# Patient Record
Sex: MALE | Race: OTHER | NOT HISPANIC OR LATINO | Employment: OTHER | URBAN - METROPOLITAN AREA
[De-identification: names, ages, dates, MRNs, and addresses within clinical notes are randomized per-mention and may not be internally consistent; named-entity substitution may affect disease eponyms.]

---

## 2017-06-12 ENCOUNTER — GENERIC CONVERSION - ENCOUNTER (OUTPATIENT)
Dept: OTHER | Facility: OTHER | Age: 62
End: 2017-06-12

## 2017-07-31 ENCOUNTER — ALLSCRIPTS OFFICE VISIT (OUTPATIENT)
Dept: OTHER | Facility: OTHER | Age: 62
End: 2017-07-31

## 2017-07-31 DIAGNOSIS — R53.83 OTHER FATIGUE: ICD-10-CM

## 2017-07-31 DIAGNOSIS — E78.5 HYPERLIPIDEMIA: ICD-10-CM

## 2017-07-31 DIAGNOSIS — Z00.00 ENCOUNTER FOR GENERAL ADULT MEDICAL EXAMINATION WITHOUT ABNORMAL FINDINGS: ICD-10-CM

## 2017-07-31 DIAGNOSIS — Z86.39 PERSONAL HISTORY OF OTHER ENDOCRINE, NUTRITIONAL AND METABOLIC DISEASE: ICD-10-CM

## 2017-07-31 DIAGNOSIS — M79.672 PAIN OF LEFT FOOT: ICD-10-CM

## 2017-08-01 ENCOUNTER — TRANSCRIBE ORDERS (OUTPATIENT)
Dept: ADMINISTRATIVE | Age: 62
End: 2017-08-01

## 2017-08-01 ENCOUNTER — APPOINTMENT (OUTPATIENT)
Dept: LAB | Age: 62
End: 2017-08-01
Payer: COMMERCIAL

## 2017-08-01 ENCOUNTER — APPOINTMENT (OUTPATIENT)
Dept: RADIOLOGY | Age: 62
End: 2017-08-01
Payer: COMMERCIAL

## 2017-08-01 DIAGNOSIS — R53.83 OTHER FATIGUE: ICD-10-CM

## 2017-08-01 DIAGNOSIS — E78.5 HYPERLIPIDEMIA: ICD-10-CM

## 2017-08-01 DIAGNOSIS — Z86.39 PERSONAL HISTORY OF OTHER ENDOCRINE, NUTRITIONAL AND METABOLIC DISEASE: ICD-10-CM

## 2017-08-01 DIAGNOSIS — Z00.00 ENCOUNTER FOR GENERAL ADULT MEDICAL EXAMINATION WITHOUT ABNORMAL FINDINGS: ICD-10-CM

## 2017-08-01 DIAGNOSIS — M79.672 PAIN OF LEFT FOOT: ICD-10-CM

## 2017-08-01 DIAGNOSIS — E11.9 DIABETES MELLITUS WITHOUT COMPLICATION (HCC): Primary | ICD-10-CM

## 2017-08-01 LAB
ALBUMIN SERPL BCP-MCNC: 3.6 G/DL (ref 3.5–5)
ALP SERPL-CCNC: 38 U/L (ref 46–116)
ALT SERPL W P-5'-P-CCNC: 28 U/L (ref 12–78)
ANION GAP SERPL CALCULATED.3IONS-SCNC: 7 MMOL/L (ref 4–13)
AST SERPL W P-5'-P-CCNC: 19 U/L (ref 5–45)
BASOPHILS # BLD AUTO: 0.03 THOUSANDS/ΜL (ref 0–0.1)
BASOPHILS NFR BLD AUTO: 0 % (ref 0–1)
BILIRUB SERPL-MCNC: 0.64 MG/DL (ref 0.2–1)
BUN SERPL-MCNC: 25 MG/DL (ref 5–25)
CALCIUM SERPL-MCNC: 9.4 MG/DL (ref 8.3–10.1)
CHLORIDE SERPL-SCNC: 102 MMOL/L (ref 100–108)
CHOLEST SERPL-MCNC: 180 MG/DL (ref 50–200)
CO2 SERPL-SCNC: 29 MMOL/L (ref 21–32)
CREAT SERPL-MCNC: 1.01 MG/DL (ref 0.6–1.3)
CREAT UR-MCNC: 38.6 MG/DL
EOSINOPHIL # BLD AUTO: 0.01 THOUSAND/ΜL (ref 0–0.61)
EOSINOPHIL NFR BLD AUTO: 0 % (ref 0–6)
ERYTHROCYTE [DISTWIDTH] IN BLOOD BY AUTOMATED COUNT: 14 % (ref 11.6–15.1)
EST. AVERAGE GLUCOSE BLD GHB EST-MCNC: 140 MG/DL
GFR SERPL CREATININE-BSD FRML MDRD: 79 ML/MIN/1.73SQ M
GLUCOSE P FAST SERPL-MCNC: 109 MG/DL (ref 65–99)
HBA1C MFR BLD: 6.5 % (ref 4.2–6.3)
HCT VFR BLD AUTO: 42.1 % (ref 36.5–49.3)
HDLC SERPL-MCNC: 35 MG/DL (ref 40–60)
HGB BLD-MCNC: 14 G/DL (ref 12–17)
LDLC SERPL CALC-MCNC: 116 MG/DL (ref 0–100)
LYMPHOCYTES # BLD AUTO: 1.89 THOUSANDS/ΜL (ref 0.6–4.47)
LYMPHOCYTES NFR BLD AUTO: 26 % (ref 14–44)
MCH RBC QN AUTO: 28.7 PG (ref 26.8–34.3)
MCHC RBC AUTO-ENTMCNC: 33.3 G/DL (ref 31.4–37.4)
MCV RBC AUTO: 86 FL (ref 82–98)
MICROALBUMIN UR-MCNC: <5 MG/L (ref 0–20)
MICROALBUMIN/CREAT 24H UR: <13 MG/G CREATININE (ref 0–30)
MONOCYTES # BLD AUTO: 0.75 THOUSAND/ΜL (ref 0.17–1.22)
MONOCYTES NFR BLD AUTO: 10 % (ref 4–12)
NEUTROPHILS # BLD AUTO: 4.69 THOUSANDS/ΜL (ref 1.85–7.62)
NEUTS SEG NFR BLD AUTO: 64 % (ref 43–75)
NRBC BLD AUTO-RTO: 0 /100 WBCS
PLATELET # BLD AUTO: 186 THOUSANDS/UL (ref 149–390)
PMV BLD AUTO: 11.8 FL (ref 8.9–12.7)
POTASSIUM SERPL-SCNC: 4.3 MMOL/L (ref 3.5–5.3)
PROT SERPL-MCNC: 7.6 G/DL (ref 6.4–8.2)
PSA SERPL-MCNC: 0.6 NG/ML (ref 0–4)
RBC # BLD AUTO: 4.87 MILLION/UL (ref 3.88–5.62)
SODIUM SERPL-SCNC: 138 MMOL/L (ref 136–145)
TRIGL SERPL-MCNC: 146 MG/DL
WBC # BLD AUTO: 7.39 THOUSAND/UL (ref 4.31–10.16)

## 2017-08-01 PROCEDURE — 73630 X-RAY EXAM OF FOOT: CPT

## 2017-08-01 PROCEDURE — 80061 LIPID PANEL: CPT

## 2017-08-01 PROCEDURE — 82570 ASSAY OF URINE CREATININE: CPT | Performed by: INTERNAL MEDICINE

## 2017-08-01 PROCEDURE — 83036 HEMOGLOBIN GLYCOSYLATED A1C: CPT

## 2017-08-01 PROCEDURE — 80053 COMPREHEN METABOLIC PANEL: CPT

## 2017-08-01 PROCEDURE — 82043 UR ALBUMIN QUANTITATIVE: CPT | Performed by: INTERNAL MEDICINE

## 2017-08-01 PROCEDURE — 36415 COLL VENOUS BLD VENIPUNCTURE: CPT

## 2017-08-01 PROCEDURE — 85025 COMPLETE CBC W/AUTO DIFF WBC: CPT

## 2017-08-01 PROCEDURE — G0103 PSA SCREENING: HCPCS

## 2017-08-08 ENCOUNTER — GENERIC CONVERSION - ENCOUNTER (OUTPATIENT)
Dept: OTHER | Facility: OTHER | Age: 62
End: 2017-08-08

## 2017-09-02 ENCOUNTER — APPOINTMENT (EMERGENCY)
Dept: RADIOLOGY | Facility: HOSPITAL | Age: 62
End: 2017-09-02
Payer: COMMERCIAL

## 2017-09-02 ENCOUNTER — HOSPITAL ENCOUNTER (OUTPATIENT)
Facility: HOSPITAL | Age: 62
Setting detail: OBSERVATION
Discharge: HOME | End: 2017-09-03
Attending: EMERGENCY MEDICINE | Admitting: INTERNAL MEDICINE
Payer: COMMERCIAL

## 2017-09-02 DIAGNOSIS — R07.9 CHEST PAIN, UNSPECIFIED TYPE: Primary | ICD-10-CM

## 2017-09-02 LAB
ALBUMIN SERPL BCP-MCNC: 3.6 G/DL (ref 3.5–5)
ALP SERPL-CCNC: 33 U/L (ref 46–116)
ALT SERPL W P-5'-P-CCNC: 36 U/L (ref 12–78)
ANION GAP SERPL CALCULATED.3IONS-SCNC: 8 MMOL/L (ref 4–13)
AST SERPL W P-5'-P-CCNC: 49 U/L (ref 5–45)
BASOPHILS # BLD AUTO: 0.04 THOUSANDS/ΜL (ref 0–0.1)
BASOPHILS NFR BLD AUTO: 1 % (ref 0–1)
BILIRUB SERPL-MCNC: 0.65 MG/DL (ref 0.2–1)
BUN SERPL-MCNC: 25 MG/DL (ref 5–25)
CALCIUM SERPL-MCNC: 9.2 MG/DL (ref 8.3–10.1)
CHLORIDE SERPL-SCNC: 104 MMOL/L (ref 100–108)
CO2 SERPL-SCNC: 25 MMOL/L (ref 21–32)
CREAT SERPL-MCNC: 1.04 MG/DL (ref 0.6–1.3)
EOSINOPHIL # BLD AUTO: 0 THOUSAND/ΜL (ref 0–0.61)
EOSINOPHIL NFR BLD AUTO: 0 % (ref 0–6)
ERYTHROCYTE [DISTWIDTH] IN BLOOD BY AUTOMATED COUNT: 13.8 % (ref 11.6–15.1)
GFR SERPL CREATININE-BSD FRML MDRD: 76 ML/MIN/1.73SQ M
GLUCOSE SERPL-MCNC: 113 MG/DL (ref 65–140)
HCT VFR BLD AUTO: 44.6 % (ref 36.5–49.3)
HGB BLD-MCNC: 15.3 G/DL (ref 12–17)
LYMPHOCYTES # BLD AUTO: 2.08 THOUSANDS/ΜL (ref 0.6–4.47)
LYMPHOCYTES NFR BLD AUTO: 26 % (ref 14–44)
MCH RBC QN AUTO: 29 PG (ref 26.8–34.3)
MCHC RBC AUTO-ENTMCNC: 34.3 G/DL (ref 31.4–37.4)
MCV RBC AUTO: 85 FL (ref 82–98)
MONOCYTES # BLD AUTO: 0.74 THOUSAND/ΜL (ref 0.17–1.22)
MONOCYTES NFR BLD AUTO: 9 % (ref 4–12)
NEUTROPHILS # BLD AUTO: 4.99 THOUSANDS/ΜL (ref 1.85–7.62)
NEUTS SEG NFR BLD AUTO: 64 % (ref 43–75)
NRBC BLD AUTO-RTO: 0 /100 WBCS
PLATELET # BLD AUTO: 193 THOUSANDS/UL (ref 149–390)
PMV BLD AUTO: 10.8 FL (ref 8.9–12.7)
POTASSIUM SERPL-SCNC: 4.2 MMOL/L (ref 3.5–5.3)
PROT SERPL-MCNC: 7.7 G/DL (ref 6.4–8.2)
RBC # BLD AUTO: 5.27 MILLION/UL (ref 3.88–5.62)
SODIUM SERPL-SCNC: 137 MMOL/L (ref 136–145)
SPECIMEN SOURCE: NORMAL
TROPONIN I BLD-MCNC: 0 NG/ML (ref 0–0.08)
WBC # BLD AUTO: 7.87 THOUSAND/UL (ref 4.31–10.16)

## 2017-09-02 PROCEDURE — 93005 ELECTROCARDIOGRAM TRACING: CPT | Performed by: EMERGENCY MEDICINE

## 2017-09-02 PROCEDURE — 80053 COMPREHEN METABOLIC PANEL: CPT | Performed by: EMERGENCY MEDICINE

## 2017-09-02 PROCEDURE — 36415 COLL VENOUS BLD VENIPUNCTURE: CPT

## 2017-09-02 PROCEDURE — 84484 ASSAY OF TROPONIN QUANT: CPT

## 2017-09-02 PROCEDURE — 71020 HB CHEST X-RAY 2VW FRONTAL&LATL: CPT

## 2017-09-02 PROCEDURE — 85025 COMPLETE CBC W/AUTO DIFF WBC: CPT | Performed by: EMERGENCY MEDICINE

## 2017-09-02 RX ORDER — OMEPRAZOLE 40 MG/1
40 CAPSULE, DELAYED RELEASE ORAL DAILY
COMMUNITY
End: 2018-01-28 | Stop reason: SDUPTHER

## 2017-09-02 RX ORDER — METOPROLOL TARTRATE 75 MG/1
TABLET, FILM COATED ORAL
COMMUNITY
End: 2017-09-03 | Stop reason: CLARIF

## 2017-09-02 RX ORDER — AMLODIPINE BESYLATE 5 MG/1
5 TABLET ORAL DAILY
COMMUNITY
End: 2018-02-25 | Stop reason: SDUPTHER

## 2017-09-02 RX ORDER — ASPIRIN 81 MG/1
81 TABLET, CHEWABLE ORAL DAILY
COMMUNITY

## 2017-09-02 RX ORDER — ENALAPRIL MALEATE 10 MG/1
10 TABLET ORAL DAILY
COMMUNITY
End: 2018-07-03 | Stop reason: SDUPTHER

## 2017-09-02 RX ORDER — TRIAMTERENE AND HYDROCHLOROTHIAZIDE 37.5; 25 MG/1; MG/1
1 CAPSULE ORAL EVERY MORNING
COMMUNITY
End: 2018-01-28 | Stop reason: SDUPTHER

## 2017-09-03 ENCOUNTER — APPOINTMENT (OUTPATIENT)
Dept: NON INVASIVE DIAGNOSTICS | Facility: HOSPITAL | Age: 62
End: 2017-09-03
Payer: COMMERCIAL

## 2017-09-03 ENCOUNTER — GENERIC CONVERSION - ENCOUNTER (OUTPATIENT)
Dept: OTHER | Facility: OTHER | Age: 62
End: 2017-09-03

## 2017-09-03 VITALS
SYSTOLIC BLOOD PRESSURE: 142 MMHG | HEART RATE: 64 BPM | TEMPERATURE: 98.7 F | BODY MASS INDEX: 41.75 KG/M2 | HEIGHT: 73 IN | WEIGHT: 315 LBS | DIASTOLIC BLOOD PRESSURE: 68 MMHG | RESPIRATION RATE: 20 BRPM | OXYGEN SATURATION: 93 %

## 2017-09-03 PROBLEM — R07.89 CHEST DISCOMFORT: Status: ACTIVE | Noted: 2017-09-03

## 2017-09-03 PROBLEM — R07.9 CHEST PAIN: Status: RESOLVED | Noted: 2017-09-03 | Resolved: 2017-09-03

## 2017-09-03 PROBLEM — R07.9 CHEST PAIN: Status: ACTIVE | Noted: 2017-09-03

## 2017-09-03 LAB
ANION GAP SERPL CALCULATED.3IONS-SCNC: 9 MMOL/L (ref 4–13)
ATRIAL RATE: 58 BPM
ATRIAL RATE: 77 BPM
BUN SERPL-MCNC: 20 MG/DL (ref 5–25)
CALCIUM SERPL-MCNC: 9 MG/DL (ref 8.3–10.1)
CHLORIDE SERPL-SCNC: 106 MMOL/L (ref 100–108)
CHOLEST SERPL-MCNC: 161 MG/DL (ref 50–200)
CO2 SERPL-SCNC: 26 MMOL/L (ref 21–32)
CREAT SERPL-MCNC: 0.92 MG/DL (ref 0.6–1.3)
ERYTHROCYTE [DISTWIDTH] IN BLOOD BY AUTOMATED COUNT: 13.6 % (ref 11.6–15.1)
EST. AVERAGE GLUCOSE BLD GHB EST-MCNC: 137 MG/DL
GFR SERPL CREATININE-BSD FRML MDRD: 88 ML/MIN/1.73SQ M
GLUCOSE SERPL-MCNC: 105 MG/DL (ref 65–140)
HBA1C MFR BLD: 6.4 % (ref 4.2–6.3)
HCT VFR BLD AUTO: 41.5 % (ref 36.5–49.3)
HDLC SERPL-MCNC: 31 MG/DL (ref 40–60)
HGB BLD-MCNC: 14.1 G/DL (ref 12–17)
LDLC SERPL CALC-MCNC: 109 MG/DL (ref 0–100)
MAGNESIUM SERPL-MCNC: 2.3 MG/DL (ref 1.6–2.6)
MCH RBC QN AUTO: 29 PG (ref 26.8–34.3)
MCHC RBC AUTO-ENTMCNC: 34 G/DL (ref 31.4–37.4)
MCV RBC AUTO: 85 FL (ref 82–98)
P AXIS: 40 DEGREES
P AXIS: 62 DEGREES
PLATELET # BLD AUTO: 168 THOUSANDS/UL (ref 149–390)
PMV BLD AUTO: 10.7 FL (ref 8.9–12.7)
POTASSIUM SERPL-SCNC: 3.3 MMOL/L (ref 3.5–5.3)
PR INTERVAL: 190 MS
PR INTERVAL: 212 MS
QRS AXIS: -45 DEGREES
QRS AXIS: -54 DEGREES
QRSD INTERVAL: 100 MS
QRSD INTERVAL: 104 MS
QT INTERVAL: 396 MS
QT INTERVAL: 438 MS
QTC INTERVAL: 429 MS
QTC INTERVAL: 448 MS
RBC # BLD AUTO: 4.87 MILLION/UL (ref 3.88–5.62)
SODIUM SERPL-SCNC: 141 MMOL/L (ref 136–145)
T WAVE AXIS: 41 DEGREES
T WAVE AXIS: 80 DEGREES
TRIGL SERPL-MCNC: 107 MG/DL
TROPONIN I SERPL-MCNC: <0.02 NG/ML
TROPONIN I SERPL-MCNC: <0.02 NG/ML
VENTRICULAR RATE: 58 BPM
VENTRICULAR RATE: 77 BPM
WBC # BLD AUTO: 5.89 THOUSAND/UL (ref 4.31–10.16)

## 2017-09-03 PROCEDURE — 83036 HEMOGLOBIN GLYCOSYLATED A1C: CPT | Performed by: INTERNAL MEDICINE

## 2017-09-03 PROCEDURE — 93005 ELECTROCARDIOGRAM TRACING: CPT | Performed by: INTERNAL MEDICINE

## 2017-09-03 PROCEDURE — 84484 ASSAY OF TROPONIN QUANT: CPT | Performed by: INTERNAL MEDICINE

## 2017-09-03 PROCEDURE — 85027 COMPLETE CBC AUTOMATED: CPT | Performed by: INTERNAL MEDICINE

## 2017-09-03 PROCEDURE — 93306 TTE W/DOPPLER COMPLETE: CPT

## 2017-09-03 PROCEDURE — 80048 BASIC METABOLIC PNL TOTAL CA: CPT | Performed by: INTERNAL MEDICINE

## 2017-09-03 PROCEDURE — 80061 LIPID PANEL: CPT | Performed by: INTERNAL MEDICINE

## 2017-09-03 PROCEDURE — 99285 EMERGENCY DEPT VISIT HI MDM: CPT

## 2017-09-03 PROCEDURE — 83735 ASSAY OF MAGNESIUM: CPT | Performed by: INTERNAL MEDICINE

## 2017-09-03 RX ORDER — PANTOPRAZOLE SODIUM 40 MG/1
40 TABLET, DELAYED RELEASE ORAL
Status: DISCONTINUED | OUTPATIENT
Start: 2017-09-03 | End: 2017-09-03 | Stop reason: HOSPADM

## 2017-09-03 RX ORDER — AMLODIPINE BESYLATE 5 MG/1
5 TABLET ORAL DAILY
Status: DISCONTINUED | OUTPATIENT
Start: 2017-09-03 | End: 2017-09-03 | Stop reason: HOSPADM

## 2017-09-03 RX ORDER — TRIAMTERENE AND HYDROCHLOROTHIAZIDE 37.5; 25 MG/1; MG/1
1 TABLET ORAL DAILY
Status: DISCONTINUED | OUTPATIENT
Start: 2017-09-03 | End: 2017-09-03 | Stop reason: HOSPADM

## 2017-09-03 RX ORDER — METOPROLOL SUCCINATE 25 MG/1
75 TABLET, EXTENDED RELEASE ORAL DAILY
COMMUNITY
Start: 2017-07-31 | End: 2018-01-28 | Stop reason: SDUPTHER

## 2017-09-03 RX ORDER — POTASSIUM CHLORIDE 20 MEQ/1
40 TABLET, EXTENDED RELEASE ORAL ONCE
Status: COMPLETED | OUTPATIENT
Start: 2017-09-03 | End: 2017-09-03

## 2017-09-03 RX ORDER — ENALAPRIL MALEATE 10 MG/1
10 TABLET ORAL DAILY
Status: DISCONTINUED | OUTPATIENT
Start: 2017-09-03 | End: 2017-09-03 | Stop reason: HOSPADM

## 2017-09-03 RX ORDER — ASPIRIN 81 MG/1
81 TABLET, CHEWABLE ORAL DAILY
Status: DISCONTINUED | OUTPATIENT
Start: 2017-09-03 | End: 2017-09-03 | Stop reason: HOSPADM

## 2017-09-03 RX ADMIN — ASPIRIN 81 MG 81 MG: 81 TABLET ORAL at 08:27

## 2017-09-03 RX ADMIN — ENALAPRIL MALEATE 10 MG: 10 TABLET ORAL at 08:27

## 2017-09-03 RX ADMIN — PANTOPRAZOLE SODIUM 40 MG: 40 TABLET, DELAYED RELEASE ORAL at 06:25

## 2017-09-03 RX ADMIN — POTASSIUM CHLORIDE 40 MEQ: 1500 TABLET, EXTENDED RELEASE ORAL at 13:43

## 2017-09-03 RX ADMIN — AMLODIPINE BESYLATE 5 MG: 5 TABLET ORAL at 08:27

## 2017-09-03 RX ADMIN — METOPROLOL SUCCINATE 75 MG: 50 TABLET, EXTENDED RELEASE ORAL at 08:26

## 2017-09-03 RX ADMIN — TRIAMTERENE AND HYDROCHLOROTHIAZIDE 1 TABLET: 37.5; 25 TABLET ORAL at 08:27

## 2017-09-03 RX ADMIN — PERFLUTREN 1 ML/MIN: 6.52 INJECTION, SUSPENSION INTRAVENOUS at 15:31

## 2017-09-04 LAB
ATRIAL RATE: 57 BPM
P AXIS: 54 DEGREES
PR INTERVAL: 208 MS
QRS AXIS: -41 DEGREES
QRSD INTERVAL: 100 MS
QT INTERVAL: 454 MS
QTC INTERVAL: 441 MS
T WAVE AXIS: 59 DEGREES
VENTRICULAR RATE: 57 BPM

## 2017-09-05 ENCOUNTER — TRANSCRIBE ORDERS (OUTPATIENT)
Dept: ADMINISTRATIVE | Age: 62
End: 2017-09-05

## 2017-09-05 ENCOUNTER — APPOINTMENT (OUTPATIENT)
Dept: RADIOLOGY | Age: 62
End: 2017-09-05
Payer: COMMERCIAL

## 2017-09-05 DIAGNOSIS — R06.02 SHORTNESS OF BREATH: ICD-10-CM

## 2017-09-05 DIAGNOSIS — R06.02 SHORTNESS OF BREATH: Primary | ICD-10-CM

## 2017-09-05 PROCEDURE — 71020 HB CHEST X-RAY 2VW FRONTAL&LATL: CPT

## 2017-09-07 ENCOUNTER — ALLSCRIPTS OFFICE VISIT (OUTPATIENT)
Dept: OTHER | Facility: OTHER | Age: 62
End: 2017-09-07

## 2017-09-07 ENCOUNTER — TRANSCRIBE ORDERS (OUTPATIENT)
Dept: ADMINISTRATIVE | Facility: HOSPITAL | Age: 62
End: 2017-09-07

## 2017-09-07 DIAGNOSIS — R07.9 CHEST PAIN, UNSPECIFIED: Primary | ICD-10-CM

## 2017-09-07 DIAGNOSIS — E87.6 HYPOKALEMIA: ICD-10-CM

## 2017-09-07 DIAGNOSIS — R07.9 CHEST PAIN: ICD-10-CM

## 2017-09-07 DIAGNOSIS — E11.9 TYPE 2 DIABETES MELLITUS WITHOUT COMPLICATIONS (HCC): ICD-10-CM

## 2017-09-22 ENCOUNTER — HOSPITAL ENCOUNTER (OUTPATIENT)
Dept: NON INVASIVE DIAGNOSTICS | Facility: CLINIC | Age: 62
Discharge: HOME | End: 2017-09-22
Payer: COMMERCIAL

## 2017-09-22 DIAGNOSIS — R07.9 CHEST PAIN, UNSPECIFIED: ICD-10-CM

## 2017-09-22 DIAGNOSIS — R07.9 CHEST PAIN: ICD-10-CM

## 2017-09-22 LAB
CHEST PAIN STATEMENT: NORMAL
MAX DIASTOLIC BP: 78 MMHG
MAX HEART RATE: 109 BPM
MAX PREDICTED HEART RATE: 157 BPM
MAX. SYSTOLIC BP: 142 MMHG
PROTOCOL NAME: NORMAL
REASON FOR TERMINATION: NORMAL
TARGET HR FORMULA: NORMAL
TEST INDICATION: NORMAL
TIME IN EXERCISE PHASE: 371 S

## 2017-09-22 PROCEDURE — A9502 TC99M TETROFOSMIN: HCPCS

## 2017-09-22 PROCEDURE — 93017 CV STRESS TEST TRACING ONLY: CPT

## 2017-09-22 PROCEDURE — 78452 HT MUSCLE IMAGE SPECT MULT: CPT

## 2017-09-22 RX ADMIN — REGADENOSON 0.4 MG: 0.08 INJECTION, SOLUTION INTRAVENOUS at 14:12

## 2018-01-11 NOTE — PROGRESS NOTES
Chief Complaint  Pt here for flu shot      Active Problems    1  Acute URI (465 9) (J06 9)   2  Allergy to IVP dye (995 27,E947 4) (T50 995A)   3  Bradycardia (427 89) (R00 1)   4  Dyslipidemia (272 4) (E78 5)   5  Encounter for screening for malignant neoplasm of prostate (V76 44) (Z12 5)   6  Erectile dysfunction of non-organic origin (302 72) (F52 21)   7  Esophageal reflux (530 81) (K21 9)   8  Hyperglycemia (790 29) (R73 9)   9  Hypertension (401 9) (I10)   10  Morbid or severe obesity due to excess calories (278 01) (E66 01)   11  Muscle ache (729 1) (M79 1)   12  Non-toxic uninodular goiter (241 0) (E04 1)   13  Obstructive sleep apnea (327 23) (G47 33)   14  Palpitations (785 1) (R00 2)   15  Seborrhea (706 3) (L21 9)   16  Spermatic cord inflammation (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 4) (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 8)   17  Testicular pain, right (608 9) (N50 8)   18  Vertigo (780 4) (R42)   19  Vitamin D deficiency (268 9) (E55 9)    Current Meds   1  Adult Aspirin EC Low Strength 81 MG Oral Tablet Delayed Release; Therapy: (Recorded:01Pxy0598) to Recorded   2  AmLODIPine Besylate 5 MG Oral Tablet; TAKE 1 TABLET DAILY FOR BLOOD   PRESSURE; Therapy: 56LIN8522 to (Last Rx:91Pzz8454)  Requested for: 54Jlt4978 Ordered   3  Amoxicillin 500 MG Oral Tablet; TAKE 1 TABLET EVERY 8 HOURS DAILY; Therapy: 11EIY0807 to (Evaluate:54Ewq4221); Last Rx:52Ije1177 Ordered   4  Blood Glucose Monitor System w/Device Kit; use as directed; Therapy: 88GNB6613 to (Last Rx:76Pnl7889) Ordered   5  [employer] Vitamin D CAPS; Therapy: (Recorded:28Cez9677) to Recorded   6  Enalapril Maleate 10 MG Oral Tablet; take 1 tablet twice a day; Therapy: 75UKN9029 to (Last Rx:85Olh9693)  Requested for: 12TAP1160 Ordered   7  Meclizine HCl - 12 5 MG Oral Tablet; Take 1 tablet 3 times daily as needed; Therapy: 79KGL7652 to (Last Rx:03Gth7945) Ordered   8  Metoprolol Succinate ER 50 MG Oral Tablet Extended Release 24 Hour; TAKE 1 5 TABS   DAILY;    Therapy: 64Vdr4335 to (Evaluate:43Ryx0474) Requested for: 71PXN0794; Last   Rx:02Yuk9942 Ordered   9  Multivitamins CAPS; Therapy: (Recorded:92Wfm1447) to Recorded   10  Omeprazole 40 MG Oral Capsule Delayed Release; TAKE 1 CAPSULE DAILY; Therapy: 72Qgm6328 to (Evaluate:83Hpm4061)  Requested for: 36Xhp1697 Recorded   11  OneTouch Verio In [employer]; TEST ONCE DAILY; Therapy: 75TEF6763 to (Last Rx:36Bcp6710)  Requested for: 67Fyz4951 Ordered   12  Triamterene-HCTZ 37 5-25 MG Oral Capsule; take 1 capsule daily; Therapy: 67QPH7556 to (Last Rx:06Jun2016)  Requested for: 06Jun2016 Ordered    Allergies    1   No Known Drug Allergies    Vitals  Signs    Temperature: 97 9 F, Tympanic    Plan  Flu vaccine need    · Fluzone Intramuscular Suspension    Signatures   Electronically signed by : ANTONIO Taylor ; Sep 26 2016  9:12PM EST                       (Author)

## 2018-01-12 NOTE — MISCELLANEOUS
Assessment    1  Chest pain (786 50) (R07 9)   2  Benign essential HTN (401 1) (I10)   3  DM type 2, goal HbA1c < 7% (250 00) (E11 9)   4  Dyslipidemia (272 4) (E78 5)   5  Hypokalemia (276 8) (E87 6)    Plan   DM type 2, goal HbA1c < 7%    · (1) COMPREHENSIVE METABOLIC PANEL; Status:Active; Requested DCT:78GQL0997;    Perform:[facility]; BQX:67UVS1762; Ordered; For:DM type 2, goal HbA1c < 7%; Ordered By:Theodora Taveras;   · (1) HEMOGLOBIN A1C; Status:Active; Requested HOW:57HUX7169;    Perform:[facility]; CZS:57OLQ1858; Ordered; For:DM type 2, goal HbA1c < 7%; Ordered By:Theodora Taveras;  Hypokalemia    · (1) BASIC METABOLIC PROFILE; Status:Active; Requested BPL:62DUC0205;    Perform:[facility]; Due:21Ctj9673; Ordered;  For:Hypokalemia; Ordered By:Rachna Taveras;    *VB - Eye Exam; Status:Active - Retrospective By Protocol Authorization; Requested WKI:[id number];   Perform:Other; Due:17Nxv1746; Last Updated By:Carol Coronel; 9/7/2017 12:19:08 PM;Ordered; For:DM type 2, goal HbA1c < 7%; Ordered By:Theodora Taveras;      Discussion/Summary  Counseling Documentation With Imm: The patient was counseled regarding impressions  Medication SE Review and Pt Understands Tx: Possible side effects of new medications were reviewed with the patient/guardian today  The treatment plan was reviewed with the patient/guardian  The patient/guardian understands and agrees with the treatment plan      Chief Complaint  Chief Complaint Free Text Note Form: Patient presents for a DESTINEE for chest pain from [facility] from 9/2/17 to 9/3/17  He still is having on/off dizziness  Has lessened his carbohydrate and sugar intake  History of Present Illness  TCM Communication [facility]: The patient is being contacted for follow-up after hospitalization  Hospital records were reviewed  He was hospitalized at [facility]  The date of admission: 09/02/2017, date of discharge: 09/03/2017   Diagnosis: CHEST DISCOMFORT  He was discharged to home  Medications reviewed and updated today  He scheduled a follow up appointment  Symptoms: dizziness and fatigue  LIGHT HEADED Counseling was provided to the patient  Topics counseled included importance of compliance with treatment  Communication performed and completed by Lauren Costa LPN   HPI: pt went to hosp  he is getting cp with exercise bike  he was negative for mi  his echo was unchanged  he states his nonfasting bld gluc nonfasting       Review of Systems  Complete-Male:   Constitutional: No fever or chills, feels well, no tiredness, no recent weight gain or weight loss  ENT: no complaints of earache, no hearing loss, no nosebleeds, no nasal discharge, no sore throat, no hoarseness  Cardiovascular: No complaints of slow heart rate, no fast heart rate, no chest pain, no palpitations, no leg claudication, no lower extremity  Respiratory: No complaints of shortness of breath, no wheezing, no cough, no SOB on exertion, no orthopnea or PND  Gastrointestinal: No complaints of abdominal pain, no constipation, no nausea or vomiting, no diarrhea or bloody stools  Active Problems     1  DM type 2, goal HbA1c < 7% (250 00) (E11 9)   2  AYLEEN (obstructive sleep apnea) (327 23) (G47 33)    Fatigue (780 79) (R53 83)       Left foot pain (729 5) (M79 672)        Benign essential HTN (401 1) (I10)       Dyslipidemia (272 4) (E78 5)          Past Medical History     1  History of Acute upper respiratory infection (465 9) (J06 9)   2  History of Edema (782 3) (R60 9)   3  History of Flu vaccine need (V04 81) (Z23)   4  Flu vaccine need (V04 81) (Z23)   5  History of Groin pain (789 09) (R10 30)   6  History of arthritis (V13 4) (Z87 39)   7  Denied: History of depression   8  History of epididymitis (V13 89) (Z87 438)   9  History of fatigue (V13 89) (Z87 898)   10  Denied: History of substance abuse   11  History of thyroid disease (V12 29) (Z86 39)   12   History of type 2 diabetes mellitus (V12 29) (Z86 39)   13  History of upper respiratory infection (V12 09) (Z87 09)   14  History of Irregular heart beat (427 9) (I49 9)   15  History of Lymph node enlargement (785 6) (R59 9)   16  History of Prediabetes (790 29) (R73 03)   17  History of Sexual dysfunction (302 70) (R37)   18  History of Tendinitis of elbow (727 09) (M77 8)   19  History of Upper respiratory infection (465 9) (J06 9)   20  History of URTI (acute upper respiratory infection) (465 9) (J06 9)   21  History of Vertigo (780 4) (R42)    History of Palpitations (785 1) (R00 2)          Surgical History    1  History of Colposcopy   2  Denied: History Of Prior Surgery  Surgical History Reviewed: The surgical history was reviewed and updated today  Family History  Mother    1  Family history of Essential Hypertension   2  Family history of cerebrovascular accident (V17 1) (Z82 3)   3  Denied: Family history of depression   4  Family history of hypertension (V17 49) (Z82 49)   5  Denied: Family history of substance abuse   6  Family history of Mother  At Age 61  Father    9  Family history of Essential Hypertension   8  Family history of cardiac disorder (V17 49) (Z82 49)   9  Denied: Family history of depression   10  Family history of hypertension (V17 49) (Z82 49)   11  Denied: Family history of substance abuse   12  Family history of Father  At Age 61  Brother    15  Family history of cerebrovascular accident (V17 1) (Z82 3)   14  Family history of hypertension (V17 49) (Z82 49)  Family History Reviewed: The family history was reviewed and updated today  Social History    · Denied: History of Drug Use   · Former smoker (V15 82) (G34 202)   · Never Drank Alcohol  Social History Reviewed: The social history was reviewed and updated today  The social history was reviewed and is unchanged  Current Meds   1   Adult Aspirin EC Low Strength 81 MG Oral Tablet Delayed Release; Therapy: (Recorded:42Tgr0148) to Recorded   2  AmLODIPine Besylate 5 MG Oral Tablet; TAKE 1 TABLET DAILY FOR BLOOD   PRESSURE; Therapy: 50FYT2105 to (Last Rx:51Vff4895)  Requested for: 93Cni1148 Ordered   3  Blood Glucose Monitor System w/Device Kit; use as directed; Therapy: 87SWN6167 to (Last Rx:06Uis3795) Ordered   4  [employer] Vitamin D CAPS; Therapy: (Recorded:24Dhf2054) to Recorded   5  Enalapril Maleate 10 MG Oral Tablet; take 1 tablet twice a day; Therapy: 71RMY4888 to ([id number])  Requested for: 68LRR1892; Last   Rx:27Rdf6505 Ordered   6  Metoprolol Succinate ER 25 MG Oral Tablet Extended Release 24 Hour; TAKE 1 TABLET   ONCE DAILY (take in addition to 50mg); Therapy: 11JBI8104 to ([id number])  Requested for: 70IEM5558; Last   Rx:46Djd7148 Ordered   7  Metoprolol Succinate ER 25 MG Oral Tablet Extended Release 24 Hour; TAKE 3 TABLET   Daily; Therapy: 90VDV3306 to Recorded   8  Metoprolol Succinate ER 50 MG Oral Tablet Extended Release 24 Hour; TAKE 1 TABLET   DAILY  (take in addition to 25mg); Therapy: 31Txw8937 to (Renew:27Jan2018)  Requested for: 71UJZ2252; Last   Rx:20Psb6935 Ordered   9  Multivitamins CAPS; Therapy: (Recorded:27Ztg0426) to Recorded   10  Omeprazole 40 MG Oral Capsule Delayed Release; TAKE 1 CAPSULE DAILY; Therapy: 22Dzu3217 to (XZFMBKN:92KOO3968)  Requested for: 77RKF3671; Last    Rx:04Jqt9559 Ordered   11  OneTouch Verio In [employer]; TEST ONCE DAILY; Therapy: 34ZZW7562 to (Last [place] Mike)  Requested for: 84MHZ1878 Ordered   12  Triamterene-HCTZ 37 5-25 MG Oral Capsule; take 1 capsule daily; Therapy: 76IEF3119 to Recorded   13  Viagra 100 MG Oral Tablet; take 1 tablet by mouth once daily as directed; Therapy: 98Moj6213 to (Renew:27Jan2018)  Requested for: 94Ssc6067; Last    Rx:06Zle4117 Ordered  Medication List Reviewed: The medication list was reviewed and updated today  Allergies    1   No Known Drug Allergies    Vitals  Signs   Recorded: 66BOY0130 09:39AM   Temperature: 97 7 F, Tympanic  Heart Rate: 72, R Brachial Artery  Pulse Quality: Normal, R Brachial Artery  Respiration Quality: Normal  Respiration: 16  Systolic: 295, LUE, Sitting  Diastolic: 70, LUE, Sitting  BP Cuff Size: Large  Height: 6 ft   Weight: 325 lb 6 oz  BMI Calculated: 44 13  BSA Calculated: 2 62  O2 Saturation: 97    Physical Exam    Constitutional   General appearance: No acute distress, well appearing and well nourished  Eyes   Conjunctiva and lids: No swelling, erythema, or discharge  Pupils and irises: Equal, round and reactive to light  Ears, Nose, Mouth, and Throat   External inspection of ears and nose: Normal     Otoscopic examination: Tympanic membrance translucent with normal light reflex  Canals patent without erythema  Nasal mucosa, septum, and turbinates: Normal without edema or erythema  Oropharynx: Normal with no erythema, edema, exudate or lesions  Pulmonary   Respiratory effort: No increased work of breathing or signs of respiratory distress  Auscultation of lungs: Clear to auscultation, equal breath sounds bilaterally, no wheezes, no rales, no rhonci  Cardiovascular   Auscultation of heart: Normal rate and rhythm, normal S1 and S2, without murmurs  Examination of extremities for edema and/or varicosities: Normal     Abdomen   Abdomen: Non-tender, no masses  Liver and spleen: No hepatomegaly or splenomegaly  Lymphatic   Palpation of lymph nodes in neck: No lymphadenopathy      Musculoskeletal   Gait and station: Normal          Future Appointments    Date/Time Provider Specialty Site   11/20/2017 01:00 PM Edmar Layne DO Internal Medicine [facility]   01/31/2018 02:30 PM Edmar Layne DO Internal Medicine [address] [place] [address]   Electronically signed by : Luciano Garcia DO; Sep  8 2017  4:27AM EST                       (Author)

## 2018-01-13 VITALS
HEART RATE: 60 BPM | BODY MASS INDEX: 42.66 KG/M2 | OXYGEN SATURATION: 97 % | WEIGHT: 315 LBS | HEIGHT: 72 IN | DIASTOLIC BLOOD PRESSURE: 60 MMHG | SYSTOLIC BLOOD PRESSURE: 110 MMHG

## 2018-01-13 VITALS
SYSTOLIC BLOOD PRESSURE: 122 MMHG | DIASTOLIC BLOOD PRESSURE: 74 MMHG | RESPIRATION RATE: 16 BRPM | WEIGHT: 315 LBS | HEART RATE: 66 BPM | BODY MASS INDEX: 42.66 KG/M2 | TEMPERATURE: 98.4 F | HEIGHT: 72 IN | OXYGEN SATURATION: 97 %

## 2018-01-13 VITALS
SYSTOLIC BLOOD PRESSURE: 132 MMHG | TEMPERATURE: 97.7 F | HEIGHT: 72 IN | HEART RATE: 72 BPM | RESPIRATION RATE: 16 BRPM | WEIGHT: 315 LBS | OXYGEN SATURATION: 97 % | DIASTOLIC BLOOD PRESSURE: 70 MMHG | BODY MASS INDEX: 42.66 KG/M2

## 2018-01-15 NOTE — MISCELLANEOUS
Message  left message his a1c went up to 6 5 with his weight gain  rec to reduce and rec restart metformen 500mg daily   please call back to discuss this      Signatures   Electronically signed by : Chelsie Colin DO; Aug  8 2017 12:22PM EST                       (Author)

## 2018-01-28 DIAGNOSIS — I10 HYPERTENSION, UNSPECIFIED TYPE: Primary | ICD-10-CM

## 2018-01-28 DIAGNOSIS — K21.9 GASTROESOPHAGEAL REFLUX DISEASE WITHOUT ESOPHAGITIS: ICD-10-CM

## 2018-01-29 RX ORDER — TRIAMTERENE AND HYDROCHLOROTHIAZIDE 37.5; 25 MG/1; MG/1
CAPSULE ORAL
Qty: 90 CAPSULE | Refills: 1 | Status: SHIPPED | OUTPATIENT
Start: 2018-01-29 | End: 2018-07-03 | Stop reason: SDUPTHER

## 2018-01-29 RX ORDER — OMEPRAZOLE 40 MG/1
CAPSULE, DELAYED RELEASE ORAL
Qty: 90 CAPSULE | Refills: 1 | Status: SHIPPED | OUTPATIENT
Start: 2018-01-29 | End: 2018-07-03 | Stop reason: SDUPTHER

## 2018-01-29 RX ORDER — METOPROLOL SUCCINATE 25 MG/1
TABLET, EXTENDED RELEASE ORAL
Qty: 90 TABLET | Refills: 1 | Status: SHIPPED | OUTPATIENT
Start: 2018-01-29 | End: 2018-07-03 | Stop reason: SDUPTHER

## 2018-02-07 DIAGNOSIS — I10 HYPERTENSION, UNSPECIFIED TYPE: Primary | ICD-10-CM

## 2018-02-07 RX ORDER — METOPROLOL SUCCINATE 50 MG/1
50 TABLET, EXTENDED RELEASE ORAL DAILY
Qty: 90 TABLET | Refills: 1 | Status: SHIPPED | OUTPATIENT
Start: 2018-02-07 | End: 2018-07-03 | Stop reason: SDUPTHER

## 2018-02-25 DIAGNOSIS — I10 HYPERTENSION, UNSPECIFIED TYPE: Primary | ICD-10-CM

## 2018-02-25 RX ORDER — AMLODIPINE BESYLATE 5 MG/1
TABLET ORAL
Qty: 90 TABLET | Refills: 1 | Status: SHIPPED | OUTPATIENT
Start: 2018-02-25 | End: 2018-07-03 | Stop reason: SDUPTHER

## 2018-05-07 ENCOUNTER — HOSPITAL ENCOUNTER (EMERGENCY)
Facility: HOSPITAL | Age: 63
Discharge: HOME | End: 2018-05-07
Attending: EMERGENCY MEDICINE | Admitting: EMERGENCY MEDICINE
Payer: COMMERCIAL

## 2018-05-07 ENCOUNTER — APPOINTMENT (EMERGENCY)
Dept: RADIOLOGY | Facility: HOSPITAL | Age: 63
End: 2018-05-07
Payer: COMMERCIAL

## 2018-05-07 VITALS
OXYGEN SATURATION: 96 % | HEART RATE: 74 BPM | RESPIRATION RATE: 18 BRPM | SYSTOLIC BLOOD PRESSURE: 133 MMHG | DIASTOLIC BLOOD PRESSURE: 64 MMHG | TEMPERATURE: 98.2 F
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
## 2018-05-07 DIAGNOSIS — S29.9XXA CHEST WALL INJURY: ICD-10-CM

## 2018-05-07 DIAGNOSIS — S93.402A LEFT ANKLE SPRAIN: ICD-10-CM

## 2018-05-07 DIAGNOSIS — S00.03XA SCALP CONTUSION: ICD-10-CM

## 2018-05-07 DIAGNOSIS — S06.0X9A CONCUSSION: Primary | ICD-10-CM

## 2018-05-07 LAB
ABO GROUP BLD: NORMAL
ANION GAP SERPL CALCULATED.3IONS-SCNC: 8 MMOL/L (ref 4–13)
ATRIAL RATE: 77 BPM
BACTERIA UR QL AUTO: ABNORMAL /HPF
BASOPHILS # BLD AUTO: 0.05 THOUSANDS/ΜL (ref 0–0.1)
BASOPHILS NFR BLD AUTO: 1 % (ref 0–1)
BILIRUB UR QL STRIP: NEGATIVE
BLD GP AB SCN SERPL QL: NEGATIVE
BUN SERPL-MCNC: 23 MG/DL (ref 5–25)
CALCIUM SERPL-MCNC: 8.9 MG/DL (ref 8.3–10.1)
CHLORIDE SERPL-SCNC: 102 MMOL/L (ref 100–108)
CLARITY UR: CLEAR
CO2 SERPL-SCNC: 29 MMOL/L (ref 21–32)
COLOR UR: YELLOW
COLOR, POC: YELLOW
CREAT SERPL-MCNC: 1.25 MG/DL (ref 0.6–1.3)
EOSINOPHIL # BLD AUTO: 0 THOUSAND/ΜL (ref 0–0.61)
EOSINOPHIL NFR BLD AUTO: 0 % (ref 0–6)
ERYTHROCYTE [DISTWIDTH] IN BLOOD BY AUTOMATED COUNT: 13.5 % (ref 11.6–15.1)
GFR SERPL CREATININE-BSD FRML MDRD: 60 ML/MIN/1.73SQ M
GLUCOSE SERPL-MCNC: 124 MG/DL (ref 65–140)
GLUCOSE UR STRIP-MCNC: NEGATIVE MG/DL
HCT VFR BLD AUTO: 40.2 % (ref 36.5–49.3)
HGB BLD-MCNC: 13.6 G/DL (ref 12–17)
HGB UR QL STRIP.AUTO: ABNORMAL
HYALINE CASTS #/AREA URNS LPF: ABNORMAL /LPF
KETONES UR STRIP-MCNC: NEGATIVE MG/DL
LEUKOCYTE ESTERASE UR QL STRIP: NEGATIVE
LYMPHOCYTES # BLD AUTO: 1.1 THOUSANDS/ΜL (ref 0.6–4.47)
LYMPHOCYTES NFR BLD AUTO: 17 % (ref 14–44)
MCH RBC QN AUTO: 29 PG (ref 26.8–34.3)
MCHC RBC AUTO-ENTMCNC: 33.8 G/DL (ref 31.4–37.4)
MCV RBC AUTO: 86 FL (ref 82–98)
MONOCYTES # BLD AUTO: 0.74 THOUSAND/ΜL (ref 0.17–1.22)
MONOCYTES NFR BLD AUTO: 12 % (ref 4–12)
NEUTROPHILS # BLD AUTO: 4.43 THOUSANDS/ΜL (ref 1.85–7.62)
NEUTS SEG NFR BLD AUTO: 70 % (ref 43–75)
NITRITE UR QL STRIP: NEGATIVE
NON-SQ EPI CELLS URNS QL MICRO: ABNORMAL /HPF
NRBC BLD AUTO-RTO: 0 /100 WBCS
P AXIS: 54 DEGREES
PH UR STRIP.AUTO: 7 [PH] (ref 4.5–8)
PLATELET # BLD AUTO: 176 THOUSANDS/UL (ref 149–390)
PMV BLD AUTO: 10.8 FL (ref 8.9–12.7)
POTASSIUM SERPL-SCNC: 3.4 MMOL/L (ref 3.5–5.3)
PR INTERVAL: 202 MS
PROT UR STRIP-MCNC: NEGATIVE MG/DL
QRS AXIS: -52 DEGREES
QRSD INTERVAL: 102 MS
QT INTERVAL: 392 MS
QTC INTERVAL: 443 MS
RBC # BLD AUTO: 4.69 MILLION/UL (ref 3.88–5.62)
RBC #/AREA URNS AUTO: ABNORMAL /HPF
RH BLD: POSITIVE
SODIUM SERPL-SCNC: 139 MMOL/L (ref 136–145)
SP GR UR STRIP.AUTO: 1.02 (ref 1–1.03)
SPECIMEN EXPIRATION DATE: NORMAL
T WAVE AXIS: 58 DEGREES
TROPONIN I SERPL-MCNC: <0.02 NG/ML
UROBILINOGEN UR QL STRIP.AUTO: 0.2 E.U./DL
VENTRICULAR RATE: 77 BPM
WBC # BLD AUTO: 6.33 THOUSAND/UL (ref 4.31–10.16)
WBC #/AREA URNS AUTO: ABNORMAL /HPF

## 2018-05-07 PROCEDURE — 84484 ASSAY OF TROPONIN QUANT: CPT | Performed by: EMERGENCY MEDICINE

## 2018-05-07 PROCEDURE — 81001 URINALYSIS AUTO W/SCOPE: CPT

## 2018-05-07 PROCEDURE — 74177 CT ABD & PELVIS W/CONTRAST: CPT

## 2018-05-07 PROCEDURE — 70486 CT MAXILLOFACIAL W/O DYE: CPT

## 2018-05-07 PROCEDURE — 99285 EMERGENCY DEPT VISIT HI MDM: CPT

## 2018-05-07 PROCEDURE — 96374 THER/PROPH/DIAG INJ IV PUSH: CPT

## 2018-05-07 PROCEDURE — 36415 COLL VENOUS BLD VENIPUNCTURE: CPT | Performed by: EMERGENCY MEDICINE

## 2018-05-07 PROCEDURE — 73630 X-RAY EXAM OF FOOT: CPT

## 2018-05-07 PROCEDURE — 93005 ELECTROCARDIOGRAM TRACING: CPT

## 2018-05-07 PROCEDURE — 72125 CT NECK SPINE W/O DYE: CPT

## 2018-05-07 PROCEDURE — 71045 X-RAY EXAM CHEST 1 VIEW: CPT

## 2018-05-07 PROCEDURE — 85025 COMPLETE CBC W/AUTO DIFF WBC: CPT | Performed by: EMERGENCY MEDICINE

## 2018-05-07 PROCEDURE — 93010 ELECTROCARDIOGRAM REPORT: CPT | Performed by: INTERNAL MEDICINE

## 2018-05-07 PROCEDURE — 96376 TX/PRO/DX INJ SAME DRUG ADON: CPT

## 2018-05-07 PROCEDURE — 71260 CT THORAX DX C+: CPT

## 2018-05-07 PROCEDURE — 73590 X-RAY EXAM OF LOWER LEG: CPT

## 2018-05-07 PROCEDURE — 80048 BASIC METABOLIC PNL TOTAL CA: CPT | Performed by: EMERGENCY MEDICINE

## 2018-05-07 PROCEDURE — 86901 BLOOD TYPING SEROLOGIC RH(D): CPT | Performed by: EMERGENCY MEDICINE

## 2018-05-07 PROCEDURE — 86850 RBC ANTIBODY SCREEN: CPT | Performed by: EMERGENCY MEDICINE

## 2018-05-07 PROCEDURE — 70450 CT HEAD/BRAIN W/O DYE: CPT

## 2018-05-07 PROCEDURE — 81002 URINALYSIS NONAUTO W/O SCOPE: CPT | Performed by: EMERGENCY MEDICINE

## 2018-05-07 PROCEDURE — 86900 BLOOD TYPING SEROLOGIC ABO: CPT | Performed by: EMERGENCY MEDICINE

## 2018-05-07 RX ORDER — FENTANYL CITRATE 50 UG/ML
50 INJECTION, SOLUTION INTRAMUSCULAR; INTRAVENOUS ONCE
Status: COMPLETED | OUTPATIENT
Start: 2018-05-07 | End: 2018-05-07

## 2018-05-07 RX ORDER — FENTANYL CITRATE 50 UG/ML
75 INJECTION, SOLUTION INTRAMUSCULAR; INTRAVENOUS ONCE
Status: COMPLETED | OUTPATIENT
Start: 2018-05-07 | End: 2018-05-07

## 2018-05-07 RX ORDER — OXYCODONE HYDROCHLORIDE AND ACETAMINOPHEN 5; 325 MG/1; MG/1
1 TABLET ORAL EVERY 4 HOURS PRN
Qty: 30 TABLET | Refills: 0 | Status: SHIPPED | OUTPATIENT
Start: 2018-05-07 | End: 2018-11-19

## 2018-05-07 RX ADMIN — FENTANYL CITRATE 50 MCG: 50 INJECTION, SOLUTION INTRAMUSCULAR; INTRAVENOUS at 21:06

## 2018-05-07 RX ADMIN — IOHEXOL 100 ML: 350 INJECTION, SOLUTION INTRAVENOUS at 21:51

## 2018-05-07 RX ADMIN — FENTANYL CITRATE 50 MCG: 50 INJECTION, SOLUTION INTRAMUSCULAR; INTRAVENOUS at 22:49

## 2018-05-08 NOTE — DISCHARGE INSTRUCTIONS
Ankle Sprain   WHAT YOU NEED TO KNOW:   An ankle sprain happens when 1 or more ligaments in your ankle joint stretch or tear  Ligaments are tough tissues that connect bones  Ligaments support your joints and keep your bones in place  DISCHARGE INSTRUCTIONS:   Return to the emergency department if:   · You have severe pain in your ankle  · Your foot or toes are cold or numb  · Your ankle becomes more weak or unstable (wobbly)  · You are unable to put any weight on your ankle or foot  · Your swelling has increased or returned  Contact your healthcare provider if:   · Your pain does not go away, even after treatment  · You have questions or concerns about your condition or care  Medicines: You may need any of the following:  · NSAIDs , such as ibuprofen, help decrease swelling, pain, and fever  This medicine is available with or without a doctor's order  NSAIDs can cause stomach bleeding or kidney problems in certain people  If you take blood thinner medicine, always ask your healthcare provider if NSAIDs are safe for you  Always read the medicine label and follow directions  · Acetaminophen  decreases pain  It is available without a doctor's order  Ask how much to take and how often to take it  Follow directions  Acetaminophen can cause liver damage if not taken correctly  · Prescription pain medicine  may be given  Ask how to take this medicine safely  · Take your medicine as directed  Contact your healthcare provider if you think your medicine is not helping or if you have side effects  Tell him or her if you are allergic to any medicine  Keep a list of the medicines, vitamins, and herbs you take  Include the amounts, and when and why you take them  Bring the list or the pill bottles to follow-up visits  Carry your medicine list with you in case of an emergency    Self care:   · Use support devices,  such as a brace, cast, or splint, may be needed to limit your movement and protect your joint  You may need to use crutches to decrease your pain as you move around  · Go to physical therapy as directed  A physical therapist teaches you exercises to help improve movement and strength, and to decrease pain  · Rest  your ankle so that it can heal  Return to normal activities as directed  · Apply ice on your ankle for 15 to 20 minutes every hour or as directed  Use an ice pack, or put crushed ice in a plastic bag  Cover it with a towel  Ice helps prevent tissue damage and decreases swelling and pain  · Compress  your ankle  Ask if you should wrap an elastic bandage around your injured ligament  An elastic bandage provides support and helps decrease swelling and movement so your joint can heal  Wear as long as directed  · Elevate  your ankle above the level of your heart as often as you can  This will help decrease swelling and pain  Prop your ankle on pillows or blankets to keep it elevated comfortably  Prevent another ankle sprain:   · Let your ankle heal   Find out how long your ligament needs to heal  Do not do any physical activity until your healthcare provider says it is okay  If you start activity too soon, you may develop a more serious injury  · Always warm up and stretch  before you exercise or play sports  · Use the right equipment  Always wear shoes that fit well and are made for the activity that you are doing  You may also need ankle supports, elbow and knee pads, or braces  Follow up with your healthcare provider as directed:  Write down your questions so you remember to ask them during your visits  © 2017 [address] Information is for End User's use only and may not be sold, redistributed or otherwise used for commercial purposes  All illustrations and images included in CareNotes® are the copyrighted property of [employer]  or [employer]  The above information is an  only   It is not intended as medical advice for individual conditions or treatments  Talk to your doctor, nurse or pharmacist before following any medical regimen to see if it is safe and effective for you  Concussion   WHAT YOU NEED TO KNOW:   A concussion is a mild brain injury  It is usually caused by a bump or blow to the head from a fall, a motor vehicle crash, or a sports injury  Sometimes being shaken forcefully may cause a concussion  DISCHARGE INSTRUCTIONS:   Have someone else call 911 for the following:   · Someone tries to wake you and cannot do so  · You have a seizure, increasing confusion, or a change in personality  · Your speech becomes slurred, or you have new vision problems  Return to the emergency department if:   · You have a severe headache that does not go away  · You have arm or leg weakness, numbness, or new problems with coordination  · You have blood or clear fluid coming out of the ears or nose  Contact your healthcare provider if:   · You have nausea or are vomiting  · You feel more sleepy than usual     · Your symptoms get worse  · Your symptoms last longer than 6 weeks after the injury  · You have questions or concerns about your condition or care  Medicines:   · Acetaminophen  helps to decrease pain  It is available without a doctor's order  Ask how much to take and how often to take it  Follow directions  Acetaminophen can cause liver damage if not taken correctly  · NSAIDs , such as ibuprofen, help decrease swelling and pain  NSAIDs can cause stomach bleeding or kidney problems in certain people  If you take blood thinner medicine, always ask your healthcare provider if NSAIDs are safe for you  Always read the medicine label and follow directions  · Take your medicine as directed  Contact your healthcare provider if you think your medicine is not helping or if you have side effects  Tell him or her if you are allergic to any medicine   Keep a list of the medicines, vitamins, and herbs you take  Include the amounts, and when and why you take them  Bring the list or the pill bottles to follow-up visits  Carry your medicine list with you in case of an emergency  Follow up with your healthcare provider as directed:  Write down your questions so you remember to ask them during your visits  Self-care:   · Rest  from physical and mental activities as directed  Mental activities are those that require thinking, concentration, and attention  You will need to rest until your symptoms are gone  Rest will allow you to recover from your concussion  Ask your healthcare provider when you can return to work and other daily activities  · Have someone stay with you for the first 24 hours after your injury  Your healthcare provider should be contacted if your symptoms get worse, or you develop new symptoms  · Do not participate in sports and physical activities until your healthcare provider says it is okay  They could make your symptoms worse or lead to another concussion  Your healthcare provider will tell you when it is okay for you to return to sports or physical activities  Prevent another concussion:   · Wear protective sports equipment that fit properly  Helmets help decrease your risk of a serious brain injury  Talk to your healthcare provider about ways you can decrease your risk for a concussion if you play sports  · Wear your seat belt  every time you travel  This helps to decrease your risk of a head injury if you are in a car accident  © 2017 [address] [employer] Information is for End User's use only and may not be sold, redistributed or otherwise used for commercial purposes  All illustrations and images included in CareNotes® are the copyrighted property of [employer]  or Jesse Reid  The above information is an  only  It is not intended as medical advice for individual conditions or treatments   Talk to your doctor, nurse or pharmacist before following any medical regimen to see if it is safe and effective for you  Contusion in Adults, Ambulatory Care   GENERAL INFORMATION:   A contusion  is a bruise that appears on your skin after an injury  A bruise happens when small blood vessels tear but skin does not  When blood vessels tear, blood leaks into nearby tissue, such as soft tissue or muscle  Common symptoms include the following:   · Pain that increases when you touch the bruise, walk, or use the area around the bruise    · Swelling or a lump at the site of the bruise or near it    · Red, blue, or black skin that may change to green or yellow after a few days    · Stiffness or problems moving the bruised area of your body  Seek immediate care for the following symptoms:   · New difficulty moving your injured area    · Tingling or numbness in or near the injured area    · Hand or foot below the bruise gets cold or turns pale  Treatment for a contusion  may include any of the following:  · NSAIDs  help decrease swelling and pain or fever  This medicine is available with or without a doctor's order  NSAIDs can cause stomach bleeding or kidney problems in certain people  If you take blood thinner medicine, always ask your healthcare provider if NSAIDs are safe for you  Always read the medicine label and follow directions  · Pain medicine  to decrease or take away pain  Do not wait until the pain is severe before you take your medicine  · Aspiration  to drain pooled blood in your muscle may be done to help prevent increased pressure in the muscle  · Surgery  may be done to repair a tear in the muscle or relieve pressure in the muscle caused by swelling  Care for a contusion:   · Rest the injured area  or use it less than usual  If you bruised your leg or foot, you may need crutches or a cane to help you walk  This will help you keep weight off your injured body part  Use crutches or a cane as directed      · Use ice  to decrease swelling and pain  Ice may also help prevent tissue damage  Use an ice pack, or put crushed ice in a plastic bag  Cover it with a towel and place it on your bruise for 15 to 20 minutes every hour or as directed  · Use Compression  An elastic bandage may be wrapped around a bruised muscle to support the area and decrease swelling  Make sure the bandage is not too tight  You should be able to fit 1 finger between the bandage and your skin  · Elevate (raise) your injured body part  above the level of your heart to help decrease pain and swelling  Use pillows, blankets, or rolled towels to elevate the area as often as you can  · Do not massage or use heat  Heat and massage may slow healing of the area  · Do not drink alcohol  Alcohol may slow healing of your injury  · Do not stretch injured muscles  Ask your healthcare provider when and how you may safely stretch after your injury  Prevent a contusion:   · Stretch and warm up before you play sports or exercise  · Wear protective gear when you play sports  Examples are shin guards and padding  · If you begin a new physical activity, start slowly to give your body a chance to adjust   Follow up with your healthcare provider as directed:  Write down your questions so you remember to ask them during your visits  CARE AGREEMENT:   You have the right to help plan your care  Learn about your health condition and how it may be treated  Discuss treatment options with your caregivers to decide what care you want to receive  You always have the right to refuse treatment  The above information is an  only  It is not intended as medical advice for individual conditions or treatments  Talk to your doctor, nurse or pharmacist before following any medical regimen to see if it is safe and effective for you    © 2014 [address] is for End User's use only and may not be sold, redistributed or otherwise used for commercial purposes  All illustrations and images included in CareNotes® are the copyrighted property of A [employer]  or [address]  Chest Wall Pain   WHAT YOU NEED TO KNOW:   Chest wall pain may be caused by problems with the muscles, cartilage, or bones of the chest wall  Chest wall pain may also be caused by pain that spreads to your chest from another part of your body  The pain may be aching, severe, dull, or sharp  It may come and go, or it may be constant  The pain may be worse when you move in certain ways, breathe deeply, or cough  DISCHARGE INSTRUCTIONS:   Call 911 if:   · You have any of the following signs of a heart attack:      ¨ Squeezing, pressure, or pain in your chest that lasts longer than 5 minutes or returns    ¨ Discomfort or pain in your back, neck, jaw, stomach, or arm     ¨ Trouble breathing    ¨ Nausea or vomiting    ¨ Lightheadedness or a sudden cold sweat, especially with chest pain or trouble breathing    Return to the emergency department if:   · You have severe pain  Contact your healthcare provider if:   · You develop a rash  · You have other new symptoms  · Your pain does not improve, even with treatment  · You have questions or concerns about your condition or care  Medicines: You may need any of the following:  · NSAIDs , such as ibuprofen, help decrease swelling, pain, and fever  This medicine is available with or without a doctor's order  NSAIDs can cause stomach bleeding or kidney problems in certain people  If you take blood thinner medicine, always ask your healthcare provider if NSAIDs are safe for you  Always read the medicine label and follow directions  · Acetaminophen  decreases pain  It is available without a doctor's order  Ask how much to take and how often to take it  Follow directions  Acetaminophen can cause liver damage if not taken correctly  · A cream  may be applied to your chest to decrease pain       · Take your medicine as directed  Contact your healthcare provider if you think your medicine is not helping or if you have side effects  Tell him of her if you are allergic to any medicine  Keep a list of the medicines, vitamins, and herbs you take  Include the amounts, and when and why you take them  Bring the list or the pill bottles to follow-up visits  Carry your medicine list with you in case of an emergency  Follow up with your healthcare provider as directed:  Write down your questions so you remember to ask them during your visits  Self-care:   · Rest  as needed  Avoid activities that make your chest wall pain worse  · Apply heat  on your chest for 20 to 30 minutes every 2 hours for as many days as directed  Heat helps decrease pain and muscle spasms  · Apply ice  on your chest for 15 to 20 minutes every hour or as directed  Use an ice pack, or put crushed ice in a plastic bag  Cover it with a towel  Ice helps prevent tissue damage and decreases swelling and pain  © 2017 [facility] [facility] Information is for End User's use only and may not be sold, redistributed or otherwise used for commercial purposes  All illustrations and images included in CareNotes® are the copyrighted property of [employer]  or Jesse Reid  The above information is an  only  It is not intended as medical advice for individual conditions or treatments  Talk to your doctor, nurse or pharmacist before following any medical regimen to see if it is safe and effective for you  Ankle Sprain   WHAT YOU NEED TO KNOW:   An ankle sprain happens when 1 or more ligaments in your ankle joint stretch or tear  Ligaments are tough tissues that connect bones  Ligaments support your joints and keep your bones in place  DISCHARGE INSTRUCTIONS:   Return to the emergency department if:   · You have severe pain in your ankle  · Your foot or toes are cold or numb      · Your ankle becomes more weak or unstable (wobbly)  · You are unable to put any weight on your ankle or foot  · Your swelling has increased or returned  Contact your healthcare provider if:   · Your pain does not go away, even after treatment  · You have questions or concerns about your condition or care  Medicines: You may need any of the following:  · NSAIDs , such as ibuprofen, help decrease swelling, pain, and fever  This medicine is available with or without a doctor's order  NSAIDs can cause stomach bleeding or kidney problems in certain people  If you take blood thinner medicine, always ask your healthcare provider if NSAIDs are safe for you  Always read the medicine label and follow directions  · Acetaminophen  decreases pain  It is available without a doctor's order  Ask how much to take and how often to take it  Follow directions  Acetaminophen can cause liver damage if not taken correctly  · Prescription pain medicine  may be given  Ask how to take this medicine safely  · Take your medicine as directed  Contact your healthcare provider if you think your medicine is not helping or if you have side effects  Tell him or her if you are allergic to any medicine  Keep a list of the medicines, vitamins, and herbs you take  Include the amounts, and when and why you take them  Bring the list or the pill bottles to follow-up visits  Carry your medicine list with you in case of an emergency  Self care:   · Use support devices,  such as a brace, cast, or splint, may be needed to limit your movement and protect your joint  You may need to use crutches to decrease your pain as you move around  · Go to physical therapy as directed  A physical therapist teaches you exercises to help improve movement and strength, and to decrease pain  · Rest  your ankle so that it can heal  Return to normal activities as directed  · Apply ice on your ankle for 15 to 20 minutes every hour or as directed   Use an ice pack, or put crushed ice in a plastic bag  Cover it with a towel  Ice helps prevent tissue damage and decreases swelling and pain  · Compress  your ankle  Ask if you should wrap an elastic bandage around your injured ligament  An elastic bandage provides support and helps decrease swelling and movement so your joint can heal  Wear as long as directed  · Elevate  your ankle above the level of your heart as often as you can  This will help decrease swelling and pain  Prop your ankle on pillows or blankets to keep it elevated comfortably  Prevent another ankle sprain:   · Let your ankle heal   Find out how long your ligament needs to heal  Do not do any physical activity until your healthcare provider says it is okay  If you start activity too soon, you may develop a more serious injury  · Always warm up and stretch  before you exercise or play sports  · Use the right equipment  Always wear shoes that fit well and are made for the activity that you are doing  You may also need ankle supports, elbow and knee pads, or braces  Follow up with your healthcare provider as directed:  Write down your questions so you remember to ask them during your visits  © 2017 [address] [employer] Information is for End User's use only and may not be sold, redistributed or otherwise used for commercial purposes  All illustrations and images included in CareNotes® are the copyrighted property of [employer]  or Jesse Reid  The above information is an  only  It is not intended as medical advice for individual conditions or treatments  Talk to your doctor, nurse or pharmacist before following any medical regimen to see if it is safe and effective for you
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
## 2018-06-13 RX ORDER — TRIAMTERENE CAPSULES 100 MG/1
100 CAPSULE ORAL
COMMUNITY
End: 2018-12-03 | Stop reason: ALTCHOICE

## 2018-06-13 RX ORDER — IBUPROFEN 600 MG/1
600 TABLET ORAL EVERY 6 HOURS
COMMUNITY
Start: 2018-03-16 | End: 2019-10-04

## 2018-06-13 RX ORDER — SILDENAFIL 100 MG/1
TABLET, FILM COATED ORAL
COMMUNITY
End: 2019-01-02

## 2018-06-13 RX ORDER — METOPROLOL TARTRATE 100 MG/1
100 TABLET ORAL
COMMUNITY
End: 2018-11-19

## 2018-06-13 RX ORDER — MECLIZINE HCL 12.5 MG/1
TABLET ORAL
COMMUNITY
End: 2018-11-19

## 2018-06-14 ENCOUNTER — OFFICE VISIT (OUTPATIENT)
Dept: FAMILY MEDICINE CLINIC | Facility: CLINIC | Age: 63
End: 2018-06-14
Payer: COMMERCIAL

## 2018-06-14 ENCOUNTER — HOSPITAL ENCOUNTER (OUTPATIENT)
Dept: ULTRASOUND IMAGING | Facility: HOSPITAL | Age: 63
Discharge: HOME | End: 2018-06-14
Payer: COMMERCIAL

## 2018-06-14 VITALS
HEART RATE: 68 BPM | BODY MASS INDEX: 41.75 KG/M2 | TEMPERATURE: 98.7 F | RESPIRATION RATE: 16 BRPM | SYSTOLIC BLOOD PRESSURE: 130 MMHG | DIASTOLIC BLOOD PRESSURE: 68 MMHG | OXYGEN SATURATION: 98 % | WEIGHT: 315 LBS | HEIGHT: 73 IN

## 2018-06-14 DIAGNOSIS — E11.9 TYPE 2 DIABETES MELLITUS WITHOUT COMPLICATION, WITHOUT LONG-TERM CURRENT USE OF INSULIN (HCC): ICD-10-CM

## 2018-06-14 DIAGNOSIS — M79.89 LEG SWELLING: ICD-10-CM

## 2018-06-14 DIAGNOSIS — M79.89 LEG SWELLING: Primary | ICD-10-CM

## 2018-06-14 LAB — SL AMB POCT HEMOGLOBIN AIC: 6.5

## 2018-06-14 PROCEDURE — 99214 OFFICE O/P EST MOD 30 MIN: CPT | Performed by: INTERNAL MEDICINE

## 2018-06-14 PROCEDURE — 93971 EXTREMITY STUDY: CPT

## 2018-06-14 PROCEDURE — 83036 HEMOGLOBIN GLYCOSYLATED A1C: CPT | Performed by: INTERNAL MEDICINE

## 2018-06-14 RX ORDER — MELOXICAM 15 MG/1
15 TABLET ORAL DAILY
Qty: 15 TABLET | Refills: 0 | Status: SHIPPED | OUTPATIENT
Start: 2018-06-14 | End: 2019-10-04
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
## 2018-06-14 NOTE — ADDENDUM NOTE
Addended by: Roni [facility] on: 6/14/2018 03:57 PM     Modules accepted: Orders

## 2018-06-15 PROCEDURE — 93971 EXTREMITY STUDY: CPT | Performed by: SURGERY

## 2018-07-03 DIAGNOSIS — I10 HYPERTENSION, UNSPECIFIED TYPE: ICD-10-CM

## 2018-07-03 DIAGNOSIS — K21.9 GASTROESOPHAGEAL REFLUX DISEASE WITHOUT ESOPHAGITIS: ICD-10-CM

## 2018-07-03 RX ORDER — METOPROLOL SUCCINATE 25 MG/1
25 TABLET, EXTENDED RELEASE ORAL DAILY
Qty: 90 TABLET | Refills: 0 | Status: SHIPPED | OUTPATIENT
Start: 2018-07-03 | End: 2018-10-09 | Stop reason: SDUPTHER

## 2018-07-03 RX ORDER — OMEPRAZOLE 40 MG/1
40 CAPSULE, DELAYED RELEASE ORAL DAILY
Qty: 90 CAPSULE | Refills: 0 | Status: SHIPPED | OUTPATIENT
Start: 2018-07-03 | End: 2018-10-09 | Stop reason: SDUPTHER

## 2018-07-03 RX ORDER — METOPROLOL SUCCINATE 50 MG/1
50 TABLET, EXTENDED RELEASE ORAL DAILY
Qty: 90 TABLET | Refills: 0 | Status: SHIPPED | OUTPATIENT
Start: 2018-07-03 | End: 2018-10-09 | Stop reason: SDUPTHER

## 2018-07-03 RX ORDER — ENALAPRIL MALEATE 10 MG/1
10 TABLET ORAL DAILY
Qty: 90 TABLET | Refills: 0 | Status: SHIPPED | OUTPATIENT
Start: 2018-07-03 | End: 2018-10-01 | Stop reason: SDUPTHER

## 2018-07-03 RX ORDER — AMLODIPINE BESYLATE 5 MG/1
5 TABLET ORAL DAILY
Qty: 90 TABLET | Refills: 0 | Status: SHIPPED | OUTPATIENT
Start: 2018-07-03 | End: 2018-10-09 | Stop reason: SDUPTHER

## 2018-07-03 RX ORDER — TRIAMTERENE AND HYDROCHLOROTHIAZIDE 37.5; 25 MG/1; MG/1
1 CAPSULE ORAL DAILY
Qty: 90 CAPSULE | Refills: 0 | Status: SHIPPED | OUTPATIENT
Start: 2018-07-03 | End: 2018-10-09 | Stop reason: SDUPTHER

## 2018-07-23 ENCOUNTER — OFFICE VISIT (OUTPATIENT)
Dept: VASCULAR SURGERY | Facility: CLINIC | Age: 63
End: 2018-07-23
Payer: COMMERCIAL

## 2018-07-23 VITALS
DIASTOLIC BLOOD PRESSURE: 78 MMHG | RESPIRATION RATE: 18 BRPM | WEIGHT: 315 LBS | SYSTOLIC BLOOD PRESSURE: 136 MMHG | HEIGHT: 73 IN | BODY MASS INDEX: 41.75 KG/M2 | HEART RATE: 73 BPM | TEMPERATURE: 97.8 F

## 2018-07-23 DIAGNOSIS — R60.0 BILATERAL LOWER EXTREMITY EDEMA: Primary | ICD-10-CM

## 2018-07-23 DIAGNOSIS — M79.89 LEG SWELLING: ICD-10-CM

## 2018-07-23 PROBLEM — E11.9 DIABETES MELLITUS (HCC): Status: ACTIVE | Noted: 2018-03-16

## 2018-07-23 PROBLEM — E78.5 DYSLIPIDEMIA: Status: ACTIVE | Noted: 2017-07-31

## 2018-07-23 PROBLEM — G47.33 OSA (OBSTRUCTIVE SLEEP APNEA): Status: ACTIVE | Noted: 2017-08-18

## 2018-07-23 PROBLEM — I10 BENIGN ESSENTIAL HTN: Status: ACTIVE | Noted: 2017-07-31

## 2018-07-23 PROBLEM — K21.9 GERD (GASTROESOPHAGEAL REFLUX DISEASE): Status: ACTIVE | Noted: 2018-03-16

## 2018-07-23 PROCEDURE — 99204 OFFICE O/P NEW MOD 45 MIN: CPT | Performed by: NURSE PRACTITIONER
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
## 2018-07-31 ENCOUNTER — TRANSCRIBE ORDERS (OUTPATIENT)
Dept: ADMINISTRATIVE | Age: 63
End: 2018-07-31

## 2018-07-31 ENCOUNTER — APPOINTMENT (OUTPATIENT)
Dept: LAB | Age: 63
End: 2018-07-31
Payer: COMMERCIAL

## 2018-07-31 DIAGNOSIS — K92.1 BLOOD IN STOOL: ICD-10-CM

## 2018-07-31 DIAGNOSIS — R10.9 STOMACH ACHE: ICD-10-CM

## 2018-07-31 DIAGNOSIS — K92.1 BLOOD IN STOOL: Primary | ICD-10-CM

## 2018-07-31 LAB
HCT VFR BLD AUTO: 41.3 % (ref 36.5–49.3)
HGB BLD-MCNC: 13.3 G/DL (ref 12–17)

## 2018-07-31 PROCEDURE — 85018 HEMOGLOBIN: CPT

## 2018-07-31 PROCEDURE — 85014 HEMATOCRIT: CPT

## 2018-07-31 PROCEDURE — 36415 COLL VENOUS BLD VENIPUNCTURE: CPT

## 2018-10-01 DIAGNOSIS — I10 HYPERTENSION, UNSPECIFIED TYPE: ICD-10-CM

## 2018-10-01 RX ORDER — ENALAPRIL MALEATE 10 MG/1
10 TABLET ORAL DAILY
Qty: 90 TABLET | Refills: 1 | Status: SHIPPED | OUTPATIENT
Start: 2018-10-01 | End: 2018-10-09 | Stop reason: SDUPTHER

## 2018-10-04 ENCOUNTER — OFFICE VISIT (OUTPATIENT)
Dept: OBGYN CLINIC | Facility: OTHER | Age: 63
End: 2018-10-04

## 2018-10-04 VITALS — HEIGHT: 73 IN | WEIGHT: 315 LBS | BODY MASS INDEX: 41.75 KG/M2

## 2018-10-04 DIAGNOSIS — M62.830 MUSCLE SPASM OF BACK: ICD-10-CM

## 2018-10-04 DIAGNOSIS — M54.50 ACUTE EXACERBATION OF CHRONIC LOW BACK PAIN: Primary | ICD-10-CM

## 2018-10-04 DIAGNOSIS — G89.29 ACUTE EXACERBATION OF CHRONIC LOW BACK PAIN: Primary | ICD-10-CM

## 2018-10-04 PROCEDURE — 99203 OFFICE O/P NEW LOW 30 MIN: CPT | Performed by: INTERNAL MEDICINE

## 2018-10-04 NOTE — LETTER
October 4, 2018     Patient: Brooks Obrien   YOB: 1955   Date of Visit: 10/4/2018       To Whom it May Concern:    Brooks Obrien is under my professional care  He was seen in my office on 10/4/2018  He is able to resume work on 10/5/2018   Because he has been out of work due to his low back pain  If you have any questions or concerns, please don't hesitate to call           Sincerely,          Linda Tapia MD        CC: No Recipients
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
## 2018-10-09 DIAGNOSIS — I10 HYPERTENSION, UNSPECIFIED TYPE: ICD-10-CM

## 2018-10-09 DIAGNOSIS — K21.9 GASTROESOPHAGEAL REFLUX DISEASE WITHOUT ESOPHAGITIS: ICD-10-CM

## 2018-10-11 RX ORDER — ENALAPRIL MALEATE 10 MG/1
10 TABLET ORAL DAILY
Qty: 90 TABLET | Refills: 1 | Status: SHIPPED | OUTPATIENT
Start: 2018-10-11 | End: 2018-10-22 | Stop reason: SDUPTHER

## 2018-10-11 RX ORDER — METOPROLOL SUCCINATE 25 MG/1
25 TABLET, EXTENDED RELEASE ORAL DAILY
Qty: 90 TABLET | Refills: 1 | Status: SHIPPED | OUTPATIENT
Start: 2018-10-11 | End: 2018-10-22 | Stop reason: SDUPTHER

## 2018-10-11 RX ORDER — OMEPRAZOLE 40 MG/1
40 CAPSULE, DELAYED RELEASE ORAL DAILY
Qty: 90 CAPSULE | Refills: 1 | Status: SHIPPED | OUTPATIENT
Start: 2018-10-11 | End: 2018-10-22 | Stop reason: SDUPTHER

## 2018-10-11 RX ORDER — TRIAMTERENE AND HYDROCHLOROTHIAZIDE 37.5; 25 MG/1; MG/1
1 CAPSULE ORAL DAILY
Qty: 90 CAPSULE | Refills: 1 | Status: SHIPPED | OUTPATIENT
Start: 2018-10-11 | End: 2018-10-22 | Stop reason: SDUPTHER

## 2018-10-11 RX ORDER — METOPROLOL SUCCINATE 50 MG/1
50 TABLET, EXTENDED RELEASE ORAL DAILY
Qty: 90 TABLET | Refills: 1 | Status: SHIPPED | OUTPATIENT
Start: 2018-10-11 | End: 2018-10-13 | Stop reason: SDUPTHER

## 2018-10-11 RX ORDER — AMLODIPINE BESYLATE 5 MG/1
5 TABLET ORAL DAILY
Qty: 90 TABLET | Refills: 1 | Status: SHIPPED | OUTPATIENT
Start: 2018-10-11 | End: 2018-10-22 | Stop reason: SDUPTHER

## 2018-10-13 DIAGNOSIS — I10 HYPERTENSION, UNSPECIFIED TYPE: ICD-10-CM

## 2018-10-13 RX ORDER — METOPROLOL SUCCINATE 50 MG/1
50 TABLET, EXTENDED RELEASE ORAL DAILY
Qty: 90 TABLET | Refills: 1 | Status: SHIPPED | OUTPATIENT
Start: 2018-10-13 | End: 2018-10-22 | Stop reason: SDUPTHER

## 2018-10-22 DIAGNOSIS — I10 HYPERTENSION, UNSPECIFIED TYPE: ICD-10-CM

## 2018-10-22 DIAGNOSIS — K21.9 GASTROESOPHAGEAL REFLUX DISEASE WITHOUT ESOPHAGITIS: ICD-10-CM

## 2018-10-24 RX ORDER — OMEPRAZOLE 40 MG/1
40 CAPSULE, DELAYED RELEASE ORAL DAILY
Qty: 90 CAPSULE | Refills: 0 | Status: SHIPPED | OUTPATIENT
Start: 2018-10-24 | End: 2019-01-02 | Stop reason: SDUPTHER

## 2018-10-24 RX ORDER — TRIAMTERENE AND HYDROCHLOROTHIAZIDE 37.5; 25 MG/1; MG/1
1 CAPSULE ORAL DAILY
Qty: 90 CAPSULE | Refills: 0 | Status: SHIPPED | OUTPATIENT
Start: 2018-10-24 | End: 2019-01-02 | Stop reason: SDUPTHER

## 2018-10-24 RX ORDER — ENALAPRIL MALEATE 10 MG/1
10 TABLET ORAL DAILY
Qty: 90 TABLET | Refills: 0 | Status: SHIPPED | OUTPATIENT
Start: 2018-10-24 | End: 2019-01-02 | Stop reason: SDUPTHER

## 2018-10-24 RX ORDER — AMLODIPINE BESYLATE 5 MG/1
5 TABLET ORAL DAILY
Qty: 90 TABLET | Refills: 0 | Status: SHIPPED | OUTPATIENT
Start: 2018-10-24 | End: 2018-10-30 | Stop reason: SDUPTHER

## 2018-10-24 RX ORDER — METOPROLOL SUCCINATE 50 MG/1
50 TABLET, EXTENDED RELEASE ORAL DAILY
Qty: 90 TABLET | Refills: 0 | Status: SHIPPED | OUTPATIENT
Start: 2018-10-24 | End: 2019-01-02 | Stop reason: SDUPTHER

## 2018-10-24 RX ORDER — METOPROLOL SUCCINATE 25 MG/1
25 TABLET, EXTENDED RELEASE ORAL DAILY
Qty: 90 TABLET | Refills: 0 | Status: SHIPPED | OUTPATIENT
Start: 2018-10-24 | End: 2019-01-02 | Stop reason: SDUPTHER

## 2018-10-30 DIAGNOSIS — I10 HYPERTENSION, UNSPECIFIED TYPE: ICD-10-CM

## 2018-10-30 RX ORDER — AMLODIPINE BESYLATE 5 MG/1
TABLET ORAL
Qty: 90 TABLET | Refills: 0 | Status: SHIPPED | OUTPATIENT
Start: 2018-10-30 | End: 2019-01-02 | Stop reason: SDUPTHER

## 2018-11-19 ENCOUNTER — OFFICE VISIT (OUTPATIENT)
Dept: FAMILY MEDICINE CLINIC | Facility: CLINIC | Age: 63
End: 2018-11-19
Payer: MEDICARE

## 2018-11-19 VITALS
BODY MASS INDEX: 41.75 KG/M2 | DIASTOLIC BLOOD PRESSURE: 64 MMHG | HEIGHT: 73 IN | SYSTOLIC BLOOD PRESSURE: 130 MMHG | WEIGHT: 315 LBS | TEMPERATURE: 98.4 F | HEART RATE: 79 BPM | RESPIRATION RATE: 16 BRPM | OXYGEN SATURATION: 96 %

## 2018-11-19 DIAGNOSIS — E11.9 TYPE 2 DIABETES MELLITUS WITHOUT COMPLICATION, WITHOUT LONG-TERM CURRENT USE OF INSULIN (HCC): ICD-10-CM

## 2018-11-19 DIAGNOSIS — E78.5 DYSLIPIDEMIA: ICD-10-CM

## 2018-11-19 DIAGNOSIS — I10 BENIGN ESSENTIAL HTN: Primary | ICD-10-CM

## 2018-11-19 DIAGNOSIS — R53.83 FATIGUE, UNSPECIFIED TYPE: ICD-10-CM

## 2018-11-19 LAB — SL AMB POCT HEMOGLOBIN AIC: 8.2

## 2018-11-19 PROCEDURE — 99214 OFFICE O/P EST MOD 30 MIN: CPT | Performed by: INTERNAL MEDICINE

## 2018-11-19 PROCEDURE — 83036 HEMOGLOBIN GLYCOSYLATED A1C: CPT | Performed by: INTERNAL MEDICINE

## 2018-11-19 PROCEDURE — 82570 ASSAY OF URINE CREATININE: CPT | Performed by: INTERNAL MEDICINE

## 2018-11-19 PROCEDURE — 82043 UR ALBUMIN QUANTITATIVE: CPT | Performed by: INTERNAL MEDICINE
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
## 2018-11-20 LAB
CREAT UR-MCNC: 118 MG/DL
MICROALBUMIN UR-MCNC: 15.9 MG/L (ref 0–20)
MICROALBUMIN/CREAT 24H UR: 13 MG/G CREATININE (ref 0–30)

## 2018-12-03 ENCOUNTER — OFFICE VISIT (OUTPATIENT)
Dept: CARDIOLOGY CLINIC | Facility: CLINIC | Age: 63
End: 2018-12-03
Payer: MEDICARE

## 2018-12-03 VITALS
SYSTOLIC BLOOD PRESSURE: 128 MMHG | HEART RATE: 64 BPM | OXYGEN SATURATION: 96 % | DIASTOLIC BLOOD PRESSURE: 60 MMHG | WEIGHT: 315 LBS | HEIGHT: 73 IN | BODY MASS INDEX: 41.75 KG/M2

## 2018-12-03 DIAGNOSIS — E78.5 DYSLIPIDEMIA: ICD-10-CM

## 2018-12-03 DIAGNOSIS — R07.89 CHEST DISCOMFORT: ICD-10-CM

## 2018-12-03 DIAGNOSIS — I10 BENIGN ESSENTIAL HTN: Primary | ICD-10-CM

## 2018-12-03 PROCEDURE — 99214 OFFICE O/P EST MOD 30 MIN: CPT | Performed by: INTERNAL MEDICINE

## 2018-12-03 NOTE — PROGRESS NOTES
Cardiology Follow Up    Hardy Mcmanus  1955  [id number]  [address] [facility]  [address] [place] [address]    1  Benign essential HTN     2  Dyslipidemia     3  Chest discomfort       Chief Complaint   Patient presents with    Follow-up     year,     Shortness of Breath     on exertion       Interval History: Patient feels well other than mild increase in shortness of breath since gaining some weight  Patient Active Problem List   Diagnosis    Chest discomfort    Benign essential HTN    Diabetes mellitus ([facility] [address] )    Dyslipidemia    GERD (gastroesophageal reflux disease)    AYLEEN (obstructive sleep apnea)    Bilateral lower extremity edema     Past Medical History:   Diagnosis Date    Arthritis     last assessed 7/1/15    Diabetes mellitus ([facility] [address] )     type 2-last assessed 1/28/15    GERD (gastroesophageal reflux disease)     Hypertension     Irregular heart beat     last assessed 7/1/15    Palpitations     last assessed 9/7/17    Sexual dysfunction     last assessed 7/1/15    Thyroid disease     last assessed 7/1/15     Social History     Social History    Marital status: Single     Spouse name: N/A    Number of children: N/A    Years of education: N/A     Occupational History    Not on file       Social History Main Topics    Smoking status: Former Smoker    Smokeless tobacco: Never Used      Comment: former smoker-quit 17 yrs ago 1pppd x 30 yrs as per [employer]    Alcohol use No    Drug use: No    Sexual activity: Not on file     Other Topics Concern    Not on file     Social History Narrative    No narrative on file      Family History   Problem Relation Age of Onset    Hypertension Mother         essential    Stroke Mother     Hypertension Father         essential    Heart defect Father         cardiac disorder    Stroke Brother     Hypertension Brother      Past Surgical History:   Procedure Laterality Date    COLPOSCOPY      last assessed 7/1/15       Current Outpatient Prescriptions:     amLODIPine (NORVASC) 5 mg tablet, TAKE 1 TABLET DAILY FOR BLOOD PRESSURE, Disp: 90 tablet, Rfl: 0    aspirin 81 mg chewable tablet, Chew 81 mg daily, Disp: , Rfl:     enalapril (VASOTEC) 10 mg tablet, Take 1 tablet (10 mg total) by mouth daily, Disp: 90 tablet, Rfl: 0    glucose blood (ONETOUCH VERIO) test strip, 1 Squirt by In Vitro route 2 (two) times a day, Disp: , Rfl:     ibuprofen (MOTRIN) 600 mg tablet, Take 600 mg by mouth every 6 (six) hours, Disp: , Rfl:     metFORMIN (GLUCOPHAGE) 500 mg tablet, Take 1 tablet (500 mg total) by mouth daily with breakfast, Disp: 90 tablet, Rfl: 1    metoprolol succinate (TOPROL-XL) 25 mg 24 hr tablet, Take 1 tablet (25 mg total) by mouth daily Take inaddition to 50mg, Disp: 90 tablet, Rfl: 0    metoprolol succinate (TOPROL-XL) 50 mg 24 hr tablet, Take 1 tablet (50 mg total) by mouth daily, Disp: 90 tablet, Rfl: 0    omeprazole (PriLOSEC) 40 MG capsule, Take 1 capsule (40 mg total) by mouth daily, Disp: 90 capsule, Rfl: 0    triamterene-hydrochlorothiazide (DYAZIDE) 37 5-25 mg per capsule, Take 1 capsule by mouth daily, Disp: 90 capsule, Rfl: 0    meloxicam (MOBIC) 15 mg tablet, Take 1 tablet (15 mg total) by mouth daily (Patient not taking: Reported on 11/19/2018 ), Disp: 15 tablet, Rfl: 0    sildenafil (VIAGRA) 100 mg tablet, Viagra 100 mg tablet, Disp: , Rfl:   Allergies   Allergen Reactions    Acetaminophen      Other reaction(s): Unknown Reaction  "I had to go to the hospital and get shots after taking it 20yrs ago"       Labs:  Office Visit on 11/19/2018   Component Date Value    Hemoglobin A1C 11/19/2018 8 2     Creatinine, Ur 11/19/2018 118 0     Microalbum  ,U,Random 11/19/2018 15 9     Microalb Creat Ratio 11/19/2018 13    Appointment on 07/31/2018   Component Date Value    Hemoglobin 07/31/2018 13 3     Hematocrit 07/31/2018 41 3    Office Visit on 06/14/2018   Component Date Value    Hemoglobin A1C 06/14/2018 6 5      Lab Results   Component Value Date    CHOL 145 12/16/2015    TRIG 107 09/03/2017    TRIG 57 12/16/2015    HDL 31 (L) 09/03/2017    HDL 35 12/16/2015    LDLDIRECT 119 11/12/2013     Imaging: No results found  Review of Systems:  Review of Systems   Constitutional: Negative for activity change, appetite change, fatigue and fever  HENT: Negative for nosebleeds and sore throat  Eyes: Negative for photophobia and visual disturbance  Respiratory: Positive for shortness of breath  Negative for cough, chest tightness and wheezing  Cardiovascular: Negative for chest pain, palpitations and leg swelling  Gastrointestinal: Negative for abdominal pain, diarrhea, nausea and vomiting  Endocrine: Negative for polyuria  Genitourinary: Negative for dysuria, frequency and hematuria  Musculoskeletal: Negative for arthralgias, back pain and gait problem  Skin: Negative for pallor and rash  Neurological: Negative for dizziness, syncope, speech difficulty and light-headedness  Hematological: Does not bruise/bleed easily  Psychiatric/Behavioral: Negative for agitation, behavioral problems and confusion  Physical Exam:  Physical Exam   Constitutional: He is oriented to person, place, and time  He appears well-developed and well-nourished  HENT:   Head: Normocephalic and atraumatic  Nose: Nose normal    Eyes: Pupils are equal, round, and reactive to light  EOM are normal  No scleral icterus  Neck: Normal range of motion  Neck supple  No JVD present  Cardiovascular: Normal rate, regular rhythm and normal heart sounds  Exam reveals no gallop and no friction rub  No murmur heard  Pulmonary/Chest: Effort normal and breath sounds normal  No respiratory distress  He has no wheezes  He has no rales  Abdominal: Soft  Bowel sounds are normal  He exhibits no distension  There is no tenderness  Musculoskeletal: Normal range of motion  He exhibits no edema or deformity  Neurological: He is alert and oriented to person, place, and time  No cranial nerve deficit  Skin: Skin is warm and dry  No rash noted  He is not diaphoretic  Psychiatric: He has a normal mood and affect  His behavior is normal    Vitals reviewed  Blood pressure 128/60, pulse 64, height 6' 1" (1 854 m), weight (!) 159 kg (349 lb 14 4 oz), SpO2 96 %  Discussion/Summary:  Palpitations: unclear etiology  Has essentially normal stress test and echocardiogram in 2011  He also had a stress test in Aug 2016 revealing no ischemia or infarct, normal LV function, and no TID  Has a normal baseline ECG to suggest any genetic conduction abnormalities  24 hour Holter monitor to look for occult arrhythmias also revealed no significant correlation of symptoms with arrhythmias or ectopy  He seems to have increased symptoms when his metoprolol is reduced  He doesn't seem to have any lightheadedness or syncope with low HR, so I'm not concerned with his HR being lower  Continue metoprolol, but at higher dose of 75mg  Unlikely to be of cardiac etiology  Possibly due to anxiety/stress vs deconditioning physically with recent weight gain and inactivity  HTN: well controlled on current doses of meds  HLD: low HDL and LDL is at goal  Would increase exercise to help with this       Chest pain at last visit: with most recent stress test over a year ago and with hospitalization for chest pain - we screened with another stress test in Sept 2017 that was normal

## 2018-12-03 NOTE — PATIENT INSTRUCTIONS

## 2018-12-10 ENCOUNTER — APPOINTMENT (OUTPATIENT)
Dept: LAB | Age: 63
End: 2018-12-10
Payer: MEDICARE

## 2018-12-10 ENCOUNTER — TRANSCRIBE ORDERS (OUTPATIENT)
Dept: ADMINISTRATIVE | Age: 63
End: 2018-12-10

## 2018-12-10 DIAGNOSIS — E11.9 TYPE 2 DIABETES MELLITUS WITHOUT COMPLICATION, WITHOUT LONG-TERM CURRENT USE OF INSULIN (HCC): ICD-10-CM

## 2018-12-10 DIAGNOSIS — R53.83 FATIGUE, UNSPECIFIED TYPE: ICD-10-CM

## 2018-12-10 DIAGNOSIS — E78.5 DYSLIPIDEMIA: ICD-10-CM

## 2018-12-10 LAB
ALBUMIN SERPL BCP-MCNC: 3.8 G/DL (ref 3.5–5)
ALP SERPL-CCNC: 33 U/L (ref 46–116)
ALT SERPL W P-5'-P-CCNC: 40 U/L (ref 12–78)
ANION GAP SERPL CALCULATED.3IONS-SCNC: 4 MMOL/L (ref 4–13)
AST SERPL W P-5'-P-CCNC: 26 U/L (ref 5–45)
BILIRUB SERPL-MCNC: 0.55 MG/DL (ref 0.2–1)
BUN SERPL-MCNC: 21 MG/DL (ref 5–25)
CALCIUM SERPL-MCNC: 9.8 MG/DL (ref 8.3–10.1)
CHLORIDE SERPL-SCNC: 103 MMOL/L (ref 100–108)
CHOLEST SERPL-MCNC: 177 MG/DL (ref 50–200)
CO2 SERPL-SCNC: 30 MMOL/L (ref 21–32)
CREAT SERPL-MCNC: 1.31 MG/DL (ref 0.6–1.3)
ERYTHROCYTE [DISTWIDTH] IN BLOOD BY AUTOMATED COUNT: 13.5 % (ref 11.6–15.1)
GFR SERPL CREATININE-BSD FRML MDRD: 57 ML/MIN/1.73SQ M
GLUCOSE P FAST SERPL-MCNC: 116 MG/DL (ref 65–99)
HCT VFR BLD AUTO: 42.4 % (ref 36.5–49.3)
HDLC SERPL-MCNC: 30 MG/DL (ref 40–60)
HGB BLD-MCNC: 13.6 G/DL (ref 12–17)
LDLC SERPL CALC-MCNC: 124 MG/DL (ref 0–100)
MCH RBC QN AUTO: 28.3 PG (ref 26.8–34.3)
MCHC RBC AUTO-ENTMCNC: 32.1 G/DL (ref 31.4–37.4)
MCV RBC AUTO: 88 FL (ref 82–98)
NONHDLC SERPL-MCNC: 147 MG/DL
PLATELET # BLD AUTO: 204 THOUSANDS/UL (ref 149–390)
PMV BLD AUTO: 11 FL (ref 8.9–12.7)
POTASSIUM SERPL-SCNC: 4 MMOL/L (ref 3.5–5.3)
PROT SERPL-MCNC: 7.6 G/DL (ref 6.4–8.2)
RBC # BLD AUTO: 4.8 MILLION/UL (ref 3.88–5.62)
SODIUM SERPL-SCNC: 137 MMOL/L (ref 136–145)
TRIGL SERPL-MCNC: 116 MG/DL
WBC # BLD AUTO: 5.66 THOUSAND/UL (ref 4.31–10.16)

## 2018-12-10 PROCEDURE — 36415 COLL VENOUS BLD VENIPUNCTURE: CPT

## 2018-12-10 PROCEDURE — 85027 COMPLETE CBC AUTOMATED: CPT

## 2018-12-10 PROCEDURE — 80053 COMPREHEN METABOLIC PANEL: CPT

## 2018-12-10 PROCEDURE — 80061 LIPID PANEL: CPT

## 2018-12-12 PROBLEM — E66.01 MORBID OBESITY WITH BODY MASS INDEX (BMI) OF 45.0 TO 49.9 IN ADULT (HCC): Status: ACTIVE | Noted: 2018-12-12

## 2018-12-17 ENCOUNTER — OFFICE VISIT (OUTPATIENT)
Dept: FAMILY MEDICINE CLINIC | Facility: CLINIC | Age: 63
End: 2018-12-17
Payer: MEDICARE

## 2018-12-17 VITALS
BODY MASS INDEX: 42.66 KG/M2 | HEIGHT: 72 IN | TEMPERATURE: 97.9 F | SYSTOLIC BLOOD PRESSURE: 140 MMHG | WEIGHT: 315 LBS | DIASTOLIC BLOOD PRESSURE: 78 MMHG | HEART RATE: 68 BPM | RESPIRATION RATE: 16 BRPM | OXYGEN SATURATION: 96 %

## 2018-12-17 DIAGNOSIS — E78.5 DYSLIPIDEMIA: ICD-10-CM

## 2018-12-17 DIAGNOSIS — Z12.5 SCREENING FOR PROSTATE CANCER: ICD-10-CM

## 2018-12-17 DIAGNOSIS — E11.9 TYPE 2 DIABETES MELLITUS WITHOUT COMPLICATION, WITHOUT LONG-TERM CURRENT USE OF INSULIN (HCC): ICD-10-CM

## 2018-12-17 DIAGNOSIS — E66.01 MORBID OBESITY WITH BODY MASS INDEX (BMI) OF 45.0 TO 49.9 IN ADULT (HCC): ICD-10-CM

## 2018-12-17 DIAGNOSIS — Z00.00 WELCOME TO MEDICARE PREVENTIVE VISIT: Primary | ICD-10-CM

## 2018-12-17 PROCEDURE — G0403 EKG FOR INITIAL PREVENT EXAM: HCPCS | Performed by: INTERNAL MEDICINE

## 2018-12-17 PROCEDURE — 99173 VISUAL ACUITY SCREEN: CPT | Performed by: INTERNAL MEDICINE

## 2018-12-17 PROCEDURE — G0402 INITIAL PREVENTIVE EXAM: HCPCS | Performed by: INTERNAL MEDICINE

## 2018-12-17 NOTE — PROGRESS NOTES
Assessment and Plan:  Problem List Items Addressed This Visit     None      Visit Diagnoses     Welcome to Medicare preventive visit    -  Primary    Relevant Orders    POCT ECG        Health Maintenance Due   Topic Date Due    Hepatitis C Screening  1955   [place] Medicare Annual Wellness Visit (AWV)  1955    DM Eye Exam  09/29/1963    DTaP,Tdap,and Td Vaccines (1 - Tdap) 09/29/1974    Pneumococcal PPSV23/PCV13 65+ Years / Low and Medium Risk (1 of 2 - PCV13) 09/29/2018         HPI:  Patient Active Problem List   Diagnosis    Chest discomfort    Benign essential HTN    [facility]    Dyslipidemia    GERD (gastroesophageal reflux disease)    AYLEEN (obstructive sleep apnea)    Bilateral lower extremity edema    Morbid obesity with body mass index (BMI) of 45 0 to 49 9 in adult [facility])     Past Medical History:   Diagnosis Date    Arthritis     last assessed 7/1/15    Diabetes mellitus ([facility] )     type 2-last assessed 1/28/15    GERD (gastroesophageal reflux disease)     Hypertension     Irregular heart beat     last assessed 7/1/15    Palpitations     last assessed 9/7/17    Sexual dysfunction     last assessed 7/1/15    Thyroid disease     last assessed 7/1/15     Past Surgical History:   Procedure Laterality Date    COLPOSCOPY      last assessed 7/1/15     Family History   Problem Relation Age of Onset    Hypertension Mother         essential    Stroke Mother     Hypertension Father         essential    Heart defect Father         cardiac disorder    Stroke Brother     Hypertension Brother      History   Smoking Status    Former Smoker   Smokeless Tobacco    Never Used     Comment: former smoker-quit 17 yrs ago 1pppd x 30 yrs as per [employer]     History   Alcohol Use No      History   Drug Use No         Current Outpatient Prescriptions   Medication Sig Dispense Refill    amLODIPine (NORVASC) 5 mg tablet TAKE 1 TABLET DAILY FOR BLOOD PRESSURE 90 tablet 0    aspirin 81 mg chewable tablet Chew 81 mg daily      enalapril (VASOTEC) 10 mg tablet Take 1 tablet (10 mg total) by mouth daily 90 tablet 0    glucose blood (ONETOUCH VERIO) test strip 1 Squirt by In Vitro route 2 (two) times a day      ibuprofen (MOTRIN) 600 mg tablet Take 600 mg by mouth every 6 (six) hours      meloxicam (MOBIC) 15 mg tablet Take 1 tablet (15 mg total) by mouth daily (Patient not taking: Reported on 11/19/2018 ) 15 tablet 0    metFORMIN (GLUCOPHAGE) 500 mg tablet Take 1 tablet (500 mg total) by mouth daily with breakfast 90 tablet 1    metoprolol succinate (TOPROL-XL) 25 mg 24 hr tablet Take 1 tablet (25 mg total) by mouth daily Take inaddition to 50mg 90 tablet 0    metoprolol succinate (TOPROL-XL) 50 mg 24 hr tablet Take 1 tablet (50 mg total) by mouth daily 90 tablet 0    omeprazole (PriLOSEC) 40 MG capsule Take 1 capsule (40 mg total) by mouth daily 90 capsule 0    sildenafil (VIAGRA) 100 mg tablet Viagra 100 mg tablet      triamterene-hydrochlorothiazide (DYAZIDE) 37 5-25 mg per capsule Take 1 capsule by mouth daily 90 capsule 0     No current facility-administered medications for this visit  Allergies   Allergen Reactions    Acetaminophen      Other reaction(s): Unknown Reaction  "I had to go to the hospital and get shots after taking it 20yrs ago"     Immunization History   Administered Date(s) Administered    Influenza 10/14/2018    Influenza TIV (IM) 10/08/2013, 10/13/2014, 10/15/2015, 09/26/2016, 09/09/2017    Pneumococcal Polysaccharide PPV23 02/18/2015       Patient Care Team:  Taylor LogDO linwood as PCP - General  Taylor Loges, MD Galindo Willis PA-C    Medicare Screening Tests and Risk Assessments:      Health Risk Assessment:  Patient rates overall health as fair  Patient feels that their physical health rating is Slightly worse  Eyesight was rated as Same  Hearing was rated as Same  Patient feels that their emotional and mental health rating is Same   Pain experienced by patient in the last 7 days has been None  Patient states that he has experienced weight loss or gain in last 6 months  (Additional comments: He gained weight)    Emotional/Mental Health:  Patient has been feeling nervous/anxious  PHQ-9 Depression Screening:    Frequency of the following problems over the past two weeks:      1  Little interest or pleasure in doing things: 0 - not at all      2  Feeling down, depressed, or hopeless: 0 - not at all  PHQ-2 Score: 0          Broken Bones/Falls: Fall Risk Assessment:    In the past year, patient has experienced: No history of falling in past year          Bladder/Bowel:  Patient has not leaked urine accidently in the last six months  Patient reports no loss of bowel control  Immunizations:  Patient has had a flu vaccination within the last year  Patient has received a pneumonia shot  Home Safety:  Patient has trouble with stairs inside or outside of their home  Patient currently reports that there are no safety hazards present in home, no working smoke alarms, no working carbon monoxide detectors  Preventative Screenings:   no prostate cancer screen performed, colon cancer screen completed, cholesterol screen completed, glaucoma eye exam completed,     Medications:  Patient is currently taking over-the-counter supplements  Patient is able to manage medications  Lifestyle Choices:  Patient reports no tobacco use  Patient has smoked or used tobacco in the past   Patient has stopped his tobacco use  Tobacco use quit date: 25 yrs ago  Patient reports no alcohol use  Patient drives a vehicle  Patient wears seat belt          Activities of Daily Living:  Can get out of bed by his or her self, able to dress self, able to make own meals, able to do own shopping, able to bathe self, can do own laundry/housekeeping, can manage own money, pay bills and track expenses    Previous Hospitalizations:  Hospitalization or ED visit in past 12 months  Number of hospitalizations within the last year: 1-2        Advanced Directives:  Patient has decided on a power of   Patient has completed advanced directive  Preventative Screening/Counseling:      Cardiovascular:      General: Risks and Benefits Discussed and Screening Current          Diabetes:      General: Risks and Benefits Discussed and Screening Current          Colorectal Cancer:      General: Risks and Benefits Discussed and Screening Current          Prostate Cancer:      General: Risks and Benefits Discussed          Osteoporosis:      Counseling: Calcium and Vitamin D Intake and Regular Weightbearing Exercise          AAA:      General: Risks and Benefits Discussed          Glaucoma:      General: Risks and Benefits Discussed and Screening Current          HIV:      General: Risks and Benefits Discussed and Patient Declines          Hepatitis C:      General: Risks and Benefits Discussed and Patient Declines        Advanced Directives:   Patient has living will for healthcare, has durable POA for healthcare, patient has an advanced directive  Immunizations:      Influenza: Risks & Benefits Discussed and Influenza UTD This Year      Pneumococcal: Risks & Benefits Discussed             Visual Acuity Screening    Right eye Left eye Both eyes   Without correction:      With correction: 20/25 20/25 20/25       Physical Exam:  Review of Systems   Constitutional: Negative  HENT: Negative  Respiratory: Negative  Cardiovascular: Negative  Gastrointestinal: Negative  Negative for bowel incontinence  Genitourinary: Negative  Psychiatric/Behavioral: The patient is not nervous/anxious          Vitals:    12/17/18 0945   BP: 140/78   BP Location: Right arm   Patient Position: Sitting   Cuff Size: Large   Pulse: 68   Resp: 16   Temp: 97 9 °F (36 6 °C)   TempSrc: Tympanic   SpO2: 96%   Weight: (!) 159 kg (351 lb)   Height: 6' (1 829 m)   Body mass index is 47 6 kg/m²  Physical Exam   Constitutional: He appears well-developed and well-nourished  HENT:   Head: Normocephalic and atraumatic  Right Ear: External ear normal    Left Ear: External ear normal    Mouth/Throat: Oropharynx is clear and moist    Neck: Normal range of motion  Neck supple  Cardiovascular: Normal rate, regular rhythm and normal heart sounds      Pulmonary/Chest: Effort normal and breath sounds normal

## 2019-01-02 DIAGNOSIS — I10 HYPERTENSION, UNSPECIFIED TYPE: ICD-10-CM

## 2019-01-02 DIAGNOSIS — K21.9 GASTROESOPHAGEAL REFLUX DISEASE WITHOUT ESOPHAGITIS: ICD-10-CM

## 2019-01-02 RX ORDER — AMLODIPINE BESYLATE 5 MG/1
5 TABLET ORAL DAILY
Qty: 90 TABLET | Refills: 0 | Status: SHIPPED | OUTPATIENT
Start: 2019-01-02 | End: 2019-03-26 | Stop reason: SDUPTHER

## 2019-01-02 RX ORDER — TRIAMTERENE AND HYDROCHLOROTHIAZIDE 37.5; 25 MG/1; MG/1
1 CAPSULE ORAL DAILY
Qty: 90 CAPSULE | Refills: 0 | Status: SHIPPED | OUTPATIENT
Start: 2019-01-02 | End: 2019-01-23 | Stop reason: SDUPTHER

## 2019-01-02 RX ORDER — METOPROLOL SUCCINATE 50 MG/1
50 TABLET, EXTENDED RELEASE ORAL DAILY
Qty: 90 TABLET | Refills: 0 | Status: SHIPPED | OUTPATIENT
Start: 2019-01-02 | End: 2019-01-23 | Stop reason: SDUPTHER

## 2019-01-02 RX ORDER — ENALAPRIL MALEATE 10 MG/1
10 TABLET ORAL DAILY
Qty: 90 TABLET | Refills: 0 | Status: SHIPPED | OUTPATIENT
Start: 2019-01-02 | End: 2019-03-26 | Stop reason: SDUPTHER

## 2019-01-02 RX ORDER — METOPROLOL SUCCINATE 25 MG/1
25 TABLET, EXTENDED RELEASE ORAL DAILY
Qty: 90 TABLET | Refills: 0 | Status: SHIPPED | OUTPATIENT
Start: 2019-01-02 | End: 2019-01-23 | Stop reason: SDUPTHER

## 2019-01-02 RX ORDER — OMEPRAZOLE 40 MG/1
40 CAPSULE, DELAYED RELEASE ORAL DAILY
Qty: 90 CAPSULE | Refills: 0 | Status: SHIPPED | OUTPATIENT
Start: 2019-01-02 | End: 2019-01-23 | Stop reason: SDUPTHER

## 2019-01-23 DIAGNOSIS — K21.9 GASTROESOPHAGEAL REFLUX DISEASE WITHOUT ESOPHAGITIS: ICD-10-CM

## 2019-01-23 DIAGNOSIS — I10 HYPERTENSION, UNSPECIFIED TYPE: ICD-10-CM

## 2019-01-23 RX ORDER — METOPROLOL SUCCINATE 25 MG/1
TABLET, EXTENDED RELEASE ORAL
Qty: 90 TABLET | Refills: 0 | Status: SHIPPED | OUTPATIENT
Start: 2019-01-23 | End: 2019-10-04 | Stop reason: SDUPTHER

## 2019-01-23 RX ORDER — OMEPRAZOLE 40 MG/1
CAPSULE, DELAYED RELEASE ORAL
Qty: 90 CAPSULE | Refills: 0 | Status: SHIPPED | OUTPATIENT
Start: 2019-01-23 | End: 2019-07-19 | Stop reason: SDUPTHER

## 2019-01-23 RX ORDER — METOPROLOL SUCCINATE 50 MG/1
TABLET, EXTENDED RELEASE ORAL
Qty: 90 TABLET | Refills: 0 | Status: SHIPPED | OUTPATIENT
Start: 2019-01-23 | End: 2019-07-19 | Stop reason: SDUPTHER

## 2019-01-23 RX ORDER — TRIAMTERENE AND HYDROCHLOROTHIAZIDE 37.5; 25 MG/1; MG/1
CAPSULE ORAL
Qty: 90 CAPSULE | Refills: 0 | Status: SHIPPED | OUTPATIENT
Start: 2019-01-23 | End: 2019-07-19 | Stop reason: SDUPTHER

## 2019-03-18 ENCOUNTER — OFFICE VISIT (OUTPATIENT)
Dept: FAMILY MEDICINE CLINIC | Facility: CLINIC | Age: 64
End: 2019-03-18
Payer: MEDICARE

## 2019-03-18 VITALS
BODY MASS INDEX: 42.66 KG/M2 | OXYGEN SATURATION: 97 % | WEIGHT: 315 LBS | HEART RATE: 58 BPM | DIASTOLIC BLOOD PRESSURE: 74 MMHG | HEIGHT: 72 IN | RESPIRATION RATE: 16 BRPM | SYSTOLIC BLOOD PRESSURE: 110 MMHG | TEMPERATURE: 98.1 F

## 2019-03-18 DIAGNOSIS — E66.01 MORBID OBESITY WITH BODY MASS INDEX (BMI) OF 45.0 TO 49.9 IN ADULT (HCC): ICD-10-CM

## 2019-03-18 DIAGNOSIS — Z12.5 SCREENING FOR PROSTATE CANCER: ICD-10-CM

## 2019-03-18 DIAGNOSIS — R73.03 PREDIABETES: Primary | ICD-10-CM

## 2019-03-18 DIAGNOSIS — Z11.59 ENCOUNTER FOR HEPATITIS C SCREENING TEST FOR LOW RISK PATIENT: ICD-10-CM

## 2019-03-18 DIAGNOSIS — R73.9 HYPERGLYCEMIA: ICD-10-CM

## 2019-03-18 DIAGNOSIS — E66.01 MORBID OBESITY (HCC): ICD-10-CM

## 2019-03-18 LAB — SL AMB POCT HEMOGLOBIN AIC: 6.3 (ref ?–6.5)

## 2019-03-18 PROCEDURE — 99214 OFFICE O/P EST MOD 30 MIN: CPT | Performed by: INTERNAL MEDICINE

## 2019-03-18 PROCEDURE — 83036 HEMOGLOBIN GLYCOSYLATED A1C: CPT | Performed by: INTERNAL MEDICINE

## 2019-03-18 NOTE — PROGRESS NOTES
Assessment/Plan:         Diagnoses and all orders for this visit:    Prediabetes  -     Comprehensive metabolic panel; Future    Hyperglycemia  -     POCT hemoglobin A1c    Encounter for hepatitis C screening test for low risk patient  -     Hepatitis C antibody; Future    Morbid obesity with body mass index (BMI) of 45 0 to 49 9 in [facility])    Morbid obesity (HCC)          Subjective:      Patient ID: Jessie Estrada is a 70 y o  male  Pt reduced 8 lbs  He felt lightheaded and d/c'd metformen  His a1c is 6 3  He does not eat rice or pasta  Denies retinopathy, neuropathy and denies ckd      The following portions of the patient's history were reviewed and updated as appropriate: He  has a past medical history of Arthritis, Diabetes mellitus ([facility] ), GERD (gastroesophageal reflux disease), Hypertension, Irregular heart beat, Palpitations, Sexual dysfunction, and Thyroid disease  He   Patient Active Problem List    Diagnosis Date Noted    Morbid obesity with body mass index (BMI) of 45 0 to 49 9 in [facility]) 12/12/2018    Bilateral lower extremity edema 07/23/2018    Diabetes mellitus ([facility] [address] ) 03/16/2018    GERD (gastroesophageal reflux disease) 03/16/2018    Chest discomfort 09/03/2017    AYLEEN (obstructive sleep apnea) 08/18/2017    Benign essential HTN 07/31/2017    Dyslipidemia 07/31/2017     He  has a past surgical history that includes Colposcopy  His family history includes Heart defect in his father; Hypertension in his brother, father, and mother; Stroke in his brother and mother  He  reports that he has quit smoking  He has never used smokeless tobacco  He reports that he does not drink alcohol or use drugs    Current Outpatient Medications   Medication Sig Dispense Refill    amLODIPine (NORVASC) 5 mg tablet Take 1 tablet (5 mg total) by mouth daily For blood pressure 90 tablet 0    aspirin 81 mg chewable tablet Chew 81 mg daily      enalapril (VASOTEC) 10 mg tablet Take 1 tablet (10 mg total) by mouth daily 90 tablet 0    glucose blood (ONETOUCH VERIO) test strip 1 Squirt by In Vitro route 2 (two) times a day      metoprolol succinate (TOPROL-XL) 25 mg 24 hr tablet TAKE 1 TABLET DAILY IN     ADDITION TO 50MG TABLET 90 tablet 0    metoprolol succinate (TOPROL-XL) 50 mg 24 hr tablet TAKE 1 TABLET DAILY 90 tablet 0    omeprazole (PriLOSEC) 40 MG capsule TAKE 1 CAPSULE DAILY 90 capsule 0    triamterene-hydrochlorothiazide (DYAZIDE) 37 5-25 mg per capsule TAKE 1 CAPSULE DAILY 90 capsule 0    ibuprofen (MOTRIN) 600 mg tablet Take 600 mg by mouth every 6 (six) hours      meloxicam (MOBIC) 15 mg tablet Take 1 tablet (15 mg total) by mouth daily (Patient not taking: Reported on 11/19/2018 ) 15 tablet 0     No current facility-administered medications for this visit  Current Outpatient Medications on File Prior to Visit   Medication Sig    amLODIPine (NORVASC) 5 mg tablet Take 1 tablet (5 mg total) by mouth daily For blood pressure    aspirin 81 mg chewable tablet Chew 81 mg daily    enalapril (VASOTEC) 10 mg tablet Take 1 tablet (10 mg total) by mouth daily    glucose blood (ONETOUCH VERIO) test strip 1 Squirt by In Vitro route 2 (two) times a day    metoprolol succinate (TOPROL-XL) 25 mg 24 hr tablet TAKE 1 TABLET DAILY IN     ADDITION TO 50MG TABLET    metoprolol succinate (TOPROL-XL) 50 mg 24 hr tablet TAKE 1 TABLET DAILY    omeprazole (PriLOSEC) 40 MG capsule TAKE 1 CAPSULE DAILY    triamterene-hydrochlorothiazide (DYAZIDE) 37 5-25 mg per capsule TAKE 1 CAPSULE DAILY    ibuprofen (MOTRIN) 600 mg tablet Take 600 mg by mouth every 6 (six) hours    meloxicam (MOBIC) 15 mg tablet Take 1 tablet (15 mg total) by mouth daily (Patient not taking: Reported on 11/19/2018 )     No current facility-administered medications on file prior to visit  He is allergic to acetaminophen       Review of Systems   Constitutional: Negative  HENT: Negative  Respiratory: Negative  Cardiovascular: Negative  Endocrine: Negative for polydipsia, polyphagia and polyuria  Objective:      /74 (BP Location: Left arm, Patient Position: Sitting, Cuff Size: Large)   Pulse 58   Temp 98 1 °F (36 7 °C) (Tympanic)   Resp 16   Ht 6' (1 829 m)   Wt (!) 156 kg (343 lb)   SpO2 97%   BMI 46 52 kg/m²          Physical Exam   Constitutional: He appears well-developed and well-nourished  No distress  HENT:   Head: Normocephalic  Right Ear: External ear normal    Left Ear: External ear normal    Nose: Nose normal    Mouth/Throat: Oropharynx is clear and moist  No oropharyngeal exudate  Neck: Normal range of motion  Neck supple  No thyromegaly present  Cardiovascular: Normal rate, regular rhythm, normal heart sounds and intact distal pulses  Exam reveals no gallop  No murmur heard  Pulmonary/Chest: Effort normal and breath sounds normal  No respiratory distress  He has no wheezes  He has no rales  He exhibits no tenderness  Lymphadenopathy:     He has no cervical adenopathy  Skin: He is not diaphoretic  BMI Counseling: Body mass index is 46 52 kg/m²  Discussed the patient's BMI with him  The BMI is above average  BMI counseling and education was provided to the patient  Nutrition recommendations include reducing portion sizes and decreasing overall calorie intake  Exercise recommendations include exercising 3-5 times per week

## 2019-03-22 NOTE — PROGRESS NOTES
Glaucoma exam report stated that the patient did not document a diabetes diagnosis  Due to this, a fundus exam/ diabetic retinopathy screening, which is a requirement of [facility] in order to evaluate and diagnose diabetic retinopathy, was not performed at the visit

## 2019-03-26 ENCOUNTER — APPOINTMENT (OUTPATIENT)
Dept: LAB | Age: 64
End: 2019-03-26
Payer: MEDICARE

## 2019-03-26 DIAGNOSIS — I10 HYPERTENSION, UNSPECIFIED TYPE: ICD-10-CM

## 2019-03-26 DIAGNOSIS — R73.03 PREDIABETES: ICD-10-CM

## 2019-03-26 DIAGNOSIS — Z11.59 ENCOUNTER FOR HEPATITIS C SCREENING TEST FOR LOW RISK PATIENT: ICD-10-CM

## 2019-03-26 DIAGNOSIS — Z12.5 SCREENING FOR PROSTATE CANCER: ICD-10-CM

## 2019-03-26 LAB
ALBUMIN SERPL BCP-MCNC: 3.4 G/DL (ref 3.5–5)
ALP SERPL-CCNC: 32 U/L (ref 46–116)
ALT SERPL W P-5'-P-CCNC: 31 U/L (ref 12–78)
ANION GAP SERPL CALCULATED.3IONS-SCNC: 5 MMOL/L (ref 4–13)
AST SERPL W P-5'-P-CCNC: 25 U/L (ref 5–45)
BILIRUB SERPL-MCNC: 0.5 MG/DL (ref 0.2–1)
BUN SERPL-MCNC: 19 MG/DL (ref 5–25)
CALCIUM SERPL-MCNC: 8.5 MG/DL (ref 8.3–10.1)
CHLORIDE SERPL-SCNC: 106 MMOL/L (ref 100–108)
CO2 SERPL-SCNC: 27 MMOL/L (ref 21–32)
CREAT SERPL-MCNC: 1.15 MG/DL (ref 0.6–1.3)
GFR SERPL CREATININE-BSD FRML MDRD: 66 ML/MIN/1.73SQ M
GLUCOSE P FAST SERPL-MCNC: 116 MG/DL (ref 65–99)
HCV AB SER QL: NORMAL
POTASSIUM SERPL-SCNC: 3.8 MMOL/L (ref 3.5–5.3)
PROT SERPL-MCNC: 7.1 G/DL (ref 6.4–8.2)
SODIUM SERPL-SCNC: 138 MMOL/L (ref 136–145)

## 2019-03-26 PROCEDURE — 36415 COLL VENOUS BLD VENIPUNCTURE: CPT

## 2019-03-26 PROCEDURE — 80053 COMPREHEN METABOLIC PANEL: CPT

## 2019-03-26 PROCEDURE — G0103 PSA SCREENING: HCPCS

## 2019-03-26 PROCEDURE — 86803 HEPATITIS C AB TEST: CPT

## 2019-03-26 RX ORDER — ENALAPRIL MALEATE 10 MG/1
10 TABLET ORAL DAILY
Qty: 90 TABLET | Refills: 0 | Status: SHIPPED | OUTPATIENT
Start: 2019-03-26 | End: 2019-10-04 | Stop reason: SDUPTHER

## 2019-03-26 RX ORDER — AMLODIPINE BESYLATE 5 MG/1
5 TABLET ORAL DAILY
Qty: 90 TABLET | Refills: 0 | Status: SHIPPED | OUTPATIENT
Start: 2019-03-26 | End: 2019-10-04 | Stop reason: SDUPTHER

## 2019-03-27 LAB
PSA FREE MFR SERPL: 30 %
PSA FREE SERPL-MCNC: 0.27 NG/ML
PSA SERPL-MCNC: 0.9 NG/ML (ref 0–4)

## 2019-05-30 DIAGNOSIS — E11.9 TYPE 2 DIABETES MELLITUS WITHOUT COMPLICATION, WITHOUT LONG-TERM CURRENT USE OF INSULIN (HCC): ICD-10-CM

## 2019-07-19 DIAGNOSIS — I10 HYPERTENSION, UNSPECIFIED TYPE: ICD-10-CM

## 2019-07-19 DIAGNOSIS — K21.9 GASTROESOPHAGEAL REFLUX DISEASE WITHOUT ESOPHAGITIS: ICD-10-CM

## 2019-07-19 RX ORDER — OMEPRAZOLE 40 MG/1
CAPSULE, DELAYED RELEASE ORAL
Qty: 90 CAPSULE | Refills: 0 | Status: SHIPPED | OUTPATIENT
Start: 2019-07-19 | End: 2019-10-04 | Stop reason: SDUPTHER

## 2019-07-19 RX ORDER — TRIAMTERENE AND HYDROCHLOROTHIAZIDE 37.5; 25 MG/1; MG/1
CAPSULE ORAL
Qty: 90 CAPSULE | Refills: 0 | Status: SHIPPED | OUTPATIENT
Start: 2019-07-19 | End: 2019-10-04 | Stop reason: SDUPTHER

## 2019-07-19 RX ORDER — METOPROLOL SUCCINATE 50 MG/1
TABLET, EXTENDED RELEASE ORAL
Qty: 90 TABLET | Refills: 0 | Status: SHIPPED | OUTPATIENT
Start: 2019-07-19 | End: 2019-10-04 | Stop reason: SDUPTHER

## 2019-07-24 LAB
LEFT EYE DIABETIC RETINOPATHY: NORMAL
RIGHT EYE DIABETIC RETINOPATHY: NORMAL
SEVERITY (EYE EXAM): NORMAL

## 2019-09-16 NOTE — TELEPHONE ENCOUNTER
Spoke with patient  Explained that he is due for appointment and labs  Patient states he now lives 3 hours away and is unable to come to the office  Stated he will find a provider closer to him for PCP

## 2019-09-16 NOTE — TELEPHONE ENCOUNTER
Pt called inquiring about his diabetic test strips he called in  last week to be refilled  I explained to pt that he was due to be seen before any Rxs can be filled  He stated that he moved and is now almost 2 hours away  I told pt our providers need to see pts every 6-12 months to continue prescribing meds  & that since he is diabetic we like to f/u w/ pts every 3-6 mo  He stated that he cannot get in because he is too far away & busy  I suggested that he may want to seek out a PCP near him and offered to do a search for him  Pt refused stating he has seen Dr Beck Maharaj for 12 yrs & is not switching  Pt refused to schedule an appt saying he is healthy and just here in March  I told him I'd forward the Rx to the dr for refill but couldn't guarantee she would refill them without an appt  Pt stated he is almost out of test strips

## 2019-10-04 ENCOUNTER — OFFICE VISIT (OUTPATIENT)
Dept: FAMILY MEDICINE CLINIC | Facility: CLINIC | Age: 64
End: 2019-10-04
Payer: MEDICARE

## 2019-10-04 ENCOUNTER — APPOINTMENT (OUTPATIENT)
Dept: LAB | Facility: MEDICAL CENTER | Age: 64
End: 2019-10-04
Payer: MEDICARE

## 2019-10-04 VITALS
DIASTOLIC BLOOD PRESSURE: 70 MMHG | TEMPERATURE: 97.8 F | WEIGHT: 315 LBS | BODY MASS INDEX: 42.66 KG/M2 | HEART RATE: 60 BPM | RESPIRATION RATE: 16 BRPM | SYSTOLIC BLOOD PRESSURE: 132 MMHG | OXYGEN SATURATION: 96 % | HEIGHT: 72 IN

## 2019-10-04 DIAGNOSIS — R53.83 FATIGUE, UNSPECIFIED TYPE: ICD-10-CM

## 2019-10-04 DIAGNOSIS — I10 HYPERTENSION, UNSPECIFIED TYPE: ICD-10-CM

## 2019-10-04 DIAGNOSIS — E11.9 TYPE 2 DIABETES MELLITUS WITHOUT COMPLICATION, WITHOUT LONG-TERM CURRENT USE OF INSULIN (HCC): Primary | ICD-10-CM

## 2019-10-04 DIAGNOSIS — E78.5 DYSLIPIDEMIA: ICD-10-CM

## 2019-10-04 DIAGNOSIS — K21.9 GASTROESOPHAGEAL REFLUX DISEASE WITHOUT ESOPHAGITIS: ICD-10-CM

## 2019-10-04 LAB
ALBUMIN SERPL BCP-MCNC: 3.9 G/DL (ref 3.5–5)
ALP SERPL-CCNC: 38 U/L (ref 46–116)
ALT SERPL W P-5'-P-CCNC: 31 U/L (ref 12–78)
ANION GAP SERPL CALCULATED.3IONS-SCNC: 7 MMOL/L (ref 4–13)
AST SERPL W P-5'-P-CCNC: 22 U/L (ref 5–45)
BILIRUB SERPL-MCNC: 1.29 MG/DL (ref 0.2–1)
BUN SERPL-MCNC: 18 MG/DL (ref 5–25)
CALCIUM SERPL-MCNC: 9.7 MG/DL (ref 8.3–10.1)
CHLORIDE SERPL-SCNC: 105 MMOL/L (ref 100–108)
CHOLEST SERPL-MCNC: 175 MG/DL (ref 50–200)
CO2 SERPL-SCNC: 28 MMOL/L (ref 21–32)
CREAT SERPL-MCNC: 1.12 MG/DL (ref 0.6–1.3)
CREAT UR-MCNC: 49.9 MG/DL
ERYTHROCYTE [DISTWIDTH] IN BLOOD BY AUTOMATED COUNT: 13.6 % (ref 11.6–15.1)
GFR SERPL CREATININE-BSD FRML MDRD: 68 ML/MIN/1.73SQ M
GLUCOSE P FAST SERPL-MCNC: 107 MG/DL (ref 65–99)
HCT VFR BLD AUTO: 44 % (ref 36.5–49.3)
HDLC SERPL-MCNC: 34 MG/DL (ref 40–60)
HGB BLD-MCNC: 13.9 G/DL (ref 12–17)
LDLC SERPL CALC-MCNC: 122 MG/DL (ref 0–100)
MCH RBC QN AUTO: 28 PG (ref 26.8–34.3)
MCHC RBC AUTO-ENTMCNC: 31.6 G/DL (ref 31.4–37.4)
MCV RBC AUTO: 89 FL (ref 82–98)
MICROALBUMIN UR-MCNC: 6 MG/L (ref 0–20)
MICROALBUMIN/CREAT 24H UR: 12 MG/G CREATININE (ref 0–30)
NONHDLC SERPL-MCNC: 141 MG/DL
PLATELET # BLD AUTO: 193 THOUSANDS/UL (ref 149–390)
PMV BLD AUTO: 11.4 FL (ref 8.9–12.7)
POTASSIUM SERPL-SCNC: 4.1 MMOL/L (ref 3.5–5.3)
PROT SERPL-MCNC: 8 G/DL (ref 6.4–8.2)
RBC # BLD AUTO: 4.97 MILLION/UL (ref 3.88–5.62)
SL AMB POCT HEMOGLOBIN AIC: 6.1 (ref ?–6.5)
SODIUM SERPL-SCNC: 140 MMOL/L (ref 136–145)
TRIGL SERPL-MCNC: 93 MG/DL
WBC # BLD AUTO: 5.98 THOUSAND/UL (ref 4.31–10.16)

## 2019-10-04 PROCEDURE — 80061 LIPID PANEL: CPT

## 2019-10-04 PROCEDURE — 99214 OFFICE O/P EST MOD 30 MIN: CPT | Performed by: INTERNAL MEDICINE

## 2019-10-04 PROCEDURE — 36415 COLL VENOUS BLD VENIPUNCTURE: CPT

## 2019-10-04 PROCEDURE — 80053 COMPREHEN METABOLIC PANEL: CPT

## 2019-10-04 PROCEDURE — 83036 HEMOGLOBIN GLYCOSYLATED A1C: CPT | Performed by: INTERNAL MEDICINE

## 2019-10-04 PROCEDURE — 85027 COMPLETE CBC AUTOMATED: CPT

## 2019-10-04 PROCEDURE — 82570 ASSAY OF URINE CREATININE: CPT | Performed by: INTERNAL MEDICINE

## 2019-10-04 PROCEDURE — 82043 UR ALBUMIN QUANTITATIVE: CPT | Performed by: INTERNAL MEDICINE

## 2019-10-04 RX ORDER — METOPROLOL SUCCINATE 50 MG/1
50 TABLET, EXTENDED RELEASE ORAL DAILY
Qty: 90 TABLET | Refills: 1 | Status: SHIPPED | OUTPATIENT
Start: 2019-10-04 | End: 2019-10-10 | Stop reason: SDUPTHER

## 2019-10-04 RX ORDER — OMEPRAZOLE 40 MG/1
40 CAPSULE, DELAYED RELEASE ORAL DAILY
Qty: 90 CAPSULE | Refills: 1 | Status: SHIPPED | OUTPATIENT
Start: 2019-10-04 | End: 2019-10-10 | Stop reason: SDUPTHER

## 2019-10-04 RX ORDER — ENALAPRIL MALEATE 10 MG/1
10 TABLET ORAL DAILY
Qty: 90 TABLET | Refills: 1 | Status: SHIPPED | OUTPATIENT
Start: 2019-10-04 | End: 2020-01-28 | Stop reason: SDUPTHER

## 2019-10-04 RX ORDER — AMLODIPINE BESYLATE 5 MG/1
5 TABLET ORAL DAILY
Qty: 90 TABLET | Refills: 1 | Status: SHIPPED | OUTPATIENT
Start: 2019-10-04 | End: 2020-02-19

## 2019-10-04 RX ORDER — TRIAMTERENE AND HYDROCHLOROTHIAZIDE 37.5; 25 MG/1; MG/1
1 CAPSULE ORAL DAILY
Qty: 90 CAPSULE | Refills: 1 | Status: SHIPPED | OUTPATIENT
Start: 2019-10-04 | End: 2019-10-10 | Stop reason: SDUPTHER

## 2019-10-04 RX ORDER — METOPROLOL SUCCINATE 25 MG/1
25 TABLET, EXTENDED RELEASE ORAL DAILY
Qty: 90 TABLET | Refills: 1 | Status: SHIPPED | OUTPATIENT
Start: 2019-10-04 | End: 2020-01-28 | Stop reason: SDUPTHER

## 2019-10-04 NOTE — PROGRESS NOTES
Assessment/Plan:         Diagnoses and all orders for this visit:    Type 2 diabetes mellitus without complication, without long-term current use of insulin ([facility])  Comments:  off metformen and brought a1c down to 6 1  Orders:  -     POCT hemoglobin A1c  -     Microalbumin / creatinine urine ratio  -     glucose blood (ONETOUCH VERIO) test strip; 1 each by Other route 2 (two) times a day    Hypertension, unspecified type  Comments:  at goal  Orders:  -     amLODIPine (NORVASC) 5 mg tablet; Take 1 tablet (5 mg total) by mouth daily For blood pressure  -     enalapril (VASOTEC) 10 mg tablet; Take 1 tablet (10 mg total) by mouth daily  -     metoprolol succinate (TOPROL-XL) 25 mg 24 hr tablet; Take 1 tablet (25 mg total) by mouth daily Take in addition to 50mg  -     metoprolol succinate (TOPROL-XL) 50 mg 24 hr tablet; Take 1 tablet (50 mg total) by mouth daily Take in addition to 25mg  -     triamterene-hydrochlorothiazide (DYAZIDE) 37 5-25 mg per capsule; Take 1 capsule by mouth daily    Gastroesophageal reflux disease without esophagitis  Comments:  need ppi reordered  Orders:  -     omeprazole (PriLOSEC) 40 MG capsule; Take 1 capsule (40 mg total) by mouth daily          Subjective:      Patient ID: Paris Russo is a 75 y o  male  He is off metforem and has lost weight  He uses vasotec and red yeast rice  He has ankle edema and sees podiatry  The following portions of the patient's history were reviewed and updated as appropriate: He  has a past medical history of Arthritis, Diabetes mellitus ([facility] [address] ), GERD (gastroesophageal reflux disease), Hypertension, Irregular heart beat, Palpitations, Sexual dysfunction, and Thyroid disease    He   Patient Active Problem List    Diagnosis Date Noted    Morbid obesity with body mass index (BMI) of 45 0 to 49 9 in adult [facility]) 12/12/2018    Bilateral lower extremity edema 07/23/2018    Diabetes mellitus ([address] ) 03/16/2018    GERD (gastroesophageal reflux disease) 03/16/2018    Chest discomfort 09/03/2017    AYLEEN (obstructive sleep apnea) 08/18/2017    Benign essential HTN 07/31/2017    Dyslipidemia 07/31/2017     He  has a past surgical history that includes Colposcopy  His family history includes Heart defect in his father; Hypertension in his brother, father, and mother; Stroke in his brother and mother  He  reports that he has quit smoking  He has never used smokeless tobacco  He reports that he does not drink alcohol or use drugs  Current Outpatient Medications   Medication Sig Dispense Refill    amLODIPine (NORVASC) 5 mg tablet Take 1 tablet (5 mg total) by mouth daily For blood pressure 90 tablet 1    aspirin 81 mg chewable tablet Chew 81 mg daily      enalapril (VASOTEC) 10 mg tablet Take 1 tablet (10 mg total) by mouth daily 90 tablet 1    glucose blood (ONETOUCH VERIO) test strip 1 each by Other route 2 (two) times a day 100 each 1    metoprolol succinate (TOPROL-XL) 25 mg 24 hr tablet Take 1 tablet (25 mg total) by mouth daily Take in addition to 50mg 90 tablet 1    metoprolol succinate (TOPROL-XL) 50 mg 24 hr tablet Take 1 tablet (50 mg total) by mouth daily Take in addition to 25mg 90 tablet 1    omeprazole (PriLOSEC) 40 MG capsule Take 1 capsule (40 mg total) by mouth daily 90 capsule 1    triamterene-hydrochlorothiazide (DYAZIDE) 37 5-25 mg per capsule Take 1 capsule by mouth daily 90 capsule 1     No current facility-administered medications for this visit        Current Outpatient Medications on File Prior to Visit   Medication Sig    aspirin 81 mg chewable tablet Chew 81 mg daily    [DISCONTINUED] amLODIPine (NORVASC) 5 mg tablet Take 1 tablet (5 mg total) by mouth daily For blood pressure    [DISCONTINUED] enalapril (VASOTEC) 10 mg tablet Take 1 tablet (10 mg total) by mouth daily    [DISCONTINUED] glucose blood (ONETOUCH VERIO) test strip 1 Squirt by In Vitro route 2 (two) times a day    [DISCONTINUED] metoprolol succinate (TOPROL-XL) 25 mg 24 hr tablet TAKE 1 TABLET DAILY IN     ADDITION TO 50MG TABLET    [DISCONTINUED] metoprolol succinate (TOPROL-XL) 50 mg 24 hr tablet TAKE 1 TABLET DAILY    [DISCONTINUED] omeprazole (PriLOSEC) 40 MG capsule TAKE 1 CAPSULE DAILY    [DISCONTINUED] triamterene-hydrochlorothiazide (DYAZIDE) 37 5-25 mg per capsule TAKE 1 CAPSULE DAILY    [DISCONTINUED] ibuprofen (MOTRIN) 600 mg tablet Take 600 mg by mouth every 6 (six) hours    [DISCONTINUED] meloxicam (MOBIC) 15 mg tablet Take 1 tablet (15 mg total) by mouth daily (Patient not taking: Reported on 11/19/2018 )    [DISCONTINUED] metFORMIN (GLUCOPHAGE) 500 mg tablet TAKE 1 TABLET DAILY WITH   BREAKFAST (Patient not taking: Reported on 10/4/2019)     No current facility-administered medications on file prior to visit  He is allergic to acetaminophen       Review of Systems   Constitutional: Negative  HENT: Negative  Respiratory: Negative  Cardiovascular: Negative  Neurological: Negative for numbness  Objective:      /70 (BP Location: Right arm, Patient Position: Sitting, Cuff Size: Large)   Pulse 60   Temp 97 8 °F (36 6 °C) (Tympanic)   Resp 16   Ht 6' (1 829 m)   Wt (!) 153 kg (337 lb)   SpO2 96%   BMI 45 71 kg/m²          Physical Exam   Constitutional: He appears well-developed and well-nourished  No distress  HENT:   Head: Normocephalic and atraumatic  Right Ear: External ear normal    Left Ear: External ear normal    Nose: Nose normal    Mouth/Throat: Oropharynx is clear and moist  No oropharyngeal exudate  Neck: Normal range of motion  Neck supple  No thyromegaly present  Cardiovascular: Normal rate, regular rhythm, normal heart sounds and intact distal pulses  Exam reveals no gallop and no friction rub  No murmur heard  Pulses:       Dorsalis pedis pulses are 2+ on the right side, and 2+ on the left side  Pulmonary/Chest: Effort normal and breath sounds normal  No stridor  No respiratory distress   He has no wheezes  Feet:   Right Foot:   Skin Integrity: Positive for warmth  Negative for erythema  Left Foot:   Skin Integrity: Positive for warmth  Negative for erythema  Lymphadenopathy:     He has no cervical adenopathy  Skin: He is not diaphoretic  Patient's shoes and socks removed  Right Foot/Ankle   Right Foot Inspection  Skin Exam: warmth no erythema                            Sensory   Vibration: diminished    Monofilament testing: intact  Vascular    The right DP pulse is 2+  Left Foot/Ankle  Left Foot Inspection  Skin Exam: warmthno erythema                                         Sensory   Vibration: absent    Monofilament: diminished  Vascular    The left DP pulse is 2+  Assign Risk Category:  No deformity present;  No loss of protective sensation;        Risk: 0

## 2019-10-10 DIAGNOSIS — I10 HYPERTENSION, UNSPECIFIED TYPE: ICD-10-CM

## 2019-10-10 DIAGNOSIS — K21.9 GASTROESOPHAGEAL REFLUX DISEASE WITHOUT ESOPHAGITIS: ICD-10-CM

## 2019-10-11 RX ORDER — OMEPRAZOLE 40 MG/1
40 CAPSULE, DELAYED RELEASE ORAL DAILY
Qty: 90 CAPSULE | Refills: 0 | Status: SHIPPED | OUTPATIENT
Start: 2019-10-11 | End: 2020-01-15

## 2019-10-11 RX ORDER — TRIAMTERENE AND HYDROCHLOROTHIAZIDE 37.5; 25 MG/1; MG/1
1 CAPSULE ORAL DAILY
Qty: 90 CAPSULE | Refills: 0 | Status: SHIPPED | OUTPATIENT
Start: 2019-10-11 | End: 2019-10-17 | Stop reason: SDUPTHER

## 2019-10-11 RX ORDER — METOPROLOL SUCCINATE 50 MG/1
50 TABLET, EXTENDED RELEASE ORAL DAILY
Qty: 90 TABLET | Refills: 0 | Status: SHIPPED | OUTPATIENT
Start: 2019-10-11 | End: 2020-01-15

## 2019-10-17 ENCOUNTER — TELEPHONE (OUTPATIENT)
Dept: FAMILY MEDICINE CLINIC | Facility: CLINIC | Age: 64
End: 2019-10-17

## 2019-10-17 DIAGNOSIS — I10 HYPERTENSION, UNSPECIFIED TYPE: ICD-10-CM

## 2019-10-17 RX ORDER — TRIAMTERENE AND HYDROCHLOROTHIAZIDE 37.5; 25 MG/1; MG/1
1 CAPSULE ORAL DAILY
Qty: 14 CAPSULE | Refills: 0 | Status: SHIPPED | OUTPATIENT
Start: 2019-10-17 | End: 2020-01-28 | Stop reason: SDUPTHER

## 2019-10-17 NOTE — TELEPHONE ENCOUNTER
Patient still waiting for triamterene coming from mail order  They are still 7-10 days out and he will run out by then  Can you send over a small supply to his local [facility] to hold him over

## 2019-10-21 ENCOUNTER — TELEPHONE (OUTPATIENT)
Dept: FAMILY MEDICINE CLINIC | Facility: CLINIC | Age: 64
End: 2019-10-21

## 2019-10-21 NOTE — TELEPHONE ENCOUNTER
I spoke with the patient regarding adding a statin for his intermittent diabetes  He is willing to try a low dose statin    Please forward to:   [employer] in [place], [place]

## 2019-10-22 DIAGNOSIS — E78.5 DYSLIPIDEMIA: Primary | ICD-10-CM

## 2019-10-22 RX ORDER — ROSUVASTATIN CALCIUM 5 MG/1
5 TABLET, COATED ORAL DAILY
Qty: 30 TABLET | Refills: 1 | Status: SHIPPED | OUTPATIENT
Start: 2019-10-22 | End: 2019-12-17 | Stop reason: SDUPTHER

## 2019-11-04 ENCOUNTER — TELEPHONE (OUTPATIENT)
Dept: FAMILY MEDICINE CLINIC | Facility: CLINIC | Age: 64
End: 2019-11-04

## 2019-11-04 NOTE — TELEPHONE ENCOUNTER
Spoke with Dr Angelo Oliver, then called pt  Told him to stay off the Rosuvastatin for 2 weeks, then restart it to see if the med is the cause of the symptoms he just had  He agrees

## 2019-11-04 NOTE — TELEPHONE ENCOUNTER
Had a reaction to to Rosuvastatin  Diarrhea, headache, fever  Reaction was 2 days after taking the med  Patient stopped the rx yesterday  Feels better today

## 2019-11-22 DIAGNOSIS — E11.9 TYPE 2 DIABETES MELLITUS WITHOUT COMPLICATION, WITHOUT LONG-TERM CURRENT USE OF INSULIN (HCC): ICD-10-CM

## 2019-12-12 ENCOUNTER — TELEPHONE (OUTPATIENT)
Dept: FAMILY MEDICINE CLINIC | Facility: CLINIC | Age: 64
End: 2019-12-12

## 2019-12-12 DIAGNOSIS — G47.33 OSA (OBSTRUCTIVE SLEEP APNEA): Primary | ICD-10-CM

## 2019-12-12 NOTE — TELEPHONE ENCOUNTER
Patient requesting new CPAP  Was advised by DME needs updated sleep study to be done for new machine and proper titration  Please place order for sleep study if appropriate

## 2019-12-16 NOTE — TELEPHONE ENCOUNTER
Order mailed to patient  Will find provider/center for testing closer to where he lives that pars with insurance

## 2019-12-17 DIAGNOSIS — E78.5 DYSLIPIDEMIA: ICD-10-CM

## 2019-12-18 RX ORDER — ROSUVASTATIN CALCIUM 5 MG/1
5 TABLET, COATED ORAL DAILY
Qty: 90 TABLET | Refills: 0 | Status: SHIPPED | OUTPATIENT
Start: 2019-12-18 | End: 2020-01-28 | Stop reason: SDUPTHER

## 2019-12-23 DIAGNOSIS — E11.9 TYPE 2 DIABETES MELLITUS WITHOUT COMPLICATION, WITHOUT LONG-TERM CURRENT USE OF INSULIN (HCC): ICD-10-CM

## 2020-01-15 DIAGNOSIS — I10 HYPERTENSION, UNSPECIFIED TYPE: ICD-10-CM

## 2020-01-15 DIAGNOSIS — K21.9 GASTROESOPHAGEAL REFLUX DISEASE WITHOUT ESOPHAGITIS: ICD-10-CM

## 2020-01-15 RX ORDER — OMEPRAZOLE 40 MG/1
CAPSULE, DELAYED RELEASE ORAL
Qty: 90 CAPSULE | Refills: 0 | Status: SHIPPED | OUTPATIENT
Start: 2020-01-15 | End: 2020-01-28 | Stop reason: SDUPTHER

## 2020-01-15 RX ORDER — METOPROLOL SUCCINATE 50 MG/1
TABLET, EXTENDED RELEASE ORAL
Qty: 90 TABLET | Refills: 0 | Status: SHIPPED | OUTPATIENT
Start: 2020-01-15 | End: 2020-01-28

## 2020-01-27 DIAGNOSIS — K21.9 GASTROESOPHAGEAL REFLUX DISEASE WITHOUT ESOPHAGITIS: ICD-10-CM

## 2020-01-27 DIAGNOSIS — E78.5 DYSLIPIDEMIA: ICD-10-CM

## 2020-01-27 DIAGNOSIS — I10 HYPERTENSION, UNSPECIFIED TYPE: ICD-10-CM

## 2020-01-28 DIAGNOSIS — I10 HYPERTENSION, UNSPECIFIED TYPE: ICD-10-CM

## 2020-01-28 DIAGNOSIS — E78.5 DYSLIPIDEMIA: ICD-10-CM

## 2020-01-28 DIAGNOSIS — K21.9 GASTROESOPHAGEAL REFLUX DISEASE WITHOUT ESOPHAGITIS: ICD-10-CM

## 2020-01-28 RX ORDER — ENALAPRIL MALEATE 10 MG/1
10 TABLET ORAL DAILY
Qty: 30 TABLET | Refills: 1 | Status: SHIPPED | OUTPATIENT
Start: 2020-01-28 | End: 2020-02-19

## 2020-01-28 RX ORDER — ENALAPRIL MALEATE 10 MG/1
10 TABLET ORAL DAILY
Qty: 90 TABLET | Refills: 1 | Status: SHIPPED | OUTPATIENT
Start: 2020-01-28 | End: 2020-01-28 | Stop reason: SDUPTHER

## 2020-01-28 RX ORDER — METOPROLOL SUCCINATE 50 MG/1
TABLET, EXTENDED RELEASE ORAL
Qty: 90 TABLET | Refills: 0 | Status: SHIPPED | OUTPATIENT
Start: 2020-01-28 | End: 2020-03-16 | Stop reason: SDUPTHER

## 2020-01-28 RX ORDER — ROSUVASTATIN CALCIUM 5 MG/1
TABLET, COATED ORAL
Qty: 90 TABLET | Refills: 0 | Status: SHIPPED | OUTPATIENT
Start: 2020-01-28 | End: 2020-03-16 | Stop reason: SDUPTHER

## 2020-01-28 RX ORDER — TRIAMTERENE AND HYDROCHLOROTHIAZIDE 37.5; 25 MG/1; MG/1
1 CAPSULE ORAL DAILY
Qty: 30 CAPSULE | Refills: 1 | Status: SHIPPED | OUTPATIENT
Start: 2020-01-28 | End: 2020-05-15

## 2020-01-28 RX ORDER — OMEPRAZOLE 40 MG/1
40 CAPSULE, DELAYED RELEASE ORAL DAILY
Qty: 90 CAPSULE | Refills: 1 | Status: SHIPPED | OUTPATIENT
Start: 2020-01-28 | End: 2020-05-15

## 2020-01-28 RX ORDER — METOPROLOL SUCCINATE 25 MG/1
25 TABLET, EXTENDED RELEASE ORAL DAILY
Qty: 30 TABLET | Refills: 1 | Status: SHIPPED | OUTPATIENT
Start: 2020-01-28 | End: 2020-03-16 | Stop reason: SDUPTHER

## 2020-01-28 RX ORDER — TRIAMTERENE AND HYDROCHLOROTHIAZIDE 37.5; 25 MG/1; MG/1
1 CAPSULE ORAL DAILY
Qty: 90 CAPSULE | Refills: 1 | Status: SHIPPED | OUTPATIENT
Start: 2020-01-28 | End: 2020-01-28 | Stop reason: SDUPTHER

## 2020-01-28 RX ORDER — ROSUVASTATIN CALCIUM 5 MG/1
5 TABLET, COATED ORAL DAILY
Qty: 90 TABLET | Refills: 1 | Status: SHIPPED | OUTPATIENT
Start: 2020-01-28 | End: 2020-03-18

## 2020-01-28 RX ORDER — OMEPRAZOLE 40 MG/1
CAPSULE, DELAYED RELEASE ORAL
Qty: 90 CAPSULE | Refills: 0 | Status: SHIPPED | OUTPATIENT
Start: 2020-01-28 | End: 2020-03-16 | Stop reason: SDUPTHER

## 2020-01-28 RX ORDER — TRIAMTERENE AND HYDROCHLOROTHIAZIDE 37.5; 25 MG/1; MG/1
CAPSULE ORAL
Qty: 90 CAPSULE | Refills: 0 | Status: SHIPPED | OUTPATIENT
Start: 2020-01-28 | End: 2020-03-16 | Stop reason: SDUPTHER

## 2020-01-28 RX ORDER — METOPROLOL SUCCINATE 25 MG/1
25 TABLET, EXTENDED RELEASE ORAL DAILY
Qty: 90 TABLET | Refills: 1 | Status: SHIPPED | OUTPATIENT
Start: 2020-01-28 | End: 2020-01-28 | Stop reason: SDUPTHER

## 2020-01-28 NOTE — TELEPHONE ENCOUNTER
Patient needs these to be sent to [facility] for [facility] short supply until mail order can be received      Metoprolol 25 mg  Enalapril 10 mg  Triamterine-hctz 37 5-25 mg

## 2020-01-29 ENCOUNTER — TELEPHONE (OUTPATIENT)
Dept: VASCULAR SURGERY | Facility: CLINIC | Age: 65
End: 2020-01-29

## 2020-01-29 NOTE — TELEPHONE ENCOUNTER
[facility] called to ask us to fill out paperwork for pump  I said we did not order pump, she said  No podiatry did,  I said sergio has not had recent visit and Kalani Gifford, NP did not order and we could not sign

## 2020-01-30 ENCOUNTER — OFFICE VISIT (OUTPATIENT)
Dept: CARDIOLOGY CLINIC | Facility: CLINIC | Age: 65
End: 2020-01-30
Payer: MEDICARE

## 2020-01-30 VITALS
BODY MASS INDEX: 42.66 KG/M2 | HEART RATE: 72 BPM | HEIGHT: 72 IN | WEIGHT: 315 LBS | DIASTOLIC BLOOD PRESSURE: 82 MMHG | SYSTOLIC BLOOD PRESSURE: 144 MMHG

## 2020-01-30 DIAGNOSIS — E66.01 MORBID OBESITY WITH BODY MASS INDEX (BMI) OF 45.0 TO 49.9 IN ADULT (HCC): ICD-10-CM

## 2020-01-30 DIAGNOSIS — I10 BENIGN ESSENTIAL HTN: Primary | ICD-10-CM

## 2020-01-30 DIAGNOSIS — E78.5 DYSLIPIDEMIA: ICD-10-CM

## 2020-01-30 DIAGNOSIS — R06.02 SOB (SHORTNESS OF BREATH): ICD-10-CM

## 2020-01-30 PROCEDURE — 99214 OFFICE O/P EST MOD 30 MIN: CPT | Performed by: INTERNAL MEDICINE

## 2020-01-30 NOTE — PROGRESS NOTES
Cardiology Follow Up    Frederick Arzate  1955  [id number]  [address] [facility]  [address] Rosemarie Chance Jayson Lee [address]    1  Benign essential HTN     2  Dyslipidemia     3  SOB (shortness of breath)     4  Morbid obesity with body mass index (BMI) of 45 0 to 49 9 in adult [facility])       Chief Complaint   Patient presents with    Follow-up    Shortness of Breath     States "i'm out of shape"    Edema     Feet     Interval History: Patient is here for continued evaluation of hypertension and hyperlipidemia with increased shortness of breath with continued weight gain  No reported chest pain, palpitations, lightheadedness, syncope, LE edema, orthopnea, PND, or significant weight changes  Patient remains active without any increased fatigue out of the ordinary        Patient Active Problem List   Diagnosis    Chest discomfort    Benign essential HTN    Diabetes mellitus ([address] )    Dyslipidemia    GERD (gastroesophageal reflux disease)    AYLEEN (obstructive sleep apnea)    Bilateral lower extremity edema    Morbid obesity with body mass index (BMI) of 45 0 to 49 9 in adult ([facility])    SOB (shortness of breath)     Past Medical History:   Diagnosis Date    Arthritis     last assessed 7/1/15    Diabetes mellitus ([address] )     type 2-last assessed 1/28/15    GERD (gastroesophageal reflux disease)     Hypertension     Irregular heart beat     last assessed 7/1/15    Palpitations     last assessed 9/7/17    Sexual dysfunction     last assessed 7/1/15    Thyroid disease     last assessed 7/1/15     Social History     Socioeconomic History    Marital status: Single     Spouse name: Not on file    Number of children: Not on file    Years of education: Not on file    Highest education level: Not on file   Occupational History    Not on file   Social Needs    Financial resource strain: Not on file   Ashlee-Aldo insecurity:     Worry: Not on file     Inability: Not on file    Transportation needs:     Medical: Not on file     Non-medical: Not on file   Tobacco Use    Smoking status: Former Smoker    Smokeless tobacco: Never Used    Tobacco comment: former smoker-quit 17 yrs ago 1pppd x 30 yrs as per [employer]   Substance and Sexual Activity    Alcohol use: No    Drug use: No    Sexual activity: Not on file   Lifestyle    Physical activity:     Days per week: Not on file     Minutes per session: Not on file    Stress: Not on file   Relationships    Social connections:     Talks on phone: Not on file     Gets together: Not on file     Attends Oriental orthodox service: Not on file     Active member of club or organization: Not on file     Attends meetings of clubs or organizations: Not on file     Relationship status: Not on file    Intimate partner violence:     Fear of current or ex partner: Not on file     Emotionally abused: Not on file     Physically abused: Not on file     Forced sexual activity: Not on file   Other Topics Concern    Not on file   Social History Narrative    Not on file      Family History   Problem Relation Age of Onset    Hypertension Mother         essential    Stroke Mother     Hypertension Father         essential    Heart defect Father         cardiac disorder    Stroke Brother     Hypertension Brother      Past Surgical History:   Procedure Laterality Date    COLPOSCOPY      last assessed 7/1/15       Current Outpatient Medications:     amLODIPine (NORVASC) 5 mg tablet, Take 1 tablet (5 mg total) by mouth daily For blood pressure, Disp: 90 tablet, Rfl: 1    aspirin 81 mg chewable tablet, Chew 81 mg daily, Disp: , Rfl:     enalapril (VASOTEC) 10 mg tablet, Take 1 tablet (10 mg total) by mouth daily, Disp: 30 tablet, Rfl: 1    glucose blood (ONETOUCH VERIO) test strip, 1 each by Other route daily, Disp: 100 each, Rfl: 1    metoprolol succinate (TOPROL-XL) 25 mg 24 hr tablet, Take 1 tablet (25 mg total) by mouth daily Take in addition to 50mg, Disp: 30 tablet, Rfl: 1    metoprolol succinate (TOPROL-XL) 50 mg 24 hr tablet, TAKE 1 TABLET DAILY, TAKE  IN ADDITION TO 25MG, Disp: 90 tablet, Rfl: 0    omeprazole (PriLOSEC) 40 MG capsule, TAKE 1 CAPSULE DAILY, Disp: 90 capsule, Rfl: 0    rosuvastatin (CRESTOR) 5 mg tablet, TAKE 1 TABLET DAILY PLEASE REPEAT LAB AFTER ON        MEDICATION FOR 6 WEEKS, Disp: 90 tablet, Rfl: 0    triamterene-hydrochlorothiazide (DYAZIDE) 37 5-25 mg per capsule, TAKE 1 CAPSULE DAILY, Disp: 90 capsule, Rfl: 0    omeprazole (PriLOSEC) 40 MG capsule, Take 1 capsule (40 mg total) by mouth daily (Patient not taking: Reported on 1/30/2020), Disp: 90 capsule, Rfl: 1    rosuvastatin (CRESTOR) 5 mg tablet, Take 1 tablet (5 mg total) by mouth daily Please repeat lab after on med x 6 weeks (Patient not taking: Reported on 1/30/2020), Disp: 90 tablet, Rfl: 1    triamterene-hydrochlorothiazide (DYAZIDE) 37 5-25 mg per capsule, Take 1 capsule by mouth daily (Patient not taking: Reported on 1/30/2020), Disp: 30 capsule, Rfl: 1  Allergies   Allergen Reactions    Acetaminophen      Other reaction(s): Unknown Reaction  "I had to go to the hospital and get shots after taking it 20yrs ago"       Labs:  Appointment on 10/04/2019   Component Date Value    Cholesterol 10/04/2019 175     Triglycerides 10/04/2019 93     HDL, Direct 10/04/2019 34*    LDL Calculated 10/04/2019 122*    Non-HDL-Chol (CHOL-HDL) 10/04/2019 141     Sodium 10/04/2019 140     Potassium 10/04/2019 4 1     Chloride 10/04/2019 105     CO2 10/04/2019 28     ANION GAP 10/04/2019 7     BUN 10/04/2019 18     Creatinine 10/04/2019 1 12     Glucose, Fasting 10/04/2019 107*    Calcium 10/04/2019 9 7     AST 10/04/2019 22     ALT 10/04/2019 31     Alkaline Phosphatase 10/04/2019 38*    Total Protein 10/04/2019 8 0     Albumin 10/04/2019 3 9     Total Bilirubin 10/04/2019 1 29*    eGFR 10/04/2019 68     WBC 10/04/2019 5 98     RBC 10/04/2019 4 97     Hemoglobin 10/04/2019 13 9     Hematocrit 10/04/2019 44 0     MCV 10/04/2019 89     MCH 10/04/2019 28 0     MCHC 10/04/2019 31 6     RDW 10/04/2019 13 6     Platelets 01/14/6346 193     MPV 10/04/2019 11 4    Office Visit on 10/04/2019   Component Date Value    Hemoglobin A1C 10/04/2019 6 1     Creatinine, Ur 10/04/2019 49 9     Microalbum  ,U,Random 10/04/2019 6 0     Microalb Creat Ratio 10/04/2019 12      Lab Results   Component Value Date    CHOL 145 12/16/2015    TRIG 93 10/04/2019    TRIG 57 12/16/2015    HDL 34 (L) 10/04/2019    HDL 35 12/16/2015    LDLDIRECT 119 11/12/2013     Imaging: No results found  Review of Systems:  Review of Systems   Constitutional: Negative for activity change, appetite change, fatigue and fever  HENT: Negative for nosebleeds and sore throat  Eyes: Negative for photophobia and visual disturbance  Respiratory: Positive for shortness of breath  Negative for cough, chest tightness and wheezing  Cardiovascular: Negative for chest pain, palpitations and leg swelling  Gastrointestinal: Negative for abdominal pain, diarrhea, nausea and vomiting  Endocrine: Negative for polyuria  Genitourinary: Negative for dysuria, frequency and hematuria  Musculoskeletal: Negative for arthralgias, back pain and gait problem  Skin: Negative for pallor and rash  Neurological: Negative for dizziness, syncope, speech difficulty and light-headedness  Hematological: Does not bruise/bleed easily  Psychiatric/Behavioral: Negative for agitation, behavioral problems and confusion  Physical Exam:  Physical Exam   Constitutional: He is oriented to person, place, and time  He appears well-developed and well-nourished  HENT:   Head: Normocephalic and atraumatic  Nose: Nose normal    Eyes: Pupils are equal, round, and reactive to light  EOM are normal  No scleral icterus  Neck: Normal range of motion  Neck supple   No JVD present  Cardiovascular: Normal rate, regular rhythm and normal heart sounds  Exam reveals no gallop and no friction rub  No murmur heard  Pulmonary/Chest: Effort normal and breath sounds normal  No respiratory distress  He has no wheezes  He has no rales  Abdominal: Soft  Bowel sounds are normal  He exhibits no distension  There is no tenderness  Musculoskeletal: Normal range of motion  He exhibits no edema or deformity  Neurological: He is alert and oriented to person, place, and time  No cranial nerve deficit  Skin: Skin is warm and dry  No rash noted  He is not diaphoretic  Psychiatric: He has a normal mood and affect  His behavior is normal    Vitals reviewed  Blood pressure 144/82, pulse 72, height 6' (1 829 m), weight (!) 157 kg (345 lb 11 2 oz)  Discussion/Summary:  Shortness of breath: unclear etiology, most likely related to weight gain and reduced activity as a result  Has essentially normal stress test and echocardiogram in 2011  He also had a stress test in Aug 2016 and Sept 2017  revealing no ischemia or infarct, normal LV function, and no TID  Has a normal baseline ECG to suggest any conduction abnormalities  24 hour Holter monitor to look for occult arrhythmias also revealed no significant correlation of symptoms with arrhythmias or ectopy  He seems to have increased symptoms when his metoprolol is reduced  He doesn't seem to have any lightheadedness or syncope with low HR, so I'm not concerned with his HR being lower  Continue metoprolol, but at higher dose of 75mg  Unlikely to be of cardiac etiology  Possibly due to anxiety/stress vs deconditioning physically with recent weight gain and inactivity  HTN: relatively well controlled on current doses of meds  Recommended weight loss  HLD: low HDL of 34 and LDL is at goal at 122 in Oct 2019  Would increase exercise to help with this

## 2020-01-30 NOTE — PATIENT INSTRUCTIONS

## 2020-02-19 DIAGNOSIS — I10 HYPERTENSION, UNSPECIFIED TYPE: ICD-10-CM

## 2020-02-19 RX ORDER — ENALAPRIL MALEATE 10 MG/1
TABLET ORAL
Qty: 90 TABLET | Refills: 0 | Status: SHIPPED | OUTPATIENT
Start: 2020-02-19 | End: 2020-03-16 | Stop reason: SDUPTHER

## 2020-02-19 RX ORDER — AMLODIPINE BESYLATE 5 MG/1
TABLET ORAL
Qty: 90 TABLET | Refills: 0 | Status: SHIPPED | OUTPATIENT
Start: 2020-02-19 | End: 2020-03-16 | Stop reason: SDUPTHER

## 2020-02-20 DIAGNOSIS — E11.9 TYPE 2 DIABETES MELLITUS WITHOUT COMPLICATION, WITHOUT LONG-TERM CURRENT USE OF INSULIN (HCC): ICD-10-CM

## 2020-02-24 ENCOUNTER — TELEPHONE (OUTPATIENT)
Dept: FAMILY MEDICINE CLINIC | Facility: CLINIC | Age: 65
End: 2020-02-24

## 2020-02-24 NOTE — TELEPHONE ENCOUNTER
Pt called stating he saw his podiatrist, Dr Spencer Nunez today for swelling in his feet  Podiatrist is recommending pt see a vascular surgeon, but told pt PCP needs to make referral   Pt would like referral to vascular surgeon    If you'd like to discuss anything w/ pts podiatrist, Dr Valdo Nash can be reached at [phone]

## 2020-02-25 ENCOUNTER — TELEPHONE (OUTPATIENT)
Dept: FAMILY MEDICINE CLINIC | Facility: CLINIC | Age: 65
End: 2020-02-25

## 2020-02-25 DIAGNOSIS — M79.89 FOOT SWELLING: Primary | ICD-10-CM

## 2020-02-25 DIAGNOSIS — E11.9 TYPE 2 DIABETES MELLITUS WITHOUT COMPLICATION, WITHOUT LONG-TERM CURRENT USE OF INSULIN (HCC): Primary | ICD-10-CM

## 2020-02-25 RX ORDER — LANCETS
EACH MISCELLANEOUS
Qty: 100 EACH | Refills: 1 | Status: SHIPPED | OUTPATIENT
Start: 2020-02-25 | End: 2020-03-16 | Stop reason: SDUPTHER

## 2020-02-25 NOTE — TELEPHONE ENCOUNTER
Pt called stating he looking into a vascular surgeon in his area  There is one in [place]  Pt is requesting you place a referral to   Dr Una Spear at the [place] [address] at [address] [facility], [address] [place] [address]  Once referral is in pts chart I will mail it to him

## 2020-02-26 NOTE — TELEPHONE ENCOUNTER
Referrals are not required  You are speaking of an order which we do internally  We do not know the physicians or health systems in that area

## 2020-03-06 ENCOUNTER — TELEPHONE (OUTPATIENT)
Dept: FAMILY MEDICINE CLINIC | Facility: CLINIC | Age: 65
End: 2020-03-06

## 2020-03-16 DIAGNOSIS — E78.5 DYSLIPIDEMIA: ICD-10-CM

## 2020-03-16 DIAGNOSIS — E11.9 TYPE 2 DIABETES MELLITUS WITHOUT COMPLICATION, WITHOUT LONG-TERM CURRENT USE OF INSULIN (HCC): ICD-10-CM

## 2020-03-16 DIAGNOSIS — K21.9 GASTROESOPHAGEAL REFLUX DISEASE WITHOUT ESOPHAGITIS: ICD-10-CM

## 2020-03-16 DIAGNOSIS — I10 HYPERTENSION, UNSPECIFIED TYPE: ICD-10-CM

## 2020-03-16 RX ORDER — AMLODIPINE BESYLATE 5 MG/1
5 TABLET ORAL DAILY
Qty: 90 TABLET | Refills: 0 | Status: SHIPPED | OUTPATIENT
Start: 2020-03-16 | End: 2020-07-27 | Stop reason: SDUPTHER

## 2020-03-16 RX ORDER — ROSUVASTATIN CALCIUM 5 MG/1
5 TABLET, COATED ORAL DAILY
Qty: 90 TABLET | Refills: 0 | Status: SHIPPED | OUTPATIENT
Start: 2020-03-16 | End: 2020-03-18

## 2020-03-16 RX ORDER — ENALAPRIL MALEATE 10 MG/1
10 TABLET ORAL DAILY
Qty: 90 TABLET | Refills: 0 | Status: SHIPPED | OUTPATIENT
Start: 2020-03-16 | End: 2020-04-20 | Stop reason: SDUPTHER

## 2020-03-16 RX ORDER — LANCETS
EACH MISCELLANEOUS
Qty: 100 EACH | Refills: 1 | Status: SHIPPED | OUTPATIENT
Start: 2020-03-16 | End: 2020-08-04 | Stop reason: SDUPTHER

## 2020-03-16 RX ORDER — TRIAMTERENE AND HYDROCHLOROTHIAZIDE 37.5; 25 MG/1; MG/1
1 CAPSULE ORAL DAILY
Qty: 90 CAPSULE | Refills: 0 | Status: SHIPPED | OUTPATIENT
Start: 2020-03-16 | End: 2020-06-18 | Stop reason: SDUPTHER

## 2020-03-16 RX ORDER — OMEPRAZOLE 40 MG/1
40 CAPSULE, DELAYED RELEASE ORAL DAILY
Qty: 90 CAPSULE | Refills: 0 | Status: SHIPPED | OUTPATIENT
Start: 2020-03-16 | End: 2020-07-02

## 2020-03-16 RX ORDER — METOPROLOL SUCCINATE 50 MG/1
50 TABLET, EXTENDED RELEASE ORAL DAILY
Qty: 90 TABLET | Refills: 0 | Status: SHIPPED | OUTPATIENT
Start: 2020-03-16 | End: 2020-07-02

## 2020-03-16 RX ORDER — METOPROLOL SUCCINATE 25 MG/1
25 TABLET, EXTENDED RELEASE ORAL DAILY
Qty: 90 TABLET | Refills: 1 | Status: SHIPPED | OUTPATIENT
Start: 2020-03-16 | End: 2020-08-04 | Stop reason: SDUPTHER

## 2020-03-18 ENCOUNTER — TELEPHONE (OUTPATIENT)
Dept: FAMILY MEDICINE CLINIC | Facility: CLINIC | Age: 65
End: 2020-03-18

## 2020-03-18 DIAGNOSIS — E78.5 DYSLIPIDEMIA: Primary | ICD-10-CM

## 2020-03-18 RX ORDER — ATORVASTATIN CALCIUM 10 MG/1
10 TABLET, FILM COATED ORAL DAILY
Qty: 90 TABLET | Refills: 0 | Status: SHIPPED | OUTPATIENT
Start: 2020-03-18 | End: 2020-03-20 | Stop reason: SDUPTHER

## 2020-03-18 NOTE — TELEPHONE ENCOUNTER
Rosuvastatin is $125 and patient cant afford it  Can you send something else that may be cheaper    Send to [employer]

## 2020-03-20 DIAGNOSIS — E78.5 DYSLIPIDEMIA: ICD-10-CM

## 2020-03-20 RX ORDER — ATORVASTATIN CALCIUM 10 MG/1
10 TABLET, FILM COATED ORAL DAILY
Qty: 14 TABLET | Refills: 0 | Status: SHIPPED | OUTPATIENT
Start: 2020-03-20 | End: 2020-03-26 | Stop reason: SDUPTHER

## 2020-03-25 ENCOUNTER — TELEPHONE (OUTPATIENT)
Dept: OTHER | Facility: OTHER | Age: 65
End: 2020-03-25

## 2020-03-25 DIAGNOSIS — E78.5 DYSLIPIDEMIA: ICD-10-CM

## 2020-03-25 NOTE — TELEPHONE ENCOUNTER
Patient would like a 90 day prescription for atorvastatin 10 mg sent to the [facility] mail [facility] please

## 2020-03-26 DIAGNOSIS — E78.5 DYSLIPIDEMIA: ICD-10-CM

## 2020-03-26 RX ORDER — ATORVASTATIN CALCIUM 10 MG/1
10 TABLET, FILM COATED ORAL DAILY
Qty: 90 TABLET | Refills: 0 | Status: SHIPPED | OUTPATIENT
Start: 2020-03-26 | End: 2020-04-02 | Stop reason: SDUPTHER

## 2020-04-02 DIAGNOSIS — E78.5 DYSLIPIDEMIA: ICD-10-CM

## 2020-04-02 RX ORDER — ATORVASTATIN CALCIUM 10 MG/1
10 TABLET, FILM COATED ORAL DAILY
Qty: 30 TABLET | Refills: 0 | Status: SHIPPED | OUTPATIENT
Start: 2020-04-02 | End: 2020-04-21 | Stop reason: SDUPTHER

## 2020-04-10 ENCOUNTER — TELEMEDICINE (OUTPATIENT)
Dept: FAMILY MEDICINE CLINIC | Facility: CLINIC | Age: 65
End: 2020-04-10
Payer: MEDICARE

## 2020-04-10 DIAGNOSIS — Z00.00 MEDICARE ANNUAL WELLNESS VISIT, SUBSEQUENT: Primary | ICD-10-CM

## 2020-04-10 DIAGNOSIS — I10 BENIGN ESSENTIAL HTN: ICD-10-CM

## 2020-04-10 DIAGNOSIS — E78.5 DYSLIPIDEMIA: ICD-10-CM

## 2020-04-10 DIAGNOSIS — E66.01 MORBID OBESITY WITH BODY MASS INDEX (BMI) OF 45.0 TO 49.9 IN ADULT (HCC): ICD-10-CM

## 2020-04-10 DIAGNOSIS — E11.9 TYPE 2 DIABETES MELLITUS WITHOUT COMPLICATION, WITHOUT LONG-TERM CURRENT USE OF INSULIN (HCC): ICD-10-CM

## 2020-04-10 PROCEDURE — 2022F DILAT RTA XM EVC RTNOPTHY: CPT | Performed by: INTERNAL MEDICINE

## 2020-04-10 PROCEDURE — G0438 PPPS, INITIAL VISIT: HCPCS | Performed by: INTERNAL MEDICINE

## 2020-04-10 PROCEDURE — 4040F PNEUMOC VAC/ADMIN/RCVD: CPT | Performed by: INTERNAL MEDICINE

## 2020-04-10 PROCEDURE — 1125F AMNT PAIN NOTED PAIN PRSNT: CPT | Performed by: INTERNAL MEDICINE

## 2020-04-10 PROCEDURE — 1036F TOBACCO NON-USER: CPT | Performed by: INTERNAL MEDICINE

## 2020-04-10 PROCEDURE — 3077F SYST BP >= 140 MM HG: CPT | Performed by: INTERNAL MEDICINE

## 2020-04-10 PROCEDURE — 99214 OFFICE O/P EST MOD 30 MIN: CPT | Performed by: INTERNAL MEDICINE

## 2020-04-10 PROCEDURE — 1170F FXNL STATUS ASSESSED: CPT | Performed by: INTERNAL MEDICINE

## 2020-04-10 PROCEDURE — 1160F RVW MEDS BY RX/DR IN RCRD: CPT | Performed by: INTERNAL MEDICINE

## 2020-04-10 PROCEDURE — 3079F DIAST BP 80-89 MM HG: CPT | Performed by: INTERNAL MEDICINE

## 2020-04-20 DIAGNOSIS — I10 HYPERTENSION, UNSPECIFIED TYPE: ICD-10-CM

## 2020-04-20 RX ORDER — ENALAPRIL MALEATE 10 MG/1
10 TABLET ORAL DAILY
Qty: 90 TABLET | Refills: 0 | Status: SHIPPED | OUTPATIENT
Start: 2020-04-20 | End: 2020-07-02

## 2020-04-21 ENCOUNTER — TELEPHONE (OUTPATIENT)
Dept: FAMILY MEDICINE CLINIC | Facility: CLINIC | Age: 65
End: 2020-04-21

## 2020-04-21 ENCOUNTER — TELEMEDICINE (OUTPATIENT)
Dept: FAMILY MEDICINE CLINIC | Facility: CLINIC | Age: 65
End: 2020-04-21
Payer: MEDICARE

## 2020-04-21 DIAGNOSIS — M79.672 LEFT FOOT PAIN: ICD-10-CM

## 2020-04-21 DIAGNOSIS — J06.9 UPPER RESPIRATORY TRACT INFECTION, UNSPECIFIED TYPE: Primary | ICD-10-CM

## 2020-04-21 DIAGNOSIS — E78.5 DYSLIPIDEMIA: ICD-10-CM

## 2020-04-21 PROCEDURE — 99214 OFFICE O/P EST MOD 30 MIN: CPT | Performed by: INTERNAL MEDICINE

## 2020-04-21 RX ORDER — ATORVASTATIN CALCIUM 10 MG/1
10 TABLET, FILM COATED ORAL DAILY
Qty: 90 TABLET | Refills: 1 | Status: SHIPPED | OUTPATIENT
Start: 2020-04-21 | End: 2020-06-22 | Stop reason: SDUPTHER

## 2020-04-21 RX ORDER — MELOXICAM 15 MG/1
15 TABLET ORAL DAILY
Qty: 30 TABLET | Refills: 1 | Status: SHIPPED | OUTPATIENT
Start: 2020-04-21 | End: 2020-06-17

## 2020-04-21 RX ORDER — AMOXICILLIN 500 MG/1
500 CAPSULE ORAL EVERY 8 HOURS SCHEDULED
Qty: 30 CAPSULE | Refills: 0 | Status: SHIPPED | OUTPATIENT
Start: 2020-04-21 | End: 2020-05-01

## 2020-05-07 DIAGNOSIS — E11.9 TYPE 2 DIABETES MELLITUS WITHOUT COMPLICATION, WITHOUT LONG-TERM CURRENT USE OF INSULIN (HCC): ICD-10-CM

## 2020-05-15 ENCOUNTER — TELEMEDICINE (OUTPATIENT)
Dept: FAMILY MEDICINE CLINIC | Facility: CLINIC | Age: 65
End: 2020-05-15
Payer: MEDICARE

## 2020-05-15 VITALS — HEIGHT: 72 IN | WEIGHT: 315 LBS | BODY MASS INDEX: 42.66 KG/M2 | TEMPERATURE: 97.7 F

## 2020-05-15 DIAGNOSIS — T59.811A SMOKE INHALATION: Primary | ICD-10-CM

## 2020-05-15 PROCEDURE — 99442 PR PHYS/QHP TELEPHONE EVALUATION 11-20 MIN: CPT | Performed by: INTERNAL MEDICINE

## 2020-05-15 RX ORDER — ALBUTEROL SULFATE 90 UG/1
2 AEROSOL, METERED RESPIRATORY (INHALATION) EVERY 4 HOURS PRN
Qty: 1 INHALER | Refills: 5 | Status: SHIPPED | OUTPATIENT
Start: 2020-05-15 | End: 2021-05-17

## 2020-06-15 ENCOUNTER — TELEPHONE (OUTPATIENT)
Dept: FAMILY MEDICINE CLINIC | Facility: CLINIC | Age: 65
End: 2020-06-15

## 2020-06-15 NOTE — TELEPHONE ENCOUNTER
Patient would like a note for work to excuse him for self quarantine during corona  He said to keep it open ended   Just from the start of corona he was advised to stay home for self quarantine       e-mail to patient

## 2020-06-17 DIAGNOSIS — M79.672 LEFT FOOT PAIN: ICD-10-CM

## 2020-06-17 RX ORDER — MELOXICAM 15 MG/1
TABLET ORAL
Qty: 30 TABLET | Refills: 1 | Status: SHIPPED | OUTPATIENT
Start: 2020-06-17 | End: 2020-08-04

## 2020-06-18 DIAGNOSIS — I10 HYPERTENSION, UNSPECIFIED TYPE: ICD-10-CM

## 2020-06-22 DIAGNOSIS — E78.5 DYSLIPIDEMIA: ICD-10-CM

## 2020-06-22 RX ORDER — TRIAMTERENE AND HYDROCHLOROTHIAZIDE 37.5; 25 MG/1; MG/1
1 CAPSULE ORAL DAILY
Qty: 90 CAPSULE | Refills: 0 | Status: SHIPPED | OUTPATIENT
Start: 2020-06-22 | End: 2020-07-08

## 2020-06-23 RX ORDER — ATORVASTATIN CALCIUM 10 MG/1
10 TABLET, FILM COATED ORAL DAILY
Qty: 90 TABLET | Refills: 1 | Status: SHIPPED | OUTPATIENT
Start: 2020-06-23 | End: 2020-07-09

## 2020-07-02 DIAGNOSIS — I10 HYPERTENSION, UNSPECIFIED TYPE: ICD-10-CM

## 2020-07-02 DIAGNOSIS — K21.9 GASTROESOPHAGEAL REFLUX DISEASE WITHOUT ESOPHAGITIS: ICD-10-CM

## 2020-07-02 RX ORDER — OMEPRAZOLE 40 MG/1
CAPSULE, DELAYED RELEASE ORAL
Qty: 90 CAPSULE | Refills: 0 | Status: SHIPPED | OUTPATIENT
Start: 2020-07-02 | End: 2020-08-04 | Stop reason: SDUPTHER

## 2020-07-02 RX ORDER — METOPROLOL SUCCINATE 50 MG/1
TABLET, EXTENDED RELEASE ORAL
Qty: 90 TABLET | Refills: 0 | Status: SHIPPED | OUTPATIENT
Start: 2020-07-02 | End: 2020-08-04 | Stop reason: SDUPTHER

## 2020-07-02 RX ORDER — ENALAPRIL MALEATE 10 MG/1
TABLET ORAL
Qty: 90 TABLET | Refills: 0 | Status: SHIPPED | OUTPATIENT
Start: 2020-07-02 | End: 2020-08-04 | Stop reason: SDUPTHER

## 2020-07-08 DIAGNOSIS — I10 HYPERTENSION, UNSPECIFIED TYPE: ICD-10-CM

## 2020-07-08 DIAGNOSIS — E78.5 DYSLIPIDEMIA: ICD-10-CM

## 2020-07-08 RX ORDER — TRIAMTERENE AND HYDROCHLOROTHIAZIDE 37.5; 25 MG/1; MG/1
CAPSULE ORAL
Qty: 90 CAPSULE | Refills: 0 | Status: SHIPPED | OUTPATIENT
Start: 2020-07-08 | End: 2020-08-04 | Stop reason: SDUPTHER

## 2020-07-09 RX ORDER — ATORVASTATIN CALCIUM 10 MG/1
TABLET, FILM COATED ORAL
Qty: 90 TABLET | Refills: 0 | Status: SHIPPED | OUTPATIENT
Start: 2020-07-09 | End: 2020-08-04 | Stop reason: SDUPTHER

## 2020-07-23 ENCOUNTER — TELEPHONE (OUTPATIENT)
Dept: FAMILY MEDICINE CLINIC | Facility: CLINIC | Age: 65
End: 2020-07-23

## 2020-07-23 DIAGNOSIS — E11.9 TYPE 2 DIABETES MELLITUS WITHOUT COMPLICATION, WITHOUT LONG-TERM CURRENT USE OF INSULIN (HCC): Primary | ICD-10-CM

## 2020-07-23 NOTE — TELEPHONE ENCOUNTER
Patient requesting call to discuss blood sugars and meds  He thinks sugars are running too high 140+    He does have follow up appointment for 8/4/20

## 2020-07-27 DIAGNOSIS — I10 HYPERTENSION, UNSPECIFIED TYPE: ICD-10-CM

## 2020-07-27 RX ORDER — AMLODIPINE BESYLATE 5 MG/1
5 TABLET ORAL DAILY
Qty: 90 TABLET | Refills: 0 | Status: SHIPPED | OUTPATIENT
Start: 2020-07-27 | End: 2020-08-04 | Stop reason: SDUPTHER

## 2020-07-28 LAB
ALBUMIN SERPL-MCNC: 4.6 G/DL (ref 3.8–4.8)
ALBUMIN/GLOB SERPL: 1.7 {RATIO} (ref 1.2–2.2)
ALP SERPL-CCNC: 36 IU/L (ref 39–117)
ALT SERPL-CCNC: 26 IU/L (ref 0–44)
AST SERPL-CCNC: 26 IU/L (ref 0–40)
BILIRUB SERPL-MCNC: 0.9 MG/DL (ref 0–1.2)
BUN SERPL-MCNC: 17 MG/DL (ref 8–27)
BUN/CREAT SERPL: 16 (ref 10–24)
CALCIUM SERPL-MCNC: 9.4 MG/DL (ref 8.6–10.2)
CHLORIDE SERPL-SCNC: 99 MMOL/L (ref 96–106)
CHOLEST SERPL-MCNC: 139 MG/DL (ref 100–199)
CO2 SERPL-SCNC: 25 MMOL/L (ref 20–29)
CREAT SERPL-MCNC: 1.08 MG/DL (ref 0.76–1.27)
GLOBULIN SER-MCNC: 2.7 G/DL (ref 1.5–4.5)
GLUCOSE SERPL-MCNC: 148 MG/DL (ref 65–99)
HBA1C MFR BLD: 7.5 % (ref 4.8–5.6)
HDLC SERPL-MCNC: 35 MG/DL
LDLC SERPL CALC-MCNC: 83 MG/DL (ref 0–99)
POTASSIUM SERPL-SCNC: 4.5 MMOL/L (ref 3.5–5.2)
PROT SERPL-MCNC: 7.3 G/DL (ref 6–8.5)
SL AMB EGFR AFRICAN AMERICAN: 82 ML/MIN/1.73
SL AMB EGFR NON AFRICAN AMERICAN: 71 ML/MIN/1.73
SL AMB VLDL CHOLESTEROL CALC: 21 MG/DL (ref 5–40)
SODIUM SERPL-SCNC: 139 MMOL/L (ref 134–144)
TRIGL SERPL-MCNC: 106 MG/DL (ref 0–149)

## 2020-08-04 ENCOUNTER — OFFICE VISIT (OUTPATIENT)
Dept: FAMILY MEDICINE CLINIC | Facility: CLINIC | Age: 65
End: 2020-08-04
Payer: MEDICARE

## 2020-08-04 VITALS
DIASTOLIC BLOOD PRESSURE: 82 MMHG | HEART RATE: 76 BPM | WEIGHT: 315 LBS | SYSTOLIC BLOOD PRESSURE: 134 MMHG | OXYGEN SATURATION: 98 % | HEIGHT: 72 IN | TEMPERATURE: 97.6 F | BODY MASS INDEX: 42.66 KG/M2

## 2020-08-04 DIAGNOSIS — I10 HYPERTENSION, UNSPECIFIED TYPE: ICD-10-CM

## 2020-08-04 DIAGNOSIS — K21.9 GASTROESOPHAGEAL REFLUX DISEASE WITHOUT ESOPHAGITIS: ICD-10-CM

## 2020-08-04 DIAGNOSIS — E11.9 TYPE 2 DIABETES MELLITUS WITHOUT COMPLICATION, WITHOUT LONG-TERM CURRENT USE OF INSULIN (HCC): Primary | ICD-10-CM

## 2020-08-04 DIAGNOSIS — Z12.5 SCREENING FOR PROSTATE CANCER: ICD-10-CM

## 2020-08-04 DIAGNOSIS — E11.9 TYPE 2 DIABETES MELLITUS WITHOUT COMPLICATION, WITHOUT LONG-TERM CURRENT USE OF INSULIN (HCC): ICD-10-CM

## 2020-08-04 DIAGNOSIS — E78.5 DYSLIPIDEMIA: ICD-10-CM

## 2020-08-04 PROCEDURE — 4040F PNEUMOC VAC/ADMIN/RCVD: CPT | Performed by: INTERNAL MEDICINE

## 2020-08-04 PROCEDURE — 3075F SYST BP GE 130 - 139MM HG: CPT | Performed by: INTERNAL MEDICINE

## 2020-08-04 PROCEDURE — 2022F DILAT RTA XM EVC RTNOPTHY: CPT | Performed by: INTERNAL MEDICINE

## 2020-08-04 PROCEDURE — 4010F ACE/ARB THERAPY RXD/TAKEN: CPT | Performed by: INTERNAL MEDICINE

## 2020-08-04 PROCEDURE — 3008F BODY MASS INDEX DOCD: CPT | Performed by: INTERNAL MEDICINE

## 2020-08-04 PROCEDURE — 3079F DIAST BP 80-89 MM HG: CPT | Performed by: INTERNAL MEDICINE

## 2020-08-04 PROCEDURE — 1036F TOBACCO NON-USER: CPT | Performed by: INTERNAL MEDICINE

## 2020-08-04 PROCEDURE — 99214 OFFICE O/P EST MOD 30 MIN: CPT | Performed by: INTERNAL MEDICINE

## 2020-08-04 RX ORDER — METOPROLOL SUCCINATE 25 MG/1
25 TABLET, EXTENDED RELEASE ORAL DAILY
Qty: 90 TABLET | Refills: 1 | Status: SHIPPED | OUTPATIENT
Start: 2020-08-04 | End: 2021-04-16

## 2020-08-04 RX ORDER — ENALAPRIL MALEATE 10 MG/1
10 TABLET ORAL DAILY
Qty: 90 TABLET | Refills: 1 | Status: SHIPPED | OUTPATIENT
Start: 2020-08-04 | End: 2021-01-29 | Stop reason: SDUPTHER

## 2020-08-04 RX ORDER — ATORVASTATIN CALCIUM 10 MG/1
10 TABLET, FILM COATED ORAL DAILY
Qty: 90 TABLET | Refills: 1 | Status: SHIPPED | OUTPATIENT
Start: 2020-08-04 | End: 2021-06-16 | Stop reason: SDUPTHER

## 2020-08-04 RX ORDER — OMEPRAZOLE 40 MG/1
40 CAPSULE, DELAYED RELEASE ORAL DAILY
Qty: 90 CAPSULE | Refills: 1 | Status: SHIPPED | OUTPATIENT
Start: 2020-08-04 | End: 2021-02-19 | Stop reason: SDUPTHER

## 2020-08-04 RX ORDER — TRIAMTERENE AND HYDROCHLOROTHIAZIDE 37.5; 25 MG/1; MG/1
1 CAPSULE ORAL DAILY
Qty: 90 CAPSULE | Refills: 1 | Status: SHIPPED | OUTPATIENT
Start: 2020-08-04 | End: 2020-08-14

## 2020-08-04 RX ORDER — METOPROLOL SUCCINATE 50 MG/1
50 TABLET, EXTENDED RELEASE ORAL DAILY
Qty: 90 TABLET | Refills: 1 | Status: SHIPPED | OUTPATIENT
Start: 2020-08-04 | End: 2021-04-16

## 2020-08-04 RX ORDER — AMLODIPINE BESYLATE 5 MG/1
5 TABLET ORAL DAILY
Qty: 90 TABLET | Refills: 1 | Status: SHIPPED | OUTPATIENT
Start: 2020-08-04 | End: 2020-10-23 | Stop reason: SDUPTHER

## 2020-08-04 RX ORDER — LANCETS
EACH MISCELLANEOUS
Qty: 100 EACH | Refills: 1 | Status: SHIPPED | OUTPATIENT
Start: 2020-08-04 | End: 2021-02-19 | Stop reason: SDUPTHER

## 2020-08-04 RX ORDER — BLOOD SUGAR DIAGNOSTIC
1 STRIP MISCELLANEOUS DAILY
Qty: 100 EACH | Refills: 3 | Status: SHIPPED | OUTPATIENT
Start: 2020-08-04 | End: 2020-09-16 | Stop reason: SDUPTHER

## 2020-08-04 NOTE — PROGRESS NOTES
Assessment/Plan:         Diagnoses and all orders for this visit:    Type 2 diabetes mellitus without complication, without long-term current use of insulin ([facility])  Comments:  increase metformen to bid a1c up to 7 5  Orders:  -     metFORMIN (GLUCOPHAGE) 500 mg tablet; Take 1 tablet (500 mg total) by mouth 2 (two) times a day with meals  -     glucose blood (OneTouch Verio) test strip; 1 each by Other route daily  -     Lancets (onetouch ultrasoft) lancets; Check glucose daily  -     Hemoglobin A1C; Future  -     Comprehensive metabolic panel; Future  -     Microalbumin / creatinine urine ratio    Type 2 diabetes mellitus without complication, without long-term current use of insulin ([facility])  -     metFORMIN (GLUCOPHAGE) 500 mg tablet; Take 1 tablet (500 mg total) by mouth 2 (two) times a day with meals  -     glucose blood (OneTouch Verio) test strip; 1 each by Other route daily  -     Lancets (onetouch ultrasoft) lancets; Check glucose daily  -     Hemoglobin A1C; Future  -     Comprehensive metabolic panel; Future  -     Microalbumin / creatinine urine ratio    Gastroesophageal reflux disease without esophagitis  Comments:  need ppi reordered  Orders:  -     omeprazole (PriLOSEC) 40 MG capsule; Take 1 capsule (40 mg total) by mouth daily    Hypertension, unspecified type  Comments:  at goal  Orders:  -     triamterene-hydrochlorothiazide (DYAZIDE) 37 5-25 mg per capsule; Take 1 capsule by mouth daily  -     amLODIPine (NORVASC) 5 mg tablet; Take 1 tablet (5 mg total) by mouth daily For blood pressure  -     enalapril (VASOTEC) 10 mg tablet; Take 1 tablet (10 mg total) by mouth daily  -     metoprolol succinate (TOPROL-XL) 25 mg 24 hr tablet; Take 1 tablet (25 mg total) by mouth daily Take in addition to 50mg  -     metoprolol succinate (TOPROL-XL) 50 mg 24 hr tablet;  Take 1 tablet (50 mg total) by mouth daily Take in addition to 25mg    Dyslipidemia  Comments:  renew statin  Orders:  -     atorvastatin (LIPITOR) 10 mg tablet; Take 1 tablet (10 mg total) by mouth daily    Screening for prostate cancer  -     PSA, total and free; Future          Subjective:      Patient ID: Nola Dahl is a 75 y o  male  Denies retinopathy, neuropathy or ckd  On statin and ace  Needs rx  Had lab and reviewed  He also states he saw vascular and told circulation was bad       The following portions of the patient's history were reviewed and updated as appropriate: He  has a past medical history of Arthritis, Diabetes mellitus ([facility] ), GERD (gastroesophageal reflux disease), Hypertension, Irregular heart beat, Palpitations, Sexual dysfunction, and Thyroid disease  He   Patient Active Problem List    Diagnosis Date Noted    SOB (shortness of breath) 01/30/2020    Morbid obesity with body mass index (BMI) of 45 0 to 49 9 in adult [facility]) 12/12/2018    Bilateral lower extremity edema 07/23/2018    Diabetes mellitus ([facility] ) 03/16/2018    GERD (gastroesophageal reflux disease) 03/16/2018    Chest discomfort 09/03/2017    AYLEEN (obstructive sleep apnea) 08/18/2017    Benign essential HTN 07/31/2017    Dyslipidemia 07/31/2017     He  has a past surgical history that includes Colonoscopy  His family history includes Heart defect in his father; Hypertension in his brother, father, and mother; Stroke in his brother and mother  He  reports that he has quit smoking  He has never used smokeless tobacco  He reports that he does not drink alcohol or use drugs    Current Outpatient Medications   Medication Sig Dispense Refill    albuterol (PROVENTIL HFA,VENTOLIN HFA) 90 mcg/act inhaler Inhale 2 puffs every 4 (four) hours as needed for wheezing or shortness of breath 1 Inhaler 5    amLODIPine (NORVASC) 5 mg tablet Take 1 tablet (5 mg total) by mouth daily For blood pressure 90 tablet 1    aspirin 81 mg chewable tablet Chew 81 mg daily      atorvastatin (LIPITOR) 10 mg tablet Take 1 tablet (10 mg total) by mouth daily 90 tablet 1    enalapril (VASOTEC) 10 mg tablet Take 1 tablet (10 mg total) by mouth daily 90 tablet 1    glucose blood (OneTouch Verio) test strip 1 each by Other route daily 100 each 3    Lancets (onetouch ultrasoft) lancets Check glucose daily 100 each 1    metFORMIN (GLUCOPHAGE) 500 mg tablet Take 1 tablet (500 mg total) by mouth 2 (two) times a day with meals 180 tablet 1    metoprolol succinate (TOPROL-XL) 25 mg 24 hr tablet Take 1 tablet (25 mg total) by mouth daily Take in addition to 50mg 90 tablet 1    metoprolol succinate (TOPROL-XL) 50 mg 24 hr tablet Take 1 tablet (50 mg total) by mouth daily Take in addition to 25mg 90 tablet 1    omeprazole (PriLOSEC) 40 MG capsule Take 1 capsule (40 mg total) by mouth daily 90 capsule 1    triamterene-hydrochlorothiazide (DYAZIDE) 37 5-25 mg per capsule Take 1 capsule by mouth daily 90 capsule 1     No current facility-administered medications for this visit  Current Outpatient Medications on File Prior to Visit   Medication Sig    albuterol (PROVENTIL HFA,VENTOLIN HFA) 90 mcg/act inhaler Inhale 2 puffs every 4 (four) hours as needed for wheezing or shortness of breath    aspirin 81 mg chewable tablet Chew 81 mg daily     No current facility-administered medications on file prior to visit  He is allergic to acetaminophen       Review of Systems   Constitutional: Negative  HENT: Negative  Eyes: Negative for visual disturbance  Respiratory: Negative  Cardiovascular: Negative  Neurological: Negative for numbness  Objective:      /82 (BP Location: Left arm, Patient Position: Sitting, Cuff Size: Large)   Pulse 76   Temp 97 6 °F (36 4 °C)   Ht 6'   Wt (!) 161 kg (354 lb 3 2 oz)   SpO2 98%   BMI 48 04 kg/m²          Physical Exam   HENT:   Head: Normocephalic and atraumatic     Right Ear: Tympanic membrane, external ear and ear canal normal    Left Ear: Tympanic membrane, external ear and ear canal normal    Nose: Nose normal  No rhinorrhea or congestion  Mouth/Throat: No oropharyngeal exudate or posterior oropharyngeal erythema  Neck: Neck supple  No muscular tenderness present  Carotid bruit is not present  No neck rigidity  Cardiovascular: Normal rate and regular rhythm  Pulses:       Dorsalis pedis pulses are 2+ on the right side and 2+ on the left side  Pulmonary/Chest: Effort normal and breath sounds normal  No stridor  No respiratory distress  He has no wheezes  Abdominal: Normal appearance  Feet:   Right Foot:   Skin Integrity: Positive for warmth  Negative for erythema  Left Foot:   Skin Integrity: Positive for warmth  Negative for erythema  Lymphadenopathy:     He has no cervical adenopathy  Neurological: He is alert  Diabetic Foot Exam    Patient's shoes and socks removed  Right Foot/Ankle   Right Foot Inspection  Skin Exam: warmth no erythema                            Sensory   Vibration: intact    Monofilament testing: intact  Vascular    The right DP pulse is 2+  Left Foot/Ankle  Left Foot Inspection  Skin Exam: warmthno erythema                                         Sensory   Vibration: intact    Monofilament: intact  Vascular    The left DP pulse is 2+  Assign Risk Category:  No deformity present;  No loss of protective sensation;        Risk: 0

## 2020-08-08 LAB
ALBUMIN SERPL-MCNC: 4.3 G/DL (ref 3.8–4.8)
ALBUMIN/CREAT UR: 14 MG/G CREAT (ref 0–29)
ALBUMIN/GLOB SERPL: 1.6 {RATIO} (ref 1.2–2.2)
ALP SERPL-CCNC: 35 IU/L (ref 39–117)
ALT SERPL-CCNC: 28 IU/L (ref 0–44)
AST SERPL-CCNC: 25 IU/L (ref 0–40)
BILIRUB SERPL-MCNC: 0.6 MG/DL (ref 0–1.2)
BUN SERPL-MCNC: 20 MG/DL (ref 8–27)
BUN/CREAT SERPL: 19 (ref 10–24)
CALCIUM SERPL-MCNC: 9.9 MG/DL (ref 8.6–10.2)
CHLORIDE SERPL-SCNC: 100 MMOL/L (ref 96–106)
CO2 SERPL-SCNC: 23 MMOL/L (ref 20–29)
CREAT SERPL-MCNC: 1.06 MG/DL (ref 0.76–1.27)
CREAT UR-MCNC: 73.8 MG/DL
GLOBULIN SER-MCNC: 2.7 G/DL (ref 1.5–4.5)
GLUCOSE SERPL-MCNC: 125 MG/DL (ref 65–99)
HBA1C MFR BLD: 7.2 % (ref 4.8–5.6)
MICROALBUMIN UR-MCNC: 10.3 UG/ML
POTASSIUM SERPL-SCNC: 4.1 MMOL/L (ref 3.5–5.2)
PROT SERPL-MCNC: 7 G/DL (ref 6–8.5)
PSA FREE MFR SERPL: 27.5 %
PSA FREE SERPL-MCNC: 0.22 NG/ML
PSA SERPL-MCNC: 0.8 NG/ML (ref 0–4)
SL AMB EGFR AFRICAN AMERICAN: 84 ML/MIN/1.73
SL AMB EGFR NON AFRICAN AMERICAN: 73 ML/MIN/1.73
SODIUM SERPL-SCNC: 140 MMOL/L (ref 134–144)

## 2020-08-10 ENCOUNTER — TELEPHONE (OUTPATIENT)
Dept: FAMILY MEDICINE CLINIC | Facility: CLINIC | Age: 65
End: 2020-08-10

## 2020-08-10 NOTE — TELEPHONE ENCOUNTER
Pt called requesting to take one metformin in the AM & 1/2 pill at dinner  Pt is afraid blood sugar will drop too low  He also asked for his lab results taken on 8/6/20

## 2020-08-11 DIAGNOSIS — E11.9 TYPE 2 DIABETES MELLITUS WITHOUT COMPLICATION, WITHOUT LONG-TERM CURRENT USE OF INSULIN (HCC): ICD-10-CM

## 2020-08-11 NOTE — TELEPHONE ENCOUNTER
Please tell him he can change the metformen to one tab in am and 1/2 tab in pm   Also his lab is okay  It is improving    His a1c is down to 7 2

## 2020-08-11 NOTE — TELEPHONE ENCOUNTER
Called & spoke to pt to give him instructions on his change to frequency of Metformin  Gave pt lab results of A1C    Pt understood instructions

## 2020-08-14 DIAGNOSIS — I10 HYPERTENSION, UNSPECIFIED TYPE: ICD-10-CM

## 2020-08-14 DIAGNOSIS — E11.9 TYPE 2 DIABETES MELLITUS WITHOUT COMPLICATION, WITHOUT LONG-TERM CURRENT USE OF INSULIN (HCC): ICD-10-CM

## 2020-08-14 RX ORDER — TRIAMTERENE AND HYDROCHLOROTHIAZIDE 37.5; 25 MG/1; MG/1
CAPSULE ORAL
Qty: 90 CAPSULE | Refills: 0 | Status: SHIPPED | OUTPATIENT
Start: 2020-08-14 | End: 2020-10-27

## 2020-08-19 ENCOUNTER — TELEPHONE (OUTPATIENT)
Dept: FAMILY MEDICINE CLINIC | Facility: CLINIC | Age: 65
End: 2020-08-19

## 2020-08-19 DIAGNOSIS — M54.50 LOW BACK PAIN, UNSPECIFIED BACK PAIN LATERALITY, UNSPECIFIED CHRONICITY, UNSPECIFIED WHETHER SCIATICA PRESENT: Primary | ICD-10-CM

## 2020-08-19 NOTE — TELEPHONE ENCOUNTER
Pt request to go to pt near his home in [place]  He will find out location and let us know if we must fax order for his low back pain  He is using tylenol and advil for pain

## 2020-08-19 NOTE — TELEPHONE ENCOUNTER
Patient called requesting order for PT be faxed to [phone]  He claims this was discussed at today's visit but I am not seeing it  Please place order if appropriate and fax

## 2020-09-04 ENCOUNTER — TELEPHONE (OUTPATIENT)
Dept: FAMILY MEDICINE CLINIC | Facility: CLINIC | Age: 65
End: 2020-09-04

## 2020-09-04 DIAGNOSIS — M54.50 LOW BACK PAIN, UNSPECIFIED BACK PAIN LATERALITY, UNSPECIFIED CHRONICITY, UNSPECIFIED WHETHER SCIATICA PRESENT: Primary | ICD-10-CM

## 2020-09-04 NOTE — TELEPHONE ENCOUNTER
Patient called asking for a referral for pain management  He's in physical therapy and seeing a chiropractor every day of the week- it's been 2 weeks with no improvement  Please advise- he would like to go to [facility]  He is taking Advil around the clock

## 2020-09-16 DIAGNOSIS — E11.9 TYPE 2 DIABETES MELLITUS WITHOUT COMPLICATION, WITHOUT LONG-TERM CURRENT USE OF INSULIN (HCC): ICD-10-CM

## 2020-09-17 RX ORDER — BLOOD SUGAR DIAGNOSTIC
1 STRIP MISCELLANEOUS DAILY
Qty: 100 EACH | Refills: 3 | Status: SHIPPED | OUTPATIENT
Start: 2020-09-17 | End: 2020-12-24 | Stop reason: SDUPTHER

## 2020-10-23 DIAGNOSIS — I10 HYPERTENSION, UNSPECIFIED TYPE: ICD-10-CM

## 2020-10-23 DIAGNOSIS — E11.9 TYPE 2 DIABETES MELLITUS WITHOUT COMPLICATION, WITHOUT LONG-TERM CURRENT USE OF INSULIN (HCC): ICD-10-CM

## 2020-10-23 RX ORDER — AMLODIPINE BESYLATE 5 MG/1
5 TABLET ORAL DAILY
Qty: 90 TABLET | Refills: 1 | Status: SHIPPED | OUTPATIENT
Start: 2020-10-23 | End: 2020-12-10 | Stop reason: SDUPTHER

## 2020-10-27 RX ORDER — TRIAMTERENE AND HYDROCHLOROTHIAZIDE 37.5; 25 MG/1; MG/1
CAPSULE ORAL
Qty: 90 CAPSULE | Refills: 0 | Status: SHIPPED | OUTPATIENT
Start: 2020-10-27 | End: 2021-02-19 | Stop reason: SDUPTHER

## 2020-12-09 ENCOUNTER — TELEPHONE (OUTPATIENT)
Dept: FAMILY MEDICINE CLINIC | Facility: CLINIC | Age: 65
End: 2020-12-09

## 2020-12-09 ENCOUNTER — TELEMEDICINE (OUTPATIENT)
Dept: FAMILY MEDICINE CLINIC | Facility: CLINIC | Age: 65
End: 2020-12-09
Payer: MEDICARE

## 2020-12-09 DIAGNOSIS — K21.9 GASTROESOPHAGEAL REFLUX DISEASE WITHOUT ESOPHAGITIS: Primary | ICD-10-CM

## 2020-12-09 DIAGNOSIS — K31.84 GASTROPARESIS: ICD-10-CM

## 2020-12-09 PROCEDURE — 99213 OFFICE O/P EST LOW 20 MIN: CPT | Performed by: INTERNAL MEDICINE

## 2020-12-09 RX ORDER — METOCLOPRAMIDE 10 MG/1
10 TABLET ORAL
Qty: 10 TABLET | Refills: 0 | Status: SHIPPED | OUTPATIENT
Start: 2020-12-09 | End: 2021-06-18

## 2020-12-10 DIAGNOSIS — I10 HYPERTENSION, UNSPECIFIED TYPE: ICD-10-CM

## 2020-12-10 DIAGNOSIS — K21.9 GASTROESOPHAGEAL REFLUX DISEASE WITHOUT ESOPHAGITIS: ICD-10-CM

## 2020-12-10 RX ORDER — AMLODIPINE BESYLATE 5 MG/1
5 TABLET ORAL DAILY
Qty: 90 TABLET | Refills: 1 | Status: SHIPPED | OUTPATIENT
Start: 2020-12-10 | End: 2021-02-19 | Stop reason: SDUPTHER

## 2020-12-24 DIAGNOSIS — E11.9 TYPE 2 DIABETES MELLITUS WITHOUT COMPLICATION, WITHOUT LONG-TERM CURRENT USE OF INSULIN (HCC): ICD-10-CM

## 2020-12-24 RX ORDER — BLOOD SUGAR DIAGNOSTIC
1 STRIP MISCELLANEOUS DAILY
Qty: 100 EACH | Refills: 3 | Status: SHIPPED | OUTPATIENT
Start: 2020-12-24 | End: 2021-01-05 | Stop reason: SDUPTHER

## 2021-01-05 DIAGNOSIS — E11.9 TYPE 2 DIABETES MELLITUS WITHOUT COMPLICATION, WITHOUT LONG-TERM CURRENT USE OF INSULIN (HCC): ICD-10-CM

## 2021-01-05 RX ORDER — BLOOD SUGAR DIAGNOSTIC
1 STRIP MISCELLANEOUS DAILY
Qty: 100 EACH | Refills: 3 | Status: SHIPPED | OUTPATIENT
Start: 2021-01-05 | End: 2021-03-18 | Stop reason: SDUPTHER

## 2021-01-05 NOTE — TELEPHONE ENCOUNTER
Patient did not  DM testing supplies at the [facility] pharmacy   Patient wants to switch the prescription to [facility]  Please send new script for testing strips

## 2021-01-19 ENCOUNTER — TELEPHONE (OUTPATIENT)
Dept: FAMILY MEDICINE CLINIC | Facility: CLINIC | Age: 66
End: 2021-01-19

## 2021-01-19 NOTE — TELEPHONE ENCOUNTER
Patient called in asking for supplies for CPAP machine- asking for 90 day supplies with refills so he doesn't have to call for them "all the time"   Please advise    [facility] in [place]#[phone]   [facility]#[phone]

## 2021-01-29 DIAGNOSIS — I10 HYPERTENSION, UNSPECIFIED TYPE: ICD-10-CM

## 2021-01-29 DIAGNOSIS — E11.9 TYPE 2 DIABETES MELLITUS WITHOUT COMPLICATION, WITHOUT LONG-TERM CURRENT USE OF INSULIN (HCC): ICD-10-CM

## 2021-01-29 RX ORDER — ENALAPRIL MALEATE 10 MG/1
10 TABLET ORAL DAILY
Qty: 90 TABLET | Refills: 1 | Status: SHIPPED | OUTPATIENT
Start: 2021-01-29 | End: 2021-04-16

## 2021-01-29 NOTE — TELEPHONE ENCOUNTER
Patient has one day left of enalapril  Please advise  I did advise him to call sooner next time for med refills

## 2021-02-19 DIAGNOSIS — K21.9 GASTROESOPHAGEAL REFLUX DISEASE WITHOUT ESOPHAGITIS: ICD-10-CM

## 2021-02-19 DIAGNOSIS — E11.9 TYPE 2 DIABETES MELLITUS WITHOUT COMPLICATION, WITHOUT LONG-TERM CURRENT USE OF INSULIN (HCC): ICD-10-CM

## 2021-02-19 DIAGNOSIS — I10 HYPERTENSION, UNSPECIFIED TYPE: ICD-10-CM

## 2021-02-19 RX ORDER — OMEPRAZOLE 40 MG/1
40 CAPSULE, DELAYED RELEASE ORAL DAILY
Qty: 90 CAPSULE | Refills: 1 | Status: SHIPPED | OUTPATIENT
Start: 2021-02-19 | End: 2021-06-18

## 2021-02-19 RX ORDER — AMLODIPINE BESYLATE 5 MG/1
5 TABLET ORAL DAILY
Qty: 90 TABLET | Refills: 1 | Status: SHIPPED | OUTPATIENT
Start: 2021-02-19 | End: 2021-06-18

## 2021-02-19 RX ORDER — TRIAMTERENE AND HYDROCHLOROTHIAZIDE 37.5; 25 MG/1; MG/1
1 CAPSULE ORAL DAILY
Qty: 90 CAPSULE | Refills: 1 | Status: SHIPPED | OUTPATIENT
Start: 2021-02-19 | End: 2021-05-12 | Stop reason: CLARIF

## 2021-02-19 RX ORDER — LANCETS
EACH MISCELLANEOUS
Qty: 100 EACH | Refills: 1 | Status: SHIPPED | OUTPATIENT
Start: 2021-02-19

## 2021-02-19 NOTE — TELEPHONE ENCOUNTER
[facility] [place]  And omeprazole to mail order     Lancets to local

## 2021-03-10 DIAGNOSIS — Z23 ENCOUNTER FOR IMMUNIZATION: ICD-10-CM

## 2021-03-18 DIAGNOSIS — E11.9 TYPE 2 DIABETES MELLITUS WITHOUT COMPLICATION, WITHOUT LONG-TERM CURRENT USE OF INSULIN (HCC): ICD-10-CM

## 2021-03-22 RX ORDER — BLOOD SUGAR DIAGNOSTIC
1 STRIP MISCELLANEOUS DAILY
Qty: 100 EACH | Refills: 0 | Status: SHIPPED | OUTPATIENT
Start: 2021-03-22 | End: 2021-08-16 | Stop reason: SDUPTHER

## 2021-04-16 DIAGNOSIS — I10 HYPERTENSION, UNSPECIFIED TYPE: ICD-10-CM

## 2021-04-16 RX ORDER — METOPROLOL SUCCINATE 25 MG/1
TABLET, EXTENDED RELEASE ORAL
Qty: 90 TABLET | Refills: 1 | Status: SHIPPED | OUTPATIENT
Start: 2021-04-16 | End: 2021-06-18

## 2021-04-16 RX ORDER — METOPROLOL SUCCINATE 50 MG/1
TABLET, EXTENDED RELEASE ORAL
Qty: 90 TABLET | Refills: 1 | Status: SHIPPED | OUTPATIENT
Start: 2021-04-16 | End: 2021-06-18

## 2021-04-16 RX ORDER — ENALAPRIL MALEATE 10 MG/1
TABLET ORAL
Qty: 90 TABLET | Refills: 1 | Status: SHIPPED | OUTPATIENT
Start: 2021-04-16 | End: 2021-06-18

## 2021-05-05 ENCOUNTER — OFFICE VISIT (OUTPATIENT)
Dept: VASCULAR SURGERY | Facility: HOSPITAL | Age: 66
End: 2021-05-05
Payer: MEDICARE

## 2021-05-05 VITALS
SYSTOLIC BLOOD PRESSURE: 147 MMHG | HEIGHT: 72 IN | WEIGHT: 315 LBS | HEART RATE: 59 BPM | RESPIRATION RATE: 20 BRPM | DIASTOLIC BLOOD PRESSURE: 68 MMHG | BODY MASS INDEX: 42.66 KG/M2

## 2021-05-05 DIAGNOSIS — E78.5 DYSLIPIDEMIA: ICD-10-CM

## 2021-05-05 DIAGNOSIS — R60.0 BILATERAL LOWER EXTREMITY EDEMA: Primary | ICD-10-CM

## 2021-05-05 DIAGNOSIS — I10 BENIGN ESSENTIAL HTN: ICD-10-CM

## 2021-05-05 PROCEDURE — 99214 OFFICE O/P EST MOD 30 MIN: CPT | Performed by: PHYSICIAN ASSISTANT

## 2021-05-05 NOTE — PATIENT INSTRUCTIONS
Lymphedema   AMBULATORY CARE:   The lymphatic system  contains fluid, vessels, tissue, and organs  This system removes and carries fluid throughout the body  It also helps the body fight infection  Lymphedema is the buildup of lymph fluid in fat tissue under your skin  The buildup causes the area to swell  Lymphedema can happen any time that lymphatic vessels are blocked or damaged  Damage to lymphatic vessels may be caused by surgery, infection, injury, cancer, radiation, or scar tissue     Common signs and symptoms include the following:  Signs and symptoms may happen where lymph nodes were removed, or in the arm, leg, chest, or underarm  · Swelling or itching    · Pain, burning, or aching    · Tight, hard, or red skin    · Hair loss    · Heaviness or fullness    · Numbness or tingling    · Stiffness    Contact your healthcare provider or lymphedema specialist if:   · You have a fever or chills  · You have an open area of skin that looks red or swollen, or drains pus  · Your symptoms, such as swelling or pain, get worse  · Your arms or legs feel heavy, or you cannot move them  · Your skin becomes hard, thick, or rough  · You have a skin wound that will not heal      · Your shoes, clothes, or jewelry feel tighter  · You have questions or concerns about your condition or care  Treatment for lymphedema  can relieve symptoms and prevent lymphedema from getting worse  You may need therapeutic massage, physical therapy, or compression devices to help decrease swelling and pain  Surgery may be needed if other treatments do not work  Self-care:   · Elevate  your arm or leg above the level of your heart as often as you can  This will help decrease swelling and pain  Prop your arm or leg on pillows or blankets to keep it elevated comfortably  · Wear compression socks, sleeves, or bandages  as directed  Compression devices must be fitted by a healthcare provider   Compression devices may need to be replaced every 3 to 6 months  · Exercise  can help you maintain or regain function of your arm or leg  Ask your healthcare provider what type of exercise to do and how often to do it  Start slow, take breaks, and gradually do more each day  Do not do vigorous, repeated exercises  Watch for changes in your arm or leg during exercise  Stop and rest if you have swelling, increased pain, or heaviness  Elevate your arm or leg above the level of your heart  · Change your position often  to help move lymphatic fluid through your body  Do not sit or  one position for more than 30 minutes  Do not cross your legs when you sit  These actions can cause lymphatic fluid to buildup  · Maintain a healthy weight  Ask your healthcare provider what you should weigh  Weight loss may improve your symptoms  If you need to lose weight, your healthcare provider can help you create a weight loss program     Prevent infection with proper skin care:  A skin infection can make lymphedema worse  Do the following to decrease your risk for a skin infection in your arm or leg with lymphedema:  · Wash your skin gently and dry it well  Use a mild soap to wash your skin  Gently pat your skin dry after you bathe  Apply a mild cream or lotion to moisturize your skin and prevent dryness or cracking  Keep your feet clean and dry  · Protect your skin from injury  Wear gloves when you garden and wash dishes  Cut your nails straight across to prevent injury to your fingers and toes  Use sunscreen and insect repellant to avoid burns and punctures  Wear slippers in the house  Wear shoes when you go outside  · Check your skin every day for signs of infection  Signs of infection include redness, swelling, increased heat, or pus  You may also have a fever or chills  · Care for cuts, scratches or burns  Apply antibiotic ointment to cuts and other small breaks in the skin   Apply a cold pack or cold water to a burn for 15 minutes  Then wash it with soap and water  Cover scratches, cuts, or burns with a clean, dry gauze or bandage as directed  Keep the area clean and dry  · Tell healthcare providers that you have lymphedema  Tell them not to do, IVs, blood draws, and blood pressure readings in the arm or leg with lymphedema  If there is lymphedema in both arms, ask them to take your blood pressure on your leg  Do not allow flu shots or vaccinations in your arm with lymphedema  Follow up with your healthcare provider or lymphedema specialist as directed: You will need regular visits so healthcare providers can examine the affected areas  You may also be referred to a clinic that specializes in lymphedema treatment  Write down your questions so you remember to ask them during your visits  © Copyright [address] Information is for End User's use only and may not be sold, redistributed or otherwise used for commercial purposes  All illustrations and images included in CareNotes® are the copyrighted property of [employer]  or [employer]  The above information is an  only  It is not intended as medical advice for individual conditions or treatments  Talk to your doctor, nurse or pharmacist before following any medical regimen to see if it is safe and effective for you

## 2021-05-05 NOTE — PROGRESS NOTES
Assessment/Plan:    Bilateral lower extremity edema  77year old morbidly obese male former smoker w/ HTN, HLD, DM2, AYLEEN, GERD presenting with bilateral lower extremity edema without wounds      -Patient reports continued lower extremity edema  He has compression stockings at home however feels that they do not help because they do not fit properly    -Symptoms are equal bilaterally  Include heaviness, aching and tired legs  No wounds or lesions  Some mild chronic skin changes consistent with lymphedema/chronic venous insufficiency  -Will prescribe compression stockings  -Discussed importance of continued compression, elevation and exercise/weight loss to help with symptoms  -Regular skin barrier protection w/ cream/mosturizer   -Follow up in 2-3 months to reassess symptoms    Benign essential HTN  -BP stable in the office today  -Continue adequate BP control  -Medical management per PCP    Dyslipidemia  -Stable  -Continue statin therapy   -Encourage low cholesterol, low fat diet  -Medical management per PCP       Diagnoses and all orders for this visit:    Bilateral lower extremity edema  -     Compression Stocking  -     Compression Stocking    Benign essential HTN    Dyslipidemia          Subjective:      Patient ID: Franco Alonzo is a 77 y o  male  Patient is new to our practice and has not had testing  Pt c/o BLE painful bulging veins  Pt states that he has tiredness, heaviness, and swelling in his legs  Lt leg > Rt leg  Pt states that he has had this for years  Pt does not wear compression stockings or elevate his legs  Pt denies DVT or open wounds  Pt does not currently wear compression stockings because it moves the swelling up his legs  77year old male with chronic venous insufficiency presents to the office for reestablishment of care and concern for worsening symptoms  Patient has been experiencing bilateral lower extremity swelling and discomfort for 2-3 years    Patient states that he has compression stockings at home however he has not been wearing them because he feels that they do not help secondary to them fitting him properly  He denies any wounds or skin changes  He does note that his legs get so swollen at night that he is unable to wear shoes  The following portions of the patient's history were reviewed and updated as appropriate: allergies, current medications, past family history, past medical history, past social history, past surgical history and problem list     Review of Systems   Constitutional: Negative  HENT: Negative  Eyes: Negative  Respiratory: Negative  Cardiovascular: Positive for leg swelling  Painful veins   Gastrointestinal: Negative  Endocrine: Negative  Genitourinary: Negative  Musculoskeletal: Positive for arthralgias and gait problem  Skin: Negative  Allergic/Immunologic: Negative  Neurological: Negative for dizziness, weakness and numbness  Hematological: Negative  Psychiatric/Behavioral: Negative  I have reviewed and made appropriate changes to the review of systems input by the medical assistant      Vitals:    05/05/21 1335   BP: 147/68   BP Location: Right arm   Patient Position: Sitting   Pulse: 59   Resp: 20   Weight: (!) 157 kg (347 lb)   Height: 6' (1 829 m)       Patient Active Problem List   Diagnosis    Chest discomfort    Benign essential HTN    Diabetes mellitus ([facility] [address] )    Dyslipidemia    GERD (gastroesophageal reflux disease)    AYLEEN (obstructive sleep apnea)    Bilateral lower extremity edema    Morbid obesity with body mass index (BMI) of 45 0 to 49 9 in adult (HCC)    SOB (shortness of breath)       Past Surgical History:   Procedure Laterality Date    COLONOSCOPY         Family History   Problem Relation Age of Onset    Hypertension Mother         essential    Stroke Mother     Hypertension Father         essential    Heart defect Father         cardiac disorder    Stroke Brother     Hypertension Brother        Social History     Socioeconomic History    Marital status: Single     Spouse name: Not on file    Number of children: Not on file    Years of education: Not on file    Highest education level: Not on file   Occupational History    Not on file   Social Needs    Financial resource strain: Not on file    Food insecurity     Worry: Not on file     Inability: Not on file    Transportation needs     Medical: Not on file     Non-medical: Not on file   Tobacco Use    Smoking status: Former Smoker    Smokeless tobacco: Never Used    Tobacco comment: former smoker-quit 17 yrs ago 1pppd x 30 yrs as per [employer]   Substance and Sexual Activity    Alcohol use: No    Drug use: No    Sexual activity: Not on file   Lifestyle    Physical activity     Days per week: Not on file     Minutes per session: Not on file    Stress: Not on file   Relationships    Social connections     Talks on phone: Not on file     Gets together: Not on file     Attends Anabaptism service: Not on file     Active member of club or organization: Not on file     Attends meetings of clubs or organizations: Not on file     Relationship status: Not on file    Intimate partner violence     Fear of current or ex partner: Not on file     Emotionally abused: Not on file     Physically abused: Not on file     Forced sexual activity: Not on file   Other Topics Concern    Not on file   Social History Narrative    Not on file       Allergies   Allergen Reactions    Acetaminophen      Other reaction(s): Unknown Reaction  "I had to go to the hospital and get shots after taking it 20yrs ago"         Current Outpatient Medications:     albuterol (PROVENTIL HFA,VENTOLIN HFA) 90 mcg/act inhaler, Inhale 2 puffs every 4 (four) hours as needed for wheezing or shortness of breath, Disp: 1 Inhaler, Rfl: 5    amLODIPine (NORVASC) 5 mg tablet, Take 1 tablet (5 mg total) by mouth daily For blood pressure, Disp: 90 tablet, Rfl: 1   aspirin 81 mg chewable tablet, Chew 81 mg daily, Disp: , Rfl:     atorvastatin (LIPITOR) 10 mg tablet, Take 1 tablet (10 mg total) by mouth daily, Disp: 90 tablet, Rfl: 1    enalapril (VASOTEC) 10 mg tablet, TAKE 1 TABLET DAILY, Disp: 90 tablet, Rfl: 1    glucose blood (OneTouch Verio) test strip, Use 1 each daily, Disp: 100 each, Rfl: 0    Lancets (onetouch ultrasoft) lancets, Check glucose daily, Disp: 100 each, Rfl: 1    metFORMIN (GLUCOPHAGE) 500 mg tablet, One tab in am and 1/2 tab in pm, Disp: 180 tablet, Rfl: 1    metoclopramide (REGLAN) 10 mg tablet, Take 1 tablet (10 mg total) by mouth 2 (two) times a day before meals, Disp: 10 tablet, Rfl: 0    metoprolol succinate (TOPROL-XL) 25 mg 24 hr tablet, TAKE 1 TABLET DAILY IN     ADDITION TO 50 MG DOSE, Disp: 90 tablet, Rfl: 1    metoprolol succinate (TOPROL-XL) 50 mg 24 hr tablet, TAKE 1 TABLET DAILY IN     ADDITION TO 25MG, Disp: 90 tablet, Rfl: 1    omeprazole (PriLOSEC) 40 MG capsule, Take 1 capsule (40 mg total) by mouth daily, Disp: 90 capsule, Rfl: 1    triamterene-hydrochlorothiazide (DYAZIDE) 37 5-25 mg per capsule, Take 1 capsule by mouth daily, Disp: 90 capsule, Rfl: 1    Objective:      /68 (BP Location: Right arm, Patient Position: Sitting)   Pulse 59   Resp 20   Ht 6' (1 829 m)   Wt (!) 157 kg (347 lb)   BMI 47 06 kg/m²          Physical Exam  Constitutional:       General: He is not in acute distress  Appearance: Normal appearance  He is not ill-appearing, toxic-appearing or diaphoretic  HENT:      Head: Normocephalic and atraumatic  Right Ear: External ear normal       Left Ear: External ear normal       Nose: Nose normal       Mouth/Throat:      Mouth: Mucous membranes are moist       Pharynx: Oropharynx is clear  Eyes:      General: No scleral icterus  Extraocular Movements: Extraocular movements intact        Conjunctiva/sclera: Conjunctivae normal    Neck:      Musculoskeletal: Normal range of motion and neck supple  Cardiovascular:      Rate and Rhythm: Normal rate and regular rhythm  Pulses:           Radial pulses are 2+ on the right side and 2+ on the left side  Dorsalis pedis pulses are 2+ on the right side and 2+ on the left side  Heart sounds: Normal heart sounds  Pulmonary:      Effort: Pulmonary effort is normal  No respiratory distress  Breath sounds: Normal breath sounds  Abdominal:      General: Abdomen is flat  Palpations: Abdomen is soft  Tenderness: There is no abdominal tenderness  Musculoskeletal: Normal range of motion  Right lower leg: Edema present  Left lower leg: Edema present  Skin:     General: Skin is warm and dry  Neurological:      General: No focal deficit present  Mental Status: He is alert and oriented to person, place, and time  Mental status is at baseline  Cranial Nerves: No cranial nerve deficit  Sensory: No sensory deficit  Motor: No weakness     Psychiatric:         Mood and Affect: Mood normal          Behavior: Behavior normal

## 2021-05-05 NOTE — ASSESSMENT & PLAN NOTE
77year old morbidly obese male former smoker w/ HTN, HLD, DM2, AYLEEN, GERD presenting with bilateral lower extremity edema without wounds      -Patient reports continued lower extremity edema  He has compression stockings at home however feels that they do not help because they do not fit properly    -Symptoms are equal bilaterally  Include heaviness, aching and tired legs  No wounds or lesions   Some mild chronic skin changes consistent with lymphedema/chronic venous insufficiency  -Will prescribe compression stockings  -Discussed importance of continued compression, elevation and exercise/weight loss to help with symptoms  -Regular skin barrier protection w/ cream/mosturizer   -Follow up in 2-3 months to reassess symptoms

## 2021-05-06 ENCOUNTER — TELEPHONE (OUTPATIENT)
Dept: FAMILY MEDICINE CLINIC | Facility: CLINIC | Age: 66
End: 2021-05-06

## 2021-05-06 NOTE — TELEPHONE ENCOUNTER
Pt's insurance is requiring pt to have updated sleep study to cover future CPAP supplies  Pt stated he called [address] [place] [address] to schedule appt & they told pt referral needs to be placed in his chart before they will schedule  I told pt to contact the sleep study doctor to place these orders  He claims he did, but that sleep study doctor would like results of a study prior to his appt

## 2021-05-11 ENCOUNTER — HOSPITAL ENCOUNTER (EMERGENCY)
Facility: HOSPITAL | Age: 66
Discharge: HOME | End: 2021-05-11
Attending: EMERGENCY MEDICINE
Payer: MEDICARE

## 2021-05-11 ENCOUNTER — OFFICE VISIT (OUTPATIENT)
Dept: URGENT CARE | Facility: CLINIC | Age: 66
End: 2021-05-11
Payer: MEDICARE

## 2021-05-11 ENCOUNTER — TELEPHONE (OUTPATIENT)
Dept: OTOLARYNGOLOGY | Facility: CLINIC | Age: 66
End: 2021-05-11

## 2021-05-11 ENCOUNTER — HOSPITAL ENCOUNTER (EMERGENCY)
Facility: HOSPITAL | Age: 66
Discharge: HOME | End: 2021-05-11
Payer: MEDICARE

## 2021-05-11 ENCOUNTER — APPOINTMENT (EMERGENCY)
Dept: RADIOLOGY | Facility: HOSPITAL | Age: 66
End: 2021-05-11
Payer: MEDICARE

## 2021-05-11 VITALS
HEIGHT: 72 IN | RESPIRATION RATE: 18 BRPM | TEMPERATURE: 98.2 F | OXYGEN SATURATION: 96 % | SYSTOLIC BLOOD PRESSURE: 125 MMHG | WEIGHT: 315 LBS | HEART RATE: 65 BPM | BODY MASS INDEX: 42.66 KG/M2 | DIASTOLIC BLOOD PRESSURE: 68 MMHG

## 2021-05-11 VITALS
SYSTOLIC BLOOD PRESSURE: 120 MMHG | TEMPERATURE: 98 F | HEIGHT: 72 IN | BODY MASS INDEX: 42.66 KG/M2 | RESPIRATION RATE: 18 BRPM | DIASTOLIC BLOOD PRESSURE: 64 MMHG | WEIGHT: 315 LBS | HEART RATE: 60 BPM | OXYGEN SATURATION: 98 %

## 2021-05-11 DIAGNOSIS — R06.00 DYSPNEA ON EXERTION: Primary | ICD-10-CM

## 2021-05-11 DIAGNOSIS — R06.00 DYSPNEA: Primary | ICD-10-CM

## 2021-05-11 LAB
ANION GAP SERPL CALCULATED.3IONS-SCNC: 8 MMOL/L (ref 4–13)
BASOPHILS # BLD AUTO: 0.03 THOUSANDS/ΜL (ref 0–0.1)
BASOPHILS NFR BLD AUTO: 1 % (ref 0–1)
BUN SERPL-MCNC: 17 MG/DL (ref 5–25)
CALCIUM SERPL-MCNC: 9.5 MG/DL (ref 8.3–10.1)
CHLORIDE SERPL-SCNC: 102 MMOL/L (ref 100–108)
CO2 SERPL-SCNC: 28 MMOL/L (ref 21–32)
CREAT SERPL-MCNC: 1.13 MG/DL (ref 0.6–1.3)
D DIMER PPP FEU-MCNC: 0.46 UG/ML FEU
EOSINOPHIL # BLD AUTO: 0 THOUSAND/ΜL (ref 0–0.61)
EOSINOPHIL NFR BLD AUTO: 0 % (ref 0–6)
ERYTHROCYTE [DISTWIDTH] IN BLOOD BY AUTOMATED COUNT: 13.5 % (ref 11.6–15.1)
GFR SERPL CREATININE-BSD FRML MDRD: 67 ML/MIN/1.73SQ M
GLUCOSE SERPL-MCNC: 126 MG/DL (ref 65–140)
HCT VFR BLD AUTO: 44.3 % (ref 36.5–49.3)
HGB BLD-MCNC: 14.1 G/DL (ref 12–17)
IMM GRANULOCYTES # BLD AUTO: 0.02 THOUSAND/UL (ref 0–0.2)
IMM GRANULOCYTES NFR BLD AUTO: 0 % (ref 0–2)
LYMPHOCYTES # BLD AUTO: 1.88 THOUSANDS/ΜL (ref 0.6–4.47)
LYMPHOCYTES NFR BLD AUTO: 30 % (ref 14–44)
MCH RBC QN AUTO: 28.3 PG (ref 26.8–34.3)
MCHC RBC AUTO-ENTMCNC: 31.8 G/DL (ref 31.4–37.4)
MCV RBC AUTO: 89 FL (ref 82–98)
MONOCYTES # BLD AUTO: 0.59 THOUSAND/ΜL (ref 0.17–1.22)
MONOCYTES NFR BLD AUTO: 9 % (ref 4–12)
NEUTROPHILS # BLD AUTO: 3.73 THOUSANDS/ΜL (ref 1.85–7.62)
NEUTS SEG NFR BLD AUTO: 60 % (ref 43–75)
NRBC BLD AUTO-RTO: 0 /100 WBCS
NT-PROBNP SERPL-MCNC: 78 PG/ML
PLATELET # BLD AUTO: 193 THOUSANDS/UL (ref 149–390)
PMV BLD AUTO: 10.9 FL (ref 8.9–12.7)
POTASSIUM SERPL-SCNC: 4 MMOL/L (ref 3.5–5.3)
RBC # BLD AUTO: 4.98 MILLION/UL (ref 3.88–5.62)
SARS-COV-2 RNA RESP QL NAA+PROBE: NEGATIVE
SODIUM SERPL-SCNC: 138 MMOL/L (ref 136–145)
TROPONIN I SERPL-MCNC: <0.02 NG/ML
WBC # BLD AUTO: 6.25 THOUSAND/UL (ref 4.31–10.16)

## 2021-05-11 PROCEDURE — 80048 BASIC METABOLIC PNL TOTAL CA: CPT | Performed by: EMERGENCY MEDICINE

## 2021-05-11 PROCEDURE — 99213 OFFICE O/P EST LOW 20 MIN: CPT | Performed by: PHYSICIAN ASSISTANT

## 2021-05-11 PROCEDURE — 36415 COLL VENOUS BLD VENIPUNCTURE: CPT | Performed by: EMERGENCY MEDICINE

## 2021-05-11 PROCEDURE — 71046 X-RAY EXAM CHEST 2 VIEWS: CPT

## 2021-05-11 PROCEDURE — 85379 FIBRIN DEGRADATION QUANT: CPT | Performed by: EMERGENCY MEDICINE

## 2021-05-11 PROCEDURE — U0003 INFECTIOUS AGENT DETECTION BY NUCLEIC ACID (DNA OR RNA); SEVERE ACUTE RESPIRATORY SYNDROME CORONAVIRUS 2 (SARS-COV-2) (CORONAVIRUS DISEASE [COVID-19]), AMPLIFIED PROBE TECHNIQUE, MAKING USE OF HIGH THROUGHPUT TECHNOLOGIES AS DESCRIBED BY CMS-2020-01-R: HCPCS | Performed by: EMERGENCY MEDICINE

## 2021-05-11 PROCEDURE — 99285 EMERGENCY DEPT VISIT HI MDM: CPT

## 2021-05-11 PROCEDURE — 85025 COMPLETE CBC W/AUTO DIFF WBC: CPT | Performed by: EMERGENCY MEDICINE

## 2021-05-11 PROCEDURE — 93005 ELECTROCARDIOGRAM TRACING: CPT

## 2021-05-11 PROCEDURE — 99284 EMERGENCY DEPT VISIT MOD MDM: CPT | Performed by: EMERGENCY MEDICINE

## 2021-05-11 PROCEDURE — G0463 HOSPITAL OUTPT CLINIC VISIT: HCPCS | Performed by: PHYSICIAN ASSISTANT

## 2021-05-11 PROCEDURE — U0005 INFEC AGEN DETEC AMPLI PROBE: HCPCS | Performed by: EMERGENCY MEDICINE

## 2021-05-11 PROCEDURE — 84484 ASSAY OF TROPONIN QUANT: CPT | Performed by: EMERGENCY MEDICINE

## 2021-05-11 PROCEDURE — 83880 ASSAY OF NATRIURETIC PEPTIDE: CPT | Performed by: EMERGENCY MEDICINE

## 2021-05-12 ENCOUNTER — OFFICE VISIT (OUTPATIENT)
Dept: CARDIOLOGY CLINIC | Facility: CLINIC | Age: 66
End: 2021-05-12
Payer: MEDICARE

## 2021-05-12 ENCOUNTER — APPOINTMENT (OUTPATIENT)
Dept: LAB | Facility: HOSPITAL | Age: 66
End: 2021-05-12
Payer: MEDICARE

## 2021-05-12 VITALS
DIASTOLIC BLOOD PRESSURE: 74 MMHG | WEIGHT: 315 LBS | BODY MASS INDEX: 42.66 KG/M2 | HEIGHT: 72 IN | SYSTOLIC BLOOD PRESSURE: 138 MMHG | OXYGEN SATURATION: 98 % | HEART RATE: 64 BPM

## 2021-05-12 DIAGNOSIS — E66.01 CLASS 3 SEVERE OBESITY WITH SERIOUS COMORBIDITY AND BODY MASS INDEX (BMI) OF 45.0 TO 49.9 IN ADULT, UNSPECIFIED OBESITY TYPE (HCC): ICD-10-CM

## 2021-05-12 DIAGNOSIS — E78.5 DYSLIPIDEMIA: ICD-10-CM

## 2021-05-12 DIAGNOSIS — G47.33 OSA TREATED WITH BIPAP: ICD-10-CM

## 2021-05-12 DIAGNOSIS — E11.9 TYPE 2 DIABETES MELLITUS WITHOUT COMPLICATION, WITHOUT LONG-TERM CURRENT USE OF INSULIN (HCC): ICD-10-CM

## 2021-05-12 DIAGNOSIS — I50.31 ACUTE DIASTOLIC HEART FAILURE (HCC): Primary | ICD-10-CM

## 2021-05-12 DIAGNOSIS — I10 HYPERTENSION, UNSPECIFIED TYPE: ICD-10-CM

## 2021-05-12 LAB
ANION GAP SERPL CALCULATED.3IONS-SCNC: 8 MMOL/L (ref 4–13)
ATRIAL RATE: 70 BPM
BUN SERPL-MCNC: 19 MG/DL (ref 5–25)
CALCIUM SERPL-MCNC: 9.4 MG/DL (ref 8.3–10.1)
CHLORIDE SERPL-SCNC: 103 MMOL/L (ref 100–108)
CO2 SERPL-SCNC: 30 MMOL/L (ref 21–32)
CREAT SERPL-MCNC: 1.25 MG/DL (ref 0.6–1.3)
GFR SERPL CREATININE-BSD FRML MDRD: 59 ML/MIN/1.73SQ M
GLUCOSE SERPL-MCNC: 149 MG/DL (ref 65–140)
P AXIS: 59 DEGREES
POTASSIUM SERPL-SCNC: 3.9 MMOL/L (ref 3.5–5.3)
PR INTERVAL: 190 MS
QRS AXIS: -55 DEGREES
QRSD INTERVAL: 110 MS
QT INTERVAL: 416 MS
QTC INTERVAL: 449 MS
SODIUM SERPL-SCNC: 141 MMOL/L (ref 136–145)
T WAVE AXIS: 65 DEGREES
VENTRICULAR RATE: 70 BPM

## 2021-05-12 PROCEDURE — 36415 COLL VENOUS BLD VENIPUNCTURE: CPT | Performed by: NURSE PRACTITIONER

## 2021-05-12 PROCEDURE — 99215 OFFICE O/P EST HI 40 MIN: CPT | Performed by: NURSE PRACTITIONER

## 2021-05-12 PROCEDURE — 93010 ELECTROCARDIOGRAM REPORT: CPT | Performed by: INTERNAL MEDICINE

## 2021-05-12 PROCEDURE — 80048 BASIC METABOLIC PNL TOTAL CA: CPT | Performed by: NURSE PRACTITIONER

## 2021-05-12 RX ORDER — FUROSEMIDE 40 MG/1
40 TABLET ORAL DAILY
Qty: 30 TABLET | Refills: 3 | Status: SHIPPED | OUTPATIENT
Start: 2021-05-12 | End: 2021-05-12 | Stop reason: SDUPTHER

## 2021-05-12 RX ORDER — POTASSIUM CHLORIDE 20 MEQ/1
20 TABLET, EXTENDED RELEASE ORAL DAILY
Qty: 30 TABLET | Refills: 3 | Status: SHIPPED | OUTPATIENT
Start: 2021-05-12 | End: 2021-06-18

## 2021-05-12 RX ORDER — POTASSIUM CHLORIDE 20 MEQ/1
20 TABLET, EXTENDED RELEASE ORAL DAILY
Qty: 30 TABLET | Refills: 3 | Status: SHIPPED | OUTPATIENT
Start: 2021-05-12 | End: 2021-05-12 | Stop reason: SDUPTHER

## 2021-05-12 RX ORDER — FUROSEMIDE 40 MG/1
40 TABLET ORAL DAILY
Qty: 30 TABLET | Refills: 3 | Status: SHIPPED | OUTPATIENT
Start: 2021-05-12 | End: 2021-06-18

## 2021-05-12 NOTE — PATIENT INSTRUCTIONS
Maintain a 2 gram daily sodium diet and 2 liters of fluid daily fluid restriction  Check daily weights  If you gained 3 pounds in one day, 5 pounds in one week, or experience worsening shortness of breath or increasing lower leg swelling  Please call the heart failure office at [phone]  Please bring a  list of your current medications and daily weights to the office visit    2 D echocardiogram   Rx nuclear stress test - Hold metoprolol the morning of stress test   Start lasix 40mg daily   Potassium 20meq daily  BMP in one week     Call our office in one week to evaluate symptoms
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
## 2021-05-12 NOTE — ED NOTES
Ambulatory pulse ox performed, patient did well  Oxygen saturation remained 97-98% on room air  Patient offered no complaints at time of discharge       Juan Manuel Mora  05/11/21 [id number]
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
## 2021-05-14 NOTE — PROGRESS NOTES
[facility] [facility]    NAME: Justyn London is a 77 y o  male  : 1955    MRN: [id number]  DATE: May 14, 2021  TIME: 3:53 PM    Assessment and Plan   Dyspnea on exertion [R06 00]  1  Dyspnea on exertion  Transfer to other facility       Patient Instructions   Patient Instructions     Recommend patient go to the emergency room for further evaluation  Need to rule out cardiac cause  Chief Complaint     Chief Complaint   Patient presents with    Shortness of Breath     chest congestion with walking       History of Present Illness     53-year-old male here with complaint shortness of breath with exertion  States that he feels like he has chest congestion with walking  Denies any cough and does not feel ill  Also has some symptoms when lying down at night  Review of Systems   Review of Systems   Constitutional: Negative for chills, fatigue and fever  HENT: Negative for congestion, ear pain, postnasal drip, sinus pressure and sore throat  Respiratory: Positive for cough, chest tightness and shortness of breath  Negative for wheezing  Neurological: Negative for headaches  All other systems reviewed and are negative        Current Medications     Current Outpatient Medications:     albuterol (PROVENTIL HFA,VENTOLIN HFA) 90 mcg/act inhaler, Inhale 2 puffs every 4 (four) hours as needed for wheezing or shortness of breath (Patient not taking: Reported on 2021), Disp: 1 Inhaler, Rfl: 5    amLODIPine (NORVASC) 5 mg tablet, Take 1 tablet (5 mg total) by mouth daily For blood pressure, Disp: 90 tablet, Rfl: 1    aspirin 81 mg chewable tablet, Chew 81 mg daily, Disp: , Rfl:     atorvastatin (LIPITOR) 10 mg tablet, Take 1 tablet (10 mg total) by mouth daily, Disp: 90 tablet, Rfl: 1    enalapril (VASOTEC) 10 mg tablet, TAKE 1 TABLET DAILY, Disp: 90 tablet, Rfl: 1    glucose blood (OneTouch Verio) test strip, Use 1 each daily, Disp: 100 each, Rfl: 0    Lancets (onetouch ultrasoft) lancets, Check glucose daily, Disp: 100 each, Rfl: 1    metFORMIN (GLUCOPHAGE) 500 mg tablet, One tab in am and 1/2 tab in pm (Patient taking differently: One tab in am), Disp: 180 tablet, Rfl: 1    metoclopramide (REGLAN) 10 mg tablet, Take 1 tablet (10 mg total) by mouth 2 (two) times a day before meals (Patient not taking: Reported on 5/12/2021), Disp: 10 tablet, Rfl: 0    metoprolol succinate (TOPROL-XL) 25 mg 24 hr tablet, TAKE 1 TABLET DAILY IN     ADDITION TO 50 MG DOSE, Disp: 90 tablet, Rfl: 1    metoprolol succinate (TOPROL-XL) 50 mg 24 hr tablet, TAKE 1 TABLET DAILY IN     ADDITION TO 25MG, Disp: 90 tablet, Rfl: 1    omeprazole (PriLOSEC) 40 MG capsule, Take 1 capsule (40 mg total) by mouth daily, Disp: 90 capsule, Rfl: 1    furosemide (LASIX) 40 mg tablet, Take 1 tablet (40 mg total) by mouth daily, Disp: 30 tablet, Rfl: 3    potassium chloride (K-DUR,KLOR-CON) 20 mEq tablet, Take 1 tablet (20 mEq total) by mouth daily, Disp: 30 tablet, Rfl: 3    Current Allergies     Allergies as of 05/11/2021 - Reviewed 05/11/2021   Allergen Reaction Noted    Acetaminophen  03/16/2018          The following portions of the patient's history were reviewed and updated as appropriate: allergies, current medications, past family history, past medical history, past social history, past surgical history and problem list    Past Medical History:   Diagnosis Date    Arthritis     last assessed 7/1/15    Diabetes mellitus ([facility] [address] )     type 2-last assessed 1/28/15    GERD (gastroesophageal reflux disease)     Hypertension     Irregular heart beat     last assessed 7/1/15    Palpitations     last assessed 9/7/17    Sexual dysfunction     last assessed 7/1/15    Thyroid disease     last assessed 7/1/15     Past Surgical History:   Procedure Laterality Date    COLONOSCOPY       Family History   Problem Relation Age of Onset    Hypertension Mother         essential    Stroke Mother     Hypertension Father         essential  Heart defect Father         cardiac disorder    Stroke Brother     Hypertension Brother      Social History     Socioeconomic History    Marital status: Single     Spouse name: Not on file    Number of children: Not on file    Years of education: Not on file    Highest education level: Not on file   Occupational History    Not on file   Social Needs    Financial resource strain: Not on file    Food insecurity     Worry: Not on file     Inability: Not on file    Transportation needs     Medical: Not on file     Non-medical: Not on file   Tobacco Use    Smoking status: Former Smoker    Smokeless tobacco: Never Used    Tobacco comment: former smoker-quit 17 yrs ago 1pppd x 30 yrs as per [employer]   Substance and Sexual Activity    Alcohol use: No    Drug use: No    Sexual activity: Not on file   Lifestyle    Physical activity     Days per week: Not on file     Minutes per session: Not on file    Stress: Not on file   Relationships    Social connections     Talks on phone: Not on file     Gets together: Not on file     Attends Sikh service: Not on file     Active member of club or organization: Not on file     Attends meetings of clubs or organizations: Not on file     Relationship status: Not on file    Intimate partner violence     Fear of current or ex partner: Not on file     Emotionally abused: Not on file     Physically abused: Not on file     Forced sexual activity: Not on file   Other Topics Concern    Not on file   Social History Narrative    Not on file     Medications have been verified  Objective   /64   Pulse 60   Temp 98 °F (36 7 °C) (Temporal)   Resp 18   Ht 6' (1 829 m)   Wt (!) 157 kg (347 lb)   SpO2 98%   BMI 47 06 kg/m²      Physical Exam   Physical Exam  Vitals signs reviewed  Constitutional:       General: He is not in acute distress  Appearance: He is well-developed  HENT:      Head: Normocephalic and atraumatic        Right Ear: Tympanic membrane normal       Left Ear: Tympanic membrane normal       Nose: No mucosal edema  Cardiovascular:      Rate and Rhythm: Normal rate and regular rhythm  Heart sounds: Normal heart sounds  Pulmonary:      Effort: Pulmonary effort is normal  No respiratory distress  Breath sounds: Normal breath sounds

## 2021-05-17 ENCOUNTER — TELEPHONE (OUTPATIENT)
Dept: CARDIOLOGY CLINIC | Facility: CLINIC | Age: 66
End: 2021-05-17

## 2021-05-17 ENCOUNTER — OFFICE VISIT (OUTPATIENT)
Dept: PULMONOLOGY | Facility: CLINIC | Age: 66
End: 2021-05-17
Payer: MEDICARE

## 2021-05-17 VITALS
SYSTOLIC BLOOD PRESSURE: 145 MMHG | OXYGEN SATURATION: 96 % | TEMPERATURE: 98.5 F | WEIGHT: 315 LBS | HEIGHT: 72 IN | DIASTOLIC BLOOD PRESSURE: 84 MMHG | BODY MASS INDEX: 42.66 KG/M2 | HEART RATE: 56 BPM

## 2021-05-17 DIAGNOSIS — G47.33 OSA (OBSTRUCTIVE SLEEP APNEA): ICD-10-CM

## 2021-05-17 DIAGNOSIS — K21.9 GASTROESOPHAGEAL REFLUX DISEASE WITHOUT ESOPHAGITIS: ICD-10-CM

## 2021-05-17 DIAGNOSIS — E66.01 MORBID OBESITY (HCC): ICD-10-CM

## 2021-05-17 DIAGNOSIS — R06.00 DYSPNEA: Primary | ICD-10-CM

## 2021-05-17 PROCEDURE — 99204 OFFICE O/P NEW MOD 45 MIN: CPT | Performed by: INTERNAL MEDICINE

## 2021-05-17 PROCEDURE — 94618 PULMONARY STRESS TESTING: CPT | Performed by: INTERNAL MEDICINE
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
## 2021-05-17 NOTE — TELEPHONE ENCOUNTER
Patient called insisting to speak with you  He will not say what it is in regards to   Please call [phone]

## 2021-05-24 ENCOUNTER — TELEPHONE (OUTPATIENT)
Dept: OTOLARYNGOLOGY | Facility: CLINIC | Age: 66
End: 2021-05-24

## 2021-05-24 ENCOUNTER — HOSPITAL ENCOUNTER (OUTPATIENT)
Dept: CT IMAGING | Facility: HOSPITAL | Age: 66
Discharge: HOME | End: 2021-05-24
Attending: INTERNAL MEDICINE
Payer: MEDICARE

## 2021-05-24 ENCOUNTER — HOSPITAL ENCOUNTER (OUTPATIENT)
Dept: PULMONOLOGY | Facility: HOSPITAL | Age: 66
Discharge: HOME | End: 2021-05-24
Attending: INTERNAL MEDICINE
Payer: MEDICARE

## 2021-05-24 DIAGNOSIS — R06.00 DYSPNEA: ICD-10-CM

## 2021-05-24 PROCEDURE — 94760 N-INVAS EAR/PLS OXIMETRY 1: CPT

## 2021-05-24 PROCEDURE — 94727 GAS DIL/WSHOT DETER LNG VOL: CPT

## 2021-05-24 PROCEDURE — 71250 CT THORAX DX C-: CPT

## 2021-05-24 PROCEDURE — 94727 GAS DIL/WSHOT DETER LNG VOL: CPT | Performed by: INTERNAL MEDICINE

## 2021-05-24 PROCEDURE — G1004 CDSM NDSC: HCPCS

## 2021-05-24 PROCEDURE — 94010 BREATHING CAPACITY TEST: CPT

## 2021-05-24 PROCEDURE — 94729 DIFFUSING CAPACITY: CPT

## 2021-05-24 PROCEDURE — 94010 BREATHING CAPACITY TEST: CPT | Performed by: INTERNAL MEDICINE

## 2021-05-24 PROCEDURE — 94729 DIFFUSING CAPACITY: CPT | Performed by: INTERNAL MEDICINE

## 2021-05-24 RX ORDER — ALBUTEROL SULFATE 2.5 MG/3ML
2.5 SOLUTION RESPIRATORY (INHALATION) ONCE AS NEEDED
Status: DISCONTINUED | OUTPATIENT
Start: 2021-05-24 | End: 2021-05-28 | Stop reason: HOSPADM

## 2021-05-25 ENCOUNTER — TELEPHONE (OUTPATIENT)
Dept: FAMILY MEDICINE CLINIC | Facility: CLINIC | Age: 66
End: 2021-05-25

## 2021-05-25 NOTE — TELEPHONE ENCOUNTER
Pt requesting annual lab work  He has been dizzy for the past 3 days  Isn't sure if it is his water pill causing side effects  Pt is only willing to schedule a VV due to distance he lives from office

## 2021-05-25 NOTE — TELEPHONE ENCOUNTER
Patient has upcoming appointment with [facility]  He can either call that location to request sooner appointment otherwise he would need to be seen in the office  We have not had a face to face since 8/2020

## 2021-05-25 NOTE — TELEPHONE ENCOUNTER
LMOM relaying information that he needs to be seen in our office in person or call [facility] to see if they can move his appt up sooner than the end of Emily

## 2021-05-28 ENCOUNTER — TELEPHONE (OUTPATIENT)
Dept: FAMILY MEDICINE CLINIC | Facility: CLINIC | Age: 66
End: 2021-05-28

## 2021-05-28 NOTE — TELEPHONE ENCOUNTER
Pt called in after receiving my  VM in regards to Dr Lou Wayne ordering routine annual lab work  Pt needs to be seen in person in our office for her to order anything or prescribe any meds  While she understands that he is 2 hours away from our office, w/ the meds pt takes provider needs to see him every 6 months in the office  Pt is scheduled in June to see a PCP closer to his home in June  Pt can also request orders once established w/ this physician

## 2021-06-02 ENCOUNTER — HOSPITAL ENCOUNTER (OUTPATIENT)
Dept: NUCLEAR MEDICINE | Facility: HOSPITAL | Age: 66
Discharge: HOME | End: 2021-06-02
Payer: MEDICARE

## 2021-06-02 ENCOUNTER — TELEPHONE (OUTPATIENT)
Dept: CARDIOLOGY CLINIC | Facility: CLINIC | Age: 66
End: 2021-06-02

## 2021-06-02 ENCOUNTER — HOSPITAL ENCOUNTER (OUTPATIENT)
Dept: NON INVASIVE DIAGNOSTICS | Facility: HOSPITAL | Age: 66
Discharge: HOME | End: 2021-06-02
Payer: MEDICARE

## 2021-06-02 DIAGNOSIS — G47.33 OSA TREATED WITH BIPAP: ICD-10-CM

## 2021-06-02 DIAGNOSIS — I50.31 ACUTE DIASTOLIC HEART FAILURE (HCC): ICD-10-CM

## 2021-06-02 DIAGNOSIS — E11.9 TYPE 2 DIABETES MELLITUS WITHOUT COMPLICATION, WITHOUT LONG-TERM CURRENT USE OF INSULIN (HCC): ICD-10-CM

## 2021-06-02 PROCEDURE — 93306 TTE W/DOPPLER COMPLETE: CPT | Performed by: INTERNAL MEDICINE

## 2021-06-02 PROCEDURE — 93017 CV STRESS TEST TRACING ONLY: CPT

## 2021-06-02 PROCEDURE — 93016 CV STRESS TEST SUPVJ ONLY: CPT | Performed by: INTERNAL MEDICINE

## 2021-06-02 PROCEDURE — 93018 CV STRESS TEST I&R ONLY: CPT | Performed by: INTERNAL MEDICINE

## 2021-06-02 PROCEDURE — A9502 TC99M TETROFOSMIN: HCPCS

## 2021-06-02 PROCEDURE — C8929 TTE W OR WO FOL WCON,DOPPLER: HCPCS

## 2021-06-02 PROCEDURE — G1004 CDSM NDSC: HCPCS

## 2021-06-02 PROCEDURE — 78452 HT MUSCLE IMAGE SPECT MULT: CPT | Performed by: INTERNAL MEDICINE

## 2021-06-02 PROCEDURE — 78452 HT MUSCLE IMAGE SPECT MULT: CPT

## 2021-06-02 RX ADMIN — REGADENOSON 0.4 MG: 0.08 INJECTION, SOLUTION INTRAVENOUS at 09:00

## 2021-06-02 RX ADMIN — PERFLUTREN 2 ML/MIN: 6.52 INJECTION, SUSPENSION INTRAVENOUS at 10:16

## 2021-06-03 ENCOUNTER — TELEPHONE (OUTPATIENT)
Dept: CARDIOLOGY CLINIC | Facility: CLINIC | Age: 66
End: 2021-06-03

## 2021-06-03 NOTE — TELEPHONE ENCOUNTER
----- Message from Tammy Ritchie, [address] sent at 6/2/2021  6:10 PM EDT -----  Please call Mr Rafa Failing and inform him echocardiogram is normal   Thank you [place] [facility]

## 2021-06-04 DIAGNOSIS — E04.1 THYROID NODULE: Primary | ICD-10-CM

## 2021-06-07 ENCOUNTER — OFFICE VISIT (OUTPATIENT)
Dept: CARDIOLOGY CLINIC | Facility: CLINIC | Age: 66
End: 2021-06-07
Payer: MEDICARE

## 2021-06-07 VITALS
SYSTOLIC BLOOD PRESSURE: 130 MMHG | WEIGHT: 315 LBS | HEART RATE: 66 BPM | DIASTOLIC BLOOD PRESSURE: 82 MMHG | HEIGHT: 72 IN | OXYGEN SATURATION: 98 % | BODY MASS INDEX: 42.66 KG/M2

## 2021-06-07 DIAGNOSIS — I10 BENIGN ESSENTIAL HTN: Primary | ICD-10-CM

## 2021-06-07 DIAGNOSIS — I51.89 DIASTOLIC DYSFUNCTION: ICD-10-CM

## 2021-06-07 DIAGNOSIS — E78.5 DYSLIPIDEMIA: ICD-10-CM

## 2021-06-07 PROCEDURE — 1123F ACP DISCUSS/DSCN MKR DOCD: CPT | Performed by: INTERNAL MEDICINE

## 2021-06-07 PROCEDURE — 99214 OFFICE O/P EST MOD 30 MIN: CPT | Performed by: INTERNAL MEDICINE

## 2021-06-07 RX ORDER — KETOCONAZOLE 20 MG/ML
SHAMPOO TOPICAL
COMMUNITY
Start: 2021-05-17 | End: 2021-09-01

## 2021-06-07 NOTE — PROGRESS NOTES
Cardiology Follow Up    Ivett De Oliveira  1955  [id number]  [address] [facility] [place]  [address] [place] [address]    1  Benign essential HTN     2  Dyslipidemia     3  Diastolic dysfunction       Chief Complaint   Patient presents with    Follow-up    Rapid Heart Rate     sometimes     Interval History: Patient is here for continued evaluation of hypertension and hyperlipidemia with increased shortness of breath  No reported chest pain, palpitations, lightheadedness, syncope, LE edema, orthopnea, PND  Patient remains active without any increased fatigue out of the ordinary          Patient Active Problem List   Diagnosis    Chest discomfort    Benign essential HTN    Diabetes mellitus ([facility] )    Dyslipidemia    GERD (gastroesophageal reflux disease)    AYLEEN (obstructive sleep apnea)    Bilateral lower extremity edema    Morbid obesity with body mass index (BMI) of 45 0 to 49 9 in adult ([facility] )    Dyspnea    Morbid obesity ([facility] )    Diastolic dysfunction     Past Medical History:   Diagnosis Date    Arthritis     last assessed 7/1/15    Diabetes mellitus ([facility] )     type 2-last assessed 1/28/15    GERD (gastroesophageal reflux disease)     Hypertension     Irregular heart beat     last assessed 7/1/15    Palpitations     last assessed 9/7/17    Sexual dysfunction     last assessed 7/1/15    Thyroid disease     last assessed 7/1/15     Social History     Socioeconomic History    Marital status: Single     Spouse name: Not on file    Number of children: Not on file    Years of education: Not on file    Highest education level: Not on file   Occupational History    Not on file   Social Needs    Financial resource strain: Not on file    Food insecurity     Worry: Not on file     Inability: Not on file    Transportation needs     Medical: Not on file     Non-medical: Not on file   Tobacco Use  Smoking status: Former Smoker    Smokeless tobacco: Never Used    Tobacco comment: former smoker-quit 17 yrs ago 1pppd x 30 yrs as per [employer]   Substance and Sexual Activity    Alcohol use: No    Drug use: No    Sexual activity: Not on file   Lifestyle    Physical activity     Days per week: Not on file     Minutes per session: Not on file    Stress: Not on file   Relationships    Social connections     Talks on phone: Not on file     Gets together: Not on file     Attends Confucianism service: Not on file     Active member of club or organization: Not on file     Attends meetings of clubs or organizations: Not on file     Relationship status: Not on file    Intimate partner violence     Fear of current or ex partner: Not on file     Emotionally abused: Not on file     Physically abused: Not on file     Forced sexual activity: Not on file   Other Topics Concern    Not on file   Social History Narrative    Not on file      Family History   Problem Relation Age of Onset    Hypertension Mother         essential    Stroke Mother     Hypertension Father         essential    Heart defect Father         cardiac disorder    Stroke Brother     Hypertension Brother      Past Surgical History:   Procedure Laterality Date    COLONOSCOPY         Current Outpatient Medications:     amLODIPine (NORVASC) 5 mg tablet, Take 1 tablet (5 mg total) by mouth daily For blood pressure, Disp: 90 tablet, Rfl: 1    aspirin 81 mg chewable tablet, Chew 81 mg daily, Disp: , Rfl:     atorvastatin (LIPITOR) 10 mg tablet, Take 1 tablet (10 mg total) by mouth daily, Disp: 90 tablet, Rfl: 1    enalapril (VASOTEC) 10 mg tablet, TAKE 1 TABLET DAILY, Disp: 90 tablet, Rfl: 1    furosemide (LASIX) 40 mg tablet, Take 1 tablet (40 mg total) by mouth daily, Disp: 30 tablet, Rfl: 3    glucose blood (OneTouch Verio) test strip, Use 1 each daily, Disp: 100 each, Rfl: 0    ketoconazole (NIZORAL) 2 % shampoo, , Disp: , Rfl:    Lancets (onetouch ultrasoft) lancets, Check glucose daily, Disp: 100 each, Rfl: 1    metFORMIN (GLUCOPHAGE) 500 mg tablet, One tab in am and 1/2 tab in pm (Patient taking differently: One tab in am), Disp: 180 tablet, Rfl: 1    metoprolol succinate (TOPROL-XL) 25 mg 24 hr tablet, TAKE 1 TABLET DAILY IN     ADDITION TO 50 MG DOSE, Disp: 90 tablet, Rfl: 1    metoprolol succinate (TOPROL-XL) 50 mg 24 hr tablet, TAKE 1 TABLET DAILY IN     ADDITION TO 25MG, Disp: 90 tablet, Rfl: 1    omeprazole (PriLOSEC) 40 MG capsule, Take 1 capsule (40 mg total) by mouth daily, Disp: 90 capsule, Rfl: 1    potassium chloride (K-DUR,KLOR-CON) 20 mEq tablet, Take 1 tablet (20 mEq total) by mouth daily, Disp: 30 tablet, Rfl: 3    metoclopramide (REGLAN) 10 mg tablet, Take 1 tablet (10 mg total) by mouth 2 (two) times a day before meals (Patient not taking: Reported on 5/12/2021), Disp: 10 tablet, Rfl: 0  Allergies   Allergen Reactions    Acetaminophen Other (See Comments)     Other reaction(s): Unknown Reaction  "I had to go to the hospital and get shots after taking it 20yrs ago"       Labs:  Office Visit on 05/12/2021   Component Date Value    Sodium 05/12/2021 141     Potassium 05/12/2021 3 9     Chloride 05/12/2021 103     CO2 05/12/2021 30     ANION GAP 05/12/2021 8     BUN 05/12/2021 19     Creatinine 05/12/2021 1 25     Glucose 05/12/2021 149*    Calcium 05/12/2021 9 4     eGFR 05/12/2021 59    Admission on 05/11/2021, Discharged on 05/11/2021   Component Date Value    Sodium 05/11/2021 138     Potassium 05/11/2021 4 0     Chloride 05/11/2021 102     CO2 05/11/2021 28     ANION GAP 05/11/2021 8     BUN 05/11/2021 17     Creatinine 05/11/2021 1 13     Glucose 05/11/2021 126     Calcium 05/11/2021 9 5     eGFR 05/11/2021 67     Troponin I 05/11/2021 <0 02     NT-proBNP 05/11/2021 78     WBC 05/11/2021 6 25     RBC 05/11/2021 4 98     Hemoglobin 05/11/2021 14 1     Hematocrit 05/11/2021 44 3     MCV 05/11/2021 89     MCH 05/11/2021 28 3     MCHC 05/11/2021 31 8     RDW 05/11/2021 13 5     MPV 05/11/2021 10 9     Platelets 97/90/2572 193     nRBC 05/11/2021 0     Neutrophils Relative 05/11/2021 60     Immat GRANS % 05/11/2021 0     Lymphocytes Relative 05/11/2021 30     Monocytes Relative 05/11/2021 9     Eosinophils Relative 05/11/2021 0     Basophils Relative 05/11/2021 1     Neutrophils Absolute 05/11/2021 3 73     Immature Grans Absolute 05/11/2021 0 02     Lymphocytes Absolute 05/11/2021 1 88     Monocytes Absolute 05/11/2021 0 59     Eosinophils Absolute 05/11/2021 0 00     Basophils Absolute 05/11/2021 0 03     SARS-CoV-2 05/11/2021 Negative     D-Dimer, Quant 05/11/2021 0 46      Lab Results   Component Value Date    CHOL 145 12/16/2015    TRIG 106 07/27/2020    HDL 35 (L) 07/27/2020    LDLDIRECT 119 11/12/2013     Imaging: No results found  Review of Systems:  Review of Systems   Constitutional: Negative for activity change, appetite change, fatigue and fever  HENT: Negative for nosebleeds and sore throat  Eyes: Negative for photophobia and visual disturbance  Respiratory: Negative for cough, chest tightness, shortness of breath and wheezing  Cardiovascular: Negative for chest pain, palpitations and leg swelling  Gastrointestinal: Negative for abdominal pain, diarrhea, nausea and vomiting  Endocrine: Negative for polyuria  Genitourinary: Negative for dysuria, frequency and hematuria  Musculoskeletal: Negative for arthralgias, back pain and gait problem  Skin: Negative for pallor and rash  Neurological: Negative for dizziness, syncope, speech difficulty and light-headedness  Hematological: Does not bruise/bleed easily  Psychiatric/Behavioral: Negative for agitation, behavioral problems and confusion  Physical Exam:  Physical Exam   Constitutional: He is oriented to person, place, and time  He appears well-developed and well-nourished  No distress  HENT:   Head: Normocephalic and atraumatic  Nose: Nose normal    Eyes: Pupils are equal, round, and reactive to light  EOM are normal  No scleral icterus  Neck: Normal range of motion  Neck supple  No JVD present  Cardiovascular: Normal rate, regular rhythm, S1 normal and S2 normal  Exam reveals no gallop, no S3, no distant heart sounds and no friction rub  No murmur heard  No systolic murmur is present  Pulmonary/Chest: Effort normal and breath sounds normal  No respiratory distress  He has no wheezes  He has no rales  Abdominal: Soft  Bowel sounds are normal  He exhibits no distension  Musculoskeletal:         General: No deformity or edema  Neurological: He is alert and oriented to person, place, and time  No cranial nerve deficit  Skin: Skin is warm and dry  He is not diaphoretic  No erythema  Psychiatric: He has a normal mood and affect  His behavior is normal    Vitals reviewed  Blood pressure 130/82, pulse 66, height 6' (1 829 m), weight (!) 146 kg (321 lb 6 4 oz), SpO2 98 %  Discussion/Summary:  Shortness of breath: unclear etiology, most likely related to weight gain and reduced activity as a result  Has essentially normal stress test and echocardiogram in 2011  He also had a stress test in Aug 2016 and Sept 2017  revealing no ischemia or infarct, normal LV function, and no TID  Repeat stress test in June 2021 revealed no ischemic changes and normal LV function  Echo in June 2021 revealed normal LVEF, mild LVH, and G1DD along with mild AR and TR  Has a normal baseline ECG to suggest any conduction abnormalities  24 hour Holter monitor to look for occult arrhythmias also revealed no significant correlation of symptoms with arrhythmias or ectopy  He seems to have increased symptoms when his metoprolol is reduced  He doesn't seem to have any lightheadedness or syncope with low HR, so I'm not concerned with his HR being lower  Continue metoprolol, but at higher dose of 75mg  There was a diagnosis of acute diastolic CHF noted to him, and he is concerned regarding this  I talked to him about HTN and how if there are elevated values in the setting of diastolic dysfunction, there is a predisposition to developing fluid retention, which could progress to become overt CHF, but that clinically he is not in heart failure right now  HTN: relatively well controlled on current doses of meds  Recommended weight loss  HLD: low HDL of 34 and LDL is at goal at 122 in Oct 2019  Would increase exercise to help with this

## 2021-06-07 NOTE — PATIENT INSTRUCTIONS

## 2021-06-16 DIAGNOSIS — E78.5 DYSLIPIDEMIA: ICD-10-CM

## 2021-06-16 LAB
CHEST PAIN STATEMENT: NORMAL
MAX DIASTOLIC BP: 74 MMHG
MAX HEART RATE: 92 BPM
MAX PREDICTED HEART RATE: 153 BPM
MAX. SYSTOLIC BP: 128 MMHG
PROTOCOL NAME: NORMAL
REASON FOR TERMINATION: NORMAL
TARGET HR FORMULA: NORMAL
TEST INDICATION: NORMAL
TIME IN EXERCISE PHASE: NORMAL

## 2021-06-16 RX ORDER — ATORVASTATIN CALCIUM 10 MG/1
10 TABLET, FILM COATED ORAL DAILY
Qty: 90 TABLET | Refills: 1 | Status: SHIPPED | OUTPATIENT
Start: 2021-06-16 | End: 2021-06-18

## 2021-06-18 ENCOUNTER — OFFICE VISIT (OUTPATIENT)
Dept: FAMILY MEDICINE CLINIC | Facility: CLINIC | Age: 66
End: 2021-06-18
Payer: MEDICARE

## 2021-06-18 VITALS
HEIGHT: 72 IN | DIASTOLIC BLOOD PRESSURE: 72 MMHG | WEIGHT: 315 LBS | BODY MASS INDEX: 42.66 KG/M2 | SYSTOLIC BLOOD PRESSURE: 134 MMHG | TEMPERATURE: 97.6 F

## 2021-06-18 DIAGNOSIS — Z12.5 PROSTATE CANCER SCREENING: ICD-10-CM

## 2021-06-18 DIAGNOSIS — E11.9 TYPE 2 DIABETES MELLITUS WITHOUT COMPLICATION, WITHOUT LONG-TERM CURRENT USE OF INSULIN (HCC): Primary | ICD-10-CM

## 2021-06-18 DIAGNOSIS — Z12.11 COLON CANCER SCREENING: ICD-10-CM

## 2021-06-18 DIAGNOSIS — I50.31 ACUTE DIASTOLIC HEART FAILURE (HCC): ICD-10-CM

## 2021-06-18 DIAGNOSIS — K21.9 GASTROESOPHAGEAL REFLUX DISEASE WITHOUT ESOPHAGITIS: ICD-10-CM

## 2021-06-18 DIAGNOSIS — E78.5 DYSLIPIDEMIA: ICD-10-CM

## 2021-06-18 DIAGNOSIS — I10 HYPERTENSION, UNSPECIFIED TYPE: ICD-10-CM

## 2021-06-18 LAB — SL AMB POCT HEMOGLOBIN AIC: 6.1 (ref ?–6.5)

## 2021-06-18 PROCEDURE — 83036 HEMOGLOBIN GLYCOSYLATED A1C: CPT | Performed by: FAMILY MEDICINE

## 2021-06-18 PROCEDURE — 99204 OFFICE O/P NEW MOD 45 MIN: CPT | Performed by: FAMILY MEDICINE

## 2021-06-18 RX ORDER — AMLODIPINE BESYLATE 5 MG/1
5 TABLET ORAL DAILY
Qty: 90 TABLET | Refills: 3 | Status: SHIPPED | OUTPATIENT
Start: 2021-06-18 | End: 2022-07-28

## 2021-06-18 RX ORDER — METOPROLOL SUCCINATE 50 MG/1
50 TABLET, EXTENDED RELEASE ORAL DAILY
Qty: 90 TABLET | Refills: 3 | Status: SHIPPED | OUTPATIENT
Start: 2021-06-18 | End: 2021-08-16 | Stop reason: SDUPTHER

## 2021-06-18 RX ORDER — POTASSIUM CHLORIDE 20 MEQ/1
20 TABLET, EXTENDED RELEASE ORAL DAILY
Qty: 90 TABLET | Refills: 3 | Status: SHIPPED | OUTPATIENT
Start: 2021-06-18 | End: 2022-07-05 | Stop reason: SDUPTHER

## 2021-06-18 RX ORDER — METOPROLOL SUCCINATE 25 MG/1
25 TABLET, EXTENDED RELEASE ORAL DAILY
Qty: 90 TABLET | Refills: 3 | Status: SHIPPED | OUTPATIENT
Start: 2021-06-18 | End: 2021-08-16 | Stop reason: SDUPTHER

## 2021-06-18 RX ORDER — FUROSEMIDE 40 MG/1
40 TABLET ORAL DAILY
Qty: 90 TABLET | Refills: 3 | Status: SHIPPED | OUTPATIENT
Start: 2021-06-18 | End: 2022-07-05 | Stop reason: SDUPTHER

## 2021-06-18 RX ORDER — ATORVASTATIN CALCIUM 10 MG/1
10 TABLET, FILM COATED ORAL DAILY
Qty: 90 TABLET | Refills: 3 | Status: SHIPPED | OUTPATIENT
Start: 2021-06-18 | End: 2022-08-09 | Stop reason: SDUPTHER

## 2021-06-18 RX ORDER — OMEPRAZOLE 40 MG/1
40 CAPSULE, DELAYED RELEASE ORAL DAILY
Qty: 90 CAPSULE | Refills: 3 | Status: SHIPPED | OUTPATIENT
Start: 2021-06-18 | End: 2022-08-09 | Stop reason: SDUPTHER

## 2021-06-18 RX ORDER — ENALAPRIL MALEATE 10 MG/1
10 TABLET ORAL DAILY
Qty: 90 TABLET | Refills: 3 | Status: SHIPPED | OUTPATIENT
Start: 2021-06-18 | End: 2022-07-05 | Stop reason: SDUPTHER

## 2021-06-18 NOTE — PROGRESS NOTES
Assessment/Plan:   hemoglobin A1c very good at 6 1  Refilled all his medications  Referred him to GI for colonoscopy  Blood work ordered  Continue same medications and follow-up here in 4 months or p r n     Problem List Items Addressed This Visit        Digestive    GERD (gastroesophageal reflux disease)    Relevant Medications    omeprazole (PriLOSEC) 40 MG capsule       Endocrine    Diabetes mellitus ([address] ) - Primary    Relevant Medications    metFORMIN (GLUCOPHAGE) 500 mg tablet    Other Relevant Orders    POCT hemoglobin A1c (Completed)    Microalbumin / creatinine urine ratio    TSH, 3rd generation with Free T4 reflex       Other    Dyslipidemia    Relevant Medications    atorvastatin (LIPITOR) 10 mg tablet    Other Relevant Orders    Comprehensive metabolic panel    Lipid Panel with Direct LDL reflex      Other Visit Diagnoses     Colon cancer screening        Relevant Orders    Ambulatory referral to Gastroenterology    Acute diastolic heart failure (HCC)        Relevant Medications    furosemide (LASIX) 40 mg tablet    potassium chloride (K-DUR,KLOR-CON) 20 mEq tablet    amLODIPine (NORVASC) 5 mg tablet    metoprolol succinate (TOPROL-XL) 25 mg 24 hr tablet    metoprolol succinate (TOPROL-XL) 50 mg 24 hr tablet    Hypertension, unspecified type        at goal    Relevant Medications    furosemide (LASIX) 40 mg tablet    amLODIPine (NORVASC) 5 mg tablet    enalapril (VASOTEC) 10 mg tablet    metoprolol succinate (TOPROL-XL) 25 mg 24 hr tablet    metoprolol succinate (TOPROL-XL) 50 mg 24 hr tablet    Other Relevant Orders    CBC and differential    TSH, 3rd generation with Free T4 reflex    Prostate cancer screening        Relevant Orders    PSA, Total Screen           Diagnoses and all orders for this visit:    Type 2 diabetes mellitus without complication, without long-term current use of insulin (HCC)  -     POCT hemoglobin A1c  -     metFORMIN (GLUCOPHAGE) 500 mg tablet;  Take 1 tablet (500 mg total) by mouth daily with breakfast One tab in am  -     Microalbumin / creatinine urine ratio  -     TSH, 3rd generation with Free T4 reflex    Colon cancer screening  -     Ambulatory referral to Gastroenterology; Future    Acute diastolic heart failure (HCC)  -     furosemide (LASIX) 40 mg tablet; Take 1 tablet (40 mg total) by mouth daily  -     potassium chloride (K-DUR,KLOR-CON) 20 mEq tablet; Take 1 tablet (20 mEq total) by mouth daily    Dyslipidemia  Comments:  renew statin  Orders:  -     atorvastatin (LIPITOR) 10 mg tablet; Take 1 tablet (10 mg total) by mouth daily  -     Comprehensive metabolic panel  -     Lipid Panel with Direct LDL reflex    Gastroesophageal reflux disease without esophagitis  Comments:  need ppi reordered  Orders:  -     omeprazole (PriLOSEC) 40 MG capsule; Take 1 capsule (40 mg total) by mouth daily    Hypertension, unspecified type  Comments:  at goal  Orders:  -     amLODIPine (NORVASC) 5 mg tablet; Take 1 tablet (5 mg total) by mouth daily For blood pressure  -     enalapril (VASOTEC) 10 mg tablet; Take 1 tablet (10 mg total) by mouth daily  -     metoprolol succinate (TOPROL-XL) 25 mg 24 hr tablet; Take 1 tablet (25 mg total) by mouth daily  -     metoprolol succinate (TOPROL-XL) 50 mg 24 hr tablet; Take 1 tablet (50 mg total) by mouth daily  -     CBC and differential  -     TSH, 3rd generation with Free T4 reflex    Prostate cancer screening  -     PSA, Total Screen        No problem-specific Assessment & Plan notes found for this encounter  Subjective:      Patient ID: Saturnino Stockton is a 77 y o  male  Patient here to establish care  Patient sees Cardiology, pulmonology  Has a history of diabetes  He is only taking 1 metformin the day  It is doing very well for him  He has been trying to watch his diet  He denies any chest pain or shortness of breath  Diabetes  He presents for his follow-up diabetic visit  He has type 2 diabetes mellitus   His disease course has been improving  There are no hypoglycemic associated symptoms  There are no diabetic associated symptoms  Pertinent negatives for diabetes include no chest pain  There are no hypoglycemic complications  Diabetic complications include heart disease  Risk factors for coronary artery disease include diabetes mellitus, dyslipidemia, hypertension and male sex  Current diabetic treatment includes oral agent (monotherapy)  He is compliant with treatment all of the time  His weight is stable  He is following a generally healthy diet  When asked about meal planning, he reported none  He has not had a previous visit with a dietitian  He participates in exercise intermittently  His home blood glucose trend is decreasing steadily  An ACE inhibitor/angiotensin II receptor blocker is being taken  Hypertension  This is a chronic problem  The problem is controlled  Pertinent negatives include no chest pain or palpitations  There are no associated agents to hypertension  Risk factors for coronary artery disease include diabetes mellitus, dyslipidemia and male gender  Past treatments include ACE inhibitors, beta blockers and diuretics  The current treatment provides significant improvement  There are no compliance problems  The following portions of the patient's history were reviewed and updated as appropriate:   He has a past medical history of Arthritis, Diabetes mellitus ([address] ), GERD (gastroesophageal reflux disease), Hypertension, Irregular heart beat, Palpitations, Sexual dysfunction, and Thyroid disease  ,  does not have any pertinent problems on file  ,   has a past surgical history that includes Colonoscopy and TONSILLECTOMY  ,  family history includes Heart defect in his father; Hypertension in his brother, father, and mother; Stroke in his brother and mother  ,   reports that he has quit smoking  He has never used smokeless tobacco  He reports that he does not drink alcohol and does not use drugs  ,  is allergic to acetaminophen     Current Outpatient Medications   Medication Sig Dispense Refill    amLODIPine (NORVASC) 5 mg tablet Take 1 tablet (5 mg total) by mouth daily For blood pressure 90 tablet 3    aspirin 81 mg chewable tablet Chew 81 mg daily      atorvastatin (LIPITOR) 10 mg tablet Take 1 tablet (10 mg total) by mouth daily 90 tablet 3    enalapril (VASOTEC) 10 mg tablet Take 1 tablet (10 mg total) by mouth daily 90 tablet 3    furosemide (LASIX) 40 mg tablet Take 1 tablet (40 mg total) by mouth daily 90 tablet 3    glucose blood (OneTouch Verio) test strip Use 1 each daily 100 each 0    ketoconazole (NIZORAL) 2 % shampoo       Lancets (onetouch ultrasoft) lancets Check glucose daily 100 each 1    metFORMIN (GLUCOPHAGE) 500 mg tablet Take 1 tablet (500 mg total) by mouth daily with breakfast One tab in am 90 tablet 3    metoprolol succinate (TOPROL-XL) 25 mg 24 hr tablet Take 1 tablet (25 mg total) by mouth daily 90 tablet 3    metoprolol succinate (TOPROL-XL) 50 mg 24 hr tablet Take 1 tablet (50 mg total) by mouth daily 90 tablet 3    omeprazole (PriLOSEC) 40 MG capsule Take 1 capsule (40 mg total) by mouth daily 90 capsule 3    potassium chloride (K-DUR,KLOR-CON) 20 mEq tablet Take 1 tablet (20 mEq total) by mouth daily 90 tablet 3     No current facility-administered medications for this visit  Review of Systems   Constitutional: Negative  Respiratory: Negative  Cardiovascular: Positive for leg swelling ( chronic)  Negative for chest pain and palpitations  Gastrointestinal: Negative  Genitourinary: Negative  Objective:  Vitals:    06/18/21 1216   BP: 134/72   Temp: 97 6 °F (36 4 °C)   Weight: (!) 147 kg (324 lb 9 6 oz)   Height: 6' (1 829 m)     Body mass index is 44 02 kg/m²  Physical Exam  Vitals reviewed  Constitutional:       General: He is not in acute distress  Appearance: Normal appearance  He is well-developed  He is not diaphoretic     HENT:      Head: Normocephalic and atraumatic  Eyes:      Conjunctiva/sclera: Conjunctivae normal    Cardiovascular:      Rate and Rhythm: Normal rate and regular rhythm  Heart sounds: Normal heart sounds  No murmur heard  No friction rub  No gallop  Pulmonary:      Effort: Pulmonary effort is normal  No respiratory distress  Breath sounds: Normal breath sounds  No wheezing or rales  Musculoskeletal:      Right lower leg: Edema ( mild) present  Left lower leg: Edema ( mild) present  Neurological:      Mental Status: He is alert and oriented to person, place, and time  Psychiatric:         Mood and Affect: Mood normal          Behavior: Behavior normal          Thought Content:  Thought content normal          Judgment: Judgment normal

## 2021-06-21 ENCOUNTER — APPOINTMENT (OUTPATIENT)
Dept: LAB | Facility: MEDICAL CENTER | Age: 66
End: 2021-06-21
Payer: MEDICARE

## 2021-06-21 DIAGNOSIS — Z12.5 SCREENING FOR PROSTATE CANCER: ICD-10-CM

## 2021-06-21 DIAGNOSIS — E11.9 TYPE 2 DIABETES MELLITUS WITHOUT COMPLICATION, WITHOUT LONG-TERM CURRENT USE OF INSULIN (HCC): ICD-10-CM

## 2021-06-21 LAB
ALBUMIN SERPL BCP-MCNC: 3.8 G/DL (ref 3.5–5)
ALP SERPL-CCNC: 36 U/L (ref 46–116)
ALT SERPL W P-5'-P-CCNC: 33 U/L (ref 12–78)
ANION GAP SERPL CALCULATED.3IONS-SCNC: 8 MMOL/L (ref 4–13)
AST SERPL W P-5'-P-CCNC: 23 U/L (ref 5–45)
BASOPHILS # BLD AUTO: 0.04 THOUSANDS/ΜL (ref 0–0.1)
BASOPHILS NFR BLD AUTO: 1 % (ref 0–1)
BILIRUB SERPL-MCNC: 1.02 MG/DL (ref 0.2–1)
BUN SERPL-MCNC: 20 MG/DL (ref 5–25)
CALCIUM SERPL-MCNC: 9.2 MG/DL (ref 8.3–10.1)
CHLORIDE SERPL-SCNC: 109 MMOL/L (ref 100–108)
CHOLEST SERPL-MCNC: 119 MG/DL (ref 50–200)
CO2 SERPL-SCNC: 24 MMOL/L (ref 21–32)
CREAT SERPL-MCNC: 1.11 MG/DL (ref 0.6–1.3)
CREAT UR-MCNC: 161 MG/DL
EOSINOPHIL # BLD AUTO: 0 THOUSAND/ΜL (ref 0–0.61)
EOSINOPHIL NFR BLD AUTO: 0 % (ref 0–6)
ERYTHROCYTE [DISTWIDTH] IN BLOOD BY AUTOMATED COUNT: 13.4 % (ref 11.6–15.1)
GFR SERPL CREATININE-BSD FRML MDRD: 68 ML/MIN/1.73SQ M
GLUCOSE P FAST SERPL-MCNC: 116 MG/DL (ref 65–99)
HCT VFR BLD AUTO: 43 % (ref 36.5–49.3)
HDLC SERPL-MCNC: 31 MG/DL
HGB BLD-MCNC: 13.9 G/DL (ref 12–17)
IMM GRANULOCYTES # BLD AUTO: 0.02 THOUSAND/UL (ref 0–0.2)
IMM GRANULOCYTES NFR BLD AUTO: 0 % (ref 0–2)
LDLC SERPL CALC-MCNC: 74 MG/DL (ref 0–100)
LYMPHOCYTES # BLD AUTO: 1.36 THOUSANDS/ΜL (ref 0.6–4.47)
LYMPHOCYTES NFR BLD AUTO: 29 % (ref 14–44)
MCH RBC QN AUTO: 29.1 PG (ref 26.8–34.3)
MCHC RBC AUTO-ENTMCNC: 32.3 G/DL (ref 31.4–37.4)
MCV RBC AUTO: 90 FL (ref 82–98)
MICROALBUMIN UR-MCNC: 28.5 MG/L (ref 0–20)
MICROALBUMIN/CREAT 24H UR: 18 MG/G CREATININE (ref 0–30)
MONOCYTES # BLD AUTO: 0.57 THOUSAND/ΜL (ref 0.17–1.22)
MONOCYTES NFR BLD AUTO: 12 % (ref 4–12)
NEUTROPHILS # BLD AUTO: 2.78 THOUSANDS/ΜL (ref 1.85–7.62)
NEUTS SEG NFR BLD AUTO: 58 % (ref 43–75)
NRBC BLD AUTO-RTO: 0 /100 WBCS
PLATELET # BLD AUTO: 193 THOUSANDS/UL (ref 149–390)
PMV BLD AUTO: 11.9 FL (ref 8.9–12.7)
POTASSIUM SERPL-SCNC: 4.3 MMOL/L (ref 3.5–5.3)
PROT SERPL-MCNC: 7.7 G/DL (ref 6.4–8.2)
RBC # BLD AUTO: 4.77 MILLION/UL (ref 3.88–5.62)
SODIUM SERPL-SCNC: 141 MMOL/L (ref 136–145)
TRIGL SERPL-MCNC: 70 MG/DL
TSH SERPL DL<=0.05 MIU/L-ACNC: 0.62 UIU/ML (ref 0.36–3.74)
WBC # BLD AUTO: 4.77 THOUSAND/UL (ref 4.31–10.16)

## 2021-06-21 PROCEDURE — 82570 ASSAY OF URINE CREATININE: CPT | Performed by: INTERNAL MEDICINE

## 2021-06-21 PROCEDURE — 82043 UR ALBUMIN QUANTITATIVE: CPT | Performed by: INTERNAL MEDICINE

## 2021-06-21 PROCEDURE — 83036 HEMOGLOBIN GLYCOSYLATED A1C: CPT

## 2021-06-21 PROCEDURE — 84443 ASSAY THYROID STIM HORMONE: CPT | Performed by: FAMILY MEDICINE

## 2021-06-21 PROCEDURE — 80061 LIPID PANEL: CPT | Performed by: FAMILY MEDICINE

## 2021-06-21 PROCEDURE — 80053 COMPREHEN METABOLIC PANEL: CPT

## 2021-06-21 PROCEDURE — 85025 COMPLETE CBC W/AUTO DIFF WBC: CPT | Performed by: FAMILY MEDICINE

## 2021-06-21 PROCEDURE — G0103 PSA SCREENING: HCPCS

## 2021-06-21 PROCEDURE — 36415 COLL VENOUS BLD VENIPUNCTURE: CPT | Performed by: FAMILY MEDICINE

## 2021-06-22 LAB
EST. AVERAGE GLUCOSE BLD GHB EST-MCNC: 134 MG/DL
HBA1C MFR BLD: 6.3 %

## 2021-06-23 LAB
PSA FREE MFR SERPL: 27.5 %
PSA FREE SERPL-MCNC: 0.22 NG/ML
PSA SERPL-MCNC: 0.8 NG/ML (ref 0–4)

## 2021-07-29 ENCOUNTER — TELEPHONE (OUTPATIENT)
Dept: OTOLARYNGOLOGY | Facility: CLINIC | Age: 66
End: 2021-07-29

## 2021-07-29 NOTE — TELEPHONE ENCOUNTER
Rashid called and does not know why he needs to have a consult with the sleep dr      He states he has sleep apnea and has a machine, so there is not reason to have this consult today  Bola Booker He wants to cancel the consult

## 2021-08-16 DIAGNOSIS — I10 HYPERTENSION, UNSPECIFIED TYPE: ICD-10-CM

## 2021-08-16 DIAGNOSIS — E11.9 TYPE 2 DIABETES MELLITUS WITHOUT COMPLICATION, WITHOUT LONG-TERM CURRENT USE OF INSULIN (HCC): ICD-10-CM

## 2021-08-16 RX ORDER — METOPROLOL SUCCINATE 25 MG/1
25 TABLET, EXTENDED RELEASE ORAL DAILY
Qty: 90 TABLET | Refills: 3 | Status: SHIPPED | OUTPATIENT
Start: 2021-08-16

## 2021-08-16 RX ORDER — BLOOD SUGAR DIAGNOSTIC
1 STRIP MISCELLANEOUS DAILY
Qty: 100 EACH | Refills: 5 | Status: SHIPPED | OUTPATIENT
Start: 2021-08-16 | End: 2021-08-16 | Stop reason: SDUPTHER

## 2021-08-16 RX ORDER — BLOOD SUGAR DIAGNOSTIC
1 STRIP MISCELLANEOUS DAILY
Qty: 100 EACH | Refills: 5 | Status: SHIPPED | OUTPATIENT
Start: 2021-08-16

## 2021-08-16 RX ORDER — METOPROLOL SUCCINATE 50 MG/1
50 TABLET, EXTENDED RELEASE ORAL DAILY
Qty: 90 TABLET | Refills: 3 | Status: SHIPPED | OUTPATIENT
Start: 2021-08-16 | End: 2022-08-09 | Stop reason: SDUPTHER

## 2021-10-06 ENCOUNTER — TELEPHONE (OUTPATIENT)
Dept: FAMILY MEDICINE CLINIC | Facility: CLINIC | Age: 66
End: 2021-10-06

## 2021-10-11 ENCOUNTER — OFFICE VISIT (OUTPATIENT)
Dept: FAMILY MEDICINE CLINIC | Facility: CLINIC | Age: 66
End: 2021-10-11
Payer: MEDICARE

## 2021-10-11 VITALS
WEIGHT: 305.6 LBS | HEART RATE: 58 BPM | DIASTOLIC BLOOD PRESSURE: 70 MMHG | BODY MASS INDEX: 41.39 KG/M2 | SYSTOLIC BLOOD PRESSURE: 118 MMHG | OXYGEN SATURATION: 98 % | HEIGHT: 72 IN | TEMPERATURE: 97.4 F

## 2021-10-11 DIAGNOSIS — E66.01 MORBID OBESITY WITH BMI OF 40.0-44.9, ADULT (HCC): ICD-10-CM

## 2021-10-11 DIAGNOSIS — Z00.00 MEDICARE ANNUAL WELLNESS VISIT, SUBSEQUENT: Primary | ICD-10-CM

## 2021-10-11 DIAGNOSIS — E11.9 TYPE 2 DIABETES MELLITUS WITHOUT COMPLICATION, WITHOUT LONG-TERM CURRENT USE OF INSULIN (HCC): ICD-10-CM

## 2021-10-11 LAB — SL AMB POCT HEMOGLOBIN AIC: 5.5 (ref ?–6.5)

## 2021-10-11 PROCEDURE — 83036 HEMOGLOBIN GLYCOSYLATED A1C: CPT | Performed by: FAMILY MEDICINE

## 2021-10-11 PROCEDURE — G0439 PPPS, SUBSEQ VISIT: HCPCS | Performed by: FAMILY MEDICINE

## 2021-10-11 RX ORDER — TRIAMCINOLONE ACETONIDE 5 MG/G
CREAM TOPICAL
COMMUNITY
Start: 2021-09-15

## 2021-10-11 RX ORDER — KETOCONAZOLE 20 MG/ML
SHAMPOO TOPICAL
COMMUNITY
Start: 2021-09-15

## 2021-10-11 RX ORDER — TRIAMCINOLONE ACETONIDE 1 MG/ML
LOTION TOPICAL
COMMUNITY
Start: 2021-09-15

## 2021-10-19 ENCOUNTER — IMMUNIZATIONS (OUTPATIENT)
Dept: FAMILY MEDICINE CLINIC | Facility: CLINIC | Age: 66
End: 2021-10-19
Payer: MEDICARE

## 2021-10-19 DIAGNOSIS — Z23 NEED FOR INFLUENZA VACCINATION: Primary | ICD-10-CM

## 2021-10-19 PROCEDURE — G0008 ADMIN INFLUENZA VIRUS VAC: HCPCS | Performed by: FAMILY MEDICINE

## 2021-10-19 PROCEDURE — 90662 IIV NO PRSV INCREASED AG IM: CPT | Performed by: FAMILY MEDICINE

## 2021-10-26 ENCOUNTER — TELEPHONE (OUTPATIENT)
Dept: FAMILY MEDICINE CLINIC | Facility: CLINIC | Age: 66
End: 2021-10-26

## 2021-10-27 ENCOUNTER — TELEPHONE (OUTPATIENT)
Dept: FAMILY MEDICINE CLINIC | Facility: CLINIC | Age: 66
End: 2021-10-27

## 2021-10-28 ENCOUNTER — TELEPHONE (OUTPATIENT)
Dept: FAMILY MEDICINE CLINIC | Facility: CLINIC | Age: 66
End: 2021-10-28

## 2021-12-06 ENCOUNTER — OFFICE VISIT (OUTPATIENT)
Dept: CARDIOLOGY CLINIC | Facility: CLINIC | Age: 66
End: 2021-12-06
Payer: MEDICARE

## 2021-12-06 VITALS
OXYGEN SATURATION: 96 % | SYSTOLIC BLOOD PRESSURE: 116 MMHG | WEIGHT: 306 LBS | DIASTOLIC BLOOD PRESSURE: 68 MMHG | HEIGHT: 72 IN | BODY MASS INDEX: 41.45 KG/M2 | HEART RATE: 64 BPM

## 2021-12-06 DIAGNOSIS — I10 BENIGN ESSENTIAL HTN: ICD-10-CM

## 2021-12-06 DIAGNOSIS — I51.89 DIASTOLIC DYSFUNCTION: ICD-10-CM

## 2021-12-06 DIAGNOSIS — R06.00 DYSPNEA ON EXERTION: Primary | ICD-10-CM

## 2021-12-06 DIAGNOSIS — E78.5 DYSLIPIDEMIA: ICD-10-CM

## 2021-12-06 PROCEDURE — 99214 OFFICE O/P EST MOD 30 MIN: CPT | Performed by: INTERNAL MEDICINE

## 2021-12-23 ENCOUNTER — OFFICE VISIT (OUTPATIENT)
Dept: FAMILY MEDICINE CLINIC | Facility: CLINIC | Age: 66
End: 2021-12-23
Payer: MEDICARE

## 2021-12-23 VITALS
HEIGHT: 72 IN | WEIGHT: 307 LBS | SYSTOLIC BLOOD PRESSURE: 126 MMHG | BODY MASS INDEX: 41.58 KG/M2 | RESPIRATION RATE: 18 BRPM | HEART RATE: 78 BPM | DIASTOLIC BLOOD PRESSURE: 70 MMHG | TEMPERATURE: 98.1 F

## 2021-12-23 DIAGNOSIS — E11.9 TYPE 2 DIABETES MELLITUS WITHOUT COMPLICATION, WITHOUT LONG-TERM CURRENT USE OF INSULIN (HCC): Primary | ICD-10-CM

## 2021-12-23 DIAGNOSIS — I51.89 DIASTOLIC DYSFUNCTION: ICD-10-CM

## 2021-12-23 DIAGNOSIS — G47.33 OSA (OBSTRUCTIVE SLEEP APNEA): ICD-10-CM

## 2021-12-23 DIAGNOSIS — E66.01 MORBID OBESITY WITH BODY MASS INDEX (BMI) OF 45.0 TO 49.9 IN ADULT (HCC): ICD-10-CM

## 2021-12-23 PROBLEM — R07.89 CHEST DISCOMFORT: Status: RESOLVED | Noted: 2017-09-03 | Resolved: 2021-12-23

## 2021-12-23 PROCEDURE — 99214 OFFICE O/P EST MOD 30 MIN: CPT | Performed by: INTERNAL MEDICINE

## 2022-01-14 ENCOUNTER — TELEPHONE (OUTPATIENT)
Dept: FAMILY MEDICINE CLINIC | Facility: CLINIC | Age: 67
End: 2022-01-14

## 2022-01-14 NOTE — TELEPHONE ENCOUNTER
Pt asking if an order could be put in for both a colonoscopy, it looks like he has an appt for one in Feb  But I didn't see an order in for one, as well as an xray  He said his orthopedic doctor was requesting an xray of his back to be ordered by his PCP due to lower back pain   Also, is there any xray department open later than 3 that he can get it done at?

## 2022-01-17 DIAGNOSIS — M54.50 CHRONIC LOW BACK PAIN, UNSPECIFIED BACK PAIN LATERALITY, UNSPECIFIED WHETHER SCIATICA PRESENT: Primary | ICD-10-CM

## 2022-01-17 DIAGNOSIS — G89.29 CHRONIC LOW BACK PAIN, UNSPECIFIED BACK PAIN LATERALITY, UNSPECIFIED WHETHER SCIATICA PRESENT: Primary | ICD-10-CM

## 2022-01-17 NOTE — TELEPHONE ENCOUNTER
Xray ordered hospital is open later as well as on weekend AM    He has scope  rescheduled for February

## 2022-01-19 ENCOUNTER — APPOINTMENT (OUTPATIENT)
Dept: RADIOLOGY | Facility: MEDICAL CENTER | Age: 67
End: 2022-01-19
Payer: MEDICARE

## 2022-01-19 DIAGNOSIS — G89.29 CHRONIC LOW BACK PAIN, UNSPECIFIED BACK PAIN LATERALITY, UNSPECIFIED WHETHER SCIATICA PRESENT: ICD-10-CM

## 2022-01-19 DIAGNOSIS — M54.50 CHRONIC LOW BACK PAIN, UNSPECIFIED BACK PAIN LATERALITY, UNSPECIFIED WHETHER SCIATICA PRESENT: ICD-10-CM

## 2022-01-19 PROCEDURE — 72110 X-RAY EXAM L-2 SPINE 4/>VWS: CPT

## 2022-01-21 DIAGNOSIS — G89.29 CHRONIC LOW BACK PAIN, UNSPECIFIED BACK PAIN LATERALITY, UNSPECIFIED WHETHER SCIATICA PRESENT: Primary | ICD-10-CM

## 2022-01-21 DIAGNOSIS — M54.50 CHRONIC LOW BACK PAIN, UNSPECIFIED BACK PAIN LATERALITY, UNSPECIFIED WHETHER SCIATICA PRESENT: Primary | ICD-10-CM

## 2022-02-02 ENCOUNTER — CONSULT (OUTPATIENT)
Dept: GASTROENTEROLOGY | Facility: CLINIC | Age: 67
End: 2022-02-02
Payer: MEDICARE

## 2022-02-02 VITALS
BODY MASS INDEX: 40.63 KG/M2 | SYSTOLIC BLOOD PRESSURE: 140 MMHG | HEART RATE: 52 BPM | HEIGHT: 72 IN | WEIGHT: 300 LBS | TEMPERATURE: 98.2 F | DIASTOLIC BLOOD PRESSURE: 68 MMHG

## 2022-02-02 DIAGNOSIS — R10.13 DYSPEPSIA: ICD-10-CM

## 2022-02-02 DIAGNOSIS — K21.9 GASTROESOPHAGEAL REFLUX DISEASE, UNSPECIFIED WHETHER ESOPHAGITIS PRESENT: ICD-10-CM

## 2022-02-02 DIAGNOSIS — Z12.11 COLON CANCER SCREENING: Primary | ICD-10-CM

## 2022-02-02 PROCEDURE — 99203 OFFICE O/P NEW LOW 30 MIN: CPT | Performed by: NURSE PRACTITIONER

## 2022-02-02 NOTE — PATIENT INSTRUCTIONS
Scheduled patient for a Colonoscopy/EGD on 4/15/2022 with MD Timmy Hernandezyer Kim instructions, patietnt expressed understanding  Follow up appointment if needed   Please cancel his other GI appointment

## 2022-02-02 NOTE — PROGRESS NOTES
[facility] - Outpatient Consultation  Yunior Lizarraga 74 y o  male MRN: [id number]  Encounter: [id number]          ASSESSMENT AND PLAN:      1  Colon cancer screening    Patient reports that he has had EGDs and colonoscopies in the past with the last 1 being in 2018  He was scheduled to have 1 done in February of this year with a hospital that was many hours away and would like to be seen here instead  His last colonoscopy report reveals moderate diverticulosis in the descending and sigmoid colon, internal and external hemorrhoids, 2 polyps and a single angiodysplasia not actively bleeding seen in the transverse colon  He was recommended to repeat in 3 years  Patient is due for screening at this time  Process, risks and benefits discussed with patient, he is agreeable  - Colonoscopy; Future    2  Gastroesophageal reflux disease, unspecified whether esophagitis present  3  Dyspepsia    Patient has a history of GERD in which he takes omeprazole 40 mg daily  He does report occasional feeling of dyspepsia and nausea  He reports if it is very bad he will have some vomiting  Patient reports that he had an EGD scheduled for February that hospital many hours from here would prefer to have it done here  Patient's last EGD was in 2018 that revealed erythema involving the stomach and a 3 cm hiatal hernia  Will set patient up for EGD to evaluate for etiology of his symptoms such as but not limited to reflux esophagitis, peptic ulcer disease or H pylori as well as evaluate for Silva's esophagus due to his long-term history of GERD  Process, risks and benefits discussed with patient, he is agreeable  - EGD; Future    Will see patient back after procedures  ______________________________________________________________________    HPI:  Yunior Lizarraga is a 41-year-old male that presents today for consult for colon cancer screening and EGD    Patient reports that he has had EGDs and colonoscopies in the past with his last 1 being in 2018  He reports that he was scheduled to repeat in 3 years with set up for EGD and colonoscopy this month but states that the hospital scheduled out is many hours away in would like to be seen here  His last colonoscopy was in 2018 that revealed moderate diverticulosis in the descending and sigmoid colon, internal and external hemorrhoids, 2 polyps and a single angiodysplasia not actively bleeding seen in the transverse colon  He was recommended to repeat in 3 years  Patient's EGD revealed erythema involving the stomach in a 3 cm hiatal hernia  Patient reports that he takes omeprazole 40 mg daily  He denies any heartburn or dysphagia but states that he does get occasional nausea and dyspepsia  He also reports occasional vomiting if his nausea is severe  He reports having a daily bowel movement that is not difficult to pass  He denies black or bloody stools  REVIEW OF SYSTEMS:    Review of Systems   Constitutional: Negative for fatigue, fever and unexpected weight change  HENT: Negative for trouble swallowing  Respiratory: Negative for shortness of breath  Cardiovascular: Negative for chest pain  Gastrointestinal: Positive for nausea and vomiting  Negative for abdominal distention, abdominal pain, anal bleeding, blood in stool, constipation, diarrhea and rectal pain  Neurological: Negative for weakness           Historical Information   Past Medical History:   Diagnosis Date    Arthritis     last assessed 7/1/15    Diabetes mellitus ([facility] [address] )     type 2-last assessed 1/28/15    GERD (gastroesophageal reflux disease)     Hypertension     Irregular heart beat     last assessed 7/1/15    Palpitations     last assessed 9/7/17    Sexual dysfunction     last assessed 7/1/15    Thyroid disease     last assessed 7/1/15     Past Surgical History:   Procedure Laterality Date    COLONOSCOPY      TONSILLECTOMY       Social History   Social History Substance and Sexual Activity   Alcohol Use Never     Social History     Substance and Sexual Activity   Drug Use Never     Social History     Tobacco Use   Smoking Status Former Smoker   Smokeless Tobacco Never Used   Tobacco Comment    former smoker-quit 17 yrs ago 1pppd x 30 yrs as per [employer]     Family History   Problem Relation Age of Onset    Hypertension Mother         essential    Stroke Mother     Hypertension Father         essential    Heart defect Father         cardiac disorder    Stroke Brother     Hypertension Brother        Meds/Allergies       Current Outpatient Medications:     amLODIPine (NORVASC) 5 mg tablet    aspirin 81 mg chewable tablet    atorvastatin (LIPITOR) 10 mg tablet    enalapril (VASOTEC) 10 mg tablet    furosemide (LASIX) 40 mg tablet    glucose blood (OneTouch Verio) test strip    ketoconazole (NIZORAL) 2 % shampoo    Lancets (onetouch ultrasoft) lancets    metFORMIN (GLUCOPHAGE) 500 mg tablet    metoprolol succinate (TOPROL-XL) 25 mg 24 hr tablet    metoprolol succinate (TOPROL-XL) 50 mg 24 hr tablet    multivitamin (THERAGRAN) TABS    omeprazole (PriLOSEC) 40 MG capsule    potassium chloride (K-DUR,KLOR-CON) 20 mEq tablet    Red Yeast Rice Extract (RED YEAST RICE PO)    triamcinolone (KENALOG) 0 1 % lotion    triamcinolone (KENALOG) 0 5 % cream    Vitamin D, Cholecalciferol, 50 MCG (2000 UT) CAPS    Allergies   Allergen Reactions    Acetaminophen Other (See Comments)     Other reaction(s): Unknown Reaction  "I had to go to the hospital and get shots after taking it 20yrs ago"           Objective     Blood pressure 140/68, pulse (!) 52, temperature 98 2 °F (36 8 °C), temperature source Tympanic, height 6' (1 829 m), weight 136 kg (300 lb)  Body mass index is 40 69 kg/m²  PHYSICAL EXAM:      General Appearance:   Alert, cooperative, no distress   HEENT:   Normocephalic, atraumatic, anicteric       Neck:  Symmetrical, trachea midline   Lungs: Clear to auscultation bilaterally; no rales, rhonchi or wheezing; respirations unlabored    Heart[de-identified]   Regular rate and rhythm; no murmur, rub, or gallop  Abdomen:   Soft, non-tender, non-distended; normal bowel sounds; no masses, no organomegaly    Genitalia:   Deferred    Rectal:   Deferred    Skin:  No jaundice, rashes, or lesions             Lab Results:   No visits with results within 1 Day(s) from this visit  Latest known visit with results is:   Office Visit on 10/11/2021   Component Date Value    Hemoglobin A1C 10/11/2021 5 5          Radiology Results:   XR spine lumbar minimum 4 views non injury    Result Date: 1/21/2022  Narrative: LUMBAR SPINE INDICATION:   M54 50: Low back pain, unspecified G89 29: Other chronic pain  COMPARISON:  01/20/2011 x-ray and CT 05/24/2021 VIEWS:  XR SPINE LUMBAR MINIMUM 4 VIEWS NON INJURY FINDINGS: There are 5 non rib bearing lumbar vertebral bodies  There is no evidence of acute lumbar fracture or destructive osseous lesion  Appears to be increased height loss at T12 compared to prior  Alignment is unremarkable  Degenerative changes throughout the lumbar spine manifested by endplate changes, disc height loss, marginal osteophyte formation and facet arthrosis  The pedicles appear intact  No pars defects  There are atherosclerotic calcifications  Soft tissues are otherwise unremarkable  Impression: Apparent increased height loss of T12 compared to CT of 08/24/2021  Age indeterminate  Please correlate with point tenderness  The study was marked in EPIC for significant notification   Workstation performed: [id number]

## 2022-02-16 ENCOUNTER — EVALUATION (OUTPATIENT)
Dept: PHYSICAL THERAPY | Facility: CLINIC | Age: 67
End: 2022-02-16
Payer: MEDICARE

## 2022-02-16 DIAGNOSIS — M54.41 CHRONIC BILATERAL LOW BACK PAIN WITH RIGHT-SIDED SCIATICA: Primary | ICD-10-CM

## 2022-02-16 DIAGNOSIS — G89.29 CHRONIC BILATERAL LOW BACK PAIN WITH RIGHT-SIDED SCIATICA: Primary | ICD-10-CM

## 2022-02-16 DIAGNOSIS — M54.6 BACK PAIN OF THORACOLUMBAR REGION: ICD-10-CM

## 2022-02-16 DIAGNOSIS — M54.50 BACK PAIN OF THORACOLUMBAR REGION: ICD-10-CM

## 2022-02-16 DIAGNOSIS — M54.10 BACK PAIN WITH RADICULOPATHY: ICD-10-CM

## 2022-02-16 PROCEDURE — 97535 SELF CARE MNGMENT TRAINING: CPT | Performed by: PHYSICAL THERAPIST

## 2022-02-16 PROCEDURE — 97110 THERAPEUTIC EXERCISES: CPT | Performed by: PHYSICAL THERAPIST

## 2022-02-16 PROCEDURE — 97140 MANUAL THERAPY 1/> REGIONS: CPT | Performed by: PHYSICAL THERAPIST

## 2022-02-16 PROCEDURE — 97161 PT EVAL LOW COMPLEX 20 MIN: CPT | Performed by: PHYSICAL THERAPIST

## 2022-02-16 NOTE — PROGRESS NOTES
PT Evaluation     Today's date: 2022  Patient name: Saturnino Stockton  : 1955  MRN: [id number]  Referring provider: Charley Martinez DO  Dx:   Encounter Diagnosis     ICD-10-CM    1  Chronic bilateral low back pain with right-sided sciatica  M54 41     G89 29    2  Back pain with radiculopathy  M54 10    3  Back pain of thoracolumbar region  M54 50     M54 6                   Assessment  Understanding of Dx/Px/POC: good   Prognosis: good    Goals  STGs: To be complete within 4 weeks  - Decrease/centralize pain to < 2/10 at worst  - Increase lumbar extension ROM to WNL  - Increase bilateral hip flexor flexibility to WNL  - Increase core stability and posterior lateral LE chain strength to > 4+/5  - Improve postural awareness capacity to > 60min before deficit    LTGs: To be complete within 6 weeks  - Able to stand and walk for any extended amount of time without pain or limitation for increased safety and functional capacity with ADLs and home-related duty  - Able to repetitively bend over at trunk without pain or limitation for increased safety and functional capacity with ADLs and and home-related duty  - Able to complete all lifting/carrying activity without pain or limitation for increased safety and functional capacity with ADLs and whome-related duty  - Able to complete transfers repetitively without pain or limitation for increased safety and functional capacity with ADLs and home-related duty    Plan  Planned therapy interventions: manual therapy, neuromuscular re-education, patient education, self care, therapeutic activities and therapeutic exercise  Frequency: 2x week  Duration in weeks: 6       Pt is a 74 y o  male with chronic LBP and intermittent R LE radicular sx who presents with functional deficits including decreased capacity with sitting, lifting/carrying, transfers, and bending over at the trunk   Upon completion of today's initial evaluation, Mhamd's sx remain consistent with LBP associated with chronic Thoracolumbar and Lumbosacral DDD and OA  Pt will benefit from skilled physical therapy to address current deficits  Subjective Evaluation    Pain  Current pain ratin  At best pain ratin  At worst pain ratin  Location: LBP    Patient Goals  Patient goals for therapy: increased strength, decreased pain and increased motion         Pt reports he has been dealing with chronic LBP for many years  Reports several months ago he began dealing with increased pain and intermittent R LE radicular sx  Pt reports sx have continued to negatively effect his overall safety and functional capacity with ADLs and home-related duties          Objective Pain level ranges 2-8/10  AROM: Lumbar flexion and rotation 50% decreased; Bilateral Hip extension 50% decreased  Strength: Core stability and post/lat LE chain strength 3+/5  Postural Awareness: Poor (flexed trunk, rounded shoulders)  Hip flexor flexibility: (+) Mode Test bilaterally  FOTO: 26; GOAL: 49  Unable stand without pain and limitation  Unable to walk without pain and limitation  Unable to bend over at trunk without pain and limitation  Unable to complete lifting/carrying activity without pain and limitation  Unable to complete transfers without pain and limitation             Precautions: None at this time    Daily Treatment Diary    HEP: Handout provided and discussed      Manuals 22            ART x15' Hip flexors            PROM/Stretch             IASTM             STM/Triggerpoint             JM                          Neuro Re-Ed             Supine Hip Flexor Stretch with PT Assist 4x20''            PPT  Next session           Bilateral Supine Piriformis Stretch  Next session                                                               Ther Ex             SKTC 3x5 ea            LTR 2x10 ea            PPT into Brdge with Add Sq  Next session           PB 3 way flexion  Next session Ther Activity             NuStep  Next session                        Gait Training                                       Modalities             MHP  Next session Supine Knees Bent           CP Supine Knees Bent x10'            US/Stim

## 2022-02-21 ENCOUNTER — OFFICE VISIT (OUTPATIENT)
Dept: PHYSICAL THERAPY | Facility: CLINIC | Age: 67
End: 2022-02-21
Payer: MEDICARE

## 2022-02-21 DIAGNOSIS — M54.50 BACK PAIN OF THORACOLUMBAR REGION: ICD-10-CM

## 2022-02-21 DIAGNOSIS — G89.29 CHRONIC BILATERAL LOW BACK PAIN WITH RIGHT-SIDED SCIATICA: Primary | ICD-10-CM

## 2022-02-21 DIAGNOSIS — M54.6 BACK PAIN OF THORACOLUMBAR REGION: ICD-10-CM

## 2022-02-21 DIAGNOSIS — M54.41 CHRONIC BILATERAL LOW BACK PAIN WITH RIGHT-SIDED SCIATICA: Primary | ICD-10-CM

## 2022-02-21 DIAGNOSIS — M54.10 BACK PAIN WITH RADICULOPATHY: ICD-10-CM

## 2022-02-21 PROCEDURE — 97110 THERAPEUTIC EXERCISES: CPT

## 2022-02-21 PROCEDURE — 97140 MANUAL THERAPY 1/> REGIONS: CPT

## 2022-02-21 PROCEDURE — 97530 THERAPEUTIC ACTIVITIES: CPT

## 2022-02-21 NOTE — PROGRESS NOTES
Daily Note     Today's date: 2022  Patient name: Dago Tolentino  : 1955  MRN: [id number]  Referring provider: Lonny Plaza DO  Dx:   Encounter Diagnosis     ICD-10-CM    1  Chronic bilateral low back pain with right-sided sciatica  M54 41     G89 29    2  Back pain with radiculopathy  M54 10    3  Back pain of thoracolumbar region  M54 50     M54 6                   Subjective: Reports some improvement since last visit  Objective: See treatment diary below      Assessment: Tolerated treatment well  Patient exhibited good technique with therapeutic exercises  Pt making gains with slight improvement in sx recently  Pt with notable HS mm restrictions with B hamstring manual stretching  Plan: Continue per plan of care        Precautions: None at this time    Daily Treatment Diary    HEP: Handout provided and discussed      Manuals 22           ART x15' Hip flexors            PROM/Stretch  15' LE           IASTM             STM/Triggerpoint             JM                          Neuro Re-Ed             Supine Hip Flexor Stretch with PT Assist 4x20'' 4/20"           PPT  2/10           Bilateral Supine Piriformis Stretch  4/20" ea                                                               Ther Ex             SKTC 3x5 ea 2/10 ea           LTR 2x10 ea 2/10 ea           PPT into Brdge with Add Sq  Next session           PB 3 way flexion  Next session                                                                            Ther Activity             NuStep  6'                        Gait Training                                       Modalities             MHP  10' knees bent supine           CP Supine Knees Bent x10'            US/Stim

## 2022-02-24 ENCOUNTER — OFFICE VISIT (OUTPATIENT)
Dept: PHYSICAL THERAPY | Facility: CLINIC | Age: 67
End: 2022-02-24
Payer: MEDICARE

## 2022-02-24 DIAGNOSIS — M54.41 CHRONIC BILATERAL LOW BACK PAIN WITH RIGHT-SIDED SCIATICA: Primary | ICD-10-CM

## 2022-02-24 DIAGNOSIS — M54.50 BACK PAIN OF THORACOLUMBAR REGION: ICD-10-CM

## 2022-02-24 DIAGNOSIS — M54.6 BACK PAIN OF THORACOLUMBAR REGION: ICD-10-CM

## 2022-02-24 DIAGNOSIS — M54.10 BACK PAIN WITH RADICULOPATHY: ICD-10-CM

## 2022-02-24 DIAGNOSIS — G89.29 CHRONIC BILATERAL LOW BACK PAIN WITH RIGHT-SIDED SCIATICA: Primary | ICD-10-CM

## 2022-02-24 PROCEDURE — 97530 THERAPEUTIC ACTIVITIES: CPT

## 2022-02-24 PROCEDURE — 97140 MANUAL THERAPY 1/> REGIONS: CPT

## 2022-02-24 PROCEDURE — 97110 THERAPEUTIC EXERCISES: CPT

## 2022-02-24 NOTE — PROGRESS NOTES
Daily Note     Today's date: 2022  Patient name: Annmarie Phelps  : 1955  MRN: [id number]  Referring provider: Felisha House DO  Dx:   Encounter Diagnosis     ICD-10-CM    1  Chronic bilateral low back pain with right-sided sciatica  M54 41     G89 29    2  Back pain with radiculopathy  M54 10    3  Back pain of thoracolumbar region  M54 50     M54 6                   Subjective: Pt reports he is cont to do better  Objective: See treatment diary below      Assessment: Tolerated treatment well  Patient exhibited good technique with therapeutic exercises  Pt cont to rafael program well with overall improved sx  Pt making gains toward goals  Plan: Continue per plan of care  Precautions: None at this time    Daily Treatment Diary    HEP: Handout provided and discussed      Manuals 22          ART x15' Hip flexors            PROM/Stretch  15' LE 13' LE          IASTM             STM/Triggerpoint             JM                          Neuro Re-Ed             Supine Hip Flexor Stretch with PT Assist 4x20'' 4/20" 420"           PPT  2/10 2/10          Bilateral Supine Piriformis Stretch  /20" ea 4/20" ea                                                              Ther Ex             SKTC 3x5 ea 2/10 ea 2/10 ea          LTR 2x10 ea 2/10 ea 2/10 ea          PPT into Brdge with Add Sq  Next session           PB 3 way flexion  Next session 20x ea                                                                           Ther Activity             NuStep  6' 10'                       Gait Training                                       Modalities             MHP  10' knees bent supine 10' knees bent            CP Supine Knees Bent x10'            US/Stim

## 2022-02-25 ENCOUNTER — TELEPHONE (OUTPATIENT)
Dept: FAMILY MEDICINE CLINIC | Facility: CLINIC | Age: 67
End: 2022-02-25

## 2022-02-25 DIAGNOSIS — M25.519 ACUTE SHOULDER PAIN, UNSPECIFIED LATERALITY: Primary | ICD-10-CM

## 2022-02-25 NOTE — TELEPHONE ENCOUNTER
Pt asking if an xray of his right shoulder could be ordered as he said he's been having severe pain in his shoulder for about a week now  Nothing OTC is helping

## 2022-02-28 ENCOUNTER — OFFICE VISIT (OUTPATIENT)
Dept: PHYSICAL THERAPY | Facility: CLINIC | Age: 67
End: 2022-02-28
Payer: MEDICARE

## 2022-02-28 ENCOUNTER — APPOINTMENT (OUTPATIENT)
Dept: RADIOLOGY | Facility: MEDICAL CENTER | Age: 67
End: 2022-02-28
Payer: MEDICARE

## 2022-02-28 DIAGNOSIS — G89.29 CHRONIC BILATERAL LOW BACK PAIN WITH RIGHT-SIDED SCIATICA: Primary | ICD-10-CM

## 2022-02-28 DIAGNOSIS — M25.519 ACUTE SHOULDER PAIN, UNSPECIFIED LATERALITY: ICD-10-CM

## 2022-02-28 DIAGNOSIS — M54.6 BACK PAIN OF THORACOLUMBAR REGION: ICD-10-CM

## 2022-02-28 DIAGNOSIS — M54.10 BACK PAIN WITH RADICULOPATHY: ICD-10-CM

## 2022-02-28 DIAGNOSIS — M54.41 CHRONIC BILATERAL LOW BACK PAIN WITH RIGHT-SIDED SCIATICA: Primary | ICD-10-CM

## 2022-02-28 DIAGNOSIS — M54.50 BACK PAIN OF THORACOLUMBAR REGION: ICD-10-CM

## 2022-02-28 PROCEDURE — 97140 MANUAL THERAPY 1/> REGIONS: CPT

## 2022-02-28 PROCEDURE — 97110 THERAPEUTIC EXERCISES: CPT

## 2022-02-28 PROCEDURE — 73030 X-RAY EXAM OF SHOULDER: CPT

## 2022-02-28 PROCEDURE — 97530 THERAPEUTIC ACTIVITIES: CPT

## 2022-02-28 NOTE — PROGRESS NOTES
Daily Note     Today's date: 2022  Patient name: Ivett De Oliveira  : 1955  MRN: [id number]  Referring provider: Anderson Varela DO  Dx:   Encounter Diagnosis     ICD-10-CM    1  Chronic bilateral low back pain with right-sided sciatica  M54 41     G89 29    2  Back pain with radiculopathy  M54 10    3  Back pain of thoracolumbar region  M54 50     M54 6                   Subjective: Pt reports he is doing well  States less back pain but cont to have some sx into R LE  Objective: See treatment diary below      Assessment: Tolerated treatment well  Patient exhibited good technique with therapeutic exercises  Pt making slow steady gains with program  Cont to c/o radicular sx into R LE  Demonstrates improved rafael to exercises  Plan: Continue per plan of care  Precautions: None at this time    Daily Treatment Diary    HEP: Handout provided and discussed      Manuals 22         ART x15' Hip flexors            PROM/Stretch  15' LE 13' LE 15'         IASTM             STM/Triggerpoint             JM                          Neuro Re-Ed             Supine Hip Flexor Stretch with PT Assist 4x20'' 4/20" 4/20"  4/20"          PPT  2/10 2/10 2/10         Bilateral Supine Piriformis Stretch  4/20" ea /20" ea 20" ea                                                             Ther Ex             SKTC 3x5 ea 2/10 ea 2/10 ea 2/10 ea         LTR 2x10 ea 2/10 ea 2/10 ea 2/10 ea         PPT into Brdge with Add Sq  Next session           PB 3 way flexion  Next session 20x ea 20x ea                                                                          Ther Activity             NuStep  6' 10' 10'                      Gait Training                                       Modalities             MHP  10' knees bent supine 10' knees bent   10' knees bent         CP Supine Knees Bent x10'            US/Stim

## 2022-03-03 ENCOUNTER — OFFICE VISIT (OUTPATIENT)
Dept: PHYSICAL THERAPY | Facility: CLINIC | Age: 67
End: 2022-03-03
Payer: MEDICARE

## 2022-03-03 DIAGNOSIS — M54.6 BACK PAIN OF THORACOLUMBAR REGION: ICD-10-CM

## 2022-03-03 DIAGNOSIS — M54.10 BACK PAIN WITH RADICULOPATHY: ICD-10-CM

## 2022-03-03 DIAGNOSIS — M25.519 ACUTE SHOULDER PAIN, UNSPECIFIED LATERALITY: Primary | ICD-10-CM

## 2022-03-03 DIAGNOSIS — M54.41 CHRONIC BILATERAL LOW BACK PAIN WITH RIGHT-SIDED SCIATICA: Primary | ICD-10-CM

## 2022-03-03 DIAGNOSIS — M54.50 BACK PAIN OF THORACOLUMBAR REGION: ICD-10-CM

## 2022-03-03 DIAGNOSIS — G89.29 CHRONIC BILATERAL LOW BACK PAIN WITH RIGHT-SIDED SCIATICA: Primary | ICD-10-CM

## 2022-03-03 PROCEDURE — 97110 THERAPEUTIC EXERCISES: CPT

## 2022-03-03 PROCEDURE — 97530 THERAPEUTIC ACTIVITIES: CPT

## 2022-03-03 PROCEDURE — 97140 MANUAL THERAPY 1/> REGIONS: CPT

## 2022-03-03 NOTE — PROGRESS NOTES
Daily Note     Today's date: 3/3/2022  Patient name: Tenny Sever  : 1955  MRN: [id number]  Referring provider: Whitney Jimenez DO  Dx:   Encounter Diagnosis     ICD-10-CM    1  Chronic bilateral low back pain with right-sided sciatica  M54 41     G89 29    2  Back pain with radiculopathy  M54 10    3  Back pain of thoracolumbar region  M54 50     M54 6                   Subjective: Pt reports back pain has improved  Reports some cont radicular sx into LE  Objective: See treatment diary below      Assessment: Tolerated treatment well  Patient demonstrated fatigue post treatment  Pt cont to rafael program well with min exacerbation of sx  Pt cont to rafael program with min c/o Lumbar pain  Plan: Continue per plan of care  Precautions: None at this time    Daily Treatment Diary    HEP: Handout provided and discussed      Manuals 2/16/22 2/21 2/24 2/28 3/3        ART x15' Hip flexors            PROM/Stretch  15' LE 13' LE 15' 15'        IASTM             STM/Triggerpoint             JM                          Neuro Re-Ed             Supine Hip Flexor Stretch with PT Assist 4x20'' /20" /20"  20"  20"         PPT  2/10 2/10 2/10 2/10        Bilateral Supine Piriformis Stretch  /20" ea 4/20" ea /20" ea /20" ea                                                            Ther Ex             SKTC 3x5 ea 2/10 ea 2/10 ea 2/10 ea 2/10 ea        LTR 2x10 ea 2/10 ea 2/10 ea 2/10 ea 2/10 ea        PPT into Brdge with Add Sq  Next session           PB 3 way flexion  Next session 20x ea 20x ea 20x ea                                                                         Ther Activity             NuStep  6' 10' 10' 10'                     Gait Training                                       Modalities             MHP  10' knees bent supine 10' knees bent  10' knees bent 10' supine knees bent           CP Supine Knees Bent x10'            US/Stim

## 2022-03-07 ENCOUNTER — APPOINTMENT (OUTPATIENT)
Dept: PHYSICAL THERAPY | Facility: CLINIC | Age: 67
End: 2022-03-07
Payer: MEDICARE

## 2022-03-10 ENCOUNTER — OFFICE VISIT (OUTPATIENT)
Dept: PHYSICAL THERAPY | Facility: CLINIC | Age: 67
End: 2022-03-10
Payer: MEDICARE

## 2022-03-10 DIAGNOSIS — M54.10 BACK PAIN WITH RADICULOPATHY: ICD-10-CM

## 2022-03-10 DIAGNOSIS — M54.6 BACK PAIN OF THORACOLUMBAR REGION: ICD-10-CM

## 2022-03-10 DIAGNOSIS — M54.50 BACK PAIN OF THORACOLUMBAR REGION: ICD-10-CM

## 2022-03-10 DIAGNOSIS — M54.41 CHRONIC BILATERAL LOW BACK PAIN WITH RIGHT-SIDED SCIATICA: Primary | ICD-10-CM

## 2022-03-10 DIAGNOSIS — G89.29 CHRONIC BILATERAL LOW BACK PAIN WITH RIGHT-SIDED SCIATICA: Primary | ICD-10-CM

## 2022-03-10 PROCEDURE — 97140 MANUAL THERAPY 1/> REGIONS: CPT

## 2022-03-10 PROCEDURE — 97110 THERAPEUTIC EXERCISES: CPT

## 2022-03-10 PROCEDURE — 97530 THERAPEUTIC ACTIVITIES: CPT

## 2022-03-10 NOTE — PROGRESS NOTES
Daily Note     Today's date: 3/10/2022  Patient name: Any Milian  : 1955  MRN: [id number]  Referring provider: Annabelle Harrison DO  Dx:   Encounter Diagnosis     ICD-10-CM    1  Chronic bilateral low back pain with right-sided sciatica  M54 41     G89 29    2  Back pain with radiculopathy  M54 10    3  Back pain of thoracolumbar region  M54 50     M54 6                   Subjective: Pt reports he cont to have LE sx however LS is doing well without pain  Objective: See treatment diary below      Assessment: Tolerated treatment well  Patient exhibited good technique with therapeutic exercises  Pt making gains with program with overall improved sx in LS  Pt with cont radicular type sx into R LE  Plan: Continue per plan of care  Precautions: None at this time    Daily Treatment Diary    HEP: Handout provided and discussed      Manuals 2/16/22 2/21 2/24 2/28 3/3 3/10       ART x15' Hip flexors            PROM/Stretch  15' LE 15' LE 15' 15' 15'       IASTM             STM/Triggerpoint             JM                          Neuro Re-Ed             Supine Hip Flexor Stretch with PT Assist 4x20'' " "  "  "  "       PPT  2/10 2/10 2/10 2/10 2/10       Bilateral Supine Piriformis Stretch  " ea " ea " ea " ea " ea                                                           Ther Ex             SKTC 3x5 ea 2/10 ea 2/10 ea 2/10 ea 2/10 ea 2/10 ea       LTR 2x10 ea 2/10 ea 2/10 ea 2/10 ea 2/10 ea 2/10 ea       PPT into Brdge with Add Sq  Next session           PB 3 way flexion  Next session 20x ea 20x ea 20x ea 20x ea                                                                        Ther Activity             NuStep  6' 10' 10' 10' 10'                    Gait Training                                       Modalities             MHP  10' knees bent supine 10' knees bent  10' knees bent 10' supine knees bent  10' supine knees bent         CP Supine Knees Bent x10'            US/Stim

## 2022-03-11 ENCOUNTER — TELEPHONE (OUTPATIENT)
Dept: GASTROENTEROLOGY | Facility: CLINIC | Age: 67
End: 2022-03-11

## 2022-03-14 ENCOUNTER — OFFICE VISIT (OUTPATIENT)
Dept: PHYSICAL THERAPY | Facility: CLINIC | Age: 67
End: 2022-03-14
Payer: MEDICARE

## 2022-03-14 DIAGNOSIS — G89.29 CHRONIC BILATERAL LOW BACK PAIN WITH RIGHT-SIDED SCIATICA: Primary | ICD-10-CM

## 2022-03-14 DIAGNOSIS — M54.41 CHRONIC BILATERAL LOW BACK PAIN WITH RIGHT-SIDED SCIATICA: Primary | ICD-10-CM

## 2022-03-14 DIAGNOSIS — M54.6 BACK PAIN OF THORACOLUMBAR REGION: ICD-10-CM

## 2022-03-14 DIAGNOSIS — M54.50 BACK PAIN OF THORACOLUMBAR REGION: ICD-10-CM

## 2022-03-14 DIAGNOSIS — M54.10 BACK PAIN WITH RADICULOPATHY: ICD-10-CM

## 2022-03-14 PROCEDURE — 97140 MANUAL THERAPY 1/> REGIONS: CPT

## 2022-03-14 PROCEDURE — 97530 THERAPEUTIC ACTIVITIES: CPT

## 2022-03-14 PROCEDURE — 97110 THERAPEUTIC EXERCISES: CPT

## 2022-03-14 NOTE — PROGRESS NOTES
Daily Note     Today's date: 3/14/2022  Patient name: Shar Dodson  : 1955  MRN: [id number]  Referring provider: Artem Witt DO  Dx:   Encounter Diagnosis     ICD-10-CM    1  Chronic bilateral low back pain with right-sided sciatica  M54 41     G89 29    2  Back pain with radiculopathy  M54 10    3  Back pain of thoracolumbar region  M54 50     M54 6                   Subjective: Pt reports cont overall improved LBP  States cont sx into LE at times  Objective: See treatment diary below      Assessment: Tolerated treatment well  Patient exhibited good technique with therapeutic exercises  Pt making slow steady improvement in sx  Pt making gains with some improved radicular sx  Plan: Continue per plan of care  Precautions: None at this time    Daily Treatment Diary    HEP: Handout provided and discussed      Manuals 2/16/22 2/21 2/24 2/28 3/3 3/10 3/14      ART x15' Hip flexors            PROM/Stretch  15' LE 15' LE 15' 15' 15' 15'      IASTM             STM/Triggerpoint             JM                          Neuro Re-Ed             Supine Hip Flexor Stretch with PT Assist 4x20'' " "  "  "  " "       PPT  2/10 2/10 2/10 2/10 2/10 2/10      Bilateral Supine Piriformis Stretch  " ea " ea 20" ea 20" ea " ea " ea                                                          Ther Ex             SKTC 3x5 ea 2/10 ea 2/10 ea 2/10 ea 2/10 ea 2/10 ea 2/10 ea      LTR 2x10 ea 2/10 ea 2/10 ea 2/10 ea 2/10 ea 2/10 ea 2/10 ea      PPT into Brdge with Add Sq  Next session           PB 3 way flexion  Next session 20x ea 20x ea 20x ea 20x ea 20x ea                                                                       Ther Activity             NuStep  6' 10' 10' 10' 10' 10'                   Gait Training                                       Modalities             MHP  10' knees bent supine 10' knees bent  10' knees bent 10' supine knees bent    10' supine knees bent  10' supine      CP Supine Knees Bent x10'            US/Stim

## 2022-03-15 ENCOUNTER — TELEPHONE (OUTPATIENT)
Dept: FAMILY MEDICINE CLINIC | Facility: CLINIC | Age: 67
End: 2022-03-15

## 2022-03-15 ENCOUNTER — OFFICE VISIT (OUTPATIENT)
Dept: OBGYN CLINIC | Facility: CLINIC | Age: 67
End: 2022-03-15
Payer: MEDICARE

## 2022-03-15 VITALS
HEIGHT: 72 IN | BODY MASS INDEX: 40.63 KG/M2 | WEIGHT: 300 LBS | HEART RATE: 57 BPM | SYSTOLIC BLOOD PRESSURE: 139 MMHG | DIASTOLIC BLOOD PRESSURE: 74 MMHG

## 2022-03-15 DIAGNOSIS — E66.01 MORBID OBESITY WITH BODY MASS INDEX (BMI) OF 45.0 TO 49.9 IN ADULT (HCC): ICD-10-CM

## 2022-03-15 DIAGNOSIS — E11.9 TYPE 2 DIABETES MELLITUS WITHOUT COMPLICATION, WITHOUT LONG-TERM CURRENT USE OF INSULIN (HCC): ICD-10-CM

## 2022-03-15 DIAGNOSIS — M75.31 CALCIFIC TENDINITIS OF RIGHT SHOULDER: Primary | ICD-10-CM

## 2022-03-15 DIAGNOSIS — M25.519 ACUTE SHOULDER PAIN, UNSPECIFIED LATERALITY: ICD-10-CM

## 2022-03-15 PROCEDURE — 99203 OFFICE O/P NEW LOW 30 MIN: CPT | Performed by: ORTHOPAEDIC SURGERY

## 2022-03-15 NOTE — TELEPHONE ENCOUNTER
Angelita Rdz moved to tomorrow at 12:30 - Thursday is supposed to be no more than 20 patients and he was #22

## 2022-03-15 NOTE — TELEPHONE ENCOUNTER
I put pt in on Thursday at 1:15 but I can call and change this appt if it is not good for you    please advise

## 2022-03-15 NOTE — PROGRESS NOTES
Chief Complaint  Right shoulder pain    History Of Presenting Illness  [facility] 1955 [facility] with right shoulder pain 2 months  Sudden onset  Pain was so bad he is not able to move the shoulder  Patient had radiographs  Patient sent to the office with radiographs  Pain and swelling has decreased significantly since it started  No history of any trauma  Patient is right handed retired         Current Medications  Current Outpatient Medications   Medication Sig Dispense Refill    amLODIPine (NORVASC) 5 mg tablet Take 1 tablet (5 mg total) by mouth daily For blood pressure 90 tablet 3    aspirin 81 mg chewable tablet Chew 81 mg daily      atorvastatin (LIPITOR) 10 mg tablet Take 1 tablet (10 mg total) by mouth daily 90 tablet 3    enalapril (VASOTEC) 10 mg tablet Take 1 tablet (10 mg total) by mouth daily 90 tablet 3    furosemide (LASIX) 40 mg tablet Take 1 tablet (40 mg total) by mouth daily 90 tablet 3    glucose blood (OneTouch Verio) test strip Use 1 each daily 100 each 5    ketoconazole (NIZORAL) 2 % shampoo       Lancets (onetouch ultrasoft) lancets Check glucose daily 100 each 1    metFORMIN (GLUCOPHAGE) 500 mg tablet Take 1 tablet (500 mg total) by mouth daily with breakfast One tab in am 90 tablet 3    metoprolol succinate (TOPROL-XL) 25 mg 24 hr tablet Take 1 tablet (25 mg total) by mouth daily 90 tablet 3    metoprolol succinate (TOPROL-XL) 50 mg 24 hr tablet Take 1 tablet (50 mg total) by mouth daily 90 tablet 3    multivitamin (THERAGRAN) TABS Take 1 tablet by mouth daily      omeprazole (PriLOSEC) 40 MG capsule Take 1 capsule (40 mg total) by mouth daily 90 capsule 3    potassium chloride (K-DUR,KLOR-CON) 20 mEq tablet Take 1 tablet (20 mEq total) by mouth daily 90 tablet 3    Red Yeast Rice Extract (RED YEAST RICE PO) Take by mouth      triamcinolone (KENALOG) 0 1 % lotion       triamcinolone (KENALOG) 0 5 % cream       Vitamin D, Cholecalciferol, 50 MCG (2000 UT) CAPS Take by mouth       No current facility-administered medications for this visit         Current Problems    Active Problems:   Patient Active Problem List    Diagnosis Date Noted    Diastolic dysfunction 17/80/9133    [facility] 05/17/2021    Dyspnea 01/30/2020    Morbid obesity with body mass index (BMI) of 45 0 to 49 9 in adult [facility]) 12/12/2018    Bilateral lower extremity edema 07/23/2018    Diabetes mellitus ([facility] ) 03/16/2018    GERD (gastroesophageal reflux disease) 03/16/2018    AYLEEN (obstructive sleep apnea) 08/18/2017    Benign essential HTN 07/31/2017    Dyslipidemia 07/31/2017         Review of Systems:    General: negative for - chills, fatigue, fever,  weight gain or weight loss  Psychological: negative for - anxiety, behavioral disorder, concentration difficulties  Ophthalmic: negative for - blurry vision, decreased vision, double vision,      Past Medical History:   Past Medical History:   Diagnosis Date    Arthritis     last assessed 7/1/15    Diabetes mellitus ([facility] )     type 2-last assessed 1/28/15    GERD (gastroesophageal reflux disease)     Hypertension     Irregular heart beat     last assessed 7/1/15    Palpitations     last assessed 9/7/17    Sexual dysfunction     last assessed 7/1/15    Thyroid disease     last assessed 7/1/15       Past Surgical History:   Past Surgical History:   Procedure Laterality Date    COLONOSCOPY      TONSILLECTOMY         Family History:  Family history reviewed and non-contributory  Family History   Problem Relation Age of Onset    Hypertension Mother         essential    Stroke Mother     Hypertension Father         essential    Heart defect Father         cardiac disorder    Stroke Brother     Hypertension Brother        Social History:  Social History     Socioeconomic History    Marital status: Single     Spouse name: None    Number of children: None    Years of education: None    Highest education level: None Occupational History    None   Tobacco Use    Smoking status: Former Smoker    Smokeless tobacco: Never Used    Tobacco comment: former smoker-quit 17 yrs ago 1pppd x 30 yrs as per [employer]   Vaping Use    Vaping Use: Never used   Substance and Sexual Activity    Alcohol use: Never    Drug use: Never    Sexual activity: None   Other Topics Concern    None   Social History Narrative    None     Social Determinants of Health     Financial Resource Strain: Not on file   Food Insecurity: Not on file   Transportation Needs: Not on file   Physical Activity: Not on file   Stress: Not on file   Social Connections: Not on file   Intimate Partner Violence: Not on file   Housing Stability: Not on file       Allergies: Allergies   Allergen Reactions    Acetaminophen Other (See Comments)     Other reaction(s): Unknown Reaction  "I had to go to the hospital and get shots after taking it 20yrs ago"           Physical ExaminationBP 139/74   Pulse 57   Ht 6' (1 829 m)   Wt 136 kg (300 lb)   BMI 40 69 kg/m²   Gen: Alert and oriented to person, place, time  HEENT: EOMI, eyes clear, moist mucus membranes, hearing intact      Orthopedic Exam  Right shoulder no swelling or deformity forward flexion is 160 AP duction is 140 cuff strength is 4+ out of 5  No distal deficits radiographs right shoulder show evidence of calcific tendinitis          Impression  Right shoulder calcific tendinitis resolving        Plan    As symptoms are resolving will just start physical therapy program, icing, over-the-counter pain  Medication home exercise program follow-up in 3 months if symptomatic we could consider steroid injection or MRI    Quirino Moss MD        Portions of the record may have been created with voice recognition software  Occasional wrong word or "sound a like" substitutions may have occurred due to the inherent limitations of voice recognition software    Read the chart carefully and recognize, using context, where substitutions have occurred

## 2022-03-15 NOTE — PATIENT INSTRUCTIONS
Exercises for Shoulder Abduction and Adduction   AMBULATORY CARE:   Shoulder abduction and adduction exercises  work the muscles at the back of your shoulder and your upper back  Before you exercise:  Warm up and stretch before you exercise  Walk or ride a stationary bike for 5 to 10 minutes to help you warm up  Stretching helps increase range of motion  It may also decrease muscle soreness and help prevent another injury  Your healthcare provider will tell you which of the following stretches to do:  · Crossover arm stretch:  Relax your shoulders  Hold your upper arm with the opposite hand  Pull your arm across your chest until you feel a stretch  Hold the stretch for 30 seconds  Return to the starting position  · Shoulder flexion stretch:  Stand facing a wall  Slowly walk your fingers up the wall until you feel a stretch  Hold the stretch for 30 seconds  Return to the starting position  · Sleeper stretch:  Lie on your injured side on a firm, flat surface  Bend the elbow of your injured arm 90° with your hand facing up  Use your arm that is not injured to slowly push your injured arm down  Stop when you feel a stretch at the back of your injured shoulder  Hold the stretch for 30 seconds  Slowly return to the starting position  Contact your healthcare provider if:   · You have sharp or worsening pain during exercise or at rest     · You have questions or concerns about your shoulder exercises  How to exercise with a weight:  Your healthcare provider will tell you how much weight to use  · Shoulder abduction:  Stand and hold a weight in your hand with your palm facing your body  Slowly raise your arm to the side with your thumb pointing up  Then raise your arm over your head as far as you can without pain  Hold this position for as long as directed  Do not raise your arm over your head unless your healthcare provider says it is okay            · Shoulder adduction:  Lie on your back on a firm surface  Extend your arm out to a "T " Bend your elbow so your forearm in the air  Hold a weight in your hand  Slowly raise your arm toward the ceiling and straighten your elbow  Hold this position for as long as directed  Slowly return to the starting position  How to exercise with an exercise band:   · Shoulder abduction:  Wrap the exercise band around a heavy, stable object near your foot  Grab the band with the hand of your injured shoulder  Keep your arm straight  Slowly raise your arm to the side with your thumb pointing up  Then, slowly pull the band over your head as far as you can without pain  Do not raise your arm over your head unless your healthcare provider says it is okay  Do not let your shoulder shrug  Hold this position for as long as directed  Slowly return to the starting position  · Shoulder adduction:  Wrap the exercise band around a heavy, stable object  Stand and face away from where the band is anchored  Hold each end of the band in both hands with your elbows bent  Your elbows should not be behind your body  Keep your arms parallel to the floor and slowly straighten your elbows  Hold this position for as long as directed  Slowly return to the starting position  Follow up with your physical therapist as directed:  Write down your questions so you remember to ask them at your visits  © Copyright [employer] 2022 Information is for End User's use only and may not be sold, redistributed or otherwise used for commercial purposes  All illustrations and images included in CareNotes® are the copyrighted property of [employer]  or Kurt Saleh  The above information is an  only  It is not intended as medical advice for individual conditions or treatments  Talk to your doctor, nurse or pharmacist before following any medical regimen to see if it is safe and effective for you

## 2022-03-16 ENCOUNTER — OFFICE VISIT (OUTPATIENT)
Dept: FAMILY MEDICINE CLINIC | Facility: CLINIC | Age: 67
End: 2022-03-16
Payer: MEDICARE

## 2022-03-16 VITALS — BODY MASS INDEX: 41.93 KG/M2 | TEMPERATURE: 98.2 F | HEIGHT: 72 IN | WEIGHT: 309.6 LBS

## 2022-03-16 DIAGNOSIS — I87.303 STASIS EDEMA OF BOTH LOWER EXTREMITIES: Primary | ICD-10-CM

## 2022-03-16 DIAGNOSIS — M79.604 LEG PAIN, BILATERAL: ICD-10-CM

## 2022-03-16 DIAGNOSIS — M79.605 LEG PAIN, BILATERAL: ICD-10-CM

## 2022-03-16 DIAGNOSIS — I51.89 DIASTOLIC DYSFUNCTION: ICD-10-CM

## 2022-03-16 PROBLEM — R06.00 DYSPNEA: Status: RESOLVED | Noted: 2020-01-30 | Resolved: 2022-03-16

## 2022-03-16 PROCEDURE — 99213 OFFICE O/P EST LOW 20 MIN: CPT | Performed by: INTERNAL MEDICINE

## 2022-03-16 NOTE — PROGRESS NOTES
Assessment/Plan:      Diagnoses and all orders for this visit:    Stasis edema of both lower extremities  -     VAS lower limb arterial duplex, complete bilateral; Future    Diastolic dysfunction  -     VAS lower limb arterial duplex, complete bilateral; Future  Recheck doppler b/l He has seen vascular in the past and will restart using compression socks, limit sodium  Cardiology note from December reviewed on chart   Leg pain, bilateral  -     VAS lower limb arterial duplex, complete bilateral; Future  Pt encouraged to elevate LE at rest    Keep April appt and he will get labs prior for A1c                Patient ID: Shashank Farmer is a 74 y o  male  HPI   Pt has swelling b/l lower ext which he has had but feels increased and also has tenderness back of calf right greater than left He saw cardio in December No worsening sob but concerned about the pain He has compression socks but has not been wearing He drinks about 4 bottles water/day No trauma No open areas He has been sitting more due to the winter weather Edema not as bad in early AM when he wakes up     Review of Systems   Constitutional: Negative for chills and fever  HENT: Negative  Eyes: Negative for visual disturbance  Respiratory: Negative for cough and shortness of breath  Cardiovascular: Positive for leg swelling  Negative for chest pain and palpitations  Gastrointestinal: Negative for abdominal distention and abdominal pain         Past Medical History:   Diagnosis Date    Arthritis     last assessed 7/1/15    [facility] ([facility] )     type 2-last assessed 1/28/15    GERD (gastroesophageal reflux disease)     Hypertension     Irregular heart beat     last assessed 7/1/15    Palpitations     last assessed 9/7/17    Sexual dysfunction     last assessed 7/1/15    Thyroid disease     last assessed 7/1/15     Past Surgical History:   Procedure Laterality Date    COLONOSCOPY      TONSILLECTOMY       Social History     Socioeconomic History    Marital status: Single     Spouse name: Not on file    Number of children: Not on file    Years of education: Not on file    Highest education level: Not on file   Occupational History    Not on file   Tobacco Use    Smoking status: Former Smoker    Smokeless tobacco: Never Used    Tobacco comment: former smoker-quit 17 yrs ago 1pppd x 30 yrs as per [employer]   Vaping Use    Vaping Use: Never used   Substance and Sexual Activity    Alcohol use: Never    Drug use: Never    Sexual activity: Not on file   Other Topics Concern    Not on file   Social History Narrative    Not on file     Social Determinants of Health     Financial Resource Strain: Not on file   Food Insecurity: Not on file   Transportation Needs: Not on file   Physical Activity: Not on file   Stress: Not on file   Social Connections: Not on file   Intimate Partner Violence: Not on file   Housing Stability: Not on file     Allergies   Allergen Reactions    Acetaminophen Other (See Comments)     Other reaction(s): Unknown Reaction  "I had to go to the hospital and get shots after taking it 20yrs ago"     BMI Counseling: Body mass index is 41 99 kg/m²  The BMI is above normal  Nutrition recommendations include consuming healthier snacks, moderation in carbohydrate intake and increasing intake of lean protein  Exercise recommendations include exercising 3-5 times per week  Rationale for BMI follow-up plan is due to patient being overweight or obese            Physical Exam

## 2022-03-17 ENCOUNTER — OFFICE VISIT (OUTPATIENT)
Dept: PHYSICAL THERAPY | Facility: CLINIC | Age: 67
End: 2022-03-17
Payer: MEDICARE

## 2022-03-17 DIAGNOSIS — M54.6 BACK PAIN OF THORACOLUMBAR REGION: ICD-10-CM

## 2022-03-17 DIAGNOSIS — M54.50 BACK PAIN OF THORACOLUMBAR REGION: ICD-10-CM

## 2022-03-17 DIAGNOSIS — M54.10 BACK PAIN WITH RADICULOPATHY: ICD-10-CM

## 2022-03-17 DIAGNOSIS — M54.41 CHRONIC BILATERAL LOW BACK PAIN WITH RIGHT-SIDED SCIATICA: Primary | ICD-10-CM

## 2022-03-17 DIAGNOSIS — G89.29 CHRONIC BILATERAL LOW BACK PAIN WITH RIGHT-SIDED SCIATICA: Primary | ICD-10-CM

## 2022-03-17 PROCEDURE — 97530 THERAPEUTIC ACTIVITIES: CPT

## 2022-03-17 PROCEDURE — 97140 MANUAL THERAPY 1/> REGIONS: CPT

## 2022-03-17 PROCEDURE — 97110 THERAPEUTIC EXERCISES: CPT

## 2022-03-17 NOTE — PROGRESS NOTES
Daily Note     Today's date: 3/17/2022  Patient name: Sarai Alvarez  : 1955  MRN: [id number]  Referring provider: Cal Gardiner DO  Dx:   Encounter Diagnosis     ICD-10-CM    1  Chronic bilateral low back pain with right-sided sciatica  M54 41     G89 29    2  Back pain with radiculopathy  M54 10    3  Back pain of thoracolumbar region  M54 50     M54 6                   Subjective: Pt states LS is doing well  Cont improvement in sx  Objective: See treatment diary below      Assessment: Tolerated treatment well  Patient exhibited good technique with therapeutic exercises  Pt cont to make steady gains with program  Pt rafael well with min c/o pain today  Plan: Continue per plan of care  RC next visit  Precautions: None at this time    Daily Treatment Diary    HEP: Handout provided and discussed      Manuals 2/16/22 2/21 2/24 2/28 3/3 3/10 3/14 3/17     ART x15' Hip flexors            PROM/Stretch  15' LE 15' LE 15' 15' 15' 15' 15'     IASTM             STM/Triggerpoint             JM                          Neuro Re-Ed             Supine Hip Flexor Stretch with PT Assist 4x20'' " "  "  "  " "  "     PPT  2/10 2/10 2/10 2/10 2/10 2/10 2/10     Bilateral Supine Piriformis Stretch  " ea /20" ea 20" ea 20" ea 20" ea 20" ea " ea                                                         Ther Ex             SKTC 3x5 ea 2/10 ea 2/10 ea 2/10 ea 2/10 ea 2/10 ea 2/10 ea 2/10 ea     LTR 2x10 ea 2/10 ea 2/10 ea 2/10 ea 2/10 ea 2/10 ea 2/10 ea 2/10 ea     PPT into Brdge with Add Sq  Next session      2/10     PB 3 way flexion  Next session 20x ea 20x ea 20x ea 20x ea 20x ea 20x ea                                                                      Ther Activity             NuStep  6' 10' 10' 10' 10' 10' 10'                  Gait Training                                       Modalities             MHP  10' knees bent supine 10' knees bent   10' knees bent 10' supine knees bent  10' supine knees bent   10' supine 10' supine knees bent     CP Supine Knees Bent x10'            US/Stim

## 2022-03-21 ENCOUNTER — OFFICE VISIT (OUTPATIENT)
Dept: PHYSICAL THERAPY | Facility: CLINIC | Age: 67
End: 2022-03-21
Payer: MEDICARE

## 2022-03-21 DIAGNOSIS — G89.29 CHRONIC BILATERAL LOW BACK PAIN WITH RIGHT-SIDED SCIATICA: Primary | ICD-10-CM

## 2022-03-21 DIAGNOSIS — M54.41 CHRONIC BILATERAL LOW BACK PAIN WITH RIGHT-SIDED SCIATICA: Primary | ICD-10-CM

## 2022-03-21 DIAGNOSIS — M54.6 BACK PAIN OF THORACOLUMBAR REGION: ICD-10-CM

## 2022-03-21 DIAGNOSIS — M54.10 BACK PAIN WITH RADICULOPATHY: ICD-10-CM

## 2022-03-21 DIAGNOSIS — M54.50 BACK PAIN OF THORACOLUMBAR REGION: ICD-10-CM

## 2022-03-21 PROCEDURE — 97140 MANUAL THERAPY 1/> REGIONS: CPT

## 2022-03-21 PROCEDURE — 97110 THERAPEUTIC EXERCISES: CPT

## 2022-03-21 PROCEDURE — 97530 THERAPEUTIC ACTIVITIES: CPT

## 2022-03-21 NOTE — PROGRESS NOTES
Daily Note     Today's date: 3/21/2022  Patient name: Janine Gray  : 1955  MRN: [id number]  Referring provider: Ronna Velázquez DO  Dx:   Encounter Diagnosis     ICD-10-CM    1  Chronic bilateral low back pain with right-sided sciatica  M54 41     G89 29    2  Back pain with radiculopathy  M54 10    3  Back pain of thoracolumbar region  M54 50     M54 6                   Subjective: Pt reports LS cont to do well  States some sx into R LE  However reports overall improvement  Objective: See treatment diary below      Assessment: Tolerated treatment well  Patient exhibited good technique with therapeutic exercises  Pt cont to have notable tightness and restrictions with HS flexiblity  Pt rafael program with cont improvement in radicular sx  Plan: Continue per plan of care        Precautions: None at this time    Daily Treatment Diary    HEP: Handout provided and discussed      Manuals 3/21 2/21 2/24 2/28 3/3 3/10 3/14 3/17     ART             PROM/Stretch 15' LE 15' LE 15' LE 15' 15' 15' 15' 15'     IASTM             STM/Triggerpoint             JM                          Neuro Re-Ed             Supine Hip Flexor Stretch with PT Assist 4x20'' " "  "  "  " "  "     PPT 2/10 2/10 2/10 2/10 2/10 2/10 2/10 2/10     Bilateral Supine Piriformis Stretch " ea " ea " ea " ea " ea " ea " ea " ea                                                         Ther Ex             SKTC 20x  ea 2/10 ea 2/10 ea 2/10 ea 2/10 ea 2/10 ea 2/10 ea 2/10 ea     LTR 2x10 ea 2/10 ea 2/10 ea 2/10 ea 2/10 ea 2/10 ea 2/10 ea 2/10 ea     PPT into Brdge with Add Sq 2/10 Next session      2/10     PB 3 way flexion 20x ea Next session 20x ea 20x ea 20x ea 20x ea 20x ea 20x ea                                                                      Ther Activity             NuStep 10' 6' 10' 10' 10' 10' 10' 10'                  Gait Training Modalities             MHP 10' supine knees bent 10' knees bent supine 10' knees bent  10' knees bent 10' supine knees bent  10' supine knees bent   10' supine 10' supine knees bent     CP Supine Knees Bent x10'            US/Stim

## 2022-03-22 ENCOUNTER — HOSPITAL ENCOUNTER (OUTPATIENT)
Dept: NON INVASIVE DIAGNOSTICS | Facility: HOSPITAL | Age: 67
Discharge: HOME | End: 2022-03-22
Attending: INTERNAL MEDICINE
Payer: MEDICARE

## 2022-03-22 DIAGNOSIS — M79.604 LEG PAIN, BILATERAL: ICD-10-CM

## 2022-03-22 DIAGNOSIS — I51.89 DIASTOLIC DYSFUNCTION: ICD-10-CM

## 2022-03-22 DIAGNOSIS — M79.605 LEG PAIN, BILATERAL: ICD-10-CM

## 2022-03-22 DIAGNOSIS — I87.303 STASIS EDEMA OF BOTH LOWER EXTREMITIES: ICD-10-CM

## 2022-03-22 PROCEDURE — 93970 EXTREMITY STUDY: CPT

## 2022-03-22 PROCEDURE — 93970 EXTREMITY STUDY: CPT | Performed by: SURGERY

## 2022-03-23 DIAGNOSIS — I87.303 STASIS EDEMA OF BOTH LOWER EXTREMITIES: Primary | ICD-10-CM

## 2022-03-24 ENCOUNTER — OFFICE VISIT (OUTPATIENT)
Dept: PHYSICAL THERAPY | Facility: CLINIC | Age: 67
End: 2022-03-24
Payer: MEDICARE

## 2022-03-24 DIAGNOSIS — M54.41 CHRONIC BILATERAL LOW BACK PAIN WITH RIGHT-SIDED SCIATICA: Primary | ICD-10-CM

## 2022-03-24 DIAGNOSIS — M54.6 BACK PAIN OF THORACOLUMBAR REGION: ICD-10-CM

## 2022-03-24 DIAGNOSIS — G89.29 CHRONIC BILATERAL LOW BACK PAIN WITH RIGHT-SIDED SCIATICA: Primary | ICD-10-CM

## 2022-03-24 DIAGNOSIS — M54.50 BACK PAIN OF THORACOLUMBAR REGION: ICD-10-CM

## 2022-03-24 DIAGNOSIS — M54.10 BACK PAIN WITH RADICULOPATHY: ICD-10-CM

## 2022-03-24 PROCEDURE — 97110 THERAPEUTIC EXERCISES: CPT

## 2022-03-24 PROCEDURE — 97140 MANUAL THERAPY 1/> REGIONS: CPT

## 2022-03-24 PROCEDURE — 97530 THERAPEUTIC ACTIVITIES: CPT

## 2022-03-24 NOTE — PROGRESS NOTES
Daily Note     Today's date: 3/24/2022  Patient name: Hiral Clark  : 1955  MRN: [id number]  Referring provider: Lynnette Corbin DO  Dx:   Encounter Diagnosis     ICD-10-CM    1  Chronic bilateral low back pain with right-sided sciatica  M54 41     G89 29    2  Back pain with radiculopathy  M54 10    3  Back pain of thoracolumbar region  M54 50     M54 6                   Subjective: Pt reports overall improvement in sx  Pt states no radicular sx or pain in LS today  Pt would like to DC next week as he will start PT with shoulder  Objective: See treatment diary below      Assessment: Tolerated treatment well  Patient exhibited good technique with therapeutic exercises  Pt cont to rafael program without c/o pain today  Pt with overall improved FOTO scores and strength overall  Plan: Continue per plan of care  DC next week        Precautions: None at this time    Daily Treatment Diary    HEP: Handout provided and discussed      Manuals 3/21 3/24 2/24 2/28 3/3 3/10 3/14 3/17     ART             PROM/Stretch 15' LE 15' LE 15' LE 15' 15' 15' 15' 15'     IASTM             STM/Triggerpoint             JM                          Neuro Re-Ed             Supine Hip Flexor Stretch with PT Assist 4x20'' " "  "  "  " "  "     PPT 2/10 2/10 2/10 2/10 2/10 2/10 2/10 2/10     Bilateral Supine Piriformis Stretch " ea " ea " ea " ea " ea " ea " ea " ea                                                         Ther Ex             SKTC 20x  ea 2/10 ea 2/10 ea 2/10 ea 2/10 ea 2/10 ea 2/10 ea 2/10 ea     LTR 2x10 ea 2/10 ea 2/10 ea 2/10 ea 2/10 ea 2/10 ea 2/10 ea 2/10 ea     PPT into Brdge with Add Sq 2/10 2/10      2/10     PB 3 way flexion 20x ea 20x ea 20x ea 20x ea 20x ea 20x ea 20x ea 20x ea                                                                      Ther Activity             NuStep 10' 10' 10' 10' 10' 10' 10' 10'                  Gait Training Modalities             MHP 10' supine knees bent 10' knees bent supine 10' knees bent  10' knees bent 10' supine knees bent  10' supine knees bent   10' supine 10' supine knees bent     CP Supine Knees Bent x10'            US/Stim

## 2022-03-28 ENCOUNTER — APPOINTMENT (OUTPATIENT)
Dept: PHYSICAL THERAPY | Facility: CLINIC | Age: 67
End: 2022-03-28
Payer: MEDICARE

## 2022-03-28 NOTE — PROGRESS NOTES
PT Re-Evaluation     Today's date: 3/24/2022  Patient name: Salena Walsh  : 1955  MRN: [id number]  Referring provider: Salena Gonzales DO  Dx:   Encounter Diagnosis     ICD-10-CM    1  Chronic bilateral low back pain with right-sided sciatica  M54 41     G89 29    2  Back pain with radiculopathy  M54 10    3   Back pain of thoracolumbar region  M54 50     M54 6        Start Time: 210  Stop Time: 315  Total time in clinic (min): 65 minutes    Assessment  Understanding of Dx/Px/POC: good   Prognosis: good    Goals  STGs: To be complete within 1-2 weeks (partially met)  - Decrease/centralize pain to < 2/10 at worst  - Increase lumbar extension ROM to WNL  - Increase bilateral hip flexor flexibility to WNL  - Increase core stability and posterior lateral LE chain strength to > 4+/5  - Improve postural awareness capacity to > 60min before deficit    LTGs: To be complete within 2-3 weeks (partially met)  - Able to stand and walk for any extended amount of time without pain or limitation for increased safety and functional capacity with ADLs and home-related duty  - Able to repetitively bend over at trunk without pain or limitation for increased safety and functional capacity with ADLs and and home-related duty  - Able to complete all lifting/carrying activity without pain or limitation for increased safety and functional capacity with ADLs and whome-related duty  - Able to complete transfers repetitively without pain or limitation for increased safety and functional capacity with ADLs and home-related duty    Plan  Planned therapy interventions: neuromuscular re-education, patient education, self care, therapeutic activities, therapeutic exercise and manual therapy  Frequency: 2x week  Duration in weeks: 6       Upon completion of today's re-evaluation, as evidenced by present objective and subjective measures, Mhamd's sx remain consistent with continued, well-paced progress from LBP associated with chronic Thoracolumbar and Lumbosacral DDD and OA  Pt will benefit from continued, skilled physical therapy to address current deficits  Subjective Evaluation    Pain  Current pain ratin  At best pain ratin  At worst pain ratin  Location: LBP    Patient Goals  Patient goals for therapy: increased strength, decreased pain and increased motion         Pt reports he feels > 75% improved since IE  Reports HEP continues to go well  Still feeling benefit from attending physical therapy at this time, as he feels progress remains to be made          Objective Pain level ranges 1-4/10  AROM: Lumbar flexion and rotation 50% decreased; Bilateral Hip extension 50% decreased  Strength: Core stability and post/lat LE chain strength 3+/5  Postural Awareness: Poor (flexed trunk, rounded shoulders)  Hip flexor flexibility: (+) Mode Test bilaterally  FOTO: 82; GOAL: 49  Unable stand without pain and limitation, but improving  Unable to walk without pain and limitation, but improving  Unable to bend over at trunk without pain and limitation, but improving  Unable to complete lifting/carrying activity without pain and limitation, but improving  Unable to complete transfers without pain and limitation, but improving    Flowsheet Rows      Most Recent Value   PT/OT G-Codes    Current Score 82   Projected Score 49

## 2022-03-29 ENCOUNTER — TELEPHONE (OUTPATIENT)
Dept: GASTROENTEROLOGY | Facility: CLINIC | Age: 67
End: 2022-03-29

## 2022-03-29 NOTE — TELEPHONE ENCOUNTER
Patients GI provider:  Concepción Montiel    Number to return call: [phone]    Reason for call: Pt calling to reschedule his procedure       Scheduled procedure/appointment date if applicable: procedure 5/15/06

## 2022-03-30 NOTE — TELEPHONE ENCOUNTER
Colon/egd rescheduled to 5/20/22 with Dr Ada Capellan at [facility]   Patient still has prep instructions

## 2022-03-31 ENCOUNTER — OFFICE VISIT (OUTPATIENT)
Dept: PHYSICAL THERAPY | Facility: CLINIC | Age: 67
End: 2022-03-31
Payer: MEDICARE

## 2022-03-31 DIAGNOSIS — M54.41 CHRONIC BILATERAL LOW BACK PAIN WITH RIGHT-SIDED SCIATICA: Primary | ICD-10-CM

## 2022-03-31 DIAGNOSIS — G89.29 CHRONIC BILATERAL LOW BACK PAIN WITH RIGHT-SIDED SCIATICA: Primary | ICD-10-CM

## 2022-03-31 DIAGNOSIS — M54.10 BACK PAIN WITH RADICULOPATHY: ICD-10-CM

## 2022-03-31 DIAGNOSIS — M54.6 BACK PAIN OF THORACOLUMBAR REGION: ICD-10-CM

## 2022-03-31 DIAGNOSIS — M54.50 BACK PAIN OF THORACOLUMBAR REGION: ICD-10-CM

## 2022-03-31 PROCEDURE — 97140 MANUAL THERAPY 1/> REGIONS: CPT

## 2022-03-31 PROCEDURE — 97530 THERAPEUTIC ACTIVITIES: CPT

## 2022-03-31 PROCEDURE — 97110 THERAPEUTIC EXERCISES: CPT

## 2022-03-31 NOTE — PROGRESS NOTES
Daily Note     Today's date: 3/31/2022  Patient name: Susie Weaver  : 1955  MRN: [id number]  Referring provider: Rosalba Higgins DO  Dx:   Encounter Diagnosis     ICD-10-CM    1  Chronic bilateral low back pain with right-sided sciatica  M54 41     G89 29    2  Back pain with radiculopathy  M54 10    3  Back pain of thoracolumbar region  M54 50     M54 6                   Subjective: Pt reports overall improved sx in LS  States doing well with LS at this time and will DC to HEP  Objective: See treatment diary below      Assessment: Tolerated treatment well  Patient exhibited good technique with therapeutic exercises  Pt made cont gains and progress toward goals  Pt will DC to HEP  Pt rafael program without c/o pain today  Plan: Continue per plan of care        Manuals 3/21 3/24 3/31 2/28 3/3 3/10 3/14 3/17       ART                       PROM/Stretch 13' LE 15' LE 15' LE 15' 15' 15' 15' 15'       IASTM                       STM/Triggerpoint                       JM                                               Neuro Re-Ed                       Supine Hip Flexor Stretch with PT Assist 4x20'' " "  "  "  " "  "       PPT 2/10 2/10 2/10 2/10 2/10 2/10 2/10 2/10       Bilateral Supine Piriformis Stretch " ea 20" ea 20" ea 20" ea 20" ea 20" ea 20" ea " ea                                                                                                       Ther Ex                       SKTC 20x  ea 2/10 ea 2/10 ea 2/10 ea 2/10 ea 2/10 ea 2/10 ea 2/10 ea       LTR 2x10 ea 2/10 ea 2/10 ea 2/10 ea 2/10 ea 2/10 ea 2/10 ea 2/10 ea       PPT into Brdge with Add Sq 2/10 2/10  2/10         2/10       PB 3 way flexion 20x ea 20x ea 20x ea 20x ea 20x ea 20x ea 20x ea 20x ea                                                                                                                               Ther Activity                       NuStep 10' 10' 10' 10' 10' 10' 10' 10'                               Gait Training                                                                       Modalities                       MHP 10' supine knees bent 10' knees bent supine 10' knees bent  10' knees bent 10' supine knees bent  10' supine knees bent   10' supine 10' supine knees bent       CP Supine Knees Bent x10'                     US/Stim

## 2022-03-31 NOTE — PROGRESS NOTES
PT Discharge    Today's date: 3/31/2022  Patient name: Hiral Clark  : 1955  MRN: [id number]  Referring provider: Lynnette Corbin DO  Dx:   Encounter Diagnosis     ICD-10-CM    1  Chronic bilateral low back pain with right-sided sciatica  M54 41     G89 29    2  Back pain with radiculopathy  M54 10    3  Back pain of thoracolumbar region  M54 50     M54 6        Start Time: 210  Stop Time: 320  Total time in clinic (min): 70 minutes      Goals  STGs: To be complete within 1-2 weeks (met)  - Decrease/centralize pain to < 2/10 at worst  - Increase lumbar extension ROM to WNL  - Increase bilateral hip flexor flexibility to WNL  - Increase core stability and posterior lateral LE chain strength to > 4+/5  - Improve postural awareness capacity to > 60min before deficit    LTGs: To be complete within 2-3 weeks (met)  - Able to stand and walk for any extended amount of time without pain or limitation for increased safety and functional capacity with ADLs and home-related duty  - Able to repetitively bend over at trunk without pain or limitation for increased safety and functional capacity with ADLs and and home-related duty  - Able to complete all lifting/carrying activity without pain or limitation for increased safety and functional capacity with ADLs and whome-related duty  - Able to complete transfers repetitively without pain or limitation for increased safety and functional capacity with ADLs and home-related duty       Pt will be D/C from physical therapy after today's session, as he has achieved all personal and functional goals  Pt has a good understanding of HEP and has been instructed to reach out with any questions/concerns/setbacks  Subjective Evaluation    Pain  Current pain ratin  At best pain ratin  At worst pain ratin  Location: LBP         Pt reports he feels ready to safely D/C to HEP at this time as he has achieved all personal and functional goals   Pt reports he will continue with HEP and reach out with any questions/concerns/setbacks          Objective Pain level ranges 1-4/10  AROM: Lumbar flexion and rotation 25% decreased; Bilateral Hip extension 25% decreased  Strength: Core stability and post/lat LE chain strength 4+/5  Postural Awareness: Fair (flexed trunk, rounded shoulders)  Hip flexor flexibility: (+) Mode Test bilaterally  FOTO: 82; GOAL: 52  Able stand without pain and limitation  Able to walk without pain and limitation  Able to bend over at trunk without pain and limitation  Able to complete lifting/carrying activity without pain and limitation  Unable to complete transfers without pain and limitation    Flowsheet Rows      Most Recent Value   PT/OT G-Codes    Current Score 82   Projected Score 49

## 2022-04-04 ENCOUNTER — RA CDI HCC (OUTPATIENT)
Dept: OTHER | Facility: HOSPITAL | Age: 67
End: 2022-04-04

## 2022-04-04 ENCOUNTER — APPOINTMENT (OUTPATIENT)
Dept: PHYSICAL THERAPY | Facility: CLINIC | Age: 67
End: 2022-04-04
Payer: MEDICARE

## 2022-04-04 NOTE — PROGRESS NOTES
E[address]  [facility] 75  coding opportunities          Chart Reviewed number of suggestions sent to Provider: 1     Patients Insurance     Medicare Insurance: [employer] all other ROS negative except as per HPI

## 2022-04-05 ENCOUNTER — EVALUATION (OUTPATIENT)
Dept: PHYSICAL THERAPY | Facility: CLINIC | Age: 67
End: 2022-04-05
Payer: MEDICARE

## 2022-04-05 DIAGNOSIS — M25.512 CHRONIC PAIN OF BOTH SHOULDERS: ICD-10-CM

## 2022-04-05 DIAGNOSIS — M75.32 CALCIFIC TENDINITIS OF BOTH SHOULDERS: Primary | ICD-10-CM

## 2022-04-05 DIAGNOSIS — M25.511 CHRONIC PAIN OF BOTH SHOULDERS: ICD-10-CM

## 2022-04-05 DIAGNOSIS — G89.29 CHRONIC PAIN OF BOTH SHOULDERS: ICD-10-CM

## 2022-04-05 DIAGNOSIS — M75.31 CALCIFIC TENDINITIS OF BOTH SHOULDERS: Primary | ICD-10-CM

## 2022-04-05 PROCEDURE — 97161 PT EVAL LOW COMPLEX 20 MIN: CPT | Performed by: PHYSICAL THERAPIST

## 2022-04-05 PROCEDURE — 97535 SELF CARE MNGMENT TRAINING: CPT | Performed by: PHYSICAL THERAPIST

## 2022-04-05 PROCEDURE — 97110 THERAPEUTIC EXERCISES: CPT | Performed by: PHYSICAL THERAPIST

## 2022-04-05 PROCEDURE — 97112 NEUROMUSCULAR REEDUCATION: CPT | Performed by: PHYSICAL THERAPIST

## 2022-04-05 NOTE — PROGRESS NOTES
PT Evaluation     Today's date: 2022  Patient name: Rebecca Branch  : 1955  MRN: [id number]  Referring provider: Corazon Booker MD  Dx:   Encounter Diagnosis     ICD-10-CM    1  Calcific tendinitis of both shoulders  M75 31 Ambulatory Referral to Physical Therapy    M75 32    2  Chronic pain of both shoulders  M25 511     G89 29     M25 512                   Assessment  Understanding of Dx/Px/POC: good   Prognosis: good    Goals  STGs: To be complete within 4 weeks  - Decrease pain to < 2/10 at worst  - Increase AROM to WNL  - Increase strength to > 4+/5  - Improve postural awareness capacity to > 60min before deficit    LTGs: To be complete within 6 weeks  - Able to repetitively complete all overhead activity without pain or limitation for increased safety and functional capacity with ADLs and home-related duty  - Able to repetitively complete all reaching activity without pain or limitation for increased safety and functional capacity with ADLs and home-related duty  - Able to repetitively complete all pushing/pulling activity without pain or limitation for increased safety and functional capacity with ADLs and home-related duty  - Able to complete all lifting/carrying activity without pain or limitation for increased safety and functional capacity with ADLs and home-related duty    Plan  Planned therapy interventions: manual therapy, neuromuscular re-education, patient education, self care, therapeutic activities and therapeutic exercise  Frequency: 2x week  Duration in weeks: 6       Pt is a 74 y o  male with bilateral shoulder pain who presents with functional deficits including decreased capacity with overhead activity, pushing/pulling, lifting/carrying, and behind the back/cross body reaching activity  Upon completion of today's initial evaluation, Mhamd's sx remain consistent with Bilateral Shoulder subacromial impingement and RTC tendonopathy   Patient will benefit from skilled physical therapy to address current deficits  Subjective Evaluation    Pain  Current pain ratin  At best pain ratin  At worst pain ratin  Location: Bilateral Shoulders    Patient Goals  Patient goals for therapy: increased strength, decreased pain and increased motion         Pt reports bilateral Shoulder pain that has continued to worsen to point where it is negatively impacting his overall safety and functional capacity with ADLs and home-related duty          Objective Pain level ranges 2-8/10  AROM: Bilateral Shoulder Abd and Flex 130 degrees (AAROM 165 degrees), ER 60 degrees (AAROM 70 degree), IR 40 degrees (AAROM 45 degrees)  Strength: Bilateral Shoulder Abd and Flex 3+/5; ER 3+/5; IR 5/5  Postural Awareness: Poor (rounded shoulders, forward head)  Special Tests: (+) Hawkin's, (+) Empty Can  FOTO: 48; GOAL: 66  Unable to complete overhead activity without pain and limitation  Unable to complete pushing/pulling activity without pain and limitation  Unable to complete lifting/carrying activity without pain and limitation  Unable to complete cross body/behind the back reaching activity without pain and limitation             Precautions: None at this time    Daily Treatment Diary    HEP: Handout provided and discussed      Manuals 22            ART             PROM/Stretch             IASTM             STM/Triggerpoint             JM                          Neuro Re-Ed             Scap Retract 3x10            No $ AROM 3x10            Bodyblade  2 way (Abd, ER) next session           Shoulder Isometrics Abd, ER 2x10 ea                                                   Ther Ex             TB No $ 3x10 L1            TB 70 degree Abd 3x10 L1            TB ER  Next session           Standing TB Ys, Ts   This session          TB Rows  Next session           TB LTP  Next session           SL ER, Abd  Next session                                     Ther Activity                                       Gait Training                                       Modalities             MHP             CP x10'            US/Stim

## 2022-04-07 ENCOUNTER — APPOINTMENT (OUTPATIENT)
Dept: PHYSICAL THERAPY | Facility: CLINIC | Age: 67
End: 2022-04-07
Payer: MEDICARE

## 2022-04-11 ENCOUNTER — APPOINTMENT (OUTPATIENT)
Dept: PHYSICAL THERAPY | Facility: CLINIC | Age: 67
End: 2022-04-11
Payer: MEDICARE

## 2022-04-12 ENCOUNTER — CONSULT (OUTPATIENT)
Dept: VASCULAR SURGERY | Facility: CLINIC | Age: 67
End: 2022-04-12
Payer: MEDICARE

## 2022-04-12 VITALS
BODY MASS INDEX: 42.66 KG/M2 | WEIGHT: 315 LBS | HEIGHT: 72 IN | SYSTOLIC BLOOD PRESSURE: 136 MMHG | DIASTOLIC BLOOD PRESSURE: 70 MMHG | HEART RATE: 56 BPM | OXYGEN SATURATION: 98 %

## 2022-04-12 DIAGNOSIS — I87.303 STASIS EDEMA OF BOTH LOWER EXTREMITIES: ICD-10-CM

## 2022-04-12 PROCEDURE — 99214 OFFICE O/P EST MOD 30 MIN: CPT | Performed by: PHYSICIAN ASSISTANT

## 2022-04-12 RX ORDER — CEPHALEXIN 500 MG/1
500 CAPSULE ORAL EVERY 12 HOURS SCHEDULED
Qty: 14 CAPSULE | Refills: 0 | Status: SHIPPED | OUTPATIENT
Start: 2022-04-12 | End: 2022-04-20 | Stop reason: SDUPTHER

## 2022-04-12 NOTE — PATIENT INSTRUCTIONS
Venous Insufficiency   AMBULATORY CARE:   Venous insufficiency  is a condition that prevents blood from flowing out of your legs and back to your heart  Veins contain valves that help blood flow in one direction  Venous insufficiency means the valves do not close correctly or fully  Blood flows back and pools in your leg  This can cause problems such as varicose veins  Venous insufficiency may also be called chronic venous insufficiency or venous stasis  Common signs and symptoms:   · Visible veins on your legs that may be small and red or large, thick, and blue         · Swelling in your ankles or calves         · Changes in skin color, such as dark or purple skin    · An ulcer (open sore) on your leg    · Leg pain that is worse when you are menstruating (women) or when you stand, and better when you elevate your legs    · Burning or itching    · Cramps that happen at night    · Thick, hard skin on your legs and ankles    · Feeling of heaviness in your legs    Seek care immediately if:   · You have a wound that does not heal or is infected  · You have an injury that has broken your skin and caused your varicose veins to bleed  · Your leg is swollen and hard  · You have pain in your leg that does not go away or gets worse  · Your legs or feet are turning blue or black  · Your leg feels warm, tender, and painful  It may look swollen and red  Call your doctor if:   · You have a fever  · You have varicose veins and they are painful  · You have new or worsening leg pain, swelling, or redness  · You have new or worsening ulcers or other sores on your leg  · You have questions or concerns about your condition or care  Treatment  may include any of the following:  · Medicine  may be given to improve blood flow  The medicines may thin your blood or reduce swelling to help blood flow  You may also need medicine to treat a bacterial infection      · Ablation  is a procedure used to close varicose veins  A catheter is guided until it is near the vein  A device will then be guided to the area  The device may produce energy through radiofrequency or a laser  The energy creates heat that will close the blood vessel  · Sclerotherapy  is a procedure used to fade visible veins  Your healthcare provider will inject a liquid into a spider vein or varicose vein  The liquid causes irritation in the vein  The vein swells and sticks together  Your body will then absorb the vein  · Surgery  may be needed if other treatments do not work  Surgery may be used to repair a leg vein valve or to clip or tie off a vein so blood cannot flow through it  You may need to have a veins removed during surgery called stripping  Surgery may be used to bypass (go around) the damaged vein  Blood will flow through a vein transplanted from another part of your body  Manage your symptoms:   · Wear pressure stockings as directed  Pressure stockings help keep blood from pooling in your leg veins  Your healthcare provider can prescribe stockings that are right for you  Do not buy over-the-counter pressure stockings unless your healthcare provider says it is okay  They may not fit correctly or may have elastic that cuts off your circulation  Ask your healthcare provider when to start wearing pressure stockings and how long to wear them each day  · Do not sit or stand for long periods of time  If you have to sit for a long time, flex and extend your legs, feet, and ankles  Do this about 10 times every 30 minutes to help keep blood flowing  If you have to stand for a long time, take breaks and sit with your legs elevated  · Elevate your legs  Elevate your legs above the level of your heart to reduce swelling  Your healthcare provider may recommend that you keep your legs elevated for 30 minutes at a time  You may need to do this 3 to 4 times per day, or more if your healthcare provider recommends           · Do not smoke   Nicotine and other chemicals in cigarettes and cigars can cause blood vessel damage  Ask your healthcare provider for information if you currently smoke and need help to quit  E-cigarettes or smokeless tobacco still contain nicotine  Talk to your healthcare provider before you use these products  · Reach or maintain a healthy weight  Extra weight can make venous insufficiency worse  Ask your healthcare provider what a healthy weight is for you  He or she can help you create a weight loss plan if you need to lose weight  · Exercise as directed  Walking can help increase blood flow in your calves  Ask your healthcare provider how much exercise you need each day and which exercises are best for you  · Care for your skin  Keep your skin clean  Do not use any soaps or lotions that may dry your skin  For example, do not use products that contain fragrance or alcohol  If you have a skin ulcer, your healthcare provider may recommend a wet-to-dry bandage  To do this, apply a wet bandage to your wound and allow it to dry  This will help remove drainage from your wound each time you change the bandage  Your healthcare provider will tell you how often to change your bandage and which kind of bandage to use  Check your wound for signs of infection, such as swelling or pus  · Go to physical therapy (PT) as directed  A physical therapist can help you increase movement and range of motion in your legs  Follow up with your doctor as directed:  Write down your questions so you remember to ask them during your visits  © Copyright [employer] 2022 Information is for End User's use only and may not be sold, redistributed or otherwise used for commercial purposes  All illustrations and images included in CareNotes® are the copyrighted property of [employer]  or Kurt Kidd   The above information is an  only   It is not intended as medical advice for individual conditions or treatments  Talk to your doctor, nurse or pharmacist before following any medical regimen to see if it is safe and effective for you  Lymphedema   AMBULATORY CARE:   The lymphatic system  contains fluid, vessels, tissue, and organs  This system removes and carries fluid throughout the body  It also helps the body fight infection  Lymphedema is the buildup of lymph fluid in fat tissue under your skin  The buildup causes the area to swell  Lymphedema can happen any time that lymphatic vessels are blocked or damaged  Damage to lymphatic vessels may be caused by surgery, infection, injury, cancer, radiation, or scar tissue     Common signs and symptoms include the following:  Signs and symptoms may happen where lymph nodes were removed, or in the arm, leg, chest, or underarm  · Swelling or itching    · Pain, burning, or aching    · Tight, hard, or red skin    · Hair loss    · Heaviness or fullness    · Numbness or tingling    · Stiffness    Contact your healthcare provider or lymphedema specialist if:   · You have a fever or chills  · You have an open area of skin that looks red or swollen, or drains pus  · Your symptoms, such as swelling or pain, get worse  · Your arms or legs feel heavy, or you cannot move them  · Your skin becomes hard, thick, or rough  · You have a skin wound that will not heal      · Your shoes, clothes, or jewelry feel tighter  · You have questions or concerns about your condition or care  Treatment for lymphedema  can relieve symptoms and prevent lymphedema from getting worse  You may need therapeutic massage, physical therapy, or compression devices to help decrease swelling and pain  Surgery may be needed if other treatments do not work  Self-care:   · Elevate  your arm or leg above the level of your heart as often as you can  This will help decrease swelling and pain  Prop your arm or leg on pillows or blankets to keep it elevated comfortably       · Wear compression socks, sleeves, or bandages  as directed  Compression devices must be fitted by a healthcare provider  Compression devices may need to be replaced every 3 to 6 months  · Exercise  can help you maintain or regain function of your arm or leg  Ask your healthcare provider what type of exercise to do and how often to do it  Start slow, take breaks, and gradually do more each day  Do not do vigorous, repeated exercises  Watch for changes in your arm or leg during exercise  Stop and rest if you have swelling, increased pain, or heaviness  Elevate your arm or leg above the level of your heart  · Change your position often  to help move lymphatic fluid through your body  Do not sit or  one position for more than 30 minutes  Do not cross your legs when you sit  These actions can cause lymphatic fluid to buildup  · Maintain a healthy weight  Ask your healthcare provider what you should weigh  Weight loss may improve your symptoms  If you need to lose weight, your healthcare provider can help you create a weight loss program     Prevent infection with proper skin care:  A skin infection can make lymphedema worse  Do the following to decrease your risk for a skin infection in your arm or leg with lymphedema:  · Wash your skin gently and dry it well  Use a mild soap to wash your skin  Gently pat your skin dry after you bathe  Apply a mild cream or lotion to moisturize your skin and prevent dryness or cracking  Keep your feet clean and dry  · Protect your skin from injury  Wear gloves when you garden and wash dishes  Cut your nails straight across to prevent injury to your fingers and toes  Use sunscreen and insect repellant to avoid burns and punctures  Wear slippers in the house  Wear shoes when you go outside  · Check your skin every day for signs of infection  Signs of infection include redness, swelling, increased heat, or pus  You may also have a fever or chills       · Care for cuts, scratches or burns  Apply antibiotic ointment to cuts and other small breaks in the skin  Apply a cold pack or cold water to a burn for 15 minutes  Then wash it with soap and water  Cover scratches, cuts, or burns with a clean, dry gauze or bandage as directed  Keep the area clean and dry  · Tell healthcare providers that you have lymphedema  Tell them not to do, IVs, blood draws, and blood pressure readings in the arm or leg with lymphedema  If there is lymphedema in both arms, ask them to take your blood pressure on your leg  Do not allow flu shots or vaccinations in your arm with lymphedema  Follow up with your healthcare provider or lymphedema specialist as directed: You will need regular visits so healthcare providers can examine the affected areas  You may also be referred to a clinic that specializes in lymphedema treatment  Write down your questions so you remember to ask them during your visits  © Copyright [employer] 2022 Information is for End User's use only and may not be sold, redistributed or otherwise used for commercial purposes  All illustrations and images included in CareNotes® are the copyrighted property of [employer]  or Kurt Kidd   The above information is an  only  It is not intended as medical advice for individual conditions or treatments  Talk to your doctor, nurse or pharmacist before following any medical regimen to see if it is safe and effective for you
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
## 2022-04-13 ENCOUNTER — APPOINTMENT (OUTPATIENT)
Dept: LAB | Facility: MEDICAL CENTER | Age: 67
End: 2022-04-13
Payer: MEDICARE

## 2022-04-13 DIAGNOSIS — E11.9 TYPE 2 DIABETES MELLITUS WITHOUT COMPLICATION, WITHOUT LONG-TERM CURRENT USE OF INSULIN (HCC): ICD-10-CM

## 2022-04-13 LAB
ALBUMIN SERPL BCP-MCNC: 3.8 G/DL (ref 3.5–5)
ALP SERPL-CCNC: 37 U/L (ref 46–116)
ALT SERPL W P-5'-P-CCNC: 29 U/L (ref 12–78)
ANION GAP SERPL CALCULATED.3IONS-SCNC: 2 MMOL/L (ref 4–13)
AST SERPL W P-5'-P-CCNC: 20 U/L (ref 5–45)
BILIRUB SERPL-MCNC: 0.97 MG/DL (ref 0.2–1)
BUN SERPL-MCNC: 19 MG/DL (ref 5–25)
CALCIUM SERPL-MCNC: 9.7 MG/DL (ref 8.3–10.1)
CHLORIDE SERPL-SCNC: 109 MMOL/L (ref 100–108)
CO2 SERPL-SCNC: 28 MMOL/L (ref 21–32)
CREAT SERPL-MCNC: 1.08 MG/DL (ref 0.6–1.3)
EST. AVERAGE GLUCOSE BLD GHB EST-MCNC: 131 MG/DL
GFR SERPL CREATININE-BSD FRML MDRD: 70 ML/MIN/1.73SQ M
GLUCOSE P FAST SERPL-MCNC: 132 MG/DL (ref 65–99)
HBA1C MFR BLD: 6.2 %
POTASSIUM SERPL-SCNC: 4.1 MMOL/L (ref 3.5–5.3)
PROT SERPL-MCNC: 7.4 G/DL (ref 6.4–8.2)
SODIUM SERPL-SCNC: 139 MMOL/L (ref 136–145)

## 2022-04-13 PROCEDURE — 83036 HEMOGLOBIN GLYCOSYLATED A1C: CPT

## 2022-04-13 PROCEDURE — 36415 COLL VENOUS BLD VENIPUNCTURE: CPT

## 2022-04-13 PROCEDURE — 80053 COMPREHEN METABOLIC PANEL: CPT

## 2022-04-14 ENCOUNTER — APPOINTMENT (OUTPATIENT)
Dept: PHYSICAL THERAPY | Facility: CLINIC | Age: 67
End: 2022-04-14
Payer: MEDICARE

## 2022-04-18 ENCOUNTER — APPOINTMENT (OUTPATIENT)
Dept: PHYSICAL THERAPY | Facility: CLINIC | Age: 67
End: 2022-04-18
Payer: MEDICARE

## 2022-04-20 ENCOUNTER — OFFICE VISIT (OUTPATIENT)
Dept: FAMILY MEDICINE CLINIC | Facility: CLINIC | Age: 67
End: 2022-04-20
Payer: MEDICARE

## 2022-04-20 VITALS
DIASTOLIC BLOOD PRESSURE: 76 MMHG | SYSTOLIC BLOOD PRESSURE: 122 MMHG | BODY MASS INDEX: 42.26 KG/M2 | WEIGHT: 312 LBS | RESPIRATION RATE: 18 BRPM | HEART RATE: 78 BPM | HEIGHT: 72 IN | TEMPERATURE: 98 F

## 2022-04-20 DIAGNOSIS — E11.69 DIABETES MELLITUS TYPE 2 IN OBESE (HCC): ICD-10-CM

## 2022-04-20 DIAGNOSIS — I87.303 STASIS EDEMA OF BOTH LOWER EXTREMITIES: ICD-10-CM

## 2022-04-20 DIAGNOSIS — E66.01 MORBID OBESITY (HCC): ICD-10-CM

## 2022-04-20 DIAGNOSIS — E66.9 DIABETES MELLITUS TYPE 2 IN OBESE (HCC): ICD-10-CM

## 2022-04-20 DIAGNOSIS — I89.0 LYMPHEDEMA: Primary | ICD-10-CM

## 2022-04-20 PROCEDURE — 92250 FUNDUS PHOTOGRAPHY W/I&R: CPT | Performed by: INTERNAL MEDICINE

## 2022-04-20 PROCEDURE — 99214 OFFICE O/P EST MOD 30 MIN: CPT | Performed by: INTERNAL MEDICINE

## 2022-04-20 RX ORDER — CEPHALEXIN 500 MG/1
500 CAPSULE ORAL EVERY 12 HOURS SCHEDULED
Qty: 14 CAPSULE | Refills: 0 | Status: SHIPPED | OUTPATIENT
Start: 2022-04-20 | End: 2022-04-27

## 2022-04-20 NOTE — PROGRESS NOTES
Assessment/Plan:         Diagnoses and all orders for this visit:    Lymphedema  -     Ambulatory Referral to PT/OT Lymphedema Therapy; Future  Reviewed vascular note and pt agrees to Lymphedema therapy  Wear compressions socks  Refilled keflex as not fully resolved infection     Stasis edema of both lower extremities  -     cephalexin (KEFLEX) 500 mg capsule; Take 1 capsule (500 mg total) by mouth every 12 (twelve) hours for 7 days    Diabetes mellitus type 2 in obese (HCC)  -     HEMOGLOBIN A1C W/ EAG ESTIMATION; Future  -     IRIS Diabetic eye exam  Pt aware of increased A1c and will work on diet changes and exercise  He wants to lose more weight and is trying intermittent fast for current Yazidism period but may continue    Morbid obesity ([facility] [address] )  Pt is working on weight loss and has done so in past He is committed to improving his diet and cutting out unhealthy portions         Rto 3months with A1c      Patient ID: Caitlyn Oliveira is a 74 y o  male  HPI   Pt doing ok but still has some tenderness left calf It is better than it was as he did see vascular and they gave him an antibiotic He generally wears his compression socks which have been helpful No chest pain and not sob Swelling of lower legs has improved He is aware sugar has trended up and admits his diet not optimal during past couple months but he is working on that         Review of Systems   Constitutional: Negative for activity change, chills and fever  HENT: Negative  Respiratory: Negative for cough and shortness of breath  Cardiovascular: Positive for leg swelling  Negative for chest pain and palpitations  Gastrointestinal: Negative for abdominal distention and abdominal pain  Genitourinary: Negative  Musculoskeletal: Positive for arthralgias and gait problem  Neurological: Negative for dizziness, light-headedness and numbness  Psychiatric/Behavioral: Negative for sleep disturbance  The patient is not nervous/anxious  Past Medical History:   Diagnosis Date    Arthritis     last assessed 7/1/15    Diabetes mellitus ([facility] [address] )     type 2-last assessed 1/28/15    GERD (gastroesophageal reflux disease)     Hypertension     Irregular heart beat     last assessed 7/1/15    Palpitations     last assessed 9/7/17    Sexual dysfunction     last assessed 7/1/15    Thyroid disease     last assessed 7/1/15     Past Surgical History:   Procedure Laterality Date    COLONOSCOPY      TONSILLECTOMY       Social History     Socioeconomic History    Marital status: Single     Spouse name: Not on file    Number of children: Not on file    Years of education: Not on file    Highest education level: Not on file   Occupational History    Not on file   Tobacco Use    Smoking status: Former Smoker    Smokeless tobacco: Never Used    Tobacco comment: former smoker-quit 17 yrs ago 1pppd x 30 yrs as per [employer]   Vaping Use    Vaping Use: Never used   Substance and Sexual Activity    Alcohol use: Never    Drug use: Never    Sexual activity: Not on file   Other Topics Concern    Not on file   Social History Narrative    Not on file     Social Determinants of Health     Financial Resource Strain: Not on file   Food Insecurity: Not on file   Transportation Needs: Not on file   Physical Activity: Not on file   Stress: Not on file   Social Connections: Not on file   Intimate Partner Violence: Not on file   Housing Stability: Not on file     Allergies   Allergen Reactions    Acetaminophen Other (See Comments)     Other reaction(s): Unknown Reaction  "I had to go to the hospital and get shots after taking it 20yrs ago"            /76   Pulse 78   Temp 98 °F (36 7 °C) (Temporal)   Resp 18   Ht 6' (1 829 m)   Wt (!) 142 kg (312 lb)   BMI 42 31 kg/m²          Physical Exam  Vitals reviewed  Constitutional:       General: He is not in acute distress  Appearance: Normal appearance   He is not ill-appearing, toxic-appearing or diaphoretic  HENT:      Head: Normocephalic and atraumatic  Right Ear: External ear normal       Left Ear: External ear normal       Nose: Nose normal       Mouth/Throat:      Mouth: Mucous membranes are dry  Eyes:      General: No scleral icterus  Extraocular Movements: Extraocular movements intact  Conjunctiva/sclera: Conjunctivae normal       Pupils: Pupils are equal, round, and reactive to light  Cardiovascular:      Rate and Rhythm: Normal rate and regular rhythm  Pulses: Normal pulses  Heart sounds: Normal heart sounds  Pulmonary:      Effort: Pulmonary effort is normal  No respiratory distress  Breath sounds: Normal breath sounds  No wheezing  Abdominal:      General: Bowel sounds are normal  There is no distension  Palpations: Abdomen is soft  Tenderness: There is no abdominal tenderness  Musculoskeletal:         General: Tenderness present  Cervical back: Normal range of motion and neck supple  No rigidity  Right lower leg: No edema  Left lower leg: Edema present  Comments: Slight redness and tenderness inner left mid calf    Lymphadenopathy:      Cervical: No cervical adenopathy  Skin:     General: Skin is dry  Coloration: Skin is not jaundiced or pale  Neurological:      General: No focal deficit present  Mental Status: He is alert and oriented to person, place, and time  Mental status is at baseline  Cranial Nerves: No cranial nerve deficit  Motor: No weakness  Gait: Gait normal    Psychiatric:         Mood and Affect: Mood normal          Behavior: Behavior normal          Thought Content:  Thought content normal          Judgment: Judgment normal

## 2022-04-21 ENCOUNTER — APPOINTMENT (OUTPATIENT)
Dept: PHYSICAL THERAPY | Facility: CLINIC | Age: 67
End: 2022-04-21
Payer: MEDICARE

## 2022-04-21 LAB
LEFT EYE DIABETIC RETINOPATHY: NORMAL
LEFT EYE IMAGE QUALITY: NORMAL
LEFT EYE MACULAR EDEMA: NORMAL
LEFT EYE OTHER RETINOPATHY: NORMAL
RIGHT EYE DIABETIC RETINOPATHY: NORMAL
RIGHT EYE IMAGE QUALITY: NORMAL
RIGHT EYE MACULAR EDEMA: NORMAL
RIGHT EYE OTHER RETINOPATHY: NORMAL
SEVERITY (EYE EXAM): NORMAL

## 2022-04-25 ENCOUNTER — APPOINTMENT (OUTPATIENT)
Dept: PHYSICAL THERAPY | Facility: CLINIC | Age: 67
End: 2022-04-25
Payer: MEDICARE

## 2022-04-27 ENCOUNTER — TELEPHONE (OUTPATIENT)
Dept: FAMILY MEDICINE CLINIC | Facility: CLINIC | Age: 67
End: 2022-04-27

## 2022-04-27 NOTE — TELEPHONE ENCOUNTER
He was supposed to start lymphedema therapy (last visit note states agreed to that) Did he scheduled that?

## 2022-04-27 NOTE — TELEPHONE ENCOUNTER
I refilled original rx from vascular - he may have discoloration longer term but not infection If no drainage would start lymphedema therapy 5/3  Use compression stockings, limit sodium

## 2022-04-28 ENCOUNTER — APPOINTMENT (OUTPATIENT)
Dept: PHYSICAL THERAPY | Facility: CLINIC | Age: 67
End: 2022-04-28
Payer: MEDICARE

## 2022-05-05 ENCOUNTER — EVALUATION (OUTPATIENT)
Dept: PHYSICAL THERAPY | Facility: HOME HEALTHCARE | Age: 67
End: 2022-05-05
Payer: MEDICARE

## 2022-05-05 DIAGNOSIS — I89.0 LYMPHEDEMA: ICD-10-CM

## 2022-05-05 PROCEDURE — 97140 MANUAL THERAPY 1/> REGIONS: CPT | Performed by: PHYSICAL THERAPIST

## 2022-05-05 PROCEDURE — 97162 PT EVAL MOD COMPLEX 30 MIN: CPT | Performed by: PHYSICAL THERAPIST

## 2022-05-05 NOTE — PROGRESS NOTES
PT Evaluation     Today's date: 2022  Patient name: Rebecca Branch  : 1955  MRN: [id number]  Referring provider: Heber Vaughn DO  Dx:   Encounter Diagnosis     ICD-10-CM    1  Lymphedema  I89 0 Ambulatory Referral to PT/OT Lymphedema Therapy                  Assessment  Assessment details: Pt presents to OPPT with s/s consistent with B LE secondary to CVI  PT interventions to include CDT (complete decongestive therapy), including MLD (manual lymphatic drainage) and compression therapy using short stretch bandages as able  PT to further provide pt with extensive education on self wrapping, self MLD techniques, and skin inspection  Patient will transition to a long term maintenance with appropriate compression garment once maximal decongestion of limb has been achieved  Thank you!     Impairments: abnormal gait, abnormal or restricted ROM, abnormal movement, activity intolerance, difficulty understanding, impaired balance, impaired physical strength, lacks appropriate home exercise program, safety issue and weight-bearing intolerance    Goals  STG: within 4 weeks   Demonstrate at least 75% compliance with self MLD   Demonstrate at least 75% compliance with compression schedule   Demonstrate at least 75% compliance with self skin and nail inspection   Demonstrate understanding of importance of compliance with POC   Free from all s/s of infection in limb    LTG upon DC:   Demonstrate 100% compliance with self MLD  Demonstrate 100% compliance with compression schedule  Demonstrate 100% compliance with skin and nail inspection  Pt remains free from s/s of infection  Pt able to obtain alternative compression garment to transition to self management      Plan  Patient would benefit from: skilled physical therapy  Planned therapy interventions: manual therapy, abdominal trunk stabilization, balance, neuromuscular re-education, patient education, self care, therapeutic training, therapeutic exercise, functional ROM exercises and compression  Frequency: 3x week  Duration in weeks: 4  Plan of Care beginning date: 5/5/2022  Plan of Care expiration date: 6/6/2022  Treatment plan discussed with: patient        Subjective Evaluation    History of Present Illness  Mechanism of injury: Pt reporting that he has noticed an increased in swelling in his legs over the past 2 months  He also had redness on the L lower leg and was put on antibiotics twice with no changes in coloration  He has been to vascular MD and had doppler completed which was (-) for clots  He followed up with PCP who is now referring to OPPT for lymphedema management  Quality of life: good    Pain  No pain reported    Social Support    Employment status: not working  Treatments  Current treatment: physical therapy  Patient Goals  Patient goal: "decreased the swelling in the legs"         Objective     General Comments:       Ankle/Foot Comments   Lymphedema History  Primary/Secondary: SECONDARY   Contributing Factor: CVI   Diagnostic testing: Doppler (-)   Previous treatments: compression garments   Infection history: NO    MLD Precautions/Contraindications   Age > 60yr old: YES  HTN:  YES  Cardia Edema: NO    Acute DVT/hx of DVT: NO    Acute infection:  NO  Renal dysfunction:  NO  Malignant disease:  NO  Pacemaker: NO    Bandaging Precautions/Contraindications    Diabetes: YES  Neuropathy:  YES  Vascular disease: YES    SKIN INSPECTION   Hemosiderin staining  (+) mild L lower leg   Skin discoloration (+)   Open wounds/Infection  (-)   Firm/Sponge (+)   Weeping  (-)   Pitting edema  (+)   Skin dryness/flaking (+) mild dryness           GIRTH   LE Girth measurements  Right  Left   Mid foot    Malleolus     +8 cm    +16 cm    +24 cm    Knee joint line

## 2022-05-09 ENCOUNTER — APPOINTMENT (EMERGENCY)
Dept: RADIOLOGY | Facility: HOSPITAL | Age: 67
End: 2022-05-09
Payer: MEDICARE

## 2022-05-09 ENCOUNTER — HOSPITAL ENCOUNTER (EMERGENCY)
Facility: HOSPITAL | Age: 67
Discharge: HOME | End: 2022-05-09
Attending: EMERGENCY MEDICINE | Admitting: EMERGENCY MEDICINE
Payer: MEDICARE

## 2022-05-09 VITALS
DIASTOLIC BLOOD PRESSURE: 62 MMHG | HEIGHT: 72 IN | SYSTOLIC BLOOD PRESSURE: 144 MMHG | TEMPERATURE: 98 F | HEART RATE: 60 BPM | RESPIRATION RATE: 13 BRPM | BODY MASS INDEX: 42.52 KG/M2 | WEIGHT: 313.94 LBS | OXYGEN SATURATION: 94 %

## 2022-05-09 DIAGNOSIS — R07.89 ATYPICAL CHEST PAIN: ICD-10-CM

## 2022-05-09 DIAGNOSIS — K21.9 GERD (GASTROESOPHAGEAL REFLUX DISEASE): Primary | ICD-10-CM

## 2022-05-09 LAB
ANION GAP SERPL CALCULATED.3IONS-SCNC: 8 MMOL/L (ref 4–13)
APTT PPP: 29 SECONDS (ref 23–37)
BASOPHILS # BLD AUTO: 0.04 THOUSANDS/ΜL (ref 0–0.1)
BASOPHILS NFR BLD AUTO: 1 % (ref 0–1)
BUN SERPL-MCNC: 21 MG/DL (ref 5–25)
CALCIUM SERPL-MCNC: 8.8 MG/DL (ref 8.3–10.1)
CARDIAC TROPONIN I PNL SERPL HS: 5 NG/L (ref 8–18)
CHLORIDE SERPL-SCNC: 104 MMOL/L (ref 100–108)
CO2 SERPL-SCNC: 27 MMOL/L (ref 21–32)
CREAT SERPL-MCNC: 1.15 MG/DL (ref 0.6–1.3)
EOSINOPHIL # BLD AUTO: 0.01 THOUSAND/ΜL (ref 0–0.61)
EOSINOPHIL NFR BLD AUTO: 0 % (ref 0–6)
ERYTHROCYTE [DISTWIDTH] IN BLOOD BY AUTOMATED COUNT: 12.6 % (ref 11.6–15.1)
GFR SERPL CREATININE-BSD FRML MDRD: 65 ML/MIN/1.73SQ M
GLUCOSE SERPL-MCNC: 140 MG/DL (ref 65–140)
HCT VFR BLD AUTO: 42.5 % (ref 36.5–49.3)
HGB BLD-MCNC: 13.9 G/DL (ref 12–17)
IMM GRANULOCYTES # BLD AUTO: 0.02 THOUSAND/UL (ref 0–0.2)
IMM GRANULOCYTES NFR BLD AUTO: 0 % (ref 0–2)
INR PPP: 1.1 (ref 0.84–1.19)
LYMPHOCYTES # BLD AUTO: 1.58 THOUSANDS/ΜL (ref 0.6–4.47)
LYMPHOCYTES NFR BLD AUTO: 28 % (ref 14–44)
MCH RBC QN AUTO: 29.4 PG (ref 26.8–34.3)
MCHC RBC AUTO-ENTMCNC: 32.7 G/DL (ref 31.4–37.4)
MCV RBC AUTO: 90 FL (ref 82–98)
MONOCYTES # BLD AUTO: 0.54 THOUSAND/ΜL (ref 0.17–1.22)
MONOCYTES NFR BLD AUTO: 10 % (ref 4–12)
NEUTROPHILS # BLD AUTO: 3.43 THOUSANDS/ΜL (ref 1.85–7.62)
NEUTS SEG NFR BLD AUTO: 61 % (ref 43–75)
NRBC BLD AUTO-RTO: 0 /100 WBCS
PLATELET # BLD AUTO: 179 THOUSANDS/UL (ref 149–390)
PMV BLD AUTO: 11.3 FL (ref 8.9–12.7)
POTASSIUM SERPL-SCNC: 3.7 MMOL/L (ref 3.5–5.3)
PROTHROMBIN TIME: 13.7 SECONDS (ref 11.6–14.5)
RBC # BLD AUTO: 4.73 MILLION/UL (ref 3.88–5.62)
SODIUM SERPL-SCNC: 139 MMOL/L (ref 136–145)
WBC # BLD AUTO: 5.62 THOUSAND/UL (ref 4.31–10.16)

## 2022-05-09 PROCEDURE — 36415 COLL VENOUS BLD VENIPUNCTURE: CPT | Performed by: EMERGENCY MEDICINE

## 2022-05-09 PROCEDURE — 85610 PROTHROMBIN TIME: CPT | Performed by: EMERGENCY MEDICINE

## 2022-05-09 PROCEDURE — 84484 ASSAY OF TROPONIN QUANT: CPT | Performed by: EMERGENCY MEDICINE

## 2022-05-09 PROCEDURE — 85730 THROMBOPLASTIN TIME PARTIAL: CPT | Performed by: EMERGENCY MEDICINE

## 2022-05-09 PROCEDURE — 85025 COMPLETE CBC W/AUTO DIFF WBC: CPT | Performed by: EMERGENCY MEDICINE

## 2022-05-09 PROCEDURE — 71045 X-RAY EXAM CHEST 1 VIEW: CPT

## 2022-05-09 PROCEDURE — 99284 EMERGENCY DEPT VISIT MOD MDM: CPT | Performed by: EMERGENCY MEDICINE

## 2022-05-09 PROCEDURE — 99285 EMERGENCY DEPT VISIT HI MDM: CPT

## 2022-05-09 PROCEDURE — 80048 BASIC METABOLIC PNL TOTAL CA: CPT | Performed by: EMERGENCY MEDICINE

## 2022-05-09 RX ORDER — ALUMINUM HYDROXIDE, MAGNESIUM HYDROXIDE, SIMETHICONE 400; 400; 40 MG/10ML; MG/10ML; MG/10ML
20 SUSPENSION ORAL
Qty: 710 ML | Refills: 0 | Status: SHIPPED | OUTPATIENT
Start: 2022-05-09

## 2022-05-09 RX ORDER — FAMOTIDINE 20 MG/1
20 TABLET, FILM COATED ORAL ONCE
Status: COMPLETED | OUTPATIENT
Start: 2022-05-09 | End: 2022-05-09

## 2022-05-09 RX ORDER — MAGNESIUM HYDROXIDE/ALUMINUM HYDROXICE/SIMETHICONE 120; 1200; 1200 MG/30ML; MG/30ML; MG/30ML
30 SUSPENSION ORAL ONCE
Status: COMPLETED | OUTPATIENT
Start: 2022-05-09 | End: 2022-05-09

## 2022-05-09 RX ADMIN — ALUMINA, MAGNESIA, AND SIMETHICONE ORAL SUSPENSION REGULAR STRENGTH 30 ML: 1200; 1200; 120 SUSPENSION ORAL at 22:06

## 2022-05-09 RX ADMIN — FAMOTIDINE 20 MG: 20 TABLET ORAL at 22:06

## 2022-05-10 ENCOUNTER — TELEPHONE (OUTPATIENT)
Dept: GASTROENTEROLOGY | Facility: CLINIC | Age: 67
End: 2022-05-10

## 2022-05-10 NOTE — ED PROVIDER NOTES
History  Chief Complaint   Patient presents with    Chest Pain     mid chest pain that started approx 3 hours ago  states if feels like a sharp pain     This is a 53-year-old male with a history of hypertension, diabetes who presents with chest pain  At around 6:00 p m  This evening, patient started to experience a substernal chest pain described as sharp, nonradiating, without any associated symptoms  Denies any alleviating or exacerbating factors  He has experienced similar symptoms in the past associated with GERD  States that this feels the same, but the pain is lasting longer than usual   Denies any exertional component  Denies any associated nausea/vomiting or diaphoresis  Denies fever/chills, nausea/vomiting, lightheadedness/dizziness, numbness/weakness, headache, change in vision, URI symptoms, neck pain, palpitations, shortness of breath, cough, back pain, flank pain, abdominal pain, diarrhea, hematochezia, melena, dysuria, hematuria  Prior to Admission Medications   Prescriptions Last Dose Informant Patient Reported? Taking?    Lancets (onetouch ultrasoft) lancets  Self No No   Sig: Check glucose daily   Red Yeast Rice Extract (RED YEAST RICE PO)  Self Yes No   Sig: Take by mouth   Vitamin D, Cholecalciferol, 50 MCG (2000 UT) CAPS  Self Yes No   Sig: Take by mouth   amLODIPine (NORVASC) 5 mg tablet  Self No No   Sig: Take 1 tablet (5 mg total) by mouth daily For blood pressure   aspirin 81 mg chewable tablet  Self Yes No   Sig: Chew 81 mg daily   atorvastatin (LIPITOR) 10 mg tablet  Self No No   Sig: Take 1 tablet (10 mg total) by mouth daily   enalapril (VASOTEC) 10 mg tablet  Self No No   Sig: Take 1 tablet (10 mg total) by mouth daily   furosemide (LASIX) 40 mg tablet  Self No No   Sig: Take 1 tablet (40 mg total) by mouth daily   glucose blood (OneTouch Verio) test strip  Self No No   Sig: Use 1 each daily   ketoconazole (NIZORAL) 2 % shampoo   Yes No   metFORMIN (GLUCOPHAGE) 500 mg tablet  Self No No   Sig: Take 1 tablet (500 mg total) by mouth daily with breakfast One tab in am   metoprolol succinate (TOPROL-XL) 25 mg 24 hr tablet  Self No No   Sig: Take 1 tablet (25 mg total) by mouth daily   metoprolol succinate (TOPROL-XL) 50 mg 24 hr tablet  Self No No   Sig: Take 1 tablet (50 mg total) by mouth daily   multivitamin (THERAGRAN) TABS  Self Yes No   Sig: Take 1 tablet by mouth daily   omeprazole (PriLOSEC) 40 MG capsule  Self No No   Sig: Take 1 capsule (40 mg total) by mouth daily   potassium chloride (K-DUR,KLOR-CON) 20 mEq tablet  Self No No   Sig: Take 1 tablet (20 mEq total) by mouth daily   triamcinolone (KENALOG) 0 1 % lotion   Yes No   triamcinolone (KENALOG) 0 5 % cream   Yes No      Facility-Administered Medications: None       Past Medical History:   Diagnosis Date    Arthritis     last assessed 7/1/15    Diabetes mellitus ([facility] [address] )     type 2-last assessed 1/28/15    GERD (gastroesophageal reflux disease)     Hypertension     Irregular heart beat     last assessed 7/1/15    Palpitations     last assessed 9/7/17    Sexual dysfunction     last assessed 7/1/15    Thyroid disease     last assessed 7/1/15       Past Surgical History:   Procedure Laterality Date    COLONOSCOPY      TONSILLECTOMY         Family History   Problem Relation Age of Onset    Hypertension Mother         essential    Stroke Mother     Hypertension Father         essential    Heart defect Father         cardiac disorder    Stroke Brother     Hypertension Brother      I have reviewed and agree with the history as documented      E-Cigarette/Vaping    E-Cigarette Use Never User      E-Cigarette/Vaping Substances    Nicotine No     THC No     CBD No     Flavoring No     Other No     Unknown No      Social History     Tobacco Use    Smoking status: Former Smoker    Smokeless tobacco: Never Used    Tobacco comment: former smoker-quit 17 yrs ago 1pppd x 30 yrs as per [employer]   Vaping Use    Vaping Use: Never used   Substance Use Topics    Alcohol use: Never    Drug use: Never       Review of Systems   Constitutional: Negative for chills, fatigue and fever  HENT: Negative for rhinorrhea, sore throat and trouble swallowing  Eyes: Negative for photophobia and visual disturbance  Respiratory: Negative for cough, chest tightness and shortness of breath  Cardiovascular: Positive for chest pain  Negative for palpitations and leg swelling  Gastrointestinal: Negative for abdominal pain, blood in stool, diarrhea, nausea and vomiting  Endocrine: Negative for polyuria  Genitourinary: Negative for dysuria, flank pain and hematuria  Musculoskeletal: Negative for back pain and neck pain  Skin: Negative for color change and rash  Allergic/Immunologic: Negative for immunocompromised state  Neurological: Negative for dizziness, weakness, light-headedness, numbness and headaches  All other systems reviewed and are negative  Physical Exam  Physical Exam  Constitutional:       General: He is not in acute distress  Appearance: Normal appearance  He is well-developed  HENT:      Mouth/Throat:      Pharynx: Uvula midline  Eyes:      General: Lids are normal       Conjunctiva/sclera: Conjunctivae normal       Pupils: Pupils are equal, round, and reactive to light  Neck:      Thyroid: No thyroid mass or thyromegaly  Trachea: Trachea normal    Cardiovascular:      Rate and Rhythm: Normal rate and regular rhythm  Heart sounds: Normal heart sounds  No murmur heard  Pulmonary:      Effort: Pulmonary effort is normal       Breath sounds: Normal breath sounds  Abdominal:      General: Bowel sounds are normal       Palpations: Abdomen is soft  Tenderness: There is no abdominal tenderness  There is no guarding or rebound  Negative signs include Villalobos's sign  Skin:     General: Skin is warm and dry  Neurological:      Mental Status: He is alert     Psychiatric: Speech: Speech normal          Behavior: Behavior normal  Behavior is cooperative  Thought Content:  Thought content normal          Vital Signs  ED Triage Vitals [05/09/22 2128]   Temperature Pulse Respirations Blood Pressure SpO2   98 °F (36 7 °C) 72 18 (!) 187/83 97 %      Temp Source Heart Rate Source Patient Position - Orthostatic VS BP Location FiO2 (%)   Temporal Monitor Lying Right arm --      Pain Score       4           Vitals:    05/09/22 2128   BP: (!) 187/83   Pulse: 72   Patient Position - Orthostatic VS: Lying         Visual Acuity      ED Medications  Medications   famotidine (PEPCID) tablet 20 mg (20 mg Oral Given 5/9/22 2206)   aluminum-magnesium hydroxide-simethicone (MYLANTA) oral suspension 30 mL (30 mL Oral Given 5/9/22 2206)       Diagnostic Studies  Results Reviewed     Procedure Component Value Units Date/Time    High Sensitivity Troponin I Random [[id number]]  (Abnormal) Collected: 05/09/22 2157    Lab Status: Final result Specimen: Blood from Arm, Right Updated: 05/09/22 2225     HS TnI random 5 ng/L     Protime-INR [[id number]]  (Normal) Collected: 05/09/22 2157    Lab Status: Final result Specimen: Blood from Arm, Right Updated: 05/09/22 2214     Protime 13 7 seconds      INR 1 10    APTT [[id number]]  (Normal) Collected: 05/09/22 2157    Lab Status: Final result Specimen: Blood from Arm, Right Updated: 05/09/22 2214     PTT 29 seconds     Basic metabolic panel [[id number]] Collected: 05/09/22 2157    Lab Status: Final result Specimen: Blood from Arm, Right Updated: 05/09/22 2214     Sodium 139 mmol/L      Potassium 3 7 mmol/L      Chloride 104 mmol/L      CO2 27 mmol/L      ANION GAP 8 mmol/L      BUN 21 mg/dL      Creatinine 1 15 mg/dL      Glucose 140 mg/dL      Calcium 8 8 mg/dL      eGFR 65 ml/min/1 73sq m     Narrative:      [place] guidelines for Chronic Kidney Disease (CKD):     Stage 1 with normal or high GFR (GFR > 90 mL/min/1 73 square meters)   Stage 2 Mild CKD (GFR = 60-89 mL/min/1 73 square meters)    Stage 3A Moderate CKD (GFR = 45-59 mL/min/1 73 square meters)    Stage 3B Moderate CKD (GFR = 30-44 mL/min/1 73 square meters)    Stage 4 Severe CKD (GFR = 15-29 mL/min/1 73 square meters)    Stage 5 End Stage CKD (GFR <15 mL/min/1 73 square meters)  Note: GFR calculation is accurate only with a steady state creatinine    CBC and differential [[id number]] Collected: 05/09/22 2157    Lab Status: Final result Specimen: Blood from Arm, Right Updated: 05/09/22 2203     WBC 5 62 Thousand/uL      RBC 4 73 Million/uL      Hemoglobin 13 9 g/dL      Hematocrit 42 5 %      MCV 90 fL      MCH 29 4 pg      MCHC 32 7 g/dL      RDW 12 6 %      MPV 11 3 fL      Platelets 431 Thousands/uL      nRBC 0 /100 WBCs      Neutrophils Relative 61 %      Immat GRANS % 0 %      Lymphocytes Relative 28 %      Monocytes Relative 10 %      Eosinophils Relative 0 %      Basophils Relative 1 %      Neutrophils Absolute 3 43 Thousands/µL      Immature Grans Absolute 0 02 Thousand/uL      Lymphocytes Absolute 1 58 Thousands/µL      Monocytes Absolute 0 54 Thousand/µL      Eosinophils Absolute 0 01 Thousand/µL      Basophils Absolute 0 04 Thousands/µL                  XR chest 1 view portable   ED Interpretation by Antonella Patterson MD (05/09 2228)   No acute cardiopulmonary disease as interpreted by myself                   Procedures  ECG 12 Lead Documentation Only    Date/Time: 5/9/2022 9:50 PM  Performed by: Antonella Patterson MD  Authorized by: Antonella Patterson MD     ECG reviewed by me, the ED Provider: yes    Patient location:  ED  Previous ECG:     Previous ECG:  Compared to current    Comparison ECG info:  6/2/21    Similarity:  No change    Comparison to cardiac monitor: Yes    Interpretation:     Interpretation: non-specific    Rate:     ECG rate:  73    ECG rate assessment: normal    Rhythm:     Rhythm: sinus rhythm    Ectopy:     Ectopy: none    QRS:     QRS axis:  Left    QRS intervals:  Normal  Conduction:     Conduction: abnormal      Abnormal conduction: LAFB    ST segments:     ST segments:  Normal  T waves:     T waves: normal               ED Course  ED Course as of 05/09/22 2230   Mon May 09, 2022   2229 HS TnI random(!): 5  Pain for greater than 3 hours  No need for a delta troponin  HEART Risk Score      Most Recent Value   Heart Score Risk Calculator    History 0 Filed at: 05/09/2022 2228   ECG 0 Filed at: 05/09/2022 2228   Age 2 Filed at: 05/09/2022 2228   Risk Factors 2 Filed at: 05/09/2022 2228   Troponin 0 Filed at: 05/09/2022 2228   HEART Score 4 Filed at: 05/09/2022 2228                        SBIRT 22yo+      Most Recent Value   SBIRT (23 yo +)    In order to provide better care to our patients, we are screening all of our patients for alcohol and drug use  Would it be okay to ask you these screening questions? Yes Filed at: 05/09/2022 2150   Initial Alcohol Screen: US AUDIT-C     1  How often do you have a drink containing alcohol? 0 Filed at: 05/09/2022 2150   2  How many drinks containing alcohol do you have on a typical day you are drinking? 0 Filed at: 05/09/2022 2150   3a  Male UNDER 65: How often do you have five or more drinks on one occasion? 0 Filed at: 05/09/2022 2150   3b  FEMALE Any Age, or MALE 65+: How often do you have 4 or more drinks on one occassion? 0 Filed at: 05/09/2022 2150   Audit-C Score 0 Filed at: 05/09/2022 2150   LOPEZ: How many times in the past year have you    Used an illegal drug or used a prescription medication for non-medical reasons? Never Filed at: 05/09/2022 2150                    MDM  Number of Diagnoses or Management Options  Diagnosis management comments: Will check labs, EKG, chest x-ray  Will treat symptomatically for GERD        Disposition  Final diagnoses:   GERD (gastroesophageal reflux disease)   Atypical chest pain     Time reflects when diagnosis was documented in both MDM as applicable and the Disposition within this note     Time User Action Codes Description Comment    5/9/2022 10:29 PM Pravin Amador Add [K21 9] GERD (gastroesophageal reflux disease)     5/9/2022 10:29 PM Pravin Amador Add [R07 89] Atypical chest pain       ED Disposition     ED Disposition Condition Date/Time Comment    Discharge Stable Mon May 9, 2022 10:29 PM Ethanjb Estrellita discharge to home/self care  Follow-up Information     Follow up With Specialties Details Why Contact Info Elle Bailon DO Internal Medicine Schedule an appointment as soon as possible for a visit   [address] [place] [address] Emergency Department Emergency Medicine Go to  If symptoms worsen [place] [phone]  [facility] Emergency Department[address], [address]          Patient's Medications   Discharge Prescriptions    ALUMINUM-MAGNESIUM HYDROXIDE-SIMETHICONE (MAALOX) 200-200-20 MG/5ML SUSP    Take 20 mL by mouth 4 (four) times a day (before meals and at bedtime)       Start Date: 5/9/2022  End Date: --       Order Dose: 20 mL       Quantity: 710 mL    Refills: 0       No discharge procedures on file      PDMP Review     None          ED Provider  Electronically Signed by           Rm Williamson MD  05/09/22 4061

## 2022-05-10 NOTE — TELEPHONE ENCOUNTER
----- Message from Atiya Rodriguez MD sent at 5/9/2022  1:01 PM EDT -----  Hi,    Yes  Let's do a 2 day prep and he should take daily Miralax for 7 days prior  Thank you!  ----- Message -----  From: Sherie Ordonez MA  Sent: 5/9/2022  12:55 PM EDT  To: Atiya Rodriguez MD    Patient scheduled 5/20/22 for Colon/EGD  He is questioning if he should have a 2 day prep due to previous experiences, he was not complete cleansed  Also, would you like him to take miralax daily a week prior? Please advise

## 2022-05-11 ENCOUNTER — APPOINTMENT (OUTPATIENT)
Dept: PHYSICAL THERAPY | Facility: HOME HEALTHCARE | Age: 67
End: 2022-05-11
Payer: MEDICARE

## 2022-05-11 NOTE — TELEPHONE ENCOUNTER
Patient came in office all directions were explained for 2 day miralax/dulcolax  Samples were given to patient for 7 day supply for week prior  Also 14 packets for prep  All directions were explained to him  He expressed understanding  He will contact our office with questions  Patient received SUTAB sample prior, he was told to discard of that sample

## 2022-05-13 ENCOUNTER — APPOINTMENT (OUTPATIENT)
Dept: PHYSICAL THERAPY | Facility: HOME HEALTHCARE | Age: 67
End: 2022-05-13
Payer: MEDICARE

## 2022-05-13 ENCOUNTER — TELEPHONE (OUTPATIENT)
Dept: FAMILY MEDICINE CLINIC | Facility: CLINIC | Age: 67
End: 2022-05-13

## 2022-05-13 NOTE — TELEPHONE ENCOUNTER
Pt has colonoscopy next Friday, 5/20/22, and was told to stop all of his vitamins a week prior  Pt asking if he needs to stop his metformin or if he should continue taking that?

## 2022-05-16 ENCOUNTER — APPOINTMENT (OUTPATIENT)
Dept: PHYSICAL THERAPY | Facility: HOME HEALTHCARE | Age: 67
End: 2022-05-16
Payer: MEDICARE

## 2022-05-17 ENCOUNTER — TELEPHONE (OUTPATIENT)
Dept: GASTROENTEROLOGY | Facility: CLINIC | Age: 67
End: 2022-05-17

## 2022-05-17 NOTE — TELEPHONE ENCOUNTER
Patients GI provider:  Dr Octavio Florentino     Number to return call: [phone]    Reason for call: Pt calling to reschedule his colonoscopy and EGD  Pt asking to be scheduled with Dr Marti Morgan      Scheduled procedure/appointment date if applicable: N/A

## 2022-05-18 ENCOUNTER — APPOINTMENT (OUTPATIENT)
Dept: PHYSICAL THERAPY | Facility: HOME HEALTHCARE | Age: 67
End: 2022-05-18
Payer: MEDICARE

## 2022-05-20 ENCOUNTER — APPOINTMENT (OUTPATIENT)
Dept: PHYSICAL THERAPY | Facility: HOME HEALTHCARE | Age: 67
End: 2022-05-20
Payer: MEDICARE

## 2022-05-20 NOTE — TELEPHONE ENCOUNTER
Pt rescheduled procedure on 7/19 at [facility] with [address]  Pt confirmed he has prep instructions

## 2022-05-23 ENCOUNTER — APPOINTMENT (OUTPATIENT)
Dept: PHYSICAL THERAPY | Facility: HOME HEALTHCARE | Age: 67
End: 2022-05-23
Payer: MEDICARE

## 2022-05-25 ENCOUNTER — APPOINTMENT (OUTPATIENT)
Dept: PHYSICAL THERAPY | Facility: HOME HEALTHCARE | Age: 67
End: 2022-05-25
Payer: MEDICARE

## 2022-05-27 ENCOUNTER — APPOINTMENT (OUTPATIENT)
Dept: PHYSICAL THERAPY | Facility: HOME HEALTHCARE | Age: 67
End: 2022-05-27
Payer: MEDICARE

## 2022-06-01 ENCOUNTER — OFFICE VISIT (OUTPATIENT)
Dept: PHYSICAL THERAPY | Facility: HOME HEALTHCARE | Age: 67
End: 2022-06-01
Payer: MEDICARE

## 2022-06-01 DIAGNOSIS — I89.0 LYMPHEDEMA: Primary | ICD-10-CM

## 2022-06-01 PROCEDURE — 97140 MANUAL THERAPY 1/> REGIONS: CPT | Performed by: PHYSICAL THERAPIST

## 2022-06-01 NOTE — PROGRESS NOTES
Daily Note     Today's date: 2022  Patient name: Caitlyn Oliveira  : 1955  MRN: [id number]  Referring provider: Eveline Carlisle DO  Dx:   Encounter Diagnosis     ICD-10-CM    1  Lymphedema  I89 0                   Subjective: Pt reporting that he is going away mid  and was wondering if we would be done therapy by then  Objective: See treatment diary below      Assessment: PT applied compression bandages with soft tubigrip underlay and cotton for skin protectant  No c/o pain or tightness with initial application this date  Advised to wear bandages consistently for most effective treatment but also s/s to watch for including pain when he should remove bandages at home  Questionable understanding of POC  Plan: Continue per plan of care              Precautions: B LE edema        Manuals        Compression bandage application  Tubigrip soft; cotton; 6cm x 1; 8cm x 1; 10cm x 1 bilateral LE

## 2022-06-03 ENCOUNTER — OFFICE VISIT (OUTPATIENT)
Dept: PHYSICAL THERAPY | Facility: HOME HEALTHCARE | Age: 67
End: 2022-06-03
Payer: MEDICARE

## 2022-06-03 DIAGNOSIS — I89.0 LYMPHEDEMA: Primary | ICD-10-CM

## 2022-06-03 PROCEDURE — 97140 MANUAL THERAPY 1/> REGIONS: CPT | Performed by: PHYSICAL THERAPIST

## 2022-06-03 NOTE — PROGRESS NOTES
Daily Note     Today's date: 6/3/2022  Patient name: Kenneth Frost  : 1955  MRN: [id number]  Referring provider: Rufina Roman DO  Dx:   Encounter Diagnosis     ICD-10-CM    1  Lymphedema  I89 0                   Subjective: Pt notes that the wraps felt fine; no issues  Objective: See treatment diary below      Assessment: Pt able to wear compression bandages from last appt  Upon removal of wraps, PT notes no evidence of skin breakdown and good edema reduction  Wraps did loosen some and slide down; PT to increase tension on wraps this date to help keep them in place over the weekend  Pt with difficulty getting into shoes with compression in place; advised to bring sandals with next time if shoes are too tight  Plan: Continue per plan of care           Precautions: B LE edema        Manuals 6/1 6/3      Compression bandage application  Tubigrip soft; cotton; 6cm x 1; 8cm x 1; 10cm x 1 bilateral LE Tubigrip soft; cotton; 6cm x 1; 8cm x 1; 10cm x 1 bilateral LE

## 2022-06-06 ENCOUNTER — OFFICE VISIT (OUTPATIENT)
Dept: PHYSICAL THERAPY | Facility: HOME HEALTHCARE | Age: 67
End: 2022-06-06
Payer: MEDICARE

## 2022-06-06 ENCOUNTER — TELEPHONE (OUTPATIENT)
Dept: FAMILY MEDICINE CLINIC | Facility: CLINIC | Age: 67
End: 2022-06-06

## 2022-06-06 DIAGNOSIS — I89.0 LYMPHEDEMA: Primary | ICD-10-CM

## 2022-06-06 PROCEDURE — 97140 MANUAL THERAPY 1/> REGIONS: CPT | Performed by: PHYSICAL THERAPIST

## 2022-06-06 NOTE — PROGRESS NOTES
Daily Note     Today's date: 2022  Patient name: Cecile Leon  : 1955  MRN: [id number]  Referring provider: Cyndee Tamez DO  Dx:   Encounter Diagnosis     ICD-10-CM    1  Lymphedema  I89 0                   Subjective: Pt reporting that the bandages feel more comfortable than the stockings he has worn before  Objective: See treatment diary below      Assessment: Pt without any evidence of skin breakdown observed  Compression bandages did slide downward and pt with minimal increase in edema proximal to bandages  PT applied compression bandages up to the knee with increased tension to help keep them in place; assess next visit  Plan: Continue per plan of care           Precautions: B LE edema        Manuals 6/1 6/3 6/6     Compression bandage application  Tubigrip soft; cotton; 6cm x 1; 8cm x 1; 10cm x 1 bilateral LE Tubigrip soft; cotton; 6cm x 1; 8cm x 1; 10cm x 1 bilateral LE Tubigrip soft; cotton; 6cm x 1; 8cm x 1; 10cm x 1 bilateral LE

## 2022-06-08 ENCOUNTER — OFFICE VISIT (OUTPATIENT)
Dept: PHYSICAL THERAPY | Facility: HOME HEALTHCARE | Age: 67
End: 2022-06-08
Payer: MEDICARE

## 2022-06-08 ENCOUNTER — OFFICE VISIT (OUTPATIENT)
Dept: FAMILY MEDICINE CLINIC | Facility: CLINIC | Age: 67
End: 2022-06-08
Payer: MEDICARE

## 2022-06-08 VITALS — WEIGHT: 313 LBS | HEIGHT: 72 IN | BODY MASS INDEX: 42.39 KG/M2 | TEMPERATURE: 97.6 F

## 2022-06-08 DIAGNOSIS — K43.9 VENTRAL HERNIA WITHOUT OBSTRUCTION OR GANGRENE: Primary | ICD-10-CM

## 2022-06-08 DIAGNOSIS — I89.0 LYMPHEDEMA: Primary | ICD-10-CM

## 2022-06-08 PROCEDURE — 99213 OFFICE O/P EST LOW 20 MIN: CPT | Performed by: INTERNAL MEDICINE

## 2022-06-08 PROCEDURE — 97140 MANUAL THERAPY 1/> REGIONS: CPT | Performed by: PHYSICAL THERAPIST

## 2022-06-08 NOTE — PROGRESS NOTES
Assessment/Plan:      Diagnoses and all orders for this visit:    Ventral hernia without obstruction or gangrene  -     US abdomen complete; Future  No acute issues and suspect underlying hernia as cause of patient concern   Weight loss,diet modification encouraged              Patient ID: Sepideh Berrios is a 74 y o  male  HPI   Pt has noted difference in size of abdomen right more prominent than left No pain No change in bowels He just notes it on the right side He is doing better with lymphedema tx and is trying to work on diet has scope in July scheduled     Review of Systems   Constitutional: Negative for chills and fever  HENT: Negative  Respiratory: Negative for shortness of breath  Cardiovascular: Positive for leg swelling  Negative for chest pain and palpitations  Gastrointestinal: Negative for abdominal pain, blood in stool, constipation, diarrhea and nausea  Musculoskeletal: Negative for back pain  Psychiatric/Behavioral: Negative for sleep disturbance  The patient is not nervous/anxious          Past Medical History:   Diagnosis Date    Arthritis     last assessed 7/1/15    Diabetes mellitus ([facility] [address] )     type 2-last assessed 1/28/15    GERD (gastroesophageal reflux disease)     Hypertension     Irregular heart beat     last assessed 7/1/15    Palpitations     last assessed 9/7/17    Sexual dysfunction     last assessed 7/1/15    Thyroid disease     last assessed 7/1/15     Past Surgical History:   Procedure Laterality Date    COLONOSCOPY      TONSILLECTOMY       Social History     Socioeconomic History    Marital status: Single     Spouse name: Not on file    Number of children: Not on file    Years of education: Not on file    Highest education level: Not on file   Occupational History    Not on file   Tobacco Use    Smoking status: Former Smoker    Smokeless tobacco: Never Used    Tobacco comment: former smoker-quit 17 yrs ago 1pppd x 30 yrs as per [employer]   Vaping Use  Vaping Use: Never used   Substance and Sexual Activity    Alcohol use: Never    Drug use: Never    Sexual activity: Not on file   Other Topics Concern    Not on file   Social History Narrative    Not on file     Social Determinants of Health     Financial Resource Strain: Not on file   Food Insecurity: Not on file   Transportation Needs: Not on file   Physical Activity: Not on file   Stress: Not on file   Social Connections: Not on file   Intimate Partner Violence: Not on file   Housing Stability: Not on file     Allergies   Allergen Reactions    Acetaminophen Other (See Comments)     Other reaction(s): Unknown Reaction  "I had to go to the hospital and get shots after taking it 20yrs ago"           Physical Exam  Constitutional:       Appearance: Normal appearance  Eyes:      General: No scleral icterus  Cardiovascular:      Rate and Rhythm: Normal rate  Pulmonary:      Effort: Pulmonary effort is normal  No respiratory distress  Breath sounds: Normal breath sounds  No wheezing  Abdominal:      General: Bowel sounds are normal  There is no distension  Palpations: Abdomen is soft  There is mass  Tenderness: There is no abdominal tenderness  Comments: Ventral hernia and lower abdominal wall protrusion non tender easily reducible    Musculoskeletal:      Right lower leg: Edema present  Left lower leg: Edema present  Comments: Has compression wraps on    Skin:     General: Skin is dry  Coloration: Skin is not jaundiced or pale  Neurological:      General: No focal deficit present  Mental Status: He is alert and oriented to person, place, and time  Mental status is at baseline  Psychiatric:         Mood and Affect: Mood normal          Behavior: Behavior normal          Thought Content:  Thought content normal          Judgment: Judgment normal

## 2022-06-08 NOTE — PROGRESS NOTES
Daily Note     Today's date: 2022  Patient name: Mame Cantu  : 1955  MRN: [id number]  Referring provider: Dominic Dang DO  Dx:   Encounter Diagnosis     ICD-10-CM    1  Lymphedema  I89 0                   Subjective: Pt reporting that the tape I use doesn't work; he has to keep using the duct tape to keep the wraps on      Objective: See treatment diary below      Assessment: Pt showing good decongestion of B LE; progressing towards transition to compression stockings next visit  PT to provided pt with proper fit compression wear upcoming  Plan: Continue per plan of care           Precautions: B LE edema        Manuals 6/1 6/3 6/6 6/8    Compression bandage application  Tubigrip soft; cotton; 6cm x 1; 8cm x 1; 10cm x 1 bilateral LE Tubigrip soft; cotton; 6cm x 1; 8cm x 1; 10cm x 1 bilateral LE Tubigrip soft; cotton; 6cm x 1; 8cm x 1; 10cm x 1 bilateral LE Tubigrip soft; cotton; 6cm x 1; 8cm x 1; 10cm x 1 bilateral LE

## 2022-06-10 ENCOUNTER — OFFICE VISIT (OUTPATIENT)
Dept: PHYSICAL THERAPY | Facility: HOME HEALTHCARE | Age: 67
End: 2022-06-10
Payer: MEDICARE

## 2022-06-10 DIAGNOSIS — I89.0 LYMPHEDEMA: Primary | ICD-10-CM

## 2022-06-10 PROCEDURE — 97140 MANUAL THERAPY 1/> REGIONS: CPT | Performed by: PHYSICAL THERAPIST

## 2022-06-10 NOTE — PROGRESS NOTES
PT Re-Evaluation     Today's date: 6/10/2022  Patient name: Jere Wisdom  : 1955  MRN: [id number]  Referring provider: Keanu Cline DO  Dx:   Encounter Diagnosis     ICD-10-CM    1  Lymphedema  I89 0                   Assessment  Assessment details: Pt presents to [facility] with s/s consistent with B LE secondary to CVI  PT interventions to include CDT (complete decongestive therapy), including MLD (manual lymphatic drainage) and compression therapy using short stretch bandages as able  PT to further provide pt with extensive education on self wrapping, self MLD techniques, and skin inspection  Patient will transition to a long term maintenance with appropriate compression garment once maximal decongestion of limb has been achieved  Thank you! UPDATE: Pt has been compliant with wearing compression bandages and therapy sessions  He is showing maximal decongestion in B lower limbs at this time  PT has advised pt to reach out to local vendor to obtain compression stockings for transition to long term maintenance; PT to continue with bandage application POC until pt is able to transition to avoid increase in limb edema  Thank you     Impairments: abnormal gait, abnormal movement, activity intolerance, difficulty understanding, impaired physical strength and lacks appropriate home exercise program    Goals  STG: within 4 weeks  - MET  Demonstrate at least 75% compliance with self MLD   Demonstrate at least 75% compliance with compression schedule   Demonstrate at least 75% compliance with self skin and nail inspection   Demonstrate understanding of importance of compliance with POC   Free from all s/s of infection in limb    LTG upon DC:   Demonstrate 100% compliance with self MLD  Demonstrate 100% compliance with compression schedule  Demonstrate 100% compliance with skin and nail inspection  Pt remains free from s/s of infection  Pt able to obtain alternative compression garment to transition to self management      Plan  Patient would benefit from: skilled physical therapy  Planned therapy interventions: manual therapy, abdominal trunk stabilization, balance, neuromuscular re-education, patient education, self care, therapeutic training, therapeutic exercise, functional ROM exercises and compression  Frequency: 3x week  Duration in weeks: 4  Plan of Care beginning date: 6/10/2022  Plan of Care expiration date: 7/8/2022  Treatment plan discussed with: patient        Subjective Evaluation    History of Present Illness  Mechanism of injury: Pt reporting that he has noticed an increased in swelling in his legs over the past 2 months  He also had redness on the L lower leg and was put on antibiotics twice with no changes in coloration  He has been to vascular MD and had doppler completed which was (-) for clots  He followed up with PCP who is now referring to OPPT for lymphedema management  Quality of life: good    Pain  No pain reported    Social Support    Employment status: not working  Treatments  Current treatment: physical therapy  Patient Goals  Patient goal: "decreased the swelling in the legs"         Objective     General Comments:       Ankle/Foot Comments   Lymphedema History  Primary/Secondary: SECONDARY   Contributing Factor: CVI   Diagnostic testing: Doppler (-)   Previous treatments: compression garments   Infection history: NO    MLD Precautions/Contraindications   Age > 60yr old: YES  HTN:  YES  Cardia Edema: NO    Acute DVT/hx of DVT: NO    Acute infection:  NO  Renal dysfunction:  NO  Malignant disease:  NO  Pacemaker: NO    Bandaging Precautions/Contraindications    Diabetes: YES  Neuropathy:  YES  Vascular disease: YES    SKIN INSPECTION   Hemosiderin staining  (+) mild L lower leg   Skin discoloration (+)   Open wounds/Infection  (-)   Firm/Sponge (+)   Weeping  (-)   Pitting edema  (+)   Skin dryness/flaking (+) mild dryness           GIRTH  (CM)   LE Girth measurements  Right  Left   Mid foot              27        27  Malleolus               25        24  +8 cm                          26        27  +16 cm              36        35  +24 cm              45        43  Knee joint line              42        42  Length mal to knee                 50         50         Precautions: B LE edema        Manuals 6/1 6/3 6/6 6/8 6/10   Compression bandage application  Tubigrip soft; cotton; 6cm x 1; 8cm x 1; 10cm x 1 bilateral LE Tubigrip soft; cotton; 6cm x 1; 8cm x 1; 10cm x 1 bilateral LE Tubigrip soft; cotton; 6cm x 1; 8cm x 1; 10cm x 1 bilateral LE Tubigrip soft; cotton; 6cm x 1; 8cm x 1; 10cm x 1 bilateral LE Tubigrip soft; cotton; 6cm x 1; 8cm x 1; 10cm x 1 bilateral LE        Updated measurements for B LE

## 2022-06-15 ENCOUNTER — OFFICE VISIT (OUTPATIENT)
Dept: PHYSICAL THERAPY | Facility: HOME HEALTHCARE | Age: 67
End: 2022-06-15
Payer: MEDICARE

## 2022-06-15 ENCOUNTER — HOSPITAL ENCOUNTER (OUTPATIENT)
Dept: ULTRASOUND IMAGING | Facility: HOSPITAL | Age: 67
Discharge: HOME | End: 2022-06-15
Attending: INTERNAL MEDICINE
Payer: MEDICARE

## 2022-06-15 DIAGNOSIS — I89.0 LYMPHEDEMA: Primary | ICD-10-CM

## 2022-06-15 DIAGNOSIS — K43.9 VENTRAL HERNIA WITHOUT OBSTRUCTION OR GANGRENE: ICD-10-CM

## 2022-06-15 PROCEDURE — 76705 ECHO EXAM OF ABDOMEN: CPT

## 2022-06-15 PROCEDURE — 97140 MANUAL THERAPY 1/> REGIONS: CPT | Performed by: PHYSICAL THERAPIST

## 2022-06-15 NOTE — PROGRESS NOTES
Daily Note     Today's date: 6/15/2022  Patient name: Petra Nur  : 1955  MRN: [id number]  Referring provider: Bob Lucio DO  Dx:   Encounter Diagnosis     ICD-10-CM    1  Lymphedema  I89 0                   Subjective: Pt reporting that the stockings he got were not long enough so he has to return them for a different size  Objective: See treatment diary below      Assessment: PT to continue with compression bandage application until pt is able to obtain compression stockings and transition to self management  Plan: Continue per plan of care          Precautions: B LE edema        Manuals 6/5 6/3 6/6 6/8 6/10   Compression bandage application  Tubigrip; cotton; 6cm x 1; 8cm x 1; 10cm x 1 bilateral LE Tubigrip soft; cotton; 6cm x 1; 8cm x 1; 10cm x 1 bilateral LE Tubigrip soft; cotton; 6cm x 1; 8cm x 1; 10cm x 1 bilateral LE Tubigrip soft; cotton; 6cm x 1; 8cm x 1; 10cm x 1 bilateral LE Tubigrip soft; cotton; 6cm x 1; 8cm x 1; 10cm x 1 bilateral LE        Updated measurements for B LE

## 2022-06-17 ENCOUNTER — APPOINTMENT (OUTPATIENT)
Dept: PHYSICAL THERAPY | Facility: HOME HEALTHCARE | Age: 67
End: 2022-06-17
Payer: MEDICARE

## 2022-06-23 NOTE — PROGRESS NOTES
PT Discharge     Today's date: 2022  Patient name: Layo Barclay  : 1955  MRN: [id number]  Referring provider: Severo Pompa MD  Dx:   Encounter Diagnosis     ICD-10-CM    1  Calcific tendinitis of both shoulders  M75 31 Ambulatory Referral to Physical Therapy    M75 32    2  Chronic pain of both shoulders  M25 511     G89 29     M25 512                   Assessment    Goals  STGs: To be complete within 4 weeks (not met)  - Decrease pain to < 2/10 at worst  - Increase AROM to WNL  - Increase strength to > 4+/5  - Improve postural awareness capacity to > 60min before deficit    LTGs: To be complete within 6 weeks (not met)  - Able to repetitively complete all overhead activity without pain or limitation for increased safety and functional capacity with ADLs and home-related duty  - Able to repetitively complete all reaching activity without pain or limitation for increased safety and functional capacity with ADLs and home-related duty  - Able to repetitively complete all pushing/pulling activity without pain or limitation for increased safety and functional capacity with ADLs and home-related duty  - Able to complete all lifting/carrying activity without pain or limitation for increased safety and functional capacity with ADLs and home-related duty        Plan    Pt will be D/C from physical therapy, as he has not reached out to schedule since 22  Goals not met          Subjective Evaluation    Pain  At best pain ratin  At worst pain ratin  Location: Bilateral Shoulders          Objective Pain level ranges 2-8/10  AROM: Bilateral Shoulder Abd and Flex 130 degrees (AAROM 165 degrees), ER 60 degrees (AAROM 70 degree), IR 40 degrees (AAROM 45 degrees)  Strength: Bilateral Shoulder Abd and Flex 3+/5; ER 3+/5; IR 5/5  Postural Awareness: Poor (rounded shoulders, forward head)  Special Tests: (+) Hawkin's, (+) Empty Can  FOTO: 48; GOAL: 66  Unable to complete overhead activity without pain and limitation  Unable to complete pushing/pulling activity without pain and limitation  Unable to complete lifting/carrying activity without pain and limitation  Unable to complete cross body/behind the back reaching activity without pain and limitation

## 2022-06-24 ENCOUNTER — RA CDI HCC (OUTPATIENT)
Dept: OTHER | Facility: HOSPITAL | Age: 67
End: 2022-06-24

## 2022-07-05 DIAGNOSIS — I10 HYPERTENSION, UNSPECIFIED TYPE: ICD-10-CM

## 2022-07-05 DIAGNOSIS — I50.31 ACUTE DIASTOLIC HEART FAILURE (HCC): ICD-10-CM

## 2022-07-05 DIAGNOSIS — E11.9 TYPE 2 DIABETES MELLITUS WITHOUT COMPLICATION, WITHOUT LONG-TERM CURRENT USE OF INSULIN (HCC): ICD-10-CM

## 2022-07-05 RX ORDER — ENALAPRIL MALEATE 10 MG/1
10 TABLET ORAL DAILY
Qty: 90 TABLET | Refills: 3 | Status: SHIPPED | OUTPATIENT
Start: 2022-07-05

## 2022-07-05 RX ORDER — FUROSEMIDE 40 MG/1
40 TABLET ORAL DAILY
Qty: 90 TABLET | Refills: 3 | Status: SHIPPED | OUTPATIENT
Start: 2022-07-05

## 2022-07-05 RX ORDER — POTASSIUM CHLORIDE 20 MEQ/1
20 TABLET, EXTENDED RELEASE ORAL DAILY
Qty: 90 TABLET | Refills: 3 | Status: SHIPPED | OUTPATIENT
Start: 2022-07-05 | End: 2022-07-08

## 2022-07-07 ENCOUNTER — APPOINTMENT (EMERGENCY)
Dept: CT IMAGING | Facility: HOSPITAL | Age: 67
End: 2022-07-07
Payer: MEDICARE

## 2022-07-07 ENCOUNTER — HOSPITAL ENCOUNTER (EMERGENCY)
Facility: HOSPITAL | Age: 67
Discharge: HOME | End: 2022-07-07
Attending: EMERGENCY MEDICINE
Payer: MEDICARE

## 2022-07-07 ENCOUNTER — HOSPITAL ENCOUNTER (EMERGENCY)
Dept: RADIOLOGY | Facility: HOSPITAL | Age: 67
Discharge: HOME | End: 2022-07-07
Attending: INTERNAL MEDICINE
Payer: MEDICARE

## 2022-07-07 VITALS
DIASTOLIC BLOOD PRESSURE: 64 MMHG | OXYGEN SATURATION: 95 % | TEMPERATURE: 98 F | SYSTOLIC BLOOD PRESSURE: 137 MMHG | RESPIRATION RATE: 16 BRPM | HEART RATE: 54 BPM

## 2022-07-07 DIAGNOSIS — S09.90XA INJURY OF HEAD, INITIAL ENCOUNTER: ICD-10-CM

## 2022-07-07 DIAGNOSIS — M54.50 LOW BACK PAIN, UNSPECIFIED BACK PAIN LATERALITY, UNSPECIFIED CHRONICITY, UNSPECIFIED WHETHER SCIATICA PRESENT: ICD-10-CM
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
## 2022-07-07 DIAGNOSIS — M54.50 LOW BACK PAIN, UNSPECIFIED BACK PAIN LATERALITY, UNSPECIFIED CHRONICITY, UNSPECIFIED WHETHER SCIATICA PRESENT: Primary | ICD-10-CM

## 2022-07-07 PROCEDURE — 99283 EMERGENCY DEPT VISIT LOW MDM: CPT

## 2022-07-07 PROCEDURE — 99284 EMERGENCY DEPT VISIT MOD MDM: CPT | Performed by: EMERGENCY MEDICINE

## 2022-07-07 PROCEDURE — 70450 CT HEAD/BRAIN W/O DYE: CPT

## 2022-07-07 PROCEDURE — 72100 X-RAY EXAM L-S SPINE 2/3 VWS: CPT

## 2022-07-07 PROCEDURE — G1004 CDSM NDSC: HCPCS

## 2022-07-07 NOTE — ED PROVIDER NOTES
History  Chief Complaint   Patient presents with    Fall     Patient was taking a shower and slipped hit his head against the wall  No thinners no LOC     59-year-old male presents for evaluation after slipping and falling in the shower and striking his head against the wall  No loss of conscious  No obvious injury  No neck pain  No headache  No focal neurological deficits  Patient denies any preceding symptoms, stating that he simply slipped and landed on his buttocks  On exam, patient is very well appearing, no distress  Prior to Admission Medications   Prescriptions Last Dose Informant Patient Reported? Taking?    Lancets (onetouch ultrasoft) lancets 7/6/2022 at Unknown time Self No Yes   Sig: Check glucose daily   Red Yeast Rice Extract (RED YEAST RICE PO) 7/6/2022 at Unknown time Self Yes Yes   Sig: Take by mouth   Vitamin D, Cholecalciferol, 50 MCG (2000 UT) CAPS 7/6/2022 at Unknown time Self Yes Yes   Sig: Take by mouth   aluminum-magnesium hydroxide-simethicone (MAALOX) 399-840-50 MG/5ML SUSP 7/6/2022 at Unknown time  No Yes   Sig: Take 20 mL by mouth 4 (four) times a day (before meals and at bedtime)   amLODIPine (NORVASC) 5 mg tablet 7/6/2022 at Unknown time Self No Yes   Sig: Take 1 tablet (5 mg total) by mouth daily For blood pressure   aspirin 81 mg chewable tablet 7/6/2022 at Unknown time Self Yes Yes   Sig: Chew 81 mg daily   atorvastatin (LIPITOR) 10 mg tablet 7/6/2022 at Unknown time Self No Yes   Sig: Take 1 tablet (10 mg total) by mouth daily   enalapril (VASOTEC) 10 mg tablet 7/6/2022 at Unknown time  No Yes   Sig: Take 1 tablet (10 mg total) by mouth daily   furosemide (LASIX) 40 mg tablet 7/6/2022 at Unknown time  No Yes   Sig: Take 1 tablet (40 mg total) by mouth daily   glucose blood (OneTouch Verio) test strip 7/6/2022 at Unknown time Self No Yes   Sig: Use 1 each daily   ketoconazole (NIZORAL) 2 % shampoo 7/6/2022 at Unknown time  Yes Yes   metFORMIN (GLUCOPHAGE) 500 mg tablet 7/6/2022 at Unknown time  No Yes   Sig: Take 1 tablet (500 mg total) by mouth daily with breakfast One tab in am   metoprolol succinate (TOPROL-XL) 25 mg 24 hr tablet 7/6/2022 at Unknown time Self No Yes   Sig: Take 1 tablet (25 mg total) by mouth daily   metoprolol succinate (TOPROL-XL) 50 mg 24 hr tablet 7/6/2022 at Unknown time Self No Yes   Sig: Take 1 tablet (50 mg total) by mouth daily   multivitamin (THERAGRAN) TABS 7/6/2022 at Unknown time Self Yes Yes   Sig: Take 1 tablet by mouth daily   omeprazole (PriLOSEC) 40 MG capsule 7/6/2022 at Unknown time Self No Yes   Sig: Take 1 capsule (40 mg total) by mouth daily   potassium chloride (K-DUR,KLOR-CON) 20 mEq tablet 7/6/2022 at Unknown time  No Yes   Sig: Take 1 tablet (20 mEq total) by mouth daily   triamcinolone (KENALOG) 0 1 % lotion 7/6/2022 at Unknown time  Yes Yes   triamcinolone (KENALOG) 0 5 % cream 7/6/2022 at Unknown time  Yes Yes      Facility-Administered Medications: None       Past Medical History:   Diagnosis Date    Arthritis     last assessed 7/1/15    [facility]     type 2-last assessed 1/28/15    GERD (gastroesophageal reflux disease)     Hypertension     Irregular heart beat     last assessed 7/1/15    Palpitations     last assessed 9/7/17    Sexual dysfunction     last assessed 7/1/15    Thyroid disease     last assessed 7/1/15       Past Surgical History:   Procedure Laterality Date    COLONOSCOPY      TONSILLECTOMY         Family History   Problem Relation Age of Onset    Hypertension Mother         essential    Stroke Mother     Hypertension Father         essential    Heart defect Father         cardiac disorder    Stroke Brother     Hypertension Brother      I have reviewed and agree with the history as documented      E-Cigarette/Vaping    E-Cigarette Use Never User      E-Cigarette/Vaping Substances    Nicotine No     THC No     CBD No     Flavoring No     Other No     Unknown No      Social History     Tobacco Use    Smoking status: Former Smoker    Smokeless tobacco: Never Used    Tobacco comment: former smoker-quit 17 yrs ago 1pppd x 30 yrs as per [employer]   Vaping Use    Vaping Use: Never used   Substance Use Topics    Alcohol use: Never    Drug use: Never       Review of Systems   Constitutional: Negative for activity change, appetite change, chills, diaphoresis, fatigue and fever  HENT: Negative for dental problem, ear pain, sore throat, trouble swallowing and voice change  Eyes: Negative for pain and visual disturbance  Respiratory: Negative for cough, chest tightness, shortness of breath and wheezing  Cardiovascular: Negative for chest pain, palpitations and leg swelling  Gastrointestinal: Negative for abdominal pain, anal bleeding, blood in stool, diarrhea, nausea, rectal pain and vomiting  Endocrine: Negative for polydipsia, polyphagia and polyuria  Genitourinary: Negative for difficulty urinating, dysuria, flank pain, frequency, hematuria and urgency  Musculoskeletal: Positive for back pain  Negative for joint swelling, myalgias, neck pain and neck stiffness  Skin: Negative for pallor, rash and wound  Neurological: Negative for dizziness, facial asymmetry, speech difficulty, weakness, light-headedness, numbness and headaches  Hematological: Negative for adenopathy  Psychiatric/Behavioral: Negative for agitation  The patient is not nervous/anxious  All other systems reviewed and are negative  Physical Exam  Physical Exam  Vitals and nursing note reviewed  Constitutional:       General: He is not in acute distress  Appearance: He is well-developed  He is obese  He is not ill-appearing, toxic-appearing or diaphoretic  HENT:      Head: Normocephalic and atraumatic  No raccoon eyes, Stockton's sign, abrasion, contusion, masses, right periorbital erythema, left periorbital erythema or laceration  Hair is normal       Jaw:  There is normal jaw occlusion  No trismus, tenderness, pain on movement or malocclusion  Right Ear: Tympanic membrane and external ear normal       Left Ear: Tympanic membrane and external ear normal       Nose: Nose normal  No congestion or rhinorrhea  Mouth/Throat:      Mouth: Mucous membranes are moist       Pharynx: No oropharyngeal exudate  Eyes:      General: No visual field deficit or scleral icterus  Right eye: No discharge  Left eye: No discharge  Extraocular Movements: Extraocular movements intact  Conjunctiva/sclera: Conjunctivae normal       Pupils: Pupils are equal, round, and reactive to light  Neck:      Thyroid: No thyroid mass or thyromegaly  Vascular: No JVD  Trachea: Trachea and phonation normal  No tracheal deviation  Cardiovascular:      Rate and Rhythm: Normal rate and regular rhythm  Pulses: Normal pulses  Heart sounds: Normal heart sounds, S1 normal and S2 normal  No murmur heard  No friction rub  No gallop  Pulmonary:      Effort: Pulmonary effort is normal  No accessory muscle usage, respiratory distress or retractions  Breath sounds: Normal breath sounds  No stridor or decreased air movement  No decreased breath sounds, wheezing, rhonchi or rales  Chest:      Chest wall: No deformity, tenderness or crepitus  Abdominal:      General: There is no distension  Palpations: Abdomen is soft  There is no mass  Tenderness: There is no abdominal tenderness  There is no guarding or rebound  Hernia: No hernia is present  Musculoskeletal:         General: No tenderness or deformity  Normal range of motion  Cervical back: Normal, full passive range of motion without pain, normal range of motion and neck supple  No swelling, edema, deformity, erythema, signs of trauma, rigidity, spasms, torticollis, tenderness, bony tenderness or crepitus  No pain with movement, spinous process tenderness or muscular tenderness   Normal range of motion  Thoracic back: Normal  No swelling, deformity, signs of trauma, spasms, tenderness or bony tenderness  Normal range of motion  Lumbar back: Normal  No swelling, deformity, signs of trauma, spasms, tenderness or bony tenderness  Normal range of motion  Lymphadenopathy:      Cervical: No cervical adenopathy  Skin:     General: Skin is warm and dry  Capillary Refill: Capillary refill takes less than 2 seconds  Coloration: Skin is not pale  Findings: No erythema or rash  Neurological:      General: No focal deficit present  Mental Status: He is alert and oriented to person, place, and time  GCS: GCS eye subscore is 4  GCS verbal subscore is 5  GCS motor subscore is 6  Cranial Nerves: Cranial nerves are intact  No cranial nerve deficit, dysarthria or facial asymmetry  Sensory: Sensation is intact  No sensory deficit  Motor: Motor function is intact  No weakness or tremor  Coordination: Coordination is intact  Gait: Gait is intact  Deep Tendon Reflexes: Reflexes are normal and symmetric   Reflexes normal    Psychiatric:         Behavior: Behavior normal          Vital Signs  ED Triage Vitals   Temperature Pulse Respirations Blood Pressure SpO2   07/07/22 0109 07/07/22 0109 07/07/22 0109 07/07/22 0200 07/07/22 0109   98 °F (36 7 °C) 67 18 137/64 97 %      Temp Source Heart Rate Source Patient Position - Orthostatic VS BP Location FiO2 (%)   07/07/22 0109 07/07/22 0109 07/07/22 0200 -- --   Tympanic Monitor Lying        Pain Score       07/07/22 0109       No Pain           Vitals:    07/07/22 0109 07/07/22 0200   BP:  137/64   Pulse: 67 (!) 54   Patient Position - Orthostatic VS:  Lying         Visual Acuity      ED Medications  Medications - No data to display    Diagnostic Studies  Results Reviewed     None                 CT head without contrast   ED Interpretation by Khadijah Lazcano DO (07/07 0224)   No obvious acute intracranial pathology  COMPARISON:  CT FACIAL BONES WO CONTRAST 5/7/2018 9:43 PM  FINDINGS:  Brain: No acute intracranial hemorrhage or acute territorial infarct  Cerebral ventricles: No ventriculomegaly  Paranasal sinuses: Visualized sinuses are unremarkable  No fluid levels  Mastoid air cells: No mastoid effusion  Bones/joints: No acute fracture  Soft tissues: Unremarkable  IMPRESSION:  No acute intracranial abnormality      Procedures  Procedures         ED Course        0249 hours:  Patient reassessed  Feeling well  No focal neurological deficits or headache  CT scan reviewed and formally read as no acute intracranial abnormality  Patient to be discharged home  MDM  Number of Diagnoses or Management Options  Diagnosis management comments: 53-year-old male presents for evaluation after slip and fall in shower  Positive Quintin Valdez  No LOC  Will perform CT scan of head, symptom management, and disposition as appropriate  Amount and/or Complexity of Data Reviewed  Tests in the radiology section of CPT®: ordered and reviewed        Disposition  Final diagnoses:   Fall, initial encounter   Injury of head, initial encounter     Time reflects when diagnosis was documented in both MDM as applicable and the Disposition within this note     Time User Action Codes Description Comment    7/7/2022  1:39 AM Suzanne Leal Add [X92  TSGM] Fall, initial encounter     7/7/2022  1:39 AM Chaitanya Jacob Add [[id number]] Injury of head, initial encounter       ED Disposition     ED Disposition   Discharge    Condition   Stable    Date/Time   Thu Jul 7, 2022  1:39 AM    Comment   Christy Haro discharge to home/self care                 Follow-up Information     Follow up With Specialties Details Why Contact Maximiliano Fritz DO Internal Medicine, Hospice Services In 1 day Follow up [address] [address] [phone]            Patient's Medications   Discharge Prescriptions    No medications on file       No discharge procedures on file      PDMP Review     None          ED Provider  Electronically Signed by           Aleksandr Son DO  07/07/22 8526

## 2022-07-07 NOTE — DISCHARGE INSTRUCTIONS
Please apply adhesive friction strips to the shower for to prevent falling  Please use your shower chair  Please use all means your disposal to prevent injury, especially when showering

## 2022-07-08 ENCOUNTER — TELEPHONE (OUTPATIENT)
Dept: FAMILY MEDICINE CLINIC | Facility: CLINIC | Age: 67
End: 2022-07-08

## 2022-07-08 DIAGNOSIS — S32.020S COMPRESSION FRACTURE OF L2 VERTEBRA, SEQUELA: Primary | ICD-10-CM

## 2022-07-08 DIAGNOSIS — I50.31 ACUTE DIASTOLIC HEART FAILURE (HCC): ICD-10-CM

## 2022-07-08 RX ORDER — POTASSIUM CHLORIDE 1500 MG/1
TABLET, EXTENDED RELEASE ORAL
Qty: 90 TABLET | Refills: 3 | Status: SHIPPED | OUTPATIENT
Start: 2022-07-08

## 2022-07-08 RX ORDER — BACLOFEN 10 MG/1
10 TABLET ORAL 2 TIMES DAILY
Qty: 30 TABLET | Refills: 0 | Status: SHIPPED | OUTPATIENT
Start: 2022-07-08

## 2022-07-08 NOTE — TELEPHONE ENCOUNTER
Pt made aware of xray results after having a fall resulting in a lot of lower back pain  I gave him number to schedule MRI, but he is asking if there's anything you can call in for him for the pain in the meantime? Pt would like to use [facility] in [place] if something is prescribed as it would be closer for him

## 2022-07-11 ENCOUNTER — TELEPHONE (OUTPATIENT)
Dept: GASTROENTEROLOGY | Facility: CLINIC | Age: 67
End: 2022-07-11

## 2022-07-11 NOTE — TELEPHONE ENCOUNTER
Confirmed upcomming procedure  Reviewed all procedure directions  2 day miralax/dulcolax prep and miralax 1 wk prior to procedure  Patient expressed understanding

## 2022-07-13 ENCOUNTER — TELEPHONE (OUTPATIENT)
Dept: FAMILY MEDICINE CLINIC | Facility: CLINIC | Age: 67
End: 2022-07-13

## 2022-07-13 ENCOUNTER — HOSPITAL ENCOUNTER (OUTPATIENT)
Dept: MRI IMAGING | Facility: HOSPITAL | Age: 67
Discharge: HOME | End: 2022-07-13
Attending: INTERNAL MEDICINE

## 2022-07-13 DIAGNOSIS — M54.2 NECK PAIN: Primary | ICD-10-CM

## 2022-07-13 DIAGNOSIS — S32.020S COMPRESSION FRACTURE OF L2 VERTEBRA, SEQUELA: ICD-10-CM

## 2022-07-13 RX ORDER — METHYLPREDNISOLONE 4 MG/1
TABLET ORAL
Qty: 21 EACH | Refills: 0 | Status: SHIPPED | OUTPATIENT
Start: 2022-07-13 | End: 2022-07-22 | Stop reason: ALTCHOICE

## 2022-07-13 NOTE — TELEPHONE ENCOUNTER
Pt was supposed to have his MRI done today but it was cancelled, looks like the reason they added was that pt did not fit in the machine  Said he has to re schedule at different location  Also, said the muscle relaxer that was sent in doesn't seem to be relieving his back pain  Please advise

## 2022-07-13 NOTE — TELEPHONE ENCOUNTER
Medrol dose pack sent to [facility] Let him know it will increase sugars temporarily

## 2022-07-14 ENCOUNTER — TELEPHONE (OUTPATIENT)
Dept: GASTROENTEROLOGY | Facility: CLINIC | Age: 67
End: 2022-07-14

## 2022-07-14 DIAGNOSIS — M54.9 ACUTE BACK PAIN, UNSPECIFIED BACK LOCATION, UNSPECIFIED BACK PAIN LATERALITY: Primary | ICD-10-CM

## 2022-07-14 RX ORDER — TRAMADOL HYDROCHLORIDE 50 MG/1
50 TABLET ORAL EVERY 12 HOURS PRN
Qty: 14 TABLET | Refills: 0 | Status: SHIPPED | OUTPATIENT
Start: 2022-07-14 | End: 2022-07-14 | Stop reason: CLARIF

## 2022-07-14 RX ORDER — TRAMADOL HYDROCHLORIDE 50 MG/1
50 TABLET ORAL EVERY 6 HOURS PRN
Qty: 14 TABLET | Refills: 0 | Status: SHIPPED | OUTPATIENT
Start: 2022-07-14 | End: 2022-07-22

## 2022-07-14 NOTE — TELEPHONE ENCOUNTER
Pt wants a different rx called in  Said he went to pick it up last night and pharmacist said it could cause hypertension, heart palpitations, said it's too many side effects, he doesn't want to take it  Detail Level: Detailed Consent: The patient's consent was obtained including but not limited to risks of crusting, scabbing, blistering, scarring, darker or lighter pigmentary change, recurrence, incomplete removal and infection. Post-Care Instructions: I reviewed with the patient in detail post-care instructions. Patient is to wear sunprotection, and avoid picking at any of the treated lesions. Pt may apply Vaseline to crusted or scabbing areas. Render Post-Care Instructions In Note?: yes Duration Of Freeze Thaw-Cycle (Seconds): 3

## 2022-07-14 NOTE — TELEPHONE ENCOUNTER
Patients GI provider:  Dr Baron Arroyo    Number to return call: [phone]    Reason for call: Pt calling to reschedule his procedure  Above is his number      Scheduled procedure/appointment date if applicable: procedure 2/30/15

## 2022-07-14 NOTE — TELEPHONE ENCOUNTER
Pt made aware of rx sent  Marcos Burnett he was in too much pain trying to get into the MRI, asking if he needs the imaging as he doesn't want to have to travel to different location with the state he's in, can he be referred anywhere? Also, hasn't had a BM in past 4 days, OTC not helping, asking for rx?

## 2022-07-14 NOTE — TELEPHONE ENCOUNTER
Sent #14 tramadol would try to use one daily and can Take Tylenol for breakthru pain Take with food   When did he r/s MRI for?

## 2022-07-14 NOTE — TELEPHONE ENCOUNTER
Use miralax daily for constipation   He should have MRI to confirm cmpression fracture but can refer to pain mgmt in interim as possibly injection would help

## 2022-07-18 ENCOUNTER — APPOINTMENT (OUTPATIENT)
Dept: LAB | Facility: CLINIC | Age: 67
End: 2022-07-18
Payer: MEDICARE

## 2022-07-18 ENCOUNTER — CONSULT (OUTPATIENT)
Dept: PAIN MEDICINE | Facility: CLINIC | Age: 67
End: 2022-07-18
Payer: MEDICARE

## 2022-07-18 VITALS
WEIGHT: 310 LBS | BODY MASS INDEX: 41.99 KG/M2 | SYSTOLIC BLOOD PRESSURE: 139 MMHG | HEIGHT: 72 IN | DIASTOLIC BLOOD PRESSURE: 75 MMHG | HEART RATE: 61 BPM

## 2022-07-18 DIAGNOSIS — M54.9 ACUTE BACK PAIN, UNSPECIFIED BACK LOCATION, UNSPECIFIED BACK PAIN LATERALITY: ICD-10-CM

## 2022-07-18 DIAGNOSIS — M51.16 LUMBAR DISC DISEASE WITH RADICULOPATHY: Primary | ICD-10-CM

## 2022-07-18 DIAGNOSIS — E66.9 DIABETES MELLITUS TYPE 2 IN OBESE (HCC): ICD-10-CM

## 2022-07-18 DIAGNOSIS — E11.69 DIABETES MELLITUS TYPE 2 IN OBESE (HCC): ICD-10-CM

## 2022-07-18 LAB
EST. AVERAGE GLUCOSE BLD GHB EST-MCNC: 128 MG/DL
HBA1C MFR BLD: 6.1 %

## 2022-07-18 PROCEDURE — 83036 HEMOGLOBIN GLYCOSYLATED A1C: CPT

## 2022-07-18 PROCEDURE — 99204 OFFICE O/P NEW MOD 45 MIN: CPT | Performed by: ANESTHESIOLOGY

## 2022-07-18 PROCEDURE — 36415 COLL VENOUS BLD VENIPUNCTURE: CPT

## 2022-07-18 NOTE — PATIENT INSTRUCTIONS
Core Strengthening Exercises   WHAT YOU NEED TO KNOW:   What do I need to know about core strengthening exercises? Your core includes the muscles of your lower back, hip, pelvis, and abdomen  Core strengthening exercises help heal and strengthen these muscles  This helps prevent another injury, and keeps your pelvis, spine, and hips in the correct position  What do I need to know about exercise safety? Talk to your healthcare provider before you start an exercise program  A physical therapist can teach you how to do core strengthening exercises safely  Do the exercises on a mat or firm surface  A firm surface will support your spine and prevent low back pain  Do not do these exercises on a bed  Move slowly and smoothly  Avoid fast or jerky motions  Stop if you feel pain  Core exercises should not be painful  Stop if you feel pain  Breathe normally during core exercises  Do not hold your breath  This may cause an increase in blood pressure and prevent muscle strengthening  Your healthcare provider will tell you when to inhale and exhale during the exercise  Begin all of your exercises with abdominal bracing  Abdominal bracing helps warm up your core muscles  You can also practice abdominal bracing throughout the day  Lie on your back with your knees bent and feet flat on the floor  Place your arms in a relaxed position beside your body  Tighten your abdominal muscles  Pull your belly button in and up toward your spine  Hold for 5 seconds  Relax your muscles  Repeat 10 times  How do I perform core strengthening exercises? Your healthcare provider will tell you how often to do these exercises  The provider will also tell you how many repetitions of each exercise you should do  Hold each exercise for 5 seconds or as directed  As you get stronger, increase your hold to 10 to 15 seconds  You can do some of these exercises on a stability ball, or with a weight   Ask your healthcare provider how to use a stability ball or weight for these exercises:  Bridging:  Lie on your back with your knees bent and feet flat on the floor  Rest your arms at your side  Tighten your buttocks, and then lift your hips 1 inch off the floor  Hold for 5 seconds  When you can do this exercise without pain for 10 seconds, increase the distance you lift your hips  A good goal is to be able to lift your hips so that your shoulders, hips, and knees are in a straight line  Dead bug:  Lie on your back with your knees bent and feet flat on the floor  Place your arms in a relaxed position beside your body  Begin with abdominal bracing  Next, raise one leg, keeping your knee bent  Hold for 5 seconds  Repeat with the other leg  When you can do this exercise without pain for 10 to 15 seconds, you may raise one straight leg and hold  Repeat with the other leg  Quadruped:  Place your hands and knees on the floor  Keep your wrists directly below your shoulders and your knees directly below your hips  Pull your belly button in toward your spine  Do not flatten or arch your back  Tighten your abdominal muscles below your belly button  Hold for 5 seconds  When you can do this exercise without pain for 10 to 15 seconds, you may extend one arm and hold  Repeat on the other side  Side bridge exercises:      Standing side bridge:  Stand next to a wall and extend one arm toward the wall  Place your palm flat on the wall with your fingers pointing upward  Begin with abdominal bracing  Next, without moving your feet, slowly bend your arm to 90 degrees  Hold for 5 seconds  Repeat on the other side  When you can do this exercise without pain for 10 to 15 seconds, you may do the bent leg side bridge on the floor  Bent leg side bridge:  Lie on one side with your legs, hips, and shoulders in a straight line  Prop yourself up onto your forearm so your elbow is directly below your shoulder   Bend your knees back to 90 degrees  Begin with abdominal bracing  Next, lift your hips and balance yourself on your forearm and knees  Hold for 5 seconds  Repeat on the other side  When you can do this exercise without pain for 10 to 15 seconds, you may do the straight leg side bridge on the floor  Straight leg side bridge:  Lie on one side with your legs, hips, and shoulders in a straight line  Prop yourself up onto your forearm so your elbow is directly below your shoulder  Begin with abdominal bracing  Lift your hips off the floor and balance yourself on your forearm and the outside of your flexed foot  Do not let your ankle bend sideways  Hold for 5 seconds  Repeat on the other side  When you can do this exercise without pain for 10 to 15 seconds, ask your healthcare provider for more advanced exercises  Superman:  Lie on your stomach  Extend your arms forward on the floor  Tighten your abdominal muscles and lift your right hand and left leg off the floor  Hold this position  Slowly return to the starting position  Tighten your abdominal muscles and lift your left hand and right leg off the floor  Hold this position  Slowly return to the starting position  Clam:  Lie on your side with your knees bent  Put your bottom arm under your head to keep your neck in line  Put your top hand on your hip to keep your pelvis from moving  Put your heels together, and keep them together during this exercise  Slowly raise your top knee toward the ceiling  Then lower your leg so your knees are together  Repeat this exercise 10 times  Then switch sides and do the exercise 10 times with the other leg  Curl up:  Lie on your back with your knees bent and feet flat on the floor  Place your hands, palms down, underneath your lower back  Next, with your elbows on the floor, lift your shoulders and chest 2 to 3 inches off the floor  Keep your head in line with your shoulders  Hold this position   Slowly return to the starting position  Straight leg raises:  Lie on your back with one leg straight  Bend the other knee and place your foot flat on the floor  Tighten your abdominal muscles  Keep your leg straight and slowly lift it straight up 6 to 12 inches off the floor  Hold this position  Lower your leg slowly  Do as many repetitions as directed on this side  Repeat with the other leg  Plank:  Lie on your stomach  Bend your elbows and place your forearms flat on the floor  Lift your chest, stomach, and knees off the floor  Make sure your elbows are below your shoulders  Your body should be in a straight line  Do not let your hips or lower back sink to the ground  Squeeze your abdominal muscles together and hold for 15 seconds  To make this exercise harder, hold for 30 seconds or lift 1 leg at a time  Bicycles:  Lie on your back  Bend both knees and bring them toward your chest  Your calves should be parallel to the floor  Place the palms of your hands on the back of your head  Straighten your right leg and keep it lifted 2 inches off the floor  Raise your head and shoulders off the floor and twist towards your left  Keep your head and shoulders lifted  Bend your right knee while you straighten your left leg  Keep your left leg 2 inches off the floor  Twist your head and chest towards the left leg  Continue to straighten 1 leg at a time and twist        When should I contact my healthcare provider? You have sharp or worsening pain during exercise or at rest     You have questions or concerns about your condition, care, or exercise program     CARE AGREEMENT:   You have the right to help plan your care  Learn about your health condition and how it may be treated  Discuss treatment options with your healthcare providers to decide what care you want to receive  You always have the right to refuse treatment  The above information is an  only   It is not intended as medical advice for individual conditions or treatments  Talk to your doctor, nurse or pharmacist before following any medical regimen to see if it is safe and effective for you  © Copyright [employer] includes the [facility] 2022 Information is for End User's use only and may not be sold, redistributed or otherwise used for commercial purposes   All illustrations and images included in CareNotes® are the copyrighted property of A D A M , Inc  or [address] [place] [address]

## 2022-07-18 NOTE — PROGRESS NOTES
Assessment:  1  Lumbar disc disease with radiculopathy    2  Acute back pain, unspecified back location, unspecified back pain laterality        Plan:  Patient is a 75-year-old male with complaints of low back pain status post fall 07/2022 in the tub presents office for initial consultation  Patient's situation is complicated by type 2 diabetes managed with metformin  Patient was prescribed tramadol for acute pain control  Patient was instructed to take the tramadol before having the MRI done to help control the pain while he is laying flat  Patient's main complaint is a discogenic low back pain which he describes as increased pressure and throbbing sensation in addition to cramping exacerbated by laying flat or standing up from sitting position or standing for too long  1  We will provide patient open MRI lumbar spine  2  Follow-up in 1 month to review diagnostic imaging and plan for interventional management which most likely be interlaminar epidural steroid injection       History of Present Illness: The patient is a 74 y o  male who presents for consultation in regards to Back Pain  Symptoms have been present for 1 week  Symptoms began following a non work related injury  Pain is reported to be 9 on the numeric rating scale  Symptoms are felt constantly and worst in the no typical pattern  Symptoms are characterized as cramping, dull/aching, pressure-like and throbbing  Symptoms are associated with bilateral leg weakness  Aggravating factors include kneeling, lying down, standing, bending, leaning forward, leaning bckward, walking, exercise, coughing/sneezing and bowel movements  Relieving factors include sitting and relaxation  No change in symptoms with turning the head  Treatments that have been helpful include heat/ice  Medications to relieve symptoms include tramadol  Review of Systems:    Review of Systems   Constitutional: Negative for chills and fever     HENT: Negative for ear pain and sore throat  Eyes: Negative for pain and visual disturbance  Respiratory: Negative for cough and shortness of breath  Cardiovascular: Positive for leg swelling  Negative for chest pain and palpitations  Gastrointestinal: Negative for abdominal pain and vomiting  Genitourinary: Negative for dysuria and hematuria  Musculoskeletal: Positive for arthralgias and myalgias  Negative for back pain  Skin: Negative for color change and rash  Neurological: Negative for seizures and syncope  All other systems reviewed and are negative          Past Medical History:   Diagnosis Date    Arthritis     last assessed 7/1/15    [facility] )     type 2-last assessed 1/28/15    GERD (gastroesophageal reflux disease)     Hypertension     Irregular heart beat     last assessed 7/1/15    Palpitations     last assessed 9/7/17    Sexual dysfunction     last assessed 7/1/15    Thyroid disease     last assessed 7/1/15       Past Surgical History:   Procedure Laterality Date    COLONOSCOPY      TONSILLECTOMY         Family History   Problem Relation Age of Onset    Hypertension Mother         essential    Stroke Mother     Hypertension Father         essential    Heart defect Father         cardiac disorder    Stroke Brother     Hypertension Brother        Social History     Occupational History    Not on file   Tobacco Use    Smoking status: Former Smoker    Smokeless tobacco: Never Used    Tobacco comment: former smoker-quit 17 yrs ago 1pppd x 30 yrs as per [employer]   Vaping Use    Vaping Use: Never used   Substance and Sexual Activity    Alcohol use: Never    Drug use: Never    Sexual activity: Not on file         Current Outpatient Medications:     baclofen 10 mg tablet, Take 1 tablet (10 mg total) by mouth 2 (two) times a day, Disp: 30 tablet, Rfl: 0    methylPREDNISolone 4 MG tablet therapy pack, Use as directed on package, Disp: 21 each, Rfl: 0    traMADol (Ultram) 50 mg tablet, Take 1 tablet (50 mg total) by mouth every 6 (six) hours as needed for moderate pain for up to 7 days, Disp: 14 tablet, Rfl: 0    aluminum-magnesium hydroxide-simethicone (MAALOX) 200-200-20 MG/5ML SUSP, Take 20 mL by mouth 4 (four) times a day (before meals and at bedtime), Disp: 710 mL, Rfl: 0    amLODIPine (NORVASC) 5 mg tablet, Take 1 tablet (5 mg total) by mouth daily For blood pressure, Disp: 90 tablet, Rfl: 3    aspirin 81 mg chewable tablet, Chew 81 mg daily, Disp: , Rfl:     atorvastatin (LIPITOR) 10 mg tablet, Take 1 tablet (10 mg total) by mouth daily, Disp: 90 tablet, Rfl: 3    enalapril (VASOTEC) 10 mg tablet, Take 1 tablet (10 mg total) by mouth daily, Disp: 90 tablet, Rfl: 3    furosemide (LASIX) 40 mg tablet, Take 1 tablet (40 mg total) by mouth daily, Disp: 90 tablet, Rfl: 3    glucose blood (OneTouch Verio) test strip, Use 1 each daily, Disp: 100 each, Rfl: 5    ketoconazole (NIZORAL) 2 % shampoo, , Disp: , Rfl:     Klor-Con M20 20 MEQ tablet, TAKE 1 TABLET DAILY, Disp: 90 tablet, Rfl: 3    Lancets (onetouch ultrasoft) lancets, Check glucose daily, Disp: 100 each, Rfl: 1    metFORMIN (GLUCOPHAGE) 500 mg tablet, Take 1 tablet (500 mg total) by mouth daily with breakfast One tab in am, Disp: 90 tablet, Rfl: 3    metoprolol succinate (TOPROL-XL) 25 mg 24 hr tablet, Take 1 tablet (25 mg total) by mouth daily, Disp: 90 tablet, Rfl: 3    metoprolol succinate (TOPROL-XL) 50 mg 24 hr tablet, Take 1 tablet (50 mg total) by mouth daily, Disp: 90 tablet, Rfl: 3    multivitamin (THERAGRAN) TABS, Take 1 tablet by mouth daily, Disp: , Rfl:     omeprazole (PriLOSEC) 40 MG capsule, Take 1 capsule (40 mg total) by mouth daily, Disp: 90 capsule, Rfl: 3    Red Yeast Rice Extract (RED YEAST RICE PO), Take by mouth, Disp: , Rfl:     triamcinolone (KENALOG) 0 1 % lotion, , Disp: , Rfl:     triamcinolone (KENALOG) 0 5 % cream, , Disp: , Rfl:     Vitamin D, Cholecalciferol, 50 MCG (2000 UT) CAPS, Take by mouth, Disp: , Rfl:     Allergies   Allergen Reactions    Acetaminophen Other (See Comments)     Other reaction(s): Unknown Reaction  "I had to go to the hospital and get shots after taking it 20yrs ago"       Physical Exam:    /75   Pulse 61   Ht 6' (1 829 m)   Wt (!) 141 kg (310 lb)   BMI 42 04 kg/m²     Constitutional: normal, well developed, well nourished, alert, in no distress and non-toxic and no overt pain behavior  and obese  Eyes: anicteric  HEENT: grossly intact  Neck: supple, symmetric, trachea midline and no masses   Pulmonary:even and unlabored  Cardiovascular:No edema or pitting edema present  Skin:Normal without rashes or lesions and well hydrated  Psychiatric:Mood and affect appropriate  Neurologic:Cranial Nerves II-XII grossly intact  Musculoskeletal:antalgic     Lumbar/Sacral Spine examination demonstrates  Decrease range of motion lumbar spine with pain upon: flexion, lateral rotation to the left/right, and bending to the left/right  Bilateral lumbar paraspinals tender to palpation  Muscle spasms noted in the lumbar area bilaterally  4/5 lower extremity strength in all muscle groups bilaterally  Positive seated straight leg raise for bilateral lower extremities  Sensitivity to light touch intact bilateral lower extremities  2+ reflexes in the patella and Achilles  No ankle clonus     Imaging  LUMBAR SPINE     INDICATION:   M[age]: Low back pain, unspecified      COMPARISON:  1/19/2022     VIEWS:  XR SPINE LUMBAR 2 OR 3 VIEWS INJURY  Images: 4     FINDINGS:  Mild superior endplate compression deformity L2, possibly acute     There are 5 non rib bearing lumbar vertebral bodies       There is no evidence of destructive osseous lesion      Alignment is unremarkable       Persistent mild multilevel degenerative spondylosis     The pedicles appear intact      There are atherosclerotic calcifications   Soft tissues are otherwise unremarkable      IMPRESSION:  Mild superior endplate compression deformity L2, possibly acute     Persistent mild multilevel degenerative spondylosis

## 2022-07-21 ENCOUNTER — HOSPITAL ENCOUNTER (OUTPATIENT)
Dept: MRI IMAGING | Facility: HOSPITAL | Age: 67
Discharge: HOME | End: 2022-07-21
Payer: MEDICARE

## 2022-07-21 DIAGNOSIS — M51.16 LUMBAR DISC DISEASE WITH RADICULOPATHY: ICD-10-CM

## 2022-07-21 DIAGNOSIS — M54.9 ACUTE BACK PAIN, UNSPECIFIED BACK LOCATION, UNSPECIFIED BACK PAIN LATERALITY: ICD-10-CM

## 2022-07-21 PROCEDURE — 72148 MRI LUMBAR SPINE W/O DYE: CPT

## 2022-07-21 PROCEDURE — G1004 CDSM NDSC: HCPCS

## 2022-07-22 ENCOUNTER — TELEPHONE (OUTPATIENT)
Dept: PAIN MEDICINE | Facility: CLINIC | Age: 67
End: 2022-07-22

## 2022-07-22 ENCOUNTER — OFFICE VISIT (OUTPATIENT)
Dept: FAMILY MEDICINE CLINIC | Facility: CLINIC | Age: 67
End: 2022-07-22
Payer: MEDICARE

## 2022-07-22 VITALS
HEIGHT: 72 IN | HEART RATE: 76 BPM | WEIGHT: 304.8 LBS | SYSTOLIC BLOOD PRESSURE: 132 MMHG | DIASTOLIC BLOOD PRESSURE: 70 MMHG | BODY MASS INDEX: 41.28 KG/M2 | TEMPERATURE: 97.6 F | RESPIRATION RATE: 18 BRPM

## 2022-07-22 DIAGNOSIS — M47.27 LUMBOSACRAL RADICULOPATHY DUE TO DEGENERATIVE JOINT DISEASE OF SPINE: Primary | ICD-10-CM

## 2022-07-22 DIAGNOSIS — E11.9 TYPE 2 DIABETES MELLITUS WITHOUT COMPLICATION, WITHOUT LONG-TERM CURRENT USE OF INSULIN (HCC): ICD-10-CM

## 2022-07-22 DIAGNOSIS — I89.0 LYMPHEDEMA: ICD-10-CM

## 2022-07-22 PROCEDURE — 99214 OFFICE O/P EST MOD 30 MIN: CPT | Performed by: INTERNAL MEDICINE

## 2022-07-22 NOTE — TELEPHONE ENCOUNTER
Pt called in to check on the status of the MRI results  Pt was advised as soon and the results are finally he will be called  Please be advised thank you    Pt can be reached @[phone]

## 2022-07-22 NOTE — PROGRESS NOTES
Assessment/Plan:         Diagnoses and all orders for this visit:    Lymphedema  -     Ambulatory Referral to Physical Therapy; Future  Pt is interested in lymphatic pumps which would likely be helpful   Type 2 diabetes mellitus without complication, without long-term current use of insulin (HCC)  -     Comprehensive metabolic panel; Future  -     HEMOGLOBIN A1C W/ EAG ESTIMATION; Future  -     Lipid panel; Future  Locarb diet Repeat labs in 3 months     Lumbosacral radiculopathy due to Degenerative joint disease spine  Saw pain mgmt - MRI report pending and he said he will be scheduled for injection thru pain mgmt     Depression Screening and Follow-up Plan: Patient was screened for depression during today's encounter  They screened negative with a PHQ-2 score of 0  Patient ID: Justyn London is a 74 y o  male  HPI  Pt doing ok His pain is better than it was on meds but he is limited in mobility He saw pain mgmt and had MRI yesterday He is awaiting results but said plan was for injection He is staying home due to hot weather and notes swelling increased He did see vascular and is trying to use wraps/compression socks but limiting       Review of Systems   Constitutional: Positive for activity change  Negative for chills and fever  HENT: Negative  Eyes: Negative for visual disturbance  Respiratory: Negative for cough and shortness of breath  Cardiovascular: Positive for leg swelling  Negative for chest pain and palpitations  Gastrointestinal: Negative  Genitourinary: Positive for frequency  Musculoskeletal: Positive for arthralgias, back pain and gait problem  Neurological: Negative for dizziness, light-headedness and headaches  Psychiatric/Behavioral: Positive for sleep disturbance         Past Medical History:   Diagnosis Date    Arthritis     last assessed 7/1/15    Diabetes mellitus ([facility] [facility] )     type 2-last assessed 1/28/15    GERD (gastroesophageal reflux disease)     Hypertension     Irregular heart beat     last assessed 7/1/15    Palpitations     last assessed 9/7/17    Sexual dysfunction     last assessed 7/1/15    Thyroid disease     last assessed 7/1/15     Past Surgical History:   Procedure Laterality Date    COLONOSCOPY      TONSILLECTOMY       Social History     Socioeconomic History    Marital status: Single     Spouse name: Not on file    Number of children: Not on file    Years of education: Not on file    Highest education level: Not on file   Occupational History    Not on file   Tobacco Use    Smoking status: Former Smoker    Smokeless tobacco: Never Used    Tobacco comment: former smoker-quit 17 yrs ago 1pppd x 30 yrs as per [employer]   Vaping Use    Vaping Use: Never used   Substance and Sexual Activity    Alcohol use: Never    Drug use: Never    Sexual activity: Not on file   Other Topics Concern    Not on file   Social History Narrative    Not on file     Social Determinants of Health     Financial Resource Strain: Not on file   Food Insecurity: Not on file   Transportation Needs: Not on file   Physical Activity: Not on file   Stress: Not on file   Social Connections: Not on file   Intimate Partner Violence: Not on file   Housing Stability: Not on file     Allergies   Allergen Reactions    Acetaminophen Other (See Comments)     Other reaction(s): Unknown Reaction  "I had to go to the hospital and get shots after taking it 20yrs ago"           /70   Pulse 76   Temp 97 6 °F (36 4 °C) (Temporal)   Resp 18   Ht 6' (1 829 m)   Wt (!) 138 kg (304 lb 12 8 oz)   BMI 41 34 kg/m²          Physical Exam  Vitals reviewed  Constitutional:       General: He is not in acute distress  Appearance: Normal appearance  He is obese  He is not ill-appearing, toxic-appearing or diaphoretic  HENT:      Head: Normocephalic and atraumatic        Right Ear: External ear normal       Left Ear: External ear normal       Nose: Nose normal  Mouth/Throat:      Mouth: Mucous membranes are dry  Eyes:      General: No scleral icterus  Extraocular Movements: Extraocular movements intact  Conjunctiva/sclera: Conjunctivae normal       Pupils: Pupils are equal, round, and reactive to light  Cardiovascular:      Rate and Rhythm: Normal rate and regular rhythm  Pulses: Normal pulses  Heart sounds: Normal heart sounds  Pulmonary:      Effort: Pulmonary effort is normal  No respiratory distress  Breath sounds: Normal breath sounds  No wheezing  Abdominal:      General: Bowel sounds are normal  There is no distension  Palpations: Abdomen is soft  Tenderness: There is no abdominal tenderness  Musculoskeletal:      Cervical back: Normal range of motion  No rigidity  Right lower leg: Edema present  Left lower leg: Edema present  Lymphadenopathy:      Cervical: No cervical adenopathy  Skin:     General: Skin is dry  Coloration: Skin is not jaundiced or pale  Neurological:      General: No focal deficit present  Mental Status: He is alert and oriented to person, place, and time  Mental status is at baseline  Psychiatric:         Mood and Affect: Mood normal          Behavior: Behavior normal          Thought Content:  Thought content normal          Judgment: Judgment normal

## 2022-07-25 DIAGNOSIS — S32.020A CLOSED COMPRESSION FRACTURE OF L2 LUMBAR VERTEBRA, INITIAL ENCOUNTER (HCC): Primary | ICD-10-CM

## 2022-07-25 NOTE — TELEPHONE ENCOUNTER
MRI Lumbar spine shows compression fracture at L2   Referral to IR has been placed for kyphoplasty (cementing)

## 2022-07-27 ENCOUNTER — APPOINTMENT (OUTPATIENT)
Dept: LAB | Facility: MEDICAL CENTER | Age: 67
End: 2022-07-27
Payer: MEDICARE

## 2022-07-27 ENCOUNTER — TELEPHONE (OUTPATIENT)
Dept: FAMILY MEDICINE CLINIC | Facility: CLINIC | Age: 67
End: 2022-07-27

## 2022-07-27 DIAGNOSIS — M10.9 ACUTE GOUT, UNSPECIFIED CAUSE, UNSPECIFIED SITE: ICD-10-CM

## 2022-07-27 DIAGNOSIS — M10.9 ACUTE GOUT, UNSPECIFIED CAUSE, UNSPECIFIED SITE: Primary | ICD-10-CM

## 2022-07-27 LAB — URATE SERPL-MCNC: 7.9 MG/DL (ref 3.5–8.5)

## 2022-07-27 PROCEDURE — 36415 COLL VENOUS BLD VENIPUNCTURE: CPT

## 2022-07-27 PROCEDURE — 84550 ASSAY OF BLOOD/URIC ACID: CPT

## 2022-07-28 DIAGNOSIS — M10.9 ACUTE GOUT, UNSPECIFIED CAUSE, UNSPECIFIED SITE: Primary | ICD-10-CM

## 2022-07-28 DIAGNOSIS — I10 HYPERTENSION, UNSPECIFIED TYPE: ICD-10-CM

## 2022-07-28 RX ORDER — AMLODIPINE BESYLATE 5 MG/1
TABLET ORAL
Qty: 90 TABLET | Refills: 3 | Status: SHIPPED | OUTPATIENT
Start: 2022-07-28

## 2022-08-01 ENCOUNTER — PREP FOR PROCEDURE (OUTPATIENT)
Dept: INTERVENTIONAL RADIOLOGY/VASCULAR | Facility: HOSPITAL | Age: 67
End: 2022-08-01

## 2022-08-01 DIAGNOSIS — S32.020G COMPRESSION FRACTURE OF L2 VERTEBRA WITH DELAYED HEALING: Primary | ICD-10-CM

## 2022-08-01 NOTE — TELEPHONE ENCOUNTER
S/W pt  Advised pt of the same and gave him central scheduling's phone #  Pt asked for RM's response to be sent to his [employer]  Sent via [employer]  [address]

## 2022-08-03 ENCOUNTER — APPOINTMENT (OUTPATIENT)
Dept: RADIOLOGY | Facility: MEDICAL CENTER | Age: 67
End: 2022-08-03
Payer: MEDICARE

## 2022-08-03 DIAGNOSIS — M25.572 LEFT ANKLE PAIN, UNSPECIFIED CHRONICITY: ICD-10-CM

## 2022-08-03 DIAGNOSIS — M19.072 ARTHRITIS OF LEFT ANKLE: ICD-10-CM

## 2022-08-03 DIAGNOSIS — M79.672 LEFT FOOT PAIN: ICD-10-CM

## 2022-08-03 PROCEDURE — 73630 X-RAY EXAM OF FOOT: CPT

## 2022-08-03 PROCEDURE — 73610 X-RAY EXAM OF ANKLE: CPT

## 2022-08-07 NOTE — TELEPHONE ENCOUNTER
Script signed and faxed to [employer] #[phone] and confirmed receipt listed at 1:10pm Cal[employer]

## 2022-08-09 DIAGNOSIS — E78.5 DYSLIPIDEMIA: ICD-10-CM

## 2022-08-09 DIAGNOSIS — K21.9 GASTROESOPHAGEAL REFLUX DISEASE WITHOUT ESOPHAGITIS: ICD-10-CM

## 2022-08-09 DIAGNOSIS — I10 HYPERTENSION, UNSPECIFIED TYPE: ICD-10-CM

## 2022-08-09 RX ORDER — ATORVASTATIN CALCIUM 10 MG/1
10 TABLET, FILM COATED ORAL DAILY
Qty: 90 TABLET | Refills: 3 | Status: SHIPPED | OUTPATIENT
Start: 2022-08-09

## 2022-08-09 RX ORDER — METOPROLOL SUCCINATE 50 MG/1
50 TABLET, EXTENDED RELEASE ORAL DAILY
Qty: 90 TABLET | Refills: 3 | Status: SHIPPED | OUTPATIENT
Start: 2022-08-09

## 2022-08-09 RX ORDER — OMEPRAZOLE 40 MG/1
40 CAPSULE, DELAYED RELEASE ORAL DAILY
Qty: 90 CAPSULE | Refills: 3 | Status: SHIPPED | OUTPATIENT
Start: 2022-08-09 | End: 2022-08-18 | Stop reason: SDUPTHER

## 2022-08-18 DIAGNOSIS — K21.9 GASTROESOPHAGEAL REFLUX DISEASE WITHOUT ESOPHAGITIS: ICD-10-CM

## 2022-08-18 RX ORDER — OMEPRAZOLE 40 MG/1
40 CAPSULE, DELAYED RELEASE ORAL DAILY
Qty: 90 CAPSULE | Refills: 3 | Status: SHIPPED | OUTPATIENT
Start: 2022-08-18

## 2022-08-29 ENCOUNTER — TELEPHONE (OUTPATIENT)
Dept: FAMILY MEDICINE CLINIC | Facility: CLINIC | Age: 67
End: 2022-08-29

## 2022-08-29 DIAGNOSIS — I10 HYPERTENSION, UNSPECIFIED TYPE: ICD-10-CM

## 2022-08-29 RX ORDER — METOPROLOL SUCCINATE 25 MG/1
TABLET, EXTENDED RELEASE ORAL
Qty: 90 TABLET | Refills: 3 | Status: SHIPPED | OUTPATIENT
Start: 2022-08-29

## 2022-08-29 NOTE — TELEPHONE ENCOUNTER
We do not have that - he can inquire at the department of health but not sure if it is accessible as in past it was dosed only to those affected when there was an outbreak

## 2022-08-29 NOTE — TELEPHONE ENCOUNTER
Pt inquiring about a vaccine for ebola? I don't believe I heard of a vaccine for that, but wanted to check with you

## 2022-09-08 ENCOUNTER — TRANSCRIBE ORDERS (OUTPATIENT)
Dept: ADMINISTRATIVE | Facility: HOSPITAL | Age: 67
End: 2022-09-08

## 2022-09-23 DIAGNOSIS — E11.9 TYPE 2 DIABETES MELLITUS WITHOUT COMPLICATION, WITHOUT LONG-TERM CURRENT USE OF INSULIN (HCC): ICD-10-CM

## 2022-09-23 RX ORDER — BLOOD SUGAR DIAGNOSTIC
1 STRIP MISCELLANEOUS DAILY
Qty: 100 EACH | Refills: 5 | Status: SHIPPED | OUTPATIENT
Start: 2022-09-23

## 2022-09-26 LAB
LEFT EYE DIABETIC RETINOPATHY: NORMAL
RIGHT EYE DIABETIC RETINOPATHY: NORMAL

## 2022-09-29 ENCOUNTER — TELEPHONE (OUTPATIENT)
Dept: PAIN MEDICINE | Facility: CLINIC | Age: 67
End: 2022-09-29

## 2022-09-29 NOTE — TELEPHONE ENCOUNTER
Pt called in request that his procedure could be scheduled at the [facility] office        Please be advised   Pt can be reached @  [phone]

## 2022-09-30 NOTE — TELEPHONE ENCOUNTER
Pt returned call from office- rep gave patient the message and number to IR and transferred- pt is ok

## 2022-10-05 ENCOUNTER — TELEPHONE (OUTPATIENT)
Dept: INTERVENTIONAL RADIOLOGY/VASCULAR | Facility: HOSPITAL | Age: 67
End: 2022-10-05

## 2022-10-05 NOTE — TELEPHONE ENCOUNTER
Called Yesterday around 8:00 am for virtual visit/consult  No answer  Left message  Tried again today  VM was full      RDr

## 2022-10-21 ENCOUNTER — TELEPHONE (OUTPATIENT)
Dept: FAMILY MEDICINE CLINIC | Facility: CLINIC | Age: 67
End: 2022-10-21

## 2022-10-27 ENCOUNTER — TELEPHONE (OUTPATIENT)
Dept: FAMILY MEDICINE CLINIC | Facility: CLINIC | Age: 67
End: 2022-10-27

## 2022-10-27 ENCOUNTER — RA CDI HCC (OUTPATIENT)
Dept: OTHER | Facility: HOSPITAL | Age: 67
End: 2022-10-27

## 2022-10-27 ENCOUNTER — APPOINTMENT (OUTPATIENT)
Dept: LAB | Facility: MEDICAL CENTER | Age: 67
End: 2022-10-27
Payer: MEDICARE

## 2022-10-27 DIAGNOSIS — E11.9 TYPE 2 DIABETES MELLITUS WITHOUT COMPLICATION, WITHOUT LONG-TERM CURRENT USE OF INSULIN (HCC): ICD-10-CM

## 2022-10-27 LAB
ALBUMIN SERPL BCP-MCNC: 3.7 G/DL (ref 3.5–5)
ALP SERPL-CCNC: 38 U/L (ref 46–116)
ALT SERPL W P-5'-P-CCNC: 27 U/L (ref 12–78)
ANION GAP SERPL CALCULATED.3IONS-SCNC: 7 MMOL/L (ref 4–13)
AST SERPL W P-5'-P-CCNC: 19 U/L (ref 5–45)
BILIRUB SERPL-MCNC: 0.84 MG/DL (ref 0.2–1)
BUN SERPL-MCNC: 21 MG/DL (ref 5–25)
CALCIUM SERPL-MCNC: 10 MG/DL (ref 8.3–10.1)
CHLORIDE SERPL-SCNC: 106 MMOL/L (ref 96–108)
CHOLEST SERPL-MCNC: 133 MG/DL
CO2 SERPL-SCNC: 26 MMOL/L (ref 21–32)
CREAT SERPL-MCNC: 1.15 MG/DL (ref 0.6–1.3)
EST. AVERAGE GLUCOSE BLD GHB EST-MCNC: 128 MG/DL
GFR SERPL CREATININE-BSD FRML MDRD: 64 ML/MIN/1.73SQ M
GLUCOSE P FAST SERPL-MCNC: 136 MG/DL (ref 65–99)
HBA1C MFR BLD: 6.1 %
HDLC SERPL-MCNC: 37 MG/DL
LDLC SERPL CALC-MCNC: 82 MG/DL (ref 0–100)
NONHDLC SERPL-MCNC: 96 MG/DL
POTASSIUM SERPL-SCNC: 4 MMOL/L (ref 3.5–5.3)
PROT SERPL-MCNC: 8.1 G/DL (ref 6.4–8.4)
SODIUM SERPL-SCNC: 139 MMOL/L (ref 135–147)
TRIGL SERPL-MCNC: 71 MG/DL

## 2022-10-27 PROCEDURE — 36415 COLL VENOUS BLD VENIPUNCTURE: CPT

## 2022-10-27 PROCEDURE — 83036 HEMOGLOBIN GLYCOSYLATED A1C: CPT

## 2022-10-27 PROCEDURE — 80061 LIPID PANEL: CPT

## 2022-10-27 PROCEDURE — 80053 COMPREHEN METABOLIC PANEL: CPT

## 2022-10-27 NOTE — TELEPHONE ENCOUNTER
He should check with office doing procedure   He should not take any meds AM of of procedure but I am not sure if they request hold of aspirin prior

## 2022-10-27 NOTE — TELEPHONE ENCOUNTER
Pt going for colonoscopy next Monday, said he's taking baby aspirin and vitamins and was wondering if he needs to stop any of these the morning of the procedure

## 2022-11-03 ENCOUNTER — OFFICE VISIT (OUTPATIENT)
Dept: FAMILY MEDICINE CLINIC | Facility: CLINIC | Age: 67
End: 2022-11-03

## 2022-11-03 VITALS — TEMPERATURE: 97.5 F | WEIGHT: 308 LBS | BODY MASS INDEX: 41.72 KG/M2 | HEIGHT: 72 IN

## 2022-11-03 DIAGNOSIS — E11.9 TYPE 2 DIABETES MELLITUS WITHOUT COMPLICATION, WITHOUT LONG-TERM CURRENT USE OF INSULIN (HCC): ICD-10-CM

## 2022-11-03 DIAGNOSIS — Z00.00 MEDICARE ANNUAL WELLNESS VISIT, SUBSEQUENT: Primary | ICD-10-CM

## 2022-11-03 NOTE — PATIENT INSTRUCTIONS
Medicare Preventive Visit Patient Instructions  Thank you for completing your Welcome to Medicare Visit or Medicare Annual Wellness Visit today  Your next wellness visit will be due in one year (11/4/2023)  The screening/preventive services that you may require over the next 5-10 years are detailed below  Some tests may not apply to you based off risk factors and/or age  Screening tests ordered at today's visit but not completed yet may show as past due  Also, please note that scanned in results may not display below  Preventive Screenings:  Service Recommendations Previous Testing/Comments   Colorectal Cancer Screening  · Colonoscopy    · Fecal Occult Blood Test (FOBT)/Fecal Immunochemical Test (FIT)  · Fecal DNA/Cologuard Test  · Flexible Sigmoidoscopy Age: 39-70 years old   Colonoscopy: every 10 years (May be performed more frequently if at higher risk)  OR  FOBT/FIT: every 1 year  OR  Cologuard: every 3 years  OR  Sigmoidoscopy: every 5 years  Screening may be recommended earlier than age 39 if at higher risk for colorectal cancer  Also, an individualized decision between you and your healthcare provider will decide whether screening between the ages of 74-80 would be appropriate   Colonoscopy: 10/31/2022  FOBT/FIT: Not on file  Cologuard: Not on file  Sigmoidoscopy: Not on file    Screening Current     Prostate Cancer Screening Individualized decision between patient and health care provider in men between ages of 53-78   Medicare will cover every 12 months beginning on the day after your 50th birthday PSA: 0 8 ng/mL           Hepatitis C Screening Once for adults born between 1945 and 1965  More frequently in patients at high risk for Hepatitis C Hep C Antibody: 03/26/2019    Screening Current   Diabetes Screening 1-2 times per year if you're at risk for diabetes or have pre-diabetes Fasting glucose: 136 mg/dL (10/27/2022)  A1C: 6 1 % (10/27/2022)  Screening Not Indicated  History Diabetes   Cholesterol Screening Once every 5 years if you don't have a lipid disorder  May order more often based on risk factors  Lipid panel: 10/27/2022  Screening Current      Other Preventive Screenings Covered by Medicare:  1  Abdominal Aortic Aneurysm (AAA) Screening: covered once if your at risk  You're considered to be at risk if you have a family history of AAA or a male between the age of 73-68 who smoking at least 100 cigarettes in your lifetime  2  Lung Cancer Screening: covers low dose CT scan once per year if you meet all of the following conditions: (1) Age 50-69; (2) No signs or symptoms of lung cancer; (3) Current smoker or have quit smoking within the last 15 years; (4) You have a tobacco smoking history of at least 20 pack years (packs per day x number of years you smoked); (5) You get a written order from a healthcare provider  3  Glaucoma Screening: covered annually if you're considered high risk: (1) You have diabetes OR (2) Family history of glaucoma OR (3)  aged 48 and older OR (3)  American aged 72 and older  3  Osteoporosis Screening: covered every 2 years if you meet one of the following conditions: (1) Have a vertebral abnormality; (2) On glucocorticoid therapy for more than 3 months; (3) Have primary hyperparathyroidism; (4) On osteoporosis medications and need to assess response to drug therapy  5  HIV Screening: covered annually if you're between the age of 12-76  Also covered annually if you are younger than 13 and older than 72 with risk factors for HIV infection  For pregnant patients, it is covered up to 3 times per pregnancy      Immunizations:  Immunization Recommendations   Influenza Vaccine Annual influenza vaccination during flu season is recommended for all persons aged >= 6 months who do not have contraindications   Pneumococcal Vaccine   * Pneumococcal conjugate vaccine = PCV13 (Prevnar 13), PCV15 (Vaxneuvance), PCV20 (Prevnar 20)  * Pneumococcal polysaccharide vaccine = PPSV23 (Pneumovax) Adults 2364 years old: 1-3 doses may be recommended based on certain risk factors  Adults 72 years old: 1-2 doses may be recommended based off what pneumonia vaccine you previously received   Hepatitis B Vaccine 3 dose series if at intermediate or high risk (ex: diabetes, end stage renal disease, liver disease)   Tetanus (Td) Vaccine - COST NOT COVERED BY MEDICARE PART B Following completion of primary series, a booster dose should be given every 10 years to maintain immunity against tetanus  Td may also be given as tetanus wound prophylaxis  Tdap Vaccine - COST NOT COVERED BY MEDICARE PART B Recommended at least once for all adults  For pregnant patients, recommended with each pregnancy  Shingles Vaccine (Shingrix) - COST NOT COVERED BY MEDICARE PART B  2 shot series recommended in those aged 48 and above     Health Maintenance Due:      Topic Date Due   • Colorectal Cancer Screening  10/31/2025   • Hepatitis C Screening  Completed     Immunizations Due:      Topic Date Due   • Pneumococcal Vaccine: 65+ Years (3 - PPSV23 or PCV20) 08/25/2021   • COVID-19 Vaccine (3 - Booster for Pfizer series) 08/25/2021   • Influenza Vaccine (1) 09/01/2022     Advance Directives   What are advance directives? Advance directives are legal documents that state your wishes and plans for medical care  These plans are made ahead of time in case you lose your ability to make decisions for yourself  Advance directives can apply to any medical decision, such as the treatments you want, and if you want to donate organs  What are the types of advance directives? There are many types of advance directives, and each state has rules about how to use them  You may choose a combination of any of the following:  · Living will: This is a written record of the treatment you want  You can also choose which treatments you do not want, which to limit, and which to stop at a certain time   This includes surgery, medicine, IV fluid, and tube feedings  · Durable power of  for healthcare [facility]): This is a written record that states who you want to make healthcare choices for you when you are unable to make them for yourself  This person, called a proxy, is usually a family member or a friend  You may choose more than 1 proxy  · Do not resuscitate (DNR) order:  A DNR order is used in case your heart stops beating or you stop breathing  It is a request not to have certain forms of treatment, such as CPR  A DNR order may be included in other types of advance directives  · Medical directive: This covers the care that you want if you are in a coma, near death, or unable to make decisions for yourself  You can list the treatments you want for each condition  Treatment may include pain medicine, surgery, blood transfusions, dialysis, IV or tube feedings, and a ventilator (breathing machine)  · Values history: This document has questions about your views, beliefs, and how you feel and think about life  This information can help others choose the care that you would choose  Why are advance directives important? An advance directive helps you control your care  Although spoken wishes may be used, it is better to have your wishes written down  Spoken wishes can be misunderstood, or not followed  Treatments may be given even if you do not want them  An advance directive may make it easier for your family to make difficult choices about your care  Fall Prevention    Fall prevention  includes ways to make your home and other areas safer  It also includes ways you can move more carefully to prevent a fall  Health conditions that cause changes in your blood pressure, vision, or muscle strength and coordination may increase your risk for falls  Medicines may also increase your risk for falls if they make you dizzy, weak, or sleepy  Fall prevention tips:   · Stand or sit up slowly  · Use assistive devices as directed      · Wear shoes that fit well and have soles that   · Wear a personal alarm  · Stay active  · Manage your medical conditions  Home Safety Tips:  · Add items to prevent falls in the bathroom  · Keep paths clear  · Install bright lights in your home  · Keep items you use often on shelves within reach  · Paint or place reflective tape on the edges of your stairs  Weight Management   Why it is important to manage your weight:  Being overweight increases your risk of health conditions such as heart disease, high blood pressure, type 2 diabetes, and certain types of cancer  It can also increase your risk for osteoarthritis, sleep apnea, and other respiratory problems  Aim for a slow, steady weight loss  Even a small amount of weight loss can lower your risk of health problems  How to lose weight safely:  A safe and healthy way to lose weight is to eat fewer calories and get regular exercise  You can lose up about 1 pound a week by decreasing the number of calories you eat by 500 calories each day  Healthy meal plan for weight management:  A healthy meal plan includes a variety of foods, contains fewer calories, and helps you stay healthy  A healthy meal plan includes the following:  · Eat whole-grain foods more often  A healthy meal plan should contain fiber  Fiber is the part of grains, fruits, and vegetables that is not broken down by your body  Whole-grain foods are healthy and provide extra fiber in your diet  Some examples of whole-grain foods are whole-wheat breads and pastas, oatmeal, brown rice, and bulgur  · Eat a variety of vegetables every day  Include dark, leafy greens such as spinach, kale, vick greens, and mustard greens  Eat yellow and orange vegetables such as carrots, sweet potatoes, and winter squash  · Eat a variety of fruits every day  Choose fresh or canned fruit (canned in its own juice or light syrup) instead of juice  Fruit juice has very little or no fiber    · Eat low-fat dairy foods  Drink fat-free (skim) milk or 1% milk  Eat fat-free yogurt and low-fat cottage cheese  Try low-fat cheeses such as mozzarella and other reduced-fat cheeses  · Choose meat and other protein foods that are low in fat  Choose beans or other legumes such as split peas or lentils  Choose fish, skinless poultry (chicken or turkey), or lean cuts of red meat (beef or pork)  Before you cook meat or poultry, cut off any visible fat  · Use less fat and oil  Try baking foods instead of frying them  Add less fat, such as margarine, sour cream, regular salad dressing and mayonnaise to foods  Eat fewer high-fat foods  Some examples of high-fat foods include french fries, doughnuts, ice cream, and cakes  · Eat fewer sweets  Limit foods and drinks that are high in sugar  This includes candy, cookies, regular soda, and sweetened drinks  Exercise:  Exercise at least 30 minutes per day on most days of the week  Some examples of exercise include walking, biking, dancing, and swimming  You can also fit in more physical activity by taking the stairs instead of the elevator or parking farther away from stores  Ask your healthcare provider about the best exercise plan for you  © Copyright [employer] 2018 Information is for End User's use only and may not be sold, redistributed or otherwise used for commercial purposes   All illustrations and images included in CareNotes® are the copyrighted property of [employer]  or [address] [place] [address] [employer]

## 2022-11-03 NOTE — PROGRESS NOTES
Assessment and Plan:     Problem List Items Addressed This Visit        Endocrine    Diabetes mellitus ([address] )    Relevant Orders    HEMOGLOBIN A1C W/ EAG ESTIMATION    Comprehensive metabolic panel    LDL cholesterol, direct       Other    Medicare annual wellness visit, subsequent - Primary      Pt had flu shot at pharmacy  Reviewed recent labs and he is monitoring BS/improving   Recheck Aic 3 months  Pt had eye exam this Fall and requested report   Colon utd and repeat 3 years noted     Depression Screening and Follow-up Plan: Patient was screened for depression during today's encounter  They screened negative with a PHQ-2 score of 0  Falls Plan of Care: balance, strength, and gait training instructions were provided  Preventive health issues were discussed with patient, and age appropriate screening tests were ordered as noted in patient's After Visit Summary  Personalized health advice and appropriate referrals for health education or preventive services given if needed, as noted in patient's After Visit Summary  History of Present Illness:     Patient presents for a Medicare Wellness Visit    HPI   Patient Care Team:  Jeff Amezcua DO as PCP - General (Internal Medicine)  DO Caleb Vazquez MD Deidre Ogren, PA-C     Review of Systems:     Review of Systems   Constitutional: Negative for chills and fever  HENT: Negative  Eyes: Negative for visual disturbance  Respiratory: Negative for cough and shortness of breath  Cardiovascular: Positive for leg swelling  Negative for chest pain  Doing better with compression   Gastrointestinal: Negative for abdominal distention and abdominal pain  Genitourinary: Negative  Musculoskeletal: Positive for arthralgias and back pain  Neurological: Negative for dizziness, light-headedness and headaches  Psychiatric/Behavioral: Negative for sleep disturbance  The patient is not nervous/anxious           Problem List: Patient Active Problem List   Diagnosis   • Benign essential HTN   • Diabetes mellitus ([place] [address] )   • Dyslipidemia   • GERD (gastroesophageal reflux disease)   • AYLEEN (obstructive sleep apnea)   • Stasis edema of both lower extremities   • Morbid obesity (HCC)   • Diastolic dysfunction   • Diabetes mellitus type 2 in obese [facility])   • Lymphedema   • Medicare annual wellness visit, subsequent      Past Medical and Surgical History:     Past Medical History:   Diagnosis Date   • Arthritis     last assessed 7/1/15   • Diabetes mellitus ([place] [address] )     type 2-last assessed 1/28/15   • GERD (gastroesophageal reflux disease)    • Hypertension    • Irregular heart beat     last assessed 7/1/15   • Palpitations     last assessed 9/7/17   • Sexual dysfunction     last assessed 7/1/15   • Thyroid disease     last assessed 7/1/15     Past Surgical History:   Procedure Laterality Date   • COLONOSCOPY     • TONSILLECTOMY        Family History:     Family History   Problem Relation Age of Onset   • Hypertension Mother         essential   • Stroke Mother    • Hypertension Father         essential   • Heart defect Father         cardiac disorder   • Stroke Brother    • Hypertension Brother       Social History:     Social History     Socioeconomic History   • Marital status: Single     Spouse name: None   • Number of children: None   • Years of education: None   • Highest education level: None   Occupational History   • None   Tobacco Use   • Smoking status: Former Smoker   • Smokeless tobacco: Never Used   • Tobacco comment: former smoker-quit 17 yrs ago 1pppd x 30 yrs as per [employer]   Vaping Use   • Vaping Use: Never used   Substance and Sexual Activity   • Alcohol use: Never   • Drug use: Never   • Sexual activity: None   Other Topics Concern   • None   Social History Narrative   • None     Social Determinants of Health     Financial Resource Strain: Not on file   Food Insecurity: Not on file   Transportation Needs: Not on file Physical Activity: Not on file   Stress: Not on file   Social Connections: Not on file   Intimate Partner Violence: Not on file   Housing Stability: Not on file      Medications and Allergies:     Current Outpatient Medications   Medication Sig Dispense Refill   • amLODIPine (NORVASC) 5 mg tablet TAKE 1 TABLET DAILY FOR    BLOOD PRESSURE 90 tablet 3   • aspirin 81 mg chewable tablet Chew 81 mg daily     • atorvastatin (LIPITOR) 10 mg tablet Take 1 tablet (10 mg total) by mouth daily 90 tablet 3   • baclofen 10 mg tablet Take 1 tablet (10 mg total) by mouth 2 (two) times a day 30 tablet 0   • enalapril (VASOTEC) 10 mg tablet Take 1 tablet (10 mg total) by mouth daily 90 tablet 3   • furosemide (LASIX) 40 mg tablet Take 1 tablet (40 mg total) by mouth daily 90 tablet 3   • glucose blood (OneTouch Verio) test strip Use 1 each daily 100 each 5   • ketoconazole (NIZORAL) 2 % shampoo      • Klor-Con M20 20 MEQ tablet TAKE 1 TABLET DAILY 90 tablet 3   • Lancets (onetouch ultrasoft) lancets Check glucose daily 100 each 1   • metFORMIN (GLUCOPHAGE) 500 mg tablet Take 1 tablet (500 mg total) by mouth daily with breakfast One tab in am 90 tablet 3   • metoprolol succinate (TOPROL-XL) 25 mg 24 hr tablet TAKE 1 TABLET DAILY 90 tablet 3   • metoprolol succinate (TOPROL-XL) 50 mg 24 hr tablet Take 1 tablet (50 mg total) by mouth daily 90 tablet 3   • multivitamin (THERAGRAN) TABS Take 1 tablet by mouth daily     • omeprazole (PriLOSEC) 40 MG capsule Take 1 capsule (40 mg total) by mouth daily 90 capsule 3   • Red Yeast Rice Extract (RED YEAST RICE PO) Take by mouth     • triamcinolone (KENALOG) 0 1 % lotion      • triamcinolone (KENALOG) 0 5 % cream      • Vitamin D, Cholecalciferol, 50 MCG (2000 UT) CAPS Take by mouth       No current facility-administered medications for this visit       Allergies   Allergen Reactions   • Acetaminophen Other (See Comments)     Other reaction(s): Unknown Reaction  "I had to go to the hospital and get shots after taking it 20yrs ago"      Immunizations:     Immunization History   Administered Date(s) Administered   • COVID-19 PFIZER VACCINE 0 3 ML IM 02/25/2021, 03/25/2021   • INFLUENZA 10/14/2018, 10/01/2019, 08/25/2020   • Influenza, high dose seasonal 0 7 mL 10/19/2021   • Influenza, seasonal, injectable 10/08/2013, 10/13/2014, 10/15/2015, 09/26/2016, 09/09/2017   • Pneumococcal Conjugate 13-Valent 08/25/2020   • Pneumococcal Polysaccharide PPV23 02/18/2015   • Tdap 11/28/2015   • influenza, trivalent, adjuvanted 10/08/2019      Health Maintenance:         Topic Date Due   • Colorectal Cancer Screening  10/31/2025   • Hepatitis C Screening  Completed         Topic Date Due   • Pneumococcal Vaccine: 65+ Years (3 - PPSV23 or PCV20) 08/25/2021   • COVID-19 Vaccine (3 - Booster for Pfizer series) 08/25/2021   • Influenza Vaccine (1) 09/01/2022      Medicare Screening Tests and Risk Assessments:     Christy is here for his Subsequent Wellness visit  Last Medicare Wellness visit information reviewed, patient interviewed, no change since last AWV  Health Risk Assessment:   Patient rates overall health as fair  Patient feels that their physical health rating is same  Patient is very satisfied with their life  Eyesight was rated as same  Hearing was rated as same  Patient feels that their emotional and mental health rating is same  Patients states they are never, rarely angry  Patient states they are sometimes unusually tired/fatigued  Pain experienced in the last 7 days has been none  Patient states that he has experienced no weight loss or gain in last 6 months  Depression Screening:   PHQ-2 Score: 0      Fall Risk Screening:    In the past year, patient has experienced: history of falling in past year    Number of falls: 1  Injured during fall?: Yes    Feels unsteady when standing or walking?: No    Worried about falling?: No      Home Safety:  Patient does not have trouble with stairs inside or outside of their home  Patient has working smoke alarms and has working carbon monoxide detector  Home safety hazards include: none  Nutrition:   Current diet is Diabetic  Medications:   Patient is currently taking over-the-counter supplements  OTC medications include: see medication list  Patient is able to manage medications  Activities of Daily Living (ADLs)/Instrumental Activities of Daily Living (IADLs):   Walk and transfer into and out of bed and chair?: Yes  Dress and groom yourself?: Yes    Bathe or shower yourself?: Yes    Feed yourself? Yes  Do your laundry/housekeeping?: Yes  Manage your money, pay your bills and track your expenses?: Yes  Make your own meals?: Yes    Do your own shopping?: Yes    Previous Hospitalizations:   Any hospitalizations or ED visits within the last 12 months?: No      Advance Care Planning:   Living will: Yes    Durable POA for healthcare:  Yes    Advanced directive: Yes    End of Life Decisions reviewed with patient: Yes    Provider agrees with end of life decisions: Yes      Cognitive Screening:   Provider or family/friend/caregiver concerned regarding cognition?: No    PREVENTIVE SCREENINGS      Cardiovascular Screening:    General: Screening Current      Diabetes Screening:     General: Screening Not Indicated, History Diabetes and Screening Current      Colorectal Cancer Screening:     General: Screening Current      Prostate Cancer Screening:    General: Screening Current      Osteoporosis Screening:    General: Screening Not Indicated      Abdominal Aortic Aneurysm (AAA) Screening:    Risk factors include: age between 73-67 yo and tobacco use        Lung Cancer Screening:     General: Screening Not Indicated      Hepatitis C Screening:    General: Screening Current    Screening, Brief Intervention, and Referral to Treatment (SBIRT)    Screening  Typical number of drinks in a day: 0  Typical number of drinks in a week: 0  Interpretation: Low risk drinking behavior  AUDIT-C Screenin) How often did you have a drink containing alcohol in the past year? never  2) How many drinks did you have on a typical day when you were drinking in the past year? 0  3) How often did you have 6 or more drinks on one occasion in the past year? never    AUDIT-C Score: 0  Interpretation: Score 0-3 (male): Negative screen for alcohol misuse    Single Item Drug Screening:  How often have you used an illegal drug (including marijuana) or a prescription medication for non-medical reasons in the past year? never    Single Item Drug Screen Score: 0  Interpretation: Negative screen for possible drug use disorder    Brief Intervention  Alcohol & drug use screenings were reviewed  No concerns regarding substance use disorder identified  Other Counseling Topics:   Calcium and vitamin D intake and regular weightbearing exercise       No exam data present     Physical Exam:     Temp 97 5 °F (36 4 °C) (Temporal)   Ht 6' (1 829 m)   Wt (!) 140 kg (308 lb)   BMI 41 77 kg/m²     Physical Exam     Mary Payment, DO

## 2022-11-04 ENCOUNTER — TELEPHONE (OUTPATIENT)
Dept: ADMINISTRATIVE | Facility: OTHER | Age: 67
End: 2022-11-04

## 2022-11-04 NOTE — TELEPHONE ENCOUNTER
Upon review of the In Basket request and the patient's chart, initial outreach has been made via fax to facility  , please see Contacts section for details       Thank you  Rahel Painter

## 2022-11-04 NOTE — LETTER
Diabetic Eye Exam Form    Date Requested: 22  Patient: Delvin Kaufman  Patient : 1955   Referring Provider: Brittanie Nice DO      DIABETIC Eye Exam Date _______________________________      Type of Exam MUST be documented for Diabetic Eye Exams  Please CHECK ONE  Retinal Exam       Dilated Retinal Exam       OCT       Optomap-Iris Exam      Fundus Photography       Left Eye - Please check Retinopathy or No Retinopathy        Exam did show retinopathy    Exam did not show retinopathy       Right Eye - Please check Retinopathy or No Retinopathy       Exam did show retinopathy    Exam did not show retinopathy       Comments __________________________________________________________    Practice Providing Exam ______________________________________________    Exam Performed By (print name) _______________________________________      Provider Signature ___________________________________________________      These reports are needed for  compliance  Please fax this completed form and a copy of the Diabetic Eye Exam report to our office located at [place] [address] as soon as possible via [phone] berny [place]: Phone [phone]  We thank you for your assistance in treating our mutual patient

## 2022-11-04 NOTE — TELEPHONE ENCOUNTER
As a follow-up, a second attempt has been made for outreach via telephone call to facility  , please see Contacts section for details      Thank you  Rajat Good

## 2022-11-04 NOTE — TELEPHONE ENCOUNTER
Upon review of the In Basket request we were able to locate, review, and update the patient chart as requested for Diabetic Eye Exam     Any additional questions or concerns should be emailed to the Practice Liaisons via the appropriate education email address, please do not reply via In Basket      Thank you  Leslie Claire

## 2022-11-04 NOTE — TELEPHONE ENCOUNTER
----- Message from Raven Camarena sent at 11/3/2022  2:59 PM EDT -----  Regarding: DM Eye Exam  11/03/22 2:59 PM    Hello, our patient Mariya Johnson has had Diabetic Eye Exam completed/performed  Please assist in updating the patient chart by making an External outreach to [facility] located in [place], [place]  The date of service is the fall of 2022 (September-October sometime)      Thank you,  Paris Ochoa   [facility]

## 2022-11-15 DIAGNOSIS — K21.9 GASTROESOPHAGEAL REFLUX DISEASE WITHOUT ESOPHAGITIS: ICD-10-CM

## 2022-11-15 DIAGNOSIS — S32.020S COMPRESSION FRACTURE OF L2 VERTEBRA, SEQUELA: ICD-10-CM

## 2022-11-15 DIAGNOSIS — I50.31 ACUTE DIASTOLIC HEART FAILURE (HCC): ICD-10-CM

## 2022-11-15 DIAGNOSIS — E78.5 DYSLIPIDEMIA: ICD-10-CM

## 2022-11-15 DIAGNOSIS — E11.9 TYPE 2 DIABETES MELLITUS WITHOUT COMPLICATION, WITHOUT LONG-TERM CURRENT USE OF INSULIN (HCC): ICD-10-CM

## 2022-11-15 DIAGNOSIS — I10 HYPERTENSION, UNSPECIFIED TYPE: ICD-10-CM

## 2022-11-15 DIAGNOSIS — E55.9 VITAMIN D DEFICIENCY: Primary | ICD-10-CM

## 2022-11-15 RX ORDER — BACLOFEN 10 MG/1
10 TABLET ORAL 2 TIMES DAILY
Qty: 30 TABLET | Refills: 5 | Status: SHIPPED | OUTPATIENT
Start: 2022-11-15

## 2022-11-15 RX ORDER — OMEPRAZOLE 40 MG/1
40 CAPSULE, DELAYED RELEASE ORAL DAILY
Qty: 90 CAPSULE | Refills: 3 | Status: SHIPPED | OUTPATIENT
Start: 2022-11-15

## 2022-11-15 RX ORDER — ENALAPRIL MALEATE 10 MG/1
10 TABLET ORAL DAILY
Qty: 90 TABLET | Refills: 3 | Status: SHIPPED | OUTPATIENT
Start: 2022-11-15

## 2022-11-15 RX ORDER — FUROSEMIDE 40 MG/1
40 TABLET ORAL DAILY
Qty: 90 TABLET | Refills: 3 | Status: SHIPPED | OUTPATIENT
Start: 2022-11-15

## 2022-11-15 RX ORDER — AMLODIPINE BESYLATE 5 MG/1
5 TABLET ORAL DAILY
Qty: 90 TABLET | Refills: 3 | Status: SHIPPED | OUTPATIENT
Start: 2022-11-15

## 2022-11-15 RX ORDER — MULTIVIT-MIN/IRON/FOLIC ACID/K 18-600-40
50 CAPSULE ORAL DAILY
Qty: 90 CAPSULE | Refills: 3 | Status: SHIPPED | OUTPATIENT
Start: 2022-11-15

## 2022-11-15 RX ORDER — METOPROLOL SUCCINATE 25 MG/1
25 TABLET, EXTENDED RELEASE ORAL DAILY
Qty: 90 TABLET | Refills: 3 | Status: SHIPPED | OUTPATIENT
Start: 2022-11-15

## 2022-11-15 RX ORDER — ATORVASTATIN CALCIUM 10 MG/1
10 TABLET, FILM COATED ORAL DAILY
Qty: 90 TABLET | Refills: 3 | Status: SHIPPED | OUTPATIENT
Start: 2022-11-15

## 2022-11-15 RX ORDER — POTASSIUM CHLORIDE 20 MEQ/1
20 TABLET, EXTENDED RELEASE ORAL DAILY
Qty: 90 TABLET | Refills: 3 | Status: SHIPPED | OUTPATIENT
Start: 2022-11-15

## 2022-11-25 ENCOUNTER — TELEPHONE (OUTPATIENT)
Dept: FAMILY MEDICINE CLINIC | Facility: CLINIC | Age: 67
End: 2022-11-25

## 2022-11-25 NOTE — TELEPHONE ENCOUNTER
Those readings are in same range as last A1c average of 130 so would not make changes  His average sugar has been 130 range or last few checks so no change

## 2022-11-25 NOTE — TELEPHONE ENCOUNTER
His fasting blood sugar is 135 and then after a meal it is 120,  ?  Medication adjustment (he is unsure of a good blood sugar)

## 2022-12-23 ENCOUNTER — TELEPHONE (OUTPATIENT)
Dept: FAMILY MEDICINE CLINIC | Facility: CLINIC | Age: 67
End: 2022-12-23

## 2022-12-23 NOTE — TELEPHONE ENCOUNTER
Patient was at the dentist and he was prescribed Amoxicillin 500 mg QID and wants to know if he should take it

## 2022-12-30 ENCOUNTER — TELEPHONE (OUTPATIENT)
Dept: CARDIOLOGY CLINIC | Facility: CLINIC | Age: 67
End: 2022-12-30

## 2022-12-30 NOTE — TELEPHONE ENCOUNTER
P/C advised is due for an appt  Pt is insist he has been in since then    Pt is advising that Potassium is costing him too much money, is there alternative    Please advise    Dr Ajay Álvarez on vacation

## 2023-01-02 PROBLEM — Z00.00 MEDICARE ANNUAL WELLNESS VISIT, SUBSEQUENT: Status: RESOLVED | Noted: 2022-11-03 | Resolved: 2023-01-02

## 2023-01-04 ENCOUNTER — TELEPHONE (OUTPATIENT)
Dept: FAMILY MEDICINE CLINIC | Facility: CLINIC | Age: 68
End: 2023-01-04

## 2023-01-04 DIAGNOSIS — E55.9 VITAMIN D DEFICIENCY: ICD-10-CM

## 2023-01-04 DIAGNOSIS — E78.5 DYSLIPIDEMIA: ICD-10-CM

## 2023-01-04 DIAGNOSIS — K21.9 GASTROESOPHAGEAL REFLUX DISEASE WITHOUT ESOPHAGITIS: ICD-10-CM

## 2023-01-04 DIAGNOSIS — I10 HYPERTENSION, UNSPECIFIED TYPE: ICD-10-CM

## 2023-01-04 DIAGNOSIS — S32.020S COMPRESSION FRACTURE OF L2 VERTEBRA, SEQUELA: ICD-10-CM

## 2023-01-04 DIAGNOSIS — I50.31 ACUTE DIASTOLIC HEART FAILURE (HCC): Primary | ICD-10-CM

## 2023-01-04 DIAGNOSIS — I50.31 ACUTE DIASTOLIC HEART FAILURE (HCC): ICD-10-CM

## 2023-01-04 DIAGNOSIS — E11.9 TYPE 2 DIABETES MELLITUS WITHOUT COMPLICATION, WITHOUT LONG-TERM CURRENT USE OF INSULIN (HCC): ICD-10-CM

## 2023-01-04 RX ORDER — FUROSEMIDE 40 MG/1
40 TABLET ORAL DAILY
Qty: 90 TABLET | Refills: 3 | Status: SHIPPED | OUTPATIENT
Start: 2023-01-04

## 2023-01-04 RX ORDER — AMLODIPINE BESYLATE 5 MG/1
5 TABLET ORAL DAILY
Qty: 90 TABLET | Refills: 3 | Status: SHIPPED | OUTPATIENT
Start: 2023-01-04

## 2023-01-04 RX ORDER — ATORVASTATIN CALCIUM 10 MG/1
10 TABLET, FILM COATED ORAL DAILY
Qty: 90 TABLET | Refills: 3 | Status: SHIPPED | OUTPATIENT
Start: 2023-01-04

## 2023-01-04 RX ORDER — OMEPRAZOLE 40 MG/1
40 CAPSULE, DELAYED RELEASE ORAL DAILY
Qty: 90 CAPSULE | Refills: 3 | Status: SHIPPED | OUTPATIENT
Start: 2023-01-04

## 2023-01-04 RX ORDER — MULTIVIT-MIN/IRON/FOLIC ACID/K 18-600-40
50 CAPSULE ORAL DAILY
Qty: 90 CAPSULE | Refills: 3 | Status: SHIPPED | OUTPATIENT
Start: 2023-01-04

## 2023-01-04 RX ORDER — METOPROLOL SUCCINATE 25 MG/1
25 TABLET, EXTENDED RELEASE ORAL DAILY
Qty: 90 TABLET | Refills: 3 | Status: SHIPPED | OUTPATIENT
Start: 2023-01-04

## 2023-01-04 RX ORDER — ENALAPRIL MALEATE 10 MG/1
10 TABLET ORAL DAILY
Qty: 90 TABLET | Refills: 3 | Status: SHIPPED | OUTPATIENT
Start: 2023-01-04

## 2023-01-04 RX ORDER — BACLOFEN 10 MG/1
10 TABLET ORAL 2 TIMES DAILY
Qty: 180 TABLET | Refills: 3 | Status: SHIPPED | OUTPATIENT
Start: 2023-01-04

## 2023-01-04 NOTE — TELEPHONE ENCOUNTER
Pt asking if you think buying OTC potassium supplement would be cheaper than the rx because he said the prescription is expensive for him

## 2023-01-06 DIAGNOSIS — I50.31 ACUTE DIASTOLIC HEART FAILURE (HCC): ICD-10-CM

## 2023-01-06 RX ORDER — POTASSIUM CHLORIDE 20 MEQ/1
20 TABLET, EXTENDED RELEASE ORAL DAILY
Qty: 90 TABLET | Refills: 3 | Status: SHIPPED | OUTPATIENT
Start: 2023-01-06 | End: 2023-01-12 | Stop reason: SDUPTHER

## 2023-01-09 DIAGNOSIS — I10 HYPERTENSION, UNSPECIFIED TYPE: ICD-10-CM

## 2023-01-09 RX ORDER — METOPROLOL SUCCINATE 50 MG/1
50 TABLET, EXTENDED RELEASE ORAL DAILY
Qty: 90 TABLET | Refills: 3 | Status: SHIPPED | OUTPATIENT
Start: 2023-01-09 | End: 2023-01-12 | Stop reason: DRUGHIGH

## 2023-01-12 ENCOUNTER — OFFICE VISIT (OUTPATIENT)
Dept: CARDIOLOGY CLINIC | Facility: CLINIC | Age: 68
End: 2023-01-12

## 2023-01-12 VITALS
DIASTOLIC BLOOD PRESSURE: 70 MMHG | SYSTOLIC BLOOD PRESSURE: 144 MMHG | WEIGHT: 301 LBS | HEIGHT: 72 IN | BODY MASS INDEX: 40.77 KG/M2 | HEART RATE: 56 BPM

## 2023-01-12 DIAGNOSIS — G47.33 OSA ON CPAP: ICD-10-CM

## 2023-01-12 DIAGNOSIS — E66.01 MORBID OBESITY (HCC): ICD-10-CM

## 2023-01-12 DIAGNOSIS — Z99.89 OSA ON CPAP: ICD-10-CM

## 2023-01-12 DIAGNOSIS — I51.89 DIASTOLIC DYSFUNCTION: Primary | ICD-10-CM

## 2023-01-12 DIAGNOSIS — I10 PRIMARY HYPERTENSION: ICD-10-CM

## 2023-01-12 DIAGNOSIS — I50.31 ACUTE DIASTOLIC HEART FAILURE (HCC): ICD-10-CM

## 2023-01-12 DIAGNOSIS — E11.69 DIABETES MELLITUS TYPE 2 IN OBESE (HCC): ICD-10-CM

## 2023-01-12 DIAGNOSIS — E66.9 DIABETES MELLITUS TYPE 2 IN OBESE (HCC): ICD-10-CM

## 2023-01-12 RX ORDER — INDOMETHACIN 50 MG/1
CAPSULE ORAL
COMMUNITY
Start: 2023-01-10

## 2023-01-12 RX ORDER — POTASSIUM CHLORIDE 20 MEQ/1
20 TABLET, EXTENDED RELEASE ORAL DAILY
Qty: 30 TABLET | Refills: 11 | Status: SHIPPED | OUTPATIENT
Start: 2023-01-12

## 2023-01-12 RX ORDER — AMOXICILLIN 500 MG/1
CAPSULE ORAL
COMMUNITY
Start: 2023-01-05 | End: 2023-01-23 | Stop reason: ALTCHOICE

## 2023-01-12 NOTE — PROGRESS NOTES
Cardiology Follow up    [facility]  [id number]  1955  [facility] [address]  [facility] [address] [address]      1  Diastolic dysfunction  Basic metabolic panel    Lipid Panel with Direct LDL reflex    Echo complete w/ contrast if indicated      2  Acute diastolic heart failure (HCC)  potassium chloride (Klor-Con M20) 20 mEq tablet      3  Diabetes mellitus type 2 in obese ([address] )        4  Morbid obesity ([address] )        5  Primary hypertension  Basic metabolic panel    Lipid Panel with Direct LDL reflex    Echo complete w/ contrast if indicated      6  AYLEEN on CPAP            Discussion/Summary:  1  Dyspnea on exertion-improved  2  Hypertension  3  Hyperlipidemia  4  Obesity  5  Diastolic dysfunction  6    Obstructive sleep apnea compliant with CPAP therapy    -Pharmacologic nuclear stress test 6/2/2021 showing normal study after pharmacologic vasodilation with image artifact with no diagnostic evidence of perfusion abnormality  -Transthoracic echocardiogram 6/2/2021 showing left-ventricular systolic function normal estimated LVEF 65% with grade 1 diastolic dysfunction, mild aortic regurgitation, mild tricuspid regurgitation   -I was able to call and speak with clinical pharmacist and she believes that after speaking with her  who is a pharmacist at [facility] 30-day supply cash price for Klor-Con 20 M EQ tablets there is $10 70 therefore we will transition patient's prescription for potassium supplementation to [facility] DR RICHA MARIE to see if this is more affordable for him  -Patient will continue amlodipine 5 mg daily, atorvastatin 10 mg daily, enalapril 10 mg daily, furosemide 40 mg daily, metoprolol succinate 25 mg daily  -Patient will monitor home blood pressure readings and let our office know if significantly elevated greater than 130s/80s mmHg as if present we will uptitrate medical therapy at that time  -Patient counseled to follow proper dietary and lifestyle modifications including following a sodium restriction to less than 1800 mg of sodium daily and fluid restriction to less than 2000 mL of fluid daily along with weight loss as goal BMI is less than 30  -He was counseled on the importance of continued compliance with CPAP therapy for treatment of obstructive sleep apnea  -I will see patient in 6 months or sooner if necessary  -Will check BMP and fasting lipid panel prior to next office visit to monitor  -Patient counseled if he were to have any warning or alarm type symptoms he is to seek emergency medical care immediately   -We will check transthoracic echocardiogram in 6 months prior to next office visit to monitor overall cardiac structure and function  History of Present Illness:  -Patient is a 20-year-old male with hypertension, hyperlipidemia, obesity, known diastolic dysfunction and intermittent dyspnea on exertion who was originally seen by Dr Cholo Ivory and presents to the office today for follow-up and transition of care  -Patient overall denies any chest pain, palpitations, lightheadedness or dizziness, loss of consciousness or shortness of breath  He states that he is doing reasonably well and denies any orthopnea or bend apnea  He states his only major problem is that his current potassium supplementation after switching prescription drug plans is costing him $250 a month        Patient Active Problem List   Diagnosis   • Benign essential HTN   • Diabetes mellitus ([facility] )   • Dyslipidemia   • GERD (gastroesophageal reflux disease)   • AYLEEN (obstructive sleep apnea)   • Stasis edema of both lower extremities   • Morbid obesity ([facility] )   • Diastolic dysfunction   • Diabetes mellitus type 2 in obese [facility])   • Lymphedema     Past Medical History:   Diagnosis Date   • Arthritis     last assessed 7/1/15   • Diabetes mellitus ([facility] )     type 2-last assessed 1/28/15   • GERD (gastroesophageal reflux disease)    • Hypertension    • Irregular heart beat     last assessed 7/1/15   • Palpitations     last assessed 9/7/17   • Sexual dysfunction     last assessed 7/1/15   • Thyroid disease     last assessed 7/1/15     Social History     Socioeconomic History   • Marital status: Single     Spouse name: Not on file   • Number of children: Not on file   • Years of education: Not on file   • Highest education level: Not on file   Occupational History   • Not on file   Tobacco Use   • Smoking status: Former   • Smokeless tobacco: Never   • Tobacco comments:     former smoker-quit 17 yrs ago 1pppd x 30 yrs as per [employer]   Vaping Use   • Vaping Use: Never used   Substance and Sexual Activity   • Alcohol use: Never   • Drug use: Never   • Sexual activity: Not on file   Other Topics Concern   • Not on file   Social History Narrative   • Not on file     Social Determinants of Health     Financial Resource Strain: Low Risk    • Difficulty of Paying Living Expenses: Not very hard   Food Insecurity: Not on file   Transportation Needs: No Transportation Needs   • Lack of Transportation (Medical): No   • Lack of Transportation (Non-Medical):  No   Physical Activity: Not on file   Stress: Not on file   Social Connections: Not on file   Intimate Partner Violence: Not on file   Housing Stability: Not on file      Family History   Problem Relation Age of Onset   • Hypertension Mother         essential   • Stroke Mother    • Hypertension Father         essential   • Heart defect Father         cardiac disorder   • Stroke Brother    • Hypertension Brother      Past Surgical History:   Procedure Laterality Date   • COLONOSCOPY     • TONSILLECTOMY         Current Outpatient Medications:   •  amLODIPine (NORVASC) 5 mg tablet, Take 1 tablet (5 mg total) by mouth daily For blood pressure, Disp: 90 tablet, Rfl: 3  •  amoxicillin (AMOXIL) 500 mg capsule, TAKE 1 CAPSULE BY MOUTH EVERY 8 HOURS UNTIL GONE, Disp: , Rfl:   •  aspirin 81 mg chewable tablet, Chew 81 mg daily, Disp: , Rfl:   •  atorvastatin (LIPITOR) 10 mg tablet, Take 1 tablet (10 mg total) by mouth daily, Disp: 90 tablet, Rfl: 3  •  enalapril (VASOTEC) 10 mg tablet, Take 1 tablet (10 mg total) by mouth daily, Disp: 90 tablet, Rfl: 3  •  furosemide (LASIX) 40 mg tablet, Take 1 tablet (40 mg total) by mouth daily, Disp: 90 tablet, Rfl: 3  •  glucose blood (OneTouch Verio) test strip, Use 1 each daily, Disp: 100 each, Rfl: 5  •  indomethacin (INDOCIN) 50 mg capsule, , Disp: , Rfl:   •  ketoconazole (NIZORAL) 2 % shampoo, , Disp: , Rfl:   •  Lancets (onetouch ultrasoft) lancets, Check glucose daily, Disp: 100 each, Rfl: 1  •  metFORMIN (GLUCOPHAGE) 500 mg tablet, Take 1 tablet (500 mg total) by mouth daily with breakfast One tab in am, Disp: 90 tablet, Rfl: 3  •  metoprolol succinate (TOPROL-XL) 25 mg 24 hr tablet, Take 1 tablet (25 mg total) by mouth daily, Disp: 90 tablet, Rfl: 3  •  metoprolol succinate (TOPROL-XL) 50 mg 24 hr tablet, Take 1 tablet (50 mg total) by mouth daily, Disp: 90 tablet, Rfl: 3  •  multivitamin (THERAGRAN) TABS, Take 1 tablet by mouth daily, Disp: , Rfl:   •  omeprazole (PriLOSEC) 40 MG capsule, Take 1 capsule (40 mg total) by mouth daily, Disp: 90 capsule, Rfl: 3  •  potassium chloride (Klor-Con M20) 20 mEq tablet, Take 1 tablet (20 mEq total) by mouth daily, Disp: 30 tablet, Rfl: 11  •  Red Yeast Rice Extract (RED YEAST RICE PO), Take by mouth, Disp: , Rfl:   •  triamcinolone (KENALOG) 0 1 % lotion, , Disp: , Rfl:   •  triamcinolone (KENALOG) 0 5 % cream, , Disp: , Rfl:   •  Vitamin D, Cholecalciferol, 50 MCG (2000 UT) CAPS, Take 50 mcg by mouth daily, Disp: 90 capsule, Rfl: 3  •  baclofen 10 mg tablet, Take 1 tablet (10 mg total) by mouth 2 (two) times a day (Patient not taking: Reported on 1/12/2023), Disp: 180 tablet, Rfl: 3  Allergies   Allergen Reactions   • Acetaminophen Other (See Comments)     Other reaction(s): Unknown Reaction  "I had to go to the hospital and get shots after taking it 20yrs ago"         Labs:  No visits with results within 2 Month(s) from this visit  Latest known visit with results is:   Telephone on 11/04/2022   Component Date Value   • Severity 09/26/2022     • Right Eye Diabetic Retin* 09/26/2022 None    • Left Eye Diabetic Retino* 09/26/2022 None         Imaging: No results found  Review of Systems:  Review of Systems   Constitutional: Negative for chills, diaphoresis, fatigue and fever  HENT: Negative for trouble swallowing and voice change  Eyes: Negative for pain  Respiratory: Negative for shortness of breath and wheezing  Cardiovascular: Negative for chest pain, palpitations and leg swelling  Gastrointestinal: Negative for abdominal pain, constipation, diarrhea, nausea and vomiting  Genitourinary: Negative for dysuria  Musculoskeletal: Positive for arthralgias  Negative for neck pain and neck stiffness  Neurological: Negative for dizziness, syncope, light-headedness and headaches  Psychiatric/Behavioral: Negative for agitation and confusion  All other systems reviewed and are negative  Vitals:    01/12/23 1543   BP: 144/70   BP Location: Left arm   Patient Position: Sitting   Pulse: 56   Weight: (!) 137 kg (301 lb)   Height: 6' (1 829 m)     Vitals:    01/12/23 1543   Weight: (!) 137 kg (301 lb)     Height: 6' (182 9 cm)     Physical Exam:  Physical Exam  Vitals reviewed  Constitutional:       General: He is not in acute distress  Appearance: He is obese  He is not diaphoretic  HENT:      Head: Normocephalic and atraumatic  Eyes:      General:         Right eye: No discharge  Left eye: No discharge  Neck:      Comments: Trachea midline, neck obese, difficult to assess JVD  Cardiovascular:      Rate and Rhythm: Normal rate and regular rhythm  Heart sounds: No friction rub     Pulmonary:      Effort: Pulmonary effort is normal  No respiratory distress  Breath sounds: No wheezing  Chest:      Chest wall: No tenderness  Abdominal:      General: Bowel sounds are normal       Palpations: Abdomen is soft  Tenderness: There is no abdominal tenderness  There is no rebound  Musculoskeletal:      Right lower leg: Edema (trace) present  Left lower leg: Edema (trace) present  Skin:     General: Skin is warm and dry  Neurological:      Mental Status: He is alert  Comments: Awake, alert, able to answer questions appropriately, able to move extremities bilaterally     Psychiatric:         Mood and Affect: Mood normal          Behavior: Behavior normal

## 2023-01-16 ENCOUNTER — TELEPHONE (OUTPATIENT)
Dept: CARDIOLOGY CLINIC | Facility: CLINIC | Age: 68
End: 2023-01-16

## 2023-01-16 DIAGNOSIS — R00.2 PALPITATIONS: Primary | ICD-10-CM

## 2023-01-16 RX ORDER — METOPROLOL SUCCINATE 50 MG/1
50 TABLET, EXTENDED RELEASE ORAL DAILY
Qty: 90 TABLET | Refills: 3 | Status: SHIPPED | OUTPATIENT
Start: 2023-01-16 | End: 2023-01-18

## 2023-01-16 NOTE — TELEPHONE ENCOUNTER
Received a call that patient had called the office and was upset that his metoprolol succinate 50 mg tablets were discontinued  When I had seen the patient in the office I had thought he had 12 and he was only taking 25 mg daily but when I called and spoke with him he states that he takes a 50 mg tablet daily and a 25 mg tablet daily and metoprolol succinate to equal 75 mg total daily dosing  He states he has been doing this for many years and feels better on this dose he notes that his blood pressures and heart rates have been well controlled with no significant issues  He asked that a new prescription be sent for the 50 mg tablets to his mail order pharmacy as he has some left at home  This prescription was sent with refills

## 2023-01-18 DIAGNOSIS — I10 HYPERTENSION, UNSPECIFIED TYPE: ICD-10-CM

## 2023-01-18 DIAGNOSIS — R00.2 PALPITATIONS: ICD-10-CM

## 2023-01-18 RX ORDER — METOPROLOL SUCCINATE 50 MG/1
50 TABLET, EXTENDED RELEASE ORAL DAILY
Qty: 90 TABLET | Refills: 3 | Status: SHIPPED | OUTPATIENT
Start: 2023-01-18

## 2023-01-18 RX ORDER — METOPROLOL SUCCINATE 25 MG/1
25 TABLET, EXTENDED RELEASE ORAL DAILY
Qty: 90 TABLET | Refills: 3 | Status: SHIPPED | OUTPATIENT
Start: 2023-01-18

## 2023-01-23 ENCOUNTER — APPOINTMENT (EMERGENCY)
Dept: CT IMAGING | Facility: HOSPITAL | Age: 68
End: 2023-01-23

## 2023-01-23 ENCOUNTER — OFFICE VISIT (OUTPATIENT)
Dept: URGENT CARE | Facility: MEDICAL CENTER | Age: 68
End: 2023-01-23

## 2023-01-23 ENCOUNTER — HOSPITAL ENCOUNTER (EMERGENCY)
Facility: HOSPITAL | Age: 68
Discharge: HOME | End: 2023-01-23
Attending: EMERGENCY MEDICINE

## 2023-01-23 ENCOUNTER — TELEPHONE (OUTPATIENT)
Dept: FAMILY MEDICINE CLINIC | Facility: CLINIC | Age: 68
End: 2023-01-23

## 2023-01-23 VITALS
BODY MASS INDEX: 40.63 KG/M2 | OXYGEN SATURATION: 100 % | HEART RATE: 60 BPM | SYSTOLIC BLOOD PRESSURE: 150 MMHG | HEIGHT: 72 IN | RESPIRATION RATE: 20 BRPM | WEIGHT: 300 LBS | TEMPERATURE: 98.1 F | DIASTOLIC BLOOD PRESSURE: 80 MMHG

## 2023-01-23 VITALS
DIASTOLIC BLOOD PRESSURE: 61 MMHG | RESPIRATION RATE: 17 BRPM | TEMPERATURE: 97.1 F | OXYGEN SATURATION: 98 % | SYSTOLIC BLOOD PRESSURE: 128 MMHG | HEART RATE: 51 BPM

## 2023-01-23 DIAGNOSIS — R53.1 WEAKNESS: ICD-10-CM

## 2023-01-23 DIAGNOSIS — R42 VERTIGO: Primary | ICD-10-CM

## 2023-01-23 DIAGNOSIS — R42 DIZZINESS AND GIDDINESS: Primary | ICD-10-CM

## 2023-01-23 LAB
ALBUMIN SERPL BCP-MCNC: 4.3 G/DL (ref 3.5–5)
ALP SERPL-CCNC: 37 U/L (ref 34–104)
ALT SERPL W P-5'-P-CCNC: 20 U/L (ref 7–52)
ANION GAP SERPL CALCULATED.3IONS-SCNC: 9 MMOL/L (ref 4–13)
AST SERPL W P-5'-P-CCNC: 20 U/L (ref 13–39)
BASOPHILS # BLD AUTO: 0.02 THOUSANDS/ÂΜL (ref 0–0.1)
BASOPHILS NFR BLD AUTO: 0 % (ref 0–1)
BILIRUB SERPL-MCNC: 1 MG/DL (ref 0.2–1)
BUN SERPL-MCNC: 26 MG/DL (ref 5–25)
CALCIUM SERPL-MCNC: 10 MG/DL (ref 8.4–10.2)
CARDIAC TROPONIN I PNL SERPL HS: 5 NG/L
CHLORIDE SERPL-SCNC: 103 MMOL/L (ref 96–108)
CO2 SERPL-SCNC: 28 MMOL/L (ref 21–32)
CREAT SERPL-MCNC: 1.19 MG/DL (ref 0.6–1.3)
EOSINOPHIL # BLD AUTO: 0 THOUSAND/ÂΜL (ref 0–0.61)
EOSINOPHIL NFR BLD AUTO: 0 % (ref 0–6)
ERYTHROCYTE [DISTWIDTH] IN BLOOD BY AUTOMATED COUNT: 13 % (ref 11.6–15.1)
FLUAV RNA RESP QL NAA+PROBE: NEGATIVE
FLUBV RNA RESP QL NAA+PROBE: NEGATIVE
GFR SERPL CREATININE-BSD FRML MDRD: 61 ML/MIN/1.73SQ M
GLUCOSE SERPL-MCNC: 117 MG/DL (ref 65–140)
GLUCOSE SERPL-MCNC: 122 MG/DL (ref 65–140)
HCT VFR BLD AUTO: 43.3 % (ref 36.5–49.3)
HGB BLD-MCNC: 14.6 G/DL (ref 12–17)
IMM GRANULOCYTES # BLD AUTO: 0.01 THOUSAND/UL (ref 0–0.2)
IMM GRANULOCYTES NFR BLD AUTO: 0 % (ref 0–2)
LYMPHOCYTES # BLD AUTO: 1.34 THOUSANDS/ÂΜL (ref 0.6–4.47)
LYMPHOCYTES NFR BLD AUTO: 22 % (ref 14–44)
MCH RBC QN AUTO: 29.9 PG (ref 26.8–34.3)
MCHC RBC AUTO-ENTMCNC: 33.7 G/DL (ref 31.4–37.4)
MCV RBC AUTO: 89 FL (ref 82–98)
MONOCYTES # BLD AUTO: 0.51 THOUSAND/ÂΜL (ref 0.17–1.22)
MONOCYTES NFR BLD AUTO: 8 % (ref 4–12)
NEUTROPHILS # BLD AUTO: 4.21 THOUSANDS/ÂΜL (ref 1.85–7.62)
NEUTS SEG NFR BLD AUTO: 70 % (ref 43–75)
NRBC BLD AUTO-RTO: 0 /100 WBCS
PLATELET # BLD AUTO: 183 THOUSANDS/UL (ref 149–390)
PMV BLD AUTO: 11.2 FL (ref 8.9–12.7)
POTASSIUM SERPL-SCNC: 4.2 MMOL/L (ref 3.5–5.3)
PROT SERPL-MCNC: 7.7 G/DL (ref 6.4–8.4)
RBC # BLD AUTO: 4.89 MILLION/UL (ref 3.88–5.62)
RSV RNA RESP QL NAA+PROBE: NEGATIVE
SARS-COV-2 RNA RESP QL NAA+PROBE: NEGATIVE
SL AMB POCT GLUCOSE BLD: 122
SODIUM SERPL-SCNC: 140 MMOL/L (ref 135–147)
WBC # BLD AUTO: 6.09 THOUSAND/UL (ref 4.31–10.16)

## 2023-01-23 RX ORDER — MECLIZINE HYDROCHLORIDE 25 MG/1
25 TABLET ORAL 3 TIMES DAILY PRN
Qty: 30 TABLET | Refills: 0 | Status: SHIPPED | OUTPATIENT
Start: 2023-01-23 | End: 2023-02-02

## 2023-01-23 RX ORDER — MECLIZINE HCL 12.5 MG/1
25 TABLET ORAL ONCE
Status: COMPLETED | OUTPATIENT
Start: 2023-01-23 | End: 2023-01-23

## 2023-01-23 RX ADMIN — IOHEXOL 100 ML: 350 INJECTION, SOLUTION INTRAVENOUS at 18:50

## 2023-01-23 RX ADMIN — MECLIZINE 25 MG: 12.5 TABLET ORAL at 18:28

## 2023-01-23 NOTE — TELEPHONE ENCOUNTER
If vomiting or unable to keep anything down would go to care now or urgent care for eval  If he can rest otherwise and increase fluids may improve Would check sugar and bp as well to make sure that not the cause of symptoms but if unable to stand or having GI sxs would get checked as above

## 2023-01-23 NOTE — PATIENT INSTRUCTIONS
Dizziness can be a sign of stroke in the right setting  While it is not necessarily the direct cause known at this time it is important to rule this out, and evaluation in the ED would be most appropriate given your past history of hypertension, high cholesterol, and diabetes

## 2023-01-23 NOTE — ED PROVIDER NOTES
EMERGENCY DEPARTMENT ENCOUNTER NOTE    This note has been generated using a voice recognition software  There may be typographic, grammatic, or word substitution errors that have escaped editorial review  Emergency Department Note- Yeimi Louis 69 y o  male MRN: [id number]    Unit/Bed#: [facility] Encounter: [id number]  ? CHIEF COMPLAINT  Chief Complaint   Patient presents with   • Dizziness     Patient c/o dizziness starting at 0800  Patient states dizziness worsens when laying flat  Patient also c/o nausea  Patient denies chest pain       HPI  Yeimi Louis is a 69 y o  male with PMH of diabetes mellitus, hypertension, hyperlipidemia, presenting with vertigo  Patient notes that over the course of the past several years, he would have vertigo (room spinning) whenever he is laying flat, and therefore, he completely avoids laying flat, sleeping on a somewhat elevated surface  He notes that usually, upon waking up he would feel vertiginous for about 5 to 10 minutes, however, after sitting down for 5 minutes, the sensation would completely resolve  Today, patient continues to have vertigo despite being upright and the symptoms persisted for several hours  He also felt nausea  There was no headache  There is no chest pain  There is no sensation as though he is about to pass out  He does have nasal congestion and his wife is currently sick  He also notes having a burning sensation in his left ear several days ago  He has never seen an ENT for his vertigo  He has no history of stroke  No headache, no changes in vision, no double vision, no difficulty with speech, no weakness or numbness of the extremities  Patient does ambulate with a cane  When he feels vertigo, he does have trouble ambulating, however, he has not sustained any falls due to this      REVIEW OF SYSTEMS    Constitutional: denies fevers, chills  Visual/Eyes: no changes in vision  HENT: no rhinorrhea, no sore throat  Cardiac: no chest pain  Respiratory: no shortness of breath, no cough  GI: Nausea, no abdominal pain  : no burning with urination  Heme/Onc: no easy bruising  Endocrine: no diabetes  Neuro: As above, no focal weakness or numbness, no headaches    Ten systems reviewed and negative unless otherwise noted in HPI and above    PAST MEDICAL HISTORY  Past Medical History:   Diagnosis Date   • Arthritis     last assessed 7/1/15   • [facility] )     type 2-last assessed 1/28/15   • GERD (gastroesophageal reflux disease)    • Hypertension    • Irregular heart beat     last assessed 7/1/15   • Palpitations     last assessed 9/7/17   • Sexual dysfunction     last assessed 7/1/15   • Thyroid disease     last assessed 7/1/15       SURGICAL HISTORY  Past Surgical History:   Procedure Laterality Date   • COLONOSCOPY     • TONSILLECTOMY         FAMILY HISTORY  Family History   Problem Relation Age of Onset   • Hypertension Mother         essential   • Stroke Mother    • Hypertension Father         essential   • Heart defect Father         cardiac disorder   • Stroke Brother    • Hypertension Brother         CURRENT MEDICATIONS  No current facility-administered medications on file prior to encounter       Current Outpatient Medications on File Prior to Encounter   Medication Sig   • amLODIPine (NORVASC) 5 mg tablet Take 1 tablet (5 mg total) by mouth daily For blood pressure   • aspirin 81 mg chewable tablet Chew 81 mg daily   • atorvastatin (LIPITOR) 10 mg tablet Take 1 tablet (10 mg total) by mouth daily   • enalapril (VASOTEC) 10 mg tablet Take 1 tablet (10 mg total) by mouth daily   • furosemide (LASIX) 40 mg tablet Take 1 tablet (40 mg total) by mouth daily   • glucose blood (OneTouch Verio) test strip Use 1 each daily   • indomethacin (INDOCIN) 50 mg capsule    • ketoconazole (NIZORAL) 2 % shampoo    • Lancets (onetouch ultrasoft) lancets Check glucose daily   • metFORMIN (GLUCOPHAGE) 500 mg tablet Take 1 tablet (500 mg total) by mouth daily with breakfast One tab in am   • metoprolol succinate (TOPROL-XL) 25 mg 24 hr tablet Take 1 tablet (25 mg total) by mouth daily   • metoprolol succinate (TOPROL-XL) 50 mg 24 hr tablet Take 1 tablet (50 mg total) by mouth daily   • multivitamin (THERAGRAN) TABS Take 1 tablet by mouth daily   • omeprazole (PriLOSEC) 40 MG capsule Take 1 capsule (40 mg total) by mouth daily   • potassium chloride (Klor-Con M20) 20 mEq tablet Take 1 tablet (20 mEq total) by mouth daily   • Red Yeast Rice Extract (RED YEAST RICE PO) Take by mouth   • triamcinolone (KENALOG) 0 1 % lotion    • triamcinolone (KENALOG) 0 5 % cream    • Vitamin D, Cholecalciferol, 50 MCG (2000 UT) CAPS Take 50 mcg by mouth daily   • [DISCONTINUED] amoxicillin (AMOXIL) 500 mg capsule TAKE 1 CAPSULE BY MOUTH EVERY 8 HOURS UNTIL GONE   • [DISCONTINUED] baclofen 10 mg tablet Take 1 tablet (10 mg total) by mouth 2 (two) times a day       ALLERGIES  Allergies   Allergen Reactions   • Acetaminophen Other (See Comments)     Other reaction(s): Unknown Reaction  "I had to go to the hospital and get shots after taking it 20yrs ago"       SOCIAL HISTORY  Social History     Socioeconomic History   • Marital status: Single     Spouse name: None   • Number of children: None   • Years of education: None   • Highest education level: None   Occupational History   • None   Tobacco Use   • Smoking status: Former   • Smokeless tobacco: Never   • Tobacco comments:     former smoker-quit 17 yrs ago 1pppd x 30 yrs as per [employer]   Vaping Use   • Vaping Use: Never used   Substance and Sexual Activity   • Alcohol use: Never   • Drug use: Never   • Sexual activity: None   Other Topics Concern   • None   Social History Narrative   • None     Social Determinants of Health     Financial Resource Strain: Low Risk    • Difficulty of Paying Living Expenses: Not very hard   Food Insecurity: Not on file   Transportation Needs: No Transportation Needs   • Lack of Transportation (Medical): No   • Lack of Transportation (Non-Medical): No   Physical Activity: Not on file   Stress: Not on file   Social Connections: Not on file   Intimate Partner Violence: Not on file   Housing Stability: Not on file       PHYSICAL EXAM    /54   Pulse (!) 52   Temp (!) 97 1 °F (36 2 °C) (Tympanic)   Resp 18   SpO2 98%   Vital signs and nursing notes reviewed    CONSTITUTIONAL: male appearing stated age resting in bed, in no acute distress  HEENT: atraumatic, normocephalic  Sclera anicteric, conjunctiva are not injected  Moist oral mucosa  CARDIOVASCULAR/CHEST: RRR, no M/R/G  2+ radial pulses  PULMONARY: Breathing comfortably on RA  Breath sounds are equal and clear to auscultation  ABDOMEN: non-distended  BS present, normoactive  Non-tender  MSK: moves all extremities, no deformities, no peripheral edema, no calf asymmetry  NEURO: Patient ambulating to the room with a somewhat unsteady gait using a cane, there is no obvious truncal ataxia  Awake, alert, and oriented x 3  Pupils 2 mm and reactive, extraocular movements are intact without nystagmus, face symmetric, uvula elevates midline, tongue protrudes midline  5/5 strength in bilateral upper and lower extremities  No pronator drift  Moves all extremities spontaneously  No focal neurologic deficits  SKIN: Warm, appears well-perfused  MENTAL STATUS: Normal affect      ? LABS AND TESTS    Results Reviewed     Procedure Component Value Units Date/Time    COVID19, Influenza A/B, RSV PCR, [facility] [[id number]]  (Normal) Collected: 01/23/23 1726    Lab Status: Final result Specimen: Nares from Nose Updated: 01/23/23 1850     SARS-CoV-2 Negative     INFLUENZA A PCR Negative     INFLUENZA B PCR Negative     RSV PCR Negative    Narrative:      FOR PEDIATRIC PATIENTS - copy/paste COVID Guidelines URL to browser: [email or web address]    SARS-CoV-2 assay is a Nucleic Acid Amplification assay intended for the  qualitative detection of nucleic acid from SARS-CoV-2 in nasopharyngeal  swabs  Results are for the presumptive identification of SARS-CoV-2 RNA  Positive results are indicative of infection with SARS-CoV-2, the virus  causing COVID-19, but do not rule out bacterial infection or co-infection  with other viruses  Laboratories within the [place] and its  territories are required to report all positive results to the appropriate  public health authorities  Negative results do not preclude SARS-CoV-2  infection and should not be used as the sole basis for treatment or other  patient management decisions  Negative results must be combined with  clinical observations, patient history, and epidemiological information  This test has not been FDA cleared or approved  This test has been authorized by FDA under an Emergency Use Authorization  (EUA)  This test is only authorized for the duration of time the  declaration that circumstances exist justifying the authorization of the  emergency use of an in vitro diagnostic tests for detection of SARS-CoV-2  virus and/or diagnosis of COVID-19 infection under section 564(b)(1) of  the Act, 21 U  S C  478RKL-2(B)(6), unless the authorization is terminated  or revoked sooner  The test has been validated but independent review by FDA  and CLIA is pending  Test performed using [employer] GeneXpert: This RT-PCR assay targets N2,  a region unique to SARS-CoV-2  A conserved region in the E-gene was chosen  for pan-Sarbecovirus detection which includes SARS-CoV-2  According to CMS-2020-01-R, this platform meets the definition of high-throughput technology      HS Troponin 0hr (reflex protocol) [[id number]]  (Normal) Collected: 01/23/23 1726    Lab Status: Final result Specimen: Blood from Arm, Right Updated: 01/23/23 1804     hs TnI 0hr 5 ng/L     Comprehensive metabolic panel [[id number]]  (Abnormal) Collected: 01/23/23 1726    Lab Status: Final result Specimen: Blood from Arm, Right Updated: 01/23/23 1747     Sodium 140 mmol/L      Potassium 4 2 mmol/L      Chloride 103 mmol/L      CO2 28 mmol/L      ANION GAP 9 mmol/L      BUN 26 mg/dL      Creatinine 1 19 mg/dL      Glucose 117 mg/dL      Calcium 10 0 mg/dL      AST 20 U/L      ALT 20 U/L      Alkaline Phosphatase 37 U/L      Total Protein 7 7 g/dL      Albumin 4 3 g/dL      Total Bilirubin 1 00 mg/dL      eGFR 61 ml/min/1 73sq m     Narrative:      [place] guidelines for Chronic Kidney Disease (CKD):   •  Stage 1 with normal or high GFR (GFR > 90 mL/min/1 73 square meters)  •  Stage 2 Mild CKD (GFR = 60-89 mL/min/1 73 square meters)  •  Stage 3A Moderate CKD (GFR = 45-59 mL/min/1 73 square meters)  •  Stage 3B Moderate CKD (GFR = 30-44 mL/min/1 73 square meters)  •  Stage 4 Severe CKD (GFR = 15-29 mL/min/1 73 square meters)  •  Stage 5 End Stage CKD (GFR <15 mL/min/1 73 square meters)  Note: GFR calculation is accurate only with a steady state creatinine    CBC and differential [[id number]] Collected: 01/23/23 1726    Lab Status: Final result Specimen: Blood from Arm, Right Updated: 01/23/23 1732     WBC 6 09 Thousand/uL      RBC 4 89 Million/uL      Hemoglobin 14 6 g/dL      Hematocrit 43 3 %      MCV 89 fL      MCH 29 9 pg      MCHC 33 7 g/dL      RDW 13 0 %      MPV 11 2 fL      Platelets 160 Thousands/uL      nRBC 0 /100 WBCs      Neutrophils Relative 70 %      Immat GRANS % 0 %      Lymphocytes Relative 22 %      Monocytes Relative 8 %      Eosinophils Relative 0 %      Basophils Relative 0 %      Neutrophils Absolute 4 21 Thousands/µL      Immature Grans Absolute 0 01 Thousand/uL      Lymphocytes Absolute 1 34 Thousands/µL      Monocytes Absolute 0 51 Thousand/µL      Eosinophils Absolute 0 00 Thousand/µL      Basophils Absolute 0 02 Thousands/µL           CTA head and neck with and without contrast   Final Result by Uday Smith MD (01/23 2037)         1    No evidence of acute infarct, intracranial hemorrhage or mass  2   No hemodynamically significant stenosis in the carotid or vertebral arteries or major vessels of the [place]  Hypoplastic vertebrobasilar system  Workstation performed: [id number]             ED [address] [facility]  ECG 12 Lead Documentation Only    Date/Time: 1/23/2023 4:18 PM  Performed by: Eduardo Mcmillan MD  Authorized by: Eduardo Mcmillan MD     Comments:      Normal sinus rhythm, ventricular rate 61, SD interval 198, , QTc 428, left axis deviation, ST segment flattening in lead aVL, and lead V3 no STEMI, overall, no significant change in prior EKG dated 5/11/2021  Medications   meclizine (ANTIVERT) tablet 25 mg (25 mg Oral Given 1/23/23 258)     49-year-old male presenting with persistent vertigo and nausea  Vital signs reviewed, afebrile, mildly bradycardic, within normal notes otherwise  Differential diagnosis includes peripheral causes of vertigo including BPPV, vestibular neuritis, inner ear dysfunction due to current respiratory illness (patient does have nasal congestion on exam), Ménière's disease, with other etiologies such as vertebrobasilar insufficiency, posterior circulation stroke considered  Deferred administered for symptoms  EKG obtained, as reviewed by me, as above  ED Course as of 01/25/23 1541   Mon Jan 23, 2023 1925 On reassessment, patient is resting in bed  He does report feeling better  He started feeling better even prior to receiving meclizine  2000 Calling radiology for update regarding CT read  Work-up reveals unremarkable CMP, normal high-sensitivity troponin, and normal CBC  COVID-19, influenza, and RSV PCR is negative  CT angiography of head and neck vessels does reveal atherosclerotic plaques but no evidence of hemodynamically significant stenosis  At this time, patient symptoms are completely resolved    I do suspect some degree of peripheral vertigo and believe the patient would benefit from a follow-up with ENT given that he always has vertigo whenever he is laying flat  His symptoms at present could be exacerbated by a viral respiratory illness with inner ear dysfunction  I will prescribe patient Antivert  If symptoms recur or new symptoms develop, including but not limited to double vision, difficulty with speech, numbness or weakness with upper or lower extremity, inability to ambulate, patient needs to return for reevaluation right away  Patient discharged to home with recommendations for symptom control, return precautions, and plan for follow up  MDM  Number of Diagnoses or Management Options  Vertigo: new and requires workup     Amount and/or Complexity of Data Reviewed  Clinical lab tests: ordered and reviewed  Tests in the radiology section of CPT®: ordered and reviewed  Tests in the medicine section of CPT®: ordered and reviewed  Review and summarize past medical records: yes  Independent visualization of images, tracings, or specimens: yes    Patient Progress  Patient progress: resolved      CLINICAL IMPRESSION  Final diagnoses:   Vertigo       DISPOSITION  Time reflects when diagnosis was documented in both MDM as applicable and the Disposition within this note     Time User Action Codes Description Comment    1/23/2023  8:35 PM Saurav Calixto [R42] Vertigo       ED Disposition     ED Disposition   Discharge    Condition   Stable    Date/Time   Mon Jan 23, 2023  8:39 PM    Comment   Mhrajeshd Estrellita discharge to home/self care                 Follow-up Information     Follow up With Specialties Details Why Contact Info Additional Information    [facility] [address] [facility] Otolaryngology Call in 1 day Emergency Room Follow-up [address] [facility],[address] [id number] [phone] [facility], [address] [place] [address], [address] [place] [address] Emergency Department Emergency Medicine Go to  As needed, If symptoms worsen Karolyn Kin [id number]  [address] [facility] Emergency Department, [address], [address]   Discharge Medication List as of 1/23/2023  8:41 PM      START taking these medications    Details   meclizine (ANTIVERT) 25 mg tablet Take 1 tablet (25 mg total) by mouth 3 (three) times a day as needed for dizziness (Vertigo) for up to 10 days, Starting Mon 1/23/2023, Until Thu 2/2/2023 at 2359, Normal         CONTINUE these medications which have NOT CHANGED    Details   amLODIPine (NORVASC) 5 mg tablet Take 1 tablet (5 mg total) by mouth daily For blood pressure, Starting Wed 1/4/2023, Normal      aspirin 81 mg chewable tablet Chew 81 mg daily, Historical Med      atorvastatin (LIPITOR) 10 mg tablet Take 1 tablet (10 mg total) by mouth daily, Starting Wed 1/4/2023, Normal      enalapril (VASOTEC) 10 mg tablet Take 1 tablet (10 mg total) by mouth daily, Starting Wed 1/4/2023, Normal      furosemide (LASIX) 40 mg tablet Take 1 tablet (40 mg total) by mouth daily, Starting Wed 1/4/2023, Normal      glucose blood (OneTouch Verio) test strip Use 1 each daily, Starting Fri 9/23/2022, Normal      indomethacin (INDOCIN) 50 mg capsule Starting Tue 1/10/2023, Historical Med      ketoconazole (NIZORAL) 2 % shampoo Starting Wed 9/15/2021, Historical Med      Lancets (onetouch ultrasoft) lancets Check glucose daily, Normal      metFORMIN (GLUCOPHAGE) 500 mg tablet Take 1 tablet (500 mg total) by mouth daily with breakfast One tab in am, Starting Wed 1/4/2023, Normal      !! metoprolol succinate (TOPROL-XL) 25 mg 24 hr tablet Take 1 tablet (25 mg total) by mouth daily, Starting Wed 1/18/2023, Normal      !! metoprolol succinate (TOPROL-XL) 50 mg 24 hr tablet Take 1 tablet (50 mg total) by mouth daily, Starting Wed 1/18/2023, Normal      multivitamin (THERAGRAN) TABS Take 1 tablet by mouth daily, Historical Med omeprazole (PriLOSEC) 40 MG capsule Take 1 capsule (40 mg total) by mouth daily, Starting Wed 1/4/2023, Normal      potassium chloride (Klor-Con M20) 20 mEq tablet Take 1 tablet (20 mEq total) by mouth daily, Starting Thu 1/12/2023, Normal      Red Yeast Rice Extract (RED YEAST RICE PO) Take by mouth, Historical Med      triamcinolone (KENALOG) 0 1 % lotion Starting Wed 9/15/2021, Historical Med      triamcinolone (KENALOG) 0 5 % cream Starting Wed 9/15/2021, Historical Med      Vitamin D, Cholecalciferol, 50 MCG (2000 UT) CAPS Take 50 mcg by mouth daily, Starting Wed 1/4/2023, Normal       !! - Potential duplicate medications found  Please discuss with provider              Devan Barone MD  01/25/23 [id number]

## 2023-01-23 NOTE — TELEPHONE ENCOUNTER
Pt said he will go to UC, said his BS was 140 fasting, said he hasn't been eating today due to being nauseous

## 2023-01-23 NOTE — PROGRESS NOTES
[address] [facility]        NAME: Ashley Roth is a 69 y o  male  : 1955    MRN: [id number]  DATE: 2023  TIME: 3:10 PM    Assessment and Plan   Dizziness and giddiness [R42]  1  Dizziness and giddiness  Transfer to other facility      2  Weakness  POCT blood glucose    Transfer to other facility            Patient Instructions       Proceed to  ER     Chief Complaint     Chief Complaint   Patient presents with   • Dizziness     Woke up this morning and was very dizzy          History of Present Illness       14:33- Patient offered wheelchair from waiting room- patient declined  Ambulated to  3   14:35 POC Glucose obtained  14:40 Patient providing HPI in patient room  Orthostatic BP were evaluated minus the lying reading due to patient's concerns with lying down and his symptoms worsening as reported below  Patient reports waking up this AM with feeling of dizziness  He also feels nauseated  No recent illnesses other than a dental issue which he was on an antibiotic around 23  Patient has pain in right foot- history of gout and ate food which exacerbated his gout last night- there are no outward signs of swelling/gout in RLE  He took his normally prescribed medications for his gout  Denies any shortness of breath  Denies any recent falls/injuries  Denies any CVA hx  There is a family history CVA documented but patient denies any knowledge of this history only knows of HTN and Cardiac problems which run in the family  He has been feeling off balance while walking- he uses a cane when necessary and is using it today more so with the dizziness and as well as the foot pain  Patient states he has a history of dizziness when lying flat  He has to sleep with his head slightly elevated  Symptoms slightly improved from earlier this am but continues to have some dizziness  Symptoms were present upon awaking, he felt ok last night prior to bed   He reports when he is sitting he doesn't feel 'as bad' and reports the dizziness gets worse when he gets up from a seated position  Patient has positive ataxia, but reports this is from his foot pain  Patient reports he checks his BP at home and it has been running in the 130s SBP  He has his machine with him today and we compared to our reading which was very similar  I recommended that he be fully evaluated in the ED to rule out CVA/TIA- patient willing to be evaluated in the ED  Patient wishes for his wife to take him over to the ER  Discussed with the wife present the benefits of being evaluated in the ED  Review of Systems   Review of Systems   Constitutional: Negative for chills, fatigue and fever  HENT: Negative for congestion, ear pain, postnasal drip, rhinorrhea, sinus pressure, sinus pain, sore throat and trouble swallowing  Eyes: Negative for photophobia, pain, discharge and visual disturbance  Respiratory: Negative for cough and shortness of breath  Cardiovascular: Negative for chest pain and palpitations  Gastrointestinal: Positive for nausea  Negative for abdominal pain, constipation, diarrhea and vomiting  Last BM- today  Genitourinary: Negative for dysuria and hematuria  Musculoskeletal: Negative for arthralgias and back pain  Skin: Negative for color change, rash and wound  Neurological: Positive for dizziness and light-headedness  Negative for seizures, syncope, facial asymmetry, speech difficulty, weakness, numbness and headaches  Generalized weakness   Hematological: Negative for adenopathy  Psychiatric/Behavioral: Negative for agitation and confusion  All other systems reviewed and are negative          Current Medications       Current Outpatient Medications:   •  amLODIPine (NORVASC) 5 mg tablet, Take 1 tablet (5 mg total) by mouth daily For blood pressure, Disp: 90 tablet, Rfl: 3  •  aspirin 81 mg chewable tablet, Chew 81 mg daily, Disp: , Rfl:   •  atorvastatin (LIPITOR) 10 mg tablet, Take 1 tablet (10 mg total) by mouth daily, Disp: 90 tablet, Rfl: 3  •  enalapril (VASOTEC) 10 mg tablet, Take 1 tablet (10 mg total) by mouth daily, Disp: 90 tablet, Rfl: 3  •  furosemide (LASIX) 40 mg tablet, Take 1 tablet (40 mg total) by mouth daily, Disp: 90 tablet, Rfl: 3  •  glucose blood (OneTouch Verio) test strip, Use 1 each daily, Disp: 100 each, Rfl: 5  •  indomethacin (INDOCIN) 50 mg capsule, , Disp: , Rfl:   •  ketoconazole (NIZORAL) 2 % shampoo, , Disp: , Rfl:   •  Lancets (onetouch ultrasoft) lancets, Check glucose daily, Disp: 100 each, Rfl: 1  •  metFORMIN (GLUCOPHAGE) 500 mg tablet, Take 1 tablet (500 mg total) by mouth daily with breakfast One tab in am, Disp: 90 tablet, Rfl: 3  •  metoprolol succinate (TOPROL-XL) 25 mg 24 hr tablet, Take 1 tablet (25 mg total) by mouth daily, Disp: 90 tablet, Rfl: 3  •  metoprolol succinate (TOPROL-XL) 50 mg 24 hr tablet, Take 1 tablet (50 mg total) by mouth daily, Disp: 90 tablet, Rfl: 3  •  multivitamin (THERAGRAN) TABS, Take 1 tablet by mouth daily, Disp: , Rfl:   •  omeprazole (PriLOSEC) 40 MG capsule, Take 1 capsule (40 mg total) by mouth daily, Disp: 90 capsule, Rfl: 3  •  potassium chloride (Klor-Con M20) 20 mEq tablet, Take 1 tablet (20 mEq total) by mouth daily, Disp: 30 tablet, Rfl: 11  •  Red Yeast Rice Extract (RED YEAST RICE PO), Take by mouth, Disp: , Rfl:   •  triamcinolone (KENALOG) 0 1 % lotion, , Disp: , Rfl:   •  triamcinolone (KENALOG) 0 5 % cream, , Disp: , Rfl:   •  Vitamin D, Cholecalciferol, 50 MCG (2000 UT) CAPS, Take 50 mcg by mouth daily, Disp: 90 capsule, Rfl: 3    Current Allergies     Allergies as of 01/23/2023 - Reviewed 01/23/2023   Allergen Reaction Noted   • Acetaminophen Other (See Comments) 03/16/2018            The following portions of the patient's history were reviewed and updated as appropriate: allergies, current medications, past family history, past medical history, past social history, past surgical history and problem list      Past Medical History:   Diagnosis Date   • Arthritis     last assessed 7/1/15   • Diabetes mellitus ([facility] [address] )     type 2-last assessed 1/28/15   • GERD (gastroesophageal reflux disease)    • Hypertension    • Irregular heart beat     last assessed 7/1/15   • Palpitations     last assessed 9/7/17   • Sexual dysfunction     last assessed 7/1/15   • Thyroid disease     last assessed 7/1/15       Past Surgical History:   Procedure Laterality Date   • COLONOSCOPY     • TONSILLECTOMY         Family History   Problem Relation Age of Onset   • Hypertension Mother         essential   • Stroke Mother    • Hypertension Father         essential   • Heart defect Father         cardiac disorder   • Stroke Brother    • Hypertension Brother          Medications have been verified  Objective   /72   Pulse 60   Temp 98 1 °F (36 7 °C)   Resp 20   Ht 6' (1 829 m)   Wt 136 kg (300 lb)   SpO2 100%   BMI 40 69 kg/m²        Physical Exam     Physical Exam  Vitals and nursing note reviewed  Constitutional:       General: He is awake  He is not in acute distress  Appearance: Normal appearance  He is well-developed and well-groomed  He is obese  He is not ill-appearing  HENT:      Head: Normocephalic and atraumatic  Right Ear: Tympanic membrane, ear canal and external ear normal  No middle ear effusion  Tympanic membrane is not bulging  Left Ear: Tympanic membrane, ear canal and external ear normal   No middle ear effusion  Tympanic membrane is not bulging  Nose: Nose normal       Mouth/Throat:      Mouth: Mucous membranes are moist       Pharynx: Oropharynx is clear  No oropharyngeal exudate or posterior oropharyngeal erythema  Eyes:      General: Lids are normal  No visual field deficit  Extraocular Movements: Extraocular movements intact  Conjunctiva/sclera: Conjunctivae normal       Pupils: Pupils are equal, round, and reactive to light     Cardiovascular:      Rate and Rhythm: Normal rate and regular rhythm  Pulses: Normal pulses  Radial pulses are 2+ on the right side and 2+ on the left side  Heart sounds: Normal heart sounds, S1 normal and S2 normal  No murmur heard  Pulmonary:      Effort: Pulmonary effort is normal  No tachypnea or respiratory distress  Breath sounds: Normal breath sounds  Abdominal:      General: Abdomen is protuberant  Bowel sounds are normal       Palpations: Abdomen is soft  Tenderness: There is no abdominal tenderness  Musculoskeletal:         General: Normal range of motion  Cervical back: Full passive range of motion without pain, normal range of motion and neck supple  Skin:     General: Skin is warm and dry  Capillary Refill: Capillary refill takes less than 2 seconds  Neurological:      General: No focal deficit present  Mental Status: He is alert and oriented to person, place, and time  Mental status is at baseline  GCS: GCS eye subscore is 4  GCS verbal subscore is 5  GCS motor subscore is 6  Cranial Nerves: Cranial nerves 2-12 are intact  No cranial nerve deficit, dysarthria or facial asymmetry  Sensory: Sensation is intact  No sensory deficit  Motor: Motor function is intact  No weakness, tremor, atrophy, abnormal muscle tone, seizure activity or pronator drift  Coordination: Romberg sign negative  Coordination normal  Finger-Nose-Finger Test abnormal       Gait: Gait abnormal       Comments: Unsteady Ambulating with use of cane - reports pain in right foot which is causing his unsteady gait  There are no obvious signs of gout on his LLE  Very slight ataxia with finger-to-nose  Psychiatric:         Attention and Perception: Attention and perception normal          Mood and Affect: Mood normal          Speech: Speech normal          Behavior: Behavior is cooperative

## 2023-01-24 LAB
ATRIAL RATE: 61 BPM
P AXIS: 47 DEGREES
PR INTERVAL: 198 MS
QRS AXIS: -34 DEGREES
QRSD INTERVAL: 110 MS
QT INTERVAL: 426 MS
QTC INTERVAL: 428 MS
T WAVE AXIS: 49 DEGREES
VENTRICULAR RATE: 61 BPM

## 2023-01-24 NOTE — DISCHARGE INSTRUCTIONS
Your evaluation today reveals no electrolyte abnormalities, baseline renal function, and no evidence of a stroke or blood vessel blockage in your neck or brain  If you develop persistent vertigo, try taking a dose of Antivert (meclizine) for your symptoms  If you are having difficulty with your balance, or have trouble with your vision, speech, or  have weakness in arm or leg, please seek medical attention right away  Please follow-up with the ear nose throat specialist for further evaluation of the vertigo you experience when lying flat

## 2023-01-26 ENCOUNTER — RA CDI HCC (OUTPATIENT)
Dept: OTHER | Facility: HOSPITAL | Age: 68
End: 2023-01-26

## 2023-01-26 ENCOUNTER — TELEPHONE (OUTPATIENT)
Dept: FAMILY MEDICINE CLINIC | Facility: CLINIC | Age: 68
End: 2023-01-26

## 2023-01-26 NOTE — TELEPHONE ENCOUNTER
There are active lab orders he should have drawn in his chart - I think the A1c is not due until after maybe 2/1 or 2 - check orders are from November and that would have to be at least 3 months so may need to push his appt to next week to have labs done

## 2023-01-27 ENCOUNTER — APPOINTMENT (OUTPATIENT)
Dept: LAB | Facility: MEDICAL CENTER | Age: 68
End: 2023-01-27

## 2023-01-27 DIAGNOSIS — E11.9 TYPE 2 DIABETES MELLITUS WITHOUT COMPLICATION, WITHOUT LONG-TERM CURRENT USE OF INSULIN (HCC): ICD-10-CM

## 2023-01-27 DIAGNOSIS — I50.31 ACUTE DIASTOLIC HEART FAILURE (HCC): ICD-10-CM

## 2023-01-27 LAB
ALBUMIN SERPL BCP-MCNC: 3.6 G/DL (ref 3.5–5)
ALP SERPL-CCNC: 41 U/L (ref 46–116)
ALT SERPL W P-5'-P-CCNC: 28 U/L (ref 12–78)
ANION GAP SERPL CALCULATED.3IONS-SCNC: 4 MMOL/L (ref 4–13)
AST SERPL W P-5'-P-CCNC: 21 U/L (ref 5–45)
BILIRUB SERPL-MCNC: 1.21 MG/DL (ref 0.2–1)
BUN SERPL-MCNC: 23 MG/DL (ref 5–25)
CALCIUM SERPL-MCNC: 9.5 MG/DL (ref 8.3–10.1)
CHLORIDE SERPL-SCNC: 106 MMOL/L (ref 96–108)
CO2 SERPL-SCNC: 28 MMOL/L (ref 21–32)
CREAT SERPL-MCNC: 1.14 MG/DL (ref 0.6–1.3)
GFR SERPL CREATININE-BSD FRML MDRD: 65 ML/MIN/1.73SQ M
GLUCOSE P FAST SERPL-MCNC: 127 MG/DL (ref 65–99)
LDLC SERPL DIRECT ASSAY-MCNC: 80 MG/DL (ref 0–100)
POTASSIUM SERPL-SCNC: 3.8 MMOL/L (ref 3.5–5.3)
PROT SERPL-MCNC: 7.6 G/DL (ref 6.4–8.4)
SODIUM SERPL-SCNC: 138 MMOL/L (ref 135–147)

## 2023-01-28 LAB
EST. AVERAGE GLUCOSE BLD GHB EST-MCNC: 128 MG/DL
HBA1C MFR BLD: 6.1 %

## 2023-02-02 ENCOUNTER — OFFICE VISIT (OUTPATIENT)
Dept: FAMILY MEDICINE CLINIC | Facility: CLINIC | Age: 68
End: 2023-02-02

## 2023-02-02 VITALS
HEART RATE: 76 BPM | RESPIRATION RATE: 18 BRPM | DIASTOLIC BLOOD PRESSURE: 74 MMHG | BODY MASS INDEX: 40.77 KG/M2 | SYSTOLIC BLOOD PRESSURE: 130 MMHG | TEMPERATURE: 98 F | WEIGHT: 301 LBS | HEIGHT: 72 IN

## 2023-02-02 DIAGNOSIS — I89.0 LYMPHEDEMA: ICD-10-CM

## 2023-02-02 DIAGNOSIS — E11.69 DIABETES MELLITUS TYPE 2 IN OBESE (HCC): ICD-10-CM

## 2023-02-02 DIAGNOSIS — J40 BRONCHITIS: Primary | ICD-10-CM

## 2023-02-02 DIAGNOSIS — I10 BENIGN ESSENTIAL HTN: ICD-10-CM

## 2023-02-02 DIAGNOSIS — E66.9 DIABETES MELLITUS TYPE 2 IN OBESE (HCC): ICD-10-CM

## 2023-02-02 PROBLEM — E11.9 DIABETES MELLITUS (HCC): Status: RESOLVED | Noted: 2018-03-16 | Resolved: 2023-02-02

## 2023-02-02 RX ORDER — AMOXICILLIN AND CLAVULANATE POTASSIUM 875; 125 MG/1; MG/1
1 TABLET, FILM COATED ORAL 2 TIMES DAILY
Qty: 14 TABLET | Refills: 0 | Status: SHIPPED | OUTPATIENT
Start: 2023-02-02 | End: 2023-02-09

## 2023-02-02 NOTE — PROGRESS NOTES
Name: Tanvi Schultz      : 1955      MRN: [id number]  Encounter Provider: Courtney Ryan DO  Encounter Date: 2023   Encounter department: [address]     1  Bronchitis  -     amoxicillin-clavulanate (AUGMENTIN) 875-125 mg per tablet; Take 1 tablet by mouth 2 (two) times a day for 7 days  Start antibiotic and can take cough rx otc  Stay hydrated Monitor BS    2  Diabetes mellitus type 2 in obese (HCC)  A1c 6 1 Monitor BS     3  Benign essential HTN  No added salt diet and he is hoping to lose more weight     4  Lymphedema  Elevate lower leg Increase water intake       BMI Counseling: Body mass index is 40 82 kg/m²  The BMI is above normal  Nutrition recommendations include consuming healthier snacks and increasing intake of lean protein  Exercise recommendations include exercising 3-5 times per week  Rationale for BMI follow-up plan is due to patient being overweight or obese  Depression Screening and Follow-up Plan: Patient was screened for depression during today's encounter  They screened negative with a PHQ-2 score of 0  Falls Plan of Care: balance, strength, and gait training instructions were provided  Rto 3 months   Subjective      HPI   Pt has had cough for 10 days Had sore throat and has resolved No sob or chest pain His edema has been stable he has starting intermittent fasting last week as he wants to lose weight No falls Back sxs ok currently He has not been drinking much water No bowel issues   Review of Systems   Constitutional: Negative for chills and fever  HENT: Positive for congestion and postnasal drip  Eyes: Negative for visual disturbance  Respiratory: Positive for cough  Negative for shortness of breath  Cardiovascular: Positive for leg swelling  Negative for chest pain and palpitations  Gastrointestinal: Negative for abdominal distention and abdominal pain  Genitourinary: Negative      Musculoskeletal: Positive for arthralgias  Neurological: Negative for dizziness, light-headedness and headaches  Psychiatric/Behavioral: Negative for sleep disturbance  The patient is not nervous/anxious          Current Outpatient Medications on File Prior to Visit   Medication Sig   • amLODIPine (NORVASC) 5 mg tablet Take 1 tablet (5 mg total) by mouth daily For blood pressure   • aspirin 81 mg chewable tablet Chew 81 mg daily   • atorvastatin (LIPITOR) 10 mg tablet Take 1 tablet (10 mg total) by mouth daily   • enalapril (VASOTEC) 10 mg tablet Take 1 tablet (10 mg total) by mouth daily   • furosemide (LASIX) 40 mg tablet Take 1 tablet (40 mg total) by mouth daily   • glucose blood (OneTouch Verio) test strip Use 1 each daily   • indomethacin (INDOCIN) 50 mg capsule    • ketoconazole (NIZORAL) 2 % shampoo    • Lancets (onetouch ultrasoft) lancets Check glucose daily   • meclizine (ANTIVERT) 25 mg tablet Take 1 tablet (25 mg total) by mouth 3 (three) times a day as needed for dizziness (Vertigo) for up to 10 days   • metFORMIN (GLUCOPHAGE) 500 mg tablet Take 1 tablet (500 mg total) by mouth daily with breakfast One tab in am   • metoprolol succinate (TOPROL-XL) 25 mg 24 hr tablet Take 1 tablet (25 mg total) by mouth daily   • metoprolol succinate (TOPROL-XL) 50 mg 24 hr tablet Take 1 tablet (50 mg total) by mouth daily   • multivitamin (THERAGRAN) TABS Take 1 tablet by mouth daily   • omeprazole (PriLOSEC) 40 MG capsule Take 1 capsule (40 mg total) by mouth daily   • potassium chloride (Klor-Con M20) 20 mEq tablet Take 1 tablet (20 mEq total) by mouth daily   • Red Yeast Rice Extract (RED YEAST RICE PO) Take by mouth   • triamcinolone (KENALOG) 0 1 % lotion    • triamcinolone (KENALOG) 0 5 % cream    • Vitamin D, Cholecalciferol, 50 MCG (2000 UT) CAPS Take 50 mcg by mouth daily       Objective     /74   Pulse 76   Temp 98 °F (36 7 °C) (Temporal)   Resp 18   Ht 6' (1 829 m)   Wt (!) 137 kg (301 lb)   BMI 40 82 kg/m²   Diabetic Foot Exam    Patient's shoes and socks removed  Right Foot/Ankle   Right Foot Inspection  Skin Exam: skin normal, skin intact, dry skin, callus and callus  No warmth, no erythema, no maceration, no abnormal color, no pre-ulcer and no ulcer  Toe Exam: tenderness  Sensory   Vibration: intact  Monofilament testing: diminished    Vascular  The right DP pulse is 2+  The right PT pulse is 1+  Left Foot/Ankle  Left Foot Inspection  Skin Exam: skin normal, skin intact, dry skin and callus  No warmth, no erythema, no maceration, normal color, no pre-ulcer and no ulcer  Toe Exam: tenderness  Sensory   Vibration: intact  Monofilament testing: diminished    Vascular  The left DP pulse is 2+  The left PT pulse is 1+  Assign Risk Category  Deformity present  Loss of protective sensation  No weak pulses  Risk: 2    Physical Exam  Vitals reviewed  Constitutional:       General: He is not in acute distress  Appearance: Normal appearance  He is not ill-appearing, toxic-appearing or diaphoretic  HENT:      Head: Normocephalic and atraumatic  Right Ear: External ear normal       Left Ear: External ear normal       Nose: Nose normal       Mouth/Throat:      Mouth: Mucous membranes are moist    Eyes:      General: No scleral icterus  Extraocular Movements: Extraocular movements intact  Conjunctiva/sclera: Conjunctivae normal       Pupils: Pupils are equal, round, and reactive to light  Cardiovascular:      Rate and Rhythm: Normal rate and regular rhythm  Pulses: no weak pulses          Dorsalis pedis pulses are 2+ on the right side and 2+ on the left side  Posterior tibial pulses are 1+ on the right side and 1+ on the left side  Heart sounds: Normal heart sounds  Pulmonary:      Effort: Pulmonary effort is normal  No respiratory distress  Breath sounds: Normal breath sounds  No wheezing  Abdominal:      General: Bowel sounds are normal  There is no distension  Palpations: Abdomen is soft  Tenderness: There is no abdominal tenderness  Musculoskeletal:      Cervical back: Normal range of motion and neck supple  No rigidity  Right lower leg: Edema present  Left lower leg: Edema present  Feet:      Right foot:      Skin integrity: Callus and dry skin present  No ulcer, skin breakdown, erythema or warmth  Left foot:      Skin integrity: Callus and dry skin present  No ulcer, skin breakdown, erythema or warmth  Lymphadenopathy:      Cervical: No cervical adenopathy  Skin:     General: Skin is dry  Coloration: Skin is not jaundiced or pale  Neurological:      General: No focal deficit present  Mental Status: He is alert and oriented to person, place, and time  Mental status is at baseline  Cranial Nerves: No cranial nerve deficit  Psychiatric:         Mood and Affect: Mood normal          Behavior: Behavior normal          Thought Content:  Thought content normal          Judgment: Judgment normal        Apolinar Lira DO

## 2023-02-06 ENCOUNTER — TELEPHONE (OUTPATIENT)
Dept: FAMILY MEDICINE CLINIC | Facility: CLINIC | Age: 68
End: 2023-02-06

## 2023-02-06 DIAGNOSIS — I10 HYPERTENSION, UNSPECIFIED TYPE: Primary | ICD-10-CM

## 2023-02-06 RX ORDER — ENALAPRIL MALEATE 20 MG/1
20 TABLET ORAL DAILY
Qty: 90 TABLET | Refills: 3 | Status: SHIPPED | OUTPATIENT
Start: 2023-02-06

## 2023-02-06 RX ORDER — ENALAPRIL MALEATE 20 MG/1
20 TABLET ORAL DAILY
COMMUNITY
Start: 2023-02-06 | End: 2023-02-06 | Stop reason: SDUPTHER

## 2023-02-06 NOTE — TELEPHONE ENCOUNTER
Would increase enalapril to 20mg daily and monitor BP - may take couple weeks on higher dose to stabilize readings

## 2023-02-06 NOTE — TELEPHONE ENCOUNTER
In office 2/2 told to keep an eye on B/P    B/P:  - Today - 145/70   - Yesterday - 167/78     In office at appt 130/74  Sameday @ home 399/37    Diastolic remains over 925   Systolic 78-43    Wants to get the B/P under control  The pressure from yesterday concerns him

## 2023-02-20 ENCOUNTER — TELEPHONE (OUTPATIENT)
Dept: FAMILY MEDICINE CLINIC | Facility: CLINIC | Age: 68
End: 2023-02-20

## 2023-02-20 NOTE — TELEPHONE ENCOUNTER
It can take few weeks to improve but that reading is much closer to acceptable range so would continue on higher dose and monitor

## 2023-02-20 NOTE — TELEPHONE ENCOUNTER
Patient said he doubled his dose of medication for 10 days and BP still high, BP was 146/72, has no other readings

## 2023-02-21 ENCOUNTER — HOSPITAL ENCOUNTER (EMERGENCY)
Facility: HOSPITAL | Age: 68
Discharge: HOME | End: 2023-02-22
Attending: EMERGENCY MEDICINE

## 2023-02-21 VITALS
OXYGEN SATURATION: 99 % | DIASTOLIC BLOOD PRESSURE: 60 MMHG | RESPIRATION RATE: 18 BRPM | HEART RATE: 70 BPM | TEMPERATURE: 98 F | SYSTOLIC BLOOD PRESSURE: 133 MMHG

## 2023-02-21 DIAGNOSIS — I10 HTN (HYPERTENSION): ICD-10-CM

## 2023-02-21 DIAGNOSIS — R42 EPISODIC LIGHTHEADEDNESS: Primary | ICD-10-CM

## 2023-02-21 LAB
ANION GAP SERPL CALCULATED.3IONS-SCNC: 8 MMOL/L (ref 4–13)
BUN SERPL-MCNC: 25 MG/DL (ref 5–25)
CALCIUM SERPL-MCNC: 9.8 MG/DL (ref 8.4–10.2)
CHLORIDE SERPL-SCNC: 104 MMOL/L (ref 96–108)
CO2 SERPL-SCNC: 26 MMOL/L (ref 21–32)
CREAT SERPL-MCNC: 1.12 MG/DL (ref 0.6–1.3)
ERYTHROCYTE [DISTWIDTH] IN BLOOD BY AUTOMATED COUNT: 13.2 % (ref 11.6–15.1)
GFR SERPL CREATININE-BSD FRML MDRD: 66 ML/MIN/1.73SQ M
GLUCOSE SERPL-MCNC: 119 MG/DL (ref 65–140)
HCT VFR BLD AUTO: 41.3 % (ref 36.5–49.3)
HGB BLD-MCNC: 13.7 G/DL (ref 12–17)
MCH RBC QN AUTO: 29.5 PG (ref 26.8–34.3)
MCHC RBC AUTO-ENTMCNC: 33.2 G/DL (ref 31.4–37.4)
MCV RBC AUTO: 89 FL (ref 82–98)
PLATELET # BLD AUTO: 160 THOUSANDS/UL (ref 149–390)
PMV BLD AUTO: 11.3 FL (ref 8.9–12.7)
POTASSIUM SERPL-SCNC: 4.3 MMOL/L (ref 3.5–5.3)
RBC # BLD AUTO: 4.64 MILLION/UL (ref 3.88–5.62)
SODIUM SERPL-SCNC: 138 MMOL/L (ref 135–147)
WBC # BLD AUTO: 5.61 THOUSAND/UL (ref 4.31–10.16)

## 2023-02-22 ENCOUNTER — TELEPHONE (OUTPATIENT)
Dept: FAMILY MEDICINE CLINIC | Facility: CLINIC | Age: 68
End: 2023-02-22

## 2023-02-22 NOTE — TELEPHONE ENCOUNTER
Blood pressure meds are not working, went to ER last night  Some readings 186/130, 161/78  Wanted to speak to you, do you want him to make appt?

## 2023-02-22 NOTE — DISCHARGE INSTRUCTIONS
You have been seen for lightheadedness and hypertension  Please continue your home medication regimen  Return to the emergency department if you develop worsening lightheadedness, dizziness, weakness/numbness, chest pain, trouble breathing or any other symptoms of concern  Please follow up with your PCP by calling the number provided

## 2023-02-22 NOTE — ED PROVIDER NOTES
History  Chief Complaint   Patient presents with   • Hypertension     Took blood pressure at home was 223 systolic  Takes amlodipine, lasix, enalapril, metoprolol for his blood pressure   states he had a headache and felt dizzy earlier  Justyn London is a 69y o  year old male with PMH of HTN, DM, thyroid disease presenting to the [facility] ED for lightheadedness and hypertension  Patient reports gradually increasing blood pressure over the last several weeks  Noticed blood pressure 570-169 systolic  Three hours prior to arrival, patient was watching TV when he felt lightheaded  He denies room spinning vertigo and did not feel as if he was about to pass out  He had no associated chest pain, dyspnea  No reported visual disturbance, neck pain or weakness/numbness/tingling in extremities  This lasted for approximately 15 minutes and self resolved  one hour ago the patient checked his blood pressure and noticed it was 414 systolic  At the time patient had a mild posterior headache and felt a redness/swelling in his face and ears which he attributes to his blood pressure  At the time of evaluation in the emergency department, the patient denies any complaints  Specifically denies headaches, dizziness, visual changes or weakness/numbness/tingling extremities  Denies chest pain or dyspnea  Reports mild chronic leg swelling which has been attributed to lymphedema though no leg pain  Patient has been compliant with previously prescribed enalapril and lasix daily  History provided by:  Medical records and patient   used: No    Hypertension  Associated symptoms: headaches    Associated symptoms: no abdominal pain, no chest pain, no confusion, no dizziness, no fever, no nausea, no shortness of breath, not vomiting and no weakness        Prior to Admission Medications   Prescriptions Last Dose Informant Patient Reported? Taking?    Lancets (onetouch ultrasoft) lancets   No No   Sig: Check glucose daily   Red Yeast Rice Extract (RED YEAST RICE PO)   Yes No   Sig: Take by mouth   Vitamin D, Cholecalciferol, 50 MCG (2000 UT) CAPS   No No   Sig: Take 50 mcg by mouth daily   amLODIPine (NORVASC) 5 mg tablet   No No   Sig: Take 1 tablet (5 mg total) by mouth daily For blood pressure   aspirin 81 mg chewable tablet   Yes No   Sig: Chew 81 mg daily   atorvastatin (LIPITOR) 10 mg tablet   No No   Sig: Take 1 tablet (10 mg total) by mouth daily   enalapril (VASOTEC) 20 mg tablet   No No   Sig: Take 1 tablet (20 mg total) by mouth daily   furosemide (LASIX) 40 mg tablet   No No   Sig: Take 1 tablet (40 mg total) by mouth daily   glucose blood (OneTouch Verio) test strip   No No   Sig: Use 1 each daily   indomethacin (INDOCIN) 50 mg capsule   Yes No   ketoconazole (NIZORAL) 2 % shampoo   Yes No   meclizine (ANTIVERT) 25 mg tablet   No No   Sig: Take 1 tablet (25 mg total) by mouth 3 (three) times a day as needed for dizziness (Vertigo) for up to 10 days   metFORMIN (GLUCOPHAGE) 500 mg tablet   No No   Sig: Take 1 tablet (500 mg total) by mouth daily with breakfast One tab in am   metoprolol succinate (TOPROL-XL) 25 mg 24 hr tablet   No No   Sig: Take 1 tablet (25 mg total) by mouth daily   metoprolol succinate (TOPROL-XL) 50 mg 24 hr tablet   No No   Sig: Take 1 tablet (50 mg total) by mouth daily   multivitamin (THERAGRAN) TABS   Yes No   Sig: Take 1 tablet by mouth daily   omeprazole (PriLOSEC) 40 MG capsule   No No   Sig: Take 1 capsule (40 mg total) by mouth daily   potassium chloride (Klor-Con M20) 20 mEq tablet   No No   Sig: Take 1 tablet (20 mEq total) by mouth daily   triamcinolone (KENALOG) 0 1 % lotion   Yes No   triamcinolone (KENALOG) 0 5 % cream   Yes No      Facility-Administered Medications: None       Past Medical History:   Diagnosis Date   • Arthritis     last assessed 7/1/15   • Diabetes mellitus ([facility] )     type 2-last assessed 1/28/15   • Diabetes mellitus ([facility] ) 3/16/2018   • GERD (gastroesophageal reflux disease)    • Hypertension    • Irregular heart beat     last assessed 7/1/15   • Palpitations     last assessed 9/7/17   • Sexual dysfunction     last assessed 7/1/15   • Thyroid disease     last assessed 7/1/15       Past Surgical History:   Procedure Laterality Date   • COLONOSCOPY     • TONSILLECTOMY         Family History   Problem Relation Age of Onset   • Hypertension Mother         essential   • Stroke Mother    • Hypertension Father         essential   • Heart defect Father         cardiac disorder   • Stroke Brother    • Hypertension Brother      I have reviewed and agree with the history as documented  E-Cigarette/Vaping   • E-Cigarette Use Never User      E-Cigarette/Vaping Substances   • Nicotine No    • THC No    • CBD No    • Flavoring No    • Other No    • Unknown No      Social History     Tobacco Use   • Smoking status: Former   • Smokeless tobacco: Never   • Tobacco comments:     former smoker-quit 17 yrs ago 1pppd x 30 yrs as per [employer]   Vaping Use   • Vaping Use: Never used   Substance Use Topics   • Alcohol use: Never   • Drug use: Never       Review of Systems   Constitutional: Negative for chills and fever  HENT: Negative for congestion  Eyes: Negative for visual disturbance  Respiratory: Negative for cough and shortness of breath  Cardiovascular: Positive for leg swelling  Negative for chest pain  Gastrointestinal: Negative for abdominal distention, abdominal pain, diarrhea, nausea and vomiting  Endocrine: Negative for polyuria  Genitourinary: Negative for dysuria and flank pain  Musculoskeletal: Negative for arthralgias  Neurological: Positive for light-headedness and headaches  Negative for dizziness, syncope, facial asymmetry, speech difficulty, weakness and numbness  Psychiatric/Behavioral: Negative for behavioral problems and confusion  All other systems reviewed and are negative        Physical Exam  Physical Exam  Vitals and nursing note reviewed  Constitutional:       General: He is not in acute distress  Appearance: Normal appearance  He is well-developed  He is obese  He is not ill-appearing, toxic-appearing or diaphoretic  HENT:      Head: Normocephalic and atraumatic  Nose: No congestion or rhinorrhea  Eyes:      General:         Right eye: No discharge  Left eye: No discharge  Cardiovascular:      Rate and Rhythm: Normal rate and regular rhythm  Pulmonary:      Effort: Pulmonary effort is normal  No respiratory distress  Breath sounds: Normal breath sounds  No wheezing or rales  Abdominal:      General: There is no distension  Palpations: Abdomen is soft  Tenderness: There is no abdominal tenderness  There is no right CVA tenderness, left CVA tenderness, guarding or rebound  Musculoskeletal:      Cervical back: Normal range of motion  No rigidity  Right lower leg: No edema  Left lower leg: No edema  Skin:     General: Skin is warm  Capillary Refill: Capillary refill takes less than 2 seconds  Neurological:      Mental Status: He is alert and oriented to person, place, and time  GCS: GCS eye subscore is 4  GCS verbal subscore is 5  GCS motor subscore is 6  Cranial Nerves: No dysarthria or facial asymmetry  Sensory: Sensation is intact  Motor: Motor function is intact  Comments: Strength +5/5 in bilateral UE/LE  No vertical nystagmus noted  CN III, IV and VI intact  Cerebellar testing: Normal FNF without ataxia  No pronator drift noted       Psychiatric:         Mood and Affect: Mood normal          Behavior: Behavior normal          Vital Signs  ED Triage Vitals   Temperature Pulse Respirations Blood Pressure SpO2   02/21/23 2245 02/21/23 2245 02/21/23 2245 02/21/23 2245 02/21/23 2245   97 7 °F (36 5 °C) 71 18 (!) 186/75 98 %      Temp Source Heart Rate Source Patient Position - Orthostatic VS BP Location FiO2 (%)   02/21/23 2357 02/21/23 2245 02/21/23 2245 02/21/23 2245 --   Tympanic Monitor Lying Left arm       Pain Score       02/21/23 2245       No Pain           Vitals:    02/21/23 2245 02/21/23 2304 02/21/23 2357   BP: (!) 186/75 160/70 133/60   Pulse: 71  70   Patient Position - Orthostatic VS: Lying Lying Lying         Visual Acuity      ED Medications  Medications - No data to display    Diagnostic Studies  Results Reviewed     Procedure Component Value Units Date/Time    Basic metabolic panel [[id number]] Collected: 02/21/23 2320    Lab Status: Final result Specimen: Blood from Arm, Right Updated: 02/21/23 2341     Sodium 138 mmol/L      Potassium 4 3 mmol/L      Chloride 104 mmol/L      CO2 26 mmol/L      ANION GAP 8 mmol/L      BUN 25 mg/dL      Creatinine 1 12 mg/dL      Glucose 119 mg/dL      Calcium 9 8 mg/dL      eGFR 66 ml/min/1 73sq m     Narrative:      [place] guidelines for Chronic Kidney Disease (CKD):   •  Stage 1 with normal or high GFR (GFR > 90 mL/min/1 73 square meters)  •  Stage 2 Mild CKD (GFR = 60-89 mL/min/1 73 square meters)  •  Stage 3A Moderate CKD (GFR = 45-59 mL/min/1 73 square meters)  •  Stage 3B Moderate CKD (GFR = 30-44 mL/min/1 73 square meters)  •  Stage 4 Severe CKD (GFR = 15-29 mL/min/1 73 square meters)  •  Stage 5 End Stage CKD (GFR <15 mL/min/1 73 square meters)  Note: GFR calculation is accurate only with a steady state creatinine    CBC and Platelet [[id number]]  (Normal) Collected: 02/21/23 2320    Lab Status: Final result Specimen: Blood from Arm, Right Updated: 02/21/23 2325     WBC 5 61 Thousand/uL      RBC 4 64 Million/uL      Hemoglobin 13 7 g/dL      Hematocrit 41 3 %      MCV 89 fL      MCH 29 5 pg      MCHC 33 2 g/dL      RDW 13 2 %      Platelets 686 Thousands/uL      MPV 11 3 fL                  No orders to display              Procedures  Procedures         ED Course  ED Course as of 02/22/23 0005 Tue Feb 21, 2023 2330 Procedure Note: EKG  Date/Time: 02/21/23 11:30 PM   Interpreted by: Luisa Stewart DO  Indications / Diagnosis: episodic lightheadedness  ECG reviewed by me, the ED Provider: yes   The EKG demonstrates:  Rhythm: normal sinus rhythm 67 BPM  Intervals: First degree AV block  Normal QT intervals  Axis: Left axis deviation  QRS/Blocks: Normal QRS  ST Changes: No acute ST/T waves changes  No ARDEN/STD  No TWI  Compared to prior EKG performed on 1/23/23   1044 Patient reassessed, he is asymptomatic at this time  Blood pressure normalized  Reviewed labs and EKG results with the patient  We will plan for discharge with outpatient follow-up regarding blood pressure management  Medical Decision Making    69 y o  male presenting for hypertension and episodic lightheadedness  BP elevated, otherwise VSS and denying complaints at time of evaluation  Chart review, underwent CTA head/neck 1 month ago which did not demonstrate cerebrovascular stenosis  The patient denies subjective strokelike symptoms and there is no neurologic deficits on exam   I considered and do not suspect TIA/acute CVA  I considered and do not suspect ACS as patient denying chest pain or dyspnea  Will check basic labs to screen for anemia, kidney disease or electrolyte abnormality and EKG to screen for arrhythmia  Reassessment: Blood pressure improved spontaneously during ED course  Reviewed labs and EKG results with the patient  Patient asymptomatic on repeat assessment  We will plan for outpatient follow-up with PCP regarding chronic blood pressure management  Disposition: I have discussed with the patient our plan to discharge them from the ED and the patient is in agreement with this plan  Discharge Plan: continue home medication regimen and BP monitoring  RTED precautions emphasized  The patient was provided a written after visit summary with strict RTED precautions       Followup: I have discussed with the patient plan to follow up with their PCP  Contact information provided in AVS     Episodic lightheadedness: acute illness or injury  HTN (hypertension): acute illness or injury  Amount and/or Complexity of Data Reviewed  Labs: ordered  Disposition  Final diagnoses:   Episodic lightheadedness   HTN (hypertension)     Time reflects when diagnosis was documented in both MDM as applicable and the Disposition within this note     Time User Action Codes Description Comment    2/21/2023 11:54 PM Akanksha Rubio Add [R42] Episodic lightheadedness     2/21/2023 11:54 PM Akanksha Rubio Add [I10] HTN (hypertension)       ED Disposition     ED Disposition   Discharge    Condition   Stable    Date/Time   Tue Feb 21, 2023 11:54 PM    Comment   Mhamd Estrellita discharge to home/self care  Follow-up Information     Follow up With Specialties Details Why [address] [facility],  Internal Medicine, Hospice Services Schedule an appointment as soon as possible for a visit  To make appointment for reevaluation in 3-5 days  [facility] [address] [phone]            Patient's Medications   Discharge Prescriptions    No medications on file       No discharge procedures on file      PDMP Review     None          ED Provider  Electronically Signed by           Sarah Suazo DO  02/22/23 0005

## 2023-02-23 ENCOUNTER — OFFICE VISIT (OUTPATIENT)
Dept: FAMILY MEDICINE CLINIC | Facility: CLINIC | Age: 68
End: 2023-02-23

## 2023-02-23 VITALS — WEIGHT: 302.2 LBS | TEMPERATURE: 97.9 F | BODY MASS INDEX: 40.93 KG/M2 | HEIGHT: 72 IN

## 2023-02-23 DIAGNOSIS — I10 PRIMARY HYPERTENSION: Primary | ICD-10-CM

## 2023-02-23 DIAGNOSIS — G47.33 OSA (OBSTRUCTIVE SLEEP APNEA): ICD-10-CM

## 2023-02-23 DIAGNOSIS — E11.69 DIABETES MELLITUS TYPE 2 IN OBESE (HCC): ICD-10-CM

## 2023-02-23 DIAGNOSIS — E66.9 DIABETES MELLITUS TYPE 2 IN OBESE (HCC): ICD-10-CM

## 2023-02-23 PROBLEM — J40 BRONCHITIS: Status: RESOLVED | Noted: 2023-02-02 | Resolved: 2023-02-23

## 2023-02-23 RX ORDER — AMLODIPINE BESYLATE 10 MG/1
10 TABLET ORAL DAILY
Qty: 90 TABLET | Refills: 0 | Status: SHIPPED | OUTPATIENT
Start: 2023-02-23

## 2023-02-23 NOTE — PROGRESS NOTES
Name: Shar Dodson      : 1955      MRN: [id number]  Encounter Provider: Vikram Almendarez DO  Encounter Date: 2023   Encounter department: [address] [place] [address]     1  Primary hypertension  -     amLODIPine (NORVASC) 10 mg tablet; Take 1 tablet (10 mg total) by mouth daily  Increase amlodipine to 10mg and take in the AM  Lo sodium diet/increase water intake  2  Diabetes mellitus type 2 in obese (HCC)  BS are doing better on intermittent fasting schedule  3  AYLEEN (obstructive sleep apnea)  Compliant with cpap and effective benefit      Rto 3 weeks        Subjective      HPI   Pt concerned because Bp remains elevated Was in ER for elevated Bp He does feel the medication is helpful but does not last thru the day He takes all 3 meds in the evening so BP will be good overnight No chest pain His BS have been better and he is tolerating fasting schedule well No headache No chest pain No sob   Review of Systems   Constitutional: Negative for chills and fever  HENT: Negative  Eyes: Negative for visual disturbance  Respiratory: Negative for cough and shortness of breath  Cardiovascular: Negative for chest pain, palpitations and leg swelling  Gastrointestinal: Negative for abdominal distention and abdominal pain  Genitourinary: Negative  Musculoskeletal: Positive for arthralgias  Neurological: Negative for dizziness, light-headedness and headaches  Psychiatric/Behavioral: Negative for sleep disturbance  The patient is not nervous/anxious          Current Outpatient Medications on File Prior to Visit   Medication Sig   • amLODIPine (NORVASC) 5 mg tablet Take 1 tablet (5 mg total) by mouth daily For blood pressure   • aspirin 81 mg chewable tablet Chew 81 mg daily   • atorvastatin (LIPITOR) 10 mg tablet Take 1 tablet (10 mg total) by mouth daily   • enalapril (VASOTEC) 20 mg tablet Take 1 tablet (20 mg total) by mouth daily   • furosemide (LASIX) 40 mg tablet Take 1 tablet (40 mg total) by mouth daily   • glucose blood (OneTouch Verio) test strip Use 1 each daily   • indomethacin (INDOCIN) 50 mg capsule    • ketoconazole (NIZORAL) 2 % shampoo    • Lancets (onetouch ultrasoft) lancets Check glucose daily   • meclizine (ANTIVERT) 25 mg tablet Take 1 tablet (25 mg total) by mouth 3 (three) times a day as needed for dizziness (Vertigo) for up to 10 days   • metFORMIN (GLUCOPHAGE) 500 mg tablet Take 1 tablet (500 mg total) by mouth daily with breakfast One tab in am   • metoprolol succinate (TOPROL-XL) 25 mg 24 hr tablet Take 1 tablet (25 mg total) by mouth daily   • metoprolol succinate (TOPROL-XL) 50 mg 24 hr tablet Take 1 tablet (50 mg total) by mouth daily   • multivitamin (THERAGRAN) TABS Take 1 tablet by mouth daily   • omeprazole (PriLOSEC) 40 MG capsule Take 1 capsule (40 mg total) by mouth daily   • potassium chloride (Klor-Con M20) 20 mEq tablet Take 1 tablet (20 mEq total) by mouth daily   • Red Yeast Rice Extract (RED YEAST RICE PO) Take by mouth   • triamcinolone (KENALOG) 0 1 % lotion    • triamcinolone (KENALOG) 0 5 % cream    • Vitamin D, Cholecalciferol, 50 MCG (2000 UT) CAPS Take 50 mcg by mouth daily       Objective     Temp 97 9 °F (36 6 °C) (Temporal)   Ht 6' (1 829 m)   Wt (!) 137 kg (302 lb 3 2 oz)   BMI 40 99 kg/m²     Physical Exam  Aisha Campo DO

## 2023-02-26 LAB
ATRIAL RATE: 67 BPM
P AXIS: 46 DEGREES
PR INTERVAL: 204 MS
QRS AXIS: -63 DEGREES
QRSD INTERVAL: 116 MS
QT INTERVAL: 412 MS
QTC INTERVAL: 435 MS
T WAVE AXIS: 60 DEGREES
VENTRICULAR RATE: 67 BPM

## 2023-03-03 ENCOUNTER — TELEPHONE (OUTPATIENT)
Dept: FAMILY MEDICINE CLINIC | Facility: CLINIC | Age: 68
End: 2023-03-03

## 2023-03-03 NOTE — TELEPHONE ENCOUNTER
8 m is the highest dose he should take of amlodipine He can an additional 10mg preferably but if too hard to cut take 20mg extra Lisinopril

## 2023-03-03 NOTE — TELEPHONE ENCOUNTER
The amlodipine often takes 2-4 weeks to have affect so may further lower readings  He can add 10mg additional of lisinopril if readings not decreasing over next few days   He has appt 3/16 for recheck with me

## 2023-03-03 NOTE — TELEPHONE ENCOUNTER
Pt called stating BP still elevated at night, last night was 170's/90's range  Taking both his enalapril 20mg and amlodipine 10mg daily  Jim Childers he takes one of them in the morning and one at hs as directed by you  Asking if you wanted to tweak any of these dosages? Please advise

## 2023-03-03 NOTE — TELEPHONE ENCOUNTER
Pt asking if BP does get too high, can he just take half an amlodipine or a whole extra tab PRN until he sees you?

## 2023-03-06 ENCOUNTER — OFFICE VISIT (OUTPATIENT)
Dept: URGENT CARE | Facility: MEDICAL CENTER | Age: 68
End: 2023-03-06

## 2023-03-06 ENCOUNTER — APPOINTMENT (EMERGENCY)
Dept: RADIOLOGY | Facility: HOSPITAL | Age: 68
End: 2023-03-06

## 2023-03-06 ENCOUNTER — HOSPITAL ENCOUNTER (EMERGENCY)
Facility: HOSPITAL | Age: 68
Discharge: HOME | End: 2023-03-06
Attending: EMERGENCY MEDICINE

## 2023-03-06 ENCOUNTER — APPOINTMENT (EMERGENCY)
Dept: CT IMAGING | Facility: HOSPITAL | Age: 68
End: 2023-03-06

## 2023-03-06 VITALS
HEIGHT: 72 IN | BODY MASS INDEX: 40.63 KG/M2 | HEART RATE: 68 BPM | RESPIRATION RATE: 18 BRPM | OXYGEN SATURATION: 100 % | TEMPERATURE: 98.1 F | DIASTOLIC BLOOD PRESSURE: 75 MMHG | WEIGHT: 300 LBS | SYSTOLIC BLOOD PRESSURE: 148 MMHG

## 2023-03-06 VITALS
RESPIRATION RATE: 20 BRPM | WEIGHT: 299.83 LBS | HEART RATE: 57 BPM | OXYGEN SATURATION: 97 % | BODY MASS INDEX: 40.66 KG/M2 | DIASTOLIC BLOOD PRESSURE: 86 MMHG | SYSTOLIC BLOOD PRESSURE: 182 MMHG | TEMPERATURE: 98.1 F

## 2023-03-06 DIAGNOSIS — I10 CHRONIC HYPERTENSION: ICD-10-CM

## 2023-03-06 DIAGNOSIS — K44.9 HIATAL HERNIA: ICD-10-CM

## 2023-03-06 DIAGNOSIS — R07.89 CHEST TIGHTNESS: Primary | ICD-10-CM

## 2023-03-06 DIAGNOSIS — R79.89 POSITIVE D DIMER: ICD-10-CM

## 2023-03-06 LAB
2HR DELTA HS TROPONIN: -1 NG/L
ALBUMIN SERPL BCP-MCNC: 4.5 G/DL (ref 3.5–5)
ALP SERPL-CCNC: 32 U/L (ref 34–104)
ALT SERPL W P-5'-P-CCNC: 18 U/L (ref 7–52)
ANION GAP SERPL CALCULATED.3IONS-SCNC: 10 MMOL/L (ref 4–13)
AST SERPL W P-5'-P-CCNC: 18 U/L (ref 13–39)
BASOPHILS # BLD AUTO: 0.05 THOUSANDS/ÂΜL (ref 0–0.1)
BASOPHILS NFR BLD AUTO: 1 % (ref 0–1)
BILIRUB SERPL-MCNC: 1.05 MG/DL (ref 0.2–1)
BNP SERPL-MCNC: 22 PG/ML (ref 0–100)
BUN SERPL-MCNC: 25 MG/DL (ref 5–25)
CALCIUM SERPL-MCNC: 9.9 MG/DL (ref 8.4–10.2)
CARDIAC TROPONIN I PNL SERPL HS: 4 NG/L
CARDIAC TROPONIN I PNL SERPL HS: 5 NG/L
CHLORIDE SERPL-SCNC: 99 MMOL/L (ref 96–108)
CO2 SERPL-SCNC: 28 MMOL/L (ref 21–32)
CREAT SERPL-MCNC: 1.12 MG/DL (ref 0.6–1.3)
D DIMER PPP FEU-MCNC: 0.6 UG/ML FEU
EOSINOPHIL # BLD AUTO: 0 THOUSAND/ÂΜL (ref 0–0.61)
EOSINOPHIL NFR BLD AUTO: 0 % (ref 0–6)
ERYTHROCYTE [DISTWIDTH] IN BLOOD BY AUTOMATED COUNT: 13.2 % (ref 11.6–15.1)
FLUAV RNA RESP QL NAA+PROBE: NEGATIVE
FLUBV RNA RESP QL NAA+PROBE: NEGATIVE
GFR SERPL CREATININE-BSD FRML MDRD: 66 ML/MIN/1.73SQ M
GLUCOSE SERPL-MCNC: 115 MG/DL (ref 65–140)
HCT VFR BLD AUTO: 44.6 % (ref 36.5–49.3)
HGB BLD-MCNC: 14.5 G/DL (ref 12–17)
IMM GRANULOCYTES # BLD AUTO: 0.01 THOUSAND/UL (ref 0–0.2)
IMM GRANULOCYTES NFR BLD AUTO: 0 % (ref 0–2)
LYMPHOCYTES # BLD AUTO: 1.63 THOUSANDS/ÂΜL (ref 0.6–4.47)
LYMPHOCYTES NFR BLD AUTO: 24 % (ref 14–44)
MAGNESIUM SERPL-MCNC: 2.1 MG/DL (ref 1.9–2.7)
MCH RBC QN AUTO: 28.9 PG (ref 26.8–34.3)
MCHC RBC AUTO-ENTMCNC: 32.5 G/DL (ref 31.4–37.4)
MCV RBC AUTO: 89 FL (ref 82–98)
MONOCYTES # BLD AUTO: 0.53 THOUSAND/ÂΜL (ref 0.17–1.22)
MONOCYTES NFR BLD AUTO: 8 % (ref 4–12)
NEUTROPHILS # BLD AUTO: 4.69 THOUSANDS/ÂΜL (ref 1.85–7.62)
NEUTS SEG NFR BLD AUTO: 67 % (ref 43–75)
NRBC BLD AUTO-RTO: 0 /100 WBCS
PLATELET # BLD AUTO: 204 THOUSANDS/UL (ref 149–390)
PMV BLD AUTO: 11.3 FL (ref 8.9–12.7)
POTASSIUM SERPL-SCNC: 3.7 MMOL/L (ref 3.5–5.3)
PROT SERPL-MCNC: 7.8 G/DL (ref 6.4–8.4)
RBC # BLD AUTO: 5.02 MILLION/UL (ref 3.88–5.62)
RSV RNA RESP QL NAA+PROBE: NEGATIVE
SARS-COV-2 RNA RESP QL NAA+PROBE: NEGATIVE
SODIUM SERPL-SCNC: 137 MMOL/L (ref 135–147)
WBC # BLD AUTO: 6.91 THOUSAND/UL (ref 4.31–10.16)

## 2023-03-06 RX ORDER — SODIUM CHLORIDE 9 MG/ML
3 INJECTION INTRAVENOUS
Status: DISCONTINUED | OUTPATIENT
Start: 2023-03-06 | End: 2023-03-06 | Stop reason: HOSPADM

## 2023-03-06 RX ADMIN — IOHEXOL 100 ML: 350 INJECTION, SOLUTION INTRAVENOUS at 19:07

## 2023-03-06 NOTE — ED PROVIDER NOTES
History  Chief Complaint   Patient presents with   • Chest Pain     haD CHEST TIGHTNESS YESTERDAY WHICH WENT AWAY AND RETURNED TODAY   WENT TO URGENT CARE AND ADVISED TO COME TO ER FOR FURTHER EVAL     69year old male with PMH HTN, GERD, DM presents for evaluation of chest tightness  He reports symptoms started last night  He states it lasted a few hours then went away  He notes symptoms returned again this morning and has been constant since  He reports occasional cough but states he always has this  He reports feeling short of breath  No reported aggravating or alleviating factors  Symptoms have been present at rest   Denies N/V/D, abdominal pain  Denies dizziness, lightheadedness  Denies fever, chills, congestion or recent illness  Notes chronic leg swelling related to lymphedema  No reported weight changes  Denies h/o COPD or asthma  Pt is a former smoker  No specific treatments tried  Pt has otherwise been in usual state of health  Pt presented to urgent care earlier today and subsequently referred for further evaluation  History provided by:  Patient and medical records   used: No    Chest Pain  Pain quality: tightness    Pain radiates to:  Does not radiate  Pain radiates to the back: no    Associated symptoms: cough and shortness of breath    Associated symptoms: no abdominal pain, no back pain, no dizziness, no fatigue, no fever, no headache, no nausea, no numbness, no palpitations and not vomiting        Prior to Admission Medications   Prescriptions Last Dose Informant Patient Reported? Taking?    Lancets (onetouch ultrasoft) lancets   No No   Sig: Check glucose daily   Red Yeast Rice Extract (RED YEAST RICE PO)   Yes No   Sig: Take by mouth   Vitamin D, Cholecalciferol, 50 MCG (2000 UT) CAPS   No No   Sig: Take 50 mcg by mouth daily   amLODIPine (NORVASC) 10 mg tablet   No No   Sig: Take 1 tablet (10 mg total) by mouth daily   amLODIPine (NORVASC) 5 mg tablet   No No   Sig: Take 1 tablet (5 mg total) by mouth daily For blood pressure   aspirin 81 mg chewable tablet   Yes No   Sig: Chew 81 mg daily   atorvastatin (LIPITOR) 10 mg tablet   No No   Sig: Take 1 tablet (10 mg total) by mouth daily   enalapril (VASOTEC) 20 mg tablet   No No   Sig: Take 1 tablet (20 mg total) by mouth daily   furosemide (LASIX) 40 mg tablet   No No   Sig: Take 1 tablet (40 mg total) by mouth daily   glucose blood (OneTouch Verio) test strip   No No   Sig: Use 1 each daily   indomethacin (INDOCIN) 50 mg capsule   Yes No   ketoconazole (NIZORAL) 2 % shampoo   Yes No   meclizine (ANTIVERT) 25 mg tablet   No No   Sig: Take 1 tablet (25 mg total) by mouth 3 (three) times a day as needed for dizziness (Vertigo) for up to 10 days   metFORMIN (GLUCOPHAGE) 500 mg tablet   No No   Sig: Take 1 tablet (500 mg total) by mouth daily with breakfast One tab in am   metoprolol succinate (TOPROL-XL) 25 mg 24 hr tablet   No No   Sig: Take 1 tablet (25 mg total) by mouth daily   metoprolol succinate (TOPROL-XL) 50 mg 24 hr tablet   No No   Sig: Take 1 tablet (50 mg total) by mouth daily   multivitamin (THERAGRAN) TABS   Yes No   Sig: Take 1 tablet by mouth daily   omeprazole (PriLOSEC) 40 MG capsule   No No   Sig: Take 1 capsule (40 mg total) by mouth daily   potassium chloride (Klor-Con M20) 20 mEq tablet   No No   Sig: Take 1 tablet (20 mEq total) by mouth daily   triamcinolone (KENALOG) 0 1 % lotion   Yes No   triamcinolone (KENALOG) 0 5 % cream   Yes No      Facility-Administered Medications: None       Past Medical History:   Diagnosis Date   • Arthritis     last assessed 7/1/15   • Bronchitis 2/2/2023   • Diabetes mellitus ([facility] [address] )     type 2-last assessed 1/28/15   • Diabetes mellitus ([facility] [address] ) 3/16/2018   • GERD (gastroesophageal reflux disease)    • Hypertension    • Irregular heart beat     last assessed 7/1/15   • Palpitations     last assessed 9/7/17   • Sexual dysfunction     last assessed 7/1/15   • Thyroid disease     last assessed 7/1/15       Past Surgical History:   Procedure Laterality Date   • COLONOSCOPY     • TONSILLECTOMY         Family History   Problem Relation Age of Onset   • Hypertension Mother         essential   • Stroke Mother    • Hypertension Father         essential   • Heart defect Father         cardiac disorder   • Stroke Brother    • Hypertension Brother      I have reviewed and agree with the history as documented  E-Cigarette/Vaping   • E-Cigarette Use Never User      E-Cigarette/Vaping Substances   • Nicotine No    • THC No    • CBD No    • Flavoring No    • Other No    • Unknown No      Social History     Tobacco Use   • Smoking status: Former   • Smokeless tobacco: Never   • Tobacco comments:     former smoker-quit 17 yrs ago 1pppd x 30 yrs as per [employer]   Vaping Use   • Vaping Use: Never used   Substance Use Topics   • Alcohol use: Never   • Drug use: Never       Review of Systems   Constitutional: Negative  Negative for chills, fatigue and fever  HENT: Negative  Negative for congestion, rhinorrhea and sore throat  Eyes: Negative  Negative for visual disturbance  Respiratory: Positive for cough, chest tightness and shortness of breath  Negative for wheezing  Cardiovascular: Positive for chest pain  Negative for palpitations and leg swelling  Gastrointestinal: Negative  Negative for abdominal pain, constipation, diarrhea, nausea and vomiting  Genitourinary: Negative  Negative for dysuria, flank pain, frequency and hematuria  Musculoskeletal: Negative  Negative for back pain, myalgias and neck pain  Skin: Negative  Negative for rash  Neurological: Negative  Negative for dizziness, syncope, light-headedness, numbness and headaches  Psychiatric/Behavioral: Negative  All other systems reviewed and are negative  Physical Exam  Physical Exam  Vitals and nursing note reviewed  Constitutional:       General: He is awake  He is not in acute distress  Appearance: Normal appearance  He is well-developed and overweight  He is not toxic-appearing or diaphoretic  HENT:      Head: Normocephalic and atraumatic  Right Ear: Hearing and external ear normal       Left Ear: Hearing and external ear normal       Nose: Nose normal       Mouth/Throat:      Mouth: Mucous membranes are moist       Tongue: Tongue does not deviate from midline  Pharynx: Oropharynx is clear  Uvula midline  Eyes:      General: Lids are normal  No scleral icterus  Conjunctiva/sclera: Conjunctivae normal    Neck:      Trachea: Trachea and phonation normal    Cardiovascular:      Rate and Rhythm: Normal rate and regular rhythm  Pulses: Normal pulses  Radial pulses are 2+ on the right side and 2+ on the left side  Dorsalis pedis pulses are 2+ on the right side and 2+ on the left side  Posterior tibial pulses are 2+ on the right side and 2+ on the left side  Heart sounds: Normal heart sounds, S1 normal and S2 normal  No murmur heard  Pulmonary:      Effort: Pulmonary effort is normal  No tachypnea or respiratory distress  Breath sounds: Normal breath sounds  No wheezing, rhonchi or rales  Abdominal:      General: Bowel sounds are normal  There is no distension  Palpations: Abdomen is soft  Tenderness: There is no abdominal tenderness  There is no guarding or rebound  Hernia: A hernia is present  Hernia is present in the ventral area  Musculoskeletal:         General: No tenderness  Normal range of motion  Cervical back: Normal range of motion and neck supple  Right lower leg: Edema present  Left lower leg: Edema present  Skin:     General: Skin is warm and dry  Capillary Refill: Capillary refill takes less than 2 seconds  Findings: No rash  Neurological:      General: No focal deficit present  Mental Status: He is alert and oriented to person, place, and time  GCS: GCS eye subscore is 4  GCS verbal subscore is 5  GCS motor subscore is 6  Gait: Gait normal       Comments: Pt ambulated to exam room  Psychiatric:         Mood and Affect: Mood normal          Speech: Speech normal          Behavior: Behavior normal  Behavior is cooperative  Vital Signs  ED Triage Vitals [03/06/23 1740]   Temperature Pulse Respirations Blood Pressure SpO2   98 1 °F (36 7 °C) 72 20 157/71 97 %      Temp Source Heart Rate Source Patient Position - Orthostatic VS BP Location FiO2 (%)   Temporal Monitor Lying Right arm --      Pain Score       5           Vitals:    03/06/23 1740 03/06/23 1945 03/06/23 2113   BP: 157/71 (!) 182/86 (!) 182/86   Pulse: 72 57 57   Patient Position - Orthostatic VS: Lying Lying Lying         Visual Acuity      ED Medications  Medications   sodium chloride (PF) 0 9 % injection 3 mL (has no administration in time range)   iohexol (OMNIPAQUE) 350 MG/ML injection (SINGLE-DOSE) 100 mL (100 mL Intravenous Given 3/6/23 1907)       Diagnostic Studies  Results Reviewed     Procedure Component Value Units Date/Time    HS Troponin I 2hr [[id number]]  (Normal) Collected: 03/06/23 1956    Lab Status: Final result Specimen: Blood from Arm, Right Updated: 03/06/23 2027     hs TnI 2hr 4 ng/L      Delta 2hr hsTnI -1 ng/L     HS Troponin I 4hr [[id number]]     Lab Status: No result Specimen: Blood     FLU/RSV/COVID - if FLU/RSV clinically relevant [[id number]]  (Normal) Collected: 03/06/23 1805    Lab Status: Final result Specimen: Nasopharyngeal Swab Updated: 03/06/23 1854     SARS-CoV-2 Negative     INFLUENZA A PCR Negative     INFLUENZA B PCR Negative     RSV PCR Negative    Narrative:      FOR PEDIATRIC PATIENTS - copy/paste COVID Guidelines URL to browser: [email or web address]    SARS-CoV-2 assay is a Nucleic Acid Amplification assay intended for the  qualitative detection of nucleic acid from SARS-CoV-2 in nasopharyngeal  swabs   Results are for the presumptive identification of SARS-CoV-2 RNA  Positive results are indicative of infection with SARS-CoV-2, the virus  causing COVID-19, but do not rule out bacterial infection or co-infection  with other viruses  Laboratories within the [place] and its  territories are required to report all positive results to the appropriate  public health authorities  Negative results do not preclude SARS-CoV-2  infection and should not be used as the sole basis for treatment or other  patient management decisions  Negative results must be combined with  clinical observations, patient history, and epidemiological information  This test has not been FDA cleared or approved  This test has been authorized by FDA under an Emergency Use Authorization  (EUA)  This test is only authorized for the duration of time the  declaration that circumstances exist justifying the authorization of the  emergency use of an in vitro diagnostic tests for detection of SARS-CoV-2  virus and/or diagnosis of COVID-19 infection under section 564(b)(1) of  the Act, 21 U  S C  528LDH-6(W)(4), unless the authorization is terminated  or revoked sooner  The test has been validated but independent review by FDA  and CLIA is pending  Test performed using [employer] GeneXpert: This RT-PCR assay targets N2,  a region unique to SARS-CoV-2  A conserved region in the E-gene was chosen  for pan-Sarbecovirus detection which includes SARS-CoV-2  According to CMS-2020-01-R, this platform meets the definition of high-throughput technology  D-dimer, quantitative [[id number]]  (Abnormal) Collected: 03/06/23 1805    Lab Status: Final result Specimen: Blood from Arm, Right Updated: 03/06/23 1836     D-Dimer, Quant 0 60 ug/ml FEU     Narrative:       In the evaluation for possible pulmonary embolism, in the appropriate (Well's Score of 4 or less) patient, the age adjusted d-dimer cutoff for this patient can be calculated as:    Age x 0 01 (in ug/mL) for Age-adjusted D-dimer exclusion threshold for a patient over 50 years      HS Troponin 0hr (reflex protocol) [[id number]]  (Normal) Collected: 03/06/23 1805    Lab Status: Final result Specimen: Blood from Arm, Right Updated: 03/06/23 1834     hs TnI 0hr 5 ng/L     B-Type Natriuretic Peptide(BNP) [[id number]]  (Normal) Collected: 03/06/23 1805    Lab Status: Final result Specimen: Blood from Arm, Right Updated: 03/06/23 1833     BNP 22 pg/mL     Comprehensive metabolic panel [[id number]]  (Abnormal) Collected: 03/06/23 1805    Lab Status: Final result Specimen: Blood from Arm, Right Updated: 03/06/23 1829     Sodium 137 mmol/L      Potassium 3 7 mmol/L      Chloride 99 mmol/L      CO2 28 mmol/L      ANION GAP 10 mmol/L      BUN 25 mg/dL      Creatinine 1 12 mg/dL      Glucose 115 mg/dL      Calcium 9 9 mg/dL      AST 18 U/L      ALT 18 U/L      Alkaline Phosphatase 32 U/L      Total Protein 7 8 g/dL      Albumin 4 5 g/dL      Total Bilirubin 1 05 mg/dL      eGFR 66 ml/min/1 73sq m     Narrative:      [place] guidelines for Chronic Kidney Disease (CKD):   •  Stage 1 with normal or high GFR (GFR > 90 mL/min/1 73 square meters)  •  Stage 2 Mild CKD (GFR = 60-89 mL/min/1 73 square meters)  •  Stage 3A Moderate CKD (GFR = 45-59 mL/min/1 73 square meters)  •  Stage 3B Moderate CKD (GFR = 30-44 mL/min/1 73 square meters)  •  Stage 4 Severe CKD (GFR = 15-29 mL/min/1 73 square meters)  •  Stage 5 End Stage CKD (GFR <15 mL/min/1 73 square meters)  Note: GFR calculation is accurate only with a steady state creatinine    Magnesium [[id number]]  (Normal) Collected: 03/06/23 1805    Lab Status: Final result Specimen: Blood from Arm, Right Updated: 03/06/23 1829     Magnesium 2 1 mg/dL     CBC and differential [[id number]] Collected: 03/06/23 1805    Lab Status: Final result Specimen: Blood from Arm, Right Updated: 03/06/23 1811     WBC 6 91 Thousand/uL      RBC 5 02 Million/uL      Hemoglobin 14 5 g/dL Hematocrit 44 6 %      MCV 89 fL      MCH 28 9 pg      MCHC 32 5 g/dL      RDW 13 2 %      MPV 11 3 fL      Platelets 043 Thousands/uL      nRBC 0 /100 WBCs      Neutrophils Relative 67 %      Immat GRANS % 0 %      Lymphocytes Relative 24 %      Monocytes Relative 8 %      Eosinophils Relative 0 %      Basophils Relative 1 %      Neutrophils Absolute 4 69 Thousands/µL      Immature Grans Absolute 0 01 Thousand/uL      Lymphocytes Absolute 1 63 Thousands/µL      Monocytes Absolute 0 53 Thousand/µL      Eosinophils Absolute 0 00 Thousand/µL      Basophils Absolute 0 05 Thousands/µL                  CTA ED chest PE Study   Final Result by Shad Parker MD (03/06 1947)      No pulmonary embolus  No acute pulmonary disease  Moderate hiatal hernia  Workstation performed: [id number]         X-ray chest 1 view portable    (Results Pending)              Procedures  ECG 12 Lead Documentation Only    Date/Time: 3/6/2023 5:50 PM  Performed by: Анна Gonzalez PA-C  Authorized by: Анна Gonzalez PA-C     Indications / Diagnosis:  Chest tightness  ECG reviewed by me, the ED Provider: yes    Patient location:  ED  Previous ECG:     Previous ECG:  Compared to current    Comparison ECG info:  3/6/23    Similarity:  No change    Comparison to cardiac monitor: Yes    Interpretation:     Interpretation: non-specific    Rate:     ECG rate:  65    ECG rate assessment: normal    Rhythm:     Rhythm: sinus rhythm    Ectopy:     Ectopy: none    Conduction:     Conduction: abnormal      Abnormal conduction: LAFB    ST segments:     ST segments:  Normal  T waves:     T waves: normal    Comments:      , , QT/QTc 428/445; no significant interval change from prior  ED Course  ED Course as of 03/06/23 2237   Mon Mar 06, 2023   1807 X-ray chest 1 view portable  Independently viewed and interpreted by me - no acute cardiopulmonary process; pending official read     1815 WBC: 6 91   1815 Hemoglobin: 14 5   1815 Platelet Count: 159   1831 Glucose, Random: 115   1831 Creatinine: 1 12  Appears baseline   1831 BUN: 25   1831 Sodium: 137   1831 Potassium: 3 7   1831 Chloride: 99   1831 CO2: 28   1831 Anion Gap: 10   1831 Calcium: 9 9   1831 AST: 18   1831 ALT: 18   1831 Alkaline Phosphatase(!): 32   1831 Total Protein: 7 8   1831 Albumin: 4 5   1831 TOTAL BILIRUBIN(!): 1 05   1831 eGFR: 66   1831 Magnesium: 2 1   1836 BNP: 22   1836 hs TnI 0hr: 5   1837 D-Dimer, Quant(!): 0 60  Although this value would correct for age, no clear explanation for his dyspnea; will pursue CTA chest to further evaluate  1854 SARS-COV-2: Negative   1854 INFLU A PCR: Negative   1854 INFLU B PCR: Negative   1854 RSV PCR: Negative   1923 CTA performed and pending interpretation  1948 CTA ED chest PE Study  IMPRESSION:     No pulmonary embolus      No acute pulmonary disease      Moderate hiatal hernia  1950 Pt and spouse updated on results  Pt resting comfortably  He reports feeling improved at this time  Reviewed CTA results  He states hiatal hernia is known and has regular acid reflux from this  Will obtain 2 hr troponin  If negative, anticipate pt could be discharged with outpatient follow up and strict return precautions  2029 Delta 2hr hsTnI: -1  Not c/w ACS   2035 Pt updated on results of repeat troponin  He continues to feel improved  Pt is concerned about his blood pressures running high  He states PCP has been adjusting his meds  He reports taking them as prescribed  He also reports compliance with his CPAP for his AYLEEN  Reviewed diet, exercise and weight loss  Pt states he does fast, watches his salt intake and only eating one meal per day  He states difficultly in losing weight  He states he does follow with Dr Sourav Harvey of cardiology  Recommended outpatient follow up  2054 /73 on recheck  Pt advised to continue home medications as prescribed  Continue to monitor blood pressure  Advised to follow up with PCP as well as cardiologist   Strict return precautions outlined  Advised outpatient follow up with PCP or return to ER for change in condition as outlined  Pt verbalized understanding and had no further questions  Pt left in stable, improved condition  HEART Risk Score    Flowsheet Row Most Recent Value   Heart Score Risk Calculator    History 0 Filed at: 03/06/2023 1801   ECG 0 Filed at: 03/06/2023 1801   Age 2 Filed at: 03/06/2023 1801   Risk Factors 2 Filed at: 03/06/2023 1801   Troponin 0 Filed at: 03/06/2023 1801   HEART Score 4 Filed at: 03/06/2023 1801                            Wells' Criteria for PE    Flowsheet Row Most Recent Value   Wells' Criteria for PE    Clinical signs and symptoms of DVT 0 Filed at: 03/06/2023 5048   PE is primary diagnosis or equally likely 0 Filed at: 03/06/2023 1802   HR >100 0 Filed at: 03/06/2023 1802   Immobilization at least 3 days or Surgery in the previous 4 weeks 0 Filed at: 03/06/2023 1802   Previous, objectively diagnosed PE or DVT 0 Filed at: 03/06/2023 1802   Hemoptysis 0 Filed at: 03/06/2023 1802   Malignancy with treatment within 6 months or palliative 0 Filed at: 03/06/2023 1802   Wells' Criteria Total 0 Filed at: 03/06/2023 3226                Medical Decision Making  Chest tightness: acute illness or injury     Details: EKG and serial troponins not c/w ACS  CTA negative for PE  No pneumonia  No heart failure  No pleural effusion  Covid/flu/rsv negative  Offered admission which pt declined  He states he has had cardiac stress testing in the past 1 5 years which was negative  He did see his cardiologist and he is scheduled for an echo  Chronic hypertension: chronic illness or injury     Details: Mildly elevated, no signs of end organ damage  On several anti-hypertensives    Advised to continue medications as prescribed, continue to monitor and f/u with PCP as well as cardiologist   Hiatal hernia: chronic illness or injury     Details: on PPI  Positive D dimer: acute illness or injury     Details: No evidence of PE  Amount and/or Complexity of Data Reviewed  External Data Reviewed: labs, radiology, ECG and notes  Labs: ordered  Decision-making details documented in ED Course  Radiology: ordered and independent interpretation performed  Decision-making details documented in ED Course  ECG/medicine tests: ordered and independent interpretation performed  Decision-making details documented in ED Course  Risk  OTC drugs  Prescription drug management  Disposition  Final diagnoses:   Chest tightness   Positive D dimer   Hiatal hernia   Chronic hypertension     Time reflects when diagnosis was documented in both MDM as applicable and the Disposition within this note     Time User Action Codes Description Comment    3/6/2023  7:57 PM Lesly Grate Add [R07 89] Chest tightness     3/6/2023  7:57 PM Lesly Grate Add [R79 89] Positive D dimer     3/6/2023  7:57 PM Lesly Grate Add [K44 9] Hiatal hernia     3/6/2023  7:57 PM Lesly Grate Add [I10] Chronic hypertension       ED Disposition     ED Disposition   Discharge    Condition   Stable    Date/Time   Mon Mar 6, 2023  8:34 PM    Comment   Christy Haro discharge to home/self care                 Follow-up Information     Follow up With Specialties Details Why Contact Info Additional Information    Oseas Burch,  Internal Medicine, Hospice Services Schedule an appointment as soon as possible for a visit   [address] [place] [address] Emergency Department Emergency Medicine  As needed [phone]  [facility] Emergency Department[address], [address]          Discharge Medication List as of 3/6/2023  8:35 PM      CONTINUE these medications which have NOT CHANGED    Details   !! amLODIPine (NORVASC) 10 mg tablet Take 1 tablet (10 mg total) by mouth daily, Starting Thu 2/23/2023, Normal      !! amLODIPine (NORVASC) 5 mg tablet Take 1 tablet (5 mg total) by mouth daily For blood pressure, Starting Wed 1/4/2023, Normal      aspirin 81 mg chewable tablet Chew 81 mg daily, Historical Med      atorvastatin (LIPITOR) 10 mg tablet Take 1 tablet (10 mg total) by mouth daily, Starting Wed 1/4/2023, Normal      enalapril (VASOTEC) 20 mg tablet Take 1 tablet (20 mg total) by mouth daily, Starting Mon 2/6/2023, Normal      furosemide (LASIX) 40 mg tablet Take 1 tablet (40 mg total) by mouth daily, Starting Wed 1/4/2023, Normal      glucose blood (OneTouch Verio) test strip Use 1 each daily, Starting Fri 9/23/2022, Normal      indomethacin (INDOCIN) 50 mg capsule Starting Tue 1/10/2023, Historical Med      ketoconazole (NIZORAL) 2 % shampoo Starting Wed 9/15/2021, Historical Med      Lancets (onetouch ultrasoft) lancets Check glucose daily, Normal      meclizine (ANTIVERT) 25 mg tablet Take 1 tablet (25 mg total) by mouth 3 (three) times a day as needed for dizziness (Vertigo) for up to 10 days, Starting Mon 1/23/2023, Until Thu 2/23/2023 at 2359, Normal      metFORMIN (GLUCOPHAGE) 500 mg tablet Take 1 tablet (500 mg total) by mouth daily with breakfast One tab in am, Starting Wed 1/4/2023, Normal      !! metoprolol succinate (TOPROL-XL) 25 mg 24 hr tablet Take 1 tablet (25 mg total) by mouth daily, Starting Wed 1/18/2023, Normal      !! metoprolol succinate (TOPROL-XL) 50 mg 24 hr tablet Take 1 tablet (50 mg total) by mouth daily, Starting Wed 1/18/2023, Normal      multivitamin (THERAGRAN) TABS Take 1 tablet by mouth daily, Historical Med      omeprazole (PriLOSEC) 40 MG capsule Take 1 capsule (40 mg total) by mouth daily, Starting Wed 1/4/2023, Normal      potassium chloride (Klor-Con M20) 20 mEq tablet Take 1 tablet (20 mEq total) by mouth daily, Starting Thu 1/12/2023, Normal      Red Yeast Rice Extract (RED YEAST RICE PO) Take by mouth, Historical Med      triamcinolone (KENALOG) 0 1 % lotion Starting Wed 9/15/2021, Historical Med      triamcinolone (KENALOG) 0 5 % cream Starting Wed 9/15/2021, Historical Med      Vitamin D, Cholecalciferol, 50 MCG (2000 UT) CAPS Take 50 mcg by mouth daily, Starting Wed 1/4/2023, Normal       !! - Potential duplicate medications found  Please discuss with provider  No discharge procedures on file      PDMP Review     None          ED Provider  Electronically Signed by           Derick Pereira PA-C  03/06/23 7403

## 2023-03-06 NOTE — PROGRESS NOTES
[facility]        NAME: Jose Juan Caban is a 69 y o  male  : 1955    MRN: [id number]  DATE: 2023  TIME: 5:30 PM    Assessment and Plan   Chest tightness [R07 89]  1  Chest tightness  Transfer to other facility        EKG 70 bpm and normal sinus rhythm  States left anterior fascicular block which was also noted on EKG 2 weeks ago  No ST elevations  Due to patient's age and comorbidities, sent patient to ER to rule out cardiac cause of chest tightness  Joshua Texted charge nurse of [address] [place] [address] to give patient info  Patient Instructions     Proceed to  ER     Chief Complaint     Chief Complaint   Patient presents with   • Chest Pain         History of Present Illness       Patient is a 68 y/o M with a pertinent PMHx of uncontrolled HTN and DM presenting today with midline chest tightness that started this morning when he woke up and has been having SOB  He regularly takes baby aspirin  No pain in arms, jaw, or back  This morning BP was 155/72  High BP for about 20 years but struggling with BP about 2 weeks and medication was increased a couple of days ago  Denies heart beating fast or palpitations, vision changes, headaches, dizziness  Review of Systems   Review of Systems   Constitutional: Negative  HENT: Negative  Respiratory: Positive for chest tightness and shortness of breath  Negative for cough and wheezing  Cardiovascular: Negative  Negative for palpitations and leg swelling  Gastrointestinal: Negative  Musculoskeletal: Negative  Skin: Negative  Neurological: Negative            Current Medications       Current Outpatient Medications:   •  amLODIPine (NORVASC) 10 mg tablet, Take 1 tablet (10 mg total) by mouth daily, Disp: 90 tablet, Rfl: 0  •  amLODIPine (NORVASC) 5 mg tablet, Take 1 tablet (5 mg total) by mouth daily For blood pressure, Disp: 90 tablet, Rfl: 3  •  aspirin 81 mg chewable tablet, Chew 81 mg daily, Disp: , Rfl:   •  atorvastatin Kike Tierney,  The results of your iron level and blood count were normal. Dr. Arevalo (LIPITOR) 10 mg tablet, Take 1 tablet (10 mg total) by mouth daily, Disp: 90 tablet, Rfl: 3  •  enalapril (VASOTEC) 20 mg tablet, Take 1 tablet (20 mg total) by mouth daily, Disp: 90 tablet, Rfl: 3  •  furosemide (LASIX) 40 mg tablet, Take 1 tablet (40 mg total) by mouth daily, Disp: 90 tablet, Rfl: 3  •  glucose blood (OneTouch Verio) test strip, Use 1 each daily, Disp: 100 each, Rfl: 5  •  indomethacin (INDOCIN) 50 mg capsule, , Disp: , Rfl:   •  ketoconazole (NIZORAL) 2 % shampoo, , Disp: , Rfl:   •  Lancets (onetouch ultrasoft) lancets, Check glucose daily, Disp: 100 each, Rfl: 1  •  meclizine (ANTIVERT) 25 mg tablet, Take 1 tablet (25 mg total) by mouth 3 (three) times a day as needed for dizziness (Vertigo) for up to 10 days, Disp: 30 tablet, Rfl: 0  •  metFORMIN (GLUCOPHAGE) 500 mg tablet, Take 1 tablet (500 mg total) by mouth daily with breakfast One tab in am, Disp: 90 tablet, Rfl: 3  •  metoprolol succinate (TOPROL-XL) 25 mg 24 hr tablet, Take 1 tablet (25 mg total) by mouth daily, Disp: 90 tablet, Rfl: 3  •  metoprolol succinate (TOPROL-XL) 50 mg 24 hr tablet, Take 1 tablet (50 mg total) by mouth daily, Disp: 90 tablet, Rfl: 3  •  multivitamin (THERAGRAN) TABS, Take 1 tablet by mouth daily, Disp: , Rfl:   •  omeprazole (PriLOSEC) 40 MG capsule, Take 1 capsule (40 mg total) by mouth daily, Disp: 90 capsule, Rfl: 3  •  potassium chloride (Klor-Con M20) 20 mEq tablet, Take 1 tablet (20 mEq total) by mouth daily, Disp: 30 tablet, Rfl: 11  •  Red Yeast Rice Extract (RED YEAST RICE PO), Take by mouth, Disp: , Rfl:   •  triamcinolone (KENALOG) 0 1 % lotion, , Disp: , Rfl:   •  triamcinolone (KENALOG) 0 5 % cream, , Disp: , Rfl:   •  Vitamin D, Cholecalciferol, 50 MCG (2000 UT) CAPS, Take 50 mcg by mouth daily, Disp: 90 capsule, Rfl: 3    Current Allergies     Allergies as of 03/06/2023 - Reviewed 02/23/2023   Allergen Reaction Noted   • Acetaminophen Other (See Comments) 03/16/2018            The following portions of the patient's history were reviewed and updated as appropriate: allergies, current medications, past family history, past medical history, past social history, past surgical history and problem list      Past Medical History:   Diagnosis Date   • Arthritis     last assessed 7/1/15   • Bronchitis 2/2/2023   • Diabetes mellitus ([facility] )     type 2-last assessed 1/28/15   • Diabetes mellitus ([facility] ) 3/16/2018   • GERD (gastroesophageal reflux disease)    • Hypertension    • Irregular heart beat     last assessed 7/1/15   • Palpitations     last assessed 9/7/17   • Sexual dysfunction     last assessed 7/1/15   • Thyroid disease     last assessed 7/1/15       Past Surgical History:   Procedure Laterality Date   • COLONOSCOPY     • TONSILLECTOMY         Family History   Problem Relation Age of Onset   • Hypertension Mother         essential   • Stroke Mother    • Hypertension Father         essential   • Heart defect Father         cardiac disorder   • Stroke Brother    • Hypertension Brother          Medications have been verified  Objective   /75   Pulse 68   Temp 98 1 °F (36 7 °C)   Resp 18   Ht 6' (1 829 m)   Wt 136 kg (300 lb)   SpO2 100%   BMI 40 69 kg/m²        Physical Exam     Physical Exam  HENT:      Head: Normocephalic  Eyes:      Extraocular Movements: Extraocular movements intact  Pupils: Pupils are equal, round, and reactive to light  Cardiovascular:      Rate and Rhythm: Normal rate and regular rhythm  Heart sounds: Normal heart sounds  Pulmonary:      Effort: Pulmonary effort is normal       Breath sounds: Examination of the left-upper field reveals wheezing  Wheezing present  Chest:      Chest wall: No deformity, tenderness or crepitus  Musculoskeletal:         General: Normal range of motion  Skin:     General: Skin is warm and dry  Capillary Refill: Capillary refill takes less than 2 seconds  Neurological:      General: No focal deficit present  Mental Status: He is alert     Psychiatric:         Mood and Affect: Mood normal          Behavior: Behavior normal

## 2023-03-07 ENCOUNTER — TELEPHONE (OUTPATIENT)
Dept: FAMILY MEDICINE CLINIC | Facility: CLINIC | Age: 68
End: 2023-03-07

## 2023-03-07 NOTE — TELEPHONE ENCOUNTER
I would recommend if still not completely at normal range (although it is better than it was ) for him to call cardiology and have them assess to see if any further changes

## 2023-03-07 NOTE — TELEPHONE ENCOUNTER
Pt's blood pressures have been running high  150's/170's over 80-90  Asking if you would like to change his medication or what you advise?

## 2023-03-07 NOTE — DISCHARGE INSTRUCTIONS
Continue your home medications as prescribed  Continue to monitor your blood pressure  Follow up with PCP and cardiology or return to ER as needed    Follow up with your cardiologist

## 2023-03-08 LAB
ATRIAL RATE: 63 BPM
ATRIAL RATE: 65 BPM
P AXIS: 57 DEGREES
P AXIS: 7 DEGREES
PR INTERVAL: 182 MS
PR INTERVAL: 208 MS
QRS AXIS: -50 DEGREES
QRS AXIS: -57 DEGREES
QRSD INTERVAL: 108 MS
QRSD INTERVAL: 114 MS
QT INTERVAL: 428 MS
QT INTERVAL: 458 MS
QTC INTERVAL: 445 MS
QTC INTERVAL: 468 MS
T WAVE AXIS: 48 DEGREES
T WAVE AXIS: 65 DEGREES
VENTRICULAR RATE: 63 BPM
VENTRICULAR RATE: 65 BPM

## 2023-03-09 ENCOUNTER — RA CDI HCC (OUTPATIENT)
Dept: OTHER | Facility: HOSPITAL | Age: 68
End: 2023-03-09

## 2023-03-10 ENCOUNTER — OFFICE VISIT (OUTPATIENT)
Dept: CARDIOLOGY CLINIC | Facility: CLINIC | Age: 68
End: 2023-03-10

## 2023-03-10 VITALS
HEART RATE: 63 BPM | WEIGHT: 300 LBS | DIASTOLIC BLOOD PRESSURE: 72 MMHG | BODY MASS INDEX: 40.63 KG/M2 | HEIGHT: 72 IN | SYSTOLIC BLOOD PRESSURE: 152 MMHG

## 2023-03-10 DIAGNOSIS — G47.33 OSA (OBSTRUCTIVE SLEEP APNEA): ICD-10-CM

## 2023-03-10 DIAGNOSIS — I10 PRIMARY HYPERTENSION: Primary | ICD-10-CM

## 2023-03-10 DIAGNOSIS — E78.5 DYSLIPIDEMIA: ICD-10-CM

## 2023-03-10 DIAGNOSIS — R07.9 CHEST PAIN, UNSPECIFIED TYPE: ICD-10-CM

## 2023-03-10 RX ORDER — HYDRALAZINE HYDROCHLORIDE 10 MG/1
10 TABLET, FILM COATED ORAL 3 TIMES DAILY
Qty: 90 TABLET | Refills: 5 | Status: SHIPPED | OUTPATIENT
Start: 2023-03-10 | End: 2023-03-16

## 2023-03-10 RX ORDER — ENALAPRIL MALEATE 20 MG/1
20 TABLET ORAL 2 TIMES DAILY
Qty: 180 TABLET | Refills: 3 | Status: SHIPPED | OUTPATIENT
Start: 2023-03-10

## 2023-03-10 NOTE — PROGRESS NOTES
Cardiology Follow up    [facility]  [id number]  1955  [facility] Mckenna RODRIGES [facility] [address] [address]      1  Primary hypertension  hydrALAZINE (APRESOLINE) 10 mg tablet      2  Dyslipidemia        3  AYLEEN (obstructive sleep apnea)        4  Chest pain, unspecified type            Discussion/Summary:  1  Chest pain  2  Dyspnea  3  Hypertension  4  Hyperlipidemia  5  Obstructive sleep apnea compliant with CPAP therapy  6  Diastolic dysfunction  7  Obesity    -After discussion with patient he does not wish to undergo repeat stress testing at this time but is agreeable to up titration of medical therapy to hopefully allow for better blood pressure control   -Patient can continue his current antihypertensive regimen with amlodipine 10 mg daily, enalapril 20 mg twice daily, furosemide 40 mg daily, metoprolol succinate 75 mg daily and potassium 20 mEq daily  -We will add hydralazine 10 mg 3 times daily patient's current regimen and uptitrate as patient tolerates  -Patient will let us know how his blood pressures are doing on this regimen and if consistently elevated greater than 130/80 will let us know so that up titration of medical therapy can be performed  -Patient counseled on dietary and lifestyle modifications including following sodium restriction to less than 1800 mg of sodium daily and fluid restriction less than 2000 mL of fluid daily along with weight loss with goal BMI less than 30  -We will give referral to sleep medicine for reevaluation and possible need for titration of CPAP therapy  -We will see patient in 1 month or sooner if necessary once testing is completed  -We will get transthoracic echocardiogram performed        History of Present Illness:  -Patient is a 15-year-old male with hypertension, hyperlipidemia, obesity, known diastolic dysfunction and intermittent dyspnea on exertion who originally presented to the office after transition of care previously seen by cardiology Dr Clare Apple   -Unfortunately patient's blood pressure over the last month or so has been uncontrolled even despite up titration of medical therapy to the point where his blood pressure was elevated and he had chest tightness and went to the emergency department in March 2023   -Patient notes home blood pressure readings still mildly elevated in the 289-887 mmHg systolic range despite significant increase in medical therapy  He is compliant with his BiPAP therapy at night but has not had it titrated in many years and while he has been trying to lose weight has not been very successful in this at this time   -Currently in the office today he denies any active chest pain, palpitations, lightness or dizziness, loss of consciousness, shortness of breath, lower extremity edema or other issue      Patient Active Problem List   Diagnosis   • Benign essential HTN   • Dyslipidemia   • GERD (gastroesophageal reflux disease)   • AYLEEN (obstructive sleep apnea)   • Stasis edema of both lower extremities   • Morbid obesity ([facility] )   • Diastolic dysfunction   • Diabetes mellitus type 2 in obese [facility])   • Lymphedema     Past Medical History:   Diagnosis Date   • Arthritis     last assessed 7/1/15   • Bronchitis 2/2/2023   • Diabetes mellitus ([facility] )     type 2-last assessed 1/28/15   • Diabetes mellitus ([facility] ) 3/16/2018   • GERD (gastroesophageal reflux disease)    • Hypertension    • Irregular heart beat     last assessed 7/1/15   • Palpitations     last assessed 9/7/17   • Sexual dysfunction     last assessed 7/1/15   • Thyroid disease     last assessed 7/1/15     Social History     Socioeconomic History   • Marital status: Single     Spouse name: Not on file   • Number of children: Not on file   • Years of education: Not on file   • Highest education level: Not on file   Occupational History   • Not on file Tobacco Use   • Smoking status: Former   • Smokeless tobacco: Never   • Tobacco comments:     former smoker-quit 17 yrs ago 1pppd x 30 yrs as per [employer]   Vaping Use   • Vaping Use: Never used   Substance and Sexual Activity   • Alcohol use: Never   • Drug use: Never   • Sexual activity: Not on file   Other Topics Concern   • Not on file   Social History Narrative   • Not on file     Social Determinants of Health     Financial Resource Strain: Low Risk    • Difficulty of Paying Living Expenses: Not very hard   Food Insecurity: Not on file   Transportation Needs: No Transportation Needs   • Lack of Transportation (Medical): No   • Lack of Transportation (Non-Medical):  No   Physical Activity: Not on file   Stress: Not on file   Social Connections: Not on file   Intimate Partner Violence: Not on file   Housing Stability: Not on file      Family History   Problem Relation Age of Onset   • Hypertension Mother         essential   • Stroke Mother    • Hypertension Father         essential   • Heart defect Father         cardiac disorder   • Stroke Brother    • Hypertension Brother      Past Surgical History:   Procedure Laterality Date   • COLONOSCOPY     • TONSILLECTOMY         Current Outpatient Medications:   •  amLODIPine (NORVASC) 10 mg tablet, Take 1 tablet (10 mg total) by mouth daily, Disp: 90 tablet, Rfl: 0  •  enalapril (VASOTEC) 20 mg tablet, Take 1 tablet (20 mg total) by mouth daily, Disp: 90 tablet, Rfl: 3  •  furosemide (LASIX) 40 mg tablet, Take 1 tablet (40 mg total) by mouth daily, Disp: 90 tablet, Rfl: 3  •  hydrALAZINE (APRESOLINE) 10 mg tablet, Take 1 tablet (10 mg total) by mouth 3 (three) times a day, Disp: 90 tablet, Rfl: 5  •  metFORMIN (GLUCOPHAGE) 500 mg tablet, Take 1 tablet (500 mg total) by mouth daily with breakfast One tab in am, Disp: 90 tablet, Rfl: 3  •  metoprolol succinate (TOPROL-XL) 25 mg 24 hr tablet, Take 1 tablet (25 mg total) by mouth daily, Disp: 90 tablet, Rfl: 3  • metoprolol succinate (TOPROL-XL) 50 mg 24 hr tablet, Take 1 tablet (50 mg total) by mouth daily, Disp: 90 tablet, Rfl: 3  •  multivitamin (THERAGRAN) TABS, Take 1 tablet by mouth daily, Disp: , Rfl:   •  omeprazole (PriLOSEC) 40 MG capsule, Take 1 capsule (40 mg total) by mouth daily, Disp: 90 capsule, Rfl: 3  •  potassium chloride (Klor-Con M20) 20 mEq tablet, Take 1 tablet (20 mEq total) by mouth daily, Disp: 30 tablet, Rfl: 11  •  Red Yeast Rice Extract (RED YEAST RICE PO), Take by mouth, Disp: , Rfl:   •  Vitamin D, Cholecalciferol, 50 MCG (2000 UT) CAPS, Take 50 mcg by mouth daily, Disp: 90 capsule, Rfl: 3  •  aspirin 81 mg chewable tablet, Chew 81 mg daily, Disp: , Rfl:   •  atorvastatin (LIPITOR) 10 mg tablet, Take 1 tablet (10 mg total) by mouth daily, Disp: 90 tablet, Rfl: 3  •  glucose blood (OneTouch Verio) test strip, Use 1 each daily, Disp: 100 each, Rfl: 5  •  indomethacin (INDOCIN) 50 mg capsule, , Disp: , Rfl:   •  ketoconazole (NIZORAL) 2 % shampoo, , Disp: , Rfl:   •  Lancets (onetouch ultrasoft) lancets, Check glucose daily, Disp: 100 each, Rfl: 1  •  meclizine (ANTIVERT) 25 mg tablet, Take 1 tablet (25 mg total) by mouth 3 (three) times a day as needed for dizziness (Vertigo) for up to 10 days (Patient not taking: Reported on 3/10/2023), Disp: 30 tablet, Rfl: 0  •  triamcinolone (KENALOG) 0 1 % lotion, , Disp: , Rfl:   •  triamcinolone (KENALOG) 0 5 % cream, , Disp: , Rfl:   Allergies   Allergen Reactions   • Acetaminophen Other (See Comments)     Other reaction(s): Unknown Reaction  "I had to go to the hospital and get shots after taking it 20yrs ago"         Labs:  Admission on 03/06/2023, Discharged on 03/06/2023   Component Date Value   • WBC 03/06/2023 6 91    • RBC 03/06/2023 5 02    • Hemoglobin 03/06/2023 14 5    • Hematocrit 03/06/2023 44 6    • MCV 03/06/2023 89    • MCH 03/06/2023 28 9    • MCHC 03/06/2023 32 5    • RDW 03/06/2023 13 2    • MPV 03/06/2023 11 3    • Platelets 54/28/9651 204    • nRBC 03/06/2023 0    • Neutrophils Relative 03/06/2023 67    • Immat GRANS % 03/06/2023 0    • Lymphocytes Relative 03/06/2023 24    • Monocytes Relative 03/06/2023 8    • Eosinophils Relative 03/06/2023 0    • Basophils Relative 03/06/2023 1    • Neutrophils Absolute 03/06/2023 4 69    • Immature Grans Absolute 03/06/2023 0 01    • Lymphocytes Absolute 03/06/2023 1 63    • Monocytes Absolute 03/06/2023 0 53    • Eosinophils Absolute 03/06/2023 0 00    • Basophils Absolute 03/06/2023 0 05    • hs TnI 0hr 03/06/2023 5    • BNP 03/06/2023 22    • Sodium 03/06/2023 137    • Potassium 03/06/2023 3 7    • Chloride 03/06/2023 99    • CO2 03/06/2023 28    • ANION GAP 03/06/2023 10    • BUN 03/06/2023 25    • Creatinine 03/06/2023 1 12    • Glucose 03/06/2023 115    • Calcium 03/06/2023 9 9    • AST 03/06/2023 18    • ALT 03/06/2023 18    • Alkaline Phosphatase 03/06/2023 32 (L)    • Total Protein 03/06/2023 7 8    • Albumin 03/06/2023 4 5    • Total Bilirubin 03/06/2023 1 05 (H)    • eGFR 03/06/2023 66    • D-Dimer, Quant 03/06/2023 0 60 (H)    • Magnesium 03/06/2023 2 1    • SARS-CoV-2 03/06/2023 Negative    • INFLUENZA A PCR 03/06/2023 Negative    • INFLUENZA B PCR 03/06/2023 Negative    • RSV PCR 03/06/2023 Negative    • hs TnI 2hr 03/06/2023 4    • Delta 2hr hsTnI 03/06/2023 -1    Office Visit on 03/06/2023   Component Date Value   • Ventricular Rate 03/06/2023 63    • Atrial Rate 03/06/2023 63    • MS Interval 03/06/2023 182    • QRSD Interval 03/06/2023 108    • QT Interval 03/06/2023 458    • QTC Interval 03/06/2023 468    • P Axis 03/06/2023 7    • QRS Axis 03/06/2023 -50    • T Wave Axis 03/06/2023 48    • Ventricular Rate 03/06/2023 65    • Atrial Rate 03/06/2023 65    • MS Interval 03/06/2023 208    • QRSD Interval 03/06/2023 114    • QT Interval 03/06/2023 428    • QTC Interval 03/06/2023 445    • P Axis 03/06/2023 57    • QRS Axis 03/06/2023 -57    • T Wave [place] 03/06/2023 65    Admission on 02/21/2023, Discharged on 02/22/2023   Component Date Value   • WBC 02/21/2023 5 61    • RBC 02/21/2023 4 64    • Hemoglobin 02/21/2023 13 7    • Hematocrit 02/21/2023 41 3    • MCV 02/21/2023 89    • MCH 02/21/2023 29 5    • MCHC 02/21/2023 33 2    • RDW 02/21/2023 13 2    • Platelets 66/77/4725 160    • MPV 02/21/2023 11 3    • Sodium 02/21/2023 138    • Potassium 02/21/2023 4 3    • Chloride 02/21/2023 104    • CO2 02/21/2023 26    • ANION GAP 02/21/2023 8    • BUN 02/21/2023 25    • Creatinine 02/21/2023 1 12    • Glucose 02/21/2023 119    • Calcium 02/21/2023 9 8    • eGFR 02/21/2023 66    • Ventricular Rate 02/21/2023 67    • Atrial Rate 02/21/2023 67    • [place] Interval 02/21/2023 204    • QRSD Interval 02/21/2023 116    • QT Interval 02/21/2023 412    • QTC Interval 02/21/2023 435    • P Axis 02/21/2023 46    • QRS Axis 02/21/2023 -63    • T Wave [place] 02/21/2023 60    Appointment on 01/27/2023   Component Date Value   • Hemoglobin A1C 01/27/2023 6 1 (H)    • EAG 01/27/2023 128    • Sodium 01/27/2023 138    • Potassium 01/27/2023 3 8    • Chloride 01/27/2023 106    • CO2 01/27/2023 28    • ANION GAP 01/27/2023 4    • BUN 01/27/2023 23    • Creatinine 01/27/2023 1 14    • Glucose, Fasting 01/27/2023 127 (H)    • Calcium 01/27/2023 9 5    • AST 01/27/2023 21    • ALT 01/27/2023 28    • Alkaline Phosphatase 01/27/2023 41 (L)    • Total Protein 01/27/2023 7 6    • Albumin 01/27/2023 3 6    • Total Bilirubin 01/27/2023 1 21 (H)    • eGFR 01/27/2023 65    • LDL Direct 01/27/2023 80    Admission on 01/23/2023, Discharged on 01/23/2023   Component Date Value   • Sodium 01/23/2023 140    • Potassium 01/23/2023 4 2    • Chloride 01/23/2023 103    • CO2 01/23/2023 28    • ANION GAP 01/23/2023 9    • BUN 01/23/2023 26 (H)    • Creatinine 01/23/2023 1 19    • Glucose 01/23/2023 117    • Calcium 01/23/2023 10 0    • AST 01/23/2023 20    • ALT 01/23/2023 20    • Alkaline Phosphatase 01/23/2023 37    • Total Protein 01/23/2023 7  7    • Albumin 01/23/2023 4 3    • Total Bilirubin 01/23/2023 1 00    • eGFR 01/23/2023 61    • WBC 01/23/2023 6 09    • RBC 01/23/2023 4 89    • Hemoglobin 01/23/2023 14 6    • Hematocrit 01/23/2023 43 3    • MCV 01/23/2023 89    • MCH 01/23/2023 29 9    • MCHC 01/23/2023 33 7    • RDW 01/23/2023 13 0    • MPV 01/23/2023 11 2    • Platelets 67/17/4209 183    • nRBC 01/23/2023 0    • Neutrophils Relative 01/23/2023 70    • Immat GRANS % 01/23/2023 0    • Lymphocytes Relative 01/23/2023 22    • Monocytes Relative 01/23/2023 8    • Eosinophils Relative 01/23/2023 0    • Basophils Relative 01/23/2023 0    • Neutrophils Absolute 01/23/2023 4 21    • Immature Grans Absolute 01/23/2023 0 01    • Lymphocytes Absolute 01/23/2023 1 34    • Monocytes Absolute 01/23/2023 0 51    • Eosinophils Absolute 01/23/2023 0 00    • Basophils Absolute 01/23/2023 0 02    • hs TnI 0hr 01/23/2023 5    • SARS-CoV-2 01/23/2023 Negative    • INFLUENZA A PCR 01/23/2023 Negative    • INFLUENZA B PCR 01/23/2023 Negative    • RSV PCR 01/23/2023 Negative    • Ventricular Rate 01/23/2023 61    • Atrial Rate 01/23/2023 61    • [place] Interval 01/23/2023 198    • QRSD Interval 01/23/2023 110    • QT Interval 01/23/2023 426    • QTC Interval 01/23/2023 428    • P Axis 01/23/2023 47    • QRS Axis 01/23/2023 -34    • T Wave Axis 01/23/2023 49    Office Visit on 01/23/2023   Component Date Value   • GLUCOSE BLD 01/23/2023 122    • POC Glucose 01/23/2023 122         Imaging: X-ray chest 1 view portable    Result Date: 3/7/2023  Narrative: CHEST INDICATION:   chest pain  COMPARISON:  May 9, 2022 EXAM PERFORMED/VIEWS:  XR CHEST PORTABLE FINDINGS:  Monitoring leads and clips project over the chest  Cardiomediastinal silhouette appears unremarkable  Linear atelectasis/scarring in the retrocardiac left lung base, unchanged from May 9, 2022  The lungs are clear  No pneumothorax or pleural effusion  Osseous structures appear within normal limits for patient age  Impression: No acute cardiopulmonary disease  Workstation performed: [id number]     CTA ED chest PE Study    Result Date: 3/6/2023  Narrative: CTA - CHEST WITH IV CONTRAST - PULMONARY ANGIOGRAM INDICATION:   dyspnea, chest tightness, positive d dimer  COMPARISON: CXR 11/6/2023, CTA neck 1/23/2023, chest CT 5/24/2021 and 5/7/2018  TECHNIQUE: CT angiogram timed for optimal opacification of the pulmonary arteries  Axial, sagittal, and coronal 2D reformats created from source data  Coronal 3D MIP postprocessing on the acquisition scanner  Radiation dose length product (DLP):  634 77 mGy-cm   Radiation dose exposure minimized using iterative reconstruction and automated exposure control  IV Contrast:  100 mL of iohexol (OMNIPAQUE)  FINDINGS: PULMONARY ARTERIES:  No pulmonary embolus  LUNGS:  No acute disease  Stable left lower lobe scar  AIRWAYS: No significant filling defects  PLEURA:  Redemonstration of calcification of the left posterior costal and diaphragmatic pleura  HEART/GREAT VESSELS:  Normal heart size  Mild coronary artery calcification indicating atherosclerotic heart disease  MEDIASTINUM AND SANDY:  Moderate hiatal hernia  CHEST WALL AND LOWER NECK: Stable calcified 3 8 cm left thyroid nodule since May 2018  Robyn Ruiz UPPER ABDOMEN:  Renal cysts  OSSEOUS STRUCTURES:  Mild degenerative disease in the spine  Stable benign bone island in T4  Impression: No pulmonary embolus  No acute pulmonary disease  Moderate hiatal hernia  Workstation performed: [id number]     Review of Systems:  Review of Systems   Constitutional: Negative for chills, diaphoresis, fatigue and fever  HENT: Negative for trouble swallowing and voice change  Eyes: Negative for pain and redness  Respiratory: Negative for shortness of breath and wheezing  Cardiovascular: Negative for chest pain, palpitations and leg swelling  Gastrointestinal: Negative for abdominal pain, constipation, diarrhea, nausea and vomiting  Genitourinary: Negative for dysuria  Musculoskeletal: Positive for arthralgias  Negative for neck pain and neck stiffness  Skin: Negative for rash  Neurological: Negative for dizziness, syncope, light-headedness and headaches  Psychiatric/Behavioral: Negative for agitation and confusion  All other systems reviewed and are negative  Vitals:    03/10/23 1501   BP: 152/72   Pulse: 63   Weight: 136 kg (300 lb)   Height: 6' (1 829 m)     Vitals:    03/10/23 1501   Weight: 136 kg (300 lb)     Height: 6' (182 9 cm)     Physical Exam:  Physical Exam  Vitals reviewed  Constitutional:       General: He is not in acute distress  Appearance: He is obese  HENT:      Head: Normocephalic and atraumatic  Eyes:      General:         Right eye: No discharge  Left eye: No discharge  Neck:      Comments: Trachea midline, neck obese, difficult to assess JVD  Cardiovascular:      Rate and Rhythm: Normal rate and regular rhythm  Heart sounds: No friction rub  Pulmonary:      Effort: Pulmonary effort is normal  No respiratory distress  Breath sounds: No wheezing  Chest:      Chest wall: No tenderness  Abdominal:      General: Bowel sounds are normal       Palpations: Abdomen is soft  Tenderness: There is no abdominal tenderness  There is no rebound  Musculoskeletal:      Right lower leg: Edema (trace-1+) present  Left lower leg: Edema (trace-1+) present  Skin:     General: Skin is warm and dry  Neurological:      Mental Status: He is alert  Comments: Awake, alert, able to answer questions appropriately, able to move extremities bilaterally     Psychiatric:         Mood and Affect: Mood normal          Behavior: Behavior normal

## 2023-03-13 ENCOUNTER — CLINICAL SUPPORT (OUTPATIENT)
Dept: CARDIOLOGY CLINIC | Facility: CLINIC | Age: 68
End: 2023-03-13

## 2023-03-13 ENCOUNTER — TELEPHONE (OUTPATIENT)
Dept: CARDIOLOGY CLINIC | Facility: CLINIC | Age: 68
End: 2023-03-13

## 2023-03-13 DIAGNOSIS — I10 HTN (HYPERTENSION), MALIGNANT: Primary | ICD-10-CM

## 2023-03-13 NOTE — TELEPHONE ENCOUNTER
Called and spoke with patient about blood pressure check today which showed blood pressure 140/80 mmHg  Unfortunately patient has only been taking hydralazine 5 mg 3 times daily instead of 10 mg 3 times daily as instructed  Patient instructed to take 10 mg 3 times daily along with all of his other antihypertensive regimen as discussed at last office visit  Patient will monitor home blood pressure readings and let us know if still elevated for up titration of medical therapy

## 2023-03-14 ENCOUNTER — TELEPHONE (OUTPATIENT)
Dept: CARDIOLOGY CLINIC | Facility: CLINIC | Age: 68
End: 2023-03-14

## 2023-03-14 NOTE — TELEPHONE ENCOUNTER
Called and spoke with patient  He had noted this morning prior to taking his medications blood pressure was in the 398F mmHg systolic range however he has not taken 2 doses of the hydralazine and recently checked his blood pressure with blood pressure improved to 120/70s mmHg  We will continue current medical therapy however patient will monitor blood pressure readings and if persistently elevated greater than 130s/80s mmHg he will let our office know so that up titration of medical therapy can be performed

## 2023-03-14 NOTE — TELEPHONE ENCOUNTER
He wants to know if he can take an extra Hydralazine if needed  He takes it 3 times a day now and doesn't feel it is helping him

## 2023-03-16 ENCOUNTER — TELEPHONE (OUTPATIENT)
Dept: CARDIOLOGY CLINIC | Facility: CLINIC | Age: 68
End: 2023-03-16

## 2023-03-16 ENCOUNTER — OFFICE VISIT (OUTPATIENT)
Dept: FAMILY MEDICINE CLINIC | Facility: CLINIC | Age: 68
End: 2023-03-16

## 2023-03-16 VITALS
DIASTOLIC BLOOD PRESSURE: 82 MMHG | SYSTOLIC BLOOD PRESSURE: 142 MMHG | HEIGHT: 72 IN | BODY MASS INDEX: 41.31 KG/M2 | TEMPERATURE: 98.5 F | HEART RATE: 76 BPM | RESPIRATION RATE: 18 BRPM | WEIGHT: 305 LBS

## 2023-03-16 DIAGNOSIS — E66.01 MORBID OBESITY (HCC): ICD-10-CM

## 2023-03-16 DIAGNOSIS — I10 PRIMARY HYPERTENSION: ICD-10-CM

## 2023-03-16 DIAGNOSIS — I51.89 DIASTOLIC DYSFUNCTION: Primary | ICD-10-CM

## 2023-03-16 DIAGNOSIS — E11.69 DIABETES MELLITUS TYPE 2 IN OBESE (HCC): ICD-10-CM

## 2023-03-16 DIAGNOSIS — I16.0 HYPERTENSIVE URGENCY: ICD-10-CM

## 2023-03-16 DIAGNOSIS — E66.9 DIABETES MELLITUS TYPE 2 IN OBESE (HCC): ICD-10-CM

## 2023-03-16 RX ORDER — HYDRALAZINE HYDROCHLORIDE 25 MG/1
25 TABLET, FILM COATED ORAL 3 TIMES DAILY
Qty: 90 TABLET | Refills: 3 | Status: SHIPPED | OUTPATIENT
Start: 2023-03-16

## 2023-03-16 NOTE — PROGRESS NOTES
Name: Thais Nye      : 1955      MRN: [id number]  Encounter Provider: Selam Calderon DO  Encounter Date: 3/16/2023   Encounter department: [facility] [address]     1  Diastolic dysfunction  Pt is following with cardiology  He is now on Hydralazine and told to continue to monitor - it seems that he has decreased his results but not consistently as of yet  Lo sodium diet  Pt BP monitor is fairly consistent with my reading today so he can use that as a guide       2  Diabetes mellitus type 2 in obese (HCC)  BS has been stable overall and he is following lo carb      3  Morbid obesity ([address] )  Low fat diet and encouraged when stable walking or low level exercise     4  Hypertensive urgency  Hydralazine added three times a day after Cardiology   Lo sodium diet and mon itor BP  Has echo and cardiology appt early May       Has appt May     Subjective      HPI   Pt was in ER recently due to chest tightness and ongoing Bp issues He did see Cardiology since then and was placed on hydralazine TID which he is taking It has lowered BP but pt states it does not last No further chest tightness or sob He is frustrated not losing weight but he is not active since BP elevated Lower leg edema has been fairly stable   Review of Systems   Constitutional: Negative for chills and fever  HENT: Negative  Eyes: Negative for visual disturbance  Respiratory: Negative for cough and shortness of breath  Cardiovascular: Negative for chest pain, palpitations and leg swelling  Gastrointestinal: Negative for abdominal distention and abdominal pain  Genitourinary: Negative  Musculoskeletal: Positive for arthralgias  Neurological: Negative for dizziness, light-headedness and headaches  Psychiatric/Behavioral: Negative for sleep disturbance  The patient is not nervous/anxious          Current Outpatient Medications on File Prior to Visit   Medication Sig   • amLODIPine (NORVASC) 10 mg tablet Take 1 tablet (10 mg total) by mouth daily   • aspirin 81 mg chewable tablet Chew 81 mg daily   • atorvastatin (LIPITOR) 10 mg tablet Take 1 tablet (10 mg total) by mouth daily   • enalapril (VASOTEC) 20 mg tablet Take 1 tablet (20 mg total) by mouth daily   • enalapril (VASOTEC) 20 mg tablet Take 1 tablet (20 mg total) by mouth 2 (two) times a day   • furosemide (LASIX) 40 mg tablet Take 1 tablet (40 mg total) by mouth daily   • glucose blood (OneTouch Verio) test strip Use 1 each daily   • hydrALAZINE (APRESOLINE) 10 mg tablet Take 1 tablet (10 mg total) by mouth 3 (three) times a day   • indomethacin (INDOCIN) 50 mg capsule    • Lancets (onetouch ultrasoft) lancets Check glucose daily   • metFORMIN (GLUCOPHAGE) 500 mg tablet Take 1 tablet (500 mg total) by mouth daily with breakfast One tab in am   • metoprolol succinate (TOPROL-XL) 25 mg 24 hr tablet Take 1 tablet (25 mg total) by mouth daily   • metoprolol succinate (TOPROL-XL) 50 mg 24 hr tablet Take 1 tablet (50 mg total) by mouth daily   • multivitamin (THERAGRAN) TABS Take 1 tablet by mouth daily   • omeprazole (PriLOSEC) 40 MG capsule Take 1 capsule (40 mg total) by mouth daily   • potassium chloride (Klor-Con M20) 20 mEq tablet Take 1 tablet (20 mEq total) by mouth daily   • Red Yeast Rice Extract (RED YEAST RICE PO) Take by mouth   • Vitamin D, Cholecalciferol, 50 MCG (2000 UT) CAPS Take 50 mcg by mouth daily   • ketoconazole (NIZORAL) 2 % shampoo  (Patient not taking: Reported on 3/10/2023)   • meclizine (ANTIVERT) 25 mg tablet Take 1 tablet (25 mg total) by mouth 3 (three) times a day as needed for dizziness (Vertigo) for up to 10 days (Patient not taking: Reported on 3/10/2023)   • triamcinolone (KENALOG) 0 1 % lotion  (Patient not taking: Reported on 3/16/2023)   • triamcinolone (KENALOG) 0 5 % cream  (Patient not taking: Reported on 3/16/2023)       Objective     /82   Pulse 76   Temp 98 5 °F (36 9 °C) (Temporal)   Resp 18   Ht 6' (1 829 m) Wt (!) 138 kg (305 lb)   BMI 41 37 kg/m²     Physical Exam  Vitals reviewed  Constitutional:       General: He is not in acute distress  Appearance: Normal appearance  He is not ill-appearing, toxic-appearing or diaphoretic  HENT:      Head: Normocephalic and atraumatic  Right Ear: External ear normal       Left Ear: External ear normal       Nose: Nose normal       Mouth/Throat:      Mouth: Mucous membranes are moist    Eyes:      General: No scleral icterus  Extraocular Movements: Extraocular movements intact  Conjunctiva/sclera: Conjunctivae normal       Pupils: Pupils are equal, round, and reactive to light  Cardiovascular:      Rate and Rhythm: Normal rate and regular rhythm  Pulses: Normal pulses  Pulmonary:      Effort: Pulmonary effort is normal  No respiratory distress  Breath sounds: Normal breath sounds  No wheezing  Abdominal:      General: Bowel sounds are normal  There is no distension  Palpations: Abdomen is soft  Tenderness: There is no abdominal tenderness  Musculoskeletal:      Cervical back: Normal range of motion and neck supple  No rigidity  Right lower leg: No edema  Left lower leg: Edema present  Lymphadenopathy:      Cervical: No cervical adenopathy  Skin:     General: Skin is warm and dry  Coloration: Skin is not jaundiced or pale  Neurological:      General: No focal deficit present  Mental Status: He is alert and oriented to person, place, and time  Mental status is at baseline  Cranial Nerves: No cranial nerve deficit  Psychiatric:         Mood and Affect: Mood normal          Behavior: Behavior normal          Thought Content:  Thought content normal          Judgment: Judgment normal        Saqib Kelly DO

## 2023-03-16 NOTE — TELEPHONE ENCOUNTER
Received message that patient was still having elevated blood pressure readings in the 404 mmHg systolic range despite up titration of medical therapy  In that setting we will increase hydralazine from 10 mg 3 times daily to 25 mg 3 times daily  Patient counseled to monitor and let us know if blood pressures again remain significantly elevated greater than 130/80 mmHg so that up titration of medical therapy can be performed  Patient was agreeable to plan and new prescription was sent

## 2023-03-23 DIAGNOSIS — I10 BENIGN ESSENTIAL HTN: ICD-10-CM

## 2023-03-23 DIAGNOSIS — I10 HTN (HYPERTENSION), MALIGNANT: Primary | ICD-10-CM

## 2023-03-23 RX ORDER — ENALAPRIL MALEATE 10 MG/1
10 TABLET ORAL 4 TIMES DAILY
Qty: 360 TABLET | Refills: 3 | Status: SHIPPED | OUTPATIENT
Start: 2023-03-23

## 2023-03-23 NOTE — TELEPHONE ENCOUNTER
Patient called  He wants a new prescription for 10 mg of Enalapril four times a day  He states that he has been cutting the 20 mg in half and taking a half tablet four times a day and feels his BP is much better controlled  I did tell him that we can do the 10 mg tablet, however, his insurance may not pay for it  Will submit new prescription and see if the insurance will okay the new dosing

## 2023-04-04 ENCOUNTER — OFFICE VISIT (OUTPATIENT)
Dept: OTOLARYNGOLOGY | Facility: CLINIC | Age: 68
End: 2023-04-04

## 2023-04-04 ENCOUNTER — OFFICE VISIT (OUTPATIENT)
Dept: AUDIOLOGY | Facility: CLINIC | Age: 68
End: 2023-04-04

## 2023-04-04 VITALS
TEMPERATURE: 98.1 F | OXYGEN SATURATION: 96 % | BODY MASS INDEX: 41.55 KG/M2 | DIASTOLIC BLOOD PRESSURE: 72 MMHG | SYSTOLIC BLOOD PRESSURE: 128 MMHG | WEIGHT: 306.8 LBS | HEART RATE: 61 BPM | HEIGHT: 72 IN

## 2023-04-04 DIAGNOSIS — H90.3 SENSORINEURAL HEARING LOSS (SNHL) OF BOTH EARS: ICD-10-CM

## 2023-04-04 DIAGNOSIS — R42 DIZZINESS: ICD-10-CM

## 2023-04-04 DIAGNOSIS — H90.3 ASYMMETRIC SNHL (SENSORINEURAL HEARING LOSS): ICD-10-CM

## 2023-04-04 DIAGNOSIS — R42 POSTURAL DIZZINESS WITH PRESYNCOPE: Primary | ICD-10-CM

## 2023-04-04 DIAGNOSIS — H90.3 SENSORY HEARING LOSS, BILATERAL: Primary | ICD-10-CM

## 2023-04-04 DIAGNOSIS — R55 POSTURAL DIZZINESS WITH PRESYNCOPE: Primary | ICD-10-CM

## 2023-04-04 NOTE — PROGRESS NOTES
Review of Systems   Constitutional: Negative  HENT: Negative  Eyes: Negative  Respiratory: Negative  Cardiovascular: Negative  Gastrointestinal: Negative  Endocrine: Negative  Genitourinary: Negative  Musculoskeletal: Negative  Skin: Negative  Allergic/Immunologic: Negative  Neurological: Positive for dizziness  Hematological: Negative  Psychiatric/Behavioral: Negative

## 2023-04-04 NOTE — PROGRESS NOTES
ENT HEARING EVALUATION    Name:  Michael Matos  :  1955  Age:  69 y o  Date of Evaluation: 23     History: ENT Audiogram  Reason for visit: Michael Matos is being seen today at the request of Dr Grady Berrios for an evaluation of hearing as part of their initial ENT visit  EVALUATION:    Tympanometry:   Right: Type A - normal middle ear pressure and compliance   Left: Type A - normal middle ear pressure and compliance    Audiogram Results:  Pure tone testing revealed a mild sensorineural hearing loss in the right ear and a mild sloping to severe sensorineural hearing loss in the  left  ear  SRT and PTA are in agreement indicating good test reliability  Word recognition scores were excellent bilaterally         *see attached audiogram      RECOMMENDATIONS:  Consult ENT      Melissa Bethea , CCC-A  Clinical Audiologist

## 2023-04-04 NOTE — PROGRESS NOTES
Otolaryngology Clinic Visit  Name:  Irma Nogueira  MRN:  [id number]  Date:  4/4/2023 3:58 PM  ________________________________________________________________________       CHIEF COMPLAINT:   dizziness     HPI:  Irma Nogueira is a 69 y o  male with PMH as below who presents today for evaluation of dizziness  It has been happening for several years  There is no vertigo  No room spending  He has a sensation when he gets up from laying down of feeling light headed and imbalance  He has seen cardiology  He denies ear issues  No issues with his nose or throat today  No other ENT questions or concerns today      PMHx:  Past Medical History:   Diagnosis Date   • Arthritis     last assessed 7/1/15   • Bronchitis 2/2/2023   • Diabetes mellitus ([facility] )     type 2-last assessed 1/28/15   • Diabetes mellitus ([facility] ) 3/16/2018   • GERD (gastroesophageal reflux disease)    • Hypertension    • Irregular heart beat     last assessed 7/1/15   • Palpitations     last assessed 9/7/17   • Sexual dysfunction     last assessed 7/1/15   • Thyroid disease     last assessed 7/1/15       PSHx:  Past Surgical History:   Procedure Laterality Date   • COLONOSCOPY     • TONSILLECTOMY         FAMHx:  Family History   Problem Relation Age of Onset   • Hypertension Mother         essential   • Stroke Mother    • Hypertension Father         essential   • Heart defect Father         cardiac disorder   • Stroke Brother    • Hypertension Brother        SOCHx:  Social History     Socioeconomic History   • Marital status: Single     Spouse name: None   • Number of children: None   • Years of education: None   • Highest education level: None   Occupational History   • None   Tobacco Use   • Smoking status: Former   • Smokeless tobacco: Never   • Tobacco comments:     former smoker-quit 17 yrs ago 1pppd x 30 yrs as per [employer]   Vaping Use   • Vaping Use: Never used   Substance and Sexual Activity   • Alcohol use: Never   • Drug use: Never   • Sexual "activity: None   Other Topics Concern   • None   Social History Narrative   • None     Social Determinants of Health     Financial Resource Strain: Low Risk    • Difficulty of Paying Living Expenses: Not very hard   Food Insecurity: Not on file   Transportation Needs: No Transportation Needs   • Lack of Transportation (Medical): No   • Lack of Transportation (Non-Medical): No   Physical Activity: Not on file   Stress: Not on file   Social Connections: Not on file   Intimate Partner Violence: Not on file   Housing Stability: Not on file       Allergies: Allergies   Allergen Reactions   • Acetaminophen Other (See Comments)     Other reaction(s): Unknown Reaction  \"I had to go to the hospital and get shots after taking it 20yrs ago\"        MEDS:  Reviewed    ROS:  As above       PHYSICAL EXAM:  /72 (BP Location: Right arm, Cuff Size: Large)   Pulse 61   Temp 98 1 °F (36 7 °C) (Temporal)   Ht 6' (1 829 m)   Wt (!) 139 kg (306 lb 12 8 oz)   SpO2 96%   BMI 41 61 kg/m²   General: NAD, AOx4  Eyes:  EOMI  Ears:  Right: ear canal normal, TM normal apperance, no fluid  Left: ear canal normal, TM normal apperance, no fluid  Nasal: No external deformity   Oral cavity:  Unremarkable  Neck: Unremarkable  Lymph:  Unremarkable  Skin:  No obvious facial lesions  Neuro: Face symmetrical, no obvious cranial nerve palsy   No focal deficits   Lungs:  Normal work of breathing, symmetrical chest expansion  Vascular: Well perfused      Procedures:  None     Medical Data Reviewed:  Records reviewed and summarized as in EPIC  Audiogram from Today Mild to moderate SNHL, there is a gap on the Left with a severe loss at the high frequencies  Tympanogram from Today Type A  Reviewed personally with patient and audiology  Radiology:  None    Labs:  None     Patient Active Problem List   Diagnosis   • Benign essential HTN   • Dyslipidemia   • GERD (gastroesophageal reflux disease)   • AYLEEN (obstructive sleep apnea)   • Stasis edema of " both lower extremities   • Morbid obesity (HCC)   • Diastolic dysfunction   • Diabetes mellitus type 2 in obese [facility])   • Lymphedema   • Hypertensive urgency       ASSESSMENT/PLAN:  Meggan Clements is a 69 y o  male with acute and chronic problems as above who presents with:    1  Postural dizziness with presyncope    2  Dizziness    3  Asymmetric SNHL (sensorineural hearing loss)        69 y o  here today for further evaluation of imbalance and dizziness  No true signs of peripheral vertigo today however will send him to PT for evaluation and treatment  He does have a high fq asymmetry with preserved WRS  Will obtain MRI IAC to rule out any retrocochlear pathology  Reviewed possible cause of asymmetry with him today including vestibular schwannoma  Pending his MRI and response to PT will need continued cardiology evaluation or neurology    -MRI  -Vestibular PT    RTC after the above        Christophe Swan MD MPH  Otolaryngology--Head and Neck Surgery  [facility]  4/4/2023 3:58 PM

## 2023-04-06 DIAGNOSIS — L30.9 DERMATITIS: Primary | ICD-10-CM

## 2023-04-06 DIAGNOSIS — I50.31 ACUTE DIASTOLIC HEART FAILURE (HCC): ICD-10-CM

## 2023-04-06 DIAGNOSIS — I10 PRIMARY HYPERTENSION: ICD-10-CM

## 2023-04-06 RX ORDER — HYDRALAZINE HYDROCHLORIDE 25 MG/1
25 TABLET, FILM COATED ORAL 3 TIMES DAILY
Qty: 90 TABLET | Refills: 3 | Status: SHIPPED | OUTPATIENT
Start: 2023-04-06

## 2023-04-06 RX ORDER — POTASSIUM CHLORIDE 20 MEQ/1
20 TABLET, EXTENDED RELEASE ORAL DAILY
Qty: 90 TABLET | Refills: 3 | Status: SHIPPED | OUTPATIENT
Start: 2023-04-06

## 2023-04-06 RX ORDER — TRIAMCINOLONE ACETONIDE 1 MG/ML
LOTION TOPICAL 2 TIMES DAILY
Qty: 60 ML | Refills: 3 | Status: SHIPPED | OUTPATIENT
Start: 2023-04-06

## 2023-04-06 RX ORDER — KETOCONAZOLE 20 MG/ML
1 SHAMPOO TOPICAL 2 TIMES WEEKLY
Qty: 120 ML | Refills: 3 | Status: SHIPPED | OUTPATIENT
Start: 2023-04-06

## 2023-04-25 ENCOUNTER — RA CDI HCC (OUTPATIENT)
Dept: OTHER | Facility: HOSPITAL | Age: 68
End: 2023-04-25

## 2023-04-25 ENCOUNTER — APPOINTMENT (EMERGENCY)
Dept: RADIOLOGY | Facility: HOSPITAL | Age: 68
End: 2023-04-25

## 2023-04-25 ENCOUNTER — HOSPITAL ENCOUNTER (EMERGENCY)
Facility: HOSPITAL | Age: 68
Discharge: HOME | End: 2023-04-26
Attending: EMERGENCY MEDICINE

## 2023-04-25 DIAGNOSIS — R07.9 CHEST PAIN, UNSPECIFIED TYPE: Primary | ICD-10-CM

## 2023-04-25 DIAGNOSIS — E11.9 T2DM (TYPE 2 DIABETES MELLITUS) (HCC): ICD-10-CM

## 2023-04-25 DIAGNOSIS — I10 HTN (HYPERTENSION): ICD-10-CM

## 2023-04-25 LAB
ALBUMIN SERPL BCP-MCNC: 4.3 G/DL (ref 3.5–5)
ALP SERPL-CCNC: 34 U/L (ref 34–104)
ALT SERPL W P-5'-P-CCNC: 26 U/L (ref 7–52)
ANION GAP SERPL CALCULATED.3IONS-SCNC: 11 MMOL/L (ref 4–13)
AST SERPL W P-5'-P-CCNC: 22 U/L (ref 13–39)
BASOPHILS # BLD AUTO: 0.04 THOUSANDS/ΜL (ref 0–0.1)
BASOPHILS NFR BLD AUTO: 1 % (ref 0–1)
BILIRUB SERPL-MCNC: 0.82 MG/DL (ref 0.2–1)
BUN SERPL-MCNC: 23 MG/DL (ref 5–25)
CALCIUM SERPL-MCNC: 9.6 MG/DL (ref 8.4–10.2)
CARDIAC TROPONIN I PNL SERPL HS: 5 NG/L
CHLORIDE SERPL-SCNC: 104 MMOL/L (ref 96–108)
CO2 SERPL-SCNC: 24 MMOL/L (ref 21–32)
CREAT SERPL-MCNC: 1.15 MG/DL (ref 0.6–1.3)
EOSINOPHIL # BLD AUTO: 0.02 THOUSAND/ΜL (ref 0–0.61)
EOSINOPHIL NFR BLD AUTO: 0 % (ref 0–6)
ERYTHROCYTE [DISTWIDTH] IN BLOOD BY AUTOMATED COUNT: 13.1 % (ref 11.6–15.1)
GFR SERPL CREATININE-BSD FRML MDRD: 64 ML/MIN/1.73SQ M
GLUCOSE SERPL-MCNC: 138 MG/DL (ref 65–140)
HCT VFR BLD AUTO: 42.9 % (ref 36.5–49.3)
HGB BLD-MCNC: 14.3 G/DL (ref 12–17)
IMM GRANULOCYTES # BLD AUTO: 0.04 THOUSAND/UL (ref 0–0.2)
IMM GRANULOCYTES NFR BLD AUTO: 1 % (ref 0–2)
LYMPHOCYTES # BLD AUTO: 1.98 THOUSANDS/ΜL (ref 0.6–4.47)
LYMPHOCYTES NFR BLD AUTO: 25 % (ref 14–44)
MCH RBC QN AUTO: 29.8 PG (ref 26.8–34.3)
MCHC RBC AUTO-ENTMCNC: 33.3 G/DL (ref 31.4–37.4)
MCV RBC AUTO: 89 FL (ref 82–98)
MONOCYTES # BLD AUTO: 0.68 THOUSAND/ΜL (ref 0.17–1.22)
MONOCYTES NFR BLD AUTO: 8 % (ref 4–12)
NEUTROPHILS # BLD AUTO: 5.31 THOUSANDS/ΜL (ref 1.85–7.62)
NEUTS SEG NFR BLD AUTO: 65 % (ref 43–75)
NRBC BLD AUTO-RTO: 0 /100 WBCS
PLATELET # BLD AUTO: 248 THOUSANDS/UL (ref 149–390)
PMV BLD AUTO: 10.2 FL (ref 8.9–12.7)
POTASSIUM SERPL-SCNC: 4 MMOL/L (ref 3.5–5.3)
PROT SERPL-MCNC: 7.4 G/DL (ref 6.4–8.4)
RBC # BLD AUTO: 4.8 MILLION/UL (ref 3.88–5.62)
SODIUM SERPL-SCNC: 139 MMOL/L (ref 135–147)
WBC # BLD AUTO: 8.07 THOUSAND/UL (ref 4.31–10.16)

## 2023-04-26 ENCOUNTER — APPOINTMENT (OUTPATIENT)
Dept: LAB | Facility: HOSPITAL | Age: 68
End: 2023-04-26
Attending: INTERNAL MEDICINE

## 2023-04-26 VITALS
BODY MASS INDEX: 40.63 KG/M2 | DIASTOLIC BLOOD PRESSURE: 55 MMHG | RESPIRATION RATE: 13 BRPM | TEMPERATURE: 97.8 F | SYSTOLIC BLOOD PRESSURE: 116 MMHG | HEIGHT: 72 IN | OXYGEN SATURATION: 94 % | HEART RATE: 56 BPM | WEIGHT: 300 LBS

## 2023-04-26 DIAGNOSIS — R55 POSTURAL DIZZINESS WITH PRESYNCOPE: ICD-10-CM

## 2023-04-26 DIAGNOSIS — R42 POSTURAL DIZZINESS WITH PRESYNCOPE: ICD-10-CM

## 2023-04-26 DIAGNOSIS — E66.9 DIABETES MELLITUS TYPE 2 IN OBESE (HCC): ICD-10-CM

## 2023-04-26 DIAGNOSIS — R42 DIZZINESS: ICD-10-CM

## 2023-04-26 DIAGNOSIS — E11.69 DIABETES MELLITUS TYPE 2 IN OBESE (HCC): ICD-10-CM

## 2023-04-26 LAB
2HR DELTA HS TROPONIN: -1 NG/L
ALBUMIN SERPL BCP-MCNC: 4.2 G/DL (ref 3.5–5)
ALP SERPL-CCNC: 34 U/L (ref 34–104)
ALT SERPL W P-5'-P-CCNC: 23 U/L (ref 7–52)
ANION GAP SERPL CALCULATED.3IONS-SCNC: 9 MMOL/L (ref 4–13)
AST SERPL W P-5'-P-CCNC: 18 U/L (ref 13–39)
BILIRUB SERPL-MCNC: 0.73 MG/DL (ref 0.2–1)
BUN SERPL-MCNC: 24 MG/DL (ref 5–25)
CALCIUM SERPL-MCNC: 9.5 MG/DL (ref 8.4–10.2)
CARDIAC TROPONIN I PNL SERPL HS: 4 NG/L
CHLORIDE SERPL-SCNC: 105 MMOL/L (ref 96–108)
CO2 SERPL-SCNC: 26 MMOL/L (ref 21–32)
CREAT SERPL-MCNC: 1.11 MG/DL (ref 0.6–1.3)
GFR SERPL CREATININE-BSD FRML MDRD: 67 ML/MIN/1.73SQ M
GLUCOSE P FAST SERPL-MCNC: 117 MG/DL (ref 65–99)
POTASSIUM SERPL-SCNC: 4.2 MMOL/L (ref 3.5–5.3)
PROT SERPL-MCNC: 7.4 G/DL (ref 6.4–8.4)
SODIUM SERPL-SCNC: 140 MMOL/L (ref 135–147)

## 2023-04-26 NOTE — ED PROVIDER NOTES
History  Chief Complaint   Patient presents with    Chest Pain     Patient came in from cutting the grass on a riding  around 8pm when he started with left sided chest pain  Mellisa Palma is a 69y o  year old male with PMH of HTN, DM, thyroid disease, GERD presenting to the [facility] ED for chest pain  Patient reported onset of left-sided chest discomfort at 2000 this evening  Started after cutting grass on ride-on-top lawn   Pain did not radiate to the neck, arm or back  Patient believed pain may be related to GERD though did not immediately resolve prompting ED presentation  No associated lightheadedness, diaphoresis, nausea/vomiting or dyspnea  No associated abdominal pain  The pain reportedly resolved prior to arrival in the emergency department  The patient denies any complaints at time of evaluation in the ED  Patient following with cardiology for hypertension and reports compliance with antihypertensive regimen  History provided by:  Patient and medical records   used: No        Prior to Admission Medications   Prescriptions Last Dose Informant Patient Reported? Taking?    Lancets (onetouch ultrasoft) lancets   No No   Sig: Check glucose daily   Red Yeast Rice Extract (RED YEAST RICE PO)   Yes No   Sig: Take by mouth   Vitamin D, Cholecalciferol, 50 MCG (2000 UT) CAPS   No No   Sig: Take 50 mcg by mouth daily   amLODIPine (NORVASC) 10 mg tablet   No No   Sig: Take 1 tablet (10 mg total) by mouth daily   Patient taking differently: Take 20 mg by mouth daily   aspirin 81 mg chewable tablet   Yes No   Sig: Chew 81 mg daily   atorvastatin (LIPITOR) 10 mg tablet   No No   Sig: Take 1 tablet (10 mg total) by mouth daily   enalapril (VASOTEC) 10 mg tablet   No No   Sig: Take 1 tablet (10 mg total) by mouth 4 (four) times a day   Patient taking differently: Take 10 mg by mouth 4 (four) times a day 2 tabs in the am, 1 tab in the afternoon, 1 tab @ hs    furosemide (LASIX) 40 mg tablet   No No   Sig: Take 1 tablet (40 mg total) by mouth daily   glucose blood (OneTouch Verio) test strip   No No   Sig: Use 1 each daily   hydrALAZINE (APRESOLINE) 25 mg tablet   No No   Sig: Take 1 tablet (25 mg total) by mouth 3 (three) times a day   indomethacin (INDOCIN) 50 mg capsule   Yes No   Sig: PRN with the gout   ketoconazole (NIZORAL) 2 % shampoo   No No   Sig: Apply 1 application   topically 2 (two) times a week   meclizine (ANTIVERT) 25 mg tablet   No No   Sig: Take 1 tablet (25 mg total) by mouth 3 (three) times a day as needed for dizziness (Vertigo) for up to 10 days   Patient not taking: Reported on 3/10/2023   metFORMIN (GLUCOPHAGE) 500 mg tablet   No No   Sig: Take 1 tablet (500 mg total) by mouth daily with breakfast One tab in am   metoprolol succinate (TOPROL-XL) 25 mg 24 hr tablet   No No   Sig: Take 1 tablet (25 mg total) by mouth daily   metoprolol succinate (TOPROL-XL) 50 mg 24 hr tablet   No No   Sig: Take 1 tablet (50 mg total) by mouth daily   multivitamin (THERAGRAN) TABS   Yes No   Sig: Take 1 tablet by mouth daily   omeprazole (PriLOSEC) 40 MG capsule   No No   Sig: Take 1 capsule (40 mg total) by mouth daily   potassium chloride (Klor-Con M20) 20 mEq tablet   No No   Sig: Take 1 tablet (20 mEq total) by mouth daily   triamcinolone (KENALOG) 0 1 % lotion   No No   Sig: Apply topically 2 (two) times a day   triamcinolone (KENALOG) 0 5 % cream   Yes No   Patient not taking: Reported on 3/16/2023      Facility-Administered Medications: None       Past Medical History:   Diagnosis Date    Arthritis     last assessed 7/1/15    Bronchitis 2/2/2023    Diabetes mellitus ([facility] )     type 2-last assessed 1/28/15    Diabetes mellitus ([facility] ) 3/16/2018    GERD (gastroesophageal reflux disease)     Hypertension     Irregular heart beat     last assessed 7/1/15    Palpitations     last assessed 9/7/17    Sexual dysfunction     last assessed 7/1/15    Thyroid disease     last assessed 7/1/15       Past Surgical History:   Procedure Laterality Date    COLONOSCOPY      TONSILLECTOMY         Family History   Problem Relation Age of Onset    Hypertension Mother         essential    Stroke Mother     Hypertension Father         essential    Heart defect Father         cardiac disorder    Stroke Brother     Hypertension Brother      I have reviewed and agree with the history as documented  E-Cigarette/Vaping    E-Cigarette Use Never User      E-Cigarette/Vaping Substances    Nicotine No     THC No     CBD No     Flavoring No     Other No     Unknown No      Social History     Tobacco Use    Smoking status: Former     Passive exposure: Never    Smokeless tobacco: Never    Tobacco comments:     former smoker-quit 17 yrs ago 1pppd x 30 yrs as per [employer]   Vaping Use    Vaping Use: Never used   Substance Use Topics    Alcohol use: Never    Drug use: Never       Review of Systems   Constitutional: Negative for chills, diaphoresis and fever  Respiratory: Negative for shortness of breath  Cardiovascular: Positive for chest pain and leg swelling (Chronic)  Gastrointestinal: Negative for abdominal pain, nausea and vomiting  Genitourinary: Negative for flank pain  Musculoskeletal: Negative for arthralgias and back pain  Skin: Negative for rash  Neurological: Negative for syncope, weakness and light-headedness  Psychiatric/Behavioral: Negative for confusion  All other systems reviewed and are negative  Physical Exam  Physical Exam  Vitals and nursing note reviewed  Constitutional:       General: He is not in acute distress  Appearance: He is well-developed  He is obese  He is not ill-appearing, toxic-appearing or diaphoretic  HENT:      Head: Normocephalic and atraumatic  Nose: No congestion or rhinorrhea  Eyes:      General:         Right eye: No discharge  Left eye: No discharge     Cardiovascular:      Rate and Rhythm: Normal rate and regular rhythm  Heart sounds: No murmur heard  Comments: Trace bilateral lower extremity edema  Pulmonary:      Effort: Pulmonary effort is normal  No respiratory distress  Breath sounds: Normal breath sounds  No wheezing or rales  Abdominal:      Palpations: Abdomen is soft  Tenderness: There is no abdominal tenderness  There is no right CVA tenderness, left CVA tenderness, guarding or rebound  Musculoskeletal:      Cervical back: Normal range of motion  No rigidity  Right lower leg: Edema present  Left lower leg: Edema present  Skin:     General: Skin is warm  Capillary Refill: Capillary refill takes less than 2 seconds  Neurological:      Mental Status: He is alert and oriented to person, place, and time     Psychiatric:         Mood and Affect: Mood normal          Behavior: Behavior normal          Vital Signs  ED Triage Vitals [04/25/23 2309]   Temperature Pulse Respirations Blood Pressure SpO2   97 8 °F (36 6 °C) 81 20 (!) 174/78 93 %      Temp Source Heart Rate Source Patient Position - Orthostatic VS BP Location FiO2 (%)   Temporal Monitor Lying Left arm --      Pain Score       --           Vitals:    04/26/23 0004 04/26/23 0034 04/26/23 0104 04/26/23 0200   BP: 128/58 118/56 119/56 116/55   Pulse: 64 62 56 56   Patient Position - Orthostatic VS:             Visual Acuity      ED Medications  Medications - No data to display    Diagnostic Studies  Results Reviewed     Procedure Component Value Units Date/Time    HS Troponin I 2hr [[id number]]  (Normal) Collected: 04/26/23 0117    Lab Status: Final result Specimen: Blood from Arm, Right Updated: 04/26/23 0148     hs TnI 2hr 4 ng/L      Delta 2hr hsTnI -1 ng/L     HS Troponin I 4hr [[id number]]     Lab Status: No result Specimen: Blood     HS Troponin 0hr (reflex protocol) [[id number]]  (Normal) Collected: 04/25/23 2311    Lab Status: Final result Specimen: Blood from Arm, Right Updated: 04/25/23 2339     hs TnI 0hr 5 ng/L     Comprehensive metabolic panel [[id number]] Collected: 04/25/23 2311    Lab Status: Final result Specimen: Blood from Arm, Right Updated: 04/25/23 2333     Sodium 139 mmol/L      Potassium 4 0 mmol/L      Chloride 104 mmol/L      CO2 24 mmol/L      ANION GAP 11 mmol/L      BUN 23 mg/dL      Creatinine 1 15 mg/dL      Glucose 138 mg/dL      Calcium 9 6 mg/dL      AST 22 U/L      ALT 26 U/L      Alkaline Phosphatase 34 U/L      Total Protein 7 4 g/dL      Albumin 4 3 g/dL      Total Bilirubin 0 82 mg/dL      eGFR 64 ml/min/1 73sq m     Narrative:      [place] guidelines for Chronic Kidney Disease (CKD):     Stage 1 with normal or high GFR (GFR > 90 mL/min/1 73 square meters)    Stage 2 Mild CKD (GFR = 60-89 mL/min/1 73 square meters)    Stage 3A Moderate CKD (GFR = 45-59 mL/min/1 73 square meters)    Stage 3B Moderate CKD (GFR = 30-44 mL/min/1 73 square meters)    Stage 4 Severe CKD (GFR = 15-29 mL/min/1 73 square meters)    Stage 5 End Stage CKD (GFR <15 mL/min/1 73 square meters)  Note: GFR calculation is accurate only with a steady state creatinine    CBC and differential [[id number]] Collected: 04/25/23 2311    Lab Status: Final result Specimen: Blood from Arm, Right Updated: 04/25/23 2318     WBC 8 07 Thousand/uL      RBC 4 80 Million/uL      Hemoglobin 14 3 g/dL      Hematocrit 42 9 %      MCV 89 fL      MCH 29 8 pg      MCHC 33 3 g/dL      RDW 13 1 %      MPV 10 2 fL      Platelets 863 Thousands/uL      nRBC 0 /100 WBCs      Neutrophils Relative 65 %      Immat GRANS % 1 %      Lymphocytes Relative 25 %      Monocytes Relative 8 %      Eosinophils Relative 0 %      Basophils Relative 1 %      Neutrophils Absolute 5 31 Thousands/µL      Immature Grans Absolute 0 04 Thousand/uL      Lymphocytes Absolute 1 98 Thousands/µL      Monocytes Absolute 0 68 Thousand/µL      Eosinophils Absolute 0 02 Thousand/µL      Basophils Absolute 0 04 Thousands/µL                  XR chest portable   ED Interpretation by Celsa Krishnamurthy DO (04/25 6103)   No acute cardiopulmonary disease  Procedures  Procedures         ED Course  ED Course as of 04/26/23 0238   Tue Apr 25, 2023   2313 Procedure Note: EKG  Date/Time: 04/25/23 11:13 PM   Interpreted by: Celsa Krishnamurthy DO  Indications / Diagnosis: Chest pain  ECG reviewed by me, the ED Provider: yes   The EKG demonstrates:  Rhythm: normal sinus rhythm 78 BPM  Intervals: Normal HI and QT intervals  Axis: Left axis  QRS/Blocks: LAFB  ST Changes: No acute ST/T waves changes  No ARDEN  TWI isolated to aVL  Wed Apr 26, 2023   0117 Procedure Note: EKG  Date/Time: 04/26/23 1:17 AM   Interpreted by: Celsa Krishnamurthy DO  Indications / Diagnosis: Chest pain  ECG reviewed by me, the ED Provider: yes   The EKG demonstrates:  Rhythm: normal sinus rhythm 62 BPM  Intervals: First degree AV block  Normal QT intervals  Axis: Normal axis  QRS/Blocks: LAFB  ST Changes: No acute ST/T waves changes  No ARDEN  TWI isolated to aVL  0151 Patient reassessed  Vitals reassuring  Patient continues to denies chest pain  Denies any complaints at this time  Reviewed labs, serial EKG results and preliminary chest x-ray results  We will plan for discharge at this time with PCP/cardiology follow-up  HEART Risk Score    Flowsheet Row Most Recent Value   Heart Score Risk Calculator    History 1 Filed at: 04/26/2023 0149   ECG 0 Filed at: 04/26/2023 0149   Age 2 Filed at: 04/26/2023 0149   Risk Factors 2 Filed at: 04/26/2023 0149   Troponin 0 Filed at: 04/26/2023 0149   HEART Score 5 Filed at: 04/26/2023 0149                        SBIRT 20yo+    Flowsheet Row Most Recent Value   Initial Alcohol Screen: US AUDIT-C     1  How often do you have a drink containing alcohol? 0 Filed at: 04/25/2023 2309   Audit-C Score 0 Filed at: 04/25/2023 2309   LOPEZ: How many times in the past year have you        Used an illegal drug or used a prescription medication for non-medical reasons? Never Filed at: 04/25/2023 4575                    Medical Decision Making    69 y o  male presenting for chest pain  Resolved PTA, denying any complaints at time of evaluation in ED  Will order CBC, CMP, troponin, EKG, CXR to evaluate for ACS, PTX, pulmonary disease, widened mediastinum  Chart reviewed, including CTA chest dated 3/6/2023 demonstrating no PE and atherosclerotic heart disease  Do not suspect PE in the absence of tachycardia or dyspnea  Given resolution of pain and BP normalized during ED course, do not suspect aortic dissection as etiology of pain  DDx includes GERD/gastritis  Reassessment: continues to deny chest pain or any symptoms during ED course  Serial EKG and troponin WNL  Reviewed lab results with patient  BP improved and VSS in ED  After diagnostic lab testing and imaging, no definitive abnormality was noted that would explain the patient's symptoms  I explained to the patient the relevant test results  I explained to the patient that although no definitive diagnosis could be made today, the possibility exists that it may be too early in the disease process to diagnose serious illness  Disposition: I have discussed with the patient our plan to discharge them from the ED and the patient is in agreement with this plan  Discharge Plan: Continue home medication regimen  RTED precautions emphasized  The patient was provided a written after visit summary with strict RTED precautions  Followup: I have discussed with the patient plan to follow up with their PCP and cardiology  Contact information provided in AVS     Amount and/or Complexity of Data Reviewed  Labs: ordered  Radiology: ordered and independent interpretation performed  ECG/medicine tests: ordered and independent interpretation performed            Disposition  Final diagnoses:   Chest pain, unspecified type   HTN (hypertension)   [facility]DM (type 2 diabetes mellitus) [facility] )     Time reflects when diagnosis was documented in both MDM as applicable and the Disposition within this note     Time User Action Codes Description Comment    4/26/2023  1:49 AM Agustin Tania Add [R07 9] Chest pain, unspecified type     4/26/2023  1:56 AM Agustin Tania Add [I10] HTN (hypertension)     4/26/2023  1:56 AM Agustin Tania Add [E11 9] T2DM (type 2 diabetes mellitus) [facility])       ED Disposition     ED Disposition   Discharge    Condition   Stable    Date/Time   Wed Apr 26, 2023  1:56 AM    Comment   Christy Haro discharge to home/self care  Follow-up Information     Follow up With Specialties Details Why [address] [place], DO Cardiology Schedule an appointment as soon as possible for a visit  To make appointment for reevaluation in 3-5 days  9 [facility] [address]      [facility],  Internal Medicine, Hospice Services Schedule an appointment as soon as possible for a visit  To make appointment for reevaluation in 3-5 days   [place] [address]  [phone]            Discharge Medication List as of 4/26/2023  1:58 AM      CONTINUE these medications which have NOT CHANGED    Details   amLODIPine (NORVASC) 10 mg tablet Take 1 tablet (10 mg total) by mouth daily, Starting Thu 2/23/2023, Normal      aspirin 81 mg chewable tablet Chew 81 mg daily, Historical Med      atorvastatin (LIPITOR) 10 mg tablet Take 1 tablet (10 mg total) by mouth daily, Starting Wed 1/4/2023, Normal      enalapril (VASOTEC) 10 mg tablet Take 1 tablet (10 mg total) by mouth 4 (four) times a day, Starting Thu 3/23/2023, Normal      furosemide (LASIX) 40 mg tablet Take 1 tablet (40 mg total) by mouth daily, Starting Wed 1/4/2023, Normal      glucose blood (OneTouch Verio) test strip Use 1 each daily, Starting Fri 9/23/2022, Normal      hydrALAZINE (APRESOLINE) 25 mg tablet Take 1 tablet (25 mg total) by mouth 3 (three) times a day, Starting Thu 4/6/2023, Normal indomethacin (INDOCIN) 50 mg capsule PRN with the gout, Starting Tue 1/10/2023, Historical Med      ketoconazole (NIZORAL) 2 % shampoo Apply 1 application  topically 2 (two) times a week, Starting Thu 4/6/2023, Normal      Lancets (onetouch ultrasoft) lancets Check glucose daily, Normal      meclizine (ANTIVERT) 25 mg tablet Take 1 tablet (25 mg total) by mouth 3 (three) times a day as needed for dizziness (Vertigo) for up to 10 days, Starting Mon 1/23/2023, Until Thu 2/23/2023 at 2359, Normal      metFORMIN (GLUCOPHAGE) 500 mg tablet Take 1 tablet (500 mg total) by mouth daily with breakfast One tab in am, Starting Wed 1/4/2023, Normal      !! metoprolol succinate (TOPROL-XL) 25 mg 24 hr tablet Take 1 tablet (25 mg total) by mouth daily, Starting Wed 1/18/2023, Normal      !! metoprolol succinate (TOPROL-XL) 50 mg 24 hr tablet Take 1 tablet (50 mg total) by mouth daily, Starting Wed 1/18/2023, Normal      multivitamin (THERAGRAN) TABS Take 1 tablet by mouth daily, Historical Med      omeprazole (PriLOSEC) 40 MG capsule Take 1 capsule (40 mg total) by mouth daily, Starting Wed 1/4/2023, Normal      potassium chloride (Klor-Con M20) 20 mEq tablet Take 1 tablet (20 mEq total) by mouth daily, Starting Thu 4/6/2023, Normal      Red Yeast Rice Extract (RED YEAST RICE PO) Take by mouth, Historical Med      triamcinolone (KENALOG) 0 1 % lotion Apply topically 2 (two) times a day, Starting Thu 4/6/2023, Normal      triamcinolone (KENALOG) 0 5 % cream Starting Wed 9/15/2021, Historical Med      Vitamin D, Cholecalciferol, 50 MCG (2000 UT) CAPS Take 50 mcg by mouth daily, Starting Wed 1/4/2023, Normal       !! - Potential duplicate medications found  Please discuss with provider  No discharge procedures on file      PDMP Review     None          ED Provider  Electronically Signed by           Macarena Galindo DO  04/26/23 6997

## 2023-04-26 NOTE — DISCHARGE INSTRUCTIONS
You have been seen for chest pain  Please continue your home medication regimen  Return to the emergency department if you develop worsening chest pain, trouble breathing, lightheadedness, vomiting or any other symptoms of concern  Please follow up with your PCP and cardiology by calling the number provided

## 2023-04-27 ENCOUNTER — HOSPITAL ENCOUNTER (OUTPATIENT)
Dept: NON INVASIVE DIAGNOSTICS | Facility: CLINIC | Age: 68
Discharge: HOME | End: 2023-04-27

## 2023-04-27 VITALS
BODY MASS INDEX: 40.63 KG/M2 | DIASTOLIC BLOOD PRESSURE: 82 MMHG | HEIGHT: 72 IN | HEART RATE: 78 BPM | SYSTOLIC BLOOD PRESSURE: 140 MMHG | WEIGHT: 300 LBS

## 2023-04-27 DIAGNOSIS — I51.89 DIASTOLIC DYSFUNCTION: ICD-10-CM

## 2023-04-27 DIAGNOSIS — I10 PRIMARY HYPERTENSION: ICD-10-CM

## 2023-04-27 LAB
AORTIC ROOT: 0 CM
APICAL FOUR CHAMBER EJECTION FRACTION: 71 %
ATRIAL RATE: 62 BPM
ATRIAL RATE: 78 BPM
FRACTIONAL SHORTENING: 24 % (ref 28–44)
INTERVENTRICULAR SEPTUM IN DIASTOLE (PARASTERNAL SHORT AXIS VIEW): 1 CM
INTERVENTRICULAR SEPTUM: 1 CM (ref 0.6–1.1)
LAAS-AP2: 29.4 CM2
LAAS-AP4: 16.6 CM2
LEFT ATRIUM SIZE: 4.9 CM
LEFT INTERNAL DIMENSION IN SYSTOLE: 4.4 CM (ref 2.1–4)
LEFT VENTRICULAR INTERNAL DIMENSION IN DIASTOLE: 5.8 CM (ref 3.5–6)
LEFT VENTRICULAR POSTERIOR WALL IN END DIASTOLE: 1 CM
LEFT VENTRICULAR STROKE VOLUME: 82 ML
LVSV (TEICH): 82 ML
P AXIS: 57 DEGREES
P AXIS: 64 DEGREES
PR INTERVAL: 188 MS
PR INTERVAL: 202 MS
QRS AXIS: -61 DEGREES
QRS AXIS: -64 DEGREES
QRSD INTERVAL: 116 MS
QRSD INTERVAL: 120 MS
QT INTERVAL: 400 MS
QT INTERVAL: 436 MS
QTC INTERVAL: 442 MS
QTC INTERVAL: 456 MS
RIGHT ATRIUM AREA SYSTOLE A4C: 23.2 CM2
RIGHT VENTRICLE ID DIMENSION: 4.1 CM
SL CV LEFT ATRIUM LENGTH A2C: 6.6 CM
SL CV LV EF: 60
SL CV PED ECHO LEFT VENTRICLE DIASTOLIC VOLUME (MOD BIPLANE) 2D: 169 ML
SL CV PED ECHO LEFT VENTRICLE SYSTOLIC VOLUME (MOD BIPLANE) 2D: 87 ML
T WAVE AXIS: 67 DEGREES
T WAVE AXIS: 76 DEGREES
TRICUSPID ANNULAR PLANE SYSTOLIC EXCURSION: 1.8 CM
VENTRICULAR RATE: 62 BPM
VENTRICULAR RATE: 78 BPM

## 2023-04-30 LAB
EST. AVERAGE GLUCOSE BLD GHB EST-MCNC: 126 MG/DL
HBA1C MFR BLD: 6 %

## 2023-05-02 ENCOUNTER — OFFICE VISIT (OUTPATIENT)
Dept: FAMILY MEDICINE CLINIC | Facility: CLINIC | Age: 68
End: 2023-05-02

## 2023-05-02 VITALS
TEMPERATURE: 98.7 F | SYSTOLIC BLOOD PRESSURE: 136 MMHG | WEIGHT: 306 LBS | HEART RATE: 76 BPM | BODY MASS INDEX: 41.45 KG/M2 | HEIGHT: 72 IN | DIASTOLIC BLOOD PRESSURE: 74 MMHG | RESPIRATION RATE: 18 BRPM

## 2023-05-02 DIAGNOSIS — E11.69 DIABETES MELLITUS TYPE 2 IN OBESE (HCC): Primary | ICD-10-CM

## 2023-05-02 DIAGNOSIS — E66.9 DIABETES MELLITUS TYPE 2 IN OBESE (HCC): Primary | ICD-10-CM

## 2023-05-02 DIAGNOSIS — I87.303 STASIS EDEMA OF BOTH LOWER EXTREMITIES: ICD-10-CM

## 2023-05-02 DIAGNOSIS — I10 BENIGN ESSENTIAL HTN: ICD-10-CM

## 2023-05-02 PROBLEM — I16.0 HYPERTENSIVE URGENCY: Status: RESOLVED | Noted: 2023-03-16 | Resolved: 2023-05-02

## 2023-05-02 NOTE — PROGRESS NOTES
Name: Tierra Schmidt      : 1955      MRN: [id number]  Encounter Provider: Jasvir Flores DO  Encounter Date: 2023   Encounter department: [address]     1  Diabetes mellitus type 2 in obese (HCC)  -     Hemoglobin A1C; Future; Expected date: 2023  -     Comprehensive metabolic panel; Future; Expected date: 2023  Recheck A1c in 3 months     2  Benign essential HTN  No added salt BP has improved and has Cardiology appt upcoming     3  Stasis edema of both lower extremities  Stable He plans to be more active in the nicer weather outside         Depression Screening and Follow-up Plan: Patient was screened for depression during today's encounter  They screened negative with a PHQ-2 score of 0  Rto 3 months   Subjective      HPI   Pt feeling better in general BP stable on current He does have some lightheadedness when he first gets up but overall he feels better and BP trend much better Overall He did not yet go to vestibular therapy but plans to call and has MRI upcoming for ENt He had echo and holter for cardio and has appt this week for followup No chest pain He plans to be outside more as the weather improves and will work on continued weight loss   Review of Systems   Constitutional: Negative for chills and fever  HENT: Negative  Eyes: Negative for visual disturbance  Respiratory: Negative for cough and shortness of breath  Cardiovascular: Positive for leg swelling  Negative for chest pain and palpitations  Gastrointestinal: Negative for abdominal distention and abdominal pain  Genitourinary: Negative  Musculoskeletal: Positive for arthralgias  Neurological: Positive for dizziness  Negative for light-headedness and headaches  Psychiatric/Behavioral: Negative for sleep disturbance  The patient is not nervous/anxious          Current Outpatient Medications on File Prior to Visit   Medication Sig    amLODIPine (NORVASC) 10 mg tablet Take 1 tablet (10 mg total) by mouth daily (Patient taking differently: Take 20 mg by mouth daily)    aspirin 81 mg chewable tablet Chew 81 mg daily    atorvastatin (LIPITOR) 10 mg tablet Take 1 tablet (10 mg total) by mouth daily    enalapril (VASOTEC) 10 mg tablet Take 1 tablet (10 mg total) by mouth 4 (four) times a day (Patient taking differently: Take 10 mg by mouth 4 (four) times a day 2 tabs in the am, 1 tab in the afternoon, 1 tab @ hs )    furosemide (LASIX) 40 mg tablet Take 1 tablet (40 mg total) by mouth daily    glucose blood (OneTouch Verio) test strip Use 1 each daily    hydrALAZINE (APRESOLINE) 25 mg tablet Take 1 tablet (25 mg total) by mouth 3 (three) times a day    indomethacin (INDOCIN) 50 mg capsule PRN with the gout    ketoconazole (NIZORAL) 2 % shampoo Apply 1 application   topically 2 (two) times a week    Lancets (onetouch ultrasoft) lancets Check glucose daily    meclizine (ANTIVERT) 25 mg tablet Take 1 tablet (25 mg total) by mouth 3 (three) times a day as needed for dizziness (Vertigo) for up to 10 days    metFORMIN (GLUCOPHAGE) 500 mg tablet Take 1 tablet (500 mg total) by mouth daily with breakfast One tab in am    metoprolol succinate (TOPROL-XL) 25 mg 24 hr tablet Take 1 tablet (25 mg total) by mouth daily    metoprolol succinate (TOPROL-XL) 50 mg 24 hr tablet Take 1 tablet (50 mg total) by mouth daily    multivitamin (THERAGRAN) TABS Take 1 tablet by mouth daily    omeprazole (PriLOSEC) 40 MG capsule Take 1 capsule (40 mg total) by mouth daily    potassium chloride (Klor-Con M20) 20 mEq tablet Take 1 tablet (20 mEq total) by mouth daily    Red Yeast Rice Extract (RED YEAST RICE PO) Take by mouth    triamcinolone (KENALOG) 0 1 % lotion Apply topically 2 (two) times a day    triamcinolone (KENALOG) 0 5 % cream     Vitamin D, Cholecalciferol, 50 MCG (2000 UT) CAPS Take 50 mcg by mouth daily       Objective     /74   Pulse 76   Temp 98 7 °F (37 1 °C) (Temporal) Resp 18   Ht 6' (1 829 m)   Wt (!) 139 kg (306 lb)   BMI 41 50 kg/m²     Physical Exam  Vitals reviewed  Constitutional:       General: He is not in acute distress  Appearance: Normal appearance  He is not ill-appearing, toxic-appearing or diaphoretic  HENT:      Head: Normocephalic and atraumatic  Right Ear: External ear normal       Left Ear: External ear normal       Nose: Nose normal       Mouth/Throat:      Mouth: Mucous membranes are moist    Eyes:      General: No scleral icterus  Extraocular Movements: Extraocular movements intact  Conjunctiva/sclera: Conjunctivae normal       Pupils: Pupils are equal, round, and reactive to light  Cardiovascular:      Rate and Rhythm: Normal rate and regular rhythm  Pulses: Normal pulses  Pulmonary:      Effort: Pulmonary effort is normal  No respiratory distress  Breath sounds: Normal breath sounds  No wheezing  Abdominal:      General: Bowel sounds are normal  There is no distension  Palpations: Abdomen is soft  Tenderness: There is no abdominal tenderness  Musculoskeletal:      Cervical back: Normal range of motion and neck supple  Right lower leg: Edema present  Left lower leg: Edema present  Lymphadenopathy:      Cervical: No cervical adenopathy  Skin:     General: Skin is warm and dry  Neurological:      General: No focal deficit present  Mental Status: He is alert and oriented to person, place, and time  Mental status is at baseline  Psychiatric:         Mood and Affect: Mood normal          Behavior: Behavior normal          Thought Content:  Thought content normal          Judgment: Judgment normal        Todd Torres DO

## 2023-05-03 ENCOUNTER — CLINICAL SUPPORT (OUTPATIENT)
Dept: CARDIOLOGY CLINIC | Facility: CLINIC | Age: 68
End: 2023-05-03

## 2023-05-03 DIAGNOSIS — E11.69 DIABETES MELLITUS TYPE 2 IN OBESE (HCC): ICD-10-CM

## 2023-05-03 DIAGNOSIS — I10 HYPERTENSION, UNSPECIFIED TYPE: ICD-10-CM

## 2023-05-03 DIAGNOSIS — E66.9 DIABETES MELLITUS TYPE 2 IN OBESE (HCC): ICD-10-CM

## 2023-05-04 ENCOUNTER — EVALUATION (OUTPATIENT)
Dept: PHYSICAL THERAPY | Facility: CLINIC | Age: 68
End: 2023-05-04

## 2023-05-04 ENCOUNTER — OFFICE VISIT (OUTPATIENT)
Dept: CARDIOLOGY CLINIC | Facility: CLINIC | Age: 68
End: 2023-05-04

## 2023-05-04 VITALS
WEIGHT: 304 LBS | BODY MASS INDEX: 41.17 KG/M2 | SYSTOLIC BLOOD PRESSURE: 122 MMHG | DIASTOLIC BLOOD PRESSURE: 60 MMHG | HEIGHT: 72 IN | HEART RATE: 68 BPM

## 2023-05-04 DIAGNOSIS — I50.32 CHRONIC DIASTOLIC HEART FAILURE (HCC): Primary | ICD-10-CM

## 2023-05-04 DIAGNOSIS — E78.5 DYSLIPIDEMIA: ICD-10-CM

## 2023-05-04 DIAGNOSIS — I10 BENIGN ESSENTIAL HTN: ICD-10-CM

## 2023-05-04 DIAGNOSIS — R00.2 PALPITATIONS: ICD-10-CM

## 2023-05-04 DIAGNOSIS — R42 POSTURAL DIZZINESS WITH PRESYNCOPE: ICD-10-CM

## 2023-05-04 DIAGNOSIS — R42 DIZZINESS: ICD-10-CM

## 2023-05-04 DIAGNOSIS — R55 POSTURAL DIZZINESS WITH PRESYNCOPE: ICD-10-CM

## 2023-05-04 RX ORDER — METOPROLOL SUCCINATE 50 MG/1
50 TABLET, EXTENDED RELEASE ORAL DAILY
Qty: 90 TABLET | Refills: 3 | Status: SHIPPED | OUTPATIENT
Start: 2023-05-04

## 2023-05-04 RX ORDER — FUROSEMIDE 40 MG/1
40 TABLET ORAL DAILY
Qty: 90 TABLET | Refills: 3 | Status: SHIPPED | OUTPATIENT
Start: 2023-05-04

## 2023-05-04 RX ORDER — ATORVASTATIN CALCIUM 10 MG/1
10 TABLET, FILM COATED ORAL DAILY
Qty: 90 TABLET | Refills: 3 | Status: SHIPPED | OUTPATIENT
Start: 2023-05-04

## 2023-05-04 RX ORDER — METOPROLOL SUCCINATE 25 MG/1
25 TABLET, EXTENDED RELEASE ORAL DAILY
Qty: 90 TABLET | Refills: 3 | Status: SHIPPED | OUTPATIENT
Start: 2023-05-04

## 2023-05-04 RX ORDER — AMLODIPINE BESYLATE 10 MG/1
20 TABLET ORAL DAILY
Qty: 90 TABLET | Refills: 3 | Status: SHIPPED | OUTPATIENT
Start: 2023-05-04

## 2023-05-04 RX ORDER — HYDRALAZINE HYDROCHLORIDE 25 MG/1
25 TABLET, FILM COATED ORAL 3 TIMES DAILY
Qty: 270 TABLET | Refills: 3 | Status: SHIPPED | OUTPATIENT
Start: 2023-05-04

## 2023-05-04 RX ORDER — ENALAPRIL MALEATE 10 MG/1
10 TABLET ORAL 4 TIMES DAILY
Qty: 360 TABLET | Refills: 3 | Status: SHIPPED | OUTPATIENT
Start: 2023-05-04

## 2023-05-04 NOTE — ASSESSMENT & PLAN NOTE
Wt Readings from Last 3 Encounters:   05/04/23 (!) 138 kg (304 lb)   05/02/23 (!) 139 kg (306 lb)   04/27/23 136 kg (300 lb)   Volume stable  Continue current Lasix dose   Lasix 40 mg daily  Continue low-sodium fluid restricted diet  Patient wearing compression stockings for lower extremity edema  GDMT with metoprolol and enalapril

## 2023-05-04 NOTE — PROGRESS NOTES
PT Evaluation     Today's date: 2023  Patient name: Roberto Carlos Milton  : 1955  MRN: [id number]  Referring provider: Lanny Pallas*  Dx:   Encounter Diagnosis     ICD-10-CM    1  Postural dizziness with presyncope  R42 Ambulatory Referral to Physical Therapy    R55       2  Dizziness  R42 Ambulatory Referral to Physical Therapy          Start Time: 1500  Stop Time: 1600  Total time in clinic (min): 60 minutes    Assessment  Assessment details: The patient is a 68 yo male who presents to physical therapy with signs and symptoms consistent with positional vertigo  Saccade testing was also positive for slow speed and poor tracking  A horizontal roll maneuver was performed although it is slightly limited by patient's difficulty with rolling in bed  Patient has limitations in cervical ROM in all directions  He is instructed to perform horizontal and vertical saccades at home 2-3x/day  He will be reassessed next week and treatment will be provided accordingly  He would benefit from a course of skilled physical therapy to address deficits in ROM, visual acuity and balance  Other impairment: dizziness  Understanding of Dx/Px/POC: good   Prognosis: good    Goals  STG (1 week)   1  Patient will reports no episodes of vertigo for 1 week   2  Patient will resume all daily activities with no vertigo    LTG (3 weeks)   1  Patient will remain vertigo free   2  Return to PLOF   3  Patient will be independent with HEP     Plan  Patient would benefit from: skilled physical therapy  Planned therapy interventions: balance, neuromuscular re-education, canalith repositioning and home exercise program  Frequency: 1-2x/week    Duration in weeks: 6  Plan of Care beginning date: 2023  Plan of Care expiration date: 6/15/2023  Treatment plan discussed with: patient        Subjective Evaluation    History of Present Illness  Mechanism of injury: The patient reports a several year history of dizziness that occurs when he lays flat and when he first gets up  He describes it as light headed and sometimes spinning that lasts for 5 minutes  He is scheduled for a brain MRI tomorrow  Recurrent probem    Pain  No pain reported    Social Support  Stairs in house: no   Lives in: [place]  Lives with: spouse    Employment status: not working    Diagnostic Tests  No diagnostic tests performed  Treatments  No previous or current treatments  Patient Goals  Patient goal: stop the dizziness        Objective     Concurrent Complaints  Negative for headaches, nausea/motion sickness, tinnitus, visual change, hearing loss, memory loss, aural fullness, poor concentration and peripheral neuropathy  Neuro Exam:     Dizziness  Positive for disequilibrium, vertigo and light-headedness  Negative for oscillopsia, motion sickness, rocking or swaying, diplopia and floating or swimming  Exacerbating factors  Positive for bending over and supine to/from sitting  Negative for rolling in bed, looking up, walking, turning head, optokinetic movement and walking in busy environment       Headaches   Patient reports headaches: No      Oculomotor exam   Oculomotor ROM: WNL  Resting nystagmus: not present   Gaze holding nystagmus: not present left  and not present right  Smooth pursuits: within normal limits  Vertical saccades: hypometric  Horizontal saccades: hypometric   Convergence: normal    Positional testing   Poyntelle-Hallpike   Left posterior canal: WNL  Right posterior canal: WNL  Roll test   Right horizontal canal: symptomatic  Positional testing comment: C/S AROM Flex 75% of normal                      Ext 10%                     R SB 10%   L SB 10%                     R rot 25%   L rot 25%        Balance assessments   MCTSIB   Eyes open level surface: 30 sec  Eyes closed level surface: 30 sec             Precautions: DM, vertigo  Diagnosis, prognosis and PT POC discussed with patient       5/4            Manuals "                       Neuro Re-Ed             Canalith Repositioning R horiz roll            Saccades Horizontal and vertical 30\" ea                                                                             Ther Ex                                                                                                                     Ther Activity                                       Gait Training                                       Modalities                                          "

## 2023-05-04 NOTE — ASSESSMENT & PLAN NOTE
Controlled on current medical regimen  Continue amlodipine 10 mg daily enalapril 10 mg four times daily furosemide 40 mg daily hydralazine 25 mg TID and metoprolol 75 mg

## 2023-05-04 NOTE — PROGRESS NOTES
[address] [place] [address] [facility]   Outpatient Note  Nelson Bass  1955  [id number]  [facility] Alyse Hoffman  [address] [place] [id number]  [facility]    Nelson Bass is a 69 y o  male    Assessment and Plan:   Benign essential HTN  Controlled on current medical regimen  Continue amlodipine 10 mg daily enalapril 10 mg four times daily furosemide 40 mg daily hydralazine 25 mg TID and metoprolol 75 mg    Dyslipidemia  Tolerating very low-dose statin therapy  Continue atorvastatin 10 mg daily    Chronic diastolic heart failure (HCC)  Wt Readings from Last 3 Encounters:   05/04/23 (!) 138 kg (304 lb)   05/02/23 (!) 139 kg (306 lb)   04/27/23 136 kg (300 lb)   Volume stable  Continue current Lasix dose   Lasix 40 mg daily  Continue low-sodium fluid restricted diet  Patient wearing compression stockings for lower extremity edema  GDMT with metoprolol and enalapril          Palpitations  Symptoms improved  Continue current metoprolol dose  Holter monitor reviewed with patient        Additional Plan:   No Medication changes made or testing ordered today  Available lab and test results are reviewed with the patient as noted  Return visit will be in six months or earlier if there are problems  The patient is encouraged to call in the meantime if there are questions or concerns  Subjective:   Patient is seen in the office today for follow-up regarding HFpEF, chronic edema, HTN and HLD  He states that since his last visit his symptoms of lightheadedness and palpitations have dramatically improved  He was advised to take his time when getting up from a sitting position and he has noticed that this dramatically improved his symptomatology  His palpitations have resolved and he no longer feels lightheaded when getting up too quickly  He states that he feels he needed \"time to get used to his medication  \"  Blood pressure is well " controlled on current regimen  He is tolerating current dose of atorvastatin  He has no complaints of exertional chest discomfort or dyspnea  He has no increasing edema  He is wearing compression stockings for chronic lymphedema  He has no orthopnea or PND  He denies any TIA or CVA symptoms        Social History  Social History     Tobacco Use   Smoking Status Former    Passive exposure: Never   Smokeless Tobacco Never   Tobacco Comments    former smoker-quit 17 yrs ago 1pppd x 30 yrs as per [employer]   ,   Social History     Substance and Sexual Activity   Alcohol Use Never   ,   Social History     Substance and Sexual Activity   Drug Use Never     Family History   Problem Relation Age of Onset    Hypertension Mother         essential    Stroke Mother     Hypertension Father         essential    Heart defect Father         cardiac disorder    Stroke Brother     Hypertension Brother        Medical and Surgical History  Past Medical History:   Diagnosis Date    Arthritis     last assessed 7/1/15    Bronchitis 2/2/2023    Diabetes mellitus ([facility] [address] )     type 2-last assessed 1/28/15    Diabetes mellitus ([facility] [address] ) 3/16/2018    GERD (gastroesophageal reflux disease)     Hypertension     Hypertensive urgency 3/16/2023    Irregular heart beat     last assessed 7/1/15    Palpitations     last assessed 9/7/17    Sexual dysfunction     last assessed 7/1/15    Thyroid disease     last assessed 7/1/15     Past Surgical History:   Procedure Laterality Date    COLONOSCOPY      TONSILLECTOMY           Current Outpatient Medications:     amLODIPine (NORVASC) 10 mg tablet, Take 2 tablets (20 mg total) by mouth daily, Disp: 90 tablet, Rfl: 3    aspirin 81 mg chewable tablet, Chew 81 mg daily, Disp: , Rfl:     atorvastatin (LIPITOR) 10 mg tablet, Take 1 tablet (10 mg total) by mouth daily, Disp: 90 tablet, Rfl: 3    enalapril (VASOTEC) 10 mg tablet, Take 1 tablet (10 mg total) by mouth 4 (four) times a day 2 tabs in the am, 1 tab in the afternoon, 1 tab @ hs , Disp: 360 tablet, Rfl: 3    furosemide (LASIX) 40 mg tablet, Take 1 tablet (40 mg total) by mouth daily, Disp: 90 tablet, Rfl: 3    glucose blood (OneTouch Verio) test strip, Use 1 each daily, Disp: 100 each, Rfl: 5    hydrALAZINE (APRESOLINE) 25 mg tablet, Take 1 tablet (25 mg total) by mouth 3 (three) times a day, Disp: 270 tablet, Rfl: 3    indomethacin (INDOCIN) 50 mg capsule, PRN with the gout, Disp: , Rfl:     ketoconazole (NIZORAL) 2 % shampoo, Apply 1 application   topically 2 (two) times a week, Disp: 120 mL, Rfl: 3    Lancets (onetouch ultrasoft) lancets, Check glucose daily, Disp: 100 each, Rfl: 1    metFORMIN (GLUCOPHAGE) 500 mg tablet, Take 1 tablet (500 mg total) by mouth daily with breakfast One tab in am, Disp: 90 tablet, Rfl: 3    metoprolol succinate (TOPROL-XL) 25 mg 24 hr tablet, Take 1 tablet (25 mg total) by mouth daily, Disp: 90 tablet, Rfl: 3    metoprolol succinate (TOPROL-XL) 50 mg 24 hr tablet, Take 1 tablet (50 mg total) by mouth daily, Disp: 90 tablet, Rfl: 3    multivitamin (THERAGRAN) TABS, Take 1 tablet by mouth daily, Disp: , Rfl:     omeprazole (PriLOSEC) 40 MG capsule, Take 1 capsule (40 mg total) by mouth daily, Disp: 90 capsule, Rfl: 3    potassium chloride (Klor-Con M20) 20 mEq tablet, Take 1 tablet (20 mEq total) by mouth daily, Disp: 90 tablet, Rfl: 3    Red Yeast Rice Extract (RED YEAST RICE PO), Take by mouth, Disp: , Rfl:     triamcinolone (KENALOG) 0 1 % lotion, Apply topically 2 (two) times a day, Disp: 60 mL, Rfl: 3    triamcinolone (KENALOG) 0 5 % cream, , Disp: , Rfl:     Vitamin D, Cholecalciferol, 50 MCG (2000 UT) CAPS, Take 50 mcg by mouth daily, Disp: 90 capsule, Rfl: 3    meclizine (ANTIVERT) 25 mg tablet, Take 1 tablet (25 mg total) by mouth 3 (three) times a day as needed for dizziness (Vertigo) for up to 10 days, Disp: 30 tablet, Rfl: 0  Allergies   Allergen Reactions    Acetaminophen Other (See "Comments)     Other reaction(s): Unknown Reaction  \"I had to go to the hospital and get shots after taking it 20yrs ago\"       Review of Systems   Constitutional: Negative  HENT: Negative  Eyes: Negative  Cardiovascular: Positive for leg swelling  Negative for chest pain, claudication, cyanosis, dyspnea on exertion, irregular heartbeat, near-syncope, orthopnea, palpitations, paroxysmal nocturnal dyspnea and syncope  Respiratory: Negative  Negative for cough, hemoptysis, shortness of breath, sleep disturbances due to breathing, snoring, sputum production and wheezing  Endocrine: Negative  Hematologic/Lymphatic: Negative  Skin: Negative  Musculoskeletal: Negative  Gastrointestinal: Negative  Genitourinary: Negative  Neurological: Positive for light-headedness  Psychiatric/Behavioral: Negative  Allergic/Immunologic: Negative  Objective:   /60 Comment: right arm  Pulse 68   Ht 6' (1 829 m)   Wt (!) 138 kg (304 lb)   BMI 41 23 kg/m²   Physical Exam  Vitals and nursing note reviewed  Constitutional:       Appearance: He is well-developed  He is obese  HENT:      Head: Normocephalic and atraumatic  Mouth/Throat:      Mouth: Mucous membranes are moist    Eyes:      General: No scleral icterus  Conjunctiva/sclera: Conjunctivae normal    Neck:      Thyroid: No thyromegaly  Vascular: No JVD  Cardiovascular:      Rate and Rhythm: Normal rate and regular rhythm  Heart sounds: Normal heart sounds, S1 normal and S2 normal  No murmur heard  No friction rub  No gallop  Pulmonary:      Effort: No respiratory distress  Breath sounds: No wheezing or rales  Abdominal:      General: Bowel sounds are normal  There is distension  Palpations: Abdomen is soft  Tenderness: There is no abdominal tenderness  Comments: Aorta not palpable   Musculoskeletal:         General: No tenderness or deformity  Normal range of motion        Cervical " back: Normal range of motion and neck supple  Right lower leg: Edema present  Left lower leg: Edema present  Skin:     General: Skin is warm and dry  Neurological:      General: No focal deficit present  Mental Status: He is alert and oriented to person, place, and time     Psychiatric:         Mood and Affect: Mood normal          Behavior: Behavior normal          Judgment: Judgment normal          Lab Review:   Lab Results   Component Value Date    CHOL 145 12/16/2015     Lab Results   Component Value Date    HDL 37 (L) 10/27/2022     Lab Results   Component Value Date    LDLCALC 82 10/27/2022     Lab Results   Component Value Date    TRIG 71 10/27/2022     Results Reviewed     None        Results Reviewed     None        Results Reviewed     None          Recent Cardiovascular Testing:   Echo 4/27/2023: Normal LVEF 60%, mild LVH normal diastolic function TAPSE greater than 1 7 cm mild LAE mild MAC mild MR    ZIO monitor 5/4/2023 sinus rhythm with first-degree heart block  average 64 SVT runs fastest 10 beats at 126, longest 15 beats at 94, some junctional rhythm present, SVE rare, no A-fib VE rare one slow run of 4 beats at 103    ECG Review:   4/26/2023:Normal sinus rhythm  Left anterior fascicular block  Poor anterior R wave progression is noted

## 2023-05-05 ENCOUNTER — HOSPITAL ENCOUNTER (OUTPATIENT)
Dept: MRI IMAGING | Facility: HOSPITAL | Age: 68
End: 2023-05-05

## 2023-05-05 DIAGNOSIS — H90.3 ASYMMETRIC SNHL (SENSORINEURAL HEARING LOSS): ICD-10-CM

## 2023-05-10 ENCOUNTER — OFFICE VISIT (OUTPATIENT)
Dept: PHYSICAL THERAPY | Facility: CLINIC | Age: 68
End: 2023-05-10

## 2023-05-10 DIAGNOSIS — R55 POSTURAL DIZZINESS WITH PRESYNCOPE: Primary | ICD-10-CM

## 2023-05-10 DIAGNOSIS — R42 DIZZINESS: ICD-10-CM

## 2023-05-10 DIAGNOSIS — R42 POSTURAL DIZZINESS WITH PRESYNCOPE: Primary | ICD-10-CM

## 2023-05-10 NOTE — PROGRESS NOTES
"Daily Note     Today's date: 5/10/2023  Patient name: Zuleima Garcia  : 1955  MRN: [id number]  Referring provider: Maryam Jiang*  Dx:   Encounter Diagnosis     ICD-10-CM    1  Postural dizziness with presyncope  R42     R55       2  Dizziness  R42           Start Time: 1600  Stop Time: 1620  Total time in clinic (min): 20 minutes    Subjective: The patient states that he had an MRI the day following his last PT session and he was able to lay down without any vertigo  He hasn't had an episode in 2 weeks  He has been performing his home exercise program 3-4x/day as directed  Objective: See treatment diary below      Assessment: Slightly altered tracking present with vertical saccades  Patient declined positional testing  Discussed calling therapist if vertigo returns  He is instructed to continue with saccades at home  He will follow up with PT clinic within the next 2 weeks if he needs to be seen in the clinic  Plan: PT on hold for 2 weeks  If symptoms return, the patient is instructed to contact clinic to schedule treatment        Precautions: DM, vertigo  Diagnosis, prognosis and PT POC discussed with patient         5/4 5/10           Manuals                                                    Neuro Re-Ed             Canalith Repositioning R horiz roll            Saccades Horizontal and vertical 30\" ea Horizontal and vert 30\" ea                                                                            Ther Ex                                                                                                                     Ther Activity                                       Gait Training                                       Modalities                                            "

## 2023-05-16 ENCOUNTER — OFFICE VISIT (OUTPATIENT)
Dept: OTOLARYNGOLOGY | Facility: CLINIC | Age: 68
End: 2023-05-16

## 2023-05-16 VITALS
BODY MASS INDEX: 40.96 KG/M2 | HEIGHT: 72 IN | OXYGEN SATURATION: 96 % | SYSTOLIC BLOOD PRESSURE: 110 MMHG | DIASTOLIC BLOOD PRESSURE: 80 MMHG | HEART RATE: 74 BPM | WEIGHT: 302.4 LBS | TEMPERATURE: 97.7 F

## 2023-05-16 DIAGNOSIS — R42 DIZZINESS: Primary | ICD-10-CM

## 2023-05-16 DIAGNOSIS — H90.3 ASYMMETRIC SNHL (SENSORINEURAL HEARING LOSS): ICD-10-CM

## 2023-05-16 DIAGNOSIS — H90.3 SENSORINEURAL HEARING LOSS (SNHL) OF BOTH EARS: ICD-10-CM

## 2023-05-16 NOTE — PROGRESS NOTES
Otolaryngology Clinic Visit  Name:  Joesph Leslie  MRN:  [id number]  Date:  5/16/2023 2:11 PM  ________________________________________________________________________       CHIEF COMPLAINT:   dizziness     HPI:  Joesph Leslie is a 69 y o  male with PMH as below who presents today for evaluation of dizziness  It has been happening for several years  There is no vertigo  No room spending  He has a sensation when he gets up from laying down of feeling light headed and imbalance  He has seen cardiology  He denies ear issues  No issues with his nose or throat today  No other ENT questions or concerns today      Interval history  MRI as below  Dizziness a bit better      PMHx:  Past Medical History:   Diagnosis Date   • Arthritis     last assessed 7/1/15   • Bronchitis 2/2/2023   • Diabetes mellitus ([facility] [address] )     type 2-last assessed 1/28/15   • Diabetes mellitus ([facility] [address] ) 3/16/2018   • GERD (gastroesophageal reflux disease)    • Hypertension    • Hypertensive urgency 3/16/2023   • Irregular heart beat     last assessed 7/1/15   • Palpitations     last assessed 9/7/17   • Sexual dysfunction     last assessed 7/1/15   • Thyroid disease     last assessed 7/1/15       PSHx:  Past Surgical History:   Procedure Laterality Date   • COLONOSCOPY     • TONSILLECTOMY         FAMHx:  Family History   Problem Relation Age of Onset   • Hypertension Mother         essential   • Stroke Mother    • Hypertension Father         essential   • Heart defect Father         cardiac disorder   • Stroke Brother    • Hypertension Brother        SOCHx:  Social History     Socioeconomic History   • Marital status: Single     Spouse name: None   • Number of children: None   • Years of education: None   • Highest education level: None   Occupational History   • None   Tobacco Use   • Smoking status: Former     Passive exposure: Never   • Smokeless tobacco: Never   • Tobacco comments:     former smoker-quit 17 yrs ago 1pppd x 30 yrs as per [employer]   Vaping "Use   • Vaping Use: Never used   Substance and Sexual Activity   • Alcohol use: Never   • Drug use: Never   • Sexual activity: None   Other Topics Concern   • None   Social History Narrative   • None     Social Determinants of Health     Financial Resource Strain: Low Risk    • Difficulty of Paying Living Expenses: Not very hard   Food Insecurity: Not on file   Transportation Needs: No Transportation Needs   • Lack of Transportation (Medical): No   • Lack of Transportation (Non-Medical): No   Physical Activity: Not on file   Stress: Not on file   Social Connections: Not on file   Intimate Partner Violence: Not on file   Housing Stability: Not on file       Allergies: Allergies   Allergen Reactions   • Acetaminophen Other (See Comments)     Other reaction(s): Unknown Reaction  \"I had to go to the hospital and get shots after taking it 20yrs ago\"        MEDS:  Reviewed    ROS:  As above       PHYSICAL EXAM:  /80 (BP Location: Right arm, Cuff Size: Large)   Pulse 74   Temp 97 7 °F (36 5 °C) (Temporal)   Ht 6' (1 829 m)   Wt (!) 137 kg (302 lb 6 4 oz)   SpO2 96%   BMI 41 01 kg/m²   General: NAD, AOx4  Eyes:  EOMI  Ears:  Right: ear canal normal, TM normal apperance, no fluid  Left: ear canal normal, TM normal apperance, no fluid  Nasal: No external deformity   Oral cavity:  Unremarkable  Neck: Unremarkable  Lymph:  Unremarkable  Skin:  No obvious facial lesions  Neuro: Face symmetrical, no obvious cranial nerve palsy   No focal deficits   Lungs:  Normal work of breathing, symmetrical chest expansion  Vascular: Well perfused      Procedures:  None     Medical Data Reviewed:  Records reviewed and summarized as in EPIC    Radiology:  MRI reviewed normal    Labs:  None     Patient Active Problem List   Diagnosis   • Benign essential HTN   • Dyslipidemia   • GERD (gastroesophageal reflux disease)   • AYLEEN (obstructive sleep apnea)   • Stasis edema of both lower extremities   • Morbid obesity ([address] )   • Diastolic " dysfunction   • Diabetes mellitus type 2 in obese [facility])   • Lymphedema   • Palpitations   • Chronic diastolic heart failure [facility])       ASSESSMENT/PLAN:  Laretta Klinefelter is a 69 y o  male with acute and chronic problems as above who presents with:    1  Dizziness    2  Sensorineural hearing loss (SNHL) of both ears    3  Asymmetric SNHL (sensorineural hearing loss)        69 y o  here today for further evaluation of imbalance and dizziness  No true signs of peripheral vertigo today however will send him to PT for evaluation and treatment  He has done well with them  MRI reassuring for his asymetry   Will follow with yearly audiogram     RTC 1 erika Suarez MD MPH  Otolaryngology--Head and Neck Surgery  [facility]  5/16/2023 2:11 PM

## 2023-05-22 ENCOUNTER — TELEPHONE (OUTPATIENT)
Dept: NON INVASIVE DIAGNOSTICS | Facility: HOSPITAL | Age: 68
End: 2023-05-22

## 2023-05-22 NOTE — TELEPHONE ENCOUNTER
Attempted to call patient to go over results of Zio patch monitor with him  Unfortunately I was not able to reach the patient and was unable to leave a voicemail as his voicemail box was listed as full could not accept new messages  Will have office staff attempt to reach patient to discuss better time to call so that I can speak with him about results

## 2023-05-24 ENCOUNTER — TELEPHONE (OUTPATIENT)
Dept: NON INVASIVE DIAGNOSTICS | Facility: HOSPITAL | Age: 68
End: 2023-05-24

## 2023-05-24 DIAGNOSIS — I49.8 JUNCTIONAL RHYTHM: Primary | ICD-10-CM

## 2023-05-24 NOTE — TELEPHONE ENCOUNTER
I was able to call and speak with patient about Zio patch monitor results  In discussing medication recommendation changes for some reduction in metoprolol therapy due to intermittent episodes of junctional rhythm patient notes that he has tried to reduce beta-blocker therapy in the past and had such significant palpitations that these were not well-tolerated  In that setting will give patient referral to electrophysiology for second opinion and assistance with medical therapy  Patient does note that overall he feels well and blood pressures are much better controlled on current regimen  Patient will be seen by sleep medicine as well  We will have office staff assist in setting patient up with electrophysiology  Patient counseled if he were to have any warning or alarm type symptoms he is to seek emergency medical care immediately

## 2023-05-25 ENCOUNTER — CONSULT (OUTPATIENT)
Dept: CARDIOLOGY CLINIC | Facility: CLINIC | Age: 68
End: 2023-05-25

## 2023-05-25 VITALS
HEIGHT: 72 IN | HEART RATE: 51 BPM | WEIGHT: 304.9 LBS | SYSTOLIC BLOOD PRESSURE: 116 MMHG | DIASTOLIC BLOOD PRESSURE: 64 MMHG | BODY MASS INDEX: 41.3 KG/M2

## 2023-05-25 DIAGNOSIS — I10 BENIGN ESSENTIAL HTN: ICD-10-CM

## 2023-05-25 DIAGNOSIS — E66.01 MORBID OBESITY (HCC): ICD-10-CM

## 2023-05-25 DIAGNOSIS — I87.303 STASIS EDEMA OF BOTH LOWER EXTREMITIES: ICD-10-CM

## 2023-05-25 DIAGNOSIS — E78.5 DYSLIPIDEMIA: ICD-10-CM

## 2023-05-25 DIAGNOSIS — R00.2 PALPITATIONS: Primary | ICD-10-CM

## 2023-05-25 DIAGNOSIS — E66.9 DIABETES MELLITUS TYPE 2 IN OBESE (HCC): ICD-10-CM

## 2023-05-25 DIAGNOSIS — I49.8 JUNCTIONAL RHYTHM: ICD-10-CM

## 2023-05-25 DIAGNOSIS — G47.33 OSA (OBSTRUCTIVE SLEEP APNEA): ICD-10-CM

## 2023-05-25 DIAGNOSIS — E11.69 DIABETES MELLITUS TYPE 2 IN OBESE (HCC): ICD-10-CM

## 2023-05-25 DIAGNOSIS — I50.32 CHRONIC DIASTOLIC HEART FAILURE (HCC): ICD-10-CM

## 2023-05-25 RX ORDER — FLECAINIDE ACETATE 150 MG/1
150 TABLET ORAL SEE ADMIN INSTRUCTIONS
Qty: 30 TABLET | Refills: 0 | Status: SHIPPED | OUTPATIENT
Start: 2023-05-25

## 2023-05-25 NOTE — TELEPHONE ENCOUNTER
Pt is scheduled to see Dr Juanita Head today at 1:00 PM in the [facility] office     Thank you for the referral

## 2023-05-25 NOTE — PATIENT INSTRUCTIONS
FOR FLECAINIDE:    Sig: Take 1 tablet (150 mg total) by mouth see administration instructions For palpitations lasting longer than 5 minutes, take one 150 mg tablet  If palpitations last longer than 30 minutes, take another 150 mg tablet  If palpitations persist, please go to the nearest emergency department  Do not exceed 300 mg in one day

## 2023-05-25 NOTE — PROGRESS NOTES
Consultation - Electrophysiology-Cardiology (EP)   Fannie Gong 69 y o  male MRN: [id number]  Unit/Bed#:  Encounter: [id number]      1  Palpitations  POCT ECG      2  Junctional rhythm  Ambulatory Referral to Cardiac Electrophysiology      3  Diabetes mellitus type 2 in obese ([facility] )        4  AYLEEN (obstructive sleep apnea)        5  Benign essential HTN        6  Chronic diastolic heart failure ([facility] )        7  Dyslipidemia        8  Stasis edema of both lower extremities        9  Morbid obesity [facility])              Consults  Physician Requesting Consult: Lucho Infante DO   Reason for Consult / Principal Problem: intermittent episodes of junctional rhythm;referral to electrophysiology for second opinion and assistance with medical therapy;  tried to reduce beta-blocker therapy in the past and had such significant palpitations that these were not well-tolerated          Summary of my recommendation  Palpitations  Long history of same, used to see Dr Klever Garza  Well-controlled on metoprolol, worsening when metoprolol dose reduced  Nuclear stress study negative for ischemia  Use flecainide as a pill in the pocket-150 mg if palpitations lasting more than 5 minutes-if palpitation lasting more than 30 minutes after first dose of flecainide can use another 150 mg      Junctional rhythm  Noted in ambulatory monitor  Heart rate around 60/min  Not having symptom rhythm correlation  No indication for device at this time      Episodic lightheadedness  Always with change in position, standing up  More pronounced when was started on hydralazine which is a vasodilator  Currently much improved      At the current time no symptom rhythm correlation to account for symptoms  If patient continues to have symptoms, recommend long-term monitoring with a Linq            Clinical conditions  Frequent palpitations improved with metoprolol  Intermittent junctional rhythm on metoprolol  Orthostatic lightheadedness  Primary hypertension  Mixed hyperlipidemia  Chronic diastolic heart failure  Morbid obesity, BMI 41  Obstructive sleep apnea  Prediabetes        Assessment/Plan     Frequent palpitations improved with metoprolol  Intermittent junctional rhythm on metoprolol    Palpitations  Long history of same, used to see Dr Julieta Bahena  Well-controlled on metoprolol, worsening when metoprolol dose reduced  Nuclear stress study negative for ischemia  Use flecainide as a pill in the pocket-150 mg if palpitations lasting more than 5 minutes-if palpitation lasting more than 30 minutes after first dose of flecainide can use another 150 mg    Junctional rhythm  Noted in ambulatory monitor  Heart rate around 60/min  Not having symptom rhythm correlation  No indication for device at this time    If patient continues to have symptoms, will benefit from long-term Linq monitoring to establish symptom rhythm correlation          Primary hypertension  Current blood pressure is 116/64  Patient is on metoprolol 75 mg a day, hydralazine, furosemide, enalapril, amlodipine  Large number of vasodilators which can provoke orthostatic hypotension  Adjustment as per primary      Mixed hyperlipidemia  On atorvastatin  No myositis or myalgia      Chronic diastolic heart failure  On beta-blocker and diuretics  Currently well-tolerated      Morbid obesity, BMI 41  Advised dietary changes  Previously had lost 50 pounds  Recommend to continue with aggressive dietary regimen      Obstructive sleep apnea  Evaluate and treat as per primary      Prediabetic  Hemoglobin A1c over 6  Continue with metformin and aggressive dietary regimen            History of Present Illness   HPI: Tammy Meyer is a 69y o  year old male has been referred to me by Dr Redia Favre, DO    For the management of palpitation, intermittent junctional rhythm and orthostatic lightheadedness      The patient has significant medical illnesses which include  Frequent palpitations improved with metoprolol  Intermittent junctional rhythm on metoprolol  Orthostatic lightheadedness  Primary hypertension  Mixed hyperlipidemia  Chronic diastolic heart failure  Morbid obesity, BMI 41  Obstructive sleep apnea  Prediabetes      Patient has a longstanding history of palpitations   used to see Dr Babita Cervantes -Well-controlled on metoprolol, worsening when metoprolol dose reduced    Patient also has very difficult to treat hypertension  He has been started on multiple vasodilator agents  He did get orthostatic hypotension  That is better with adjustment of his medication    He was noted to have junctional rhythm on his ambulatory monitor  However the junctional rhythm was at around 60 beats a minute  There was no symptom rhythm correlation    Patient has gained weight over time and his current BMI is 40    He had smoked a pack per day for for 30 years, quit about 17 years ago  Currently not abusing drugs or alcohol        Historical Information   Past Medical History:   Diagnosis Date   • Arthritis     last assessed 7/1/15   • Bronchitis 2/2/2023   • Diabetes mellitus ([facility] )     type 2-last assessed 1/28/15   • Diabetes mellitus ([facility] ) 3/16/2018   • GERD (gastroesophageal reflux disease)    • Hypertension    • Hypertensive urgency 3/16/2023   • Irregular heart beat     last assessed 7/1/15   • Palpitations     last assessed 9/7/17   • Sexual dysfunction     last assessed 7/1/15   • Thyroid disease     last assessed 7/1/15     Past Surgical History:   Procedure Laterality Date   • COLONOSCOPY     • TONSILLECTOMY       Social History     Substance and Sexual Activity   Alcohol Use Never     Social History     Substance and Sexual Activity   Drug Use Never     Social History     Tobacco Use   Smoking Status Former   • Passive exposure: Never   Smokeless Tobacco Never   Tobacco Comments    former smoker-quit 17 yrs ago 1pppd x 30 yrs as per [employer]     Social History     Socioeconomic History   • Marital status: Single     Spouse name: Not on file "  • Number of children: Not on file   • Years of education: Not on file   • Highest education level: Not on file   Occupational History   • Not on file   Tobacco Use   • Smoking status: Former     Passive exposure: Never   • Smokeless tobacco: Never   • Tobacco comments:     former smoker-quit 17 yrs ago 1pppd x 30 yrs as per [employer]   Vaping Use   • Vaping Use: Never used   Substance and Sexual Activity   • Alcohol use: Never   • Drug use: Never   • Sexual activity: Not on file   Other Topics Concern   • Not on file   Social History Narrative   • Not on file     Social Determinants of Health     Financial Resource Strain: Low Risk  (11/3/2022)    Overall Financial Resource Strain (CARDIA)    • Difficulty of Paying Living Expenses: Not very hard   Food Insecurity: Not on file   Transportation Needs: No Transportation Needs (11/3/2022)    PRAPARE - Transportation    • Lack of Transportation (Medical): No    • Lack of Transportation (Non-Medical): No   Physical Activity: Not on file   Stress: Not on file   Social Connections: Not on file   Intimate Partner Violence: Not on file   Housing Stability: Not on file       Family History:  Family History   Problem Relation Age of Onset   • Hypertension Mother         essential   • Stroke Mother    • Hypertension Father         essential   • Heart defect Father         cardiac disorder   • Stroke Brother    • Hypertension Brother          Meds/Allergies      No current facility-administered medications for this visit          (Not in a hospital admission)      Allergies   Allergen Reactions   • Acetaminophen Other (See Comments)     Other reaction(s): Unknown Reaction  \"I had to go to the hospital and get shots after taking it 20yrs ago\"           Objective   Vitals:   Visit Vitals  /64 (BP Location: Left arm, Patient Position: Sitting, Cuff Size: Large)   Pulse (!) 51   Ht 6' (1 829 m)   Wt (!) 138 kg (304 lb 14 4 oz)   BMI 41 35 kg/m²   Smoking Status Former   BSA " 2 55 m²      Vitals:    05/25/23 1312   Weight: (!) 138 kg (304 lb 14 4 oz)   [unfilled]    Invasive Devices     None                   ROS  Review of Systems   All other systems reviewed and are negative  As described in my history of present illness      PHYSICAL EXAM  Physical Exam  Vitals reviewed  Constitutional:       General: He is not in acute distress  Appearance: Normal appearance  He is obese  He is not ill-appearing  HENT:      Head: Normocephalic and atraumatic  Right Ear: External ear normal       Left Ear: External ear normal       Nose: Nose normal       Mouth/Throat:      Comments: Posterior pharynx is crowded  Eyes:      General: No scleral icterus  Extraocular Movements: Extraocular movements intact  Conjunctiva/sclera: Conjunctivae normal       Pupils: Pupils are equal, round, and reactive to light  Neck:      Comments: Large thick neck  Cardiovascular:      Rate and Rhythm: Normal rate and regular rhythm  Heart sounds: Normal heart sounds  No murmur heard  Pulmonary:      Effort: No respiratory distress  Breath sounds: Normal breath sounds  Abdominal:      Palpations: Abdomen is soft  Comments: Central obesity present   Musculoskeletal:         General: Swelling present  No deformity  Skin:     Coloration: Skin is not jaundiced  Findings: No bruising  Neurological:      Mental Status: He is alert and oriented to person, place, and time  Mental status is at baseline  Cranial Nerves: No cranial nerve deficit  Psychiatric:         Mood and Affect: Mood normal          Behavior: Behavior normal          Thought Content:  Thought content normal          Judgment: Judgment normal                LAB RESULTS:      CBC:  Results from Last 12 Months   Lab Units 04/25/23  2311 03/06/23  1805 02/21/23  2320 01/23/23  1726   HEMATOCRIT % 42 9 44 6 41 3 43 3   HEMOGLOBIN g/dL 14 3 14 5 13 7 14 6   MCH pg 29 8 28 9 29 5 29 9   MCHC g/dL 33 3 32 5 33 2 "33 7   MCV fL 89 89 89 89   MPV fL 10 2 11 3 11 3 11 2   NRBC AUTO /100 WBCs 0 0  --  0   PLATELETS Thousands/uL 248 204 160 183   RBC Million/uL 4 80 5 02 4 64 4 89   RDW % 13 1 13 2 13 2 13 0   WBC Thousand/uL 8 07 6 91 5 61 6 09        CMP:  Results from Last 12 Months   Lab Units 04/26/23  1444 04/25/23  2311 03/06/23  1805 02/21/23  2320 01/27/23  1440 01/23/23  1726   ALK PHOS U/L 34 34 32*  --  41* 37   ALT U/L 23 26 18  --  28 20   AST U/L 18 22 18  --  21 20   BUN mg/dL 24 23 25 25 23 26*   CALCIUM mg/dL 9 5 9 6 9 9 9 8 9 5 10 0   CHLORIDE mmol/L 105 104 99 104 106 103   CO2 mmol/L 26 24 28 26 28 28   CREATININE mg/dL 1 11 1 15 1 12 1 12 1 14 1 19   EGFR ml/min/1 73sq m 67 64 66 66 65 61   POTASSIUM mmol/L 4 2 4 0 3 7 4 3 3 8 4 2        Magnesium:   Results from Last 12 Months   Lab Units 03/06/23  1805   MAGNESIUM mg/dL 2 1       A1C:  Results from Last 12 Months   Lab Units 04/26/23  1444 01/27/23  1440 10/27/22  1357 07/18/22  0906   HEMOGLOBIN A1C % 6 0* 6 1* 6 1* 6 1*        TSH:  No results for input(s): \"TSH\" in the last 8784 hours  TSH 3rd Gen:  6/21/2021  Component Ref Range & Units 6/21/21  1:35 PM 12/16/15  1:30 PM 5/5/14  1:43 PM 11/12/13  9:37 AM   TSH 3RD GENERATON 0 358 - 3 740 uIU/mL 0 625  0 554 R, CM  0 717 R, CM  0 812 R, CM          PT/INR:  No results for input(s): \"INR\", \"PROTIME\" in the last 8784 hours      Lipid Panel:  Results from Last 12 Months   Lab Units 10/27/22  1357   CHOLESTEROL mg/dL 133   HDL mg/dL 37*   NON-HDL-CHOL (CHOL-HDL) mg/dl 96   TRIGLYCERIDES mg/dL 71        Troponin:   4/25/2023  Component Ref Range & Units 4/25/23 11:11 PM 3/6/23  6:05 PM 1/23/23  5:26 PM   hs TnI 0hr \"Refer to ACS Flowchart\"- see link ng/L 5  5 CM  5 CM      Component Ref Range & Units 4/26/23  1:17 AM 3/6/23  7:56 PM   hs TnI 2hr \"Refer to ACS Flowchart\"- see link ng/L 4  4 CM      D-DIMER  3/6/2023    Component Ref Range & Units 3/6/23  6:05 PM 5/11/21  9:08 PM   D-Dimer, [place] <0 50 ug/ml " FEU 0 60 High   0 46 CM            Imaging:     CTA ED CHEST PE STUDY  3/6/2023    CTA - CHEST WITH IV CONTRAST - PULMONARY ANGIOGRAM     INDICATION:   dyspnea, chest tightness, positive d dimer      COMPARISON: CXR 11/6/2023, CTA neck 1/23/2023, chest CT 5/24/2021 and 5/7/2018      TECHNIQUE: CT angiogram timed for optimal opacification of the pulmonary arteries  Axial, sagittal, and coronal 2D reformats created from source data  Coronal 3D MIP postprocessing on the acquisition scanner        Radiation dose length product (DLP):  634 77 mGy-cm   Radiation dose exposure minimized using iterative reconstruction and automated exposure control      IV Contrast:  100 mL of iohexol (OMNIPAQUE)     FINDINGS:     PULMONARY ARTERIES:  No pulmonary embolus       LUNGS:  No acute disease  Stable left lower lobe scar      AIRWAYS: No significant filling defects      PLEURA:  Redemonstration of calcification of the left posterior costal and diaphragmatic pleura      HEART/GREAT VESSELS:  Normal heart size  Mild coronary artery calcification indicating atherosclerotic heart disease      MEDIASTINUM AND SANDY:  Moderate hiatal hernia      CHEST WALL AND LOWER NECK: Stable calcified 3 8 cm left thyroid nodule since May 2018        UPPER ABDOMEN:  Renal cysts      OSSEOUS STRUCTURES:  Mild degenerative disease in the spine  Stable benign bone island in T4      IMPRESSION:     No pulmonary embolus      No acute pulmonary disease      Moderate hiatal hernia            Imaging:    TISHA  No results found for this or any previous visit  Echocardiogram  Results for orders placed during the hospital encounter of 04/27/23    Echo complete w/ contrast if indicated    Interpretation Summary  •  Left Ventricle: Left ventricular cavity size is normal  Wall thickness is mildly increased  There is mild concentric hypertrophy  The left ventricular ejection fraction is 60%   Systolic function is normal  Wall motion is normal  Diastolic function is normal   •  Right Ventricle: Systolic function is normal  Normal tricuspid annular plane systolic excursion (TAPSE) > 1 7 cm  •  Left Atrium: The atrium is mildly dilated  •  Mitral Valve: There is mild annular calcification  There is mild regurgitation  •  Pericardium: There is no pericardial effusion  No results found for this or any previous visit  Stress Test:   Results for orders placed during the hospital encounter of 21    NM myocardial perfusion spect (rx stress and/or rest)    Narrative  [address] [facility] [address] [facility] [address] [place] [address]  [phone]    Regadenoson    Patient: Velma Veloz  MR number: [id number]  Account number: [de-identified]  : 1955  Age: 77 years  Gender: Male  Status: Outpatient  Location: Stress lab  Height: 72 in  Weight: 335 lb  BP: 118/ 72 mmHg    Allergies: ACETAMINOPHEN    Diagnosis: 401 9 - HYPERTENSION NOS    RN:  Shiva Anne RN  Primary Physician:  Catia Hsu DO  Technician:  Evelin Ramos  Referring Physician:  Clotilde Najjar, CRNP  Interpreting Physician:  Melissa Fraire DO  Group:  [facility]    INDICATIONS: Coronary artery disease  HISTORY: The patient is a 77year old  male  Chest pain status: no chest pain  Coronary artery disease risk factors: dyslipidemia, hypertension, family history of premature coronary artery disease, and diabetes mellitus  Cardiovascular history: congestive heart failure  REST ECG: Sinus bradycardia  PROCEDURE: The study was performed in the the Stress lab  A regadenoson infusion pharmacologic stress test was performed  Systolic blood pressure was 118 mmHg, at the start of the study  Diastolic blood pressure was 72 mmHg, at the start  of the study  The heart rate was 53 bpm, at the start of the study    Regadenoson protocol:  HR bpm SBP mmHg DBP mmHg  Baseline 53 118 72  Immediate 85 122 70  2 min 73 128 74  4 min 67 124 64    STRESS SUMMARY: Duration of pharmacologic stress was 3 min  The heart rate response to stress was normal  There was normal resting blood pressure with an appropriate response to stress  There was no chest pain during stress  The stress  test was terminated due to protocol completion  The stress ECG was negative for ischemia and normal  There were no stress arrhythmias or conduction abnormalities  ISOTOPE ADMINISTRATION:  Resting isotope administration Stress isotope administration  Agent Tetrofosmin Tetrofosmin  Dose 16 2 mCi 480 mCi  Date 06/02/2021 06/02/2021  Injection time 08:05 09:00  Imaging time 08:25 --  Injection-image interval 20 min 45 min    The radiopharmaceutical was injected at the peak effect of pharmacologic stress  MYOCARDIAL PERFUSION IMAGING:  The image quality was good  Rotating projection images reveal mild diaphragmatic attenuation and moderate patient motion  Left ventricular size was normal  The TID ratio was 0 98  PERFUSION DEFECTS:  -  There was a small, moderately severe, fixed myocardial perfusion defect of the inferior wall likely due to diaphragm attenuation  GATED SPECT:  The calculated left ventricular ejection fraction was 65 %  There was no left ventricular regional abnormality  SUMMARY:  -  Stress results: There was no chest pain during stress  -  ECG conclusions: The stress ECG was negative for ischemia and normal   -  Perfusion imaging: There was a small, moderately severe, fixed myocardial perfusion defect of the inferior wall likely due to diaphragm attenuation   -  Gated SPECT: The calculated left ventricular ejection fraction was 65 %  There was no left ventricular regional abnormality  IMPRESSIONS: Normal study after pharmacologic vasodilation  There was image artifact, without diagnostic evidence for perfusion abnormality      Prepared and signed by    John Jay DO  Signed 06/02/2021 10:56:07    No results found for this or any previous visit  Cardiac catheterization :  No results found for this or any previous visit  HOLTER MONITOR: 24 HOUR/48 HOUR MONITORS  No results found for this or any previous visit  AMB extended holter monitor  4/19/2023-4/25/2023                    DEVICE CHECK:       No results found for this or any previous visit          Code Status: [unfilled]  Advance Directive and Living Will:      Power of :    POLST:      Counseling / Coordination of Care  Very detailed discussion done with regards to management of palpitations, junctional rhythm as well as orthostatic hypotension      Hanny Duff MD

## 2023-06-05 DIAGNOSIS — S32.020S COMPRESSION FRACTURE OF L2 VERTEBRA, SEQUELA: ICD-10-CM

## 2023-06-05 DIAGNOSIS — M47.27 LUMBOSACRAL RADICULOPATHY DUE TO DEGENERATIVE JOINT DISEASE OF SPINE: Primary | ICD-10-CM

## 2023-06-13 DIAGNOSIS — I10 BENIGN ESSENTIAL HTN: ICD-10-CM

## 2023-06-13 RX ORDER — HYDRALAZINE HYDROCHLORIDE 25 MG/1
25 TABLET, FILM COATED ORAL 3 TIMES DAILY
Qty: 270 TABLET | Refills: 3 | Status: SHIPPED | OUTPATIENT
Start: 2023-06-13

## 2023-06-14 DIAGNOSIS — R00.2 PALPITATIONS: ICD-10-CM

## 2023-06-14 DIAGNOSIS — L30.9 DERMATITIS: ICD-10-CM

## 2023-06-14 DIAGNOSIS — I50.32 CHRONIC DIASTOLIC HEART FAILURE (HCC): ICD-10-CM

## 2023-06-14 DIAGNOSIS — I50.31 ACUTE DIASTOLIC HEART FAILURE (HCC): ICD-10-CM

## 2023-06-14 DIAGNOSIS — I10 BENIGN ESSENTIAL HTN: ICD-10-CM

## 2023-06-14 RX ORDER — KETOCONAZOLE 20 MG/ML
1 SHAMPOO TOPICAL 2 TIMES WEEKLY
Qty: 120 ML | Refills: 3 | Status: SHIPPED | OUTPATIENT
Start: 2023-06-15

## 2023-06-14 RX ORDER — METOPROLOL SUCCINATE 50 MG/1
50 TABLET, EXTENDED RELEASE ORAL DAILY
Qty: 90 TABLET | Refills: 3 | Status: SHIPPED | OUTPATIENT
Start: 2023-06-14

## 2023-06-14 RX ORDER — METOPROLOL SUCCINATE 25 MG/1
25 TABLET, EXTENDED RELEASE ORAL DAILY
Qty: 90 TABLET | Refills: 3 | Status: SHIPPED | OUTPATIENT
Start: 2023-06-14

## 2023-06-14 RX ORDER — ENALAPRIL MALEATE 10 MG/1
10 TABLET ORAL 4 TIMES DAILY
Qty: 360 TABLET | Refills: 3 | Status: SHIPPED | OUTPATIENT
Start: 2023-06-14

## 2023-06-14 RX ORDER — POTASSIUM CHLORIDE 20 MEQ/1
20 TABLET, EXTENDED RELEASE ORAL DAILY
Qty: 90 TABLET | Refills: 3 | Status: SHIPPED | OUTPATIENT
Start: 2023-06-14

## 2023-06-14 RX ORDER — AMLODIPINE BESYLATE 10 MG/1
20 TABLET ORAL DAILY
Qty: 180 TABLET | Refills: 3 | Status: SHIPPED | OUTPATIENT
Start: 2023-06-14

## 2023-06-26 DIAGNOSIS — E11.9 TYPE 2 DIABETES MELLITUS WITHOUT COMPLICATION, WITHOUT LONG-TERM CURRENT USE OF INSULIN (HCC): ICD-10-CM

## 2023-06-26 RX ORDER — BLOOD SUGAR DIAGNOSTIC
1 STRIP MISCELLANEOUS DAILY
Qty: 100 EACH | Refills: 5 | Status: SHIPPED | OUTPATIENT
Start: 2023-06-26

## 2023-07-24 ENCOUNTER — OFFICE VISIT (OUTPATIENT)
Dept: CARDIOLOGY CLINIC | Facility: CLINIC | Age: 68
End: 2023-07-24
Payer: MEDICARE

## 2023-07-24 VITALS
DIASTOLIC BLOOD PRESSURE: 62 MMHG | HEIGHT: 72 IN | HEART RATE: 56 BPM | RESPIRATION RATE: 14 BRPM | WEIGHT: 303 LBS | SYSTOLIC BLOOD PRESSURE: 128 MMHG | OXYGEN SATURATION: 98 % | BODY MASS INDEX: 41.04 KG/M2

## 2023-07-24 DIAGNOSIS — R00.2 PALPITATION: ICD-10-CM

## 2023-07-24 DIAGNOSIS — R06.00 DYSPNEA, UNSPECIFIED TYPE: ICD-10-CM

## 2023-07-24 DIAGNOSIS — I10 PRIMARY HYPERTENSION: Primary | ICD-10-CM

## 2023-07-24 DIAGNOSIS — E66.01 CLASS 3 SEVERE OBESITY DUE TO EXCESS CALORIES WITH BODY MASS INDEX (BMI) OF 40.0 TO 44.9 IN ADULT, UNSPECIFIED WHETHER SERIOUS COMORBIDITY PRESENT (HCC): ICD-10-CM

## 2023-07-24 PROCEDURE — 99214 OFFICE O/P EST MOD 30 MIN: CPT | Performed by: INTERNAL MEDICINE

## 2023-07-24 RX ORDER — METHOCARBAMOL 750 MG/1
TABLET, FILM COATED ORAL
COMMUNITY
Start: 2023-06-12

## 2023-07-24 NOTE — PROGRESS NOTES
Cardiology Follow up  [facility]  [id number]  1955  [facility], [facility]  [facility]  [place] [address]      1. Primary hypertension        2. Palpitation        3. Dyspnea, unspecified type        4. Class 3 severe obesity due to excess calories with body mass index (BMI) of 40.0 to 44.9 in adult, unspecified whether serious comorbidity present ([address])            Discussion/Summary:  1. Hypertension  2. Hyperlipidemia  3. Obstructive sleep apnea compliant with CPAP therapy  4. Dyspnea  5. Diastolic dysfunction  6. Obesity  7.   Palpitations      -As blood pressure appears controlled today we will continue amlodipine 10 mg daily, atorvastatin 10 mg daily, enalapril 10 mg 4 times daily, furosemide 40 mg daily, hydralazine 25 mg 3 times daily, flecainide 150 mg pill in pocket method however patient has not needed and metoprolol succinate 75 mg daily along with potassium 20 mEq daily.  -Patient was seen and evaluated by electrophysiology with recommendations for implantable loop recorder and will have office staff assist in setting this up  -Transthoracic echocardiogram 4/27/2023 showing left ventricular systolic function normal estimated LVEF 60% with mild mitral regurgitation.  -Patient counseled on dietary and lifestyle modifications including following a low-salt, low-fat, heart healthy diet with sodium restriction to less than 1800 mg of sodium daily fluid restriction less than 2000 L of fluid daily along with weight loss with goal BMI less than 30  -We will see patient in 3 months or sooner if necessary and patient will follow-up with sleep medicine for continued monitoring and treatment of his obstructive sleep apnea  -Patient will continue to monitor home blood pressure readings and let our office know if there are any significant issues.  -Counseled if he were to have any warning or alarm type symptoms he is to seek emergency medical care immediately. History of Present Illness:  -Patient is a 67-year-old male with hypertension, hyperlipidemia, obesity, known diastolic dysfunction with intermittent dyspnea on exertion who originally presented to the office after transition of care from his prior cardiologist Dr. Wilfredo Frost. He had also been seen and evaluated by Dr. Dm Farias and Dr. Laura Goes for palpitations and ambulatory event monitor showing intermittent episodes of junctional rhythm.  -He presents to the office today and denies any chest pain, palpitations, lightheadedness or dizziness, loss of consciousness, shortness of breath. He notes blood pressures at home are well controlled on current medical regimen and overall he is doing reasonably well. He does still intermittently get some short episodes of palpitations but states these only last a couple minutes and do not cause him to have any issues. He does note still some intermittent episodes with orthostatic dizziness but states if he goes slow and gets up slowly he does not have these issues.     Patient Active Problem List   Diagnosis   • Benign essential HTN   • Dyslipidemia   • GERD (gastroesophageal reflux disease)   • AYLEEN (obstructive sleep apnea)   • Stasis edema of both lower extremities   • Morbid obesity ([address])   • Diastolic dysfunction   • Diabetes mellitus type 2 in obese [facility])   • Lymphedema   • Palpitations   • Chronic diastolic heart failure ([address])     Past Medical History:   Diagnosis Date   • Arthritis     last assessed 7/1/15   • Bronchitis 2/2/2023   • Diabetes mellitus ([address])     type 2-last assessed 1/28/15   • Diabetes mellitus ([address]) 3/16/2018   • GERD (gastroesophageal reflux disease)    • Hypertension    • Hypertensive urgency 3/16/2023   • Irregular heart beat     last assessed 7/1/15   • Palpitations     last assessed 9/7/17   • Sexual dysfunction     last assessed 7/1/15   • Thyroid disease last assessed 7/1/15     Social History     Socioeconomic History   • Marital status: Single     Spouse name: Not on file   • Number of children: Not on file   • Years of education: Not on file   • Highest education level: Not on file   Occupational History   • Not on file   Tobacco Use   • Smoking status: Former     Passive exposure: Never   • Smokeless tobacco: Never   • Tobacco comments:     former smoker-quit 17 yrs ago 1pppd x 30 yrs as per [employer]   Vaping Use   • Vaping Use: Never used   Substance and Sexual Activity   • Alcohol use: Never   • Drug use: Never   • Sexual activity: Not on file   Other Topics Concern   • Not on file   Social History Narrative   • Not on file     Social Determinants of Health     Financial Resource Strain: Low Risk  (11/3/2022)    Overall Financial Resource Strain (CARDIA)    • Difficulty of Paying Living Expenses: Not very hard   Food Insecurity: Not on file   Transportation Needs: No Transportation Needs (11/3/2022)    PRAPARE - Transportation    • Lack of Transportation (Medical): No    • Lack of Transportation (Non-Medical):  No   Physical Activity: Not on file   Stress: Not on file   Social Connections: Not on file   Intimate Partner Violence: Not on file   Housing Stability: Not on file      Family History   Problem Relation Age of Onset   • Hypertension Mother         essential   • Stroke Mother    • Hypertension Father         essential   • Heart defect Father         cardiac disorder   • Stroke Brother    • Hypertension Brother      Past Surgical History:   Procedure Laterality Date   • COLONOSCOPY     • TONSILLECTOMY         Current Outpatient Medications:   •  amLODIPine (NORVASC) 10 mg tablet, Take 2 tablets (20 mg total) by mouth daily, Disp: 180 tablet, Rfl: 3  •  aspirin 81 mg chewable tablet, Chew 81 mg daily, Disp: , Rfl:   •  atorvastatin (LIPITOR) 10 mg tablet, Take 1 tablet (10 mg total) by mouth daily, Disp: 90 tablet, Rfl: 3  •  enalapril (VASOTEC) 10 mg tablet, Take 1 tablet (10 mg total) by mouth 4 (four) times a day 2 tabs in the am, 1 tab in the afternoon, 1 tab @ hs., Disp: 360 tablet, Rfl: 3  •  flecainide (TAMBOCOR) 150 MG tablet, Take 1 tablet (150 mg total) by mouth see administration instructions For palpitations lasting longer than 5 minutes, take one 150 mg tablet. If palpitations last longer than 30 minutes, take another 150 mg tablet. If palpitations persist, please go to the nearest emergency department. Do not exceed 300 mg in one day., Disp: 30 tablet, Rfl: 0  •  furosemide (LASIX) 40 mg tablet, Take 1 tablet (40 mg total) by mouth daily, Disp: 90 tablet, Rfl: 3  •  glucose blood (OneTouch Verio) test strip, Use 1 each daily, Disp: 100 each, Rfl: 5  •  hydrALAZINE (APRESOLINE) 25 mg tablet, Take 1 tablet (25 mg total) by mouth 3 (three) times a day, Disp: 270 tablet, Rfl: 3  •  indomethacin (INDOCIN) 50 mg capsule, PRN with the gout, Disp: , Rfl:   •  ketoconazole (NIZORAL) 2 % shampoo, Apply 1 application.  topically 2 (two) times a week, Disp: 120 mL, Rfl: 3  •  Lancets (onetouch ultrasoft) lancets, Check glucose daily, Disp: 100 each, Rfl: 1  •  metFORMIN (GLUCOPHAGE) 500 mg tablet, Take 1 tablet (500 mg total) by mouth daily with breakfast One tab in am, Disp: 90 tablet, Rfl: 3  •  metoprolol succinate (TOPROL-XL) 25 mg 24 hr tablet, Take 1 tablet (25 mg total) by mouth daily, Disp: 90 tablet, Rfl: 3  •  metoprolol succinate (TOPROL-XL) 50 mg 24 hr tablet, Take 1 tablet (50 mg total) by mouth daily, Disp: 90 tablet, Rfl: 3  •  multivitamin (THERAGRAN) TABS, Take 1 tablet by mouth daily, Disp: , Rfl:   •  omeprazole (PriLOSEC) 40 MG capsule, Take 1 capsule (40 mg total) by mouth daily, Disp: 90 capsule, Rfl: 3  •  potassium chloride (Klor-Con M20) 20 mEq tablet, Take 1 tablet (20 mEq total) by mouth daily, Disp: 90 tablet, Rfl: 3  •  Red Yeast Rice Extract (RED YEAST RICE PO), Take by mouth, Disp: , Rfl:   •  triamcinolone (KENALOG) 0.1 % lotion, Apply topically 2 (two) times a day, Disp: 60 mL, Rfl: 3  •  triamcinolone (KENALOG) 0.5 % cream, , Disp: , Rfl:   •  Vitamin D, Cholecalciferol, 50 MCG (2000 UT) CAPS, Take 50 mcg by mouth daily, Disp: 90 capsule, Rfl: 3  •  meclizine (ANTIVERT) 25 mg tablet, Take 1 tablet (25 mg total) by mouth 3 (three) times a day as needed for dizziness (Vertigo) for up to 10 days (Patient not taking: Reported on 5/16/2023), Disp: 30 tablet, Rfl: 0  •  methocarbamol (ROBAXIN) 750 mg tablet, TAKE 1 TABLET BY MOUTH 4 TIMES DAILY AS NEEDED FOR MUSCLE SPASM, Disp: , Rfl:   Allergies   Allergen Reactions   • Acetaminophen Other (See Comments)     Other reaction(s): Unknown Reaction  "I had to go to the hospital and get shots after taking it 20yrs ago"         Labs:  No visits with results within 2 Month(s) from this visit. Latest known visit with results is:   Hospital Outpatient Visit on 04/27/2023   Component Date Value   • LA size 04/27/2023 4.9    • LVPWd 04/27/2023 1.00    • Left Atrium Area-systoli* 04/27/2023 29.4    • Left Atrium Area-systoli* 04/27/2023 16.6    • Tricuspid annular plane * 04/27/2023 1.80    • IVSd 04/27/2023 5.26    • LV DIASTOLIC VOLUME (MOD* 38/82/3874 169    • LEFT VENTRICLE SYSTOLIC * 83/36/1796 87    • Left ventricular stroke * 04/27/2023 82.00    • A4C EF 04/27/2023 71    • LA length (A2C) 04/27/2023 6.60    • LVIDd 04/27/2023 5.80    • IVS 04/27/2023 1    • LVIDS 04/27/2023 4.40    • FS 04/27/2023 24    • Ao root 04/27/2023 0.00    • RVID d 04/27/2023 4.1    • RAA A4C 04/27/2023 23.2    • LVSV, 2D 04/27/2023 82    • LV EF 04/27/2023 60         Imaging: MRI lumbar spine wo contrast    Result Date: 6/30/2023  Narrative: History: Low back pain Procedure: MRI of the lumbar spine was obtained with the following sequences: Sagittal T1, sagittal T2, sagittal STIR and axial T2 weighted images. Comparison: None Findings:  For the purposes of this dictation, the lumbar vertebrae are labeled from a caudal to cranial direction, the first vertebra with lumbar morphology is labeled as L5. Conus and lower thoracic cord: Normal Marrow and Alignment: No marrow abnormality. Anatomic alignment. Chronic compression fracture at L2. Mild kyphosis centered at L1-L2. L1-L2: Mild degenerative changes in endplates. Moderate facet hypertrophy. L2-L3: Mild degenerative changes in endplates. Moderate facet hypertrophy. Minimal disc bulging. L3-L4: Mild degenerative changes in endplates. Moderate facet hypertrophy. Minimal disc bulging. L4-L5: Mild degenerative changes in endplates. Moderate facet hypertrophy. L5-S1: Mild degenerative changes in endplates. Moderate facet hypertrophy. Minimal central disc protrusion. No significant central stenosis. Right foraminal stenosis at L3-L4, L5-S1. Left foraminal stenosis at L3-L4, L4-L5, L5-S1. Impression: IMPRESSION: 1. Moderate spondylosis with facet arthropathy. 2. No central stenosis. 3. Foraminal stenosis bilaterally at multiple levels. 4. Chronic compression fracture at L2. L1-L2: L2-L3 : L3-L4: L4-L5: L5-S1: Impression: Workstation:[id number]      Review of Systems:  Review of Systems   Constitutional: Negative for chills, diaphoresis, fatigue and fever. HENT: Negative for trouble swallowing and voice change. Eyes: Negative for pain and redness. Respiratory: Negative for shortness of breath and wheezing. Cardiovascular: Negative for chest pain, palpitations and leg swelling. Gastrointestinal: Negative for abdominal pain, constipation, diarrhea, nausea and vomiting. Genitourinary: Negative for dysuria. Musculoskeletal: Positive for arthralgias. Negative for neck pain and neck stiffness. Skin: Negative for rash. Neurological: Negative for dizziness, syncope, light-headedness and headaches. Psychiatric/Behavioral: Negative for agitation and confusion. All other systems reviewed and are negative.         Vitals:    07/24/23 1529   BP: 128/62   BP Location: Left arm   Patient Position: Sitting   Cuff Size: Large   Pulse: 56   Resp: 14   SpO2: 98%   Weight: (!) 137 kg (303 lb)   Height: 6' (1.829 m)     Vitals:    07/24/23 1529   Weight: (!) 137 kg (303 lb)     Height: 6' (182.9 cm)     Physical Exam:  Physical Exam  Vitals reviewed. Constitutional:       General: He is not in acute distress. Appearance: He is obese. He is not diaphoretic. HENT:      Head: Normocephalic and atraumatic. Eyes:      General:         Right eye: No discharge. Left eye: No discharge. Neck:      Comments: Neck obese, difficult to assess JVD, trachea midline  Cardiovascular:      Rate and Rhythm: Normal rate and regular rhythm. Heart sounds: No friction rub. Pulmonary:      Effort: Pulmonary effort is normal. No respiratory distress. Breath sounds: No wheezing. Chest:      Chest wall: No tenderness. Abdominal:      General: Bowel sounds are normal.      Palpations: Abdomen is soft. Tenderness: There is no abdominal tenderness. There is no rebound. Musculoskeletal:      Right lower leg: Edema (1+) present. Left lower leg: Edema (1+) present. Skin:     General: Skin is warm and dry. Neurological:      Mental Status: He is alert. Comments: Awake, alert, able to answer questions appropriately, able to move extremities bilaterally.    Psychiatric:         Mood and Affect: Mood normal.         Behavior: Behavior normal.

## 2023-07-26 ENCOUNTER — RA CDI HCC (OUTPATIENT)
Dept: OTHER | Facility: HOSPITAL | Age: 68
End: 2023-07-26

## 2023-07-26 NOTE — PROGRESS NOTES
[address] [facility] coding opportunities       Chart reviewed, no opportunity found: CHART REVIEWED, NO OPPORTUNITY FOUND        Patients Insurance     Medicare Insurance: Medicare

## 2023-07-31 ENCOUNTER — APPOINTMENT (OUTPATIENT)
Dept: LAB | Facility: MEDICAL CENTER | Age: 68
End: 2023-07-31
Payer: MEDICARE

## 2023-07-31 DIAGNOSIS — I51.89 DIASTOLIC DYSFUNCTION: ICD-10-CM

## 2023-07-31 DIAGNOSIS — I10 PRIMARY HYPERTENSION: ICD-10-CM

## 2023-07-31 LAB
ALBUMIN SERPL BCP-MCNC: 3.6 G/DL (ref 3.5–5)
ALP SERPL-CCNC: 35 U/L (ref 46–116)
ALT SERPL W P-5'-P-CCNC: 29 U/L (ref 12–78)
ANION GAP SERPL CALCULATED.3IONS-SCNC: 2 MMOL/L
AST SERPL W P-5'-P-CCNC: 18 U/L (ref 5–45)
BILIRUB SERPL-MCNC: 0.78 MG/DL (ref 0.2–1)
BUN SERPL-MCNC: 29 MG/DL (ref 5–25)
CALCIUM SERPL-MCNC: 9.1 MG/DL (ref 8.3–10.1)
CHLORIDE SERPL-SCNC: 111 MMOL/L (ref 96–108)
CHOLEST SERPL-MCNC: 129 MG/DL
CO2 SERPL-SCNC: 25 MMOL/L (ref 21–32)
CREAT SERPL-MCNC: 1.15 MG/DL (ref 0.6–1.3)
EST. AVERAGE GLUCOSE BLD GHB EST-MCNC: 131 MG/DL
GFR SERPL CREATININE-BSD FRML MDRD: 64 ML/MIN/1.73SQ M
GLUCOSE P FAST SERPL-MCNC: 128 MG/DL (ref 65–99)
HBA1C MFR BLD: 6.2 %
HDLC SERPL-MCNC: 32 MG/DL
LDLC SERPL CALC-MCNC: 71 MG/DL (ref 0–100)
POTASSIUM SERPL-SCNC: 4.3 MMOL/L (ref 3.5–5.3)
PROT SERPL-MCNC: 7.2 G/DL (ref 6.4–8.4)
SODIUM SERPL-SCNC: 138 MMOL/L (ref 135–147)
TRIGL SERPL-MCNC: 131 MG/DL

## 2023-07-31 PROCEDURE — 80061 LIPID PANEL: CPT

## 2023-08-07 ENCOUNTER — OFFICE VISIT (OUTPATIENT)
Dept: FAMILY MEDICINE CLINIC | Facility: CLINIC | Age: 68
End: 2023-08-07
Payer: MEDICARE

## 2023-08-07 VITALS
BODY MASS INDEX: 41.53 KG/M2 | SYSTOLIC BLOOD PRESSURE: 128 MMHG | DIASTOLIC BLOOD PRESSURE: 76 MMHG | HEART RATE: 78 BPM | HEIGHT: 72 IN | RESPIRATION RATE: 18 BRPM | WEIGHT: 306.6 LBS | TEMPERATURE: 97.2 F

## 2023-08-07 DIAGNOSIS — E66.9 DIABETES MELLITUS TYPE 2 IN OBESE (HCC): Primary | ICD-10-CM

## 2023-08-07 DIAGNOSIS — I10 BENIGN ESSENTIAL HTN: ICD-10-CM

## 2023-08-07 DIAGNOSIS — E66.01 MORBID OBESITY (HCC): ICD-10-CM

## 2023-08-07 DIAGNOSIS — E78.5 DYSLIPIDEMIA: ICD-10-CM

## 2023-08-07 DIAGNOSIS — E11.69 DIABETES MELLITUS TYPE 2 IN OBESE (HCC): Primary | ICD-10-CM

## 2023-08-07 PROCEDURE — 99214 OFFICE O/P EST MOD 30 MIN: CPT | Performed by: INTERNAL MEDICINE

## 2023-08-07 NOTE — PROGRESS NOTES
Name: Colton Cho      : 1955      MRN: [id number]  Encounter Provider: Anthony Barreto DO  Encounter Date: 2023   Encounter department: [address]     1. Diabetes mellitus type 2 in obese (HCC)  -     Hemoglobin A1C; Future; Expected date: 2023  -     Comprehensive metabolic panel; Future; Expected date: 2023  Pt is getting back on lo carb diet after vacation and trying to stay hydrated   Recheck A1c for next visit    2. Benign essential HTN  Stable and pt monitors     3. Dyslipidemia  Lo fat diet and he is trying to stay active with home exercises     4. Morbid obesity ([address])  He was on vacation and diet changed for the week but he is back on his lo carb diet         Depression Screening and Follow-up Plan: Patient was screened for depression during today's encounter. They screened negative with a PHQ-2 score of 0. Rto 3months   Subjective      HPI   Pt doing ok He did go on vacation last wee so his diet was not as consistent His sugars did go up slightly but he is getting back on his regular lo carb diet He did have some increased le edema but no sob or chest pain BP stable No falls No acute issues other than BS increase slightly due to vacation  Review of Systems   Constitutional: Negative for chills and fever. HENT: Negative. Eyes: Negative for visual disturbance. Respiratory: Negative for cough and shortness of breath. Cardiovascular: Negative for chest pain, palpitations and leg swelling. Gastrointestinal: Negative for abdominal distention and abdominal pain. Genitourinary: Negative. Musculoskeletal: Positive for arthralgias. Neurological: Negative for dizziness, light-headedness and headaches. Psychiatric/Behavioral: Negative for sleep disturbance. The patient is not nervous/anxious.         Current Outpatient Medications on File Prior to Visit   Medication Sig   • amLODIPine (NORVASC) 10 mg tablet Take 2 tablets (20 mg total) by mouth daily   • aspirin 81 mg chewable tablet Chew 81 mg daily   • atorvastatin (LIPITOR) 10 mg tablet Take 1 tablet (10 mg total) by mouth daily   • enalapril (VASOTEC) 10 mg tablet Take 1 tablet (10 mg total) by mouth 4 (four) times a day 2 tabs in the am, 1 tab in the afternoon, 1 tab @ hs. • flecainide (TAMBOCOR) 150 MG tablet Take 1 tablet (150 mg total) by mouth see administration instructions For palpitations lasting longer than 5 minutes, take one 150 mg tablet. If palpitations last longer than 30 minutes, take another 150 mg tablet. If palpitations persist, please go to the nearest emergency department. Do not exceed 300 mg in one day. • furosemide (LASIX) 40 mg tablet Take 1 tablet (40 mg total) by mouth daily   • glucose blood (OneTouch Verio) test strip Use 1 each daily   • hydrALAZINE (APRESOLINE) 25 mg tablet Take 1 tablet (25 mg total) by mouth 3 (three) times a day   • indomethacin (INDOCIN) 50 mg capsule PRN with the gout   • ketoconazole (NIZORAL) 2 % shampoo Apply 1 application.  topically 2 (two) times a week   • Lancets (onetouch ultrasoft) lancets Check glucose daily   • meclizine (ANTIVERT) 25 mg tablet Take 1 tablet (25 mg total) by mouth 3 (three) times a day as needed for dizziness (Vertigo) for up to 10 days   • metFORMIN (GLUCOPHAGE) 500 mg tablet Take 1 tablet (500 mg total) by mouth daily with breakfast One tab in am   • methocarbamol (ROBAXIN) 750 mg tablet TAKE 1 TABLET BY MOUTH 4 TIMES DAILY AS NEEDED FOR MUSCLE SPASM   • metoprolol succinate (TOPROL-XL) 25 mg 24 hr tablet Take 1 tablet (25 mg total) by mouth daily   • metoprolol succinate (TOPROL-XL) 50 mg 24 hr tablet Take 1 tablet (50 mg total) by mouth daily   • multivitamin (THERAGRAN) TABS Take 1 tablet by mouth daily   • omeprazole (PriLOSEC) 40 MG capsule Take 1 capsule (40 mg total) by mouth daily   • potassium chloride (Klor-Con M20) 20 mEq tablet Take 1 tablet (20 mEq total) by mouth daily   • Red Yeast Rice Extract (RED YEAST RICE PO) Take by mouth   • triamcinolone (KENALOG) 0.1 % lotion Apply topically 2 (two) times a day   • triamcinolone (KENALOG) 0.5 % cream    • Vitamin D, Cholecalciferol, 50 MCG (2000 UT) CAPS Take 50 mcg by mouth daily       Objective     /76   Pulse 78   Temp (!) 97.2 °F (36.2 °C) (Temporal)   Resp 18   Ht 6' (1.829 m)   Wt (!) 139 kg (306 lb 9.6 oz)   BMI 41.58 kg/m²     Physical Exam  Vitals reviewed. Constitutional:       General: He is not in acute distress. Appearance: Normal appearance. He is not ill-appearing, toxic-appearing or diaphoretic. HENT:      Head: Normocephalic and atraumatic. Right Ear: External ear normal.      Left Ear: External ear normal.      Nose: Nose normal.      Mouth/Throat:      Mouth: Mucous membranes are moist.   Eyes:      General: No scleral icterus. Extraocular Movements: Extraocular movements intact. Conjunctiva/sclera: Conjunctivae normal.      Pupils: Pupils are equal, round, and reactive to light. Cardiovascular:      Rate and Rhythm: Normal rate and regular rhythm. Pulses: Normal pulses. Heart sounds: Normal heart sounds. Pulmonary:      Effort: Pulmonary effort is normal. No respiratory distress. Breath sounds: Normal breath sounds. No wheezing. Abdominal:      General: Bowel sounds are normal. There is no distension. Palpations: Abdomen is soft. Tenderness: There is no abdominal tenderness. Musculoskeletal:      Cervical back: Normal range of motion and neck supple. Right lower leg: Edema present. Left lower leg: Edema present. Lymphadenopathy:      Cervical: No cervical adenopathy. Skin:     General: Skin is warm and dry. Neurological:      General: No focal deficit present. Mental Status: He is alert and oriented to person, place, and time. Mental status is at baseline.    Psychiatric:         Mood and Affect: Mood normal.         Behavior: Behavior normal. Thought Content:  Thought content normal.         Judgment: Judgment normal.       Gaston Russo, DO

## 2023-08-17 DIAGNOSIS — I10 BENIGN ESSENTIAL HTN: ICD-10-CM

## 2023-08-17 RX ORDER — HYDRALAZINE HYDROCHLORIDE 25 MG/1
25 TABLET, FILM COATED ORAL 3 TIMES DAILY
Qty: 270 TABLET | Refills: 3 | Status: SHIPPED | OUTPATIENT
Start: 2023-08-17

## 2023-09-08 ENCOUNTER — TELEPHONE (OUTPATIENT)
Dept: FAMILY MEDICINE CLINIC | Facility: CLINIC | Age: 68
End: 2023-09-08

## 2023-10-18 ENCOUNTER — TELEPHONE (OUTPATIENT)
Dept: CARDIOLOGY CLINIC | Facility: CLINIC | Age: 68
End: 2023-10-18

## 2023-10-18 NOTE — TELEPHONE ENCOUNTER
I was able to call and speak with patient about palpitations. Patient notes other than feeling sensation of his heartbeat he denies any tachycardia, chest pain, shortness of breath, lower extremity edema, orthopnea or bendopnea. He states that his heart rate when he checks on pulse oximeter is in the 60s-70s beat per minute range and blood pressures have been in the 712-751 mmHg systolic range with diastolics in the 15F mmHg range. I asked if patient wished to be seen and evaluated emergency department and at this time he declines however I did urged that if he were to have any worsening or alarm type symptoms he is to seek emergency medical care immediately. Patient will be set up for office visit for evaluation and is scheduled to undergo implantable loop recorder later this week to assist as well. I did recommend pharmacologic nuclear stress testing for patient however at this time he declines stating that he has no chest pain or shortness of breath, loss of consciousness lightheadedness or dizziness he only has the sensation of feeling his heartbeat. He does note this is somewhat more significant after eating. Will have office staff set patient up for evaluation and follow-up.

## 2023-10-18 NOTE — TELEPHONE ENCOUNTER
He is having increased pounding in his chest after he eats. He also gets it when he is up walking around and then sits down. He has no other symptoms.   I set him up for an appt with you in [place] tomorrow however he wanted me to message you and see if he can just adjust his medication

## 2023-10-19 ENCOUNTER — OFFICE VISIT (OUTPATIENT)
Dept: CARDIOLOGY CLINIC | Facility: CLINIC | Age: 68
End: 2023-10-19
Payer: MEDICARE

## 2023-10-19 VITALS
OXYGEN SATURATION: 98 % | HEIGHT: 72 IN | WEIGHT: 311.8 LBS | HEART RATE: 54 BPM | BODY MASS INDEX: 42.23 KG/M2 | TEMPERATURE: 98.1 F | DIASTOLIC BLOOD PRESSURE: 68 MMHG | SYSTOLIC BLOOD PRESSURE: 140 MMHG

## 2023-10-19 DIAGNOSIS — E78.5 DYSLIPIDEMIA: ICD-10-CM

## 2023-10-19 DIAGNOSIS — G47.33 OSA (OBSTRUCTIVE SLEEP APNEA): ICD-10-CM

## 2023-10-19 DIAGNOSIS — R00.2 PALPITATIONS: Primary | ICD-10-CM

## 2023-10-19 DIAGNOSIS — K21.9 GASTROESOPHAGEAL REFLUX DISEASE WITHOUT ESOPHAGITIS: ICD-10-CM

## 2023-10-19 DIAGNOSIS — I10 BENIGN ESSENTIAL HTN: ICD-10-CM

## 2023-10-19 DIAGNOSIS — E11.9 TYPE 2 DIABETES MELLITUS WITHOUT COMPLICATION, WITHOUT LONG-TERM CURRENT USE OF INSULIN (HCC): ICD-10-CM

## 2023-10-19 DIAGNOSIS — I50.32 CHRONIC DIASTOLIC HEART FAILURE (HCC): ICD-10-CM

## 2023-10-19 PROCEDURE — 93000 ELECTROCARDIOGRAM COMPLETE: CPT | Performed by: INTERNAL MEDICINE

## 2023-10-19 PROCEDURE — 99214 OFFICE O/P EST MOD 30 MIN: CPT | Performed by: INTERNAL MEDICINE

## 2023-10-19 RX ORDER — OMEPRAZOLE 40 MG/1
40 CAPSULE, DELAYED RELEASE ORAL DAILY
Qty: 90 CAPSULE | Refills: 10 | Status: SHIPPED | OUTPATIENT
Start: 2023-10-19

## 2023-10-19 NOTE — PROGRESS NOTES
Cardiology Follow Up    Paige Tavera  [id number]  1955  [facility] [address] [address] [phone]      1. Palpitations  POCT ECG      2. AYLEEN (obstructive sleep apnea)        3. Benign essential HTN        4. Chronic diastolic heart failure ([address])        5. Dyslipidemia            Discussion/Summary:  1. Palpitations  2. Hypertension  3. Obstructive sleep apnea without CPAP therapy  4. Obesity  5.   Diastolic dysfunction      -ECG performed in office today showing sinus bradycardia first-degree AV block left anterior fascicular block heart rate 57 bpm  -Patient denies chest pain at this time and does not wish to undergo further evaluation with repeat stress testing.  -As patient notes blood pressures at home are well controlled we will continue amlodipine 10 mg daily, atorvastatin 10 mg daily, enalapril 10 mg 4 times daily, furosemide 40 mg daily, hydralazine 25 mg 3 times daily, flecainide 150 mg pill in pocket method and metoprolol succinate 75 mg daily, potassium 20 mEq daily.  -Patient notes that his implantable loop recorder scheduled for later this week and will undergo evaluation and follow-up results of this.  -Thoracic echocardiogram 4/27/2023 showing left ventricular systolic function normal stented LVEF 60% with normal right ventricular size and systolic function, mildly dilated left atrium and mild mitral regurgitation.  -Patient counseled on dietary and lifestyle modifications including following a low-salt, low-fat, heart healthy diet with sodium restriction to less than 1800 mg of sodium daily and fluid restriction less than 1800 mL of fluid daily along with weight reduction goal BMI less than 30  -Patient will continue to monitor symptoms and I will check CMP and CBC along with TSH  -Patient will be seen in 1 month for follow-up or sooner if necessary  -Patient counseled if he were to have any warning or alarm type symptoms he is to seek emergency medical care immediately. History of Present Illness:  -Patient is a 53-year-old male with hypertension, hyperlipidemia, obesity, known diastolic dysfunction with intermittent dyspnea on exertion who originally presented to the office after transition of care from prior cardiologist Dr. Luna Jacobo. He had also been seen and evaluated by Dr. Dmitry Toussaint and Dr. Vicente Amaya for palpitations and ambulatory event monitor has shown intermittent episodes of junctional rhythm. Patient contacted office yesterday stating that palpitations have been worsening after eating. He had noted blood pressures at home are well controlled and heart rates has been well without other significant symptoms. He denies any chest pain or shortness of breath, worsening lower extremity edema, orthopnea or bendopnea and presents to office today.  -Currently in office today patient does note palpitations but denies any chest pain, loss of consciousness, shortness of breath, worsening lower extremity edema, orthopnea or bendopnea. He states that his symptoms do not seem to change with exertion and his symptoms are worsened after eating.     Patient Active Problem List   Diagnosis    Benign essential HTN    Dyslipidemia    GERD (gastroesophageal reflux disease)    AYLEEN (obstructive sleep apnea)    Stasis edema of both lower extremities    Morbid obesity (HCC)    Diastolic dysfunction    Diabetes mellitus type 2 in obese     Lymphedema    Palpitations    Chronic diastolic heart failure ([address])     Past Medical History:   Diagnosis Date    Arthritis     last assessed 7/1/15    Bronchitis 2/2/2023    Diabetes mellitus ([address])     type 2-last assessed 1/28/15    Diabetes mellitus ([address]) 3/16/2018    GERD (gastroesophageal reflux disease)     Hypertension     Hypertensive urgency 3/16/2023    Irregular heart beat     last assessed 7/1/15    Palpitations     last assessed 9/7/17 Sexual dysfunction     last assessed 7/1/15    Thyroid disease     last assessed 7/1/15     Social History     Socioeconomic History    Marital status: Single     Spouse name: Not on file    Number of children: Not on file    Years of education: Not on file    Highest education level: Not on file   Occupational History    Not on file   Tobacco Use    Smoking status: Former     Passive exposure: Never    Smokeless tobacco: Never    Tobacco comments:     former smoker-quit 17 yrs ago 1pppd x 30 yrs as per [employer]   Vaping Use    Vaping Use: Never used   Substance and Sexual Activity    Alcohol use: Never    Drug use: Never    Sexual activity: Not on file   Other Topics Concern    Not on file   Social History Narrative    Not on file     Social Determinants of Health     Financial Resource Strain: [address]  (11/3/2022)    Overall Financial Resource Strain (CARDIA)     Difficulty of Paying Living Expenses: Not very hard   Food Insecurity: Not on file   Transportation Needs: No Transportation Needs (11/3/2022)    PRAPARE - Transportation     Lack of Transportation (Medical): No     Lack of Transportation (Non-Medical):  No   Physical Activity: Not on file   Stress: Not on file   Social Connections: Not on file   Intimate Partner Violence: Not on file   Housing Stability: Not on file      Family History   Problem Relation Age of Onset    Hypertension Mother         essential    Stroke Mother     Hypertension Father         essential    Heart defect Father         cardiac disorder    Stroke Brother     Hypertension Brother      Past Surgical History:   Procedure Laterality Date    COLONOSCOPY      TONSILLECTOMY         Current Outpatient Medications:     amLODIPine (NORVASC) 10 mg tablet, Take 2 tablets (20 mg total) by mouth daily, Disp: 180 tablet, Rfl: 3    aspirin 81 mg chewable tablet, Chew 81 mg daily, Disp: , Rfl:     atorvastatin (LIPITOR) 10 mg tablet, Take 1 tablet (10 mg total) by mouth daily, Disp: 90 tablet, Rfl: 3    enalapril (VASOTEC) 10 mg tablet, Take 1 tablet (10 mg total) by mouth 4 (four) times a day 2 tabs in the am, 1 tab in the afternoon, 1 tab @ hs., Disp: 360 tablet, Rfl: 3    flecainide (TAMBOCOR) 150 MG tablet, Take 1 tablet (150 mg total) by mouth see administration instructions For palpitations lasting longer than 5 minutes, take one 150 mg tablet. If palpitations last longer than 30 minutes, take another 150 mg tablet. If palpitations persist, please go to the nearest emergency department. Do not exceed 300 mg in one day., Disp: 30 tablet, Rfl: 0    furosemide (LASIX) 40 mg tablet, Take 1 tablet (40 mg total) by mouth daily, Disp: 90 tablet, Rfl: 3    glucose blood (OneTouch Verio) test strip, Use 1 each daily, Disp: 100 each, Rfl: 5    hydrALAZINE (APRESOLINE) 25 mg tablet, Take 1 tablet (25 mg total) by mouth 3 (three) times a day, Disp: 270 tablet, Rfl: 3    indomethacin (INDOCIN) 50 mg capsule, PRN with the gout, Disp: , Rfl:     ketoconazole (NIZORAL) 2 % shampoo, Apply 1 application.  topically 2 (two) times a week, Disp: 120 mL, Rfl: 3    Lancets (onetouch ultrasoft) lancets, Check glucose daily, Disp: 100 each, Rfl: 1    meclizine (ANTIVERT) 25 mg tablet, Take 1 tablet (25 mg total) by mouth 3 (three) times a day as needed for dizziness (Vertigo) for up to 10 days, Disp: 30 tablet, Rfl: 0    metFORMIN (GLUCOPHAGE) 500 mg tablet, TAKE 1 TABLET (500 MG TOTAL) BY MOUTH IN THE MORNING WITH BREAKFAST, Disp: 90 tablet, Rfl: 10    methocarbamol (ROBAXIN) 750 mg tablet, TAKE 1 TABLET BY MOUTH 4 TIMES DAILY AS NEEDED FOR MUSCLE SPASM, Disp: , Rfl:     metoprolol succinate (TOPROL-XL) 25 mg 24 hr tablet, Take 1 tablet (25 mg total) by mouth daily, Disp: 90 tablet, Rfl: 3    metoprolol succinate (TOPROL-XL) 50 mg 24 hr tablet, Take 1 tablet (50 mg total) by mouth daily, Disp: 90 tablet, Rfl: 3    multivitamin (THERAGRAN) TABS, Take 1 tablet by mouth daily, Disp: , Rfl:     omeprazole (PriLOSEC) 40 MG capsule, TAKE 1 CAPSULE EVERY DAY, Disp: 90 capsule, Rfl: 10    potassium chloride (Klor-Con M20) 20 mEq tablet, Take 1 tablet (20 mEq total) by mouth daily, Disp: 90 tablet, Rfl: 3    Red Yeast Rice Extract (RED YEAST RICE PO), Take by mouth, Disp: , Rfl:     triamcinolone (KENALOG) 0.1 % lotion, Apply topically 2 (two) times a day, Disp: 60 mL, Rfl: 3    triamcinolone (KENALOG) 0.5 % cream, , Disp: , Rfl:     Vitamin D, Cholecalciferol, 50 MCG (2000 UT) CAPS, Take 50 mcg by mouth daily, Disp: 90 capsule, Rfl: 3  Allergies   Allergen Reactions    Acetaminophen Other (See Comments)     Other reaction(s): Unknown Reaction  "I had to go to the hospital and get shots after taking it 20yrs ago"         Labs:  No visits with results within 2 Month(s) from this visit. Latest known visit with results is:   Appointment on 07/31/2023   Component Date Value    Cholesterol 07/31/2023 129     Triglycerides 07/31/2023 131     HDL, Direct 07/31/2023 32 (L)     LDL Calculated 07/31/2023 71         Imaging: No results found. Review of Systems:  Review of Systems   Constitutional:  Negative for chills, diaphoresis, fatigue and fever. HENT:  Negative for trouble swallowing and voice change. Eyes:  Negative for pain and redness. Respiratory:  Negative for shortness of breath and wheezing. Cardiovascular:  Positive for palpitations. Negative for chest pain and leg swelling. Gastrointestinal:  Negative for abdominal pain, constipation, diarrhea, nausea and vomiting. Genitourinary:  Negative for dysuria. Musculoskeletal:  Positive for arthralgias. Negative for neck pain and neck stiffness. Skin:  Negative for rash. Neurological:  Negative for dizziness, syncope, light-headedness and headaches. Psychiatric/Behavioral:  Negative for agitation and confusion. All other systems reviewed and are negative.         Vitals:    10/19/23 1059   BP: 140/68   BP Location: Left arm   Patient Position: Sitting   Cuff Size: Large   Pulse: (!) 54   Temp: 98.1 °F (36.7 °C)   TempSrc: Temporal   SpO2: 98%   Weight: (!) 141 kg (311 lb 12.8 oz)   Height: 6' (1.829 m)     Vitals:    10/19/23 1059   Weight: (!) 141 kg (311 lb 12.8 oz)     Height: 6' (182.9 cm)     Physical Exam:  Physical Exam  Vitals reviewed. Constitutional:       General: He is not in acute distress. Appearance: He is obese. He is not diaphoretic. HENT:      Head: Normocephalic and atraumatic. Eyes:      General:         Right eye: No discharge. Left eye: No discharge. Neck:      Comments: Trachea midline, neck obese, difficult to assess JVD  Cardiovascular:      Rate and Rhythm: Regular rhythm. Bradycardia present. Heart sounds: No friction rub. Pulmonary:      Effort: Pulmonary effort is normal. No respiratory distress. Breath sounds: No wheezing. Chest:      Chest wall: No tenderness. Abdominal:      General: Bowel sounds are normal.      Palpations: Abdomen is soft. Tenderness: There is no abdominal tenderness. There is no rebound. Musculoskeletal:      Right lower leg: Edema (1+) present. Left lower leg: Edema (1+) present. Skin:     General: Skin is warm and dry. Neurological:      Mental Status: He is alert. Comments: Awake, alert, able to answer questions appropriately, able to move extremities bilaterally.    Psychiatric:         Mood and Affect: Mood normal.         Behavior: Behavior normal.

## 2023-10-20 ENCOUNTER — HOSPITAL ENCOUNTER (OUTPATIENT)
Dept: SURGERY | Facility: HOSPITAL | Age: 68
End: 2023-10-20
Payer: MEDICARE

## 2023-10-20 VITALS
BODY MASS INDEX: 42.12 KG/M2 | TEMPERATURE: 98.1 F | HEART RATE: 68 BPM | WEIGHT: 311 LBS | RESPIRATION RATE: 18 BRPM | DIASTOLIC BLOOD PRESSURE: 55 MMHG | SYSTOLIC BLOOD PRESSURE: 110 MMHG | OXYGEN SATURATION: 98 % | HEIGHT: 72 IN

## 2023-10-20 PROCEDURE — C1764 EVENT RECORDER, CARDIAC: HCPCS

## 2023-10-20 PROCEDURE — 33285 INSJ SUBQ CAR RHYTHM MNTR: CPT | Performed by: INTERNAL MEDICINE

## 2023-10-20 RX ORDER — LIDOCAINE HYDROCHLORIDE 10 MG/ML
5 INJECTION, SOLUTION EPIDURAL; INFILTRATION; INTRACAUDAL; PERINEURAL ONCE
Status: COMPLETED | OUTPATIENT
Start: 2023-10-20 | End: 2023-10-20

## 2023-10-20 RX ADMIN — LIDOCAINE HYDROCHLORIDE 5 ML: 10 INJECTION, SOLUTION EPIDURAL; INFILTRATION; INTRACAUDAL; PERINEURAL at 11:03

## 2023-10-20 NOTE — PROCEDURES
INVASIVE CARDIOLOGY- LOOP RECORDER IMPLANTATION    PRE-OP DIAGNOSIS:  PAF    POST-OP DIAGNOSIS:  Same     :  Stacey/ANDREEA Benites    Device Details: Medtronic [id number] Serial [id number]      ANESTHESIA:  Local anesthesia with 1% lidocaine    COMPLICATIONS:  None. The nature of the procedure, risks and alternatives were discussed with the patient who gave informed consent. OPERATIVE TECHNIQUE:  The patient draped in a sterile fashion. The 4th intercostal space 2 cm from the left edge of the sternum was identified. Local anesthesia was performed with 1% Lidocaine . An incision was made with the push blade. The insertion tool was placed through the incision and rotated 180 degrees. The plunger was inserted and the device was deployed. The skin was closed with dermabond. Steri-strips were added. Wound and device f/u arranged.       ANDREEA Quinones

## 2023-10-26 ENCOUNTER — IN-CLINIC DEVICE VISIT (OUTPATIENT)
Dept: CARDIOLOGY CLINIC | Facility: CLINIC | Age: 68
End: 2023-10-26
Payer: MEDICARE

## 2023-10-26 DIAGNOSIS — R00.2 PALPITATIONS: Primary | ICD-10-CM

## 2023-10-26 PROCEDURE — 99211 OFF/OP EST MAY X REQ PHY/QHP: CPT | Performed by: NURSE PRACTITIONER

## 2023-10-26 NOTE — PROGRESS NOTES
Pt presents for wound check s/p ILR implant    Denies fever or chills    L chest incision with steri strips intact. No drainage or bleeding noted. No erythema, edema, or warmth to surrounding area.

## 2023-11-08 ENCOUNTER — APPOINTMENT (OUTPATIENT)
Dept: LAB | Facility: MEDICAL CENTER | Age: 68
End: 2023-11-08
Payer: MEDICARE

## 2023-11-08 DIAGNOSIS — R00.2 PALPITATIONS: ICD-10-CM

## 2023-11-08 DIAGNOSIS — E11.69 DIABETES MELLITUS TYPE 2 IN OBESE: ICD-10-CM

## 2023-11-08 DIAGNOSIS — E66.9 DIABETES MELLITUS TYPE 2 IN OBESE: ICD-10-CM

## 2023-11-08 LAB
ALBUMIN SERPL BCP-MCNC: 4 G/DL (ref 3.5–5)
ALP SERPL-CCNC: 35 U/L (ref 34–104)
ALT SERPL W P-5'-P-CCNC: 20 U/L (ref 7–52)
ANION GAP SERPL CALCULATED.3IONS-SCNC: 5 MMOL/L
AST SERPL W P-5'-P-CCNC: 21 U/L (ref 13–39)
BASOPHILS # BLD AUTO: 0.03 THOUSANDS/ÂΜL (ref 0–0.1)
BASOPHILS NFR BLD AUTO: 1 % (ref 0–1)
BILIRUB SERPL-MCNC: 0.83 MG/DL (ref 0.2–1)
BUN SERPL-MCNC: 22 MG/DL (ref 5–25)
CALCIUM SERPL-MCNC: 9.6 MG/DL (ref 8.4–10.2)
CHLORIDE SERPL-SCNC: 107 MMOL/L (ref 96–108)
CO2 SERPL-SCNC: 28 MMOL/L (ref 21–32)
CREAT SERPL-MCNC: 1.05 MG/DL (ref 0.6–1.3)
EOSINOPHIL # BLD AUTO: 0.01 THOUSAND/ÂΜL (ref 0–0.61)
EOSINOPHIL NFR BLD AUTO: 0 % (ref 0–6)
ERYTHROCYTE [DISTWIDTH] IN BLOOD BY AUTOMATED COUNT: 13.1 % (ref 11.6–15.1)
EST. AVERAGE GLUCOSE BLD GHB EST-MCNC: 134 MG/DL
GFR SERPL CREATININE-BSD FRML MDRD: 71 ML/MIN/1.73SQ M
GLUCOSE P FAST SERPL-MCNC: 128 MG/DL (ref 65–99)
HBA1C MFR BLD: 6.3 %
HCT VFR BLD AUTO: 40.3 % (ref 36.5–49.3)
HGB BLD-MCNC: 12.7 G/DL (ref 12–17)
IMM GRANULOCYTES # BLD AUTO: 0.01 THOUSAND/UL (ref 0–0.2)
IMM GRANULOCYTES NFR BLD AUTO: 0 % (ref 0–2)
LYMPHOCYTES # BLD AUTO: 1.3 THOUSANDS/ÂΜL (ref 0.6–4.47)
LYMPHOCYTES NFR BLD AUTO: 28 % (ref 14–44)
MCH RBC QN AUTO: 28.9 PG (ref 26.8–34.3)
MCHC RBC AUTO-ENTMCNC: 31.5 G/DL (ref 31.4–37.4)
MCV RBC AUTO: 92 FL (ref 82–98)
MONOCYTES # BLD AUTO: 0.52 THOUSAND/ÂΜL (ref 0.17–1.22)
MONOCYTES NFR BLD AUTO: 11 % (ref 4–12)
NEUTROPHILS # BLD AUTO: 2.82 THOUSANDS/ÂΜL (ref 1.85–7.62)
NEUTS SEG NFR BLD AUTO: 60 % (ref 43–75)
NRBC BLD AUTO-RTO: 0 /100 WBCS
PLATELET # BLD AUTO: 190 THOUSANDS/UL (ref 149–390)
PMV BLD AUTO: 11.4 FL (ref 8.9–12.7)
POTASSIUM SERPL-SCNC: 4.5 MMOL/L (ref 3.5–5.3)
PROT SERPL-MCNC: 7.4 G/DL (ref 6.4–8.4)
RBC # BLD AUTO: 4.4 MILLION/UL (ref 3.88–5.62)
SODIUM SERPL-SCNC: 140 MMOL/L (ref 135–147)
TSH SERPL DL<=0.05 MIU/L-ACNC: 0.64 UIU/ML (ref 0.45–4.5)
WBC # BLD AUTO: 4.69 THOUSAND/UL (ref 4.31–10.16)

## 2023-11-08 PROCEDURE — 80053 COMPREHEN METABOLIC PANEL: CPT

## 2023-11-08 PROCEDURE — 84443 ASSAY THYROID STIM HORMONE: CPT

## 2023-11-08 PROCEDURE — 36415 COLL VENOUS BLD VENIPUNCTURE: CPT

## 2023-11-08 PROCEDURE — 85025 COMPLETE CBC W/AUTO DIFF WBC: CPT

## 2023-11-08 PROCEDURE — 83036 HEMOGLOBIN GLYCOSYLATED A1C: CPT

## 2023-11-10 ENCOUNTER — TELEPHONE (OUTPATIENT)
Dept: FAMILY MEDICINE CLINIC | Facility: CLINIC | Age: 68
End: 2023-11-10

## 2023-11-10 NOTE — TELEPHONE ENCOUNTER
Pt called worried about recent labs. Wondering if you could please review it. Also mentioned if Metformin should be increased? Please advise.  Thank you

## 2023-11-10 NOTE — TELEPHONE ENCOUNTER
Fasting sugar was same level as prior and A1c was up one tenth of a point so generally same range  Cholesterol looked a better and kidney function was improved  I do not think based on that needs additional metformin and may drop sugars too low unless when he test blood sugars they are trending higher

## 2023-11-10 NOTE — TELEPHONE ENCOUNTER
Pt aware of results and aware we will not be increasing meds at this time. Encouraged to keep an eye on BS levels, and if consistenty high, give our office a call.

## 2023-11-20 ENCOUNTER — OFFICE VISIT (OUTPATIENT)
Dept: CARDIOLOGY CLINIC | Facility: CLINIC | Age: 68
End: 2023-11-20
Payer: MEDICARE

## 2023-11-20 VITALS
HEART RATE: 54 BPM | HEIGHT: 72 IN | WEIGHT: 302 LBS | SYSTOLIC BLOOD PRESSURE: 108 MMHG | DIASTOLIC BLOOD PRESSURE: 62 MMHG | BODY MASS INDEX: 40.9 KG/M2

## 2023-11-20 DIAGNOSIS — I51.89 DIASTOLIC DYSFUNCTION: ICD-10-CM

## 2023-11-20 DIAGNOSIS — I10 BENIGN ESSENTIAL HTN: ICD-10-CM

## 2023-11-20 DIAGNOSIS — G47.33 OSA (OBSTRUCTIVE SLEEP APNEA): ICD-10-CM

## 2023-11-20 DIAGNOSIS — E66.01 MORBID OBESITY (HCC): ICD-10-CM

## 2023-11-20 DIAGNOSIS — R00.2 PALPITATIONS: Primary | ICD-10-CM

## 2023-11-20 PROCEDURE — 99214 OFFICE O/P EST MOD 30 MIN: CPT | Performed by: INTERNAL MEDICINE

## 2023-11-20 NOTE — PROGRESS NOTES
Cardiology Follow Up    Lisa Disla  [id number]  1955  [facility] [address] [address]      1. AYLEEN (obstructive sleep apnea)        2. Benign essential HTN        3. Diastolic dysfunction        4. Morbid obesity (HCC)        5. Palpitations            Discussion/Summary:  1. Intermittent palpitations s/p implantable loop recorder October 2023  2. Hypertension  3. Obesity  4. Obstructive sleep apnea currently without CPAP therapy  5.   Diastolic dysfunction      -Transthoracic echocardiogram 4/27/2023 showing left ventricular systolic function normal estimated LVEF 60% with normal right ventricular size and systolic function with mildly dilated left atrium and mild mitral regurgitation.  -Lipid panel 7/31/2023 showing total cholesterol 129, triglyceride 131, HDL 32, LDL 71  -As patient blood pressure at home is well controlled we will continue amlodipine 10 mg daily, enalapril 10 mg 4 times daily, atorvastatin 10 mg daily, hydralazine 25 mg 3 times daily, furosemide 40 mg daily with potassium 20 mEq daily  -Due to bradycardia with patient noting the heart rate is always consistently less than 60 bpm at home we will reduce metoprolol succinate from 75 mg daily to 50 mg daily and patient can continue flecainide 150 mg pill in pocket method at this time  -Patient will let us know if there are any issues with this and will follow-up interrogation of implantable loop recorder.  -Patient counseled on dietary and lifestyle modifications including following a low-salt, low-fat, heart healthy diet with sodium restriction to less than 1800 mg of sodium daily and fluid restriction to less than 1800 mL of fluid daily along with weight reduction with goal BMI less than 29  -Patient will be seen in 6 months or sooner if necessary  -Patient declined stress testing in the past and does not wish to undergo further unless having recurrence of symptoms  -Patient counseled if he were to have any warning or alarm type symptoms he is to seek emergency medical care immediately.  -We will give referral to sleep medicine for obstructive sleep apnea evaluation. History of Present Illness:  -Patient is a 64-year-old male with hypertension, hyperlipidemia, obesity, known diastolic dysfunction with intermittent dyspnea on exertion who originally presented to the office after transition of care from prior cardiologist Dr. Wilmer Murrell. He had also been seen and evaluated by Dr. Jose Lind and Dr. Kory Kerns for palpitations and ambulatory event monitoring had shown intermittent episodes of junctional rhythm and underwent implantable loop recorder in October 2023 and presents to the office today for scheduled follow-up.  -Patient overall notes doing well today and denies any chest pain, palpitations, lightness or dizziness, loss of consciousness, shortness of breath, lower extremity edema, orthopnea or bendopnea. Notes blood pressures at home are well controlled and while he still does have some mild intermittent palpitations after eating he notes these are improved from previous.       Patient Active Problem List   Diagnosis    Benign essential HTN    Dyslipidemia    GERD (gastroesophageal reflux disease)    AYLEEN (obstructive sleep apnea)    Stasis edema of both lower extremities    Morbid obesity (HCC)    Diastolic dysfunction    Diabetes mellitus type 2 in obese     Lymphedema    Palpitations    Chronic diastolic heart failure ([address])     Past Medical History:   Diagnosis Date    Arthritis     last assessed 7/1/15    Bronchitis 02/02/2023    Chronic diastolic (congestive) heart failure (HCC)     Diabetes mellitus ([address])     type 2-last assessed 1/28/15    Diabetes mellitus ([address]) 03/16/2018    Dyslipidemia     GERD (gastroesophageal reflux disease)     Hypertension     Hypertensive urgency 03/16/2023 Irregular heart beat     last assessed 7/1/15    Obstructive sleep apnea     Palpitations     last assessed 9/7/17    Sexual dysfunction     last assessed 7/1/15    Thyroid disease     last assessed 7/1/15     Social History     Socioeconomic History    Marital status: Single     Spouse name: Not on file    Number of children: Not on file    Years of education: Not on file    Highest education level: Not on file   Occupational History    Not on file   Tobacco Use    Smoking status: Former     Packs/day: 1.00     Types: Cigarettes     Passive exposure: Never    Smokeless tobacco: Never    Tobacco comments:     former smoker-quit 17 yrs ago 1pppd x 30 yrs as per [employer]   Vaping Use    Vaping Use: Never used   Substance and Sexual Activity    Alcohol use: Never    Drug use: Never    Sexual activity: Not on file   Other Topics Concern    Not on file   Social History Narrative    Not on file     Social Determinants of Health     Financial Resource Strain: Low Risk  (11/3/2022)    Overall Financial Resource Strain (CARDIA)     Difficulty of Paying Living Expenses: Not very hard   Food Insecurity: Not on file   Transportation Needs: No Transportation Needs (11/3/2022)    PRAPARE - Transportation     Lack of Transportation (Medical): No     Lack of Transportation (Non-Medical):  No   Physical Activity: Not on file   Stress: Not on file   Social Connections: Not on file   Intimate Partner Violence: Not on file   Housing Stability: Not on file      Family History   Problem Relation Age of Onset    Hypertension Mother         essential    Stroke Mother     Hypertension Father         essential    Heart defect Father         cardiac disorder    Stroke Brother     Hypertension Brother      Past Surgical History:   Procedure Laterality Date    COLONOSCOPY      TONSILLECTOMY         Current Outpatient Medications:     amLODIPine (NORVASC) 10 mg tablet, Take 2 tablets (20 mg total) by mouth daily (Patient taking differently: Take 10 mg by mouth daily), Disp: 180 tablet, Rfl: 3    aspirin 81 mg chewable tablet, Chew 81 mg daily, Disp: , Rfl:     atorvastatin (LIPITOR) 10 mg tablet, Take 1 tablet (10 mg total) by mouth daily, Disp: 90 tablet, Rfl: 3    enalapril (VASOTEC) 10 mg tablet, Take 1 tablet (10 mg total) by mouth 4 (four) times a day 2 tabs in the am, 1 tab in the afternoon, 1 tab @ hs., Disp: 360 tablet, Rfl: 3    flecainide (TAMBOCOR) 150 MG tablet, Take 1 tablet (150 mg total) by mouth see administration instructions For palpitations lasting longer than 5 minutes, take one 150 mg tablet. If palpitations last longer than 30 minutes, take another 150 mg tablet. If palpitations persist, please go to the nearest emergency department. Do not exceed 300 mg in one day., Disp: 30 tablet, Rfl: 0    furosemide (LASIX) 40 mg tablet, Take 1 tablet (40 mg total) by mouth daily, Disp: 90 tablet, Rfl: 3    glucose blood (OneTouch Verio) test strip, Use 1 each daily, Disp: 100 each, Rfl: 5    hydrALAZINE (APRESOLINE) 25 mg tablet, Take 1 tablet (25 mg total) by mouth 3 (three) times a day, Disp: 270 tablet, Rfl: 3    indomethacin (INDOCIN) 50 mg capsule, PRN with the gout, Disp: , Rfl:     ketoconazole (NIZORAL) 2 % shampoo, Apply 1 application.  topically 2 (two) times a week, Disp: 120 mL, Rfl: 3    Lancets (onetouch ultrasoft) lancets, Check glucose daily, Disp: 100 each, Rfl: 1    metFORMIN (GLUCOPHAGE) 500 mg tablet, TAKE 1 TABLET (500 MG TOTAL) BY MOUTH IN THE MORNING WITH BREAKFAST, Disp: 90 tablet, Rfl: 10    metoprolol succinate (TOPROL-XL) 25 mg 24 hr tablet, Take 1 tablet (25 mg total) by mouth daily, Disp: 90 tablet, Rfl: 3    metoprolol succinate (TOPROL-XL) 50 mg 24 hr tablet, Take 1 tablet (50 mg total) by mouth daily, Disp: 90 tablet, Rfl: 3    multivitamin (THERAGRAN) TABS, Take 1 tablet by mouth daily, Disp: , Rfl:     omeprazole (PriLOSEC) 40 MG capsule, TAKE 1 CAPSULE EVERY DAY, Disp: 90 capsule, Rfl: 10    potassium chloride (Klor-Con M20) 20 mEq tablet, Take 1 tablet (20 mEq total) by mouth daily, Disp: 90 tablet, Rfl: 3    Red Yeast Rice Extract (RED YEAST RICE PO), Take by mouth, Disp: , Rfl:     triamcinolone (KENALOG) 0.1 % lotion, Apply topically 2 (two) times a day, Disp: 60 mL, Rfl: 3    triamcinolone (KENALOG) 0.5 % cream, , Disp: , Rfl:     Vitamin D, Cholecalciferol, 50 MCG (2000 UT) CAPS, Take 50 mcg by mouth daily, Disp: 90 capsule, Rfl: 3    meclizine (ANTIVERT) 25 mg tablet, Take 1 tablet (25 mg total) by mouth 3 (three) times a day as needed for dizziness (Vertigo) for up to 10 days, Disp: 30 tablet, Rfl: 0    methocarbamol (ROBAXIN) 750 mg tablet, TAKE 1 TABLET BY MOUTH 4 TIMES DAILY AS NEEDED FOR MUSCLE SPASM (Patient not taking: Reported on 11/20/2023), Disp: , Rfl:   Allergies   Allergen Reactions    Acetaminophen Other (See Comments)     Other reaction(s): Unknown Reaction  "I had to go to the hospital and get shots after taking it 20yrs ago"         Labs:  Appointment on 11/08/2023   Component Date Value    Hemoglobin A1C 11/08/2023 6.3 (H)     EAG 11/08/2023 134     Sodium 11/08/2023 140     Potassium 11/08/2023 4.5     Chloride 11/08/2023 107     CO2 11/08/2023 28     ANION GAP 11/08/2023 5     BUN 11/08/2023 22     Creatinine 11/08/2023 1.05     Glucose, Fasting 11/08/2023 128 (H)     Calcium 11/08/2023 9.6     AST 11/08/2023 21     ALT 11/08/2023 20     Alkaline Phosphatase 11/08/2023 35     Total Protein 11/08/2023 7.4     Albumin 11/08/2023 4.0     Total Bilirubin 11/08/2023 0.83     eGFR 11/08/2023 71     WBC 11/08/2023 4.69     RBC 11/08/2023 4.40     Hemoglobin 11/08/2023 12.7     Hematocrit 11/08/2023 40.3     MCV 11/08/2023 92     MCH 11/08/2023 28.9     MCHC 11/08/2023 31.5     RDW 11/08/2023 13.1     MPV 11/08/2023 11.4     Platelets 55/30/1989 190     nRBC 11/08/2023 0     Neutrophils Relative 11/08/2023 60     Immat GRANS % 11/08/2023 0     Lymphocytes Relative 11/08/2023 28     Monocytes Relative 11/08/2023 11     Eosinophils Relative 11/08/2023 0     Basophils Relative 11/08/2023 1     Neutrophils Absolute 11/08/2023 2.82     Immature Grans Absolute 11/08/2023 0.01     Lymphocytes Absolute 11/08/2023 1.30     Monocytes Absolute 11/08/2023 0.52     Eosinophils Absolute 11/08/2023 0.01     Basophils Absolute 11/08/2023 0.03     TSH 3RD GENERATON 11/08/2023 0.642         Imaging: No results found. Review of Systems:  Review of Systems   Constitutional:  Negative for chills, diaphoresis, fatigue and fever. HENT:  Negative for trouble swallowing and voice change. Eyes:  Negative for pain and redness. Respiratory:  Negative for shortness of breath and wheezing. Cardiovascular:  Negative for chest pain, palpitations and leg swelling. Gastrointestinal:  Negative for abdominal pain, constipation, diarrhea, nausea and vomiting. Genitourinary:  Negative for dysuria. Musculoskeletal:  Positive for arthralgias. Negative for neck pain and neck stiffness. Skin:  Negative for rash. Neurological:  Negative for dizziness, syncope, light-headedness and headaches. Psychiatric/Behavioral:  Negative for agitation and confusion. All other systems reviewed and are negative. Vitals:    11/20/23 1407   BP: 108/62   Pulse: (!) 54   Weight: (!) 137 kg (302 lb)   Height: 6' (1.829 m)     Vitals:    11/20/23 1407   Weight: (!) 137 kg (302 lb)     Height: 6' (182.9 cm)     Physical Exam:   Physical Exam  Vitals reviewed. Constitutional:       General: He is not in acute distress. Appearance: He is obese. He is not diaphoretic. HENT:      Head: Normocephalic and atraumatic. Eyes:      General:         Right eye: No discharge. Left eye: No discharge. Neck:      Comments: Trachea midline, neck obese, difficult to assess JVD  Cardiovascular:      Rate and Rhythm: Regular rhythm. Bradycardia present. Heart sounds: No friction rub.    Pulmonary:      Effort: Pulmonary effort is normal. No respiratory distress. Breath sounds: No wheezing. Chest:      Chest wall: No tenderness. Abdominal:      General: Bowel sounds are normal.      Palpations: Abdomen is soft. Tenderness: There is no abdominal tenderness. There is no rebound. Musculoskeletal:      Right lower leg: Edema (1+) present. Left lower leg: Edema (1+) present. Skin:     General: Skin is warm and dry. Neurological:      Mental Status: He is alert. Comments: Awake, alert, able to answer questions appropriately, able to move extremities bilaterally.    Psychiatric:         Mood and Affect: Mood normal.         Behavior: Behavior normal.

## 2023-12-14 ENCOUNTER — TELEPHONE (OUTPATIENT)
Dept: FAMILY MEDICINE CLINIC | Facility: CLINIC | Age: 68
End: 2023-12-14

## 2023-12-14 NOTE — TELEPHONE ENCOUNTER
He got a small cut on his hand form a piece of metal  he had a tetnus shot in 2015, wants to know if you think he should have a booster

## 2023-12-31 ENCOUNTER — RA CDI HCC (OUTPATIENT)
Dept: OTHER | Facility: HOSPITAL | Age: 68
End: 2023-12-31

## 2023-12-31 NOTE — PROGRESS NOTES
I11.0  HCC coding opportunities          Chart Reviewed number of suggestions sent to Provider: 1     Patients Insurance     Medicare Insurance: Medicare        For 2024

## 2024-01-08 ENCOUNTER — OFFICE VISIT (OUTPATIENT)
Dept: FAMILY MEDICINE CLINIC | Facility: CLINIC | Age: 69
End: 2024-01-08
Payer: MEDICARE

## 2024-01-08 VITALS
RESPIRATION RATE: 18 BRPM | HEIGHT: 72 IN | TEMPERATURE: 97.8 F | DIASTOLIC BLOOD PRESSURE: 74 MMHG | OXYGEN SATURATION: 99 % | WEIGHT: 306.8 LBS | BODY MASS INDEX: 41.55 KG/M2 | HEART RATE: 52 BPM | SYSTOLIC BLOOD PRESSURE: 126 MMHG

## 2024-01-08 DIAGNOSIS — E11.69 DIABETES MELLITUS TYPE 2 IN OBESE: ICD-10-CM

## 2024-01-08 DIAGNOSIS — Z12.5 PROSTATE CANCER SCREENING: ICD-10-CM

## 2024-01-08 DIAGNOSIS — E11.65 TYPE 2 DIABETES MELLITUS WITH HYPERGLYCEMIA, WITHOUT LONG-TERM CURRENT USE OF INSULIN (HCC): ICD-10-CM

## 2024-01-08 DIAGNOSIS — E66.01 MORBID OBESITY (HCC): ICD-10-CM

## 2024-01-08 DIAGNOSIS — I50.32 CHRONIC DIASTOLIC HEART FAILURE (HCC): ICD-10-CM

## 2024-01-08 DIAGNOSIS — Z00.00 ENCOUNTER FOR SUBSEQUENT ANNUAL WELLNESS VISIT IN MEDICARE PATIENT: Primary | ICD-10-CM

## 2024-01-08 DIAGNOSIS — I48.0 PAROXYSMAL ATRIAL FIBRILLATION (HCC): ICD-10-CM

## 2024-01-08 DIAGNOSIS — E66.9 DIABETES MELLITUS TYPE 2 IN OBESE: ICD-10-CM

## 2024-01-08 PROCEDURE — 92250 FUNDUS PHOTOGRAPHY W/I&R: CPT | Performed by: INTERNAL MEDICINE

## 2024-01-08 PROCEDURE — G0439 PPPS, SUBSEQ VISIT: HCPCS | Performed by: INTERNAL MEDICINE

## 2024-01-08 NOTE — PROGRESS NOTES
Assessment and Plan:     Problem List Items Addressed This Visit          Endocrine    Diabetes mellitus type 2 in obese        Cardiovascular and Mediastinum    Chronic diastolic heart failure (HCC)    Paroxysmal atrial fibrillation (HCC)       Other    Morbid obesity (HCC)    Encounter for subsequent annual wellness visit in Medicare patient - Primary     Other Visit Diagnoses       Type 2 diabetes mellitus with hyperglycemia, without long-term current use of insulin (HCC)        Relevant Orders    IRIS Diabetic eye exam    HEMOGLOBIN A1C W/ EAG ESTIMATION    Comprehensive metabolic panel    LDL cholesterol, direct    Prostate cancer screening        Relevant Orders    PSA, Total Screen        Rx for fbw  Limit carbs especially at night Stay active  Stay hydrated  Labs prior to followup appt 3 monhts  Luna today     Depression Screening and Follow-up Plan: Patient was screened for depression during today's encounter. They screened negative with a PHQ-2 score of 0.      Preventive health issues were discussed with patient, and age appropriate screening tests were ordered as noted in patient's After Visit Summary.  Personalized health advice and appropriate referrals for health education or preventive services given if needed, as noted in patient's After Visit Summary.     History of Present Illness:     Patient presents for a Medicare Wellness Visit    HPI   Pt doing ok overall BS range usually below 120 and no hypoglycemia No recent illness NO chest pain or sob   Patient Care Team:  Alma Delia Fountain DO as PCP - General (Internal Medicine)  DO Eros Brown MD Dana Zovko, PA-C     Review of Systems:     Review of Systems   Constitutional:  Negative for chills and fever.   HENT: Negative.     Eyes:  Negative for visual disturbance.   Respiratory:  Negative for cough and shortness of breath.    Cardiovascular:  Negative for chest pain, palpitations and leg swelling.   Gastrointestinal:  Negative  for abdominal distention and abdominal pain.   Genitourinary: Negative.    Musculoskeletal:  Positive for arthralgias.   Neurological:  Negative for dizziness, light-headedness and headaches.   Psychiatric/Behavioral:  Negative for sleep disturbance. The patient is not nervous/anxious.         Problem List:     Patient Active Problem List   Diagnosis    Benign essential HTN    Dyslipidemia    GERD (gastroesophageal reflux disease)    AYLEEN (obstructive sleep apnea)    Stasis edema of both lower extremities    Morbid obesity (HCC)    Diastolic dysfunction    Diabetes mellitus type 2 in obese     Lymphedema    Encounter for subsequent annual wellness visit in Medicare patient    Palpitations    Chronic diastolic heart failure (HCC)    Paroxysmal atrial fibrillation (HCC)      Past Medical and Surgical History:     Past Medical History:   Diagnosis Date    Arthritis     last assessed 7/1/15    Bronchitis 02/02/2023    Chronic diastolic (congestive) heart failure (HCC)     Diabetes mellitus (HCC)     type 2-last assessed 1/28/15    Diabetes mellitus (HCC) 03/16/2018    Dyslipidemia     GERD (gastroesophageal reflux disease)     Hypertension     Hypertensive urgency 03/16/2023    Irregular heart beat     last assessed 7/1/15    Obstructive sleep apnea     Palpitations     last assessed 9/7/17    Sexual dysfunction     last assessed 7/1/15    Thyroid disease     last assessed 7/1/15     Past Surgical History:   Procedure Laterality Date    COLONOSCOPY      TONSILLECTOMY        Family History:     Family History   Problem Relation Age of Onset    Hypertension Mother         essential    Stroke Mother     Hypertension Father         essential    Heart defect Father         cardiac disorder    Stroke Brother     Hypertension Brother       Social History:     Social History     Socioeconomic History    Marital status: Single     Spouse name: None    Number of children: None    Years of education: None    Highest education  level: None   Occupational History    None   Tobacco Use    Smoking status: Former     Current packs/day: 1.00     Types: Cigarettes     Passive exposure: Never    Smokeless tobacco: Never    Tobacco comments:     former smoker-quit 17 yrs ago 1pppd x 30 yrs as per [employer]   Vaping Use    Vaping status: Never Used   Substance and Sexual Activity    Alcohol use: Never    Drug use: Never    Sexual activity: None   Other Topics Concern    None   Social History Narrative    None     Social Determinants of Health     Financial Resource Strain: Low Risk  (1/8/2024)    Overall Financial Resource Strain (CARDIA)     Difficulty of Paying Living Expenses: Not hard at all   Food Insecurity: Not on file   Transportation Needs: No Transportation Needs (1/8/2024)    PRAPARE - Transportation     Lack of Transportation (Medical): No     Lack of Transportation (Non-Medical): No   Physical Activity: Not on file   Stress: Not on file   Social Connections: Not on file   Intimate Partner Violence: Not on file   Housing Stability: Not on file      Medications and Allergies:     Current Outpatient Medications   Medication Sig Dispense Refill    amLODIPine (NORVASC) 10 mg tablet Take 2 tablets (20 mg total) by mouth daily (Patient taking differently: Take 10 mg by mouth daily) 180 tablet 3    aspirin 81 mg chewable tablet Chew 81 mg daily      atorvastatin (LIPITOR) 10 mg tablet Take 1 tablet (10 mg total) by mouth daily 90 tablet 3    enalapril (VASOTEC) 10 mg tablet Take 1 tablet (10 mg total) by mouth 4 (four) times a day 2 tabs in the am, 1 tab in the afternoon, 1 tab @ hs. 360 tablet 3    flecainide (TAMBOCOR) 150 MG tablet Take 1 tablet (150 mg total) by mouth see administration instructions For palpitations lasting longer than 5 minutes, take one 150 mg tablet.  If palpitations last longer than 30 minutes, take another 150 mg tablet.  If palpitations persist, please go to the nearest emergency department.    Do not exceed 300 mg  "in one day. 30 tablet 0    furosemide (LASIX) 40 mg tablet Take 1 tablet (40 mg total) by mouth daily 90 tablet 3    glucose blood (OneTouch Verio) test strip Use 1 each daily 100 each 5    hydrALAZINE (APRESOLINE) 25 mg tablet Take 1 tablet (25 mg total) by mouth 3 (three) times a day 270 tablet 3    indomethacin (INDOCIN) 50 mg capsule PRN with the gout      ketoconazole (NIZORAL) 2 % shampoo Apply 1 application. topically 2 (two) times a week 120 mL 3    Lancets (onetouch ultrasoft) lancets Check glucose daily 100 each 1    meclizine (ANTIVERT) 25 mg tablet Take 1 tablet (25 mg total) by mouth 3 (three) times a day as needed for dizziness (Vertigo) for up to 10 days 30 tablet 0    metFORMIN (GLUCOPHAGE) 500 mg tablet TAKE 1 TABLET (500 MG TOTAL) BY MOUTH IN THE MORNING WITH BREAKFAST 90 tablet 10    metoprolol succinate (TOPROL-XL) 25 mg 24 hr tablet Take 1 tablet (25 mg total) by mouth daily 90 tablet 3    metoprolol succinate (TOPROL-XL) 50 mg 24 hr tablet Take 1 tablet (50 mg total) by mouth daily 90 tablet 3    multivitamin (THERAGRAN) TABS Take 1 tablet by mouth daily      omeprazole (PriLOSEC) 40 MG capsule TAKE 1 CAPSULE EVERY DAY 90 capsule 10    potassium chloride (Klor-Con M20) 20 mEq tablet Take 1 tablet (20 mEq total) by mouth daily 90 tablet 3    Red Yeast Rice Extract (RED YEAST RICE PO) Take by mouth      triamcinolone (KENALOG) 0.1 % lotion Apply topically 2 (two) times a day 60 mL 3    triamcinolone (KENALOG) 0.5 % cream       Vitamin D, Cholecalciferol, 50 MCG (2000 UT) CAPS Take 50 mcg by mouth daily 90 capsule 3    methocarbamol (ROBAXIN) 750 mg tablet TAKE 1 TABLET BY MOUTH 4 TIMES DAILY AS NEEDED FOR MUSCLE SPASM (Patient not taking: Reported on 11/20/2023)       No current facility-administered medications for this visit.     Allergies   Allergen Reactions    Acetaminophen Other (See Comments)     Other reaction(s): Unknown Reaction  \"I had to go to the hospital and get shots after taking " "it 20yrs ago\"      Immunizations:     Immunization History   Administered Date(s) Administered    COVID-19 PFIZER VACCINE 0.3 ML IM 02/25/2021, 03/25/2021    COVID-19 Pfizer Vac BIVALENT Emigdio-sucrose 12 Yr+ IM 12/29/2022    INFLUENZA 10/14/2018, 10/01/2019, 08/25/2020    Influenza, high dose seasonal 0.7 mL 10/19/2021    Influenza, seasonal, injectable 10/08/2013, 10/13/2014, 10/15/2015, 09/26/2016, 09/09/2017    Pneumococcal Conjugate 13-Valent 08/25/2020    Pneumococcal Conjugate Vaccine 20-valent (Pcv20), Polysace 12/29/2022    Pneumococcal Polysaccharide PPV23 02/18/2015    Tdap 11/28/2015    influenza, trivalent, adjuvanted 10/08/2019      Health Maintenance:         Topic Date Due    Colorectal Cancer Screening  10/31/2025    Hepatitis C Screening  Completed         Topic Date Due    Influenza Vaccine (1) 09/01/2023    COVID-19 Vaccine (4 - 2023-24 season) 09/01/2023      Medicare Screening Tests and Risk Assessments:     Christy is here for his Subsequent Wellness visit. Last Medicare Wellness visit information reviewed, patient interviewed, no change since last AWV.     Health Risk Assessment:   Patient rates overall health as fair. Patient feels that their physical health rating is same. Patient is very satisfied with their life. Eyesight was rated as same. Hearing was rated as same. Patient feels that their emotional and mental health rating is same. Patients states they are never, rarely angry. Patient states they are never, rarely unusually tired/fatigued. Pain experienced in the last 7 days has been none. Patient states that he has experienced no weight loss or gain in last 6 months.     Depression Screening:   PHQ-2 Score: 0      Fall Risk Screening:   In the past year, patient has experienced: no history of falling in past year      Home Safety:  Patient does not have trouble with stairs inside or outside of their home. Patient has working smoke alarms and has working carbon monoxide detector. Home " safety hazards include: none.     Nutrition:   Current diet is No Added Salt and Low Carb.     Medications:   Patient is currently taking over-the-counter supplements. OTC medications include: see medication list. Patient is able to manage medications.     Activities of Daily Living (ADLs)/Instrumental Activities of Daily Living (IADLs):   Walk and transfer into and out of bed and chair?: Yes  Dress and groom yourself?: Yes    Bathe or shower yourself?: Yes    Feed yourself? Yes  Do your laundry/housekeeping?: Yes  Manage your money, pay your bills and track your expenses?: Yes  Make your own meals?: Yes    Do your own shopping?: Yes    Previous Hospitalizations:   Any hospitalizations or ED visits within the last 12 months?: Yes    How many hospitalizations have you had in the last year?: 3-4    Advance Care Planning:   Living will: Yes    Durable POA for healthcare: Yes    Advanced directive: Yes    End of Life Decisions reviewed with patient: Yes    Provider agrees with end of life decisions: Yes      Cognitive Screening:   Provider or family/friend/caregiver concerned regarding cognition?: No    PREVENTIVE SCREENINGS      Cardiovascular Screening:    General: Screening Current      Diabetes Screening:     General: Screening Not Indicated and History Diabetes      Colorectal Cancer Screening:     General: Screening Current      Prostate Cancer Screening:    General: Risks and Benefits Discussed    Due for: PSA      Osteoporosis Screening:    General: Screening Not Indicated      Abdominal Aortic Aneurysm (AAA) Screening:    Risk factors include: age between 65-76 yo and tobacco use        Lung Cancer Screening:     General: Screening Not Indicated      Hepatitis C Screening:    General: Screening Current    Screening, Brief Intervention, and Referral to Treatment (SBIRT)    Screening  Typical number of drinks in a day: 0  Typical number of drinks in a week: 0  Interpretation: Low risk drinking  behavior.    AUDIT-C Screenin) How often did you have a drink containing alcohol in the past year? never  2) How many drinks did you have on a typical day when you were drinking in the past year? 0  3) How often did you have 6 or more drinks on one occasion in the past year? never    AUDIT-C Score: 0  Interpretation: Score 0-3 (male): Negative screen for alcohol misuse    Single Item Drug Screening:  How often have you used an illegal drug (including marijuana) or a prescription medication for non-medical reasons in the past year? never    Single Item Drug Screen Score: 0  Interpretation: Negative screen for possible drug use disorder    Brief Intervention  Alcohol & drug use screenings were reviewed. No concerns regarding substance use disorder identified.     Other Counseling Topics:   Calcium and vitamin D intake and regular weightbearing exercise.     No results found.     Physical Exam:     /74   Pulse (!) 52   Temp 97.8 °F (36.6 °C) (Temporal)   Resp 18   Ht 6' (1.829 m)   Wt (!) 139 kg (306 lb 12.8 oz)   SpO2 99%   BMI 41.61 kg/m²     Physical Exam  Vitals reviewed.   Constitutional:       General: He is not in acute distress.     Appearance: Normal appearance. He is not ill-appearing, toxic-appearing or diaphoretic.   HENT:      Head: Normocephalic and atraumatic.      Right Ear: External ear normal.      Left Ear: External ear normal.      Nose: Nose normal.      Mouth/Throat:      Mouth: Mucous membranes are moist.   Eyes:      General: No scleral icterus.  Cardiovascular:      Rate and Rhythm: Normal rate and regular rhythm.      Pulses: Normal pulses.      Heart sounds: Normal heart sounds.   Pulmonary:      Effort: Pulmonary effort is normal.      Breath sounds: Normal breath sounds.   Abdominal:      General: Bowel sounds are normal. There is no distension.      Palpations: Abdomen is soft.      Tenderness: There is no abdominal tenderness.   Musculoskeletal:      Cervical back:  Normal range of motion and neck supple. No rigidity.      Right lower leg: No edema.   Skin:     General: Skin is warm and dry.      Coloration: Skin is not jaundiced or pale.   Neurological:      General: No focal deficit present.      Mental Status: He is alert and oriented to person, place, and time. Mental status is at baseline.      Cranial Nerves: No cranial nerve deficit.      Sensory: No sensory deficit.   Psychiatric:         Mood and Affect: Mood normal.         Behavior: Behavior normal.         Thought Content: Thought content normal.         Judgment: Judgment normal.          Alma Delia Fountain, DO

## 2024-01-08 NOTE — PATIENT INSTRUCTIONS
Medicare Preventive Visit Patient Instructions  Thank you for completing your Welcome to Medicare Visit or Medicare Annual Wellness Visit today. Your next wellness visit will be due in one year (1/8/2025).  The screening/preventive services that you may require over the next 5-10 years are detailed below. Some tests may not apply to you based off risk factors and/or age. Screening tests ordered at today's visit but not completed yet may show as past due. Also, please note that scanned in results may not display below.  Preventive Screenings:  Service Recommendations Previous Testing/Comments   Colorectal Cancer Screening  Colonoscopy    Fecal Occult Blood Test (FOBT)/Fecal Immunochemical Test (FIT)  Fecal DNA/Cologuard Test  Flexible Sigmoidoscopy Age: 45-75 years old   Colonoscopy: every 10 years (May be performed more frequently if at higher risk)  OR  FOBT/FIT: every 1 year  OR  Cologuard: every 3 years  OR  Sigmoidoscopy: every 5 years  Screening may be recommended earlier than age 45 if at higher risk for colorectal cancer. Also, an individualized decision between you and your healthcare provider will decide whether screening between the ages of 76-85 would be appropriate. Colonoscopy: 10/31/2022  FOBT/FIT: Not on file  Cologuard: Not on file  Sigmoidoscopy: Not on file    Screening Current     Prostate Cancer Screening Individualized decision between patient and health care provider in men between ages of 55-69   Medicare will cover every 12 months beginning on the day after your 50th birthday PSA: 0.8 ng/mL           Hepatitis C Screening Once for adults born between 1945 and 1965  More frequently in patients at high risk for Hepatitis C Hep C Antibody: 03/26/2019    Screening Current   Diabetes Screening 1-2 times per year if you're at risk for diabetes or have pre-diabetes Fasting glucose: 128 mg/dL (11/8/2023)  A1C: 6.3 % (11/8/2023)  Screening Not Indicated  History Diabetes   Cholesterol Screening Once  every 5 years if you don't have a lipid disorder. May order more often based on risk factors. Lipid panel: 07/31/2023  Screening Current      Other Preventive Screenings Covered by Medicare:  Abdominal Aortic Aneurysm (AAA) Screening: covered once if your at risk. You're considered to be at risk if you have a family history of AAA or a male between the age of 65-75 who smoking at least 100 cigarettes in your lifetime.  Lung Cancer Screening: covers low dose CT scan once per year if you meet all of the following conditions: (1) Age 55-77; (2) No signs or symptoms of lung cancer; (3) Current smoker or have quit smoking within the last 15 years; (4) You have a tobacco smoking history of at least 20 pack years (packs per day x number of years you smoked); (5) You get a written order from a healthcare provider.  Glaucoma Screening: covered annually if you're considered high risk: (1) You have diabetes OR (2) Family history of glaucoma OR (3)  aged 50 and older OR (4)  American aged 65 and older  Osteoporosis Screening: covered every 2 years if you meet one of the following conditions: (1) Have a vertebral abnormality; (2) On glucocorticoid therapy for more than 3 months; (3) Have primary hyperparathyroidism; (4) On osteoporosis medications and need to assess response to drug therapy.  HIV Screening: covered annually if you're between the age of 15-65. Also covered annually if you are younger than 15 and older than 65 with risk factors for HIV infection. For pregnant patients, it is covered up to 3 times per pregnancy.    Immunizations:  Immunization Recommendations   Influenza Vaccine Annual influenza vaccination during flu season is recommended for all persons aged >= 6 months who do not have contraindications   Pneumococcal Vaccine   * Pneumococcal conjugate vaccine = PCV13 (Prevnar 13), PCV15 (Vaxneuvance), PCV20 (Prevnar 20)  * Pneumococcal polysaccharide vaccine = PPSV23 (Pneumovax) Adults  19-65 yo with certain risk factors or if 65+ yo  If never received any pneumonia vaccine: recommend Prevnar 20 (PCV20)  Give PCV20 if previously received 1 dose of PCV13 or PPSV23   Hepatitis B Vaccine 3 dose series if at intermediate or high risk (ex: diabetes, end stage renal disease, liver disease)   Respiratory syncytial virus (RSV) Vaccine - COVERED BY MEDICARE PART D  * RSVPreF3 (Arexvy) CDC recommends that adults 60 years of age and older may receive a single dose of RSV vaccine using shared clinical decision-making (SCDM)   Tetanus (Td) Vaccine - COST NOT COVERED BY MEDICARE PART B Following completion of primary series, a booster dose should be given every 10 years to maintain immunity against tetanus. Td may also be given as tetanus wound prophylaxis.   Tdap Vaccine - COST NOT COVERED BY MEDICARE PART B Recommended at least once for all adults. For pregnant patients, recommended with each pregnancy.   Shingles Vaccine (Shingrix) - COST NOT COVERED BY MEDICARE PART B  2 shot series recommended in those 19 years and older who have or will have weakened immune systems or those 50 years and older     Health Maintenance Due:      Topic Date Due   • Colorectal Cancer Screening  10/31/2025   • Hepatitis C Screening  Completed     Immunizations Due:      Topic Date Due   • Influenza Vaccine (1) 09/01/2023   • COVID-19 Vaccine (4 - 2023-24 season) 09/01/2023     Advance Directives   What are advance directives?  Advance directives are legal documents that state your wishes and plans for medical care. These plans are made ahead of time in case you lose your ability to make decisions for yourself. Advance directives can apply to any medical decision, such as the treatments you want, and if you want to donate organs.   What are the types of advance directives?  There are many types of advance directives, and each state has rules about how to use them. You may choose a combination of any of the following:  Living will:   This is a written record of the treatment you want. You can also choose which treatments you do not want, which to limit, and which to stop at a certain time. This includes surgery, medicine, IV fluid, and tube feedings.   Durable power of  for healthcare (DPAHC):  This is a written record that states who you want to make healthcare choices for you when you are unable to make them for yourself. This person, called a proxy, is usually a family member or a friend. You may choose more than 1 proxy.  Do not resuscitate (DNR) order:  A DNR order is used in case your heart stops beating or you stop breathing. It is a request not to have certain forms of treatment, such as CPR. A DNR order may be included in other types of advance directives.  Medical directive:  This covers the care that you want if you are in a coma, near death, or unable to make decisions for yourself. You can list the treatments you want for each condition. Treatment may include pain medicine, surgery, blood transfusions, dialysis, IV or tube feedings, and a ventilator (breathing machine).  Values history:  This document has questions about your views, beliefs, and how you feel and think about life. This information can help others choose the care that you would choose.  Why are advance directives important?  An advance directive helps you control your care. Although spoken wishes may be used, it is better to have your wishes written down. Spoken wishes can be misunderstood, or not followed. Treatments may be given even if you do not want them. An advance directive may make it easier for your family to make difficult choices about your care.   Weight Management   Why it is important to manage your weight:  Being overweight increases your risk of health conditions such as heart disease, high blood pressure, type 2 diabetes, and certain types of cancer. It can also increase your risk for osteoarthritis, sleep apnea, and other respiratory  problems. Aim for a slow, steady weight loss. Even a small amount of weight loss can lower your risk of health problems.  How to lose weight safely:  A safe and healthy way to lose weight is to eat fewer calories and get regular exercise. You can lose up about 1 pound a week by decreasing the number of calories you eat by 500 calories each day.   Healthy meal plan for weight management:  A healthy meal plan includes a variety of foods, contains fewer calories, and helps you stay healthy. A healthy meal plan includes the following:  Eat whole-grain foods more often.  A healthy meal plan should contain fiber. Fiber is the part of grains, fruits, and vegetables that is not broken down by your body. Whole-grain foods are healthy and provide extra fiber in your diet. Some examples of whole-grain foods are whole-wheat breads and pastas, oatmeal, brown rice, and bulgur.  Eat a variety of vegetables every day.  Include dark, leafy greens such as spinach, kale, vick greens, and mustard greens. Eat yellow and orange vegetables such as carrots, sweet potatoes, and winter squash.   Eat a variety of fruits every day.  Choose fresh or canned fruit (canned in its own juice or light syrup) instead of juice. Fruit juice has very little or no fiber.  Eat low-fat dairy foods.  Drink fat-free (skim) milk or 1% milk. Eat fat-free yogurt and low-fat cottage cheese. Try low-fat cheeses such as mozzarella and other reduced-fat cheeses.  Choose meat and other protein foods that are low in fat.  Choose beans or other legumes such as split peas or lentils. Choose fish, skinless poultry (chicken or turkey), or lean cuts of red meat (beef or pork). Before you cook meat or poultry, cut off any visible fat.   Use less fat and oil.  Try baking foods instead of frying them. Add less fat, such as margarine, sour cream, regular salad dressing and mayonnaise to foods. Eat fewer high-fat foods. Some examples of high-fat foods include french fries,  doughnuts, ice cream, and cakes.  Eat fewer sweets.  Limit foods and drinks that are high in sugar. This includes candy, cookies, regular soda, and sweetened drinks.  Exercise:  Exercise at least 30 minutes per day on most days of the week. Some examples of exercise include walking, biking, dancing, and swimming. You can also fit in more physical activity by taking the stairs instead of the elevator or parking farther away from stores. Ask your healthcare provider about the best exercise plan for you.      © Copyright [employer] 2018 Information is for End User's use only and may not be sold, redistributed or otherwise used for commercial purposes. All illustrations and images included in CareNotes® are the copyrighted property of [employer] or [employer]

## 2024-01-11 DIAGNOSIS — Z87.39 HX OF GOUT: Primary | ICD-10-CM

## 2024-01-11 RX ORDER — INDOMETHACIN 50 MG/1
50 CAPSULE ORAL DAILY PRN
Qty: 30 CAPSULE | Refills: 1 | Status: SHIPPED | OUTPATIENT
Start: 2024-01-11 | End: 2024-03-11

## 2024-01-29 ENCOUNTER — REMOTE DEVICE CLINIC VISIT (OUTPATIENT)
Dept: CARDIOLOGY CLINIC | Facility: CLINIC | Age: 69
End: 2024-01-29
Payer: MEDICARE

## 2024-01-29 DIAGNOSIS — Z95.818 PRESENCE OF OTHER CARDIAC IMPLANTS AND GRAFTS: Primary | ICD-10-CM

## 2024-01-29 PROCEDURE — 93298 REM INTERROG DEV EVAL SCRMS: CPT | Performed by: INTERNAL MEDICINE

## 2024-01-29 NOTE — PROGRESS NOTES
"CARELINK TRANSMISSION: LOOP RECORDER. PRESENTING RHYTHM NSR @ 60 BPM. BATTERY STATUS \"OK.\" NO PATIENT OR DEVICE ACTIVATED EPISODES. NORMAL DEVICE FUNCTION. DL   "

## 2024-02-08 DIAGNOSIS — I49.8 JUNCTIONAL RHYTHM: ICD-10-CM

## 2024-02-08 DIAGNOSIS — I50.31 ACUTE DIASTOLIC HEART FAILURE (HCC): ICD-10-CM

## 2024-02-08 DIAGNOSIS — I10 BENIGN ESSENTIAL HTN: ICD-10-CM

## 2024-02-08 DIAGNOSIS — L30.9 DERMATITIS: ICD-10-CM

## 2024-02-08 DIAGNOSIS — R00.2 PALPITATIONS: ICD-10-CM

## 2024-02-08 DIAGNOSIS — I87.303 STASIS EDEMA OF BOTH LOWER EXTREMITIES: ICD-10-CM

## 2024-02-08 DIAGNOSIS — E11.9 TYPE 2 DIABETES MELLITUS WITHOUT COMPLICATION, WITHOUT LONG-TERM CURRENT USE OF INSULIN (HCC): ICD-10-CM

## 2024-02-08 DIAGNOSIS — E78.5 DYSLIPIDEMIA: ICD-10-CM

## 2024-02-08 DIAGNOSIS — E11.69 DIABETES MELLITUS TYPE 2 IN OBESE: ICD-10-CM

## 2024-02-08 DIAGNOSIS — I50.32 CHRONIC DIASTOLIC HEART FAILURE (HCC): ICD-10-CM

## 2024-02-08 DIAGNOSIS — E66.01 MORBID OBESITY (HCC): ICD-10-CM

## 2024-02-08 DIAGNOSIS — E66.9 DIABETES MELLITUS TYPE 2 IN OBESE: ICD-10-CM

## 2024-02-08 DIAGNOSIS — G47.33 OSA (OBSTRUCTIVE SLEEP APNEA): ICD-10-CM

## 2024-02-08 DIAGNOSIS — K21.9 GASTROESOPHAGEAL REFLUX DISEASE WITHOUT ESOPHAGITIS: ICD-10-CM

## 2024-02-08 DIAGNOSIS — Z87.39 HX OF GOUT: ICD-10-CM

## 2024-02-08 RX ORDER — METOPROLOL SUCCINATE 25 MG/1
25 TABLET, EXTENDED RELEASE ORAL DAILY
Qty: 90 TABLET | Refills: 0 | Status: SHIPPED | OUTPATIENT
Start: 2024-02-08

## 2024-02-08 RX ORDER — FUROSEMIDE 40 MG/1
40 TABLET ORAL DAILY
Qty: 90 TABLET | Refills: 0 | Status: SHIPPED | OUTPATIENT
Start: 2024-02-08

## 2024-02-08 RX ORDER — FLECAINIDE ACETATE 150 MG/1
150 TABLET ORAL SEE ADMIN INSTRUCTIONS
Qty: 90 TABLET | Refills: 0 | Status: SHIPPED | OUTPATIENT
Start: 2024-02-08

## 2024-02-08 RX ORDER — ENALAPRIL MALEATE 10 MG/1
10 TABLET ORAL 4 TIMES DAILY
Qty: 270 TABLET | Refills: 0 | Status: SHIPPED | OUTPATIENT
Start: 2024-02-08

## 2024-02-08 RX ORDER — AMLODIPINE BESYLATE 10 MG/1
10 TABLET ORAL DAILY
Qty: 90 TABLET | Refills: 0 | Status: SHIPPED | OUTPATIENT
Start: 2024-02-08

## 2024-02-08 RX ORDER — POTASSIUM CHLORIDE 20 MEQ/1
20 TABLET, EXTENDED RELEASE ORAL DAILY
Qty: 90 TABLET | Refills: 0 | Status: SHIPPED | OUTPATIENT
Start: 2024-02-08

## 2024-02-08 RX ORDER — INDOMETHACIN 50 MG/1
50 CAPSULE ORAL DAILY PRN
Qty: 90 CAPSULE | Refills: 0 | Status: SHIPPED | OUTPATIENT
Start: 2024-02-08 | End: 2024-04-08

## 2024-02-08 RX ORDER — KETOCONAZOLE 20 MG/ML
1 SHAMPOO TOPICAL 2 TIMES WEEKLY
Qty: 120 ML | Refills: 0 | Status: SHIPPED | OUTPATIENT
Start: 2024-02-08

## 2024-02-08 RX ORDER — HYDRALAZINE HYDROCHLORIDE 25 MG/1
25 TABLET, FILM COATED ORAL 3 TIMES DAILY
Qty: 270 TABLET | Refills: 0 | Status: SHIPPED | OUTPATIENT
Start: 2024-02-08

## 2024-02-08 RX ORDER — ATORVASTATIN CALCIUM 10 MG/1
10 TABLET, FILM COATED ORAL DAILY
Qty: 90 TABLET | Refills: 0 | Status: SHIPPED | OUTPATIENT
Start: 2024-02-08

## 2024-02-08 RX ORDER — METOPROLOL SUCCINATE 50 MG/1
50 TABLET, EXTENDED RELEASE ORAL DAILY
Qty: 90 TABLET | Refills: 0 | Status: SHIPPED | OUTPATIENT
Start: 2024-02-08

## 2024-02-08 RX ORDER — OMEPRAZOLE 40 MG/1
40 CAPSULE, DELAYED RELEASE ORAL DAILY
Qty: 90 CAPSULE | Refills: 0 | Status: SHIPPED | OUTPATIENT
Start: 2024-02-08

## 2024-02-09 ENCOUNTER — APPOINTMENT (EMERGENCY)
Dept: RADIOLOGY | Facility: HOSPITAL | Age: 69
End: 2024-02-09
Payer: MEDICARE

## 2024-02-09 ENCOUNTER — HOSPITAL ENCOUNTER (EMERGENCY)
Facility: HOSPITAL | Age: 69
Discharge: HOME | End: 2024-02-09
Attending: EMERGENCY MEDICINE
Payer: MEDICARE

## 2024-02-09 VITALS
OXYGEN SATURATION: 96 % | TEMPERATURE: 97.6 F | SYSTOLIC BLOOD PRESSURE: 111 MMHG | DIASTOLIC BLOOD PRESSURE: 53 MMHG | RESPIRATION RATE: 12 BRPM | HEART RATE: 51 BPM

## 2024-02-09 DIAGNOSIS — R06.02 SOB (SHORTNESS OF BREATH): Primary | ICD-10-CM

## 2024-02-09 DIAGNOSIS — R00.2 PALPITATIONS: ICD-10-CM

## 2024-02-09 LAB
2HR DELTA HS TROPONIN: 0 NG/L
ALBUMIN SERPL BCP-MCNC: 4.1 G/DL (ref 3.5–5)
ALP SERPL-CCNC: 33 U/L (ref 34–104)
ALT SERPL W P-5'-P-CCNC: 20 U/L (ref 7–52)
ANION GAP SERPL CALCULATED.3IONS-SCNC: 10 MMOL/L
AST SERPL W P-5'-P-CCNC: 18 U/L (ref 13–39)
ATRIAL RATE: 60 BPM
BASOPHILS # BLD AUTO: 0.02 THOUSANDS/ÂΜL (ref 0–0.1)
BASOPHILS NFR BLD AUTO: 0 % (ref 0–1)
BILIRUB SERPL-MCNC: 1.14 MG/DL (ref 0.2–1)
BNP SERPL-MCNC: 34 PG/ML (ref 0–100)
BUN SERPL-MCNC: 34 MG/DL (ref 5–25)
CALCIUM SERPL-MCNC: 9.7 MG/DL (ref 8.4–10.2)
CARDIAC TROPONIN I PNL SERPL HS: 6 NG/L
CARDIAC TROPONIN I PNL SERPL HS: 6 NG/L
CHLORIDE SERPL-SCNC: 103 MMOL/L (ref 96–108)
CO2 SERPL-SCNC: 26 MMOL/L (ref 21–32)
CREAT SERPL-MCNC: 1.4 MG/DL (ref 0.6–1.3)
EOSINOPHIL # BLD AUTO: 0 THOUSAND/ÂΜL (ref 0–0.61)
EOSINOPHIL NFR BLD AUTO: 0 % (ref 0–6)
ERYTHROCYTE [DISTWIDTH] IN BLOOD BY AUTOMATED COUNT: 13.2 % (ref 11.6–15.1)
GFR SERPL CREATININE-BSD FRML MDRD: 50 ML/MIN/1.73SQ M
GLUCOSE SERPL-MCNC: 118 MG/DL (ref 65–140)
HCT VFR BLD AUTO: 41.6 % (ref 36.5–49.3)
HGB BLD-MCNC: 13.5 G/DL (ref 12–17)
IMM GRANULOCYTES # BLD AUTO: 0.01 THOUSAND/UL (ref 0–0.2)
IMM GRANULOCYTES NFR BLD AUTO: 0 % (ref 0–2)
LYMPHOCYTES # BLD AUTO: 1.09 THOUSANDS/ÂΜL (ref 0.6–4.47)
LYMPHOCYTES NFR BLD AUTO: 19 % (ref 14–44)
MAGNESIUM SERPL-MCNC: 2 MG/DL (ref 1.9–2.7)
MCH RBC QN AUTO: 29.2 PG (ref 26.8–34.3)
MCHC RBC AUTO-ENTMCNC: 32.5 G/DL (ref 31.4–37.4)
MCV RBC AUTO: 90 FL (ref 82–98)
MONOCYTES # BLD AUTO: 0.62 THOUSAND/ÂΜL (ref 0.17–1.22)
MONOCYTES NFR BLD AUTO: 11 % (ref 4–12)
NEUTROPHILS # BLD AUTO: 3.88 THOUSANDS/ÂΜL (ref 1.85–7.62)
NEUTS SEG NFR BLD AUTO: 70 % (ref 43–75)
NRBC BLD AUTO-RTO: 0 /100 WBCS
P AXIS: 50 DEGREES
PLATELET # BLD AUTO: 158 THOUSANDS/UL (ref 149–390)
PMV BLD AUTO: 10.8 FL (ref 8.9–12.7)
POTASSIUM SERPL-SCNC: 3.7 MMOL/L (ref 3.5–5.3)
PR INTERVAL: 214 MS
PROT SERPL-MCNC: 7.2 G/DL (ref 6.4–8.4)
QRS AXIS: -47 DEGREES
QRSD INTERVAL: 118 MS
QT INTERVAL: 452 MS
QTC INTERVAL: 452 MS
RBC # BLD AUTO: 4.63 MILLION/UL (ref 3.88–5.62)
SODIUM SERPL-SCNC: 139 MMOL/L (ref 135–147)
T WAVE AXIS: 59 DEGREES
TSH SERPL DL<=0.05 MIU/L-ACNC: 0.59 UIU/ML (ref 0.45–4.5)
VENTRICULAR RATE: 60 BPM
WBC # BLD AUTO: 5.62 THOUSAND/UL (ref 4.31–10.16)

## 2024-02-09 PROCEDURE — 80053 COMPREHEN METABOLIC PANEL: CPT | Performed by: EMERGENCY MEDICINE

## 2024-02-09 PROCEDURE — 85025 COMPLETE CBC W/AUTO DIFF WBC: CPT | Performed by: EMERGENCY MEDICINE

## 2024-02-09 PROCEDURE — 84484 ASSAY OF TROPONIN QUANT: CPT | Performed by: EMERGENCY MEDICINE

## 2024-02-09 PROCEDURE — 36415 COLL VENOUS BLD VENIPUNCTURE: CPT | Performed by: EMERGENCY MEDICINE

## 2024-02-09 PROCEDURE — 84443 ASSAY THYROID STIM HORMONE: CPT | Performed by: EMERGENCY MEDICINE

## 2024-02-09 PROCEDURE — 99285 EMERGENCY DEPT VISIT HI MDM: CPT | Performed by: EMERGENCY MEDICINE

## 2024-02-09 PROCEDURE — 83735 ASSAY OF MAGNESIUM: CPT | Performed by: EMERGENCY MEDICINE

## 2024-02-09 PROCEDURE — 83880 ASSAY OF NATRIURETIC PEPTIDE: CPT | Performed by: EMERGENCY MEDICINE

## 2024-02-09 PROCEDURE — 99285 EMERGENCY DEPT VISIT HI MDM: CPT

## 2024-02-09 PROCEDURE — 93005 ELECTROCARDIOGRAM TRACING: CPT

## 2024-02-09 PROCEDURE — 71045 X-RAY EXAM CHEST 1 VIEW: CPT

## 2024-02-09 NOTE — ED PROVIDER NOTES
"History  Chief Complaint   Patient presents with    Shortness of Breath     Shortness of breath that started 2 hours ago, states that he felt as if his heart was pounding as well. States he still feels some shortness of breath, but not as bad        Shortness of Breath  Associated symptoms: no abdominal pain, no chest pain, no cough, no diaphoresis, no ear pain, no fever, no neck pain, no rash, no sore throat and no vomiting    68M PMHx significant for CHF on lasix, HTN, HLD, GERD, AYLEEN, Obesity, T2DM, Afib, Hx palpitations, presenting to the ED for evaluation of acute onset SOB/palpitations beginning 2 hours ago, now improving.     Patient describes intermittent symptoms described as \"heart pounding\" and dyspnea which is an intermittent symptom which has been occurring for several days now but has been going on for several years and he has had prior evaluations by cardiology and currently has a loop recorder implanted for these symptoms without any clear pathology identified.  Patient describes the symptoms similar to what he has been experiencing chronically.  He reports that in the last 2 hours he had another bout of symptoms while ambulating around and felt like he should get evaluated for the symptoms.  He reports no new features or no change in what he is experienced in the past.  He reports no diaphoresis lightheadedness nausea vomiting chest pain numbness tingling weakness visual changes.  He notes some chronic leg swelling bilaterally which is stable and not worsening.  He reports compliance with his home medications.    Review of chart shows multiple outpatient cardiology visits in the recent past.  Due to undergo go ambulatory event monitor for palpitations complaint and was found to have some runs of junctional rhythm which prompted placement of an implanted loop recorder in October 2023.  He had his last echocardiogram in April 2023 with a preserved ejection fraction and no significant valvular " abnormalities noted.  He had follow-up with cardiology most recently in November 2023 and had his metoprolol decreased from 75 mg to 50 mg with concerns for chronic bradycardia.  He was instructed to continue other medications.  His loop recorder was last interrogated on 1/29/2024, 1.5 weeks ago and was normal with no events recorded or reported.    Patient denies any other symptoms such as cough congestion fevers chills or recent illness.        Prior to Admission Medications   Prescriptions Last Dose Informant Patient Reported? Taking?   Lancets (onetouch ultrasoft) lancets  Self No No   Sig: Check glucose daily   Red Yeast Rice Extract (RED YEAST RICE PO)  Self Yes No   Sig: Take by mouth   Vitamin D, Cholecalciferol, 50 MCG (2000 UT) CAPS  Self No No   Sig: Take 50 mcg by mouth daily   amLODIPine (NORVASC) 10 mg tablet   No No   Sig: Take 1 tablet (10 mg total) by mouth daily   aspirin 81 mg chewable tablet  Self Yes No   Sig: Chew 81 mg daily   atorvastatin (LIPITOR) 10 mg tablet   No No   Sig: Take 1 tablet (10 mg total) by mouth daily   enalapril (VASOTEC) 10 mg tablet   No No   Sig: Take 1 tablet (10 mg total) by mouth 4 (four) times a day 2 tabs in the am, 1 tab in the afternoon, 1 tab @ hs.   flecainide (TAMBOCOR) 150 MG tablet   No No   Sig: Take 1 tablet (150 mg total) by mouth see administration instructions For palpitations lasting longer than 5 minutes, take one 150 mg tablet.  If palpitations last longer than 30 minutes, take another 150 mg tablet.  If palpitations persist, please go to the nearest emergency department.    Do not exceed 300 mg in one day.   furosemide (LASIX) 40 mg tablet   No No   Sig: Take 1 tablet (40 mg total) by mouth daily   glucose blood (OneTouch Verio) test strip   No No   Sig: Use 1 each daily   hydrALAZINE (APRESOLINE) 25 mg tablet   No No   Sig: Take 1 tablet (25 mg total) by mouth 3 (three) times a day   indomethacin (INDOCIN) 50 mg capsule   No No   Sig: Take 1 capsule  (50 mg total) by mouth daily as needed for mild pain PRN with the gout   ketoconazole (NIZORAL) 2 % shampoo   No No   Sig: Apply 1 Application topically 2 (two) times a week   meclizine (ANTIVERT) 25 mg tablet   No No   Sig: Take 1 tablet (25 mg total) by mouth 3 (three) times a day as needed for dizziness (Vertigo) for up to 10 days   metFORMIN (GLUCOPHAGE) 500 mg tablet   No No   Sig: Take 1 tablet (500 mg total) by mouth daily with breakfast   methocarbamol (ROBAXIN) 750 mg tablet   Yes No   Sig: TAKE 1 TABLET BY MOUTH 4 TIMES DAILY AS NEEDED FOR MUSCLE SPASM   Patient not taking: Reported on 11/20/2023   metoprolol succinate (TOPROL-XL) 25 mg 24 hr tablet   No No   Sig: Take 1 tablet (25 mg total) by mouth daily   metoprolol succinate (TOPROL-XL) 50 mg 24 hr tablet   No No   Sig: Take 1 tablet (50 mg total) by mouth daily   multivitamin (THERAGRAN) TABS  Self Yes No   Sig: Take 1 tablet by mouth daily   omeprazole (PriLOSEC) 40 MG capsule   No No   Sig: Take 1 capsule (40 mg total) by mouth daily   potassium chloride (Klor-Con M20) 20 mEq tablet   No No   Sig: Take 1 tablet (20 mEq total) by mouth daily   triamcinolone (KENALOG) 0.1 % lotion  Self No No   Sig: Apply topically 2 (two) times a day   triamcinolone (KENALOG) 0.5 % cream  Self Yes No      Facility-Administered Medications: None       Past Medical History:   Diagnosis Date    Arthritis     last assessed 7/1/15    Bronchitis 02/02/2023    Chronic diastolic (congestive) heart failure (HCC)     Diabetes mellitus (HCC)     type 2-last assessed 1/28/15    Diabetes mellitus (HCC) 03/16/2018    Dyslipidemia     GERD (gastroesophageal reflux disease)     Hypertension     Hypertensive urgency 03/16/2023    Irregular heart beat     last assessed 7/1/15    Obstructive sleep apnea     Palpitations     last assessed 9/7/17    Sexual dysfunction     last assessed 7/1/15    Thyroid disease     last assessed 7/1/15       Past Surgical History:   Procedure Laterality  Date    COLONOSCOPY      TONSILLECTOMY         Family History   Problem Relation Age of Onset    Hypertension Mother         essential    Stroke Mother     Hypertension Father         essential    Heart defect Father         cardiac disorder    Stroke Brother     Hypertension Brother      I have reviewed and agree with the history as documented.    E-Cigarette/Vaping    E-Cigarette Use Never User      E-Cigarette/Vaping Substances    Nicotine No     THC No     CBD No     Flavoring No     Other No     Unknown No      Social History     Tobacco Use    Smoking status: Former     Current packs/day: 1.00     Types: Cigarettes     Passive exposure: Never    Smokeless tobacco: Never    Tobacco comments:     former smoker-quit 17 yrs ago 1pppd x 30 yrs as per [employer]   Vaping Use    Vaping status: Never Used   Substance Use Topics    Alcohol use: Never    Drug use: Never       Review of Systems   Constitutional:  Negative for chills, diaphoresis and fever.   HENT:  Negative for ear pain and sore throat.    Eyes:  Negative for pain and visual disturbance.   Respiratory:  Positive for shortness of breath. Negative for cough.    Cardiovascular:  Positive for palpitations and leg swelling. Negative for chest pain.   Gastrointestinal:  Negative for abdominal pain, diarrhea, nausea and vomiting.   Genitourinary:  Negative for dysuria and hematuria.   Musculoskeletal:  Negative for arthralgias, back pain, gait problem and neck pain.   Skin:  Negative for color change and rash.   Neurological:  Negative for seizures and syncope.   All other systems reviewed and are negative.      Physical Exam  Physical Exam  Vitals and nursing note reviewed.   Constitutional:       General: He is not in acute distress.     Appearance: He is well-developed. He is obese. He is not ill-appearing, toxic-appearing or diaphoretic.   HENT:      Head: Normocephalic and atraumatic.      Mouth/Throat:      Mouth: Mucous membranes are moist.      Pharynx:  Oropharynx is clear.   Neck:      Vascular: No JVD.      Trachea: No tracheal deviation.   Cardiovascular:      Rate and Rhythm: Normal rate and regular rhythm.      Pulses: Normal pulses.      Heart sounds: Normal heart sounds. No murmur heard.     No friction rub.   Pulmonary:      Effort: Pulmonary effort is normal.      Breath sounds: Normal breath sounds. No decreased breath sounds, wheezing, rhonchi or rales.   Chest:      Chest wall: No tenderness.   Abdominal:      Palpations: Abdomen is soft.      Tenderness: There is no abdominal tenderness.   Musculoskeletal:      Right lower leg: No tenderness. 1+ Pitting Edema present.      Left lower leg: No tenderness. 1+ Pitting Edema present.   Skin:     General: Skin is warm and dry.      Capillary Refill: Capillary refill takes less than 2 seconds.      Coloration: Skin is not cyanotic.   Neurological:      General: No focal deficit present.      Mental Status: He is alert and oriented to person, place, and time.         Vital Signs  ED Triage Vitals   Temperature Pulse Respirations Blood Pressure SpO2   02/09/24 1730 02/09/24 1730 02/09/24 1730 02/09/24 1732 02/09/24 1730   97.6 °F (36.4 °C) 65 18 145/66 96 %      Temp Source Heart Rate Source Patient Position - Orthostatic VS BP Location FiO2 (%)   02/09/24 1730 02/09/24 1730 02/09/24 1732 02/09/24 1732 --   Temporal Monitor Lying Left arm       Pain Score       02/09/24 1730       No Pain           Vitals:    02/09/24 1900 02/09/24 1930 02/09/24 2000 02/09/24 2030   BP: 110/53 116/58 104/53 109/53   Pulse: (!) 53 56 (!) 53 (!) 51   Patient Position - Orthostatic VS:             Visual Acuity      ED Medications  Medications - No data to display    Diagnostic Studies  Results Reviewed       Procedure Component Value Units Date/Time    HS Troponin I 2hr [[id number]]  (Normal) Collected: 02/09/24 2013    Lab Status: Final result Specimen: Blood from Arm, Right Updated: 02/09/24 2039     hs TnI 2hr 6 ng/L      Delta  2hr hsTnI 0 ng/L     TSH, 3rd generation with Free T4 reflex [[id number]]  (Normal) Collected: 02/09/24 1759    Lab Status: Final result Specimen: Blood from Arm, Right Updated: 02/09/24 1841     TSH 3RD GENERATON 0.594 uIU/mL     HS Troponin 0hr (reflex protocol) [[id number]]  (Normal) Collected: 02/09/24 1759    Lab Status: Final result Specimen: Blood from Arm, Right Updated: 02/09/24 1831     hs TnI 0hr 6 ng/L     B-Type Natriuretic Peptide(BNP) [[id number]]  (Normal) Collected: 02/09/24 1759    Lab Status: Final result Specimen: Blood from Arm, Right Updated: 02/09/24 1831     BNP 34 pg/mL     Comprehensive metabolic panel [[id number]]  (Abnormal) Collected: 02/09/24 1759    Lab Status: Final result Specimen: Blood from Arm, Right Updated: 02/09/24 1826     Sodium 139 mmol/L      Potassium 3.7 mmol/L      Chloride 103 mmol/L      CO2 26 mmol/L      ANION GAP 10 mmol/L      BUN 34 mg/dL      Creatinine 1.40 mg/dL      Glucose 118 mg/dL      Calcium 9.7 mg/dL      AST 18 U/L      ALT 20 U/L      Alkaline Phosphatase 33 U/L      Total Protein 7.2 g/dL      Albumin 4.1 g/dL      Total Bilirubin 1.14 mg/dL      eGFR 50 ml/min/1.73sq m     Narrative:      National Kidney Disease Foundation guidelines for Chronic Kidney Disease (CKD):     Stage 1 with normal or high GFR (GFR > 90 mL/min/1.73 square meters)    Stage 2 Mild CKD (GFR = 60-89 mL/min/1.73 square meters)    Stage 3A Moderate CKD (GFR = 45-59 mL/min/1.73 square meters)    Stage 3B Moderate CKD (GFR = 30-44 mL/min/1.73 square meters)    Stage 4 Severe CKD (GFR = 15-29 mL/min/1.73 square meters)    Stage 5 End Stage CKD (GFR <15 mL/min/1.73 square meters)  Note: GFR calculation is accurate only with a steady state creatinine    Magnesium [[id number]]  (Normal) Collected: 02/09/24 1759    Lab Status: Final result Specimen: Blood from Arm, Right Updated: 02/09/24 1826     Magnesium 2.0 mg/dL     CBC and differential [[id number]] Collected: 02/09/24 1054    Lab Status:  Final result Specimen: Blood from Arm, Right Updated: 02/09/24 1808     WBC 5.62 Thousand/uL      RBC 4.63 Million/uL      Hemoglobin 13.5 g/dL      Hematocrit 41.6 %      MCV 90 fL      MCH 29.2 pg      MCHC 32.5 g/dL      RDW 13.2 %      MPV 10.8 fL      Platelets 158 Thousands/uL      nRBC 0 /100 WBCs      Neutrophils Relative 70 %      Immat GRANS % 0 %      Lymphocytes Relative 19 %      Monocytes Relative 11 %      Eosinophils Relative 0 %      Basophils Relative 0 %      Neutrophils Absolute 3.88 Thousands/µL      Immature Grans Absolute 0.01 Thousand/uL      Lymphocytes Absolute 1.09 Thousands/µL      Monocytes Absolute 0.62 Thousand/µL      Eosinophils Absolute 0.00 Thousand/µL      Basophils Absolute 0.02 Thousands/µL                    XR chest 1 view portable   ED Interpretation by Leonel Hartmann DO (02/09 1753)   1 view x-ray of the chest interpreted by myself pending official radiology report.  No acute cardiopulmonary process seen.  When compared to prior chest x-ray on file from April 2023, interval placement of loop recorder overlying the left chest.                 Procedures  Procedures         ED Course  ED Course as of 02/09/24 2113 Fri Feb 09, 2024   1739 EKG interpreted by myself.  EKG dated 2/9/2024 at 1736 demonstrates sinus rhythm at 60 bpm, prolonged PA interval with first-degree AV block, normal QRS duration with left anterior fascicular block pattern, normal QTc interval, no changes when compared to prior EKG from 4/26/2023, no STEMI.   1739 Blood Pressure: 145/66   1741 Temperature: 97.6 °F (36.4 °C)   1741 Pulse: 65   1741 Respirations: 18   1741 SpO2: 96 %   1753 Per chart review normal report from loop recorder as of 1/29/2024.   1753 Per chart review, last echocardiogram April 2023 60% ejection fraction.   1837 Creatinine(!): 1.40   1837 BUN(!): 34  Does not meet criteria for LIZ.    1844 hs TnI 0hr: 6   1844 MAGNESIUM: 2.0   1844 Potassium: 3.7   1844 BNP: 34   1844 TSH  3RD GENERATON: 0.594   1844 Hemoglobin: 13.5   1844 GLUCOSE: 118   2105 hs TnI 2hr: 6   2106 Delta 2hr hsTnI: 0                               SBIRT 20yo+      Flowsheet Row Most Recent Value   Initial Alcohol Screen: US AUDIT-C     1. How often do you have a drink containing alcohol? 0 Filed at: 02/09/2024 1732   2. How many drinks containing alcohol do you have on a typical day you are drinking?  0 Filed at: 02/09/2024 1732   3a. Male UNDER 65: How often do you have five or more drinks on one occasion? 0 Filed at: 02/09/2024 1732   Audit-C Score 0 Filed at: 02/09/2024 1732   LOPEZ: How many times in the past year have you...    Used an illegal drug or used a prescription medication for non-medical reasons? Never Filed at: 02/09/2024 1732                      Medical Decision Making  68M p/w acute onset SOB+palpitations. EKG no acute changes. No arrhyhtmia on cardiac monitor. Will check screening cardiac labs, CXR. VSS.  Screening labs unremarkable.  Patient with no new oxygen requirements no rales on exam clear chest x-ray.  Discussed possible benign etiologies of his symptoms discussed given unremarkable labs would anticipate continued outpatient follow-up with cardiology, return to ER precautions for new/worsening symptoms.  Given the chronic intermittent stable nature of symptoms with normal vital signs no arrhythmia no clinical evidence of right heart strain on EKG and no tachycardia very low suspicion for PE do not feel workup is indicated at this time.    Problems Addressed:  Palpitations: chronic illness or injury with exacerbation, progression, or side effects of treatment  SOB (shortness of breath): acute illness or injury    Amount and/or Complexity of Data Reviewed  Labs: ordered. Decision-making details documented in ED Course.  Radiology: ordered and independent interpretation performed.  ECG/medicine tests: ordered and independent interpretation performed. Decision-making details documented in ED  Course.             Disposition  Final diagnoses:   SOB (shortness of breath)   Palpitations     Time reflects when diagnosis was documented in both MDM as applicable and the Disposition within this note       Time User Action Codes Description Comment    2/9/2024  5:42 PM Leonel Hartmann [R06.02] SOB (shortness of breath)     2/9/2024  5:42 PM Leonel Hartmann [R00.2] Palpitations           ED Disposition       ED Disposition   Discharge    Condition   Stable    Date/Time   Fri Feb 9, 2024 2106    Comment   Mhamd Elashram discharge to home/self care.                   Follow-up Information       Follow up With Specialties Details Why Contact Info Additional Information    Alma Delia Fountain DO Internal Medicine, Hospice Services Schedule an appointment as soon as possible for a visit   [address] [facility] [address]  [phone]       [facility] Schedule an appointment as soon as possible for a visit   [address] [place] [address]  [phone] [facility], [address] [place] [address], [phone]   [phone]    [facility] Emergency Department Emergency Medicine Go to  If symptoms worsen [address] [facility] [address]  [phone] [facility] Emergency Department, [address] [place] [address], [phone]            Patient's Medications   Discharge Prescriptions    No medications on file       No discharge procedures on file.    PDMP Review       None            ED Provider  Electronically Signed by             Leonel Hartmann DO  02/09/24 2117

## 2024-02-10 NOTE — DISCHARGE INSTRUCTIONS
Thank you for visiting the Emergency Department today.    No acute findings on workup  EKG stable no new changes  No arrhythmias on cardiac monitor  Normal vital signs    Heart enzymes (troponin) negative x 2, 2-hours apart.  Chest x-ray clear.  Blood test for heart failure negative for fluid retention.

## 2024-02-12 ENCOUNTER — VBI (OUTPATIENT)
Dept: FAMILY MEDICINE CLINIC | Facility: CLINIC | Age: 69
End: 2024-02-12

## 2024-02-12 LAB
ATRIAL RATE: 60 BPM
P AXIS: 50 DEGREES
PR INTERVAL: 214 MS
QRS AXIS: -47 DEGREES
QRSD INTERVAL: 118 MS
QT INTERVAL: 452 MS
QTC INTERVAL: 452 MS
T WAVE AXIS: 59 DEGREES
VENTRICULAR RATE: 60 BPM

## 2024-02-12 NOTE — TELEPHONE ENCOUNTER
02/12/24 11:50 AM    Patient contacted post ED visit, first outreach attempt made. Message was left for patient to return a call to the [facility] Department at [facility]: Phone [phone].    Thank you.  [facility] Cervantes  [facility]

## 2024-02-12 NOTE — LETTER
[facility]  [address] [facility] [address]    Date: 02/15/24  Christy Haro  [address]  [facility] [address]    Dear Christy:                                                                                                                              Thank you for choosing [facility] emergency department for care.  Your primary care provider wants to make sure that your ongoing medical care is being addressed. If you require follow up care as a result of your emergency department visit, there are a few things the practice would like you to know.                As part of the network's continuing commitment to caring for our patients, we have added more same day appointments and have extended office hours to meet your medical needs. After hours, on-call physicians are available via your primary care provider's main office line.               We encourage you to contact our office prior to seeking treatment to discuss your symptoms with the medical staff.  Together, we can determine the correct course of action.  A majority of non-emergent conditions such as: common cold, flu-like symptoms, fevers, strains/sprains, dislocations, minor burns, cuts and animal bites can be treated at [facility] facilities. Diagnostic testing is available at some sites.               Of course, if you are experiencing a life threatening medical emergency call 911 or proceed directly to the nearest emergency room.    Your nearest [facility] facility is conveniently located at:    [address] [address]  SKIP THE WAIT  Conveniently offered at most [facility] locations  [place] your spot online at [email or web address] or on the [facility]    Sincerely,    [facility]  Dept: [phone]

## 2024-02-13 ENCOUNTER — TELEPHONE (OUTPATIENT)
Age: 69
End: 2024-02-13

## 2024-02-14 NOTE — TELEPHONE ENCOUNTER
02/14/24 10:09 AM    Patient contacted post ED visit, second outreach attempt made. Message was left for patient to return a call to the [facility] Department at [facility]: Phone [phone].    Thank you.  [facility] Cervantes  [facility]

## 2024-02-15 NOTE — TELEPHONE ENCOUNTER
02/15/24 9:22 AM    Patient contacted post ED visit, phone outreaches were unsuccessful and a MyChart letter has been sent to the patient as follow-up.    Thank you.  JUVE MILLER  [facility]

## 2024-02-21 PROBLEM — Z00.00 ENCOUNTER FOR SUBSEQUENT ANNUAL WELLNESS VISIT IN MEDICARE PATIENT: Status: RESOLVED | Noted: 2022-11-03 | Resolved: 2024-02-21

## 2024-03-11 DIAGNOSIS — E11.9 TYPE 2 DIABETES MELLITUS WITHOUT COMPLICATION, WITHOUT LONG-TERM CURRENT USE OF INSULIN (HCC): ICD-10-CM

## 2024-03-11 RX ORDER — BLOOD SUGAR DIAGNOSTIC
1 STRIP MISCELLANEOUS DAILY
Qty: 100 EACH | Refills: 5 | Status: SHIPPED | OUTPATIENT
Start: 2024-03-11

## 2024-04-07 ENCOUNTER — APPOINTMENT (OUTPATIENT)
Dept: LAB | Facility: HOSPITAL | Age: 69
End: 2024-04-07
Payer: MEDICARE

## 2024-04-07 DIAGNOSIS — E11.65 TYPE 2 DIABETES MELLITUS WITH HYPERGLYCEMIA, WITHOUT LONG-TERM CURRENT USE OF INSULIN (HCC): ICD-10-CM

## 2024-04-07 DIAGNOSIS — Z12.5 PROSTATE CANCER SCREENING: ICD-10-CM

## 2024-04-07 LAB
ALBUMIN SERPL BCP-MCNC: 4.2 G/DL (ref 3.5–5)
ALP SERPL-CCNC: 28 U/L (ref 34–104)
ALT SERPL W P-5'-P-CCNC: 18 U/L (ref 7–52)
ANION GAP SERPL CALCULATED.3IONS-SCNC: 9 MMOL/L (ref 4–13)
AST SERPL W P-5'-P-CCNC: 18 U/L (ref 13–39)
BILIRUB SERPL-MCNC: 0.95 MG/DL (ref 0.2–1)
BUN SERPL-MCNC: 29 MG/DL (ref 5–25)
CALCIUM SERPL-MCNC: 9.8 MG/DL (ref 8.4–10.2)
CHLORIDE SERPL-SCNC: 103 MMOL/L (ref 96–108)
CO2 SERPL-SCNC: 25 MMOL/L (ref 21–32)
CREAT SERPL-MCNC: 1.19 MG/DL (ref 0.6–1.3)
EST. AVERAGE GLUCOSE BLD GHB EST-MCNC: 126 MG/DL
GFR SERPL CREATININE-BSD FRML MDRD: 61 ML/MIN/1.73SQ M
GLUCOSE P FAST SERPL-MCNC: 126 MG/DL (ref 65–99)
HBA1C MFR BLD: 6 %
LDLC SERPL DIRECT ASSAY-MCNC: 86 MG/DL (ref 0–100)
POTASSIUM SERPL-SCNC: 3.7 MMOL/L (ref 3.5–5.3)
PROT SERPL-MCNC: 7.4 G/DL (ref 6.4–8.4)
PSA SERPL-MCNC: 1.24 NG/ML (ref 0–4)
SODIUM SERPL-SCNC: 137 MMOL/L (ref 135–147)

## 2024-04-07 PROCEDURE — 80053 COMPREHEN METABOLIC PANEL: CPT

## 2024-04-07 PROCEDURE — G0103 PSA SCREENING: HCPCS

## 2024-04-07 PROCEDURE — 36415 COLL VENOUS BLD VENIPUNCTURE: CPT

## 2024-04-07 PROCEDURE — 83721 ASSAY OF BLOOD LIPOPROTEIN: CPT

## 2024-04-07 PROCEDURE — 83036 HEMOGLOBIN GLYCOSYLATED A1C: CPT

## 2024-04-08 ENCOUNTER — OFFICE VISIT (OUTPATIENT)
Dept: FAMILY MEDICINE CLINIC | Facility: CLINIC | Age: 69
End: 2024-04-08
Payer: MEDICARE

## 2024-04-08 VITALS
HEIGHT: 72 IN | SYSTOLIC BLOOD PRESSURE: 132 MMHG | WEIGHT: 305.8 LBS | OXYGEN SATURATION: 98 % | HEART RATE: 49 BPM | TEMPERATURE: 97.8 F | BODY MASS INDEX: 41.42 KG/M2 | DIASTOLIC BLOOD PRESSURE: 78 MMHG

## 2024-04-08 DIAGNOSIS — E11.69 TYPE 2 DIABETES MELLITUS WITH OBESITY  (HCC): ICD-10-CM

## 2024-04-08 DIAGNOSIS — I50.32 CHRONIC DIASTOLIC HEART FAILURE (HCC): ICD-10-CM

## 2024-04-08 DIAGNOSIS — I10 BENIGN ESSENTIAL HTN: ICD-10-CM

## 2024-04-08 DIAGNOSIS — E11.9 TYPE 2 DIABETES MELLITUS WITHOUT COMPLICATION, WITHOUT LONG-TERM CURRENT USE OF INSULIN (HCC): Primary | ICD-10-CM

## 2024-04-08 DIAGNOSIS — E66.9 TYPE 2 DIABETES MELLITUS WITH OBESITY  (HCC): ICD-10-CM

## 2024-04-08 DIAGNOSIS — R05.9 COUGH, UNSPECIFIED TYPE: ICD-10-CM

## 2024-04-08 DIAGNOSIS — G47.33 OSA (OBSTRUCTIVE SLEEP APNEA): ICD-10-CM

## 2024-04-08 PROCEDURE — G2211 COMPLEX E/M VISIT ADD ON: HCPCS | Performed by: INTERNAL MEDICINE

## 2024-04-08 PROCEDURE — 99214 OFFICE O/P EST MOD 30 MIN: CPT | Performed by: INTERNAL MEDICINE

## 2024-04-08 NOTE — PROGRESS NOTES
Name: Christy Haro      : 1955      MRN: [id number]  Encounter Provider: Alma Delia Fountain DO  Encounter Date: 2024   Encounter department: [facility]    Assessment & Plan     1. Type 2 diabetes mellitus without complication, without long-term current use of insulin (HCC)  -     Albumin / creatinine urine ratio; Future  A1c did improve and will recheck in 3 months   He is trying to stay on lo carb diet and has been more consistent     2. Type 2 diabetes mellitus with obesity  (HCC)  Pt is still working on weight loss and said his weight on his scale this Am was 300lbs    3. AYLEEN (obstructive sleep apnea)  Pt plans to call to r/s appt he missed while on vacation He is having an issue with his humidified area and has increased mucous Recommended mucinex     4. Benign essential HTN  Lo sodium diet and continued weight loss efforts He has cardio followup upcoming     5. Chronic diastolic heart failure (HCC)  Pt does weigh himself daily and overall weight stable although he is trying to lose weight with diet portion control     Cough  Mucinex and stay hydrated         Depression Screening and Follow-up Plan: Patient was screened for depression during today's encounter. They screened negative with a PHQ-2 score of 0.  Rto 3 months     Subjective      HPI  Pt doing ok He is aware A1c is lower and he has been trying to stick with lo carb diet No chest pain or sob No dizziness or lightheadedness No falls He was on vacation in [place] recently and enjoyed the warmer weather He is still working on weight loss He notes his HR 50-60 but no sxs He has increased mucous production , clear mucous No sob or wheeze Weight overall stable   Review of Systems   Constitutional:  Negative for activity change, chills and fever.   HENT:  Positive for congestion.    Eyes:  Negative for visual disturbance.   Respiratory:  Positive for cough.    Cardiovascular:  Negative for chest pain, palpitations and leg swelling.    Gastrointestinal:  Negative for abdominal distention and abdominal pain.   Genitourinary: Negative.    Musculoskeletal:  Positive for arthralgias.   Neurological:  Negative for dizziness, light-headedness and headaches.   Psychiatric/Behavioral:  Negative for sleep disturbance. The patient is not nervous/anxious.        Current Outpatient Medications on File Prior to Visit   Medication Sig   • amLODIPine (NORVASC) 10 mg tablet Take 1 tablet (10 mg total) by mouth daily   • aspirin 81 mg chewable tablet Chew 81 mg daily   • atorvastatin (LIPITOR) 10 mg tablet Take 1 tablet (10 mg total) by mouth daily   • enalapril (VASOTEC) 10 mg tablet Take 1 tablet (10 mg total) by mouth 4 (four) times a day 2 tabs in the am, 1 tab in the afternoon, 1 tab @ hs.   • flecainide (TAMBOCOR) 150 MG tablet Take 1 tablet (150 mg total) by mouth see administration instructions For palpitations lasting longer than 5 minutes, take one 150 mg tablet.  If palpitations last longer than 30 minutes, take another 150 mg tablet.  If palpitations persist, please go to the nearest emergency department.    Do not exceed 300 mg in one day.   • furosemide (LASIX) 40 mg tablet Take 1 tablet (40 mg total) by mouth daily   • glucose blood (OneTouch Verio) test strip Use 1 each daily   • hydrALAZINE (APRESOLINE) 25 mg tablet Take 1 tablet (25 mg total) by mouth 3 (three) times a day   • indomethacin (INDOCIN) 50 mg capsule Take 1 capsule (50 mg total) by mouth daily as needed for mild pain PRN with the gout   • ketoconazole (NIZORAL) 2 % shampoo Apply 1 Application topically 2 (two) times a week   • Lancets (onetouch ultrasoft) lancets Check glucose daily   • meclizine (ANTIVERT) 25 mg tablet Take 1 tablet (25 mg total) by mouth 3 (three) times a day as needed for dizziness (Vertigo) for up to 10 days   • metFORMIN (GLUCOPHAGE) 500 mg tablet Take 1 tablet (500 mg total) by mouth daily with breakfast   • metoprolol succinate (TOPROL-XL) 25 mg 24 hr tablet  Take 1 tablet (25 mg total) by mouth daily   • metoprolol succinate (TOPROL-XL) 50 mg 24 hr tablet Take 1 tablet (50 mg total) by mouth daily   • multivitamin (THERAGRAN) TABS Take 1 tablet by mouth daily   • omeprazole (PriLOSEC) 40 MG capsule Take 1 capsule (40 mg total) by mouth daily   • potassium chloride (Klor-Con M20) 20 mEq tablet Take 1 tablet (20 mEq total) by mouth daily   • Red Yeast Rice Extract (RED YEAST RICE PO) Take by mouth   • triamcinolone (KENALOG) 0.1 % lotion Apply topically 2 (two) times a day   • triamcinolone (KENALOG) 0.5 % cream    • Vitamin D, Cholecalciferol, 50 MCG (2000 UT) CAPS Take 50 mcg by mouth daily   • methocarbamol (ROBAXIN) 750 mg tablet TAKE 1 TABLET BY MOUTH 4 TIMES DAILY AS NEEDED FOR MUSCLE SPASM (Patient not taking: Reported on 11/20/2023)       Objective     /78   Pulse (!) 49   Temp 97.8 °F (36.6 °C) (Temporal)   Ht 6' (1.829 m)   Wt (!) 139 kg (305 lb 12.8 oz)   SpO2 98%   BMI 41.47 kg/m²   Diabetic Foot Exam    Patient's shoes and socks removed.    Right Foot/Ankle   Right Foot Inspection  Skin Exam: skin normal, skin intact and dry skin. No warmth, no callus, no erythema, no maceration, no abnormal color, no pre-ulcer, no ulcer and no callus.     Toe Exam: ROM and strength within normal limits and swelling. No tenderness    Sensory   Vibration: diminished  Monofilament testing: diminished    Vascular  The right DP pulse is 2+. The right PT pulse is 1+.     Left Foot/Ankle  Left Foot Inspection  Skin Exam: skin normal, skin intact and dry skin. No warmth, no erythema, no maceration, normal color, no pre-ulcer, no ulcer and no callus.     Toe Exam: ROM and strength within normal limits and swelling. No tenderness.     Sensory   Vibration: diminished  Monofilament testing: diminished    Vascular  The left DP pulse is 2+. The left PT pulse is 1+.     Assign Risk Category  No deformity present  Loss of protective sensation  Weak pulses  Risk: 2  Physical  Exam  Vitals and nursing note reviewed.   Constitutional:       General: He is not in acute distress.     Appearance: Normal appearance. He is not ill-appearing, toxic-appearing or diaphoretic.   HENT:      Head: Normocephalic and atraumatic.      Nose: Nose normal.      Mouth/Throat:      Mouth: Mucous membranes are moist.   Eyes:      General: No scleral icterus.  Cardiovascular:      Rate and Rhythm: Normal rate and regular rhythm.      Pulses: Pulses are weak.           Dorsalis pedis pulses are 2+ on the right side and 2+ on the left side.        Posterior tibial pulses are 1+ on the right side and 1+ on the left side.      Heart sounds: Normal heart sounds. No murmur heard.  Pulmonary:      Effort: Pulmonary effort is normal. No respiratory distress.      Breath sounds: Normal breath sounds. No wheezing.   Abdominal:      General: Bowel sounds are normal.      Palpations: Abdomen is soft.   Musculoskeletal:      Cervical back: Normal range of motion and neck supple.      Right lower leg: No edema.      Left lower leg: No edema.   Feet:      Right foot:      Skin integrity: Dry skin present. No ulcer, skin breakdown, erythema, warmth or callus.      Left foot:      Skin integrity: Dry skin present. No ulcer, skin breakdown, erythema, warmth or callus.   Lymphadenopathy:      Cervical: No cervical adenopathy.   Skin:     General: Skin is warm and dry.   Neurological:      General: No focal deficit present.      Mental Status: He is alert and oriented to person, place, and time. Mental status is at baseline.   Psychiatric:         Mood and Affect: Mood normal.         Behavior: Behavior normal.         Thought Content: Thought content normal.         Judgment: Judgment normal.     Alma Delia Fountain DO

## 2024-04-12 ENCOUNTER — TELEPHONE (OUTPATIENT)
Dept: OTOLARYNGOLOGY | Facility: CLINIC | Age: 69
End: 2024-04-12

## 2024-04-12 NOTE — TELEPHONE ENCOUNTER
Left message letting patient know we had to cancel the 5/24/24 ENT visit as Dr. Macario is no longer in our practice.  He can schedule with Maria Saha PA-C here in [place].  Patient also needs a hearing test scheduled the same day as the ENT one half hour prior to appointment.

## 2024-04-29 ENCOUNTER — REMOTE DEVICE CLINIC VISIT (OUTPATIENT)
Dept: CARDIOLOGY CLINIC | Facility: CLINIC | Age: 69
End: 2024-04-29
Payer: MEDICARE

## 2024-04-29 ENCOUNTER — HOSPITAL ENCOUNTER (EMERGENCY)
Facility: HOSPITAL | Age: 69
Discharge: HOME | End: 2024-04-29
Attending: EMERGENCY MEDICINE
Payer: MEDICARE

## 2024-04-29 ENCOUNTER — APPOINTMENT (EMERGENCY)
Dept: RADIOLOGY | Facility: HOSPITAL | Age: 69
End: 2024-04-29
Payer: MEDICARE

## 2024-04-29 VITALS
OXYGEN SATURATION: 95 % | DIASTOLIC BLOOD PRESSURE: 53 MMHG | TEMPERATURE: 97.6 F | RESPIRATION RATE: 18 BRPM | HEART RATE: 72 BPM | SYSTOLIC BLOOD PRESSURE: 107 MMHG | WEIGHT: 315 LBS | BODY MASS INDEX: 43 KG/M2

## 2024-04-29 DIAGNOSIS — I50.9 CHF (CONGESTIVE HEART FAILURE) (HCC): ICD-10-CM

## 2024-04-29 DIAGNOSIS — R06.02 SOB (SHORTNESS OF BREATH): Primary | ICD-10-CM

## 2024-04-29 DIAGNOSIS — Z95.818 PRESENCE OF CARDIAC DEVICE: Primary | ICD-10-CM

## 2024-04-29 LAB
2HR DELTA HS TROPONIN: 0 NG/L
ALBUMIN SERPL BCP-MCNC: 4.3 G/DL (ref 3.5–5)
ALP SERPL-CCNC: 31 U/L (ref 34–104)
ALT SERPL W P-5'-P-CCNC: 21 U/L (ref 7–52)
ANION GAP SERPL CALCULATED.3IONS-SCNC: 8 MMOL/L (ref 4–13)
AST SERPL W P-5'-P-CCNC: 19 U/L (ref 13–39)
ATRIAL RATE: 62 BPM
BASOPHILS # BLD AUTO: 0.03 THOUSANDS/ÂΜL (ref 0–0.1)
BASOPHILS NFR BLD AUTO: 1 % (ref 0–1)
BILIRUB SERPL-MCNC: 0.59 MG/DL (ref 0.2–1)
BNP SERPL-MCNC: 50 PG/ML (ref 0–100)
BUN SERPL-MCNC: 26 MG/DL (ref 5–25)
CALCIUM SERPL-MCNC: 9.8 MG/DL (ref 8.4–10.2)
CARDIAC TROPONIN I PNL SERPL HS: 5 NG/L
CARDIAC TROPONIN I PNL SERPL HS: 5 NG/L
CHLORIDE SERPL-SCNC: 106 MMOL/L (ref 96–108)
CO2 SERPL-SCNC: 27 MMOL/L (ref 21–32)
CREAT SERPL-MCNC: 1.16 MG/DL (ref 0.6–1.3)
EOSINOPHIL # BLD AUTO: 0 THOUSAND/ÂΜL (ref 0–0.61)
EOSINOPHIL NFR BLD AUTO: 0 % (ref 0–6)
ERYTHROCYTE [DISTWIDTH] IN BLOOD BY AUTOMATED COUNT: 13.3 % (ref 11.6–15.1)
GFR SERPL CREATININE-BSD FRML MDRD: 63 ML/MIN/1.73SQ M
GLUCOSE SERPL-MCNC: 128 MG/DL (ref 65–140)
HCT VFR BLD AUTO: 42 % (ref 36.5–49.3)
HGB BLD-MCNC: 13.6 G/DL (ref 12–17)
IMM GRANULOCYTES # BLD AUTO: 0.01 THOUSAND/UL (ref 0–0.2)
IMM GRANULOCYTES NFR BLD AUTO: 0 % (ref 0–2)
LYMPHOCYTES # BLD AUTO: 1.49 THOUSANDS/ÂΜL (ref 0.6–4.47)
LYMPHOCYTES NFR BLD AUTO: 29 % (ref 14–44)
MCH RBC QN AUTO: 29.6 PG (ref 26.8–34.3)
MCHC RBC AUTO-ENTMCNC: 32.4 G/DL (ref 31.4–37.4)
MCV RBC AUTO: 92 FL (ref 82–98)
MONOCYTES # BLD AUTO: 0.5 THOUSAND/ÂΜL (ref 0.17–1.22)
MONOCYTES NFR BLD AUTO: 10 % (ref 4–12)
NEUTROPHILS # BLD AUTO: 3.2 THOUSANDS/ÂΜL (ref 1.85–7.62)
NEUTS SEG NFR BLD AUTO: 60 % (ref 43–75)
NRBC BLD AUTO-RTO: 0 /100 WBCS
P AXIS: 53 DEGREES
PLATELET # BLD AUTO: 150 THOUSANDS/UL (ref 149–390)
PMV BLD AUTO: 11.6 FL (ref 8.9–12.7)
POTASSIUM SERPL-SCNC: 4 MMOL/L (ref 3.5–5.3)
PR INTERVAL: 212 MS
PROT SERPL-MCNC: 7.5 G/DL (ref 6.4–8.4)
QRS AXIS: -57 DEGREES
QRSD INTERVAL: 118 MS
QT INTERVAL: 428 MS
QTC INTERVAL: 434 MS
RBC # BLD AUTO: 4.59 MILLION/UL (ref 3.88–5.62)
SODIUM SERPL-SCNC: 141 MMOL/L (ref 135–147)
T WAVE AXIS: 67 DEGREES
VENTRICULAR RATE: 62 BPM
WBC # BLD AUTO: 5.23 THOUSAND/UL (ref 4.31–10.16)

## 2024-04-29 PROCEDURE — 99285 EMERGENCY DEPT VISIT HI MDM: CPT | Performed by: EMERGENCY MEDICINE

## 2024-04-29 PROCEDURE — 93005 ELECTROCARDIOGRAM TRACING: CPT

## 2024-04-29 PROCEDURE — 83880 ASSAY OF NATRIURETIC PEPTIDE: CPT | Performed by: EMERGENCY MEDICINE

## 2024-04-29 PROCEDURE — 71045 X-RAY EXAM CHEST 1 VIEW: CPT

## 2024-04-29 PROCEDURE — 80053 COMPREHEN METABOLIC PANEL: CPT | Performed by: EMERGENCY MEDICINE

## 2024-04-29 PROCEDURE — 99285 EMERGENCY DEPT VISIT HI MDM: CPT

## 2024-04-29 PROCEDURE — 85025 COMPLETE CBC W/AUTO DIFF WBC: CPT | Performed by: EMERGENCY MEDICINE

## 2024-04-29 PROCEDURE — 36415 COLL VENOUS BLD VENIPUNCTURE: CPT | Performed by: EMERGENCY MEDICINE

## 2024-04-29 PROCEDURE — 96374 THER/PROPH/DIAG INJ IV PUSH: CPT

## 2024-04-29 PROCEDURE — 93298 REM INTERROG DEV EVAL SCRMS: CPT | Performed by: INTERNAL MEDICINE

## 2024-04-29 PROCEDURE — 93010 ELECTROCARDIOGRAM REPORT: CPT | Performed by: INTERNAL MEDICINE

## 2024-04-29 PROCEDURE — 84484 ASSAY OF TROPONIN QUANT: CPT | Performed by: EMERGENCY MEDICINE

## 2024-04-29 RX ORDER — FUROSEMIDE 10 MG/ML
40 INJECTION INTRAMUSCULAR; INTRAVENOUS ONCE
Status: COMPLETED | OUTPATIENT
Start: 2024-04-29 | End: 2024-04-29

## 2024-04-29 RX ADMIN — FUROSEMIDE 40 MG: 10 INJECTION, SOLUTION INTRAMUSCULAR; INTRAVENOUS at 08:39

## 2024-04-29 NOTE — PROGRESS NOTES
"Results for orders placed or performed in visit on 04/29/24   Cardiac EP device report    Narrative    CARELINK TRANSMISSION: BATTERY STATUS \"OK.\" NO PATIENT OR DEVICE ACTIVATED EPISODES.â€”HERNANDEZ        "

## 2024-04-29 NOTE — ED PROVIDER NOTES
History  Chief Complaint   Patient presents with    Shortness of Breath     States that he woke up this morning with some trouble breathing and pain in left shoulder, denies any chest pain      68-year-old male states that difficulty sleeping due to shortness of breath.  He woke with increased shortness of breath and pain in the left shoulder.  Denies any chest pain.  No recent change in medications.  No recent febrile illness.  He states after he was up and walking around that the breathing did improve.  Denies any fevers or chills although he states he has had some mild nasal congestion and occasional sputum production.  Denies chest pain.  Denies any headaches.  Denies any rash.  Has chronic lower extremity edema.  Lives with spouse.      Shortness of Breath  Associated symptoms: no abdominal pain, no chest pain, no cough, no ear pain, no fever, no headaches, no neck pain, no rash, no sore throat, no vomiting and no wheezing        Prior to Admission Medications   Prescriptions Last Dose Informant Patient Reported? Taking?   Lancets (onetouch ultrasoft) lancets  Self No No   Sig: Check glucose daily   Red Yeast Rice Extract (RED YEAST RICE PO)  Self Yes No   Sig: Take by mouth   Vitamin D, Cholecalciferol, 50 MCG (2000 UT) CAPS  Self No No   Sig: Take 50 mcg by mouth daily   amLODIPine (NORVASC) 10 mg tablet   No No   Sig: Take 1 tablet (10 mg total) by mouth daily   aspirin 81 mg chewable tablet  Self Yes No   Sig: Chew 81 mg daily   atorvastatin (LIPITOR) 10 mg tablet   No No   Sig: Take 1 tablet (10 mg total) by mouth daily   enalapril (VASOTEC) 10 mg tablet   No No   Sig: Take 1 tablet (10 mg total) by mouth 4 (four) times a day 2 tabs in the am, 1 tab in the afternoon, 1 tab @ hs.   flecainide (TAMBOCOR) 150 MG tablet   No No   Sig: Take 1 tablet (150 mg total) by mouth see administration instructions For palpitations lasting longer than 5 minutes, take one 150 mg tablet.  If palpitations last longer than 30  minutes, take another 150 mg tablet.  If palpitations persist, please go to the nearest emergency department.    Do not exceed 300 mg in one day.   furosemide (LASIX) 40 mg tablet   No No   Sig: Take 1 tablet (40 mg total) by mouth daily   glucose blood (OneTouch Verio) test strip   No No   Sig: Use 1 each daily   hydrALAZINE (APRESOLINE) 25 mg tablet   No No   Sig: Take 1 tablet (25 mg total) by mouth 3 (three) times a day   indomethacin (INDOCIN) 50 mg capsule   No No   Sig: Take 1 capsule (50 mg total) by mouth daily as needed for mild pain PRN with the gout   ketoconazole (NIZORAL) 2 % shampoo   No No   Sig: Apply 1 Application topically 2 (two) times a week   meclizine (ANTIVERT) 25 mg tablet   No No   Sig: Take 1 tablet (25 mg total) by mouth 3 (three) times a day as needed for dizziness (Vertigo) for up to 10 days   metFORMIN (GLUCOPHAGE) 500 mg tablet   No No   Sig: Take 1 tablet (500 mg total) by mouth daily with breakfast   methocarbamol (ROBAXIN) 750 mg tablet   Yes No   Sig: TAKE 1 TABLET BY MOUTH 4 TIMES DAILY AS NEEDED FOR MUSCLE SPASM   Patient not taking: Reported on 11/20/2023   metoprolol succinate (TOPROL-XL) 25 mg 24 hr tablet   No No   Sig: Take 1 tablet (25 mg total) by mouth daily   metoprolol succinate (TOPROL-XL) 50 mg 24 hr tablet   No No   Sig: Take 1 tablet (50 mg total) by mouth daily   multivitamin (THERAGRAN) TABS  Self Yes No   Sig: Take 1 tablet by mouth daily   omeprazole (PriLOSEC) 40 MG capsule   No No   Sig: Take 1 capsule (40 mg total) by mouth daily   potassium chloride (Klor-Con M20) 20 mEq tablet   No No   Sig: Take 1 tablet (20 mEq total) by mouth daily   triamcinolone (KENALOG) 0.1 % lotion  Self No No   Sig: Apply topically 2 (two) times a day   triamcinolone (KENALOG) 0.5 % cream  Self Yes No      Facility-Administered Medications: None       Past Medical History:   Diagnosis Date    Arthritis     last assessed 7/1/15    Bronchitis 02/02/2023    Chronic diastolic  (congestive) heart failure (HCC)     Diabetes mellitus (HCC)     type 2-last assessed 1/28/15    Diabetes mellitus (HCC) 03/16/2018    Dyslipidemia     GERD (gastroesophageal reflux disease)     Hypertension     Hypertensive urgency 03/16/2023    Irregular heart beat     last assessed 7/1/15    Obstructive sleep apnea     Palpitations     last assessed 9/7/17    Sexual dysfunction     last assessed 7/1/15    Thyroid disease     last assessed 7/1/15       Past Surgical History:   Procedure Laterality Date    COLONOSCOPY      TONSILLECTOMY         Family History   Problem Relation Age of Onset    Hypertension Mother         essential    Stroke Mother     Hypertension Father         essential    Heart defect Father         cardiac disorder    Stroke Brother     Hypertension Brother      I have reviewed and agree with the history as documented.    E-Cigarette/Vaping    E-Cigarette Use Never User      E-Cigarette/Vaping Substances    Nicotine No     THC No     CBD No     Flavoring No     Other No     Unknown No      Social History     Tobacco Use    Smoking status: Former     Current packs/day: 1.00     Types: Cigarettes     Passive exposure: Never    Smokeless tobacco: Never    Tobacco comments:     former smoker-quit 17 yrs ago 1pppd x 30 yrs as per [employer]   Vaping Use    Vaping status: Never Used   Substance Use Topics    Alcohol use: Never    Drug use: Never       Review of Systems   Constitutional:  Negative for activity change, appetite change, chills and fever.   HENT:  Negative for ear pain, hearing loss, rhinorrhea, sneezing, sore throat and trouble swallowing.    Eyes:  Negative for pain and visual disturbance.   Respiratory:  Positive for shortness of breath. Negative for cough, choking, chest tightness, wheezing and stridor.    Cardiovascular:  Negative for chest pain, palpitations and leg swelling.   Gastrointestinal:  Negative for abdominal pain, constipation, diarrhea, nausea and vomiting.    Genitourinary:  Negative for difficulty urinating, dysuria, frequency, hematuria and testicular pain.   Musculoskeletal:  Negative for arthralgias, back pain, gait problem and neck pain.   Skin:  Negative for color change and rash.   Allergic/Immunologic: Negative for immunocompromised state.   Neurological:  Negative for dizziness, seizures, syncope, speech difficulty, weakness, light-headedness, numbness and headaches.   Psychiatric/Behavioral:  Positive for sleep disturbance. Negative for confusion.    All other systems reviewed and are negative.      Physical Exam  Physical Exam  Vitals and nursing note reviewed.   Constitutional:       General: He is not in acute distress.     Appearance: Normal appearance. He is well-developed. He is obese. He is not ill-appearing, toxic-appearing or diaphoretic.   HENT:      Head: Normocephalic and atraumatic.      Nose: Nose normal.      Mouth/Throat:      Mouth: Mucous membranes are moist.      Pharynx: Oropharynx is clear.   Eyes:      General: No scleral icterus.     Extraocular Movements: Extraocular movements intact.      Conjunctiva/sclera: Conjunctivae normal.   Cardiovascular:      Rate and Rhythm: Normal rate and regular rhythm.      Pulses: Normal pulses.      Heart sounds: Normal heart sounds. No murmur heard.  Pulmonary:      Effort: Pulmonary effort is normal. No tachypnea, accessory muscle usage or respiratory distress.      Breath sounds: Normal breath sounds. No decreased breath sounds, wheezing, rhonchi or rales.   Chest:      Chest wall: No mass, tenderness or crepitus.   Abdominal:      General: Bowel sounds are normal. There is no distension.      Palpations: Abdomen is soft. There is no mass.      Tenderness: There is no abdominal tenderness. There is no right CVA tenderness, left CVA tenderness, guarding or rebound.   Musculoskeletal:         General: No tenderness, deformity or signs of injury. Normal range of motion.      Cervical back: Normal range  of motion and neck supple. No rigidity or tenderness.      Right lower leg: No tenderness. Edema present.      Left lower leg: No tenderness. Edema present.   Lymphadenopathy:      Cervical: No cervical adenopathy.   Skin:     General: Skin is warm and dry.      Capillary Refill: Capillary refill takes less than 2 seconds.      Coloration: Skin is not cyanotic, jaundiced or pale.      Findings: No bruising, erythema, lesion or rash.   Neurological:      General: No focal deficit present.      Mental Status: He is alert and oriented to person, place, and time. Mental status is at baseline.      Motor: No abnormal muscle tone.   Psychiatric:         Mood and Affect: Mood normal.         Behavior: Behavior normal.         Vital Signs  ED Triage Vitals [04/29/24 0701]   Temperature Pulse Respirations Blood Pressure SpO2   97.6 °F (36.4 °C) 65 18 154/73 97 %      Temp Source Heart Rate Source Patient Position - Orthostatic VS BP Location FiO2 (%)   Temporal Monitor Lying Right arm --      Pain Score       5           Vitals:    04/29/24 0800 04/29/24 0830 04/29/24 0900 04/29/24 1010   BP: 121/56 120/58 107/53    Pulse: (!) 53 56 (!) 52 72   Patient Position - Orthostatic VS: Lying Lying Sitting          Visual Acuity      ED Medications  Medications   furosemide (LASIX) injection 40 mg (40 mg Intravenous Given 4/29/24 0839)       Diagnostic Studies  Results Reviewed       Procedure Component Value Units Date/Time    HS Troponin I 2hr [[id number]]  (Normal) Collected: 04/29/24 0928    Lab Status: Final result Specimen: Blood from Arm, Left Updated: 04/29/24 1017     hs TnI 2hr 5 ng/L      Delta 2hr hsTnI 0 ng/L     HS Troponin I 4hr [[id number]]     Lab Status: No result Specimen: Blood     HS Troponin 0hr (reflex protocol) [[id number]]  (Normal) Collected: 04/29/24 0719    Lab Status: Final result Specimen: Blood from Arm, Left Updated: 04/29/24 0748     hs TnI 0hr 5 ng/L     B-Type Natriuretic Peptide(BNP) [[id number]]   (Normal) Collected: 04/29/24 0719    Lab Status: Final result Specimen: Blood from Arm, Left Updated: 04/29/24 0747     BNP 50 pg/mL     Comprehensive metabolic panel [[id number]]  (Abnormal) Collected: 04/29/24 0719    Lab Status: Final result Specimen: Blood from Arm, Left Updated: 04/29/24 0742     Sodium 141 mmol/L      Potassium 4.0 mmol/L      Chloride 106 mmol/L      CO2 27 mmol/L      ANION GAP 8 mmol/L      BUN 26 mg/dL      Creatinine 1.16 mg/dL      Glucose 128 mg/dL      Calcium 9.8 mg/dL      AST 19 U/L      ALT 21 U/L      Alkaline Phosphatase 31 U/L      Total Protein 7.5 g/dL      Albumin 4.3 g/dL      Total Bilirubin 0.59 mg/dL      eGFR 63 ml/min/1.73sq m     Narrative:      National Kid[employer] Foundation guidelines for Chronic Kidney Disease (CKD):     Stage 1 with normal or high GFR (GFR > 90 mL/min/1.73 square meters)    Stage 2 Mild CKD (GFR = 60-89 mL/min/1.73 square meters)    Stage 3A Moderate CKD (GFR = 45-59 mL/min/1.73 square meters)    Stage 3B Moderate CKD (GFR = 30-44 mL/min/1.73 square meters)    Stage 4 Severe CKD (GFR = 15-29 mL/min/1.73 square meters)    Stage 5 End Stage CKD (GFR <15 mL/min/1.73 square meters)  Note: GFR calculation is accurate only with a steady state creatinine    CBC and differential [[id number]] Collected: 04/29/24 0719    Lab Status: Final result Specimen: Blood from Arm, Left Updated: 04/29/24 0726     WBC 5.23 Thousand/uL      RBC 4.59 Million/uL      Hemoglobin 13.6 g/dL      Hematocrit 42.0 %      MCV 92 fL      MCH 29.6 pg      MCHC 32.4 g/dL      RDW 13.3 %      MPV 11.6 fL      Platelets 150 Thousands/uL      nRBC 0 /100 WBCs      Segmented % 60 %      Immature Grans % 0 %      Lymphocytes % 29 %      Monocytes % 10 %      Eosinophils Relative 0 %      Basophils Relative 1 %      Absolute Neutrophils 3.20 Thousands/µL      Absolute Immature Grans 0.01 Thousand/uL      Absolute Lymphocytes 1.49 Thousands/µL      Absolute Monocytes 0.50 Thousand/µL       Eosinophils Absolute 0.00 Thousand/µL      Basophils Absolute 0.03 Thousands/µL                    X-ray chest 1 view portable   Final Result by Guillermo Lemon MD (04/29 0934)      No acute cardiopulmonary disease.            Workstation performed: [id number]                    Procedures  ECG 12 Lead Documentation Only    Date/Time: 4/29/2024 7:40 AM    Performed by: Mike Cohen DO  Authorized by: Mike Cohen DO    Indications / Diagnosis:  SOB  ECG reviewed by me, the ED Provider: yes    Patient location:  ED  Previous ECG:     Comparison to cardiac monitor: Yes    Rate:     ECG rate:  65    ECG rate assessment: normal    Rhythm:     Rhythm: sinus rhythm    Ectopy:     Ectopy: none    QRS:     QRS axis:  Left    QRS intervals:  Normal  Conduction:     Conduction: abnormal      Abnormal conduction: 1st degree and LAFB    ST segments:     ST segments:  Normal  T waves:     T waves: non-specific             ED Course                               SBIRT 20yo+      Flowsheet Row Most Recent Value   Initial Alcohol Screen: US AUDIT-C     1. How often do you have a drink containing alcohol? 0 Filed at: 04/29/2024 0723   2. How many drinks containing alcohol do you have on a typical day you are drinking?  0 Filed at: 04/29/2024 0723   3a. Male UNDER 65: How often do you have five or more drinks on one occasion? 0 Filed at: 04/29/2024 0723   3b. FEMALE Any Age, or MALE 65+: How often do you have 4 or more drinks on one occassion? 0 Filed at: 04/29/2024 0723   Audit-C Score 0 Filed at: 04/29/2024 0723   LOPEZ: How many times in the past year have you...    Used an illegal drug or used a prescription medication for non-medical reasons? Never Filed at: 04/29/2024 0723                      Medical Decision Making  5-year-old male complaining of shortness of breath x 1 day    Problems Addressed:  CHF (congestive heart failure) (HCC): chronic illness or injury  SOB (shortness of breath): acute illness  or injury    Amount and/or Complexity of Data Reviewed  External Data Reviewed: labs, radiology, ECG and notes.  Labs: ordered. Decision-making details documented in ED Course.  Radiology: ordered and independent interpretation performed. Decision-making details documented in ED Course.  ECG/medicine tests: ordered and independent interpretation performed. Decision-making details documented in ED Course.    Risk  OTC drugs.  Prescription drug management.  Decision regarding hospitalization.  Risk Details: Patient was able to ambulate and maintain normal room air oxygen saturation.  No evidence for decompensated congestive heart failure both on chest x-ray and lab evaluation.  Recommend to continue outpatient regimen but follow-up with cardiology and/or primary care physician is available appointment.  Return if worsens.             Disposition  Final diagnoses:   SOB (shortness of breath)   CHF (congestive heart failure) ([facility])     Time reflects when diagnosis was documented in both MDM as applicable and the Disposition within this note       Time User Action Codes Description Comment    4/29/2024 10:05 AM Mike Cohen Add [R06.02] SOB (shortness of breath)     4/29/2024 10:05 AM Mike Cohen Add [I50.9] CHF (congestive heart failure) ([facility])           ED Disposition       ED Disposition   Discharge    Condition   Stable    Date/Time   Mon Apr 29, 2024 1005    Comment   jennifer Haro discharge to home/self care.                   Follow-up Information       Follow up With Specialties Details Why Contact Info    Alma Delia Fountain DO Internal Medicine, Hospice Services Schedule an appointment as soon as possible for a visit   [address] [facility] [address]  [phone]              Current Discharge Medication List        CONTINUE these medications which have NOT CHANGED    Details   amLODIPine (NORVASC) 10 mg tablet Take 1 tablet (10 mg total) by mouth daily  Qty: 90 tablet, Refills: 0    Associated  Diagnoses: Benign essential HTN      aspirin 81 mg chewable tablet Chew 81 mg daily      atorvastatin (LIPITOR) 10 mg tablet Take 1 tablet (10 mg total) by mouth daily  Qty: 90 tablet, Refills: 0    Associated Diagnoses: Dyslipidemia      enalapril (VASOTEC) 10 mg tablet Take 1 tablet (10 mg total) by mouth 4 (four) times a day 2 tabs in the am, 1 tab in the afternoon, 1 tab @ hs.  Qty: 270 tablet, Refills: 0    Comments: This is a change in dosing from 20 mg bid to 10 mg qid.  Associated Diagnoses: Benign essential HTN; Chronic diastolic heart failure (HCC)      flecainide (TAMBOCOR) 150 MG tablet Take 1 tablet (150 mg total) by mouth see administration instructions For palpitations lasting longer than 5 minutes, take one 150 mg tablet.  If palpitations last longer than 30 minutes, take another 150 mg tablet.  If palpitations persist, please go to the nearest emergency department.    Do not exceed 300 mg in one day.  Qty: 90 tablet, Refills: 0    Associated Diagnoses: Benign essential HTN; Chronic diastolic heart failure (HCC); Palpitations; Junctional rhythm; Diabetes mellitus type 2 in obese; AYLEEN (obstructive sleep apnea); Dyslipidemia; Stasis edema of both lower extremities; Morbid obesity (HCC)      furosemide (LASIX) 40 mg tablet Take 1 tablet (40 mg total) by mouth daily  Qty: 90 tablet, Refills: 0    Associated Diagnoses: Chronic diastolic heart failure (HCC)      glucose blood (OneTouch Verio) test strip Use 1 each daily  Qty: 100 each, Refills: 5    Associated Diagnoses: Type 2 diabetes mellitus without complication, without long-term current use of insulin (HCC)      hydrALAZINE (APRESOLINE) 25 mg tablet Take 1 tablet (25 mg total) by mouth 3 (three) times a day  Qty: 270 tablet, Refills: 0    Associated Diagnoses: Benign essential HTN      indomethacin (INDOCIN) 50 mg capsule Take 1 capsule (50 mg total) by mouth daily as needed for mild pain PRN with the gout  Qty: 90 capsule, Refills: 0    Associated  Diagnoses: Hx of gout      ketoconazole (NIZORAL) 2 % shampoo Apply 1 Application topically 2 (two) times a week  Qty: 120 mL, Refills: 0    Associated Diagnoses: Dermatitis      Lancets (onetouch ultrasoft) lancets Check glucose daily  Qty: 100 each, Refills: 1    Associated Diagnoses: Type 2 diabetes mellitus without complication, without long-term current use of insulin ([facility])      meclizine (ANTIVERT) 25 mg tablet Take 1 tablet (25 mg total) by mouth 3 (three) times a day as needed for dizziness (Vertigo) for up to 10 days  Qty: 30 tablet, Refills: 0    Associated Diagnoses: Vertigo      metFORMIN (GLUCOPHAGE) 500 mg tablet Take 1 tablet (500 mg total) by mouth daily with breakfast  Qty: 90 tablet, Refills: 0    Associated Diagnoses: Type 2 diabetes mellitus without complication, without long-term current use of insulin ([facility])      methocarbamol (ROBAXIN) 750 mg tablet TAKE 1 TABLET BY MOUTH 4 TIMES DAILY AS NEEDED FOR MUSCLE SPASM      !! metoprolol succinate (TOPROL-XL) 25 mg 24 hr tablet Take 1 tablet (25 mg total) by mouth daily  Qty: 90 tablet, Refills: 0    Comments: Patient takes a 25mg tablet with a 50mg tablet to equal 75mg once daily  Associated Diagnoses: Benign essential HTN; Chronic diastolic heart failure (HCC); Palpitations      !! metoprolol succinate (TOPROL-XL) 50 mg 24 hr tablet Take 1 tablet (50 mg total) by mouth daily  Qty: 90 tablet, Refills: 0    Associated Diagnoses: Benign essential HTN; Chronic diastolic heart failure (HCC); Palpitations      multivitamin (THERAGRAN) TABS Take 1 tablet by mouth daily      omeprazole (PriLOSEC) 40 MG capsule Take 1 capsule (40 mg total) by mouth daily  Qty: 90 capsule, Refills: 0    Associated Diagnoses: Gastroesophageal reflux disease without esophagitis      potassium chloride (Klor-Con M20) 20 mEq tablet Take 1 tablet (20 mEq total) by mouth daily  Qty: 90 tablet, Refills: 0    Associated Diagnoses: Acute diastolic heart failure (HCC)      Red Yeast  Rice Extract (RED YEAST RICE PO) Take by mouth      triamcinolone (KENALOG) 0.1 % lotion Apply topically 2 (two) times a day  Qty: 60 mL, Refills: 3    Associated Diagnoses: Dermatitis      triamcinolone (KENALOG) 0.5 % cream       Vitamin D, Cholecalciferol, 50 MCG (2000 UT) CAPS Take 50 mcg by mouth daily  Qty: 90 capsule, Refills: 3    Associated Diagnoses: Vitamin D deficiency       !! - Potential duplicate medications found. Please discuss with provider.          No discharge procedures on file.    PDMP Review       None            ED Provider  Electronically Signed by             Mike Cohen DO  04/29/24 5250

## 2024-04-30 ENCOUNTER — VBI (OUTPATIENT)
Dept: FAMILY MEDICINE CLINIC | Facility: CLINIC | Age: 69
End: 2024-04-30

## 2024-04-30 ENCOUNTER — TELEPHONE (OUTPATIENT)
Dept: CARDIOLOGY CLINIC | Facility: CLINIC | Age: 69
End: 2024-04-30

## 2024-04-30 ENCOUNTER — TELEPHONE (OUTPATIENT)
Age: 69
End: 2024-04-30

## 2024-04-30 NOTE — TELEPHONE ENCOUNTER
04/30/24 2:40 PM    Patient contacted post ED visit, [facility] department spoke with patient/caregiver and outreach was successful.    Thank you.  Dina Lamb MA  [facility]       Difficulty with rate control.   Message sent to Dr Knutson.   Pt will schedule an appt to follow up with cardiology to discuss further.

## 2024-04-30 NOTE — TELEPHONE ENCOUNTER
I was able to call and speak with patient about emergency room visit.  In the setting of concern for heart failure with preserved ejection fraction I do believe patient may benefit from SGLT2 inhibitor therapy with either Jardiance or Farxiga however as he is currently being treated for diabetes mellitus by his primary care physician I will send message to her office to see if she is in agreement with this and if so if she is agreeable to prescribing this medication for patient for treatment of both heart failure preserved ejection fraction and diabetes mellitus.  Today patient still noting some lower extremity edema and therefore we will take additional dose of his furosemide 40 mg and additional dose of his potassium 20 mEq and then will return to daily dosing and was counseled again on the importance of sodium and fluid restricted diet to less than 1800 mg of sodium daily and less than 1800 mL of fluid daily along with need for weight reduction and follow-up with sleep medicine for treatment of sleep apnea.  I will see patient as scheduled or sooner if necessary.

## 2024-04-30 NOTE — TELEPHONE ENCOUNTER
Caller: Christy Haro    Doctor: Dr. Esquivel    Reason for call: Followup appt    Call back#: [phone]    Patient was seen in ED yesterday, 4/29.  He would like Dr. Esquivel to look over his ED visit and advise whether he should schedule a sooner followup appt than what is currently scheduled on 5/30/2024.

## 2024-05-01 ENCOUNTER — TELEPHONE (OUTPATIENT)
Age: 69
End: 2024-05-01

## 2024-05-01 ENCOUNTER — OFFICE VISIT (OUTPATIENT)
Dept: SLEEP CENTER | Facility: CLINIC | Age: 69
End: 2024-05-01
Payer: MEDICARE

## 2024-05-01 VITALS
BODY MASS INDEX: 41.07 KG/M2 | WEIGHT: 303.2 LBS | DIASTOLIC BLOOD PRESSURE: 60 MMHG | OXYGEN SATURATION: 96 % | HEART RATE: 56 BPM | SYSTOLIC BLOOD PRESSURE: 100 MMHG | HEIGHT: 72 IN | TEMPERATURE: 97.4 F

## 2024-05-01 DIAGNOSIS — E66.01 MORBID OBESITY (HCC): ICD-10-CM

## 2024-05-01 DIAGNOSIS — I10 BENIGN ESSENTIAL HTN: ICD-10-CM

## 2024-05-01 DIAGNOSIS — I48.0 PAROXYSMAL ATRIAL FIBRILLATION (HCC): ICD-10-CM

## 2024-05-01 DIAGNOSIS — E11.9 TYPE 2 DIABETES MELLITUS WITHOUT COMPLICATION, WITHOUT LONG-TERM CURRENT USE OF INSULIN (HCC): Primary | ICD-10-CM

## 2024-05-01 DIAGNOSIS — G47.33 OSA (OBSTRUCTIVE SLEEP APNEA): Primary | ICD-10-CM

## 2024-05-01 DIAGNOSIS — I50.32 CHRONIC DIASTOLIC HEART FAILURE (HCC): ICD-10-CM

## 2024-05-01 PROCEDURE — 99204 OFFICE O/P NEW MOD 45 MIN: CPT | Performed by: STUDENT IN AN ORGANIZED HEALTH CARE EDUCATION/TRAINING PROGRAM

## 2024-05-01 NOTE — PROGRESS NOTES
[facility]  Sleep Medicine New Patient Evaluation    PATIENT NAME: Christy Haro  DATE OF SERVICE: May 1, 2024    CONSULTING PROVIDER:  Saleem Schmitt DO  [address]  [address]  [place] [address]      Assessment/Plan:  1. AYLEEN (obstructive sleep apnea)  Ambulatory Referral to Sleep Medicine    Mask fitting only    PAP DME Resupply/Reorder      2. Paroxysmal atrial fibrillation (HCC)  Mask fitting only    PAP DME Resupply/Reorder      3. Chronic diastolic heart failure (HCC)  Mask fitting only    PAP DME Resupply/Reorder      4. Benign essential HTN  Mask fitting only    PAP DME Resupply/Reorder      5. Morbid obesity (HCC)  Mask fitting only    PAP DME Resupply/Reorder         Christy is a pleasant 68-year-old gentleman with PMHx of HTN, chronic diastolic heart failure (EF 60% 4/2023), PAF, GERD, T2DM, dyslipidemia, obesity who presents for obstructive sleep apnea (AHI 87.5, O2 james 70% 2/2020).  He wishes to reestablish care with a sleep physician, and ensure that his AYLEEN is being adequately treated.  Unfortunately, I do not yet have access to a compliance download for him, however I am concerned that he may be suboptimally treated given his residual symptoms.    For now, I have instructed him to continue BiPAP at present settings.  He did express a desire to switch DME to 1 that is closer/more convenient; thus, I will send prescriptions for new supplies to [facility], and he will bring his device here tomorrow to obtain a compliance report.  I will review send compliance report and make settings adjustments if necessary.  Prescription for new supplies ordered today  Reviewed CMS/insurance requirements and resupply guidelines  Information provided on the above as well as general maintenance steps  I have also placed an order for a formal mask fitting appointment, so that he may ensure that his device is working properly (given his reports of inadequate  "humidification/running of water in the middle of the night).  He will Return in about 6 months (around 2024).    Thank you for allowing me to participate in the care of your patient.  If there are any questions regarding evaluation please feel free to reach out.       ________________________________________________________________________________________________    HPI: Christy Haro is a 68 y.o. male with PMHx of HTN, chronic diastolic heart failure (EF 60% 2023), PAF, GERD, T2DM, dyslipidemia, obesity who presents for obstructive sleep apnea (AHI 87.5, O2 james 70% 2020).    Sleep-Related History:     He is currently using BiPAP (received ~). Issue with it is that it will run out of distilled water after ~4 hours.     After Sleep Study, COVID hit and he has not seen a sleep doctor since then as a result. He states that 2 days ago, he woke up with shortness of breath, and he fears that he is \"not getting enough oxygen.\"     His cardiologist advised that he come in the get checked out and ensure that his AYLEEN is being effectively treated.     Gu[place]upe Sleepiness Scale:  What are your chances of dozing?   0= no chance  1= slight chance  2= moderate chance  3= high chance    Sitting and readin   Watching TV: 3  Sitting, inactive in a public place (e.g. a theatre or a meeting):2  As a passenger in a car for an hour without a break: 0  Lying down to rest in the afternoon when circumstances permit: 3   Sitting and talking to someone: 0  Sitting quietly after a lunch without alcohol: 3  In a car, while stopped for a few minutes in the traffic: 0       TOTAL    Greater or equal to 10 is positive for excessive daytime sleepiness        SLEEP-WAKE SCHEDULE  Sleep Schedule:  Weekdays:  Bedtime: 0200   Asleep in ~1 hour.    Nighttime awakenings: Maybe once (with BiPAP)     Wake: 1000    Naps: 1 per day, for ~1 hour. Does use BiPAP    Average total sleep time (in a 24 hour period): ~7-8 " hours.    Weekends:  same    Sleep-Related Details:  Preferred Sleep Position: side(s)  SDB Symptoms: Without device: snoring, witnessed apneas, multiple awakenings. Is still waking unrefreshed with BiPAP  Bruxism: No  Nocturnal GERD: Yes,    Nocturnal Palpitations: No  Sleep-Related Hallucinations: Yes, at times    Sleep Paralysis: No     He denies any parasomnia activity.    Wake-Related Details:  Daytime Sleepiness: Yes,      Work Schedule: Retired; worked nights for ~20 years  Drowsy Driving: Yes, but not falling asleep  Caffeine: No  Alcohol Use: No  Substance Use: No  Tobacco Use: No  Weight Change: Gained weight since 2020.    He does have difficulty with memory or concentration.      PAST TREATMENTS:  BiPAP  DME: [facility]  Mask: [facility]    PRIOR SLEEP STUDIES:  -Split-night polysomnogram 2/26/2020:  Diagnostic:  minutes, sleep efficiency 74%, sleep latency 22 minutes.  AHI 87.5, O2 james 70%.  Therapeutic: Best pressure noted to be bilevel PAP at settings of IPAP 22 cm H2O, EPAP 8 cm H2O.    OTHER RELEVANT LABS AND STUDIES:  -Echocardiogram 4/27/2023: EF 60%      Past Medical History:   Diagnosis Date    Arthritis     last assessed 7/1/15    Bronchitis 02/02/2023    Chronic diastolic (congestive) heart failure (HCC)     Diabetes mellitus (HCC)     type 2-last assessed 1/28/15    Diabetes mellitus (HCC) 03/16/2018    Dyslipidemia     GERD (gastroesophageal reflux disease)     Hypertension     Hypertensive urgency 03/16/2023    Irregular heart beat     last assessed 7/1/15    Obstructive sleep apnea     Palpitations     last assessed 9/7/17    Sexual dysfunction     last assessed 7/1/15    Thyroid disease     last assessed 7/1/15      Past Surgical History:   Procedure Laterality Date    COLONOSCOPY      TONSILLECTOMY        Patient Active Problem List   Diagnosis    Benign essential HTN    Dyslipidemia    GERD (gastroesophageal reflux disease)    AYLEEN (obstructive sleep apnea)    Stasis edema of both lower  extremities    Morbid obesity (HCC)    Diastolic dysfunction    Type 2 diabetes mellitus with obesity  (HCC)    Lymphedema    Bronchitis    Palpitations    Chronic diastolic heart failure (HCC)    Paroxysmal atrial fibrillation (HCC)    Cough      Allergies as of 05/01/2024 - Reviewed 05/01/2024   Allergen Reaction Noted    Acetaminophen Other (See Comments) 03/16/2018         Review of Symptoms:    Review of Systems  10-point system review completed, all of which are negative except as mentioned above.    CURRENT MEDICATIONS:  Current Outpatient Medications   Medication Instructions    amLODIPine (NORVASC) 10 mg, Oral, Daily    aspirin 81 mg, Oral, Daily    atorvastatin (LIPITOR) 10 mg, Oral, Daily    Empagliflozin (JARDIANCE) 10 mg, Oral, Daily    enalapril (VASOTEC) 10 mg, Oral, 4 times daily, 2 tabs in the am, 1 tab in the afternoon, 1 tab @ hs.    flecainide (TAMBOCOR) 150 mg, Oral, See admin instructions, For palpitations lasting longer than 5 minutes, take one 150 mg tablet.  If palpitations last longer than 30 minutes, take another 150 mg tablet.  If palpitations persist, please go to the nearest emergency department.    Do not exceed 300 mg in one day.    furosemide (LASIX) 40 mg, Oral, Daily    glucose blood (OneTouch Verio) test strip 1 each, Other, Daily    hydrALAZINE (APRESOLINE) 25 mg, Oral, 3 times daily    indomethacin (INDOCIN) 50 mg, Oral, Daily PRN, PRN with the gout    ketoconazole (NIZORAL) 2 % shampoo 1 Application, Topical, 2 times weekly    Lancets (onetouch ultrasoft) lancets Check glucose daily    meclizine (ANTIVERT) 25 mg, Oral, 3 times daily PRN    metFORMIN (GLUCOPHAGE) 500 mg, Oral, Daily with breakfast    methocarbamol (ROBAXIN) 750 mg tablet TAKE 1 TABLET BY MOUTH 4 TIMES DAILY AS NEEDED FOR MUSCLE SPASM    metoprolol succinate (TOPROL-XL) 25 mg, Oral, Daily    metoprolol succinate (TOPROL-XL) 50 mg, Oral, Daily    multivitamin (THERAGRAN) TABS 1 tablet, Oral, Daily    omeprazole  (PRILOSEC) 40 mg, Oral, Daily    potassium chloride (Klor-Con M20) 20 mEq tablet 20 mEq, Oral, Daily    Red Yeast Rice Extract (RED YEAST RICE PO) Oral    triamcinolone (KENALOG) 0.1 % lotion Topical, 2 times daily    triamcinolone (KENALOG) 0.5 % cream No dose, route, or frequency recorded.    Vitamin D (Cholecalciferol) 50 mcg, Oral, Daily         SOCIAL HISTORY:  Social History     Tobacco Use    Smoking status: Former     Current packs/day: 1.00     Types: Cigarettes     Passive exposure: Never    Smokeless tobacco: Never    Tobacco comments:     former smoker-quit 27 yrs ago 1pppd x 30 yrs as per [employer]   Vaping Use    Vaping status: Never Used   Substance Use Topics    Alcohol use: Never    Drug use: Never       FAMILY HISTORY:  Family History   Problem Relation Age of Onset    Hypertension Mother         essential    Stroke Mother     Hypertension Father         essential    Heart defect Father         cardiac disorder    Stroke Brother     Hypertension Brother       Family History of Sleep Disorders: Denies        PHYSICAL EXAMINATION:  Vital Signs:  /60 (BP Location: Right arm, Patient Position: Sitting, Cuff Size: Large)   Pulse 56   Temp (!) 97.4 °F (36.3 °C)   Ht 6' (1.829 m)   Wt (!) 138 kg (303 lb 3.2 oz)   SpO2 96%   BMI 41.12 kg/m²  Body mass index is 41.12 kg/m².    Constitutional: NAD, well appearing   Mental Status: AAOx3  Neck Circumference: Neck Circumference: 20 inches  Skin: Warm, dry, no rashes noted   Eyes: PERRL, normal conjunctiva  ENT: Nasal congestion absent, nasal valve incompetence absent.  Posterior Airspace:   Dickson Tongue Position: 4  Retrognathia: absent  Overbite: absent  High Arched Palate: present  Tongue Scalloping/Ridging: present  Uvula: normal  Chest: No evidence of respiratory distress, no accessory muscle use; no evidence of peripheral cyanosis   Abdomen: Soft, NT/ND  Extremities: No digital clubbing or pedal edema    NEUROLOGICAL EXAM:  General: Awake,  alert, speech fluent, comprehension, naming, repetition intact. Short and long term memory intact.  CN: PERRL, EOMI without nystagmus, facial sensation and strength are normal and symmetric, hearing is intact to conversational tone, palate and tongue movements are intact and symmetric.  Motor: Normal tone, bulk and strength bilaterally.   Sensation: LT intact throughout.  Gait: Able to walk without difficulty. Stance normal.      I have spent a total time of 40-50 minutes on 05/01/24 in caring for this patient including Diagnostic results, Prognosis, Risks and benefits of tx options, Instructions for management, Patient and family education, Importance of tx compliance, Risk factor reductions, Documenting in the medical record, Reviewing / ordering tests, medicine, procedures  , and Obtaining or reviewing history  .        Electronically signed by:    Jose Marquez DO  Board-certified Neurology and Sleep Medicine  [facility]  05/01/24

## 2024-05-01 NOTE — TELEPHONE ENCOUNTER
Patient called because the medication that the doctor prescribed today is too expensive and he wants to know if there is something else the doctor could prescribe. Please call the patient back, thank you.

## 2024-05-01 NOTE — PATIENT INSTRUCTIONS
Continue PAP Therapy  Continue BiPAP at current settings  Remember to clean your mask and equipment regularly, as directed.  I will attempt to obtain a Report from your device to ensure that it is effectively treating your sleep apnea.  I am ordering a formal mask fitting appointment; this is an appointment with the DME typically to ensure that you have the optimal mask and fit for your face structure, however in your case I would use it to ensure the device's humidification is working properly.     You should be eligible for new supplies approximately every 3-6 months, depending on your insurance coverage. Contact your Durable Medical Equipment (DME) company for new supplies as needed.  Follow up in 6 months.      Care and Maintenance  Headgear should be washed as needed. Daily inspection and weekly washings are recommended. Do not disassemble the straps. Machine wash in warm water, making sure to attach Velcro hooks and tabs before washing. Line dry or machine dry on a low setting.  Masks should be washed every day. Daily inspection is recommended. Leave the mask and tubing attached. Gently wash the mask with a soft cloth using warm water and mild detergent, concentrating on the mask cushion flaps. DO NOT use alcohol or bleach. Rinse thoroughly and air dry.  Tubing and headgear should be washed weekly. Daily inspection is recommended. Wash in warm water and mild detergent and rinse thoroughly. Hook the tubing to the machine and blow until dry.  Humidifier should be washed daily and filled with DISTILLED water before use. Wash with warm water and mild detergent. Disinfect weekly by soaking with a solution of 1 part white vinegar and 3 parts water for 30 minutes. Rinse thoroughly and air dry.  Disposable filters should be replaced once a month. Wash reusable foam filters with warm water and mild detergent at least once a month. Rinse thoroughly and dry with paper towels.  Avoid  that contain fragrance or  conditioners, as these will leave a residue.  NEVER iron any soft goods.      CMS Requirements    Your insurance requires a face-to-face follow up visit within a 31-90 day period after starting CPAP.  Your insurance requires compliance with CPAP, which is at least 4 hours per night for 70% of the time. This must be done over a 30 day period and must occur within the initial 31-90 day period after starting CPAP.  Your insurance also requires at least yearly follow ups to continue to pay for CPAP supplies.       PAP Supply Guidelines    Below are the guidelines for reordering your supplies. You will be responsible for your deductible, co payments, and out of pocket expenses.    Item Frequency   Nasal Mask (no headgear) 1 every 3 months   Nasal Mask Cushion 1 every 2 weeks   Full Face Mask (no headgear) 1 every 3 months   Full Face Mask Cushion 1 every month   Nasal Pillows 1 every 2 weeks   Headgear 1 every 6 months   hin Strap 1 every 6 months   syed 1 every 3 months   Filters: Reusable 1 every 6 months   Filters: Disposable 1 every 2 weeks   Humidifier Chamber(disposable) 1 every 6 months         Good Sleep Hygiene    Wake up at the same time every day, even on the weekends.  Use your bed for sleep and intimacy only.  If you have been in bed awake for 30 minutes, get up and leave the bedroom. Choose a dull activity not involving a blue screen (TV, computer, handheld devices). Go back to bed when you feel sleepy.  Avoid caffeine, nicotine and alcohol before you go to bed.  Avoid large meals before you go to bed.  Avoid using screens (computers, tablets, smartphones, etc.) for at least 1 hour before bedtime  Exercise regularly, but do not exercise right before you go to bed.  Avoid daytime naps. If you do take a nap, sleep for 20-40 minutes, and not after dinner.

## 2024-05-02 ENCOUNTER — TELEPHONE (OUTPATIENT)
Age: 69
End: 2024-05-02

## 2024-05-02 ENCOUNTER — TELEPHONE (OUTPATIENT)
Dept: SLEEP CENTER | Facility: CLINIC | Age: 69
End: 2024-05-02

## 2024-05-02 DIAGNOSIS — E11.00 TYPE 2 DIABETES MELLITUS WITH HYPEROSMOLARITY WITHOUT COMA, WITHOUT LONG-TERM CURRENT USE OF INSULIN (HCC): Primary | ICD-10-CM

## 2024-05-02 NOTE — TELEPHONE ENCOUNTER
I called patient and he states that his - Jardience will cost him $477 and would like if you can please send something different     - Dr Esquivel told him to take his Furosemide and Potassium different, he would like him to start taking 1 in the AM and 1 in the PM, he would like to know if you are agreeable and if so can you please send new script to the pharmacy for him. He states that Dr Esquivel told him he still has fluid that is why the scripts need to be changed. \    - Pt is also asking if he should be seeing a nutritionist    Please advise thank you

## 2024-05-02 NOTE — TELEPHONE ENCOUNTER
I messaged dr conner that med was too expensive so would followhis instructions to see if that helps  I placed order for nutritionist

## 2024-05-02 NOTE — TELEPHONE ENCOUNTER
Please confirm that he is to take potassium and lasix BID - may have to check with cardiology and I would think they sent rx if they changed dose

## 2024-05-02 NOTE — TELEPHONE ENCOUNTER
Patient called regarding previous message - Jardiance is too expensive.      Also, Dr. Schmitt wanted him to double up on Furosemide and Potassium Chloride.  Therefore,he will now need a new prescription with new instructions as he will run out of medication before it's due.    He would like someone to call him about the Jardiance.

## 2024-05-03 ENCOUNTER — TELEPHONE (OUTPATIENT)
Dept: CARDIOLOGY CLINIC | Facility: CLINIC | Age: 69
End: 2024-05-03

## 2024-05-03 DIAGNOSIS — I50.30 HEART FAILURE WITH PRESERVED EJECTION FRACTION, UNSPECIFIED HF CHRONICITY (HCC): Primary | ICD-10-CM

## 2024-05-03 LAB

## 2024-05-03 NOTE — TELEPHONE ENCOUNTER
There is a message in pt's chart from dr conner on 4/30 that said he was going to have pt take an additional dose of the lasix and additional dose of the potassium and then return to daily dosing. I don't think pt is supposed to stay on the doubled dose. However, if I call cardio, message will get taken by pod and pt will have to wait for his response. Pt is scheduled to see dr conner this coming Monday, 5/6.

## 2024-05-03 NOTE — TELEPHONE ENCOUNTER
That was my concern that it should not be long term - are you able to send a message to cardiology pod and ask someone to clarify and call pt so he knows what to do with the lasix and potassium thru the weekend

## 2024-05-03 NOTE — TELEPHONE ENCOUNTER
-I was able to call and speak with patient.  As he was not able to afford Jardiance or SGLT2 inhibitor therapy will uptitrate his diuretics to 40 mg twice daily and potassium 20 mEq twice daily for today, Saturday, and Sunday and repeat BNP and BMP.  I will see patient as scheduled or sooner if necessary.  Patient denies any shortness of breath at this time but does have lower extremity edema.  I also counseled patient on sodium and fluid restricted diet along with the importance of weight reduction.  Patient understanding was agreeable to plan.

## 2024-05-03 NOTE — TELEPHONE ENCOUNTER
Can someone clarify with pt how he is supposed to be taking his lasix and potassium over the weekend? Pt states he was supposed to double these doses but note from Dr. Schmitt on 4/30 looks like should've only been a one time thing and then back to just daily dosing for both meds? Pt has appt with Dr. Schmitt on Monday, 5/6.

## 2024-05-06 ENCOUNTER — OFFICE VISIT (OUTPATIENT)
Dept: CARDIOLOGY CLINIC | Facility: CLINIC | Age: 69
End: 2024-05-06
Payer: MEDICARE

## 2024-05-06 ENCOUNTER — APPOINTMENT (OUTPATIENT)
Dept: LAB | Facility: HOSPITAL | Age: 69
End: 2024-05-06
Payer: MEDICARE

## 2024-05-06 VITALS
SYSTOLIC BLOOD PRESSURE: 116 MMHG | RESPIRATION RATE: 18 BRPM | DIASTOLIC BLOOD PRESSURE: 64 MMHG | HEIGHT: 70 IN | WEIGHT: 301 LBS | BODY MASS INDEX: 43.09 KG/M2 | HEART RATE: 58 BPM | OXYGEN SATURATION: 96 %

## 2024-05-06 DIAGNOSIS — I50.30 HEART FAILURE WITH PRESERVED EJECTION FRACTION, UNSPECIFIED HF CHRONICITY (HCC): Primary | ICD-10-CM

## 2024-05-06 DIAGNOSIS — I10 BENIGN ESSENTIAL HTN: ICD-10-CM

## 2024-05-06 DIAGNOSIS — I50.30 HEART FAILURE WITH PRESERVED EJECTION FRACTION, UNSPECIFIED HF CHRONICITY (HCC): ICD-10-CM

## 2024-05-06 DIAGNOSIS — G47.33 OSA (OBSTRUCTIVE SLEEP APNEA): ICD-10-CM

## 2024-05-06 DIAGNOSIS — K21.9 GASTROESOPHAGEAL REFLUX DISEASE WITHOUT ESOPHAGITIS: ICD-10-CM

## 2024-05-06 DIAGNOSIS — I10 PRIMARY HYPERTENSION: ICD-10-CM

## 2024-05-06 DIAGNOSIS — E66.01 CLASS 3 SEVERE OBESITY DUE TO EXCESS CALORIES WITH BODY MASS INDEX (BMI) OF 40.0 TO 44.9 IN ADULT, UNSPECIFIED WHETHER SERIOUS COMORBIDITY PRESENT (HCC): ICD-10-CM

## 2024-05-06 DIAGNOSIS — I50.32 CHRONIC DIASTOLIC HEART FAILURE (HCC): ICD-10-CM

## 2024-05-06 DIAGNOSIS — R00.2 PALPITATIONS: ICD-10-CM

## 2024-05-06 DIAGNOSIS — I34.0 MITRAL VALVE INSUFFICIENCY, UNSPECIFIED ETIOLOGY: ICD-10-CM

## 2024-05-06 LAB
ANION GAP SERPL CALCULATED.3IONS-SCNC: 9 MMOL/L (ref 4–13)
BNP SERPL-MCNC: 28 PG/ML (ref 0–100)
BUN SERPL-MCNC: 30 MG/DL (ref 5–25)
CALCIUM SERPL-MCNC: 9.4 MG/DL (ref 8.4–10.2)
CHLORIDE SERPL-SCNC: 105 MMOL/L (ref 96–108)
CO2 SERPL-SCNC: 25 MMOL/L (ref 21–32)
CREAT SERPL-MCNC: 1.21 MG/DL (ref 0.6–1.3)
GFR SERPL CREATININE-BSD FRML MDRD: 60 ML/MIN/1.73SQ M
GLUCOSE P FAST SERPL-MCNC: 122 MG/DL (ref 65–99)
POTASSIUM SERPL-SCNC: 4.2 MMOL/L (ref 3.5–5.3)
SODIUM SERPL-SCNC: 139 MMOL/L (ref 135–147)

## 2024-05-06 PROCEDURE — 99214 OFFICE O/P EST MOD 30 MIN: CPT | Performed by: INTERNAL MEDICINE

## 2024-05-06 PROCEDURE — 83880 ASSAY OF NATRIURETIC PEPTIDE: CPT

## 2024-05-06 PROCEDURE — 80048 BASIC METABOLIC PNL TOTAL CA: CPT

## 2024-05-06 PROCEDURE — 36415 COLL VENOUS BLD VENIPUNCTURE: CPT

## 2024-05-06 RX ORDER — AMLODIPINE BESYLATE 10 MG/1
10 TABLET ORAL DAILY
Qty: 90 TABLET | Refills: 3 | Status: SHIPPED | OUTPATIENT
Start: 2024-05-06

## 2024-05-06 RX ORDER — HYDRALAZINE HYDROCHLORIDE 25 MG/1
25 TABLET, FILM COATED ORAL 3 TIMES DAILY
Qty: 270 TABLET | Refills: 3 | Status: SHIPPED | OUTPATIENT
Start: 2024-05-06

## 2024-05-06 RX ORDER — METOPROLOL SUCCINATE 50 MG/1
50 TABLET, EXTENDED RELEASE ORAL DAILY
Qty: 90 TABLET | Refills: 3 | Status: SHIPPED | OUTPATIENT
Start: 2024-05-06

## 2024-05-06 RX ORDER — METOPROLOL SUCCINATE 25 MG/1
25 TABLET, EXTENDED RELEASE ORAL DAILY
Qty: 90 TABLET | Refills: 3 | Status: SHIPPED | OUTPATIENT
Start: 2024-05-06

## 2024-05-06 RX ORDER — OMEPRAZOLE 40 MG/1
40 CAPSULE, DELAYED RELEASE ORAL DAILY
Qty: 90 CAPSULE | Refills: 3 | Status: SHIPPED | OUTPATIENT
Start: 2024-05-06

## 2024-05-06 RX ORDER — ENALAPRIL MALEATE 10 MG/1
10 TABLET ORAL 4 TIMES DAILY
Qty: 360 TABLET | Refills: 3 | Status: SHIPPED | OUTPATIENT
Start: 2024-05-06

## 2024-05-06 NOTE — PROGRESS NOTES
Cardiology Follow up  [facility] [place]  [id number]  1955  [facility] [address] [address]      1. Heart failure with preserved ejection fraction, unspecified HF chronicity (HCC)  Basic metabolic panel      2. Primary hypertension        3. Class 3 severe obesity due to excess calories with body mass index (BMI) of 40.0 to 44.9 in adult, unspecified whether serious comorbidity present (HCC)        4. Mitral valve insufficiency, unspecified etiology        5. AYLEEN (obstructive sleep apnea)            Discussion/Summary:  1.  Heart failure with preserved ejection fraction  2.  Hypertension  3.  Obesity  4.  Obstructive sleep apnea  5.  Intermittent palpitations  6.  Mitral regurgitation  7.  Bilateral lower extremity edema      -Device interrogation 4/29/2024 showing no patient or device activated episodes  -Transthoracic echocardiogram 4/27/2023 showing left-ventricular systolic function normal cemented LVEF 60% with normal right ventricular systolic function mildly dilated left atrium with mild mitral regurgitation  -Laboratory studies 5/6/2024 with BNP within normal limits at 28  -Patient will continue amlodipine 10 mg daily, atorvastatin 10 mg daily, enalapril 10 mg 4 times daily, hydralazine 25 mg 3 times daily, metoprolol succinate 75 mg daily, furosemide 40mg daily, with potassium 20 mEq daily  -After discussion with patient he is going to trial Jardiance 10 mg daily and will also speak with team about financial assistance for this.  -Patient counseled on dietary and lifestyle modifications including following a low-salt, low-fat, heart healthy diet with sodium restriction to less than 1800 mg of sodium daily and fluid restriction to less than 1800 mL of fluid daily along weight reduction goal BMI less than 29  -I will see patient in 3 months or sooner if  necessary  -With initiation of Jardiance patient will get BMP in 1 week to monitor renal function and electrolytes  -Patient counseled if he were to have any warning or alarm type symptoms he is to seek emergency medical care immediately.    History of Present Illness:  -Patient is a 68-year-old male with hypertension, hyperlipidemia, obesity, heart failure with preserved ejection fraction who had previously been seen and evaluated by cardiology Dr. Coello in the past and had also been seen and evaluated by Dr. Ovalle and Dr. Davenport for palpitations with ambulatory event monitor showing intermittent episodes of junctional rhythm and underwent implantable loop recorder October 2023.  He was seen and evaluated emergency department with concern for heart failure was given IV diuretic with up titration and oral regimen and presents to office today for follow-up.  -Currently in the office today patient denies any chest pain, palpitations, lightness or dizziness, loss of consciousness, shortness of breath, orthopnea or bendopnea.  He notes intermittent lower extremity edema but states this is been stable.  He notes that with up titration and diuretic regimen while he did have some increased urination denies any change in symptoms.  He notes that he only had shortness of breath with awakening from sleep on 1 or 2 occasions which was not significant for him and he has no exertional symptoms.  He states that blood pressures at home have been very well-controlled.       Patient Active Problem List   Diagnosis    Benign essential HTN    Dyslipidemia    GERD (gastroesophageal reflux disease)    AYLEEN (obstructive sleep apnea)    Stasis edema of both lower extremities    Morbid obesity (HCC)    Diastolic dysfunction    Type 2 diabetes mellitus with obesity  (HCC)    Lymphedema    Bronchitis    Palpitations    Chronic diastolic heart failure (HCC)    Paroxysmal atrial fibrillation (HCC)    Cough     Past Medical History:    Diagnosis Date    Arthritis     last assessed 7/1/15    Bronchitis 02/02/2023    Chronic diastolic (congestive) heart failure (HCC)     Diabetes mellitus (HCC)     type 2-last assessed 1/28/15    Diabetes mellitus (HCC) 03/16/2018    Dyslipidemia     GERD (gastroesophageal reflux disease)     Hypertension     Hypertensive urgency 03/16/2023    Irregular heart beat     last assessed 7/1/15    Obstructive sleep apnea     Palpitations     last assessed 9/7/17    Sexual dysfunction     last assessed 7/1/15    Thyroid disease     last assessed 7/1/15     Social History     Socioeconomic History    Marital status: Single     Spouse name: Not on file    Number of children: Not on file    Years of education: Not on file    Highest education level: Not on file   Occupational History    Not on file   Tobacco Use    Smoking status: Former     Current packs/day: 1.00     Types: Cigarettes     Passive exposure: Never    Smokeless tobacco: Never    Tobacco comments:     former smoker-quit 27 yrs ago 1pppd x 30 yrs as per [employer]   Vaping Use    Vaping status: Never Used   Substance and Sexual Activity    Alcohol use: Never    Drug use: Never    Sexual activity: Not on file   Other Topics Concern    Not on file   Social History Narrative    Not on file     Social Determinants of Health     Financial Resource Strain: Low Risk  (1/8/2024)    Overall Financial Resource Strain (CARDIA)     Difficulty of Paying Living Expenses: Not hard at all   Food Insecurity: Not on file   Transportation Needs: No Transportation Needs (1/8/2024)    PRAPARE - Transportation     Lack of Transportation (Medical): No     Lack of Transportation (Non-Medical): No   Physical Activity: Not on file   Stress: Not on file   Social Connections: Not on file   Intimate Partner Violence: Not on file   Housing Stability: Not on file      Family History   Problem Relation Age of Onset    Hypertension Mother         essential    Stroke Mother     Hypertension  Father         essential    Heart defect Father         cardiac disorder    Stroke Brother     Hypertension Brother      Past Surgical History:   Procedure Laterality Date    COLONOSCOPY      TONSILLECTOMY         Current Outpatient Medications:     amLODIPine (NORVASC) 10 mg tablet, Take 1 tablet (10 mg total) by mouth daily, Disp: 90 tablet, Rfl: 3    aspirin 81 mg chewable tablet, Chew 81 mg daily, Disp: , Rfl:     atorvastatin (LIPITOR) 10 mg tablet, Take 1 tablet (10 mg total) by mouth daily, Disp: 90 tablet, Rfl: 0    Empagliflozin (JARDIANCE) 10 MG TABS tablet, Take 1 tablet (10 mg total) by mouth daily, Disp: 30 tablet, Rfl: 5    enalapril (VASOTEC) 10 mg tablet, Take 1 tablet (10 mg total) by mouth 4 (four) times a day 2 tabs in the am, 1 tab in the afternoon, 1 tab @ hs., Disp: 360 tablet, Rfl: 3    flecainide (TAMBOCOR) 150 MG tablet, Take 1 tablet (150 mg total) by mouth see administration instructions For palpitations lasting longer than 5 minutes, take one 150 mg tablet.  If palpitations last longer than 30 minutes, take another 150 mg tablet.  If palpitations persist, please go to the nearest emergency department.    Do not exceed 300 mg in one day., Disp: 90 tablet, Rfl: 0    furosemide (LASIX) 40 mg tablet, Take 1 tablet (40 mg total) by mouth daily, Disp: 90 tablet, Rfl: 0    hydrALAZINE (APRESOLINE) 25 mg tablet, Take 1 tablet (25 mg total) by mouth 3 (three) times a day, Disp: 270 tablet, Rfl: 3    ketoconazole (NIZORAL) 2 % shampoo, Apply 1 Application topically 2 (two) times a week, Disp: 120 mL, Rfl: 0    metFORMIN (GLUCOPHAGE) 500 mg tablet, Take 1 tablet (500 mg total) by mouth daily with breakfast, Disp: 90 tablet, Rfl: 0    metoprolol succinate (TOPROL-XL) 25 mg 24 hr tablet, Take 1 tablet (25 mg total) by mouth daily, Disp: 90 tablet, Rfl: 3    metoprolol succinate (TOPROL-XL) 50 mg 24 hr tablet, Take 1 tablet (50 mg total) by mouth daily, Disp: 90 tablet, Rfl: 3    multivitamin  "(THERAGRAN) TABS, Take 1 tablet by mouth daily, Disp: , Rfl:     omeprazole (PriLOSEC) 40 MG capsule, Take 1 capsule (40 mg total) by mouth daily, Disp: 90 capsule, Rfl: 3    potassium chloride (Klor-Con M20) 20 mEq tablet, Take 1 tablet (20 mEq total) by mouth daily, Disp: 90 tablet, Rfl: 0    Red Yeast Rice Extract (RED YEAST RICE PO), Take by mouth, Disp: , Rfl:     triamcinolone (KENALOG) 0.1 % lotion, Apply topically 2 (two) times a day, Disp: 60 mL, Rfl: 3    triamcinolone (KENALOG) 0.5 % cream, , Disp: , Rfl:     Vitamin D, Cholecalciferol, 50 MCG (2000 UT) CAPS, Take 50 mcg by mouth daily, Disp: 90 capsule, Rfl: 3    glucose blood (OneTouch Verio) test strip, Use 1 each daily, Disp: 100 each, Rfl: 5    indomethacin (INDOCIN) 50 mg capsule, Take 1 capsule (50 mg total) by mouth daily as needed for mild pain PRN with the gout, Disp: 90 capsule, Rfl: 0    Lancets (onetouch ultrasoft) lancets, Check glucose daily, Disp: 100 each, Rfl: 1    meclizine (ANTIVERT) 25 mg tablet, Take 1 tablet (25 mg total) by mouth 3 (three) times a day as needed for dizziness (Vertigo) for up to 10 days, Disp: 30 tablet, Rfl: 0    methocarbamol (ROBAXIN) 750 mg tablet, TAKE 1 TABLET BY MOUTH 4 TIMES DAILY AS NEEDED FOR MUSCLE SPASM (Patient not taking: Reported on 5/6/2024), Disp: , Rfl:   Allergies   Allergen Reactions    Acetaminophen Other (See Comments)     Other reaction(s): Unknown Reaction  \"I had to go to the hospital and get shots after taking it 20yrs ago\"         Labs:  Appointment on 05/06/2024   Component Date Value    Sodium 05/06/2024 139     Potassium 05/06/2024 4.2     Chloride 05/06/2024 105     CO2 05/06/2024 25     ANION GAP 05/06/2024 9     BUN 05/06/2024 30 (H)     Creatinine 05/06/2024 1.21     Glucose, Fasting 05/06/2024 122 (H)     Calcium 05/06/2024 9.4     eGFR 05/06/2024 60     BNP 05/06/2024 28    Telephone on 05/02/2024   Component Date Value    Supplier Name 05/02/2024 [employer] " [employer]     Supplier Phone Number 05/02/2024 [phone]     Order Status 05/02/2024 Completed     Delivery Request Date 05/02/2024 05/02/2024     Date Delivered  05/02/2024 05/02/2024     Item Description 05/02/2024 PAP Accessory     Item Description 05/02/2024 PAP Mask, Full Face, Fit Upon Setup, N/A, 1 per 3 months     Item Description 05/02/2024 Humidifier Water Chamber, 1 per 6 months     Item Description 05/02/2024 PAP Headgear, 1 per 6 months     Item Description 05/02/2024 PAP Chinstrap, 1 per 6 months     Item Description 05/02/2024 PAP Humidifier, Heated     Item Description 05/02/2024 PAP Monitoring Modem     Item Description 05/02/2024 Heated PAP Tubing, 1 per 3 months     Item Description 05/02/2024 Disposable PAP Filter, 2 per 1 month     Item Description 05/02/2024 Non-Disposable PAP Filter, 1 per 6 months     Item Description 05/02/2024 PAP Mask Interface Cushion, Full Face, 1 per 1 month    Admission on 04/29/2024, Discharged on 04/29/2024   Component Date Value    Ventricular Rate 04/29/2024 62     Atrial Rate 04/29/2024 62     WV Interval 04/29/2024 212     QRSD Interval 04/29/2024 118     QT Interval 04/29/2024 428     QTC Interval 04/29/2024 434     P Axis 04/29/2024 53     QRS Axis 04/29/2024 -57     T Wave Axis 04/29/2024 67     WBC 04/29/2024 5.23     RBC 04/29/2024 4.59     Hemoglobin 04/29/2024 13.6     Hematocrit 04/29/2024 42.0     MCV 04/29/2024 92     MCH 04/29/2024 29.6     MCHC 04/29/2024 32.4     RDW 04/29/2024 13.3     MPV 04/29/2024 11.6     Platelets 04/29/2024 150     nRBC 04/29/2024 0     Segmented % 04/29/2024 60     Immature Grans % 04/29/2024 0     Lymphocytes % 04/29/2024 29     Monocytes % 04/29/2024 10     Eosinophils Relative 04/29/2024 0     Basophils Relative 04/29/2024 1     Absolute Neutrophils 04/29/2024 3.20     Absolute Immature Grans 04/29/2024 0.01     Absolute Lymphocytes 04/29/2024 1.49     Absolute Monocytes 04/29/2024 0.50     Eosinophils Absolute  "04/29/2024 0.00     Basophils Absolute 04/29/2024 0.03     Sodium 04/29/2024 141     Potassium 04/29/2024 4.0     Chloride 04/29/2024 106     CO2 04/29/2024 27     ANION GAP 04/29/2024 8     BUN 04/29/2024 26 (H)     Creatinine 04/29/2024 1.16     Glucose 04/29/2024 128     Calcium 04/29/2024 9.8     AST 04/29/2024 19     ALT 04/29/2024 21     Alkaline Phosphatase 04/29/2024 31 (L)     Total Protein 04/29/2024 7.5     Albumin 04/29/2024 4.3     Total Bilirubin 04/29/2024 0.59     eGFR 04/29/2024 63     BNP 04/29/2024 50     hs TnI 0hr 04/29/2024 5     hs TnI 2hr 04/29/2024 5     Delta 2hr hsTnI 04/29/2024 0    Appointment on 04/07/2024   Component Date Value    Hemoglobin A1C 04/07/2024 6.0 (H)     EAG 04/07/2024 126     Sodium 04/07/2024 137     Potassium 04/07/2024 3.7     Chloride 04/07/2024 103     CO2 04/07/2024 25     ANION GAP 04/07/2024 9     BUN 04/07/2024 29 (H)     Creatinine 04/07/2024 1.19     Glucose, Fasting 04/07/2024 126 (H)     Calcium 04/07/2024 9.8     AST 04/07/2024 18     ALT 04/07/2024 18     Alkaline Phosphatase 04/07/2024 28 (L)     Total Protein 04/07/2024 7.4     Albumin 04/07/2024 4.2     Total Bilirubin 04/07/2024 0.95     eGFR 04/07/2024 61     LDL Direct 04/07/2024 86     PSA 04/07/2024 1.24         Imaging: X-ray chest 1 view portable    Result Date: 4/29/2024  Narrative: XR CHEST PORTABLE INDICATION: CHF. COMPARISON: Radiograph 2/9/2024 FINDINGS: Loop recorder projects over the left medial hemithorax Clear lungs. No pneumothorax or pleural effusion. Normal cardiomediastinal silhouette. Bones are unremarkable for age. Normal upper abdomen.     Impression: No acute cardiopulmonary disease. Workstation performed: [id number]     Cardiac EP device report    Result Date: 4/29/2024  Narrative: CARELINK TRANSMISSION: BATTERY STATUS \"OK.\" NO PATIENT OR DEVICE ACTIVATED EPISODES.â€”HERNANDEZ     Review of Systems:  Review of Systems   Constitutional:  Negative for chills, diaphoresis, fatigue and " "fever.   HENT:  Negative for trouble swallowing and voice change.    Eyes:  Negative for pain and redness.   Respiratory:  Negative for shortness of breath and wheezing.    Cardiovascular:  Negative for chest pain, palpitations and leg swelling.   Gastrointestinal:  Negative for abdominal pain, constipation, diarrhea, nausea and vomiting.   Genitourinary:  Negative for dysuria.   Musculoskeletal:  Positive for arthralgias. Negative for neck pain and neck stiffness.   Skin:  Negative for rash.   Neurological:  Negative for dizziness, syncope, light-headedness and headaches.   Psychiatric/Behavioral:  Negative for agitation and confusion.    All other systems reviewed and are negative.        Vitals:    05/06/24 1355   BP: 116/64   BP Location: Left arm   Patient Position: Sitting   Cuff Size: Large   Pulse: 58   Resp: 18   SpO2: 96%   Weight: (!) 137 kg (301 lb)   Height: 5' 10.28\" (1.785 m)     Vitals:    05/06/24 1355   Weight: (!) 137 kg (301 lb)     Height: 5' 10.28\" (178.5 cm)     Physical Exam:   Physical Exam  Vitals reviewed.   Constitutional:       General: He is not in acute distress.     Appearance: He is obese. He is not diaphoretic.   HENT:      Head: Normocephalic and atraumatic.   Eyes:      General:         Right eye: No discharge.         Left eye: No discharge.   Neck:      Comments: Trachea midline, neck obese, difficult to assess JVD  Cardiovascular:      Rate and Rhythm: Normal rate and regular rhythm.      Heart sounds:      No friction rub.   Pulmonary:      Effort: No respiratory distress.      Breath sounds: No wheezing.   Chest:      Chest wall: No tenderness.   Abdominal:      General: Bowel sounds are normal.      Palpations: Abdomen is soft.      Tenderness: There is no abdominal tenderness. There is no rebound.   Musculoskeletal:      Right lower leg: Edema (trace) present.      Left lower leg: Edema (trace) present.   Skin:     General: Skin is warm and dry.   Neurological:      Mental " Status: He is alert.      Comments: Awake, alert, and able to answer questions appropriately and move extremities bilaterally.   Psychiatric:         Mood and Affect: Mood normal.         Behavior: Behavior normal.

## 2024-05-07 ENCOUNTER — TELEPHONE (OUTPATIENT)
Dept: FAMILY MEDICINE CLINIC | Facility: CLINIC | Age: 69
End: 2024-05-07

## 2024-05-07 NOTE — TELEPHONE ENCOUNTER
"Pt called office very upset stating no one has spoke to him about medications in a week. States \"I have been on hold for a bout a week.\" Pt also states \" you can not ignore me forever. If doctor does not wish me to be her pt anymore just say so.\" I attempted to deescalate the situation by telling pt what all last weeks messages said. Pt states\" No one had called me.\" Told pt that if he were to start Jardiance he is to stop taking his Metformin (per Dr. Fountain). Pt still very upset stating \"I should not have to wait a week to know this information. There needs to be better communication.\" Pt then said thank you and hung up on me.    "

## 2024-05-08 PROBLEM — R05.9 COUGH: Status: RESOLVED | Noted: 2024-04-08 | Resolved: 2024-05-08

## 2024-05-09 DIAGNOSIS — I50.31 ACUTE DIASTOLIC HEART FAILURE (HCC): ICD-10-CM

## 2024-05-09 DIAGNOSIS — I50.32 CHRONIC DIASTOLIC HEART FAILURE (HCC): ICD-10-CM

## 2024-05-09 RX ORDER — POTASSIUM CHLORIDE 20 MEQ/1
20 TABLET, EXTENDED RELEASE ORAL DAILY
Qty: 90 TABLET | Refills: 1 | Status: SHIPPED | OUTPATIENT
Start: 2024-05-09

## 2024-05-09 RX ORDER — FUROSEMIDE 40 MG/1
40 TABLET ORAL DAILY
Qty: 90 TABLET | Refills: 1 | Status: SHIPPED | OUTPATIENT
Start: 2024-05-09

## 2024-05-10 ENCOUNTER — TELEPHONE (OUTPATIENT)
Age: 69
End: 2024-05-10

## 2024-05-10 NOTE — TELEPHONE ENCOUNTER
Pt needs to know which medication to stop base on his medication list wants to know about metformin and Jardiance. Please call will PCP advise.    Need referral for dietician.

## 2024-05-15 DIAGNOSIS — E78.5 DYSLIPIDEMIA: ICD-10-CM

## 2024-05-15 RX ORDER — ATORVASTATIN CALCIUM 10 MG/1
10 TABLET, FILM COATED ORAL DAILY
Qty: 90 TABLET | Refills: 3 | Status: SHIPPED | OUTPATIENT
Start: 2024-05-15

## 2024-05-20 ENCOUNTER — APPOINTMENT (OUTPATIENT)
Dept: LAB | Facility: MEDICAL CENTER | Age: 69
End: 2024-05-20
Payer: MEDICARE

## 2024-05-20 DIAGNOSIS — I50.30 HEART FAILURE WITH PRESERVED EJECTION FRACTION, UNSPECIFIED HF CHRONICITY (HCC): ICD-10-CM

## 2024-05-20 LAB
ANION GAP SERPL CALCULATED.3IONS-SCNC: 9 MMOL/L (ref 4–13)
BUN SERPL-MCNC: 29 MG/DL (ref 5–25)
CALCIUM SERPL-MCNC: 9.2 MG/DL (ref 8.4–10.2)
CHLORIDE SERPL-SCNC: 106 MMOL/L (ref 96–108)
CO2 SERPL-SCNC: 24 MMOL/L (ref 21–32)
CREAT SERPL-MCNC: 1.17 MG/DL (ref 0.6–1.3)
GFR SERPL CREATININE-BSD FRML MDRD: 62 ML/MIN/1.73SQ M
GLUCOSE P FAST SERPL-MCNC: 128 MG/DL (ref 65–99)
POTASSIUM SERPL-SCNC: 4.2 MMOL/L (ref 3.5–5.3)
SODIUM SERPL-SCNC: 139 MMOL/L (ref 135–147)

## 2024-05-20 PROCEDURE — 36415 COLL VENOUS BLD VENIPUNCTURE: CPT

## 2024-05-20 PROCEDURE — 80048 BASIC METABOLIC PNL TOTAL CA: CPT

## 2024-05-31 DIAGNOSIS — E11.9 TYPE 2 DIABETES MELLITUS WITHOUT COMPLICATION, WITHOUT LONG-TERM CURRENT USE OF INSULIN (HCC): ICD-10-CM

## 2024-05-31 NOTE — TELEPHONE ENCOUNTER
Patient Mhamd contacting office requesting refill for the Empagliflozin (JARDIANCE) 10 MG TABS tablet   Take 1 tablet (10 mg total) by mouth daily to be sent to [facility] in [place].    There was an issue refilling the medication @ [facility].     Patient has a few days left on meds.

## 2024-06-13 ENCOUNTER — TELEPHONE (OUTPATIENT)
Age: 69
End: 2024-06-13

## 2024-06-13 NOTE — TELEPHONE ENCOUNTER
Christy called asking if needs to see a specialist for the chronic diastolic heart failure   Echo 4/2023 EF 60 %  Please advise

## 2024-06-14 DIAGNOSIS — I50.32 CHRONIC DIASTOLIC HEART FAILURE (HCC): Primary | ICD-10-CM

## 2024-06-14 NOTE — TELEPHONE ENCOUNTER
Patient states that at his last visit doc and patient discussed with Heart failure theres a group in [facility] that specializes in heart failure.    Patient is asking if he should make an appointment with HF group? (I did advise if patient is interested in the appointment, he can always schedule if he desires to)     Because his next office visit is not until August with Cardiology

## 2024-06-14 NOTE — TELEPHONE ENCOUNTER
LM for patient regarding referral and left a callback # for scheduling this appointment with the HF group in [facility]

## 2024-06-24 ENCOUNTER — CONSULT (OUTPATIENT)
Dept: CARDIOLOGY CLINIC | Facility: CLINIC | Age: 69
End: 2024-06-24
Payer: MEDICARE

## 2024-06-24 VITALS
WEIGHT: 303 LBS | SYSTOLIC BLOOD PRESSURE: 110 MMHG | DIASTOLIC BLOOD PRESSURE: 60 MMHG | HEIGHT: 70 IN | OXYGEN SATURATION: 97 % | HEART RATE: 59 BPM | BODY MASS INDEX: 43.38 KG/M2

## 2024-06-24 DIAGNOSIS — I50.32 CHRONIC DIASTOLIC HEART FAILURE (HCC): ICD-10-CM

## 2024-06-24 DIAGNOSIS — I50.30 HEART FAILURE WITH PRESERVED EJECTION FRACTION, UNSPECIFIED HF CHRONICITY (HCC): Primary | ICD-10-CM

## 2024-06-24 DIAGNOSIS — I10 BENIGN ESSENTIAL HTN: ICD-10-CM

## 2024-06-24 DIAGNOSIS — R00.2 PALPITATIONS: ICD-10-CM

## 2024-06-24 DIAGNOSIS — G47.33 OSA ON CPAP: ICD-10-CM

## 2024-06-24 DIAGNOSIS — Z95.818 PRESENCE OF CARDIAC DEVICE: ICD-10-CM

## 2024-06-24 DIAGNOSIS — E66.01 CLASS 3 SEVERE OBESITY DUE TO EXCESS CALORIES WITH BODY MASS INDEX (BMI) OF 40.0 TO 44.9 IN ADULT, UNSPECIFIED WHETHER SERIOUS COMORBIDITY PRESENT (HCC): ICD-10-CM

## 2024-06-24 PROCEDURE — 99205 OFFICE O/P NEW HI 60 MIN: CPT | Performed by: INTERNAL MEDICINE

## 2024-06-24 RX ORDER — FUROSEMIDE 40 MG/1
60 TABLET ORAL DAILY
Qty: 135 TABLET | Refills: 1 | Status: SHIPPED | OUTPATIENT
Start: 2024-06-24 | End: 2024-12-21

## 2024-06-24 NOTE — PATIENT INSTRUCTIONS
Increase lasix to 60mg daily  Obtain BMP and BNP in 1 week  2g sodium diet  2L fluid diet  Daily weights, call office for weight gain of 3 lbs in a day or 5 lbs in 5-7 days.  Physical activities/walking as tolerated

## 2024-06-24 NOTE — LETTER
June 25, 2024     Saleem Schmitt DO  [address] [facility]  [address]  [facility] [address]    Patient: Christy Haro   YOB: 1955   Date of Visit: 6/24/2024       Dear Dr. Schmitt:    Thank you for referring Christy Haro to me for evaluation. Below are my notes for this consultation.    If you have questions, please do not hesitate to call me. I look forward to following your patient along with you.         Sincerely,        Tri Schroeder MD        CC: No Recipients    Tri Schroeder MD  6/25/2024  5:39 AM  Sign when Signing Visit  Heart Failure Outpatient Consult Note - Christy Haro 68 y.o. male MRN: [id number]    Encounter: [id number]      Assessment/Plan:    Patient Active Problem List    Diagnosis Date Noted   • Paroxysmal atrial fibrillation (HCC) 01/08/2024   • Palpitations 05/04/2023   • Chronic diastolic heart failure (HCC) 05/04/2023   • Bronchitis 02/02/2023   • Type 2 diabetes mellitus with obesity  (HCC) 04/20/2022   • Lymphedema 04/20/2022   • Diastolic dysfunction 06/07/2021   • Morbid obesity (HCC) 05/17/2021   • Stasis edema of both lower extremities 07/23/2018   • GERD (gastroesophageal reflux disease) 03/16/2018   • AYLEEN (obstructive sleep apnea) 08/18/2017   • Benign essential HTN 07/31/2017   • Dyslipidemia 07/31/2017       # Heart failure with preserved ejection fraction  Volume up on exam with elevated JVP  Increase furosemide to 60mg daily, Continue Jardiance    # Palpitations  # H/o SVT and junctional rhythm noted on ambulatory monitor 5/2024  Seen by Dr. Davenport and deemed no symptom rhythm correlation to account for symptoms. Loop recorder recommended if continues to have symptoms    # Loop recorder in place  Interrogation 4/29/24: no patient or device activated episodes  Interrogation 1/29/24: presenting rhythm NSR at 60bpm. No patient or device activated episodes    # Hypertension, controlled  Continue current antihypertensives    # AYLEEN, continue BIPAP, follows with  sleep medicine    # Morbid obesity, encouraged weight loss    Studies- personally reviewed by me    TTE 4/27/23  LVEF: 60%  LVIDd: 5.1cm, 1cm Pwd IVSd  RV: normal size and systolic function  MR: mild  PASP: unable to estimate, trace TR  RVOT: no notching  Other:  no pericardial effusion    Pharm Nuc Stress 6/2021: small, moderately severe, fixed myocardial perfusion defect of the inferior wall likely due to diaphragm attenuation.   PFT 5/24/21: normal lung volumes, normal diffusion capacity    Today's Plan:  Patient is volume up on exam with elevated JVP  Increase lasix to 60mg daily  Obtain BMP and BNP in 1 week  2g sodium diet  2L fluid diet  Daily weights, call office for weight gain of 3 lbs in a day or 5 lbs in 5-7 days.  Physical activities/walking as tolerated    HPI:   68 year old male with chronic heart failure with preserved ejection fraction, morbid obesity, hypertension, AYLEEN, hypertension, SVT and junctional rhythm with loop recorder in place who here for second opinion/heart failure consultation. He has recently been following with Dr. Schmitt. Patient would like to obtain more information about his condition. Patient recently with more episodes of shortness of breath and seen at the ED on February and April of this year. He notes shortness of breath was more notable on ED during evaluation on 4/29/24 with concern for heart failure. He was given IV diuretic. Followed up with Dr. Schmitt and started on Jardiance. He reports no recurrence of shortness of breath. Notes swelling on the left ankle which has overall been chronic. No chest pain, PND, orthopnea. No palpitations, dizziness or lightheadedness. He reports following a low sodium diet and adherent with medications        Past Medical History:   Diagnosis Date   • Arthritis     last assessed 7/1/15   • Bronchitis 02/02/2023   • Chronic diastolic (congestive) heart failure (HCC)    • Diabetes mellitus (HCC)     type 2-last assessed 1/28/15   • Diabetes  "mellitus (HCC) 03/16/2018   • Dyslipidemia    • GERD (gastroesophageal reflux disease)    • Hypertension    • Hypertensive urgency 03/16/2023   • Irregular heart beat     last assessed 7/1/15   • Obstructive sleep apnea    • Palpitations     last assessed 9/7/17   • Sexual dysfunction     last assessed 7/1/15   • Thyroid disease     last assessed 7/1/15       Review of Systems   Constitutional:  Negative for chills and fever.   HENT:  Negative for ear pain and sore throat.    Eyes:  Negative for pain and visual disturbance.   Respiratory:  Negative for cough and shortness of breath.    Cardiovascular:  Positive for leg swelling. Negative for chest pain and palpitations.   Gastrointestinal:  Negative for abdominal distention, abdominal pain and vomiting.   Genitourinary:  Negative for dysuria and hematuria.   Musculoskeletal:  Negative for arthralgias and back pain.   Skin:  Negative for color change and rash.   Neurological:  Negative for dizziness, seizures, syncope and light-headedness.   All other systems reviewed and are negative.       Allergies   Allergen Reactions   • Acetaminophen Other (See Comments)     Other reaction(s): Unknown Reaction  \"I had to go to the hospital and get shots after taking it 20yrs ago\"     .    Current Outpatient Medications:   •  amLODIPine (NORVASC) 10 mg tablet, Take 1 tablet (10 mg total) by mouth daily, Disp: 90 tablet, Rfl: 3  •  aspirin 81 mg chewable tablet, Chew 81 mg daily, Disp: , Rfl:   •  atorvastatin (LIPITOR) 10 mg tablet, Take 1 tablet (10 mg total) by mouth daily, Disp: 90 tablet, Rfl: 3  •  Empagliflozin (JARDIANCE) 10 MG TABS tablet, Take 1 tablet (10 mg total) by mouth daily, Disp: 30 tablet, Rfl: 5  •  enalapril (VASOTEC) 10 mg tablet, Take 1 tablet (10 mg total) by mouth 4 (four) times a day 2 tabs in the am, 1 tab in the afternoon, 1 tab @ hs., Disp: 360 tablet, Rfl: 3  •  flecainide (TAMBOCOR) 150 MG tablet, Take 1 tablet (150 mg total) by mouth see " administration instructions For palpitations lasting longer than 5 minutes, take one 150 mg tablet.  If palpitations last longer than 30 minutes, take another 150 mg tablet.  If palpitations persist, please go to the nearest emergency department.    Do not exceed 300 mg in one day., Disp: 90 tablet, Rfl: 0  •  furosemide (LASIX) 40 mg tablet, Take 1 tablet (40 mg total) by mouth daily, Disp: 90 tablet, Rfl: 1  •  glucose blood (OneTouch Verio) test strip, Use 1 each daily, Disp: 100 each, Rfl: 5  •  hydrALAZINE (APRESOLINE) 25 mg tablet, Take 1 tablet (25 mg total) by mouth 3 (three) times a day, Disp: 270 tablet, Rfl: 3  •  indomethacin (INDOCIN) 50 mg capsule, Take 1 capsule (50 mg total) by mouth daily as needed for mild pain PRN with the gout, Disp: 90 capsule, Rfl: 0  •  ketoconazole (NIZORAL) 2 % shampoo, Apply 1 Application topically 2 (two) times a week, Disp: 120 mL, Rfl: 0  •  Lancets (onetouch ultrasoft) lancets, Check glucose daily, Disp: 100 each, Rfl: 1  •  meclizine (ANTIVERT) 25 mg tablet, Take 1 tablet (25 mg total) by mouth 3 (three) times a day as needed for dizziness (Vertigo) for up to 10 days, Disp: 30 tablet, Rfl: 0  •  metFORMIN (GLUCOPHAGE) 500 mg tablet, Take 1 tablet (500 mg total) by mouth daily with breakfast, Disp: 90 tablet, Rfl: 0  •  methocarbamol (ROBAXIN) 750 mg tablet, TAKE 1 TABLET BY MOUTH 4 TIMES DAILY AS NEEDED FOR MUSCLE SPASM (Patient not taking: Reported on 5/6/2024), Disp: , Rfl:   •  metoprolol succinate (TOPROL-XL) 25 mg 24 hr tablet, Take 1 tablet (25 mg total) by mouth daily, Disp: 90 tablet, Rfl: 3  •  metoprolol succinate (TOPROL-XL) 50 mg 24 hr tablet, Take 1 tablet (50 mg total) by mouth daily, Disp: 90 tablet, Rfl: 3  •  multivitamin (THERAGRAN) TABS, Take 1 tablet by mouth daily, Disp: , Rfl:   •  omeprazole (PriLOSEC) 40 MG capsule, Take 1 capsule (40 mg total) by mouth daily, Disp: 90 capsule, Rfl: 3  •  potassium chloride (Klor-Con M20) 20 mEq tablet, Take 1  tablet (20 mEq total) by mouth daily, Disp: 90 tablet, Rfl: 1  •  Red Yeast Rice Extract (RED YEAST RICE PO), Take by mouth, Disp: , Rfl:   •  triamcinolone (KENALOG) 0.1 % lotion, Apply topically 2 (two) times a day, Disp: 60 mL, Rfl: 3  •  triamcinolone (KENALOG) 0.5 % cream, , Disp: , Rfl:   •  Vitamin D, Cholecalciferol, 50 MCG (2000 UT) CAPS, Take 50 mcg by mouth daily, Disp: 90 capsule, Rfl: 3    Social History     Socioeconomic History   • Marital status: Single     Spouse name: Not on file   • Number of children: Not on file   • Years of education: Not on file   • Highest education level: Not on file   Occupational History   • Not on file   Tobacco Use   • Smoking status: Former     Current packs/day: 1.00     Types: Cigarettes     Passive exposure: Never   • Smokeless tobacco: Never   • Tobacco comments:     former smoker-quit 27 yrs ago 1pppd x 30 yrs as per [employer]   Vaping Use   • Vaping status: Never Used   Substance and Sexual Activity   • Alcohol use: Never   • Drug use: Never   • Sexual activity: Not on file   Other Topics Concern   • Not on file   Social History Narrative   • Not on file     Social Determinants of Health     Financial Resource Strain: Low Risk  (1/8/2024)    Overall Financial Resource Strain (CARDIA)    • Difficulty of Paying Living Expenses: Not hard at all   Food Insecurity: Not on file   Transportation Needs: No Transportation Needs (1/8/2024)    PRAPARE - Transportation    • Lack of Transportation (Medical): No    • Lack of Transportation (Non-Medical): No   Physical Activity: Not on file   Stress: Not on file   Social Connections: Not on file   Intimate Partner Violence: Not on file   Housing Stability: Not on file       Family History   Problem Relation Age of Onset   • Hypertension Mother         essential   • Stroke Mother    • Hypertension Father         essential   • Heart defect Father         cardiac disorder   • Stroke Brother    • Hypertension Brother         Physical Exam:    Vitals: There were no vitals taken for this visit., There is no height or weight on file to calculate BMI.,   Wt Readings from Last 3 Encounters:   05/06/24 (!) 137 kg (301 lb)   05/01/24 (!) 138 kg (303 lb 3.2 oz)   04/29/24 (!) 144 kg (317 lb 0.3 oz)       Physical Exam  Constitutional:       General: He is not in acute distress.     Appearance: Normal appearance.   HENT:      Head: Normocephalic and atraumatic.      Mouth/Throat:      Mouth: Mucous membranes are moist.   Eyes:      General: No scleral icterus.     Extraocular Movements: Extraocular movements intact.   Neck:      Vascular: JVD present.   Cardiovascular:      Rate and Rhythm: Normal rate and regular rhythm.      Pulses: Normal pulses.      Heart sounds: S1 normal and S2 normal. No murmur heard.     No friction rub. No gallop.   Pulmonary:      Breath sounds: Normal breath sounds.   Abdominal:      General: There is no distension.      Palpations: Abdomen is soft.      Tenderness: There is no abdominal tenderness. There is no guarding or rebound.   Musculoskeletal:         General: Normal range of motion.      Cervical back: Neck supple.      Right lower leg: Edema present.      Left lower leg: Edema (left more than right) present.   Skin:     General: Skin is warm and dry.      Capillary Refill: Capillary refill takes less than 2 seconds.   Neurological:      General: No focal deficit present.      Mental Status: He is alert and oriented to person, place, and time.   Psychiatric:         Mood and Affect: Mood normal.         Labs & Results:    Lab Results   Component Value Date    WBC 5.23 04/29/2024    HGB 13.6 04/29/2024    HCT 42.0 04/29/2024    MCV 92 04/29/2024     04/29/2024     Lab Results   Component Value Date    SODIUM 139 05/20/2024    K 4.2 05/20/2024     05/20/2024    CO2 24 05/20/2024    BUN 29 (H) 05/20/2024    CREATININE 1.17 05/20/2024    GLUC 128 04/29/2024    CALCIUM 9.2 05/20/2024     Lab  Results   Component Value Date    NTBNP 78 05/11/2021      Lab Results   Component Value Date    CHOLESTEROL 129 07/31/2023    CHOLESTEROL 133 10/27/2022    CHOLESTEROL 119 06/21/2021     Lab Results   Component Value Date    HDL 32 (L) 07/31/2023    HDL 37 (L) 10/27/2022    HDL 31 (L) 06/21/2021     Lab Results   Component Value Date    TRIG 131 07/31/2023    TRIG 71 10/27/2022    TRIG 70 06/21/2021     Lab Results   Component Value Date    NONHDLC 96 10/27/2022    NONHDLC 141 10/04/2019    NONHDLC 147 12/10/2018       EKG personally reviewed by Cornelio Schroeder MD.     Counseling / Coordination of Care  I have spent a total time of 55 minutes on 06/24/24 in caring for this patient including Diagnostic results, Prognosis, Risks and benefits of tx options, Instructions for management, Patient and family education, Importance of tx compliance, Risk factor reductions, Impressions, Counseling / Coordination of care, Documenting in the medical record, Reviewing / ordering tests, medicine, procedures  , and Obtaining or reviewing history  .     Thank you for the opportunity to participate in the care of this patient.    CORNELIO SCHROEDER MD  ADVANCED HEART FAILURE AND MECHANICAL CIRCULATORY SUPPORT  [facility]

## 2024-06-24 NOTE — PROGRESS NOTES
Heart Failure Outpatient Consult Note - Mhamd Estrellita 68 y.o. male MRN: [id number]    Encounter: [id number]      Assessment/Plan:    Patient Active Problem List    Diagnosis Date Noted    Paroxysmal atrial fibrillation (HCC) 01/08/2024    Palpitations 05/04/2023    Chronic diastolic heart failure (HCC) 05/04/2023    Bronchitis 02/02/2023    Type 2 diabetes mellitus with obesity  (HCC) 04/20/2022    Lymphedema 04/20/2022    Diastolic dysfunction 06/07/2021    Morbid obesity (HCC) 05/17/2021    Stasis edema of both lower extremities 07/23/2018    GERD (gastroesophageal reflux disease) 03/16/2018    AYLEEN (obstructive sleep apnea) 08/18/2017    Benign essential HTN 07/31/2017    Dyslipidemia 07/31/2017       # Heart failure with preserved ejection fraction  Volume up on exam with elevated JVP  Increase furosemide to 60mg daily, Continue Jardiance    # Palpitations  # H/o SVT and junctional rhythm noted on ambulatory monitor 5/2024  Seen by Dr. Davenport and deemed no symptom rhythm correlation to account for symptoms. Loop recorder recommended if continues to have symptoms    # Loop recorder in place  Interrogation 4/29/24: no patient or device activated episodes  Interrogation 1/29/24: presenting rhythm NSR at 60bpm. No patient or device activated episodes    # Hypertension, controlled  Continue current antihypertensives    # AYLEEN, continue BIPAP, follows with sleep medicine    # Morbid obesity, encouraged weight loss    Studies- personally reviewed by me    TTE 4/27/23  LVEF: 60%  LVIDd: 5.1cm, 1cm Pwd IVSd  RV: normal size and systolic function  MR: mild  PASP: unable to estimate, trace TR  RVOT: no notching  Other:  no pericardial effusion    Pharm Nuc Stress 6/2021: small, moderately severe, fixed myocardial perfusion defect of the inferior wall likely due to diaphragm attenuation.   PFT 5/24/21: normal lung volumes, normal diffusion capacity    Today's Plan:  Patient is volume up on exam with elevated JVP  Increase  lasix to 60mg daily  Obtain BMP and BNP in 1 week  2g sodium diet  2L fluid diet  Daily weights, call office for weight gain of 3 lbs in a day or 5 lbs in 5-7 days.  Physical activities/walking as tolerated    HPI:   68 year old male with chronic heart failure with preserved ejection fraction, morbid obesity, hypertension, AYLEEN, hypertension, SVT and junctional rhythm with loop recorder in place who here for second opinion/heart failure consultation. He has recently been following with Dr. Schmitt. Patient would like to obtain more information about his condition. Patient recently with more episodes of shortness of breath and seen at the ED on February and April of this year. He notes shortness of breath was more notable on ED during evaluation on 4/29/24 with concern for heart failure. He was given IV diuretic. Followed up with Dr. Schmitt and started on Jardiance. He reports no recurrence of shortness of breath. Notes swelling on the left ankle which has overall been chronic. No chest pain, PND, orthopnea. No palpitations, dizziness or lightheadedness. He reports following a low sodium diet and adherent with medications        Past Medical History:   Diagnosis Date    Arthritis     last assessed 7/1/15    Bronchitis 02/02/2023    Chronic diastolic (congestive) heart failure (HCC)     Diabetes mellitus (HCC)     type 2-last assessed 1/28/15    Diabetes mellitus (HCC) 03/16/2018    Dyslipidemia     GERD (gastroesophageal reflux disease)     Hypertension     Hypertensive urgency 03/16/2023    Irregular heart beat     last assessed 7/1/15    Obstructive sleep apnea     Palpitations     last assessed 9/7/17    Sexual dysfunction     last assessed 7/1/15    Thyroid disease     last assessed 7/1/15       Review of Systems   Constitutional:  Negative for chills and fever.   HENT:  Negative for ear pain and sore throat.    Eyes:  Negative for pain and visual disturbance.   Respiratory:  Negative for cough and shortness of  "breath.    Cardiovascular:  Positive for leg swelling. Negative for chest pain and palpitations.   Gastrointestinal:  Negative for abdominal distention, abdominal pain and vomiting.   Genitourinary:  Negative for dysuria and hematuria.   Musculoskeletal:  Negative for arthralgias and back pain.   Skin:  Negative for color change and rash.   Neurological:  Negative for dizziness, seizures, syncope and light-headedness.   All other systems reviewed and are negative.       Allergies   Allergen Reactions    Acetaminophen Other (See Comments)     Other reaction(s): Unknown Reaction  \"I had to go to the hospital and get shots after taking it 20yrs ago\"     .    Current Outpatient Medications:     amLODIPine (NORVASC) 10 mg tablet, Take 1 tablet (10 mg total) by mouth daily, Disp: 90 tablet, Rfl: 3    aspirin 81 mg chewable tablet, Chew 81 mg daily, Disp: , Rfl:     atorvastatin (LIPITOR) 10 mg tablet, Take 1 tablet (10 mg total) by mouth daily, Disp: 90 tablet, Rfl: 3    Empagliflozin (JARDIANCE) 10 MG TABS tablet, Take 1 tablet (10 mg total) by mouth daily, Disp: 30 tablet, Rfl: 5    enalapril (VASOTEC) 10 mg tablet, Take 1 tablet (10 mg total) by mouth 4 (four) times a day 2 tabs in the am, 1 tab in the afternoon, 1 tab @ hs., Disp: 360 tablet, Rfl: 3    flecainide (TAMBOCOR) 150 MG tablet, Take 1 tablet (150 mg total) by mouth see administration instructions For palpitations lasting longer than 5 minutes, take one 150 mg tablet.  If palpitations last longer than 30 minutes, take another 150 mg tablet.  If palpitations persist, please go to the nearest emergency department.    Do not exceed 300 mg in one day., Disp: 90 tablet, Rfl: 0    furosemide (LASIX) 40 mg tablet, Take 1 tablet (40 mg total) by mouth daily, Disp: 90 tablet, Rfl: 1    glucose blood (OneTouch Verio) test strip, Use 1 each daily, Disp: 100 each, Rfl: 5    hydrALAZINE (APRESOLINE) 25 mg tablet, Take 1 tablet (25 mg total) by mouth 3 (three) times a " day, Disp: 270 tablet, Rfl: 3    indomethacin (INDOCIN) 50 mg capsule, Take 1 capsule (50 mg total) by mouth daily as needed for mild pain PRN with the gout, Disp: 90 capsule, Rfl: 0    ketoconazole (NIZORAL) 2 % shampoo, Apply 1 Application topically 2 (two) times a week, Disp: 120 mL, Rfl: 0    Lancets (onetouch ultrasoft) lancets, Check glucose daily, Disp: 100 each, Rfl: 1    meclizine (ANTIVERT) 25 mg tablet, Take 1 tablet (25 mg total) by mouth 3 (three) times a day as needed for dizziness (Vertigo) for up to 10 days, Disp: 30 tablet, Rfl: 0    metFORMIN (GLUCOPHAGE) 500 mg tablet, Take 1 tablet (500 mg total) by mouth daily with breakfast, Disp: 90 tablet, Rfl: 0    methocarbamol (ROBAXIN) 750 mg tablet, TAKE 1 TABLET BY MOUTH 4 TIMES DAILY AS NEEDED FOR MUSCLE SPASM (Patient not taking: Reported on 5/6/2024), Disp: , Rfl:     metoprolol succinate (TOPROL-XL) 25 mg 24 hr tablet, Take 1 tablet (25 mg total) by mouth daily, Disp: 90 tablet, Rfl: 3    metoprolol succinate (TOPROL-XL) 50 mg 24 hr tablet, Take 1 tablet (50 mg total) by mouth daily, Disp: 90 tablet, Rfl: 3    multivitamin (THERAGRAN) TABS, Take 1 tablet by mouth daily, Disp: , Rfl:     omeprazole (PriLOSEC) 40 MG capsule, Take 1 capsule (40 mg total) by mouth daily, Disp: 90 capsule, Rfl: 3    potassium chloride (Klor-Con M20) 20 mEq tablet, Take 1 tablet (20 mEq total) by mouth daily, Disp: 90 tablet, Rfl: 1    Red Yeast Rice Extract (RED YEAST RICE PO), Take by mouth, Disp: , Rfl:     triamcinolone (KENALOG) 0.1 % lotion, Apply topically 2 (two) times a day, Disp: 60 mL, Rfl: 3    triamcinolone (KENALOG) 0.5 % cream, , Disp: , Rfl:     Vitamin D, Cholecalciferol, 50 MCG (2000 UT) CAPS, Take 50 mcg by mouth daily, Disp: 90 capsule, Rfl: 3    Social History     Socioeconomic History    Marital status: Single     Spouse name: Not on file    Number of children: Not on file    Years of education: Not on file    Highest education level: Not on file    Occupational History    Not on file   Tobacco Use    Smoking status: Former     Current packs/day: 1.00     Types: Cigarettes     Passive exposure: Never    Smokeless tobacco: Never    Tobacco comments:     former smoker-quit 27 yrs ago 1pppd x 30 yrs as per [employer]   Vaping Use    Vaping status: Never Used   Substance and Sexual Activity    Alcohol use: Never    Drug use: Never    Sexual activity: Not on file   Other Topics Concern    Not on file   Social History Narrative    Not on file     Social Determinants of Health     Financial Resource Strain: Low Risk  (1/8/2024)    Overall Financial Resource Strain (CARDIA)     Difficulty of Paying Living Expenses: Not hard at all   Food Insecurity: Not on file   Transportation Needs: No Transportation Needs (1/8/2024)    PRAPARE - Transportation     Lack of Transportation (Medical): No     Lack of Transportation (Non-Medical): No   Physical Activity: Not on file   Stress: Not on file   Social Connections: Not on file   Intimate Partner Violence: Not on file   Housing Stability: Not on file       Family History   Problem Relation Age of Onset    Hypertension Mother         essential    Stroke Mother     Hypertension Father         essential    Heart defect Father         cardiac disorder    Stroke Brother     Hypertension Brother        Physical Exam:    Vitals: There were no vitals taken for this visit., There is no height or weight on file to calculate BMI.,   Wt Readings from Last 3 Encounters:   05/06/24 (!) 137 kg (301 lb)   05/01/24 (!) 138 kg (303 lb 3.2 oz)   04/29/24 (!) 144 kg (317 lb 0.3 oz)       Physical Exam  Constitutional:       General: He is not in acute distress.     Appearance: Normal appearance.   HENT:      Head: Normocephalic and atraumatic.      Mouth/Throat:      Mouth: Mucous membranes are moist.   Eyes:      General: No scleral icterus.     Extraocular Movements: Extraocular movements intact.   Neck:      Vascular: JVD present.    Cardiovascular:      Rate and Rhythm: Normal rate and regular rhythm.      Pulses: Normal pulses.      Heart sounds: S1 normal and S2 normal. No murmur heard.     No friction rub. No gallop.   Pulmonary:      Breath sounds: Normal breath sounds.   Abdominal:      General: There is no distension.      Palpations: Abdomen is soft.      Tenderness: There is no abdominal tenderness. There is no guarding or rebound.   Musculoskeletal:         General: Normal range of motion.      Cervical back: Neck supple.      Right lower leg: Edema present.      Left lower leg: Edema (left more than right) present.   Skin:     General: Skin is warm and dry.      Capillary Refill: Capillary refill takes less than 2 seconds.   Neurological:      General: No focal deficit present.      Mental Status: He is alert and oriented to person, place, and time.   Psychiatric:         Mood and Affect: Mood normal.         Labs & Results:    Lab Results   Component Value Date    WBC 5.23 04/29/2024    HGB 13.6 04/29/2024    HCT 42.0 04/29/2024    MCV 92 04/29/2024     04/29/2024     Lab Results   Component Value Date    SODIUM 139 05/20/2024    K 4.2 05/20/2024     05/20/2024    CO2 24 05/20/2024    BUN 29 (H) 05/20/2024    CREATININE 1.17 05/20/2024    GLUC 128 04/29/2024    CALCIUM 9.2 05/20/2024     Lab Results   Component Value Date    NTBNP 78 05/11/2021      Lab Results   Component Value Date    CHOLESTEROL 129 07/31/2023    CHOLESTEROL 133 10/27/2022    CHOLESTEROL 119 06/21/2021     Lab Results   Component Value Date    HDL 32 (L) 07/31/2023    HDL 37 (L) 10/27/2022    HDL 31 (L) 06/21/2021     Lab Results   Component Value Date    TRIG 131 07/31/2023    TRIG 71 10/27/2022    TRIG 70 06/21/2021     Lab Results   Component Value Date    NONHDLC 96 10/27/2022    NONHDLC 141 10/04/2019    NONHDLC 147 12/10/2018       EKG personally reviewed by Tri Schroeder MD.     Counseling / Coordination of Care  I have spent a total time  of 55 minutes on 06/24/24 in caring for this patient including Diagnostic results, Prognosis, Risks and benefits of tx options, Instructions for management, Patient and family education, Importance of tx compliance, Risk factor reductions, Impressions, Counseling / Coordination of care, Documenting in the medical record, Reviewing / ordering tests, medicine, procedures  , and Obtaining or reviewing history  .     Thank you for the opportunity to participate in the care of this patient.    CORNELIO MILLER MD  ADVANCED HEART FAILURE AND MECHANICAL CIRCULATORY SUPPORT  [facility]

## 2024-07-01 ENCOUNTER — TELEPHONE (OUTPATIENT)
Dept: OTOLARYNGOLOGY | Facility: CLINIC | Age: 69
End: 2024-07-01

## 2024-07-01 ENCOUNTER — APPOINTMENT (OUTPATIENT)
Dept: LAB | Facility: MEDICAL CENTER | Age: 69
End: 2024-07-01
Payer: MEDICARE

## 2024-07-01 DIAGNOSIS — E11.9 TYPE 2 DIABETES MELLITUS WITHOUT COMPLICATION, WITHOUT LONG-TERM CURRENT USE OF INSULIN (HCC): ICD-10-CM

## 2024-07-01 DIAGNOSIS — I50.30 HEART FAILURE WITH PRESERVED EJECTION FRACTION, UNSPECIFIED HF CHRONICITY (HCC): ICD-10-CM

## 2024-07-01 LAB
ALBUMIN SERPL BCG-MCNC: 4.2 G/DL (ref 3.5–5)
ALP SERPL-CCNC: 34 U/L (ref 34–104)
ALT SERPL W P-5'-P-CCNC: 17 U/L (ref 7–52)
ANION GAP SERPL CALCULATED.3IONS-SCNC: 12 MMOL/L (ref 4–13)
AST SERPL W P-5'-P-CCNC: 18 U/L (ref 13–39)
BILIRUB SERPL-MCNC: 0.83 MG/DL (ref 0.2–1)
BNP SERPL-MCNC: 28 PG/ML (ref 0–100)
BUN SERPL-MCNC: 30 MG/DL (ref 5–25)
CALCIUM SERPL-MCNC: 9.2 MG/DL (ref 8.4–10.2)
CHLORIDE SERPL-SCNC: 105 MMOL/L (ref 96–108)
CO2 SERPL-SCNC: 21 MMOL/L (ref 21–32)
CREAT SERPL-MCNC: 1.36 MG/DL (ref 0.6–1.3)
CREAT UR-MCNC: 30.9 MG/DL
EST. AVERAGE GLUCOSE BLD GHB EST-MCNC: 131 MG/DL
GFR SERPL CREATININE-BSD FRML MDRD: 52 ML/MIN/1.73SQ M
GLUCOSE P FAST SERPL-MCNC: 122 MG/DL (ref 65–99)
HBA1C MFR BLD: 6.2 %
MICROALBUMIN UR-MCNC: <7 MG/L
POTASSIUM SERPL-SCNC: 4.2 MMOL/L (ref 3.5–5.3)
PROT SERPL-MCNC: 7.4 G/DL (ref 6.4–8.4)
SODIUM SERPL-SCNC: 138 MMOL/L (ref 135–147)

## 2024-07-01 PROCEDURE — 83880 ASSAY OF NATRIURETIC PEPTIDE: CPT

## 2024-07-01 PROCEDURE — 83036 HEMOGLOBIN GLYCOSYLATED A1C: CPT

## 2024-07-01 PROCEDURE — 82570 ASSAY OF URINE CREATININE: CPT

## 2024-07-01 PROCEDURE — 36415 COLL VENOUS BLD VENIPUNCTURE: CPT

## 2024-07-01 PROCEDURE — 80053 COMPREHEN METABOLIC PANEL: CPT

## 2024-07-01 PROCEDURE — 82043 UR ALBUMIN QUANTITATIVE: CPT

## 2024-07-01 NOTE — TELEPHONE ENCOUNTER
Christy came in asking for his DME order to be faxed to [facility]. I refaxed it today 2x times, also gave him a copy of the DME order from May 1st.

## 2024-07-08 ENCOUNTER — OFFICE VISIT (OUTPATIENT)
Dept: FAMILY MEDICINE CLINIC | Facility: CLINIC | Age: 69
End: 2024-07-08
Payer: MEDICARE

## 2024-07-08 DIAGNOSIS — E66.9 TYPE 2 DIABETES MELLITUS WITH OBESITY  (HCC): ICD-10-CM

## 2024-07-08 DIAGNOSIS — I50.32 CHRONIC DIASTOLIC HEART FAILURE (HCC): Primary | ICD-10-CM

## 2024-07-08 DIAGNOSIS — E11.69 TYPE 2 DIABETES MELLITUS WITH OBESITY  (HCC): ICD-10-CM

## 2024-07-08 DIAGNOSIS — E66.01 MORBID OBESITY (HCC): ICD-10-CM

## 2024-07-08 DIAGNOSIS — I10 BENIGN ESSENTIAL HTN: ICD-10-CM

## 2024-07-08 PROCEDURE — 99214 OFFICE O/P EST MOD 30 MIN: CPT | Performed by: INTERNAL MEDICINE

## 2024-07-08 PROCEDURE — G2211 COMPLEX E/M VISIT ADD ON: HCPCS | Performed by: INTERNAL MEDICINE

## 2024-07-08 NOTE — PROGRESS NOTES
Ambulatory Visit  Name: Christy Haro      : 1955      MRN: [id number]  Encounter Provider: Alma Delia Fountain DO  Encounter Date: 2024   Encounter department: [facility]    Assessment & Plan   1. Chronic diastolic heart failure (HCC)  Lo sodium diet Pt has cardiology appt upcoming Daily weights and improve diet choices to facilitate weight loss   2. Benign essential HTN  Stay hydrated (water) Continue current rx Lo sodium diet   3. Type 2 diabetes mellitus with obesity  (HCC)  A1c up slightly He is now on jardiance instead of metformin and states he can improved diet to improve further   Recheck A1c in 3 months to see if alternate rx indicated Pt mentioned ozempic but since just started jardiance will recheck A1c 3 months and he will improve his diet   4. Morbid obesity (HCC)  Several small meals /day, limit sodium and increase water       Depression Screening and Follow-up Plan: Patient was screened for depression during today's encounter. They screened negative with a PHQ-2 score of 0.    Rto 3months  History of Present Illness     HPI  Pt doing ok He is on jardiance now and not metformin BS ok per pt He admits diet not optimal and he needs to improve that to help with his weight His weights at home have been stable NO chest pain or sob BP trend stable for pt as well No falls No chest pain Sleep ok but not on regular schedule No recent illness He stays indoors due to the heat Lower leg edema has been fairly stable despite humidity   Review of Systems   Constitutional:  Negative for chills and fever.   HENT: Negative.     Eyes:  Negative for visual disturbance.   Respiratory:  Negative for cough and shortness of breath.    Cardiovascular:  Negative for chest pain, palpitations and leg swelling.   Gastrointestinal:  Negative for abdominal distention and abdominal pain.   Genitourinary: Negative.    Musculoskeletal: Negative.    Skin: Negative.    Neurological:  Negative for dizziness,  light-headedness and headaches.   Psychiatric/Behavioral:  Negative for sleep disturbance. The patient is not nervous/anxious.      Past Medical History:   Diagnosis Date    Arthritis     last assessed 7/1/15    Bronchitis 02/02/2023    Chronic diastolic (congestive) heart failure (HCC)     Diabetes mellitus (HCC)     type 2-last assessed 1/28/15    Diabetes mellitus (HCC) 03/16/2018    Dyslipidemia     GERD (gastroesophageal reflux disease)     Hypertension     Hypertensive urgency 03/16/2023    Irregular heart beat     last assessed 7/1/15    Obstructive sleep apnea     Palpitations     last assessed 9/7/17    Sexual dysfunction     last assessed 7/1/15    Thyroid disease     last assessed 7/1/15     .psh  Social History     Socioeconomic History    Marital status: Single     Spouse name: Not on file    Number of children: Not on file    Years of education: Not on file    Highest education level: Not on file   Occupational History    Not on file   Tobacco Use    Smoking status: Former     Current packs/day: 1.00     Types: Cigarettes     Passive exposure: Never    Smokeless tobacco: Never    Tobacco comments:     former smoker-quit 27 yrs ago 1pppd x 30 yrs as per [employer]   Vaping Use    Vaping status: Never Used   Substance and Sexual Activity    Alcohol use: Never    Drug use: Never    Sexual activity: Not on file   Other Topics Concern    Not on file   Social History Narrative    Not on file     Social Determinants of Health     Financial Resource Strain: Low Risk  (1/8/2024)    Overall Financial Resource Strain (CARDIA)     Difficulty of Paying Living Expenses: Not hard at all   Food Insecurity: Not on file   Transportation Needs: No Transportation Needs (1/8/2024)    PRAPARE - Transportation     Lack of Transportation (Medical): No     Lack of Transportation (Non-Medical): No   Physical Activity: Not on file   Stress: Not on file   Social Connections: Not on file   Intimate Partner Violence: Not on file  "  Housing Stability: Not on file     Allergies   Allergen Reactions    Acetaminophen Other (See Comments)     Other reaction(s): Unknown Reaction  \"I had to go to the hospital and get shots after taking it 20yrs ago\"       Objective     /72   Pulse 64   Temp 97.6 °F (36.4 °C) (Temporal)   Resp 18   Ht 5' 10\" (1.778 m)   Wt (!) 137 kg (303 lb)   SpO2 95%   BMI 43.48 kg/m²     Physical Exam  Vitals and nursing note reviewed.   Constitutional:       General: He is not in acute distress.     Appearance: Normal appearance. He is not ill-appearing, toxic-appearing or diaphoretic.   HENT:      Head: Normocephalic and atraumatic.      Right Ear: External ear normal.      Left Ear: External ear normal.      Nose: Nose normal.      Mouth/Throat:      Mouth: Mucous membranes are moist.   Eyes:      General: No scleral icterus.     Extraocular Movements: Extraocular movements intact.      Conjunctiva/sclera: Conjunctivae normal.      Pupils: Pupils are equal, round, and reactive to light.   Cardiovascular:      Rate and Rhythm: Normal rate and regular rhythm.      Pulses: Normal pulses.      Heart sounds: Normal heart sounds. No murmur heard.  Pulmonary:      Effort: Pulmonary effort is normal. No respiratory distress.      Breath sounds: Normal breath sounds. No wheezing.   Abdominal:      General: Bowel sounds are normal. There is no distension.      Palpations: Abdomen is soft.      Tenderness: There is no abdominal tenderness.   Musculoskeletal:      Cervical back: Normal range of motion and neck supple.      Right lower leg: No edema.      Left lower leg: No edema.   Lymphadenopathy:      Cervical: No cervical adenopathy.   Skin:     General: Skin is warm and dry.   Neurological:      General: No focal deficit present.      Mental Status: He is alert and oriented to person, place, and time. Mental status is at baseline.   Psychiatric:         Mood and Affect: Mood normal.         Behavior: Behavior normal.    "      Thought Content: Thought content normal.         Judgment: Judgment normal.       Administrative Statements

## 2024-07-09 VITALS
TEMPERATURE: 97.6 F | DIASTOLIC BLOOD PRESSURE: 72 MMHG | BODY MASS INDEX: 43.38 KG/M2 | SYSTOLIC BLOOD PRESSURE: 130 MMHG | WEIGHT: 303 LBS | HEART RATE: 64 BPM | OXYGEN SATURATION: 95 % | RESPIRATION RATE: 18 BRPM | HEIGHT: 70 IN

## 2024-07-09 PROBLEM — J40 BRONCHITIS: Status: RESOLVED | Noted: 2023-02-02 | Resolved: 2024-07-09

## 2024-07-29 ENCOUNTER — REMOTE DEVICE CLINIC VISIT (OUTPATIENT)
Dept: CARDIOLOGY CLINIC | Facility: CLINIC | Age: 69
End: 2024-07-29
Payer: MEDICARE

## 2024-07-29 DIAGNOSIS — I48.0 PAROXYSMAL ATRIAL FIBRILLATION (HCC): Primary | ICD-10-CM

## 2024-07-29 PROCEDURE — 93298 REM INTERROG DEV EVAL SCRMS: CPT | Performed by: INTERNAL MEDICINE

## 2024-07-30 ENCOUNTER — TELEPHONE (OUTPATIENT)
Age: 69
End: 2024-07-30

## 2024-07-30 DIAGNOSIS — E11.65 TYPE 2 DIABETES MELLITUS WITH HYPERGLYCEMIA, WITHOUT LONG-TERM CURRENT USE OF INSULIN (HCC): Primary | ICD-10-CM

## 2024-07-30 NOTE — TELEPHONE ENCOUNTER
Patient does not believe the Jardiance is working for him, he states that his fasting bs is 130 and after he eats it is 120, he sees his A1c is going up not down, he feels that his sugars are higher, he states it may be good for his heart but he does not feel it is helping his sugars, please advise

## 2024-07-30 NOTE — TELEPHONE ENCOUNTER
Patient returned missed call. Warm transfer to [facility] in Clinical to discuss medications.

## 2024-07-30 NOTE — TELEPHONE ENCOUNTER
Would patient consider speaking with endocrinology (Dr Francisco) as perhaps there are other options Cardiology would prefer he mot take the metformin

## 2024-08-01 ENCOUNTER — NURSE TRIAGE (OUTPATIENT)
Age: 69
End: 2024-08-01

## 2024-08-01 NOTE — TELEPHONE ENCOUNTER
Pt called back wanting to let Dr. Schmitt know that he is going to see an endocrinologist on 9/10 Dr Hidalgo.     Pt states he is just wondering if another diabetic medication can be added to help with his blood sugars or if he can also be on metformin with the jardiance.    Please advise

## 2024-08-01 NOTE — TELEPHONE ENCOUNTER
"Reason for Disposition  • Caller has NON-URGENT medicine question about med that PCP or specialist prescribed and triager unable to answer question    Answer Assessment - Initial Assessment Questions  1. NAME of MEDICATION: \"What medicine are you calling about?\"      Jardiance and Metformin  2. QUESTION: \"What is your question?\" (e.g., medication refill, side effect)      Can he take low dose metformin?  3. PRESCRIBING HCP: \"Who prescribed it?\" Reason: if prescribed by specialist, call should be referred to that group.      Dr. Schmitt  4. SYMPTOMS: \"Do you have any symptoms?\"      Higher blood sugars  5. SEVERITY: If symptoms are present, ask \"Are they mild, moderate or severe?\"      varies    Protocols used: Medication Question Call-ADULT-[place]    " CTS aware

## 2024-08-01 NOTE — TELEPHONE ENCOUNTER
Received call from pt.  He calls with concerns of his blood sugars being higher off of Metformin.  Pt states he was previously taking 500 mg daily, but had to discontinue it due to being prescribed Jardiance by Dr. Schmitt. Pt states he spoke with his PCP who recommended reaching out to Dr. Schmitt to see if pt could go back on Metformin, even if only a low dose.    Please advise.

## 2024-08-02 DIAGNOSIS — I50.30 HEART FAILURE WITH PRESERVED EJECTION FRACTION, UNSPECIFIED HF CHRONICITY (HCC): ICD-10-CM

## 2024-08-02 RX ORDER — FUROSEMIDE 40 MG/1
60 TABLET ORAL DAILY
Qty: 135 TABLET | Refills: 3 | Status: SHIPPED | OUTPATIENT
Start: 2024-08-02 | End: 2025-07-28

## 2024-08-03 DIAGNOSIS — E78.5 DYSLIPIDEMIA: ICD-10-CM

## 2024-08-04 RX ORDER — ATORVASTATIN CALCIUM 10 MG/1
10 TABLET, FILM COATED ORAL DAILY
Qty: 30 TABLET | Refills: 0 | Status: SHIPPED | OUTPATIENT
Start: 2024-08-04

## 2024-08-05 DIAGNOSIS — E11.65 TYPE 2 DIABETES MELLITUS WITH HYPERGLYCEMIA, WITHOUT LONG-TERM CURRENT USE OF INSULIN (HCC): Primary | ICD-10-CM

## 2024-08-05 NOTE — TELEPHONE ENCOUNTER
Pt not comfortable making this decision. He would prefer that dr. Fountain make the decision for any medication changes.

## 2024-08-05 NOTE — TELEPHONE ENCOUNTER
Patient states he is worried about his blood sugars. He states they are uncontrolled still. He spoke with his Cardiologist and was informed that he is on the lowest dose for his heart. His appointment with Endo isn't until 9/10. He is wondering what can be done in the interim to help stabilize his blood sugars Patient request call back to advise.

## 2024-08-06 ENCOUNTER — TELEPHONE (OUTPATIENT)
Age: 69
End: 2024-08-06

## 2024-08-06 DIAGNOSIS — E11.65 TYPE 2 DIABETES MELLITUS WITH HYPERGLYCEMIA, WITHOUT LONG-TERM CURRENT USE OF INSULIN (HCC): Primary | ICD-10-CM

## 2024-08-06 NOTE — TELEPHONE ENCOUNTER
Medication: Empagliflozin (JARDIANCE) 25 MG TABS tablet    Dose/Frequency: Take 1 tablet (10 mg total) by mouth daily     Quantity: 30 tablet     Pharmacy: [id number] - [place] [address]    Office:   [x] PCP/Provider - Alma Delia Fountain, DO   [] Speciality/Provider -     Does the patient have enough for 3 days?   [x] Yes   [] No - Send as HP to POD        Patient says his script has now changed to 25 MG. Patient says a new script was sent to [facility]? He needs this script sent to [facility]. Please advise.

## 2024-08-07 ENCOUNTER — HOSPITAL ENCOUNTER (EMERGENCY)
Facility: HOSPITAL | Age: 69
Discharge: HOME | End: 2024-08-07
Attending: EMERGENCY MEDICINE
Payer: MEDICARE

## 2024-08-07 ENCOUNTER — APPOINTMENT (EMERGENCY)
Dept: RADIOLOGY | Facility: HOSPITAL | Age: 69
End: 2024-08-07
Payer: MEDICARE

## 2024-08-07 VITALS
TEMPERATURE: 98 F | OXYGEN SATURATION: 95 % | RESPIRATION RATE: 20 BRPM | SYSTOLIC BLOOD PRESSURE: 124 MMHG | DIASTOLIC BLOOD PRESSURE: 58 MMHG | HEART RATE: 67 BPM

## 2024-08-07 DIAGNOSIS — M79.602 LEFT ARM PAIN: Primary | ICD-10-CM

## 2024-08-07 DIAGNOSIS — R20.2 PARESTHESIAS: ICD-10-CM

## 2024-08-07 LAB
2HR DELTA HS TROPONIN: 0 NG/L
ALBUMIN SERPL BCG-MCNC: 4.4 G/DL (ref 3.5–5)
ALP SERPL-CCNC: 30 U/L (ref 34–104)
ALT SERPL W P-5'-P-CCNC: 19 U/L (ref 7–52)
ANION GAP SERPL CALCULATED.3IONS-SCNC: 10 MMOL/L (ref 4–13)
AST SERPL W P-5'-P-CCNC: 18 U/L (ref 13–39)
BASOPHILS # BLD AUTO: 0.04 THOUSANDS/ÂΜL (ref 0–0.1)
BASOPHILS NFR BLD AUTO: 1 % (ref 0–1)
BILIRUB SERPL-MCNC: 0.93 MG/DL (ref 0.2–1)
BNP SERPL-MCNC: 28 PG/ML (ref 0–100)
BUN SERPL-MCNC: 35 MG/DL (ref 5–25)
CALCIUM SERPL-MCNC: 9.8 MG/DL (ref 8.4–10.2)
CARDIAC TROPONIN I PNL SERPL HS: 5 NG/L
CARDIAC TROPONIN I PNL SERPL HS: 5 NG/L
CHLORIDE SERPL-SCNC: 102 MMOL/L (ref 96–108)
CO2 SERPL-SCNC: 26 MMOL/L (ref 21–32)
CREAT SERPL-MCNC: 1.34 MG/DL (ref 0.6–1.3)
EOSINOPHIL # BLD AUTO: 0.01 THOUSAND/ÂΜL (ref 0–0.61)
EOSINOPHIL NFR BLD AUTO: 0 % (ref 0–6)
ERYTHROCYTE [DISTWIDTH] IN BLOOD BY AUTOMATED COUNT: 13.4 % (ref 11.6–15.1)
GFR SERPL CREATININE-BSD FRML MDRD: 53 ML/MIN/1.73SQ M
GLUCOSE SERPL-MCNC: 143 MG/DL (ref 65–140)
HCT VFR BLD AUTO: 43.9 % (ref 36.5–49.3)
HGB BLD-MCNC: 14.2 G/DL (ref 12–17)
IMM GRANULOCYTES # BLD AUTO: 0.01 THOUSAND/UL (ref 0–0.2)
IMM GRANULOCYTES NFR BLD AUTO: 0 % (ref 0–2)
LIPASE SERPL-CCNC: 38 U/L (ref 11–82)
LYMPHOCYTES # BLD AUTO: 1.69 THOUSANDS/ÂΜL (ref 0.6–4.47)
LYMPHOCYTES NFR BLD AUTO: 28 % (ref 14–44)
MCH RBC QN AUTO: 29.2 PG (ref 26.8–34.3)
MCHC RBC AUTO-ENTMCNC: 32.3 G/DL (ref 31.4–37.4)
MCV RBC AUTO: 90 FL (ref 82–98)
MONOCYTES # BLD AUTO: 0.65 THOUSAND/ÂΜL (ref 0.17–1.22)
MONOCYTES NFR BLD AUTO: 11 % (ref 4–12)
NEUTROPHILS # BLD AUTO: 3.66 THOUSANDS/ÂΜL (ref 1.85–7.62)
NEUTS SEG NFR BLD AUTO: 60 % (ref 43–75)
NRBC BLD AUTO-RTO: 0 /100 WBCS
PLATELET # BLD AUTO: 167 THOUSANDS/UL (ref 149–390)
PMV BLD AUTO: 10.9 FL (ref 8.9–12.7)
POTASSIUM SERPL-SCNC: 3.8 MMOL/L (ref 3.5–5.3)
PROT SERPL-MCNC: 7.6 G/DL (ref 6.4–8.4)
RBC # BLD AUTO: 4.87 MILLION/UL (ref 3.88–5.62)
SODIUM SERPL-SCNC: 138 MMOL/L (ref 135–147)
WBC # BLD AUTO: 6.06 THOUSAND/UL (ref 4.31–10.16)

## 2024-08-07 PROCEDURE — 71045 X-RAY EXAM CHEST 1 VIEW: CPT

## 2024-08-07 PROCEDURE — 99284 EMERGENCY DEPT VISIT MOD MDM: CPT

## 2024-08-07 PROCEDURE — 83690 ASSAY OF LIPASE: CPT | Performed by: EMERGENCY MEDICINE

## 2024-08-07 PROCEDURE — 85025 COMPLETE CBC W/AUTO DIFF WBC: CPT | Performed by: EMERGENCY MEDICINE

## 2024-08-07 PROCEDURE — 93005 ELECTROCARDIOGRAM TRACING: CPT

## 2024-08-07 PROCEDURE — 84484 ASSAY OF TROPONIN QUANT: CPT | Performed by: EMERGENCY MEDICINE

## 2024-08-07 PROCEDURE — 83880 ASSAY OF NATRIURETIC PEPTIDE: CPT | Performed by: EMERGENCY MEDICINE

## 2024-08-07 PROCEDURE — 80053 COMPREHEN METABOLIC PANEL: CPT | Performed by: EMERGENCY MEDICINE

## 2024-08-07 PROCEDURE — 36415 COLL VENOUS BLD VENIPUNCTURE: CPT | Performed by: EMERGENCY MEDICINE

## 2024-08-07 PROCEDURE — 99285 EMERGENCY DEPT VISIT HI MDM: CPT | Performed by: EMERGENCY MEDICINE

## 2024-08-07 RX ORDER — SODIUM CHLORIDE 9 MG/ML
3 INJECTION INTRAVENOUS
Status: DISCONTINUED | OUTPATIENT
Start: 2024-08-07 | End: 2024-08-07 | Stop reason: HOSPADM

## 2024-08-07 NOTE — ED PROVIDER NOTES
History  Chief Complaint   Patient presents with    Arm Pain     Per pt, 30 minutes ago began w/ upper left arm pain and left sided jaw pain. Hx of heart failure.     Patient is a 68-year-old male who states he had some left upper arm pain over the last several days but states today he noted when he was having the pain in his left arm he had some numbness that radiated up his left shoulder and along the left side of his neck.  Denies any neck pain or headache.  Denies any chest pain or shortness of breath during these events.  Symptoms are still slightly present although they have improved.  He was driving during this event and does not recall any periods of recent exertion or other overusage of his arms or legs.  No recent change in medications.  Presents with his spouse.  Previous records reviewed.      Arm Pain  Associated symptoms: no abdominal pain, no chest pain, no cough, no diarrhea, no ear pain, no fever, no headaches, no nausea, no rash, no rhinorrhea, no shortness of breath, no sore throat, no vomiting and no wheezing        Prior to Admission Medications   Prescriptions Last Dose Informant Patient Reported? Taking?   Empagliflozin 25 MG TABS   No No   Sig: Take 1 tablet (25 mg total) by mouth daily   Lancets (onetouch ultrasoft) lancets  Self No No   Sig: Check glucose daily   Red Yeast Rice Extract (RED YEAST RICE PO)  Self Yes No   Sig: Take by mouth   Vitamin D, Cholecalciferol, 50 MCG (2000 UT) CAPS  Self No No   Sig: Take 50 mcg by mouth daily   amLODIPine (NORVASC) 10 mg tablet   No No   Sig: Take 1 tablet (10 mg total) by mouth daily   aspirin 81 mg chewable tablet  Self Yes No   Sig: Chew 81 mg daily   atorvastatin (LIPITOR) 10 mg tablet   No No   Sig: Take 1 tablet by mouth once daily   enalapril (VASOTEC) 10 mg tablet   No No   Sig: Take 1 tablet (10 mg total) by mouth 4 (four) times a day 2 tabs in the am, 1 tab in the afternoon, 1 tab @ hs.   flecainide (TAMBOCOR) 150 MG tablet  Self No No    Sig: Take 1 tablet (150 mg total) by mouth see administration instructions For palpitations lasting longer than 5 minutes, take one 150 mg tablet.  If palpitations last longer than 30 minutes, take another 150 mg tablet.  If palpitations persist, please go to the nearest emergency department.    Do not exceed 300 mg in one day.   Patient not taking: Reported on 7/8/2024   furosemide (LASIX) 40 mg tablet   No No   Sig: Take 1.5 tablets (60 mg total) by mouth daily   glucose blood (OneTouch Verio) test strip  Self No No   Sig: Use 1 each daily   hydrALAZINE (APRESOLINE) 25 mg tablet   No No   Sig: Take 1 tablet (25 mg total) by mouth 3 (three) times a day   indomethacin (INDOCIN) 50 mg capsule   No No   Sig: Take 1 capsule (50 mg total) by mouth daily as needed for mild pain PRN with the gout   ketoconazole (NIZORAL) 2 % shampoo  Self No No   Sig: Apply 1 Application topically 2 (two) times a week   metoprolol succinate (TOPROL-XL) 25 mg 24 hr tablet   No No   Sig: Take 1 tablet (25 mg total) by mouth daily   metoprolol succinate (TOPROL-XL) 50 mg 24 hr tablet   No No   Sig: Take 1 tablet (50 mg total) by mouth daily   multivitamin (THERAGRAN) TABS  Self Yes No   Sig: Take 1 tablet by mouth daily   omeprazole (PriLOSEC) 40 MG capsule   No No   Sig: Take 1 capsule (40 mg total) by mouth daily   potassium chloride (Klor-Con M20) 20 mEq tablet   No No   Sig: Take 1 tablet (20 mEq total) by mouth daily   triamcinolone (KENALOG) 0.1 % lotion  Self No No   Sig: Apply topically 2 (two) times a day   triamcinolone (KENALOG) 0.5 % cream  Self Yes No      Facility-Administered Medications: None       Past Medical History:   Diagnosis Date    Arthritis     last assessed 7/1/15    Bronchitis 02/02/2023    Chronic diastolic (congestive) heart failure (HCC)     Diabetes mellitus (HCC)     type 2-last assessed 1/28/15    Diabetes mellitus (HCC) 03/16/2018    Dyslipidemia     GERD (gastroesophageal reflux disease)     Hypertension      Hypertensive urgency 03/16/2023    Irregular heart beat     last assessed 7/1/15    Obstructive sleep apnea     Palpitations     last assessed 9/7/17    Sexual dysfunction     last assessed 7/1/15    Thyroid disease     last assessed 7/1/15       Past Surgical History:   Procedure Laterality Date    COLONOSCOPY      TONSILLECTOMY         Family History   Problem Relation Age of Onset    Hypertension Mother         essential    Stroke Mother     Hypertension Father         essential    Heart defect Father         cardiac disorder    Stroke Brother     Hypertension Brother      I have reviewed and agree with the history as documented.    E-Cigarette/Vaping    E-Cigarette Use Never User      E-Cigarette/Vaping Substances    Nicotine No     THC No     CBD No     Flavoring No     Other No     Unknown No      Social History     Tobacco Use    Smoking status: Former     Current packs/day: 1.00     Types: Cigarettes     Passive exposure: Never    Smokeless tobacco: Never    Tobacco comments:     former smoker-quit 27 yrs ago 1pppd x 30 yrs as per [employer]   Vaping Use    Vaping status: Never Used   Substance Use Topics    Alcohol use: Never    Drug use: Never       Review of Systems   Constitutional:  Negative for activity change, appetite change, chills and fever.   HENT:  Negative for ear pain, hearing loss, rhinorrhea, sneezing, sore throat and trouble swallowing.    Eyes:  Negative for pain and visual disturbance.   Respiratory:  Negative for cough, choking, chest tightness, shortness of breath, wheezing and stridor.    Cardiovascular:  Negative for chest pain, palpitations and leg swelling.   Gastrointestinal:  Negative for abdominal pain, constipation, diarrhea, nausea and vomiting.   Genitourinary:  Negative for difficulty urinating, dysuria, frequency, hematuria and testicular pain.   Musculoskeletal:  Negative for arthralgias, back pain, gait problem and neck pain.   Skin:  Negative for color change and rash.    Allergic/Immunologic: Negative for immunocompromised state.   Neurological:  Negative for dizziness, seizures, syncope, speech difficulty, weakness, light-headedness, numbness and headaches.   All other systems reviewed and are negative.      Physical Exam  Physical Exam  Vitals and nursing note reviewed.   Constitutional:       General: He is not in acute distress.     Appearance: Normal appearance. He is well-developed and normal weight. He is not ill-appearing, toxic-appearing or diaphoretic.   HENT:      Head: Normocephalic and atraumatic.      Nose: Nose normal.      Mouth/Throat:      Mouth: Mucous membranes are moist.      Pharynx: Oropharynx is clear.   Eyes:      General: No scleral icterus.     Extraocular Movements: Extraocular movements intact.      Conjunctiva/sclera: Conjunctivae normal.   Cardiovascular:      Rate and Rhythm: Normal rate and regular rhythm.      Pulses: Normal pulses.      Heart sounds: Normal heart sounds. No murmur heard.  Pulmonary:      Effort: Pulmonary effort is normal. No respiratory distress.      Breath sounds: Normal breath sounds. No wheezing or rales.   Chest:      Chest wall: No tenderness.   Abdominal:      General: Bowel sounds are normal. There is no distension.      Palpations: Abdomen is soft. There is no mass.      Tenderness: There is no abdominal tenderness. There is no right CVA tenderness, left CVA tenderness, guarding or rebound.   Musculoskeletal:         General: No tenderness, deformity or signs of injury. Normal range of motion.      Cervical back: Normal range of motion and neck supple. No rigidity or tenderness.      Right lower leg: Edema present.      Left lower leg: Edema present.   Lymphadenopathy:      Cervical: No cervical adenopathy.   Skin:     General: Skin is warm and dry.      Capillary Refill: Capillary refill takes less than 2 seconds.      Coloration: Skin is not jaundiced or pale.      Findings: No bruising, erythema, lesion or rash.    Neurological:      General: No focal deficit present.      Mental Status: He is alert and oriented to person, place, and time. Mental status is at baseline.      Motor: No abnormal muscle tone.   Psychiatric:         Mood and Affect: Mood normal.         Behavior: Behavior normal.         Vital Signs  ED Triage Vitals [08/07/24 1723]   Temperature Pulse Respirations Blood Pressure SpO2   98 °F (36.7 °C) 70 20 150/93 95 %      Temp Source Heart Rate Source Patient Position - Orthostatic VS BP Location FiO2 (%)   Temporal Monitor -- Left arm --      Pain Score       5           Vitals:    08/07/24 1723 08/07/24 1942 08/07/24 2002   BP: 150/93 133/61 124/58   Pulse: 70 60 67         Visual Acuity      ED Medications  Medications - No data to display      Diagnostic Studies  Results Reviewed       Procedure Component Value Units Date/Time    HS Troponin I 2hr [[id number]]  (Normal) Collected: 08/07/24 1941    Lab Status: Final result Specimen: Blood from Arm, Right Updated: 08/07/24 2009     hs TnI 2hr 5 ng/L      Delta 2hr hsTnI 0 ng/L     HS Troponin 0hr (reflex protocol) [[id number]]  (Normal) Collected: 08/07/24 1748    Lab Status: Final result Specimen: Blood from Arm, Right Updated: 08/07/24 1817     hs TnI 0hr 5 ng/L     B-Type Natriuretic Peptide(BNP) [[id number]]  (Normal) Collected: 08/07/24 1748    Lab Status: Final result Specimen: Blood from Arm, Right Updated: 08/07/24 1815     BNP 28 pg/mL     Comprehensive metabolic panel [[id number]]  (Abnormal) Collected: 08/07/24 1748    Lab Status: Final result Specimen: Blood from Arm, Right Updated: 08/07/24 1810     Sodium 138 mmol/L      Potassium 3.8 mmol/L      Chloride 102 mmol/L      CO2 26 mmol/L      ANION GAP 10 mmol/L      BUN 35 mg/dL      Creatinine 1.34 mg/dL      Glucose 143 mg/dL      Calcium 9.8 mg/dL      AST 18 U/L      ALT 19 U/L      Alkaline Phosphatase 30 U/L      Total Protein 7.6 g/dL      Albumin 4.4 g/dL      Total Bilirubin 0.93 mg/dL       eGFR 53 ml/min/1.73sq m     Narrative:      [employer] guidelines for Chronic Kidney Disease (CKD):     Stage 1 with normal or high GFR (GFR > 90 mL/min/1.73 square meters)    Stage 2 Mild CKD (GFR = 60-89 mL/min/1.73 square meters)    Stage 3A Moderate CKD (GFR = 45-59 mL/min/1.73 square meters)    Stage 3B Moderate CKD (GFR = 30-44 mL/min/1.73 square meters)    Stage 4 Severe CKD (GFR = 15-29 mL/min/1.73 square meters)    Stage 5 End Stage CKD (GFR <15 mL/min/1.73 square meters)  Note: GFR calculation is accurate only with a steady state creatinine    Lipase [[id number]]  (Normal) Collected: 08/07/24 1748    Lab Status: Final result Specimen: Blood from Arm, Right Updated: 08/07/24 1810     Lipase 38 u/L     CBC and differential [[id number]] Collected: 08/07/24 1748    Lab Status: Final result Specimen: Blood from Arm, Right Updated: 08/07/24 1756     WBC 6.06 Thousand/uL      RBC 4.87 Million/uL      Hemoglobin 14.2 g/dL      Hematocrit 43.9 %      MCV 90 fL      MCH 29.2 pg      MCHC 32.3 g/dL      RDW 13.4 %      MPV 10.9 fL      Platelets 167 Thousands/uL      nRBC 0 /100 WBCs      Segmented % 60 %      Immature Grans % 0 %      Lymphocytes % 28 %      Monocytes % 11 %      Eosinophils Relative 0 %      Basophils Relative 1 %      Absolute Neutrophils 3.66 Thousands/µL      Absolute Immature Grans 0.01 Thousand/uL      Absolute Lymphocytes 1.69 Thousands/µL      Absolute Monocytes 0.65 Thousand/µL      Eosinophils Absolute 0.01 Thousand/µL      Basophils Absolute 0.04 Thousands/µL                    X-ray chest 1 view portable   Final Result by Adrianna Hernandez MD (08/08 0651)      No acute cardiopulmonary disease.            Workstation performed: [id number]                    Procedures  ECG 12 Lead Documentation Only    Date/Time: 8/7/2024 6:03 PM    Performed by: Mike Cohen DO  Authorized by: Mike Cohen DO    Indications / Diagnosis:  Arm pain  ECG  reviewed by me, the ED Provider: yes    Patient location:  ED  Previous ECG:     Comparison to cardiac monitor: Yes    Rate:     ECG rate:  68    ECG rate assessment: normal    Rhythm:     Rhythm: sinus rhythm    Ectopy:     Ectopy: none    QRS:     QRS axis:  Left    QRS intervals:  Normal  Conduction:     Conduction: abnormal      Abnormal conduction: LAFB    ST segments:     ST segments:  Normal  T waves:     T waves: non-specific    ECG 12 Lead Documentation Only    Date/Time: 8/7/2024 7:49 PM    Performed by: Mike Cohen DO  Authorized by: Mike Cohen DO    Indications / Diagnosis:  Arm pain  ECG reviewed by me, the ED Provider: yes    Patient location:  ED  Previous ECG:     Comparison to cardiac monitor: Yes    Rate:     ECG rate:  62    ECG rate assessment: normal    Rhythm:     Rhythm: sinus rhythm    Ectopy:     Ectopy: none    QRS:     QRS axis:  Normal    QRS intervals:  Normal  Conduction:     Conduction: abnormal      Abnormal conduction: incomplete RBBB, 1st degree and LAFB    ST segments:     ST segments:  Normal  T waves:     T waves: non-specific             ED Course               HEART Risk Score      Flowsheet Row Most Recent Value   Heart Score Risk Calculator    History 0 Filed at: 08/07/2024 1950   ECG 1 Filed at: 08/07/2024 1950   Age 1 Filed at: 08/07/2024 1950   Risk Factors 1 Filed at: 08/07/2024 1950   Troponin 0 Filed at: 08/07/2024 1950   HEART Score 3 Filed at: 08/07/2024 1950                                        Medical Decision Making  68-year-old male presenting with left arm pain and numbness that radiate to his left shoulder and left side of his neck.    Problems Addressed:  Left arm pain: acute illness or injury  Paresthesias: acute illness or injury    Amount and/or Complexity of Data Reviewed  External Data Reviewed: labs, radiology, ECG and notes.  Labs: ordered. Decision-making details documented in ED Course.  Radiology: ordered and independent  interpretation performed. Decision-making details documented in ED Course.  ECG/medicine tests: ordered and independent interpretation performed. Decision-making details documented in ED Course.    Risk  OTC drugs.  Prescription drug management.  Decision regarding hospitalization.  Risk Details: Troponin negative x 2.  EKG nonischemic x 2.  Ambulatory upon discharge difficulty.  Reviewed follow-up instructions as well as return precautions with patient and spouse.                 Disposition  Final diagnoses:   Left arm pain   Paresthesias     Time reflects when diagnosis was documented in both MDM as applicable and the Disposition within this note       Time User Action Codes Description Comment    8/7/2024  8:34 PM Mike Cohen Add [M79.602] Left arm pain     8/7/2024  8:35 PM Mike Cohen Add [R20.2] Paresthesias           ED Disposition       ED Disposition   Discharge    Condition   Stable    Date/Time   Wed Aug 7, 2024 2034    Comment   Mhamd Elashram discharge to home/self care.                   Follow-up Information       Follow up With Specialties Details Why Contact Info Additional Information    [facility],  Internal Medicine, Hospice Services Schedule an appointment as soon as possible for a visit   [address] [place] [id number]  [phone]       [facility] Cardiology [facility] Cardiology Schedule an appointment as soon as possible for a visit   [address] [place] [id number]  [phone] [facility], [address] [place] [address], [phone]   [phone]            Discharge Medication List as of 8/7/2024  8:38 PM        CONTINUE these medications which have NOT CHANGED    Details   amLODIPine (NORVASC) 10 mg tablet Take 1 tablet (10 mg total) by mouth daily, Starting Mon 5/6/2024, Normal      aspirin 81 mg chewable tablet Chew 81 mg daily, Historical Med      atorvastatin  (LIPITOR) 10 mg tablet Take 1 tablet by mouth once daily, Starting Sun 8/4/2024, Normal      Empagliflozin 25 MG TABS Take 1 tablet (25 mg total) by mouth daily, Starting Tue 8/6/2024, Until Sun 2/2/2025, Normal      enalapril (VASOTEC) 10 mg tablet Take 1 tablet (10 mg total) by mouth 4 (four) times a day 2 tabs in the am, 1 tab in the afternoon, 1 tab @ hs., Starting Mon 5/6/2024, Normal      flecainide (TAMBOCOR) 150 MG tablet Take 1 tablet (150 mg total) by mouth see administration instructions For palpitations lasting longer than 5 minutes, take one 150 mg tablet.  If palpitations last longer than 30 minutes, take another 150 mg tablet.  If palpitations persist, please go to  the nearest emergency department.    Do not exceed 300 mg in one day., Starting Thu 2/8/2024, Normal      furosemide (LASIX) 40 mg tablet Take 1.5 tablets (60 mg total) by mouth daily, Starting Fri 8/2/2024, Until Mon 7/28/2025, Normal      glucose blood (OneTouch Verio) test strip Use 1 each daily, Starting Mon 3/11/2024, Normal      hydrALAZINE (APRESOLINE) 25 mg tablet Take 1 tablet (25 mg total) by mouth 3 (three) times a day, Starting Mon 5/6/2024, Normal      indomethacin (INDOCIN) 50 mg capsule Take 1 capsule (50 mg total) by mouth daily as needed for mild pain PRN with the gout, Starting Thu 2/8/2024, Until Mon 7/8/2024 at 2359, Normal      ketoconazole (NIZORAL) 2 % shampoo Apply 1 Application topically 2 (two) times a week, Starting Thu 2/8/2024, Normal      Lancets (onetouch ultrasoft) lancets Check glucose daily, Normal      !! metoprolol succinate (TOPROL-XL) 25 mg 24 hr tablet Take 1 tablet (25 mg total) by mouth daily, Starting Mon 5/6/2024, Normal      !! metoprolol succinate (TOPROL-XL) 50 mg 24 hr tablet Take 1 tablet (50 mg total) by mouth daily, Starting Mon 5/6/2024, Normal      multivitamin (THERAGRAN) TABS Take 1 tablet by mouth daily, Historical Med      omeprazole (PriLOSEC) 40 MG capsule Take 1 capsule (40 mg  total) by mouth daily, Starting Mon 5/6/2024, Normal      potassium chloride (Klor-Con M20) 20 mEq tablet Take 1 tablet (20 mEq total) by mouth daily, Starting Thu 5/9/2024, Normal      Red Yeast Rice Extract (RED YEAST RICE PO) Take by mouth, Historical Med      triamcinolone (KENALOG) 0.1 % lotion Apply topically 2 (two) times a day, Starting Thu 4/6/2023, Normal      triamcinolone (KENALOG) 0.5 % cream Starting Wed 9/15/2021, Historical Med      Vitamin D, Cholecalciferol, 50 MCG (2000 UT) CAPS Take 50 mcg by mouth daily, Starting Wed 1/4/2023, Normal       !! - Potential duplicate medications found. Please discuss with provider.          No discharge procedures on file.    PDMP Review       None            ED Provider  Electronically Signed by             Mike Cohen DO  08/08/24 5936

## 2024-08-08 LAB
ATRIAL RATE: 58 BPM
ATRIAL RATE: 62 BPM
ATRIAL RATE: 68 BPM
P AXIS: 50 DEGREES
P AXIS: 57 DEGREES
P AXIS: 67 DEGREES
PR INTERVAL: 204 MS
PR INTERVAL: 212 MS
PR INTERVAL: 214 MS
QRS AXIS: -61 DEGREES
QRS AXIS: -65 DEGREES
QRS AXIS: -68 DEGREES
QRSD INTERVAL: 114 MS
QRSD INTERVAL: 118 MS
QRSD INTERVAL: 120 MS
QT INTERVAL: 434 MS
QT INTERVAL: 442 MS
QT INTERVAL: 460 MS
QTC INTERVAL: 448 MS
QTC INTERVAL: 451 MS
QTC INTERVAL: 461 MS
T WAVE AXIS: 64 DEGREES
T WAVE AXIS: 66 DEGREES
T WAVE AXIS: 76 DEGREES
VENTRICULAR RATE: 58 BPM
VENTRICULAR RATE: 62 BPM
VENTRICULAR RATE: 68 BPM

## 2024-08-08 PROCEDURE — 93010 ELECTROCARDIOGRAM REPORT: CPT | Performed by: INTERNAL MEDICINE

## 2024-08-12 ENCOUNTER — OFFICE VISIT (OUTPATIENT)
Dept: CARDIOLOGY CLINIC | Facility: CLINIC | Age: 69
End: 2024-08-12
Payer: MEDICARE

## 2024-08-12 VITALS
BODY MASS INDEX: 43.67 KG/M2 | HEART RATE: 54 BPM | WEIGHT: 305 LBS | HEIGHT: 70 IN | DIASTOLIC BLOOD PRESSURE: 60 MMHG | SYSTOLIC BLOOD PRESSURE: 98 MMHG

## 2024-08-12 DIAGNOSIS — E66.01 MORBID OBESITY (HCC): ICD-10-CM

## 2024-08-12 DIAGNOSIS — M79.604 PAIN IN BOTH LOWER EXTREMITIES: ICD-10-CM

## 2024-08-12 DIAGNOSIS — I10 BENIGN ESSENTIAL HTN: Primary | ICD-10-CM

## 2024-08-12 DIAGNOSIS — N18.9 CHRONIC KIDNEY DISEASE, UNSPECIFIED CKD STAGE: ICD-10-CM

## 2024-08-12 DIAGNOSIS — N18.30 STAGE 3 CHRONIC KIDNEY DISEASE, UNSPECIFIED WHETHER STAGE 3A OR 3B CKD (HCC): ICD-10-CM

## 2024-08-12 DIAGNOSIS — E78.5 DYSLIPIDEMIA: ICD-10-CM

## 2024-08-12 DIAGNOSIS — M79.605 PAIN IN BOTH LOWER EXTREMITIES: ICD-10-CM

## 2024-08-12 DIAGNOSIS — G47.33 OSA (OBSTRUCTIVE SLEEP APNEA): ICD-10-CM

## 2024-08-12 DIAGNOSIS — I50.32 CHRONIC DIASTOLIC HEART FAILURE (HCC): ICD-10-CM

## 2024-08-12 PROCEDURE — 99214 OFFICE O/P EST MOD 30 MIN: CPT | Performed by: INTERNAL MEDICINE

## 2024-08-12 NOTE — PROGRESS NOTES
Patient ID: Mhjennifer Haro is a 68 y.o. male.        Plan:      Benign essential HTN  -Blood pressure is very well-controlled can continue amlodipine 10 mg daily, enalapril 10 mg 4 times daily, hydralazine 25 mg 3 times daily, metoprolol succinate 75 mg daily  -Patient currently on furosemide 60 mg daily with 20 mEq potassium daily.  -Counseled patient on dietary lifestyle modifications    Chronic diastolic heart failure (HCC)  Wt Readings from Last 3 Encounters:   08/12/24 (!) 138 kg (305 lb)   07/08/24 (!) 137 kg (303 lb)   06/24/24 (!) 137 kg (303 lb)       -Patient counseled on need for sodium and fluid restricted diet along with weight reduction goal BMI less than 29  -Given CKD will have patient seen and evaluated by nephrology for assistance with medical therapy and avoidance of worsening renal dysfunction  -Patient currently on furosemide 60 mg daily with 20 mEq potassium daily.        AYLEEN (obstructive sleep apnea)  -Counseled on need for continued compliance with CPAP therapy along with weight reduction    Morbid obesity (HCC)  -Counseled on need for weight reduction will BMI less than 29    Dyslipidemia  -Counseled on dietary lifestyle modifications will continue atorvastatin 10 mg daily.    Pain in both lower extremities  -Will check lower extremity arterial Doppler for evaluation.       Follow up Plan/Other summary comments:  -Patient counseled on dietary and lifestyle modifications including following a low-salt, low-fat, heart healthy diet with sodium restriction to less than 1800 mg of sodium daily and fluid restriction to less than 1800 mL of fluid daily along with need for weight reduction goal BMI less than 29  -Patient will continue amlodipine 10 mg daily, atorvastatin 10 mg daily, Jardiance 25 mg daily management primary care physician along with enalapril 10 mg 4 times daily, furosemide 60 mg daily, hydralazine 25 mg 3 times daily, metoprolol succinate 75 mg daily, potassium 20 mEq daily  -LDL  cholesterol 4/7/2024 was 86  -BNP on 8/7/2024 was 28  -Patient will be seen in 3 months or sooner if necessary and should follow-up with advanced heart failure team as well  -Will repeat CMP and fasting lipid panel prior to next office visit.  -Patient counseled if he were to have any warning or alarm type symptoms he is to seek emergency medical care immediately.        HPI:   Patient is a 68-year-old male with hypertension, hyperlipidemia, obesity, heart failure with preserved ejection fraction who had previously been evaluated by cardiology Dr. Coello and Dr. Ovalle along with Dr. Davenport.  He had been seen and evaluated by electrophysiology for ambulatory event monitor showing intermittent episodes of junctional rhythm and had implantable loop recorder placed in October 2023.  Due to issues with heart failure and request for second opinion he was even seen and evaluated by Dr. Schroeder in June 2024 and presents to the office today for follow-up.  -Currently in the office today patient denies any chest pain, palpitations, lightness or dizziness, loss of consciousness, shortness of breath,, orthopnea or bendopnea.  He does note significant urine output with current diuretic regimen and still has some mild lower extremity edema.  He notes some issues with lower extremity pain with ambulation but is also unsure if this is from joint issues as well.  He notes that there is room for improvement in fluid restrictions and will be trialing this.      Most recent or relevant cardiac/vascular testing:    -Transthoracic echocardiogram 4/27/2023 showing left ventricular systolic function normal estimated LVEF 60% with normal right ventricular function, mildly dilated left atrium with mild mitral regurgitation.    -Device report 7/29/2024 showing no patient or device activated episodes.      Past Surgical History:   Procedure Laterality Date    COLONOSCOPY      TONSILLECTOMY         Review of Systems   Review of Systems  "  Constitutional:  Negative for chills, diaphoresis, fatigue and fever.   HENT:  Negative for trouble swallowing and voice change.    Eyes:  Negative for pain and redness.   Respiratory:  Negative for shortness of breath and wheezing.    Cardiovascular:  Negative for chest pain, palpitations and leg swelling.   Gastrointestinal:  Negative for abdominal pain, constipation, diarrhea, nausea and vomiting.   Genitourinary:  Negative for dysuria.   Musculoskeletal:  Positive for arthralgias. Negative for neck pain and neck stiffness.   Skin:  Negative for rash.   Neurological:  Negative for dizziness, syncope, light-headedness and headaches.   Psychiatric/Behavioral:  Negative for agitation, confusion and hallucinations.    All other systems reviewed and are negative.       Objective:     BP 98/60   Pulse (!) 54   Ht 5' 10\" (1.778 m)   Wt (!) 138 kg (305 lb)   BMI 43.76 kg/m²     PHYSICAL EXAM:  Physical Exam  Vitals reviewed.   Constitutional:       General: He is not in acute distress.     Appearance: He is obese. He is not diaphoretic.   HENT:      Head: Normocephalic and atraumatic.   Eyes:      General:         Right eye: No discharge.         Left eye: No discharge.   Cardiovascular:      Rate and Rhythm: Normal rate and regular rhythm.      Heart sounds:      No friction rub.   Pulmonary:      Effort: Pulmonary effort is normal. No respiratory distress.      Breath sounds: No wheezing.   Chest:      Chest wall: No tenderness.   Abdominal:      General: Bowel sounds are normal.      Palpations: Abdomen is soft.      Tenderness: There is no abdominal tenderness. There is no rebound.   Musculoskeletal:      Right lower leg: Edema (trace) present.      Left lower leg: Edema (trace) present.   Skin:     General: Skin is warm and dry.   Neurological:      Mental Status: He is alert.      Comments: Awake, alert, able to answer questions appropriately, able move extremities bilaterally.   Psychiatric:         Mood and " Affect: Mood normal.         Behavior: Behavior normal.        Meds reviewed.  Current Outpatient Medications on File Prior to Visit   Medication Sig Dispense Refill    amLODIPine (NORVASC) 10 mg tablet Take 1 tablet (10 mg total) by mouth daily 90 tablet 3    aspirin 81 mg chewable tablet Chew 81 mg daily      atorvastatin (LIPITOR) 10 mg tablet Take 1 tablet by mouth once daily 30 tablet 0    Empagliflozin 25 MG TABS Take 1 tablet (25 mg total) by mouth daily 30 tablet 5    enalapril (VASOTEC) 10 mg tablet Take 1 tablet (10 mg total) by mouth 4 (four) times a day 2 tabs in the am, 1 tab in the afternoon, 1 tab @ hs. 360 tablet 3    furosemide (LASIX) 40 mg tablet Take 1.5 tablets (60 mg total) by mouth daily 135 tablet 3    glucose blood (OneTouch Verio) test strip Use 1 each daily 100 each 5    hydrALAZINE (APRESOLINE) 25 mg tablet Take 1 tablet (25 mg total) by mouth 3 (three) times a day 270 tablet 3    ketoconazole (NIZORAL) 2 % shampoo Apply 1 Application topically 2 (two) times a week 120 mL 0    Lancets (onetouch ultrasoft) lancets Check glucose daily 100 each 1    metoprolol succinate (TOPROL-XL) 25 mg 24 hr tablet Take 1 tablet (25 mg total) by mouth daily 90 tablet 3    metoprolol succinate (TOPROL-XL) 50 mg 24 hr tablet Take 1 tablet (50 mg total) by mouth daily 90 tablet 3    multivitamin (THERAGRAN) TABS Take 1 tablet by mouth daily      omeprazole (PriLOSEC) 40 MG capsule Take 1 capsule (40 mg total) by mouth daily 90 capsule 3    potassium chloride (Klor-Con M20) 20 mEq tablet Take 1 tablet (20 mEq total) by mouth daily 90 tablet 1    Red Yeast Rice Extract (RED YEAST RICE PO) Take by mouth      triamcinolone (KENALOG) 0.1 % lotion Apply topically 2 (two) times a day 60 mL 3    triamcinolone (KENALOG) 0.5 % cream       Vitamin D, Cholecalciferol, 50 MCG (2000 UT) CAPS Take 50 mcg by mouth daily 90 capsule 3    flecainide (TAMBOCOR) 150 MG tablet Take 1 tablet (150 mg total) by mouth see  administration instructions For palpitations lasting longer than 5 minutes, take one 150 mg tablet.  If palpitations last longer than 30 minutes, take another 150 mg tablet.  If palpitations persist, please go to the nearest emergency department.    Do not exceed 300 mg in one day. (Patient not taking: Reported on 7/8/2024) 90 tablet 0    indomethacin (INDOCIN) 50 mg capsule Take 1 capsule (50 mg total) by mouth daily as needed for mild pain PRN with the gout 90 capsule 0     No current facility-administered medications on file prior to visit.      Past Medical History:   Diagnosis Date    Arthritis     last assessed 7/1/15    Bronchitis 02/02/2023    Chronic diastolic (congestive) heart failure (HCC)     Diabetes mellitus (HCC)     type 2-last assessed 1/28/15    Diabetes mellitus (HCC) 03/16/2018    Dyslipidemia     GERD (gastroesophageal reflux disease)     Hypertension     Hypertensive urgency 03/16/2023    Irregular heart beat     last assessed 7/1/15    Obstructive sleep apnea     Palpitations     last assessed 9/7/17    Sexual dysfunction     last assessed 7/1/15    Thyroid disease     last assessed 7/1/15       Social History     Tobacco Use   Smoking Status Former    Current packs/day: 1.00    Types: Cigarettes    Passive exposure: Never   Smokeless Tobacco Never   Tobacco Comments    former smoker-quit 27 yrs ago 1pppd x 30 yrs as per [employer]     Family History   Problem Relation Age of Onset    Hypertension Mother         essential    Stroke Mother     Hypertension Father         essential    Heart defect Father         cardiac disorder    Stroke Brother     Hypertension Brother

## 2024-08-12 NOTE — ASSESSMENT & PLAN NOTE
Wt Readings from Last 3 Encounters:   08/12/24 (!) 138 kg (305 lb)   07/08/24 (!) 137 kg (303 lb)   06/24/24 (!) 137 kg (303 lb)       -Patient counseled on need for sodium and fluid restricted diet along with weight reduction goal BMI less than 29  -Given CKD will have patient seen and evaluated by nephrology for assistance with medical therapy and avoidance of worsening renal dysfunction  -Patient currently on furosemide 60 mg daily with 20 mEq potassium daily.

## 2024-08-12 NOTE — ASSESSMENT & PLAN NOTE
-Blood pressure is very well-controlled can continue amlodipine 10 mg daily, enalapril 10 mg 4 times daily, hydralazine 25 mg 3 times daily, metoprolol succinate 75 mg daily  -Patient currently on furosemide 60 mg daily with 20 mEq potassium daily.  -Counseled patient on dietary lifestyle modifications

## 2024-08-26 DIAGNOSIS — E11.65 TYPE 2 DIABETES MELLITUS WITH HYPERGLYCEMIA, WITHOUT LONG-TERM CURRENT USE OF INSULIN (HCC): ICD-10-CM

## 2024-08-26 NOTE — TELEPHONE ENCOUNTER
Reason for call:   [x] Refill   [] Prior Auth  [] Other: Pharmacy requesting 90 day supply for patients plan    Office:   [x] PCP/Provider - [facility] - Alma Delia Fountain DO   [] Specialty/Provider -     Medication: Empagliflozin 25 MG TABS     Dose/Frequency:  Take 1 tablet (25 mg total) by mouth daily,     Quantity: 30 tablet     Pharmacy: [id number]  [place] [address] [phone]

## 2024-09-10 ENCOUNTER — CONSULT (OUTPATIENT)
Dept: ENDOCRINOLOGY | Facility: CLINIC | Age: 69
End: 2024-09-10
Payer: MEDICARE

## 2024-09-10 VITALS
BODY MASS INDEX: 44.72 KG/M2 | HEIGHT: 70 IN | HEART RATE: 55 BPM | SYSTOLIC BLOOD PRESSURE: 110 MMHG | WEIGHT: 312.4 LBS | TEMPERATURE: 97.3 F | DIASTOLIC BLOOD PRESSURE: 62 MMHG

## 2024-09-10 DIAGNOSIS — E11.69 TYPE 2 DIABETES MELLITUS WITH OBESITY  (HCC): ICD-10-CM

## 2024-09-10 DIAGNOSIS — E11.65 TYPE 2 DIABETES MELLITUS WITH HYPERGLYCEMIA, WITHOUT LONG-TERM CURRENT USE OF INSULIN (HCC): Primary | ICD-10-CM

## 2024-09-10 DIAGNOSIS — E66.9 TYPE 2 DIABETES MELLITUS WITH OBESITY  (HCC): ICD-10-CM

## 2024-09-10 DIAGNOSIS — I10 BENIGN ESSENTIAL HTN: ICD-10-CM

## 2024-09-10 DIAGNOSIS — E66.01 CLASS 3 SEVERE OBESITY DUE TO EXCESS CALORIES WITH SERIOUS COMORBIDITY AND BODY MASS INDEX (BMI) OF 40.0 TO 44.9 IN ADULT (HCC): ICD-10-CM

## 2024-09-10 PROCEDURE — 99204 OFFICE O/P NEW MOD 45 MIN: CPT | Performed by: STUDENT IN AN ORGANIZED HEALTH CARE EDUCATION/TRAINING PROGRAM

## 2024-09-10 NOTE — ASSESSMENT & PLAN NOTE
Reasonably well controlled by A1c. On account of ASCVD Hx and CHF and obesity w complications, could benefit from GLP + SGLT2i. Continue jardiance. GLP pro/cons reviewed. Recommend initiate ozempic 0.25 mg weekly x4 weeks, then 0.5 mg weekly thereafter. Will arrange 3-mo f/u with labs

## 2024-09-10 NOTE — ASSESSMENT & PLAN NOTE
Lab Results   Component Value Date    EGFR 53 08/07/2024    EGFR 52 07/01/2024    EGFR 62 05/20/2024    CREATININE 1.34 (H) 08/07/2024    CREATININE 1.36 (H) 07/01/2024    CREATININE 1.17 05/20/2024

## 2024-09-10 NOTE — PROGRESS NOTES
Ambulatory Visit  Name: Christy Haro      : 1955      MRN: [id number]  Encounter Provider: Marshall Francisco DO  Encounter Date: 9/10/2024   Encounter department: [facility]    Assessment & Plan  Type 2 diabetes mellitus with hyperglycemia, without long-term current use of insulin (HCC)    Reasonably well controlled by A1c. On account of ASCVD Hx and CHF and obesity w complications, could benefit from GLP + SGLT2i. Continue jardiance. GLP pro/cons reviewed. Recommend initiate ozempic 0.25 mg weekly x4 weeks, then 0.5 mg weekly thereafter. Will arrange 3-mo f/u with labs    Lab Results   Component Value Date    HGBA1C 6.2 (H) 2024       Orders:    Ambulatory referral to Endocrinology    semaglutide, 0.25 or 0.5 mg/dose, (Ozempic, 0.25 or 0.5 MG/DOSE,) 2 mg/3 mL injection pen; E11.65 inject 0.25 mg weekly for 4-weeks, then 0.5 mg weekly thereafter    Hemoglobin A1C; Future    Basic metabolic panel; Future    Class 3 severe obesity due to excess calories with serious comorbidity and body mass index (BMI) of 40.0 to 44.9 in adult (HCC)    Could benefit from >7.5-10% weight loss         Benign essential HTN  Good control         Type 2 diabetes mellitus with obesity  (HCC)  Reasonably well controlled by A1c. On account of ASCVD Hx and CHF and obesity w complications, could benefit from GLP + SGLT2i. Continue jardiance. GLP pro/cons reviewed. Recommend initiate ozempic 0.25 mg weekly x4 weeks, then 0.5 mg weekly thereafter. Will arrange 3-mo f/u with labs           Stage 3 chronic kidney disease, unspecified whether stage 3a or 3b CKD (HCC)  Lab Results   Component Value Date    EGFR 53 2024    EGFR 52 2024    EGFR 62 2024    CREATININE 1.34 (H) 2024    CREATININE 1.36 (H) 2024    CREATININE 1.17 2024     Continue jardiance       RTC 3-mo    History of Present Illness     Christy Haro is a 68 y.o. male who presents for  evaluation of T2D. T2D longstanding, diagnosed 10y ago. Complications include CKD3b and CHF. He was on metformin, which was recently changed to jardiance 10 mg then 25 mg daily. With therapy, A1c improved, but weight has been ongoing issue and is concern. Has tried to follow dietary recommendations. No hx pancreaitits. No fam hx MTC. At home, performs CBG fasting ~120, there is 2-hour post-prandial <140.      History obtained from : patient and patient's Significant Other  Review of Systems   Constitutional:  Negative for diaphoresis and unexpected weight change.   Gastrointestinal:  Negative for nausea and vomiting.   Endocrine: Negative for polydipsia and polyuria.   All other systems reviewed and are negative.    Medical History Reviewed by provider this encounter:  Tobacco  Allergies  Meds  Problems  Med Hx  Surg Hx  Fam Hx       Current Outpatient Medications on File Prior to Visit   Medication Sig Dispense Refill    amLODIPine (NORVASC) 10 mg tablet Take 1 tablet (10 mg total) by mouth daily 90 tablet 3    aspirin 81 mg chewable tablet Chew 81 mg daily      atorvastatin (LIPITOR) 10 mg tablet Take 1 tablet by mouth once daily 30 tablet 0    Empagliflozin 25 MG TABS Take 1 tablet (25 mg total) by mouth daily 30 tablet 5    furosemide (LASIX) 40 mg tablet Take 1.5 tablets (60 mg total) by mouth daily 135 tablet 3    glucose blood (OneTouch Verio) test strip Use 1 each daily 100 each 5    hydrALAZINE (APRESOLINE) 25 mg tablet Take 1 tablet (25 mg total) by mouth 3 (three) times a day 270 tablet 3    ketoconazole (NIZORAL) 2 % shampoo Apply 1 Application topically 2 (two) times a week 120 mL 0    Lancets (onetouch ultrasoft) lancets Check glucose daily 100 each 1    metoprolol succinate (TOPROL-XL) 25 mg 24 hr tablet Take 1 tablet (25 mg total) by mouth daily 90 tablet 3    metoprolol succinate (TOPROL-XL) 50 mg 24 hr tablet Take 1 tablet (50 mg total) by mouth daily 90 tablet 3    multivitamin (THERAGRAN)  "TABS Take 1 tablet by mouth daily      omeprazole (PriLOSEC) 40 MG capsule Take 1 capsule (40 mg total) by mouth daily 90 capsule 3    potassium chloride (Klor-Con M20) 20 mEq tablet Take 1 tablet (20 mEq total) by mouth daily 90 tablet 1    Red Yeast Rice Extract (RED YEAST RICE PO) Take by mouth      triamcinolone (KENALOG) 0.1 % lotion Apply topically 2 (two) times a day 60 mL 3    triamcinolone (KENALOG) 0.5 % cream       Vitamin D, Cholecalciferol, 50 MCG (2000 UT) CAPS Take 50 mcg by mouth daily 90 capsule 3    enalapril (VASOTEC) 10 mg tablet Take 1 tablet (10 mg total) by mouth 4 (four) times a day 2 tabs in the am, 1 tab in the afternoon, 1 tab @ hs. 360 tablet 3    flecainide (TAMBOCOR) 150 MG tablet Take 1 tablet (150 mg total) by mouth see administration instructions For palpitations lasting longer than 5 minutes, take one 150 mg tablet.  If palpitations last longer than 30 minutes, take another 150 mg tablet.  If palpitations persist, please go to the nearest emergency department.    Do not exceed 300 mg in one day. (Patient not taking: Reported on 7/8/2024) 90 tablet 0    indomethacin (INDOCIN) 50 mg capsule Take 1 capsule (50 mg total) by mouth daily as needed for mild pain PRN with the gout 90 capsule 0     No current facility-administered medications on file prior to visit.      Social History     Tobacco Use    Smoking status: Former     Current packs/day: 1.00     Types: Cigarettes     Passive exposure: Never    Smokeless tobacco: Never    Tobacco comments:     former smoker-quit 27 yrs ago 1pppd x 30 yrs as per [employer]   Vaping Use    Vaping status: Never Used   Substance and Sexual Activity    Alcohol use: Never    Drug use: Never    Sexual activity: Not on file         Objective     /62   Pulse 55   Temp (!) 97.3 °F (36.3 °C)   Ht 5' 10\" (1.778 m)   Wt (!) 142 kg (312 lb 6.4 oz)   BMI 44.82 kg/m²     Physical Exam  Vitals reviewed.   Constitutional:       General: He is not in " acute distress.     Appearance: Normal appearance.   HENT:      Head: Normocephalic and atraumatic.      Nose: Nose normal.   Eyes:      General: No scleral icterus.     Conjunctiva/sclera: Conjunctivae normal.   Pulmonary:      Effort: Pulmonary effort is normal. No respiratory distress.   Musculoskeletal:         General: No deformity.      Cervical back: Normal range of motion.   Skin:     General: Skin is warm and dry.   Neurological:      Mental Status: He is alert.   Psychiatric:         Mood and Affect: Mood normal.         Behavior: Behavior normal.     Lab Results   Component Value Date     12/16/2015    SODIUM 138 08/07/2024    K 3.8 08/07/2024     08/07/2024    CO2 26 08/07/2024    ANIONGAP 5 12/16/2015    AGAP 10 08/07/2024    BUN 35 (H) 08/07/2024    CREATININE 1.34 (H) 08/07/2024    GLUC 143 (H) 08/07/2024    GLUF 122 (H) 07/01/2024    CALCIUM 9.8 08/07/2024    AST 18 08/07/2024    ALT 19 08/07/2024    ALKPHOS 30 (L) 08/07/2024    PROT 7.2 12/16/2015    TP 7.6 08/07/2024    BILITOT 0.84 12/16/2015    TBILI 0.93 08/07/2024    EGFR 53 08/07/2024     Lab Results   Component Value Date    HGBA1C 6.2 (H) 07/01/2024     Lab Results   Component Value Date    CHOLESTEROL 129 07/31/2023    CHOLESTEROL 133 10/27/2022    CHOLESTEROL 119 06/21/2021     Lab Results   Component Value Date    HDL 32 (L) 07/31/2023    HDL 37 (L) 10/27/2022    HDL 31 (L) 06/21/2021     Lab Results   Component Value Date    TRIG 131 07/31/2023    TRIG 71 10/27/2022    TRIG 70 06/21/2021     Lab Results   Component Value Date    NONHDLC 96 10/27/2022    NONHDLC 141 10/04/2019    NONHDLC 147 12/10/2018

## 2024-09-12 ENCOUNTER — TELEPHONE (OUTPATIENT)
Dept: ENDOCRINOLOGY | Facility: CLINIC | Age: 69
End: 2024-09-12

## 2024-09-16 NOTE — TELEPHONE ENCOUNTER
Phone call from patient to verify if Authorization was sent to [facility] for Ozempic. Please contact patient to confirm.

## 2024-09-18 ENCOUNTER — OFFICE VISIT (OUTPATIENT)
Dept: URGENT CARE | Facility: MEDICAL CENTER | Age: 69
End: 2024-09-18
Payer: MEDICARE

## 2024-09-18 VITALS
TEMPERATURE: 98.9 F | SYSTOLIC BLOOD PRESSURE: 146 MMHG | WEIGHT: 306 LBS | HEART RATE: 92 BPM | RESPIRATION RATE: 20 BRPM | OXYGEN SATURATION: 96 % | DIASTOLIC BLOOD PRESSURE: 70 MMHG | HEIGHT: 70 IN | BODY MASS INDEX: 43.81 KG/M2

## 2024-09-18 DIAGNOSIS — N34.1 NONSPECIFIC URETHRITIS: ICD-10-CM

## 2024-09-18 DIAGNOSIS — B02.9 HERPES ZOSTER WITHOUT COMPLICATION: Primary | ICD-10-CM

## 2024-09-18 DIAGNOSIS — R30.0 DYSURIA: ICD-10-CM

## 2024-09-18 LAB
SL AMB  POCT GLUCOSE, UA: 2000
SL AMB LEUKOCYTE ESTERASE,UA: ABNORMAL
SL AMB POCT BILIRUBIN,UA: ABNORMAL
SL AMB POCT BLOOD,UA: ABNORMAL
SL AMB POCT CLARITY,UA: CLEAR
SL AMB POCT COLOR,UA: YELLOW
SL AMB POCT KETONES,UA: ABNORMAL
SL AMB POCT NITRITE,UA: ABNORMAL
SL AMB POCT PH,UA: 7.5
SL AMB POCT SPECIFIC GRAVITY,UA: 1
SL AMB POCT URINE PROTEIN: ABNORMAL
SL AMB POCT UROBILINOGEN: 0.2

## 2024-09-18 PROCEDURE — 87086 URINE CULTURE/COLONY COUNT: CPT | Performed by: STUDENT IN AN ORGANIZED HEALTH CARE EDUCATION/TRAINING PROGRAM

## 2024-09-18 PROCEDURE — 99213 OFFICE O/P EST LOW 20 MIN: CPT | Performed by: STUDENT IN AN ORGANIZED HEALTH CARE EDUCATION/TRAINING PROGRAM

## 2024-09-18 PROCEDURE — G0463 HOSPITAL OUTPT CLINIC VISIT: HCPCS | Performed by: STUDENT IN AN ORGANIZED HEALTH CARE EDUCATION/TRAINING PROGRAM

## 2024-09-18 PROCEDURE — 81002 URINALYSIS NONAUTO W/O SCOPE: CPT | Performed by: STUDENT IN AN ORGANIZED HEALTH CARE EDUCATION/TRAINING PROGRAM

## 2024-09-18 RX ORDER — VALACYCLOVIR HYDROCHLORIDE 1 G/1
1000 TABLET, FILM COATED ORAL 3 TIMES DAILY
Qty: 21 TABLET | Refills: 0 | Status: SHIPPED | OUTPATIENT
Start: 2024-09-18 | End: 2024-09-25

## 2024-09-18 NOTE — PROGRESS NOTES
"  [facility]        NAME: Christy Haro is a 68 y.o. male  : 1955    MRN: [id number]  DATE: 2024  TIME: 6:11 PM    Assessment and Orders   Herpes zoster without complication [B02.9]  1. Herpes zoster without complication  valACYclovir (VALTREX) 1,000 mg tablet            Plan and Discussion      Symptoms and exam consistent with shingles of L5/S1 dermatome. Will treat with oral Valtrex.      Patient also complaining of dysuria and urinary frequency. UA positive for glucose only. Follow up with PCP.     Risks and benefits discussed. Patient understands and agrees with the plan.     PATIENT INSTRUCTIONS    Patient Education     Shingles   The Basics   Written by the doctors and editors at UpToDate   What is shingles? -- Shingles is a painful rash that is usually shaped like a band (picture 1). It can affect people of all ages, but it is most common in those older than 50. It is also more common in people whose immune system (the body's infection-fighting system) is weaker than normal. Another name for shingles is \"herpes zoster.\"  Shingles is caused by a virus called varicella-zoster virus. This is the same virus that causes chickenpox. After someone has chickenpox, the virus sometimes hides out, \"asleep\" in the body. Years later, it can \"wake up\" and cause shingles. The first time a person is infected with that virus, they get chickenpox, not shingles.  Is shingles contagious? -- In a way, yes. It is not possible to \"catch\" shingles from someone who has the rash. But if you have never had chickenpox or gotten the chickenpox vaccine, it is possible to \"catch\" the virus and then get sick with chickenpox. Shingles and chickenpox are caused by the same virus.  You probably will not catch the virus (or get chickenpox) if you:   Had chickenpox or shingles in the past   Had the chickenpox vaccine   Were born in the [place] before  (most people born before  have had chickenpox even if they " "don't remember it)  If you have never had chickenpox or the chickenpox vaccine, avoid contact with anyone who has shingles. It is especially important that you do not touch their rash. If you do, you could get sick with chickenpox. In rare cases, it is possible to get chickenpox from just being near someone with shingles.  Some people have a higher risk than others for getting very sick or having other problems because of chickenpox. People at highest risk include:   People who are pregnant - Pregnant people can pass the chickenpox virus to their growing baby.   Premature babies   People whose immune system (the body's infection-fighting system) is weaker than normal  What are the symptoms of shingles? -- At first, shingles causes weird sensations on your skin. You might feel itching, burning, pain, or tingling. Some people get a fever, feel sick, or get a headache. Within 1 to 2 days, a rash with blisters appears. Blisters most often appear in a band across the chest and back (picture 1 and picture 2). But they can show up on other parts of the body, too. The blisters cause pain that can be mild or severe.  Within 3 to 4 days, shingles blisters can become open sores or \"ulcers.\" These ulcers can sometimes get infected. Within 7 to 10 days, the rash should scab over and start to heal.  Can shingles be serious? -- Yes. Shingles can be serious, but this is rare. About 1 out of 10 people with shingles will get something called \"postherpetic neuralgia\" (\"PHN\"). People with PHN keep feeling pain or discomfort even after their rash goes away. This pain can last for months or even years. It can be so bad that it makes it hard to sleep, causes weight loss, and leads to depression.  Shingles can also cause:   Skin infections   Eye problems (if the rash is near the eye)   Ear problems (if the rash is near the ear)  In rare cases, shingles can cause serious problems with the brain or nerves. But this is very uncommon.  Will I " "need tests? -- It depends. Your doctor or nurse will probably be able to tell if you have shingles by doing an exam and asking about your symptoms. In some cases, they might take a sample of fluid from your rash for testing.  If you have a rash that you think might be shingles, call your doctor or nurse right away. They will do an exam and might recommend treatment.  How is shingles treated? -- It depends on how long you have had the shingles rash:   If you have had the rash for less than 3 days, your doctor will prescribe a medicine to help you get rid of the virus. These medicines are called \"antivirals.\" They can speed your recovery and lower the chances of problems such as PHN.   If you have had the rash for more than 3 days, your doctor might or might not prescribe medicine. Antiviral medicine might help if new blisters are still appearing, or if your immune system is weaker than normal.  Many people can use non-prescription pain medicines to treat their pain. But some people need prescription medicines.  Is there anything I can do on my own to feel better? -- Yes. You can:   Take all of your medicines as instructed.   Keep your rash clean and dry. Do not use creams or gels unless your doctor or nurse says that you should.   Try not to scratch your skin. It might help to cover it with a clean dressing.   Wear loose clothing if this makes you more comfortable.  Can shingles be prevented? -- People can lower their chances of getting shingles by getting the shingles vaccine. The vaccine might also make shingles symptoms milder if they do occur.  The shingles vaccine is typically recommended for adults over 50 years. It might also be recommended for younger adults, if their immune system is weaker than normal. Your doctor can tell you if you should get a shingles vaccine.  If you do get shingles, you can prevent spreading the virus to other people if you:   Keep your rash covered.   Wash your hands often until your " rash has scabbed over.  When should I call the doctor? -- Call your doctor or nurse right away if you think that you might have shingles. The sooner you can start treatment, the better.  If you are already being treated for shingles, call your doctor or nurse if:   Your pain gets worse and is not helped by over-the-counter medicines.   You have increased redness or swelling around your rash.   You get a fever.   You have eye symptoms like redness, irritation, or vision problems.   You have ear symptoms like pain or trouble hearing.  All topics are updated as new evidence becomes available and our peer review process is complete.  This topic retrieved from [employer] on: Mar 20, 2024.  Topic 55644 Version 17.0  Release: 32.2.4 - C32.78  © 2024 UpToDate, Inc. and/or its affiliates. All rights reserved.  picture 1: Shingles (herpes zoster)     Graphic 02525 Version 2.0  picture 2: Shingles (herpes zoster)     Graphic 41621 Version 2.0  Consumer Information Use and Disclaimer   Disclaimer: This generalized information is a limited summary of diagnosis, treatment, and/or medication information. It is not meant to be comprehensive and should be used as a tool to help the user understand and/or assess potential diagnostic and treatment options. It does NOT include all information about conditions, treatments, medications, side effects, or risks that may apply to a specific patient. It is not intended to be medical advice or a substitute for the medical advice, diagnosis, or treatment of a health care provider based on the health care provider's examination and assessment of a patient's specific and unique circumstances. Patients must speak with a health care provider for complete information about their health, medical questions, and treatment options, including any risks or benefits regarding use of medications. This information does not endorse any treatments or medications as safe, effective, or approved for treating a  specific patient. UpToDate, Inc. and its affiliates disclaim any warranty or liability relating to this information or the use thereof.The use of this information is governed by the Terms of Use, available at https://www.[employer].com/en/know/clinical-effectiveness-terms. 2024© UpToDate, Inc. and its affiliates and/or licensors. All rights reserved.  Copyright   © 2024 UpToDate, Inc. and/or its affiliates. All rights reserved.       If tests have been performed at [facility], our office will contact you with results if changes need to be made to the care plan discussed with you at the visit.  You can review your full results on [facility] MyChart.    Follow up with PCP.     If any of the following occur, please report to your nearest ED for evaluation or call 911.   Difficultly breathing or shortness of breath  Chest pain  Acutely worsening symptoms.         Chief Complaint     Chief Complaint   Patient presents with    Generalized Body Aches    Fever    Rash         History of Present Illness       Patient is a 68-year-old male who presents with rash and bodyaches x 4 days.  Patient states that rash is very painful and annoying.  He has tried multiple topical treatments including steroid cream with no relief.  He states that he has been trying a powder but he is unsure of the name.  Patient states that 2 days ago he had a temperature of 100.3.  Patient was recently ordered Ozempic for his type 2 diabetes but states that he has not started it yet as it needs prior authorization with his insurance.  Rash is on the lower left side of his back and goes down his left thigh.    Patient also states that he has been having burning with urination for the last 3 days.  He states that he has difficulty starting a stream and complains of urinary frequency.          Generalized Body Aches  Associated symptoms include a rash.   Fever  Associated symptoms include a rash.   Rash        Review of Systems   Review of Systems    Skin:  Positive for rash.         Current Medications       Current Outpatient Medications:     amLODIPine (NORVASC) 10 mg tablet, Take 1 tablet (10 mg total) by mouth daily, Disp: 90 tablet, Rfl: 3    aspirin 81 mg chewable tablet, Chew 81 mg daily, Disp: , Rfl:     atorvastatin (LIPITOR) 10 mg tablet, Take 1 tablet by mouth once daily, Disp: 30 tablet, Rfl: 0    Empagliflozin 25 MG TABS, Take 1 tablet (25 mg total) by mouth daily, Disp: 30 tablet, Rfl: 5    enalapril (VASOTEC) 10 mg tablet, Take 1 tablet (10 mg total) by mouth 4 (four) times a day 2 tabs in the am, 1 tab in the afternoon, 1 tab @ hs., Disp: 360 tablet, Rfl: 3    furosemide (LASIX) 40 mg tablet, Take 1.5 tablets (60 mg total) by mouth daily, Disp: 135 tablet, Rfl: 3    glucose blood (OneTouch Verio) test strip, Use 1 each daily, Disp: 100 each, Rfl: 5    hydrALAZINE (APRESOLINE) 25 mg tablet, Take 1 tablet (25 mg total) by mouth 3 (three) times a day, Disp: 270 tablet, Rfl: 3    ketoconazole (NIZORAL) 2 % shampoo, Apply 1 Application topically 2 (two) times a week, Disp: 120 mL, Rfl: 0    Lancets (onetouch ultrasoft) lancets, Check glucose daily, Disp: 100 each, Rfl: 1    metoprolol succinate (TOPROL-XL) 25 mg 24 hr tablet, Take 1 tablet (25 mg total) by mouth daily, Disp: 90 tablet, Rfl: 3    metoprolol succinate (TOPROL-XL) 50 mg 24 hr tablet, Take 1 tablet (50 mg total) by mouth daily, Disp: 90 tablet, Rfl: 3    multivitamin (THERAGRAN) TABS, Take 1 tablet by mouth daily, Disp: , Rfl:     omeprazole (PriLOSEC) 40 MG capsule, Take 1 capsule (40 mg total) by mouth daily, Disp: 90 capsule, Rfl: 3    potassium chloride (Klor-Con M20) 20 mEq tablet, Take 1 tablet (20 mEq total) by mouth daily, Disp: 90 tablet, Rfl: 1    Red Yeast Rice Extract (RED YEAST RICE PO), Take by mouth, Disp: , Rfl:     semaglutide, 0.25 or 0.5 mg/dose, (Ozempic, 0.25 or 0.5 MG/DOSE,) 2 mg/3 mL injection pen, E11.65 inject 0.25 mg weekly for 4-weeks, then 0.5 mg weekly  thereafter, Disp: 3 mL, Rfl: 3    triamcinolone (KENALOG) 0.1 % lotion, Apply topically 2 (two) times a day, Disp: 60 mL, Rfl: 3    triamcinolone (KENALOG) 0.5 % cream, , Disp: , Rfl:     valACYclovir (VALTREX) 1,000 mg tablet, Take 1 tablet (1,000 mg total) by mouth 3 (three) times a day for 7 days, Disp: 21 tablet, Rfl: 0    Vitamin D, Cholecalciferol, 50 MCG (2000 UT) CAPS, Take 50 mcg by mouth daily, Disp: 90 capsule, Rfl: 3    flecainide (TAMBOCOR) 150 MG tablet, Take 1 tablet (150 mg total) by mouth see administration instructions For palpitations lasting longer than 5 minutes, take one 150 mg tablet.  If palpitations last longer than 30 minutes, take another 150 mg tablet.  If palpitations persist, please go to the nearest emergency department.    Do not exceed 300 mg in one day. (Patient not taking: Reported on 7/8/2024), Disp: 90 tablet, Rfl: 0    indomethacin (INDOCIN) 50 mg capsule, Take 1 capsule (50 mg total) by mouth daily as needed for mild pain PRN with the gout, Disp: 90 capsule, Rfl: 0    Current Allergies     Allergies as of 09/18/2024 - Reviewed 09/18/2024   Allergen Reaction Noted    Acetaminophen Other (See Comments) 03/16/2018            The following portions of the patient's history were reviewed and updated as appropriate: allergies, current medications, past family history, past medical history, past social history, past surgical history and problem list.     Past Medical History:   Diagnosis Date    Arthritis     last assessed 7/1/15    Bronchitis 02/02/2023    Chronic diastolic (congestive) heart failure (HCC)     Diabetes mellitus (HCC)     type 2-last assessed 1/28/15    Diabetes mellitus (HCC) 03/16/2018    Dyslipidemia     GERD (gastroesophageal reflux disease)     Hypertension     Hypertensive urgency 03/16/2023    Irregular heart beat     last assessed 7/1/15    Obstructive sleep apnea     Palpitations     last assessed 9/7/17    Sexual dysfunction     last assessed 7/1/15     "Thyroid disease     last assessed 7/1/15       Past Surgical History:   Procedure Laterality Date    COLONOSCOPY      TONSILLECTOMY         Family History   Problem Relation Age of Onset    Hypertension Mother         essential    Stroke Mother     Hypertension Father         essential    Heart defect Father         cardiac disorder    Stroke Brother     Hypertension Brother          Medications have been verified.        Objective   /70   Pulse 92   Temp 98.9 °F (37.2 °C)   Resp 20   Ht 5' 10\" (1.778 m)   Wt (!) 139 kg (306 lb)   SpO2 96%   BMI 43.91 kg/m²   No LMP for male patient.       Physical Exam     Physical Exam  Constitutional:       Appearance: He is obese.   Pulmonary:      Effort: Pulmonary effort is normal. No respiratory distress.   Skin:     Findings: Rash present. Rash is crusting and vesicular.          Neurological:      Mental Status: He is alert and oriented to person, place, and time.      Gait: Gait abnormal (using cane).   Psychiatric:         Mood and Affect: Mood normal.         Behavior: Behavior normal.               Padmaja Joseph DO       "

## 2024-09-18 NOTE — TELEPHONE ENCOUNTER
Patient is calling asking if the PA has been sent to [facility] for his Ozempic.     Informed pt that the Pa process can take anywhere from 7-21 business days.

## 2024-09-19 ENCOUNTER — HOSPITAL ENCOUNTER (EMERGENCY)
Facility: HOSPITAL | Age: 69
Discharge: HOME | End: 2024-09-19
Attending: EMERGENCY MEDICINE
Payer: MEDICARE

## 2024-09-19 ENCOUNTER — TELEPHONE (OUTPATIENT)
Age: 69
End: 2024-09-19

## 2024-09-19 ENCOUNTER — TELEPHONE (OUTPATIENT)
Dept: UROLOGY | Facility: MEDICAL CENTER | Age: 69
End: 2024-09-19

## 2024-09-19 VITALS
OXYGEN SATURATION: 96 % | RESPIRATION RATE: 14 BRPM | HEART RATE: 70 BPM | SYSTOLIC BLOOD PRESSURE: 120 MMHG | TEMPERATURE: 98.7 F | DIASTOLIC BLOOD PRESSURE: 62 MMHG

## 2024-09-19 DIAGNOSIS — R33.8 ACUTE URINARY RETENTION: Primary | ICD-10-CM

## 2024-09-19 LAB
ALBUMIN SERPL BCG-MCNC: 4.3 G/DL (ref 3.5–5)
ALP SERPL-CCNC: 32 U/L (ref 34–104)
ALT SERPL W P-5'-P-CCNC: 18 U/L (ref 7–52)
ANION GAP SERPL CALCULATED.3IONS-SCNC: 10 MMOL/L (ref 4–13)
APTT PPP: 27 SECONDS (ref 23–34)
AST SERPL W P-5'-P-CCNC: 18 U/L (ref 13–39)
BACTERIA UR CULT: NORMAL
BASOPHILS # BLD AUTO: 0.02 THOUSANDS/ÂΜL (ref 0–0.1)
BASOPHILS NFR BLD AUTO: 0 % (ref 0–1)
BILIRUB SERPL-MCNC: 1.16 MG/DL (ref 0.2–1)
BILIRUB UR QL STRIP: NEGATIVE
BUN SERPL-MCNC: 38 MG/DL (ref 5–25)
CALCIUM SERPL-MCNC: 9.6 MG/DL (ref 8.4–10.2)
CHLORIDE SERPL-SCNC: 99 MMOL/L (ref 96–108)
CLARITY UR: CLEAR
CO2 SERPL-SCNC: 26 MMOL/L (ref 21–32)
COLOR UR: COLORLESS
CREAT SERPL-MCNC: 1.55 MG/DL (ref 0.6–1.3)
EOSINOPHIL # BLD AUTO: 0.01 THOUSAND/ÂΜL (ref 0–0.61)
EOSINOPHIL NFR BLD AUTO: 0 % (ref 0–6)
ERYTHROCYTE [DISTWIDTH] IN BLOOD BY AUTOMATED COUNT: 13.5 % (ref 11.6–15.1)
GFR SERPL CREATININE-BSD FRML MDRD: 44 ML/MIN/1.73SQ M
GLUCOSE SERPL-MCNC: 136 MG/DL (ref 65–140)
GLUCOSE UR STRIP-MCNC: ABNORMAL MG/DL
HCT VFR BLD AUTO: 44.2 % (ref 36.5–49.3)
HGB BLD-MCNC: 14.3 G/DL (ref 12–17)
HGB UR QL STRIP.AUTO: NEGATIVE
IMM GRANULOCYTES # BLD AUTO: 0.01 THOUSAND/UL (ref 0–0.2)
IMM GRANULOCYTES NFR BLD AUTO: 0 % (ref 0–2)
INR PPP: 1.04 (ref 0.85–1.19)
KETONES UR STRIP-MCNC: NEGATIVE MG/DL
LEUKOCYTE ESTERASE UR QL STRIP: NEGATIVE
LYMPHOCYTES # BLD AUTO: 1.17 THOUSANDS/ÂΜL (ref 0.6–4.47)
LYMPHOCYTES NFR BLD AUTO: 24 % (ref 14–44)
MCH RBC QN AUTO: 29 PG (ref 26.8–34.3)
MCHC RBC AUTO-ENTMCNC: 32.4 G/DL (ref 31.4–37.4)
MCV RBC AUTO: 90 FL (ref 82–98)
MONOCYTES # BLD AUTO: 0.74 THOUSAND/ÂΜL (ref 0.17–1.22)
MONOCYTES NFR BLD AUTO: 15 % (ref 4–12)
NEUTROPHILS # BLD AUTO: 2.86 THOUSANDS/ÂΜL (ref 1.85–7.62)
NEUTS SEG NFR BLD AUTO: 61 % (ref 43–75)
NITRITE UR QL STRIP: NEGATIVE
NRBC BLD AUTO-RTO: 0 /100 WBCS
PH UR STRIP.AUTO: 5.5 [PH]
PLATELET # BLD AUTO: 154 THOUSANDS/UL (ref 149–390)
PMV BLD AUTO: 10.7 FL (ref 8.9–12.7)
POTASSIUM SERPL-SCNC: 4.1 MMOL/L (ref 3.5–5.3)
PROT SERPL-MCNC: 8 G/DL (ref 6.4–8.4)
PROT UR STRIP-MCNC: NEGATIVE MG/DL
PROTHROMBIN TIME: 14.1 SECONDS (ref 12.3–15)
RBC # BLD AUTO: 4.93 MILLION/UL (ref 3.88–5.62)
SODIUM SERPL-SCNC: 135 MMOL/L (ref 135–147)
SP GR UR STRIP.AUTO: 1.01 (ref 1–1.03)
UROBILINOGEN UR STRIP-ACNC: <2 MG/DL
WBC # BLD AUTO: 4.81 THOUSAND/UL (ref 4.31–10.16)

## 2024-09-19 PROCEDURE — 80053 COMPREHEN METABOLIC PANEL: CPT | Performed by: EMERGENCY MEDICINE

## 2024-09-19 PROCEDURE — 85730 THROMBOPLASTIN TIME PARTIAL: CPT | Performed by: EMERGENCY MEDICINE

## 2024-09-19 PROCEDURE — 85610 PROTHROMBIN TIME: CPT | Performed by: EMERGENCY MEDICINE

## 2024-09-19 PROCEDURE — 99283 EMERGENCY DEPT VISIT LOW MDM: CPT

## 2024-09-19 PROCEDURE — 85025 COMPLETE CBC W/AUTO DIFF WBC: CPT | Performed by: EMERGENCY MEDICINE

## 2024-09-19 PROCEDURE — 36415 COLL VENOUS BLD VENIPUNCTURE: CPT | Performed by: EMERGENCY MEDICINE

## 2024-09-19 PROCEDURE — 99284 EMERGENCY DEPT VISIT MOD MDM: CPT | Performed by: EMERGENCY MEDICINE

## 2024-09-19 NOTE — TELEPHONE ENCOUNTER
Patient currently being seen at [facility] ED for urinary retention.  He previously followed with our office (Dr. Grant) last seen in 2015 for BPH.  Today in ED patient required [place] catheter placement for 700 cc of retention.  He will need trial of void with our office in roughly 10-14 days and will also need to reestablish care.

## 2024-09-19 NOTE — TELEPHONE ENCOUNTER
New Patient    What is the reason for the patient’s appointment?:Patient called stating he is having a slow stream and he is having pain on the whole private area.  He is not able to empty his bladder.   It started a few days ago.  He wants to see Urology.  He stated he might have to go to ER .  First available appointment in [place] in November.  Informed patient to also follow up with his family doctor.  He did not want to schedule appointment.  He wants to know if he should go to ER .     Patient can be reached at [phone]    What office location does the patient prefer?:[place]     Does patient have Imaging/Lab Results:    Have patient records been requested?:  If No, are the records showing in Epic:       INSURANCE:   Do we accept the patient's insurance or is the patient Self-Pay?:    Insurance Provider:Medicare/ aarp   Plan Type/Number:   Member ID#:       HISTORY:   Has the patient had any previous Urologist(s)?:no     Was the patient seen in the ED?:    Has the patient had any outside testing done?:    Does the patient have a personal history of cancer?:no

## 2024-09-19 NOTE — ED PROVIDER NOTES
1. Acute urinary retention      ED Disposition       ED Disposition   Discharge    Condition   Stable    Date/Time   u Sep 19, 2024  3:12 PM    Comment   Christy Haro discharge to home/self care.                   Assessment & Plan       Medical Decision Making  68-year-old male acute on chronic urinary retention with 700 cc retained after postvoid residual.  Urine and labs near baseline.  No evidence of UTI.  Patient does have ongoing shingles no evidence of superimposed cellulitis no evidence of Jeison's gangrene.  No history of trauma or low back pain to raise concerns for spinal cord injury as an etiology of the urinary retention.  Given age and demographics this is most likely related to BPH but lacks formal diagnosis.  I did contact urology on the patient's behalf to expedite a follow-up visit a Moreau catheter was placed uneventfully and the patient was discharged with instructions on use and return to ER and plan for follow-up as an outpatient with urology he verbalized understanding and agreement with that plan.    Problems Addressed:  Acute urinary retention: acute illness or injury    Amount and/or Complexity of Data Reviewed  Labs: ordered.                     Medications - No data to display    History of Present Illness       68-year-old male who presents to the ER for difficulty urinating.  He reports he was recently diagnosed with shingles.  This is in the left buttocks leg in an L5/S1 distribution he started on Valtrex the other day for this.  He describes some difficulty urinating preceding this which has worsened to the point where he feels he is not emptying his bladder.  He is able to get some urine out.  He denies any recent falls or injuries he denies low back pain or flank pain.  He denies any nausea or vomiting or upper abdominal pain notes some suprapubic discomfort.  Denies any difficulty with stools.  He states that he has been trying to see urology for this issue but does not have  an appointment until October.  He has never required a catheter to be placed before.          Review of Systems   Constitutional:  Negative for chills and fever.   HENT:  Negative for ear pain and sore throat.    Eyes:  Negative for pain and visual disturbance.   Respiratory:  Negative for cough and shortness of breath.    Cardiovascular:  Negative for chest pain and palpitations.   Gastrointestinal:  Positive for abdominal pain. Negative for diarrhea, nausea and vomiting.   Genitourinary:  Positive for difficulty urinating. Negative for dysuria, flank pain, hematuria, penile swelling and scrotal swelling.   Musculoskeletal:  Negative for arthralgias and back pain.   Skin:  Positive for rash. Negative for color change.   Neurological:  Negative for seizures and syncope.   All other systems reviewed and are negative.          Objective     ED Triage Vitals [09/19/24 1240]   Temperature Pulse Blood Pressure Respirations SpO2 Patient Position - Orthostatic VS   98.7 °F (37.1 °C) 70 120/62 14 96 % Sitting      Temp Source Heart Rate Source BP Location FiO2 (%) Pain Score    Temporal Monitor Left arm -- 6        Physical Exam  Vitals and nursing note reviewed. Exam conducted with a chaperone present.   Constitutional:       General: He is not in acute distress.     Appearance: Normal appearance. He is well-developed. He is obese. He is not ill-appearing, toxic-appearing or diaphoretic.   HENT:      Head: Normocephalic and atraumatic.      Right Ear: External ear normal.      Left Ear: External ear normal.      Nose: Nose normal.      Mouth/Throat:      Mouth: Mucous membranes are moist.      Pharynx: Oropharynx is clear.   Eyes:      Conjunctiva/sclera: Conjunctivae normal.   Cardiovascular:      Rate and Rhythm: Normal rate and regular rhythm.      Heart sounds: No murmur heard.  Pulmonary:      Effort: Pulmonary effort is normal. No respiratory distress.      Breath sounds: Normal breath sounds.   Abdominal:       Palpations: Abdomen is soft.      Tenderness: There is abdominal tenderness in the suprapubic area. There is no guarding or rebound.   Musculoskeletal:         General: No swelling.      Cervical back: Neck supple.      Right lower leg: No edema.      Left lower leg: No edema.   Skin:     General: Skin is warm and dry.      Capillary Refill: Capillary refill takes less than 2 seconds.      Comments: Patient has a grouped vesicular rash in an L5/S1 distribution which is consistent with known diagnosis of shingles.  There is no Jeison's gangrene.  There is no evidence of superimposed cellulitis and no clinical evidence of abscess in the groin and perineal or leg region.   Neurological:      Mental Status: He is alert.   Psychiatric:         Mood and Affect: Mood normal.         Labs Reviewed   UA W REFLEX TO MICROSCOPIC WITH REFLEX TO CULTURE - Abnormal       Result Value    Color, UA Colorless      Clarity, UA Clear      Specific Gravity, UA 1.015      pH, UA 5.5      Leukocytes, UA Negative      Nitrite, UA Negative      Protein, UA Negative      Glucose, UA 1000 (1%) (*)     Ketones, UA Negative      Urobilinogen, UA <2.0      Bilirubin, UA Negative      Occult Blood, UA Negative     CBC AND DIFFERENTIAL - Abnormal    WBC 4.81      RBC 4.93      Hemoglobin 14.3      Hematocrit 44.2      MCV 90      MCH 29.0      MCHC 32.4      RDW 13.5      MPV 10.7      Platelets 154      nRBC 0      Segmented % 61      Immature Grans % 0      Lymphocytes % 24      Monocytes % 15 (*)     Eosinophils Relative 0      Basophils Relative 0      Absolute Neutrophils 2.86      Absolute Immature Grans 0.01      Absolute Lymphocytes 1.17      Absolute Monocytes 0.74      Eosinophils Absolute 0.01      Basophils Absolute 0.02     COMPREHENSIVE METABOLIC PANEL - Abnormal    Sodium 135      Potassium 4.1      Chloride 99      CO2 26      ANION GAP 10      BUN 38 (*)     Creatinine 1.55 (*)     Glucose 136      Calcium 9.6      AST 18       ALT 18      Alkaline Phosphatase 32 (*)     Total Protein 8.0      Albumin 4.3      Total Bilirubin 1.16 (*)     eGFR 44      Narrative:     [employer] guidelines for Chronic Kidney Disease (CKD):     Stage 1 with normal or high GFR (GFR > 90 mL/min/1.73 square meters)    Stage 2 Mild CKD (GFR = 60-89 mL/min/1.73 square meters)    Stage 3A Moderate CKD (GFR = 45-59 mL/min/1.73 square meters)    Stage 3B Moderate CKD (GFR = 30-44 mL/min/1.73 square meters)    Stage 4 Severe CKD (GFR = 15-29 mL/min/1.73 square meters)    Stage 5 End Stage CKD (GFR <15 mL/min/1.73 square meters)  Note: GFR calculation is accurate only with a steady state creatinine   PROTIME-INR - Normal    Protime 14.1      INR 1.04      Narrative:     INR Therapeutic Range    Indication                                             INR Range      Atrial Fibrillation                                               2.0-3.0  Hypercoagulable State                                    2.0.2.3  Left Ventricular Asist Device                            2.0-3.0  Mechanical Heart Valve                                  -    Aortic(with afib, MI, embolism, HF, LA enlargement,    and/or coagulopathy)                                     2.0-3.0 (2.5-3.5)     Mitral                                                             2.5-3.5  Prosthetic/Bioprosthetic Heart Valve               2.0-3.0  Venous thromboembolism (VTE: VT, PE        2.0-3.0   APTT - Normal    PTT 27       No orders to display       Procedures    ED Medication and Procedure Management   Prior to Admission Medications   Prescriptions Last Dose Informant Patient Reported? Taking?   Empagliflozin 25 MG TABS   No No   Sig: Take 1 tablet (25 mg total) by mouth daily   Lancets (onetouch ultrasoft) lancets  Self No No   Sig: Check glucose daily   Red Yeast Rice Extract (RED YEAST RICE PO)  Self Yes No   Sig: Take by mouth   Vitamin D, Cholecalciferol, 50 MCG (2000 UT) CAPS  Self No No    Sig: Take 50 mcg by mouth daily   amLODIPine (NORVASC) 10 mg tablet   No No   Sig: Take 1 tablet (10 mg total) by mouth daily   aspirin 81 mg chewable tablet  Self Yes No   Sig: Chew 81 mg daily   atorvastatin (LIPITOR) 10 mg tablet   No No   Sig: Take 1 tablet by mouth once daily   enalapril (VASOTEC) 10 mg tablet   No No   Sig: Take 1 tablet (10 mg total) by mouth 4 (four) times a day 2 tabs in the am, 1 tab in the afternoon, 1 tab @ hs.   flecainide (TAMBOCOR) 150 MG tablet  Self No No   Sig: Take 1 tablet (150 mg total) by mouth see administration instructions For palpitations lasting longer than 5 minutes, take one 150 mg tablet.  If palpitations last longer than 30 minutes, take another 150 mg tablet.  If palpitations persist, please go to the nearest emergency department.    Do not exceed 300 mg in one day.   Patient not taking: Reported on 7/8/2024   furosemide (LASIX) 40 mg tablet   No No   Sig: Take 1.5 tablets (60 mg total) by mouth daily   glucose blood (OneTouch Verio) test strip  Self No No   Sig: Use 1 each daily   hydrALAZINE (APRESOLINE) 25 mg tablet   No No   Sig: Take 1 tablet (25 mg total) by mouth 3 (three) times a day   indomethacin (INDOCIN) 50 mg capsule   No No   Sig: Take 1 capsule (50 mg total) by mouth daily as needed for mild pain PRN with the gout   ketoconazole (NIZORAL) 2 % shampoo  Self No No   Sig: Apply 1 Application topically 2 (two) times a week   metoprolol succinate (TOPROL-XL) 25 mg 24 hr tablet   No No   Sig: Take 1 tablet (25 mg total) by mouth daily   metoprolol succinate (TOPROL-XL) 50 mg 24 hr tablet   No No   Sig: Take 1 tablet (50 mg total) by mouth daily   multivitamin (THERAGRAN) TABS  Self Yes No   Sig: Take 1 tablet by mouth daily   omeprazole (PriLOSEC) 40 MG capsule   No No   Sig: Take 1 capsule (40 mg total) by mouth daily   potassium chloride (Klor-Con M20) 20 mEq tablet   No No   Sig: Take 1 tablet (20 mEq total) by mouth daily   semaglutide, 0.25 or 0.5  mg/dose, (Ozempic, 0.25 or 0.5 MG/DOSE,) 2 mg/3 mL injection pen   No No   Sig: E11.65 inject 0.25 mg weekly for 4-weeks, then 0.5 mg weekly thereafter   triamcinolone (KENALOG) 0.1 % lotion  Self No No   Sig: Apply topically 2 (two) times a day   triamcinolone (KENALOG) 0.5 % cream  Self Yes No   valACYclovir (VALTREX) 1,000 mg tablet   No No   Sig: Take 1 tablet (1,000 mg total) by mouth 3 (three) times a day for 7 days      Facility-Administered Medications: None     Patient's Medications   Discharge Prescriptions    No medications on file     No discharge procedures on file.     Leonel Hartmann,   09/19/24 1512

## 2024-09-19 NOTE — TELEPHONE ENCOUNTER
Patient called in again and was made aware that encounter was sent to urology pod and that they will be contacting him.

## 2024-09-19 NOTE — TELEPHONE ENCOUNTER
Pt called in and stated he needed to schedule a follow up appointment right away, he was told by the ER provider to be seen by Monday at the latest. Advised pt it was our providers recommendations to be seen within 10-14 days to which he declined and stated this is an emergent case as he will not be keeping a catheter in place that long. Pt stated he needs to be seen by Monday. Please review.     Call back: [phone]

## 2024-09-19 NOTE — DISCHARGE INSTRUCTIONS
Thank you for visiting the Emergency Department today.    Labs and urine are within normal limits.  You were found to have 700 mL of urine in your bladder after urinating.  This is consistent with urinary retention.  This is probably due to an enlarged prostate which is restricting outflow of urine from the bladder/urethra.    I personally contacted the urology staff and they are aware that the you need to be seen in the office in the short-term.  Call the number listed to schedule the appointment.  They anticipate seeing you within the week.  Return if symptoms worsen or you are having issues with the catheter or other concerns.  You may call the urology office first for advice as they may be able to see you or provide advice over the phone but if you are unable to reach them or having emergent issues return here.

## 2024-09-19 NOTE — TELEPHONE ENCOUNTER
Pt called asking if PCP could please place order to check his PSA levels.  Pt states he's been experiencing problems urinating and having feeling of not emptying after urination.  Appears pt [facility] and had urine testing done.      Please review and advise how pt should proceed.  Pt would like to be called back once order is placed.   Pt reports vomiting today.  Pt feels as though she is dehydrated.

## 2024-09-19 NOTE — TELEPHONE ENCOUNTER
Pt called back.  Pt sts that he was in the ED and catheter was placed.  Pt sts that ER informed him to contact Urology for ASAP  appt to have catheter removed.  Informed pt of previous messages.  Pt sts that he does not want to wait 10-14 days as recommended.  Pt sts that he wants the catheter removed before the weekend.  Informed pt that message was sent to clinical team for review.  Also informed pt that I will send another message for clinical to review if possible to have catheter removed 9/20/24.    Pt call back: [phone]

## 2024-09-19 NOTE — TELEPHONE ENCOUNTER
He had PSA in normal range 4/24 - urine culture in process but urine preliminary looked ok  WOUld recommend urology ashley

## 2024-09-20 ENCOUNTER — HOSPITAL ENCOUNTER (EMERGENCY)
Facility: HOSPITAL | Age: 69
Discharge: HOME | End: 2024-09-20
Attending: EMERGENCY MEDICINE
Payer: MEDICARE

## 2024-09-20 VITALS
DIASTOLIC BLOOD PRESSURE: 59 MMHG | TEMPERATURE: 97 F | OXYGEN SATURATION: 97 % | HEART RATE: 74 BPM | RESPIRATION RATE: 18 BRPM | SYSTOLIC BLOOD PRESSURE: 123 MMHG

## 2024-09-20 DIAGNOSIS — T83.9XXA PROBLEM WITH URINARY CATHETER (HCC): Primary | ICD-10-CM

## 2024-09-20 PROCEDURE — 99284 EMERGENCY DEPT VISIT MOD MDM: CPT | Performed by: EMERGENCY MEDICINE

## 2024-09-20 PROCEDURE — 99282 EMERGENCY DEPT VISIT SF MDM: CPT

## 2024-09-20 RX ORDER — PHENAZOPYRIDINE HYDROCHLORIDE 200 MG/1
200 TABLET, FILM COATED ORAL 3 TIMES DAILY
Qty: 5 TABLET | Refills: 0 | Status: SHIPPED | OUTPATIENT
Start: 2024-09-20 | End: 2024-09-23 | Stop reason: SDUPTHER

## 2024-09-20 RX ORDER — PHENAZOPYRIDINE HYDROCHLORIDE 100 MG/1
100 TABLET, FILM COATED ORAL ONCE
Status: COMPLETED | OUTPATIENT
Start: 2024-09-20 | End: 2024-09-20

## 2024-09-20 RX ADMIN — PHENAZOPYRIDINE 100 MG: 100 TABLET ORAL at 14:46

## 2024-09-20 NOTE — TELEPHONE ENCOUNTER
PA for semaglutide, 0.25 or 0.5 mg/dose, (Ozempic, 0.25 or 0.5 MG/DOSE,) 2 mg/3 mL injection pen  APPROVED     Date(s) approved August 21, 2024 to September 20, 2025     Case #[id number]     Patient advised by          []Nuvilexhart Message  []Phone call   [x]LMOM  []L/M to call office as no active Communication consent on file  []Unable to leave detailed message as VM not approved on Communication consent       Pharmacy advised by    [x]Fax  []Phone call    Approval letter scanned into Media

## 2024-09-20 NOTE — TELEPHONE ENCOUNTER
Patient was seen in the ER and recommendations by Urology AP remain the same- void trial in 10-14 days coordinated with NP AP visit. Called and LMOM for patient to contact the office. Currently holding NP appointment with ANDREEA Lane on 10/2/24 @ 8:40 am. Recommendation would be for patient to return to the office at 2:30 pm on the same day for nurse visit for PVR, however patient has a Nephrology appointment that afternoon- may need to adjust Nephrology appointment to coordinate void trial.

## 2024-09-20 NOTE — TELEPHONE ENCOUNTER
PA for Ozempic 0.25mg SUBMITTED     via    []CMM-KEY:   [x]Nicky-Case ID # [id number]   []Faxed to plan   []Other website   []Phone call Case ID #     Office notes sent, clinical questions answered. Awaiting determination    Turnaround time for your insurance to make a decision on your Prior Authorization can take 7-21 business days.

## 2024-09-20 NOTE — TELEPHONE ENCOUNTER
Pt called back in and stated he needs to be seen today. He does not want the catheter in place any longer. Pt stated he is not agreeing to the 10-14 day recommendations, he stated it is an emergency. Pt stated if he is not seen today he is going back to the ER to have it removed and if something happens to him it is our fault. Pt is very persistent that he is seen today and stated he needs a call back within an hour. Please advise.     Call back: [phone]

## 2024-09-20 NOTE — ED PROVIDER NOTES
1. Problem with urinary catheter (HCC)      ED Disposition       ED Disposition   Discharge    Condition   Stable    Date/Time   Fri Sep 20, 2024  3:17 PM    Comment   Christy Haro discharge to home/self care.                   Assessment & Plan       Medical Decision Making  68-year-old male patient who had an indwelling Enamorado catheter with leg bag placed in this emergency department yesterday comes in today because it feels uncomfortable and like it is pulling.  He has no other complaints.  He is also upset that he does not have an appointment for approximately 10 days with urology.  Yesterday the physician did reach out to urology and I will do so again today differential diagnose include not limited to bladder spasm secondary indwelling Enamorado catheter, displacement of the catheter, and appropriate connection to the leg bag    Amount and/or Complexity of Data Reviewed  External Data Reviewed: notes.     Details: I reviewed all the notes from yesterday to gain history of present illness  Discussion of management or test interpretation with external provider(s): Using joint decision-making with the patient and speaking with urology physician assistant patient be discharged home with readjustment of the Enamorado catheter and instructions on how to change the bag is not to be pulling the catheter tight.  It was recommended that the patient receive Ditropan from urology however with the chance of hypotension and the fact that he is a bit unsteady I will go with Pyridium for a few days.  He was told to follow-up with urology in 10 days as planned and urinary return with any worsening symptoms question comes concerns he verbalized understanding and agreement    Risk  Prescription drug management.                ED Course as of 09/20/24 1518   Fri Sep 20, 2024   1447 Urological physician assistant  could also give him ditropan which he can take up to TID for bladder spasms but he should stop it 3 days prior to enamorado  removal as it can exacerbate retention. There is definitely no way we could get him in sooner than 10/1 which is when he is currently scheduled as he is also a new patient so he requires an appointment with one of our practitioners as well as a nurse visit for the trial of void. If he takes his Moreau catheter out sooner than 10 days he will most definitely go back into urinary retention and then the whole process will start back over from square 1.       Medications   phenazopyridine (PYRIDIUM) tablet 100 mg (100 mg Oral Given 9/20/24 3262)       History of Present Illness       68-year-old male patient who was seen here yesterday had an indwelling Moreau catheter placed for urine retention.  He comes in today stating that he has pulling at the catheter makes him uncomfortable when he goes from standing to a sitting position he denies any fever chills headache blurred vision double vision cough chest sore throat or nausea fine diarrhea abdominal pain.  No testicular pain or swelling.  States the urine is coming out appropriately he is able to change the bag he was also upset that he called urology and they gave him an appointment approximately 10 days from now and I did explain to him after contacting urology that it needs to stay in for 10 days to dilate the urethra portion of the prostate so that he does not continue with retention.  I did give him Pyridium to help with bladder spasm.        Review of Systems   Constitutional:  Negative for chills, diaphoresis, fatigue and fever.   HENT:  Negative for congestion, ear pain, nosebleeds and sore throat.    Eyes:  Negative for photophobia, pain, discharge and visual disturbance.   Respiratory:  Negative for cough, choking, chest tightness, shortness of breath and wheezing.    Cardiovascular:  Negative for chest pain and palpitations.   Gastrointestinal:  Negative for abdominal distention, abdominal pain, diarrhea and vomiting.   Genitourinary:  Negative for dysuria,  flank pain, frequency and hematuria.   Musculoskeletal:  Negative for arthralgias, back pain, gait problem and joint swelling.   Skin:  Negative for color change and rash.   Neurological:  Negative for dizziness, seizures, syncope and headaches.   Psychiatric/Behavioral:  Negative for behavioral problems and confusion. The patient is not nervous/anxious.    All other systems reviewed and are negative.          Objective     ED Triage Vitals   Temperature Pulse Blood Pressure Respirations SpO2 Patient Position - Orthostatic VS   09/20/24 1422 09/20/24 1422 09/20/24 1423 09/20/24 1423 09/20/24 1422 09/20/24 1423   (!) 97 °F (36.1 °C) 74 123/59 18 97 % Sitting      Temp Source Heart Rate Source BP Location FiO2 (%) Pain Score    09/20/24 1422 09/20/24 1422 09/20/24 1423 -- 09/20/24 1422    Temporal Monitor Right arm  6        Physical Exam  Vitals and nursing note reviewed. Exam conducted with a chaperone present.   Constitutional:       General: He is not in acute distress.     Appearance: Normal appearance. He is well-developed. He is not ill-appearing, toxic-appearing or diaphoretic.   HENT:      Head: Normocephalic and atraumatic.      Right Ear: External ear normal.      Left Ear: External ear normal.      Nose: Nose normal.      Mouth/Throat:      Mouth: Mucous membranes are moist.      Pharynx: Oropharynx is clear. No oropharyngeal exudate or posterior oropharyngeal erythema.   Eyes:      General: No scleral icterus.        Right eye: No discharge.         Left eye: No discharge.      Conjunctiva/sclera: Conjunctivae normal.      Pupils: Pupils are equal, round, and reactive to light.   Cardiovascular:      Rate and Rhythm: Normal rate and regular rhythm.   Pulmonary:      Effort: Pulmonary effort is normal.   Abdominal:      General: Bowel sounds are normal.      Palpations: Abdomen is soft.      Tenderness: There is no abdominal tenderness.   Genitourinary:     Comments: Penis appears normal no swelling.  No  painful testicles or swelling of testicles patient has indwelling Moreau catheter I did a meatus and it appears to be pulling tight on the bag.  I had the nurse adjust the bag and leave slack in the catheter which helped.  He will get instructions on how to change the bag  Musculoskeletal:         General: Normal range of motion.      Cervical back: Normal range of motion and neck supple.      Right lower leg: No edema.      Left lower leg: No edema.   Skin:     General: Skin is warm.      Capillary Refill: Capillary refill takes less than 2 seconds.      Findings: Rash present.          Neurological:      General: No focal deficit present.      Mental Status: He is alert and oriented to person, place, and time. Mental status is at baseline.   Psychiatric:         Mood and Affect: Mood normal.         Behavior: Behavior normal.         Labs Reviewed - No data to display  No orders to display       Procedures    ED Medication and Procedure Management   Prior to Admission Medications   Prescriptions Last Dose Informant Patient Reported? Taking?   Empagliflozin 25 MG TABS   No No   Sig: Take 1 tablet (25 mg total) by mouth daily   Lancets (onetouch ultrasoft) lancets  Self No No   Sig: Check glucose daily   Red Yeast Rice Extract (RED YEAST RICE PO)  Self Yes No   Sig: Take by mouth   Vitamin D, Cholecalciferol, 50 MCG (2000 UT) CAPS  Self No No   Sig: Take 50 mcg by mouth daily   amLODIPine (NORVASC) 10 mg tablet   No No   Sig: Take 1 tablet (10 mg total) by mouth daily   aspirin 81 mg chewable tablet  Self Yes No   Sig: Chew 81 mg daily   atorvastatin (LIPITOR) 10 mg tablet   No No   Sig: Take 1 tablet by mouth once daily   enalapril (VASOTEC) 10 mg tablet   No No   Sig: Take 1 tablet (10 mg total) by mouth 4 (four) times a day 2 tabs in the am, 1 tab in the afternoon, 1 tab @ hs.   flecainide (TAMBOCOR) 150 MG tablet  Self No No   Sig: Take 1 tablet (150 mg total) by mouth see administration instructions For  palpitations lasting longer than 5 minutes, take one 150 mg tablet.  If palpitations last longer than 30 minutes, take another 150 mg tablet.  If palpitations persist, please go to the nearest emergency department.    Do not exceed 300 mg in one day.   Patient not taking: Reported on 7/8/2024   furosemide (LASIX) 40 mg tablet   No No   Sig: Take 1.5 tablets (60 mg total) by mouth daily   glucose blood (OneTouch Verio) test strip  Self No No   Sig: Use 1 each daily   hydrALAZINE (APRESOLINE) 25 mg tablet   No No   Sig: Take 1 tablet (25 mg total) by mouth 3 (three) times a day   indomethacin (INDOCIN) 50 mg capsule   No No   Sig: Take 1 capsule (50 mg total) by mouth daily as needed for mild pain PRN with the gout   ketoconazole (NIZORAL) 2 % shampoo  Self No No   Sig: Apply 1 Application topically 2 (two) times a week   metoprolol succinate (TOPROL-XL) 25 mg 24 hr tablet   No No   Sig: Take 1 tablet (25 mg total) by mouth daily   metoprolol succinate (TOPROL-XL) 50 mg 24 hr tablet   No No   Sig: Take 1 tablet (50 mg total) by mouth daily   multivitamin (THERAGRAN) TABS  Self Yes No   Sig: Take 1 tablet by mouth daily   omeprazole (PriLOSEC) 40 MG capsule   No No   Sig: Take 1 capsule (40 mg total) by mouth daily   potassium chloride (Klor-Con M20) 20 mEq tablet   No No   Sig: Take 1 tablet (20 mEq total) by mouth daily   semaglutide, 0.25 or 0.5 mg/dose, (Ozempic, 0.25 or 0.5 MG/DOSE,) 2 mg/3 mL injection pen   No No   Sig: E11.65 inject 0.25 mg weekly for 4-weeks, then 0.5 mg weekly thereafter   triamcinolone (KENALOG) 0.1 % lotion  Self No No   Sig: Apply topically 2 (two) times a day   triamcinolone (KENALOG) 0.5 % cream  Self Yes No   valACYclovir (VALTREX) 1,000 mg tablet   No No   Sig: Take 1 tablet (1,000 mg total) by mouth 3 (three) times a day for 7 days      Facility-Administered Medications: None     Patient's Medications   Discharge Prescriptions    PHENAZOPYRIDINE (PYRIDIUM) 200 MG TABLET    Take 1  tablet (200 mg total) by mouth 3 (three) times a day       Start Date: 9/20/2024 End Date: --       Order Dose: 200 mg       Quantity: 5 tablet    Refills: 0     No discharge procedures on file.     Zaki Roach PA-C  09/20/24 1516

## 2024-09-20 NOTE — DISCHARGE INSTRUCTIONS
Call the urologist today to confirm your appointment for October 1.    Please follow the instructions a nurse on how to change her leg bag to avoid pulling on the catheter.    Return with any worsening symptoms questions comments or concerns

## 2024-09-20 NOTE — TELEPHONE ENCOUNTER
"Called and spoke with patient to discuss NP appointment/void trial. Explained to patient that he is not yet established with [facility] and will require a new patient appointment with AP coordinated with a void trial with RN. Advised that the recommended time for this is 10-14 days based on the amount of urine that was in his bladder when catheter was inserted. Patient is very frustrated and wants Moreau catheter removed today. Patient was made aware that this is not office policy and he was offered a 2 part visit with AP/RN on 10/1 in the [place] office. Patient states he knows he has an enlarged prostate and he wants it corrected immediately as he cannot have a catheter in place. Patient was advised that Moreau catheter can safely remain in place for 4 weeks at a time. Patient declined scheduling an appointment at this time- he is aware that the schedules fill up quick and when he calls back the appointment that was offered on 10/1 may no longer be available. Patient states he may just go back to the ER and demand that Moreau be removed and that he be started on treatment for his prostate. Patient stated \"I'll call you back\" and disconnected the call.   "

## 2024-09-23 ENCOUNTER — TELEPHONE (OUTPATIENT)
Age: 69
End: 2024-09-23

## 2024-09-23 ENCOUNTER — VBI (OUTPATIENT)
Dept: FAMILY MEDICINE CLINIC | Facility: CLINIC | Age: 69
End: 2024-09-23

## 2024-09-23 DIAGNOSIS — T83.9XXA PROBLEM WITH URINARY CATHETER (HCC): ICD-10-CM

## 2024-09-23 DIAGNOSIS — N18.30 STAGE 3 CHRONIC KIDNEY DISEASE, UNSPECIFIED WHETHER STAGE 3A OR 3B CKD (HCC): Primary | ICD-10-CM

## 2024-09-23 RX ORDER — PHENAZOPYRIDINE HYDROCHLORIDE 200 MG/1
200 TABLET, FILM COATED ORAL 3 TIMES DAILY
Qty: 5 TABLET | Refills: 0 | Status: SHIPPED | OUTPATIENT
Start: 2024-09-23 | End: 2024-09-24 | Stop reason: SDUPTHER

## 2024-09-23 NOTE — TELEPHONE ENCOUNTER
Patient needs enough pills to hold him down until his appointment on 10/2 with Urology.  Please advise.    Shelley: [phone]

## 2024-09-23 NOTE — TELEPHONE ENCOUNTER
Patient called to follow up on prescription request. Patient stated he needs 10 days worth. Patient is stressing that he needs prescription asap.     Patient would like a return call.    Please advise  Thank you

## 2024-09-23 NOTE — TELEPHONE ENCOUNTER
Pt called stating he was called & no message I could not find any such call other that auto confirmation.

## 2024-09-23 NOTE — TELEPHONE ENCOUNTER
*NOT A DUPLICATE*    Prescribed by hospital and they only gave a temporary supply of 5 tablets. Please determine if appropriate for PCP to take over and if more than 5 tablets needs to be sent.    Reason for call:   [x] Refill   [] Prior Auth  [] Other:     Office:   [x] PCP/Provider - [facility]   [] Specialty/Provider -     Medication:   phenazopyridine (PYRIDIUM) 200 mg tablet - Take 1 tablet (200 mg total) by mouth 3 (three) times a day     Pharmacy:   [address], [address]     Does the patient have enough for 3 days?   [] Yes   [x] No - Send as HP to POD

## 2024-09-24 ENCOUNTER — TELEPHONE (OUTPATIENT)
Age: 69
End: 2024-09-24

## 2024-09-24 DIAGNOSIS — T83.9XXA PROBLEM WITH URINARY CATHETER (HCC): ICD-10-CM

## 2024-09-24 DIAGNOSIS — B02.9 HERPES ZOSTER WITHOUT COMPLICATION: ICD-10-CM

## 2024-09-24 RX ORDER — PHENAZOPYRIDINE HYDROCHLORIDE 200 MG/1
200 TABLET, FILM COATED ORAL 3 TIMES DAILY
Qty: 90 TABLET | Refills: 0 | Status: SHIPPED | OUTPATIENT
Start: 2024-09-24

## 2024-09-24 NOTE — TELEPHONE ENCOUNTER
Left voicemail with doctors recommendations. Stated if pt wishes to make follow up appt to call office back.

## 2024-09-24 NOTE — TELEPHONE ENCOUNTER
Please call patient Valtrex generally is only for one week and more is not indicated   Shingles is viral so generally an antibiotic not prescribed  If he has further concerns would recommend her setup appt for followup

## 2024-09-24 NOTE — TELEPHONE ENCOUNTER
Patient was only prescribed 5 pills yesterday and patient is supposed to take 1 pill 3 times a day. Patient has an appointment with urology on 10/2/24      Reason for call:   [x] Refill   [] Prior Auth  [] Other:     Office:   [x] PCP/Provider -   [] Specialty/Provider -     Medication: Phenazopyridine 200 mg, take 1 tablet by mouth 3 times a day       Pharmacy: [facility]     Does the patient have enough for 3 days?   [] Yes   [x] No - Send as HP to POD

## 2024-09-24 NOTE — TELEPHONE ENCOUNTER
Patient called the RX Refill Line. Message is being forwarded to the office.     Patient states he was prescribed this in the [facility] for Shingles. Patient states he has enough Valtrex for today only. He still has shingles and doesn't know what he should do? Should he be on an antibiotic?      Please contact patient at  [phone]

## 2024-10-01 NOTE — PROGRESS NOTES
10/2/2024    Chief Complaint   Patient presents with    Follow-up     Urinary retention for enamorado removal. Enamorado has been placed 2 weeks ago       Assessment and Plan    69 y.o. male new patient to office    Urinary retention  Acute ER visit on 9/19/2024 for complaints of difficulty with urination. Found to be retaining greater than 700 cc urine, enamorado catheter was placed.  No prior history of urinary retention. Recently diagnosed with shingles 1 day prior to L5/S1 dermatome and was started on Valtrex. He has since completed Valtrex.   This is likely due to recent shingles diagnosis, has completed Valtrex 1000 mg TID for 7 days. Cannot exclude possible underlying BPH  Enamorado catheter was removed by me today. He will present back later today for void trial. Recommend starting Flomax 0.4 mg HS.   If he fails void trial today, recommend repeat void trial in 1 week. Consider repeat treatment for shingles as patient continues to complain of pain.       History of Present Illness  Christy Haro is a 69 y.o. male new patient to office with PMHx significant for hypertension, CHF, A-fib, AYLEEN, GERD, T2DM, CKD stage III, dyslipidemia, lymphedema. Here for evaluation of urinary retention    Patient was seen at [facility] emergency department on 9/19/2024 for evaluation of difficulty urinating.  He had recently been diagnosed with shingles to the left buttocks and was started on Valtrex.  He describes some difficulty urinating proceeding this.  Bladder scan showed urinary retention in excess of 700 cc.  Enamorado catheter was placed.  According to ER notes, patient had been trying to see urology for this issue but did not have an appointment until October.    He presents today reporting no prior history of urinary retention.  He denies any significant bothersome urinary symptoms at baseline.  He feels he has a normal urinary stream denies any sensation of incomplete emptying, no hesitancy or urgency, and denies  "nocturia.            Review of Systems   Constitutional:  Negative for chills and fever.   HENT:  Negative for congestion and sore throat.    Respiratory:  Negative for cough and shortness of breath.    Cardiovascular:  Negative for chest pain and leg swelling.   Gastrointestinal:  Negative for abdominal pain, constipation and diarrhea.   Genitourinary:  Negative for difficulty urinating, dysuria, frequency, hematuria and urgency.   Musculoskeletal:  Negative for back pain and gait problem.   Skin:  Negative for wound.   Allergic/Immunologic: Negative for immunocompromised state.   Hematological:  Does not bruise/bleed easily.               Vitals  Vitals:    10/02/24 0841   BP: 98/56   Pulse: 59   Temp: 97.7 °F (36.5 °C)   SpO2: 94%   Weight: 135 kg (297 lb 8 oz)   Height: 5' 10\" (1.778 m)       Physical Exam  Vitals reviewed.   Constitutional:       General: He is not in acute distress.     Appearance: Normal appearance. He is not ill-appearing or toxic-appearing.   HENT:      Head: Normocephalic and atraumatic.   Eyes:      General: No scleral icterus.     Conjunctiva/sclera: Conjunctivae normal.   Cardiovascular:      Rate and Rhythm: Normal rate.   Pulmonary:      Effort: Pulmonary effort is normal. No respiratory distress.   Abdominal:      Tenderness: There is no right CVA tenderness or left CVA tenderness.      Hernia: No hernia is present.   Genitourinary:     Comments: Normal circumcised phallus, urethral Moreau catheter draining Pyridium stained urine to gravity.  Musculoskeletal:      Cervical back: Normal range of motion.      Right lower leg: No edema.      Left lower leg: No edema.   Skin:     General: Skin is warm and dry.      Coloration: Skin is not jaundiced or pale.   Neurological:      General: No focal deficit present.      Mental Status: He is alert and oriented to person, place, and time. Mental status is at baseline.      Gait: Gait normal.   Psychiatric:         Mood and Affect: Mood normal. "         Behavior: Behavior normal.         Thought Content: Thought content normal.         Judgment: Judgment normal.         Past History  Past Medical History:   Diagnosis Date    Arthritis     last assessed 7/1/15    Bronchitis 02/02/2023    Chronic diastolic (congestive) heart failure (HCC)     Diabetes mellitus (HCC)     type 2-last assessed 1/28/15    Diabetes mellitus (HCC) 03/16/2018    Dyslipidemia     GERD (gastroesophageal reflux disease)     Hypertension     Hypertensive urgency 03/16/2023    Irregular heart beat     last assessed 7/1/15    Obstructive sleep apnea     Palpitations     last assessed 9/7/17    Sexual dysfunction     last assessed 7/1/15    Thyroid disease     last assessed 7/1/15     Social History     Socioeconomic History    Marital status: Single     Spouse name: None    Number of children: None    Years of education: None    Highest education level: None   Occupational History    None   Tobacco Use    Smoking status: Former     Current packs/day: 1.00     Types: Cigarettes     Passive exposure: Never    Smokeless tobacco: Never    Tobacco comments:     former smoker-quit 27 yrs ago 1pppd x 30 yrs as per [employer]   Vaping Use    Vaping status: Never Used   Substance and Sexual Activity    Alcohol use: Never    Drug use: Never    Sexual activity: Not Currently   Other Topics Concern    None   Social History Narrative    None     Social Determinants of Health     Financial Resource Strain: Low Risk  (1/8/2024)    Overall Financial Resource Strain (CARDIA)     Difficulty of Paying Living Expenses: Not hard at all   Food Insecurity: Not on file   Transportation Needs: No Transportation Needs (1/8/2024)    PRAPARE - Transportation     Lack of Transportation (Medical): No     Lack of Transportation (Non-Medical): No   Physical Activity: Not on file   Stress: Not on file   Social Connections: Not on file   Intimate Partner Violence: Not on file   Housing Stability: Not on file     Social  History     Tobacco Use   Smoking Status Former    Current packs/day: 1.00    Types: Cigarettes    Passive exposure: Never   Smokeless Tobacco Never   Tobacco Comments    former smoker-quit 27 yrs ago 1pppd x 30 yrs as per [employer]     Family History   Problem Relation Age of Onset    Hypertension Mother         essential    Stroke Mother     Hypertension Father         essential    Heart defect Father         cardiac disorder    Stroke Brother     Hypertension Brother        The following portions of the patient's history were reviewed and updated as appropriate allergies, current medications, past medical history, past social history, past surgical history and problem list    Imaging:    Results  No results found for this or any previous visit (from the past 1 hour(s)).]  Lab Results   Component Value Date    PSA 1.24 04/07/2024    PSA 0.8 06/21/2021    PSA 0.8 08/06/2020    PSA 0.9 03/26/2019     Lab Results   Component Value Date    GLUCOSE 107 12/16/2015    CALCIUM 9.6 09/19/2024     12/16/2015    K 4.1 09/19/2024    CO2 26 09/19/2024    CL 99 09/19/2024    BUN 38 (H) 09/19/2024    CREATININE 1.55 (H) 09/19/2024     Lab Results   Component Value Date    WBC 4.81 09/19/2024    HGB 14.3 09/19/2024    HCT 44.2 09/19/2024    MCV 90 09/19/2024     09/19/2024       Please Note:  Voice dictation software has been used to create this document. There may be inadvertent transcriptions errors.     ANDREEA Kelly 10/02/24

## 2024-10-02 ENCOUNTER — PROCEDURE VISIT (OUTPATIENT)
Dept: UROLOGY | Facility: CLINIC | Age: 69
End: 2024-10-02
Payer: MEDICARE

## 2024-10-02 ENCOUNTER — OFFICE VISIT (OUTPATIENT)
Dept: UROLOGY | Facility: CLINIC | Age: 69
End: 2024-10-02
Payer: MEDICARE

## 2024-10-02 VITALS
OXYGEN SATURATION: 94 % | HEART RATE: 59 BPM | SYSTOLIC BLOOD PRESSURE: 98 MMHG | TEMPERATURE: 97.7 F | HEIGHT: 70 IN | BODY MASS INDEX: 42.59 KG/M2 | WEIGHT: 297.5 LBS | DIASTOLIC BLOOD PRESSURE: 56 MMHG

## 2024-10-02 DIAGNOSIS — R33.9 URINARY RETENTION: Primary | ICD-10-CM

## 2024-10-02 LAB — POST-VOID RESIDUAL VOLUME, ML POC: 67 ML

## 2024-10-02 PROCEDURE — 51798 US URINE CAPACITY MEASURE: CPT

## 2024-10-02 PROCEDURE — 99203 OFFICE O/P NEW LOW 30 MIN: CPT

## 2024-10-02 RX ORDER — TAMSULOSIN HYDROCHLORIDE 0.4 MG/1
0.4 CAPSULE ORAL
Qty: 30 CAPSULE | Refills: 3 | Status: ON HOLD | OUTPATIENT
Start: 2024-10-02

## 2024-10-02 NOTE — PROGRESS NOTES
10/2/2024  Christy Haro is a 69 y.o. male  [id number]    Diagnosis: Urinary retention   Chief Complaint    PVR         Patient presents for follow up post void residual. He was seen by ANDREEA Lane this morning for a NP appointment and catheter was removed at that time.    Plan:  Follow up with ANDREEA Lane in 6-8 weeks with PVR. Continue Flomax as ordered.    Patient is aware to contact the office in the meantime with questions or concerns.     Follow up with PCP regarding Shingles.       Assessment:      Patient voided in the waiting room prior to being called back for his appointment. Post void residual measured via hand held bladder scanner to be 67 mL.     Recent Results (from the past 6 hour(s))   POCT Measure PVR    Collection Time: 10/02/24  2:35 PM   Result Value Ref Range    POST-VOID RESIDUAL VOLUME, ML POC 67 mL         Griselda Gray RN

## 2024-10-03 ENCOUNTER — TELEPHONE (OUTPATIENT)
Dept: FAMILY MEDICINE CLINIC | Facility: CLINIC | Age: 69
End: 2024-10-03

## 2024-10-03 ENCOUNTER — NURSE TRIAGE (OUTPATIENT)
Age: 69
End: 2024-10-03

## 2024-10-03 ENCOUNTER — TELEPHONE (OUTPATIENT)
Age: 69
End: 2024-10-03

## 2024-10-03 DIAGNOSIS — G62.9 NEUROPATHY: Primary | ICD-10-CM

## 2024-10-03 DIAGNOSIS — R31.0 GROSS HEMATURIA: Primary | ICD-10-CM

## 2024-10-03 RX ORDER — GABAPENTIN 100 MG/1
100 CAPSULE ORAL 2 TIMES DAILY
Qty: 60 CAPSULE | Refills: 0 | Status: ON HOLD | OUTPATIENT
Start: 2024-10-03

## 2024-10-03 NOTE — TELEPHONE ENCOUNTER
Patient called requesting refill for  gabapentin (NEURONTIN) 100 mg capsule. Patient made aware medication was refilled on 10/03/2024  for 60 with 0 refills to [id number] - [facility] [address]  pharmacy. Patient instructed to contact the pharmacy to obtain refills of medication. Patient verbalized understanding.    Receipt confirmed by pharmacy (10/3/2024  9:19 AM EDT)

## 2024-10-03 NOTE — TELEPHONE ENCOUNTER
Called and spoke with patient. He was advised to increase water intake and provide a urine sample at the lab as ordered. Office to call with results.

## 2024-10-03 NOTE — TELEPHONE ENCOUNTER
I recommend urine testing and he needs to increase his water intake.  This is likely due to recent removal of his Moreau catheter.  ER precautions advised.

## 2024-10-03 NOTE — TELEPHONE ENCOUNTER
Patient of Ted, last seen yesterday and had Enamorado removed    Today he woke and noticed blood in his urine, states it is bright red, no clots. Burning with urination as well. Patient states he has not had very much to drink today.    Discussed some of these symptoms could be from the enamorado catheter. Advised to increase water to 64 ounces daily, avoid bladder irritants, call back if new or worsening symptoms    Taking Pyridium, last dose at 8 am this morning. Advised to hold of on taking until provider responds. Informed he may need to go to the lab and provide a urine sample but needs to go 24 hours off of Pyridium, verbalized understanding    Please advise if patient  should go to the lab or monitor symptoms     #[phone]   Reason for Disposition   Questions about, urinary health    Answer Assessment - Initial Assessment Questions  1. When did you start with blood in your urine, and can you describe things in detail? Do you have any blood clots, is the hematuria continuous or intermittent and can you describe the color of the blood in your urine in relation to a beverage?   Today when he woke bright red urine continuous  2. Do you have lower back, flank, or lower abdominal/bladder pain?   Bladder pain, pain level 4 out of 10  3. Are you experiencing urinary frequency, urgency, pain or burning or any other changes in your urination like being unable to urinate?   Burning with every urination  4. Do you take any blood thinners?   ASA 81 mg   5. Have you recently taken rifampin or Pyridium? If yes, for what?   Taking Pyridium, last dose at 8 am  6. Have you had any recent urine testing or imaging? (UA with micro, urine culture, kidney ultrasound, CT scan)? Or any recent urologic surgery or procedures?   no  7. Have you ever had a workup for blood in the urine?   denies  8. Have you eaten a lot of red dye or beets recently?  denies  9. Do you have a history of bladder cancer?   denies    Protocols used:  Urology-Gross Hemaruria-ADULT-[place]

## 2024-10-03 NOTE — PROGRESS NOTES
I supervised the Advanced Practitioner on . I reviewed the Advanced Practitioner note and agree.    Russ Keith MD 10/03/24

## 2024-10-03 NOTE — TELEPHONE ENCOUNTER
As long as patient is passing urine and hematuria does not persist, continue to observe.
LVM for pt with urologist's message.   
Pt aware rx was sent. Also, wanted me to make you and urology aware that he had a significant amount of blood in his urine this morning. Only happened once, nothing yesterday. Please advise.   
Pt was in to see Urology yesterday. As he was at check out pt stated he has been having continuous nerve pain since having shingles. Pt was wondering if there is anything you could recommend? Please advise.   
Sent Rx for gabapentin 100mg bid to pharmacy  
No

## 2024-10-04 ENCOUNTER — HOSPITAL ENCOUNTER (EMERGENCY)
Facility: HOSPITAL | Age: 69
Discharge: HOME | DRG: 872 | End: 2024-10-05
Attending: EMERGENCY MEDICINE
Payer: MEDICARE

## 2024-10-04 ENCOUNTER — APPOINTMENT (OUTPATIENT)
Dept: LAB | Facility: HOSPITAL | Age: 69
DRG: 872 | End: 2024-10-04
Payer: MEDICARE

## 2024-10-04 ENCOUNTER — APPOINTMENT (EMERGENCY)
Dept: RADIOLOGY | Facility: HOSPITAL | Age: 69
DRG: 872 | End: 2024-10-04
Payer: MEDICARE

## 2024-10-04 DIAGNOSIS — N30.90 CYSTITIS: ICD-10-CM

## 2024-10-04 DIAGNOSIS — N12 PYELONEPHRITIS: Primary | ICD-10-CM

## 2024-10-04 DIAGNOSIS — R31.0 GROSS HEMATURIA: ICD-10-CM

## 2024-10-04 LAB
BASOPHILS # BLD AUTO: 0.05 THOUSANDS/ΜL (ref 0–0.1)
BASOPHILS NFR BLD AUTO: 0 % (ref 0–1)
EOSINOPHIL # BLD AUTO: 0.05 THOUSAND/ΜL (ref 0–0.61)
EOSINOPHIL NFR BLD AUTO: 0 % (ref 0–6)
ERYTHROCYTE [DISTWIDTH] IN BLOOD BY AUTOMATED COUNT: 14.3 % (ref 11.6–15.1)
HCT VFR BLD AUTO: 40 % (ref 36.5–49.3)
HGB BLD-MCNC: 12.9 G/DL (ref 12–17)
IMM GRANULOCYTES # BLD AUTO: 0.05 THOUSAND/UL (ref 0–0.2)
IMM GRANULOCYTES NFR BLD AUTO: 0 % (ref 0–2)
LYMPHOCYTES # BLD AUTO: 1.12 THOUSANDS/ΜL (ref 0.6–4.47)
LYMPHOCYTES NFR BLD AUTO: 7 % (ref 14–44)
MCH RBC QN AUTO: 29.1 PG (ref 26.8–34.3)
MCHC RBC AUTO-ENTMCNC: 32.3 G/DL (ref 31.4–37.4)
MCV RBC AUTO: 90 FL (ref 82–98)
MONOCYTES # BLD AUTO: 1 THOUSAND/ΜL (ref 0.17–1.22)
MONOCYTES NFR BLD AUTO: 6 % (ref 4–12)
NEUTROPHILS # BLD AUTO: 14.07 THOUSANDS/ΜL (ref 1.85–7.62)
NEUTS SEG NFR BLD AUTO: 87 % (ref 43–75)
NRBC BLD AUTO-RTO: 0 /100 WBCS
PLATELET # BLD AUTO: 208 THOUSANDS/UL (ref 149–390)
PMV BLD AUTO: 10.6 FL (ref 8.9–12.7)
RBC # BLD AUTO: 4.44 MILLION/UL (ref 3.88–5.62)
WBC # BLD AUTO: 16.34 THOUSAND/UL (ref 4.31–10.16)

## 2024-10-04 PROCEDURE — 96361 HYDRATE IV INFUSION ADD-ON: CPT

## 2024-10-04 PROCEDURE — 96374 THER/PROPH/DIAG INJ IV PUSH: CPT

## 2024-10-04 PROCEDURE — 80048 BASIC METABOLIC PNL TOTAL CA: CPT | Performed by: EMERGENCY MEDICINE

## 2024-10-04 PROCEDURE — 71045 X-RAY EXAM CHEST 1 VIEW: CPT

## 2024-10-04 PROCEDURE — 83605 ASSAY OF LACTIC ACID: CPT | Performed by: EMERGENCY MEDICINE

## 2024-10-04 PROCEDURE — 84145 PROCALCITONIN (PCT): CPT | Performed by: EMERGENCY MEDICINE

## 2024-10-04 PROCEDURE — 87086 URINE CULTURE/COLONY COUNT: CPT

## 2024-10-04 PROCEDURE — 99285 EMERGENCY DEPT VISIT HI MDM: CPT

## 2024-10-04 PROCEDURE — 87077 CULTURE AEROBIC IDENTIFY: CPT

## 2024-10-04 PROCEDURE — 36415 COLL VENOUS BLD VENIPUNCTURE: CPT | Performed by: EMERGENCY MEDICINE

## 2024-10-04 PROCEDURE — 99285 EMERGENCY DEPT VISIT HI MDM: CPT | Performed by: EMERGENCY MEDICINE

## 2024-10-04 PROCEDURE — 85025 COMPLETE CBC W/AUTO DIFF WBC: CPT | Performed by: EMERGENCY MEDICINE

## 2024-10-04 PROCEDURE — 87804 INFLUENZA ASSAY W/OPTIC: CPT | Performed by: EMERGENCY MEDICINE

## 2024-10-04 PROCEDURE — 87811 SARS-COV-2 COVID19 W/OPTIC: CPT | Performed by: EMERGENCY MEDICINE

## 2024-10-04 PROCEDURE — 87186 SC STD MICRODIL/AGAR DIL: CPT

## 2024-10-04 RX ORDER — KETOROLAC TROMETHAMINE 30 MG/ML
15 INJECTION, SOLUTION INTRAMUSCULAR; INTRAVENOUS ONCE
Status: COMPLETED | OUTPATIENT
Start: 2024-10-04 | End: 2024-10-04

## 2024-10-04 RX ADMIN — KETOROLAC TROMETHAMINE 15 MG: 30 INJECTION, SOLUTION INTRAMUSCULAR at 23:44

## 2024-10-04 RX ADMIN — SODIUM CHLORIDE 1000 ML: 0.9 INJECTION, SOLUTION INTRAVENOUS at 23:42

## 2024-10-05 ENCOUNTER — APPOINTMENT (EMERGENCY)
Dept: CT IMAGING | Facility: HOSPITAL | Age: 69
DRG: 872 | End: 2024-10-05
Payer: MEDICARE

## 2024-10-05 VITALS
WEIGHT: 289.68 LBS | DIASTOLIC BLOOD PRESSURE: 55 MMHG | BODY MASS INDEX: 41.57 KG/M2 | SYSTOLIC BLOOD PRESSURE: 102 MMHG | OXYGEN SATURATION: 92 % | HEART RATE: 74 BPM | TEMPERATURE: 99.2 F | RESPIRATION RATE: 17 BRPM

## 2024-10-05 LAB
ANION GAP SERPL CALCULATED.3IONS-SCNC: 11 MMOL/L (ref 4–13)
APTT PPP: 30 SECONDS (ref 23–34)
BACTERIA UR QL AUTO: ABNORMAL /HPF
BILIRUB UR QL STRIP: NEGATIVE
BUN SERPL-MCNC: 41 MG/DL (ref 5–25)
CALCIUM SERPL-MCNC: 9.2 MG/DL (ref 8.4–10.2)
CHLORIDE SERPL-SCNC: 100 MMOL/L (ref 96–108)
CLARITY UR: ABNORMAL
CO2 SERPL-SCNC: 23 MMOL/L (ref 21–32)
COLOR UR: ABNORMAL
CREAT SERPL-MCNC: 1.89 MG/DL (ref 0.6–1.3)
FLUAV AG UPPER RESP QL IA.RAPID: NEGATIVE
FLUBV AG UPPER RESP QL IA.RAPID: NEGATIVE
GFR SERPL CREATININE-BSD FRML MDRD: 34 ML/MIN/1.73SQ M
GLUCOSE SERPL-MCNC: 129 MG/DL (ref 65–140)
GLUCOSE UR STRIP-MCNC: ABNORMAL MG/DL
HGB UR QL STRIP.AUTO: ABNORMAL
INR PPP: 1.16 (ref 0.85–1.19)
KETONES UR STRIP-MCNC: NEGATIVE MG/DL
LACTATE SERPL-SCNC: 1.4 MMOL/L (ref 0.5–2)
LEUKOCYTE ESTERASE UR QL STRIP: ABNORMAL
NITRITE UR QL STRIP: NEGATIVE
NON-SQ EPI CELLS URNS QL MICRO: ABNORMAL /HPF
PH UR STRIP.AUTO: 5.5 [PH]
POTASSIUM SERPL-SCNC: 4 MMOL/L (ref 3.5–5.3)
PROCALCITONIN SERPL-MCNC: 0.38 NG/ML
PROT UR STRIP-MCNC: ABNORMAL MG/DL
PROTHROMBIN TIME: 15.3 SECONDS (ref 12.3–15)
RBC #/AREA URNS AUTO: ABNORMAL /HPF
SARS-COV+SARS-COV-2 AG RESP QL IA.RAPID: NEGATIVE
SODIUM SERPL-SCNC: 134 MMOL/L (ref 135–147)
SP GR UR STRIP.AUTO: 1.01 (ref 1–1.03)
UROBILINOGEN UR STRIP-ACNC: <2 MG/DL
WBC #/AREA URNS AUTO: ABNORMAL /HPF

## 2024-10-05 PROCEDURE — 87077 CULTURE AEROBIC IDENTIFY: CPT | Performed by: EMERGENCY MEDICINE

## 2024-10-05 PROCEDURE — 87086 URINE CULTURE/COLONY COUNT: CPT | Performed by: EMERGENCY MEDICINE

## 2024-10-05 PROCEDURE — 87040 BLOOD CULTURE FOR BACTERIA: CPT | Performed by: EMERGENCY MEDICINE

## 2024-10-05 PROCEDURE — 36415 COLL VENOUS BLD VENIPUNCTURE: CPT | Performed by: EMERGENCY MEDICINE

## 2024-10-05 PROCEDURE — 74176 CT ABD & PELVIS W/O CONTRAST: CPT

## 2024-10-05 PROCEDURE — 87154 CUL TYP ID BLD PTHGN 6+ TRGT: CPT | Performed by: EMERGENCY MEDICINE

## 2024-10-05 PROCEDURE — 87186 SC STD MICRODIL/AGAR DIL: CPT | Performed by: EMERGENCY MEDICINE

## 2024-10-05 PROCEDURE — 85730 THROMBOPLASTIN TIME PARTIAL: CPT | Performed by: EMERGENCY MEDICINE

## 2024-10-05 PROCEDURE — 81001 URINALYSIS AUTO W/SCOPE: CPT | Performed by: EMERGENCY MEDICINE

## 2024-10-05 PROCEDURE — 85610 PROTHROMBIN TIME: CPT | Performed by: EMERGENCY MEDICINE

## 2024-10-05 RX ORDER — CEFPODOXIME PROXETIL 200 MG/1
200 TABLET, FILM COATED ORAL 2 TIMES DAILY
Qty: 20 TABLET | Refills: 0 | Status: SHIPPED | OUTPATIENT
Start: 2024-10-05 | End: 2024-10-11

## 2024-10-05 RX ORDER — CEFPODOXIME PROXETIL 200 MG/1
200 TABLET, FILM COATED ORAL ONCE
Status: COMPLETED | OUTPATIENT
Start: 2024-10-05 | End: 2024-10-05

## 2024-10-05 RX ORDER — CEFPODOXIME PROXETIL 200 MG/1
TABLET, FILM COATED ORAL
Status: DISPENSED
Start: 2024-10-05 | End: 2024-10-05

## 2024-10-05 RX ORDER — CEFPODOXIME PROXETIL 200 MG/1
200 TABLET, FILM COATED ORAL 2 TIMES DAILY WITH MEALS
Status: DISCONTINUED | OUTPATIENT
Start: 2024-10-05 | End: 2024-10-05

## 2024-10-05 RX ADMIN — CEFPODOXIME PROXETIL 200 MG: 200 TABLET, FILM COATED ORAL at 04:48

## 2024-10-05 NOTE — ED RE-EVALUATION NOTE
Final diagnoses:   Pyelonephritis   Cystitis     ED Disposition       ED Disposition   Discharge    Condition   Stable    Date/Time   Sat Oct 5, 2024  4:22 AM    Comment   Christy Haro discharge to home/self care.                   Assessment & Plan       Medical Decision Making  Amount and/or Complexity of Data Reviewed  Labs: ordered. Decision-making details documented in ED Course.  Radiology: ordered and independent interpretation performed. Decision-making details documented in ED Course.    Risk  Prescription drug management.        ED Course as of 10/05/24 0454   Fri Oct 04, 2024   2350 WBC(!): 16.34  SIRS positive.   Sat Oct 05, 2024   0003 LACTIC ACID: 1.4  Normal lactate   0024 Temperature(!): 101.3 °F (38.5 °C)  Significant improvement.   0130 WBC, UA(!): Innumerable   0130 Bacteria, UA(!): Innumerable  Consistent with UTI.   0418 CT abdomen pelvis wo contrast   0452 I offered the patient admission vs monitoring sx at home. He adamantly wanted to leave. Will treat for pyelonephritis given UTI + fever, per imaging does not appear to be retaining. Fever improved. DC with strict return precautions.       Medications   sodium chloride 0.9 % bolus 1,000 mL (0 mL Intravenous Stopped 10/5/24 0051)   ketorolac (TORADOL) injection 15 mg (15 mg Intravenous Given 10/4/24 2344)   cefpodoxime (VANTIN) tablet 200 mg (200 mg Oral Given 10/5/24 0448)       ED Risk Strat Scores                           SBIRT 20yo+      Flowsheet Row Most Recent Value   Initial Alcohol Screen: US AUDIT-C     1. How often do you have a drink containing alcohol? 0 Filed at: 10/04/2024 2320   2. How many drinks containing alcohol do you have on a typical day you are drinking?  0 Filed at: 10/04/2024 2320   3a. Male UNDER 65: How often do you have five or more drinks on one occasion? 0 Filed at: 10/04/2024 2320   Audit-C Score 0 Filed at: 10/04/2024 2320   LOPEZ: How many times in the past year have you...    Used an illegal drug or used a  prescription medication for non-medical reasons? Never Filed at: 10/04/2024 2320                            History of Present Illness       Chief Complaint   Patient presents with    Fever     Pt presents with fever x 1 day, dx with shingles 2 weeks ago, enamorado catheter was removed on Wednesday       Past Medical History:   Diagnosis Date    Arthritis     last assessed 7/1/15    Bronchitis 02/02/2023    Chronic diastolic (congestive) heart failure (HCC)     Diabetes mellitus (HCC)     type 2-last assessed 1/28/15    Diabetes mellitus (HCC) 03/16/2018    Dyslipidemia     GERD (gastroesophageal reflux disease)     Hypertension     Hypertensive urgency 03/16/2023    Irregular heart beat     last assessed 7/1/15    Obstructive sleep apnea     Palpitations     last assessed 9/7/17    Sexual dysfunction     last assessed 7/1/15    Thyroid disease     last assessed 7/1/15      Past Surgical History:   Procedure Laterality Date    COLONOSCOPY      TONSILLECTOMY        Family History   Problem Relation Age of Onset    Hypertension Mother         essential    Stroke Mother     Hypertension Father         essential    Heart defect Father         cardiac disorder    Stroke Brother     Hypertension Brother       Social History     Tobacco Use    Smoking status: Former     Current packs/day: 1.00     Types: Cigarettes     Passive exposure: Never    Smokeless tobacco: Never    Tobacco comments:     former smoker-quit 27 yrs ago 1pppd x 30 yrs as per [employer]   Vaping Use    Vaping status: Never Used   Substance Use Topics    Alcohol use: Never    Drug use: Never      E-Cigarette/Vaping    E-Cigarette Use Never User       E-Cigarette/Vaping Substances    Nicotine No     THC No     CBD No     Flavoring No     Other No     Unknown No       I have reviewed and agree with the history as documented.     HPI    Review of Systems        Objective       ED Triage Vitals [10/04/24 2321]   Temperature Pulse Blood Pressure Respirations SpO2  Patient Position - Orthostatic VS   (!) 103.8 °F (39.9 °C) 95 134/61 20 94 % Lying      Temp Source Heart Rate Source BP Location FiO2 (%) Pain Score    Temporal -- Right arm -- No Pain      Vitals      Date and Time Temp Pulse SpO2 Resp BP Pain Score FACES Pain Rating User   10/05/24 0423 99.2 °F (37.3 °C) -- -- -- -- -- -- BB   10/05/24 0100 -- 74 92 % 17 102/55 -- -- CP   10/05/24 0008 101.3 °F (38.5 °C) -- -- -- -- -- -- BB   10/04/24 2344 -- -- -- -- -- Med Not Given for Pain - for MAR use only -- BB   10/04/24 2321 103.8 °F (39.9 °C) 95 94 % 20 134/61 No Pain -- BB            Physical Exam    Results Reviewed       Procedure Component Value Units Date/Time    Urine Microscopic [[id number]]  (Abnormal) Collected: 10/05/24 0050    Lab Status: Final result Specimen: Urine, Clean Catch Updated: 10/05/24 0126     RBC, UA 4-10 /hpf      WBC, UA Innumerable /hpf      Epithelial Cells Occasional /hpf      Bacteria, UA Innumerable /hpf     Urine culture [[id number]] Collected: 10/05/24 0050    Lab Status: In process Specimen: Urine, Clean Catch Updated: 10/05/24 0126    UA w Reflex to Microscopic w Reflex to Culture [[id number]]  (Abnormal) Collected: 10/05/24 0050    Lab Status: Final result Specimen: Urine, Clean Catch Updated: 10/05/24 0126     Color, UA Dark Yellow     Clarity, UA Slightly Cloudy     Specific Gravity, UA 1.015     pH, UA 5.5     Leukocytes, UA Large     Nitrite, UA Negative     Protein, UA 30 (1+) mg/dl      Glucose, UA 1000 (1%) mg/dl      Ketones, UA Negative mg/dl      Urobilinogen, UA <2.0 mg/dl      Bilirubin, UA Negative     Occult Blood, UA Small    Protime-INR [[id number]]  (Abnormal) Collected: 10/05/24 0008    Lab Status: Final result Specimen: Blood from Arm, Right Updated: 10/05/24 0039     Protime 15.3 seconds      INR 1.16    Narrative:      INR Therapeutic Range    Indication                                             INR Range      Atrial Fibrillation                                                2.0-3.0  Hypercoagulable State                                    2.0.2.3  Left Ventricular Asist Device                            2.0-3.0  Mechanical Heart Valve                                  -    Aortic(with afib, MI, embolism, HF, LA enlargement,    and/or coagulopathy)                                     2.0-3.0 (2.5-3.5)     Mitral                                                             2.5-3.5  Prosthetic/Bioprosthetic Heart Valve               2.0-3.0  Venous thromboembolism (VTE: VT, PE        2.0-3.0    APTT [[id number]]  (Normal) Collected: 10/05/24 0008    Lab Status: Final result Specimen: Blood from Arm, Right Updated: 10/05/24 0039     PTT 30 seconds     FLU/COVID Rapid Antigen (30 min. TAT) - Preferred screening test in ED [[id number]]  (Normal) Collected: 10/04/24 2334    Lab Status: Final result Specimen: Nares from Nose Updated: 10/05/24 0013     SARS COV Rapid Antigen Negative     Influenza A Rapid Antigen Negative     Influenza B Rapid Antigen Negative    Narrative:      This test has been performed using the [employer] Lelo 2 FLU+SARS Antigen test under the Emergency Use Authorization (EUA). This test has been validated by the  and verified by the performing laboratory. The Lelo uses lateral flow immunofluorescent sandwich assay to detect SARS-COV, Influenza A and Influenza B Antigen.     The Quidel Lelo 2 SARS Antigen test does not differentiate between SARS-CoV and SARS-CoV-2.     Negative results are presumptive and may be confirmed with a molecular assay, if necessary, for patient management. Negative results do not rule out SARS-CoV-2 or influenza infection and should not be used as the sole basis for treatment or patient management decisions. A negative test result may occur if the level of antigen in a sample is below the limit of detection of this test.     Positive results are indicative of the presence of viral antigens, but do not rule out bacterial  infection or co-infection with other viruses.     All test results should be used as an adjunct to clinical observations and other information available to the provider.    FOR PEDIATRIC PATIENTS - copy/paste COVID Guidelines URL to browser: [email or web address]    Blood culture #1 [[id number]] Collected: 10/05/24 0008    Lab Status: In process Specimen: Blood from Arm, Right Updated: 10/05/24 0012    Blood culture #2 [[id number]] Collected: 10/05/24 0008    Lab Status: In process Specimen: Blood from Arm, Left Updated: 10/05/24 0012    Procalcitonin [[id number]]  (Abnormal) Collected: 10/04/24 2334    Lab Status: Final result Specimen: Blood from Arm, Left Updated: 10/05/24 0012     Procalcitonin 0.38 ng/ml     Basic metabolic panel [[id number]]  (Abnormal) Collected: 10/04/24 2334    Lab Status: Final result Specimen: Blood from Arm, Left Updated: 10/05/24 0002     Sodium 134 mmol/L      Potassium 4.0 mmol/L      Chloride 100 mmol/L      CO2 23 mmol/L      ANION GAP 11 mmol/L      BUN 41 mg/dL      Creatinine 1.89 mg/dL      Glucose 129 mg/dL      Calcium 9.2 mg/dL      eGFR 34 ml/min/1.73sq m     Narrative:      National Kidney Disease Foundation guidelines for Chronic Kidney Disease (CKD):     Stage 1 with normal or high GFR (GFR > 90 mL/min/1.73 square meters)    Stage 2 Mild CKD (GFR = 60-89 mL/min/1.73 square meters)    Stage 3A Moderate CKD (GFR = 45-59 mL/min/1.73 square meters)    Stage 3B Moderate CKD (GFR = 30-44 mL/min/1.73 square meters)    Stage 4 Severe CKD (GFR = 15-29 mL/min/1.73 square meters)    Stage 5 End Stage CKD (GFR <15 mL/min/1.73 square meters)  Note: GFR calculation is accurate only with a steady state creatinine    Lactic acid, plasma (w/reflex if result > 2.0) [[id number]]  (Normal) Collected: 10/04/24 2334    Lab Status: Final result Specimen: Blood from Arm, Left Updated: 10/05/24 0001     LACTIC ACID 1.4 mmol/L     Narrative:       Result may be elevated if tourniquet was used during collection.    CBC and differential [[id number]]  (Abnormal) Collected: 10/04/24 2334    Lab Status: Final result Specimen: Blood from Arm, Left Updated: 10/04/24 2345     WBC 16.34 Thousand/uL      RBC 4.44 Million/uL      Hemoglobin 12.9 g/dL      Hematocrit 40.0 %      MCV 90 fL      MCH 29.1 pg      MCHC 32.3 g/dL      RDW 14.3 %      MPV 10.6 fL      Platelets 208 Thousands/uL      nRBC 0 /100 WBCs      Segmented % 87 %      Immature Grans % 0 %      Lymphocytes % 7 %      Monocytes % 6 %      Eosinophils Relative 0 %      Basophils Relative 0 %      Absolute Neutrophils 14.07 Thousands/µL      Absolute Immature Grans 0.05 Thousand/uL      Absolute Lymphocytes 1.12 Thousands/µL      Absolute Monocytes 1.00 Thousand/µL      Eosinophils Absolute 0.05 Thousand/µL      Basophils Absolute 0.05 Thousands/µL             CT abdomen pelvis wo contrast   Final Interpretation by Jose Nelson MD (10/05 9036)      1.  Mild thickening of the urinary bladder wall which may be due to cystitis, correlate with urinalysis.   2.  Bilateral renal cysts. No hydronephrosis.   3.  Small to moderate hiatal hernia.         Workstation performed: [id number]         XR chest 1 view portable   ED Interpretation by Shay Carney MD (10/04 2340)   No obvious consolidation on my interpretation          Procedures    ED Medication and Procedure Management   Prior to Admission Medications   Prescriptions Last Dose Informant Patient Reported? Taking?   Empagliflozin 25 MG TABS  Self No No   Sig: Take 1 tablet (25 mg total) by mouth daily   Lancets (onetouch ultrasoft) lancets  Self No No   Sig: Check glucose daily   Red Yeast Rice Extract (RED YEAST RICE PO)  Self Yes No   Sig: Take by mouth   Vitamin D, Cholecalciferol, 50 MCG (2000 UT) CAPS  Self No No   Sig: Take 50 mcg by mouth daily   amLODIPine (NORVASC) 10 mg tablet  Self No No   Sig: Take 1 tablet (10 mg total) by mouth daily    aspirin 81 mg chewable tablet  Self Yes No   Sig: Chew 81 mg daily   atorvastatin (LIPITOR) 10 mg tablet  Self No No   Sig: Take 1 tablet by mouth once daily   enalapril (VASOTEC) 10 mg tablet  Self No No   Sig: Take 1 tablet (10 mg total) by mouth 4 (four) times a day 2 tabs in the am, 1 tab in the afternoon, 1 tab @ hs.   flecainide (TAMBOCOR) 150 MG tablet  Self No No   Sig: Take 1 tablet (150 mg total) by mouth see administration instructions For palpitations lasting longer than 5 minutes, take one 150 mg tablet.  If palpitations last longer than 30 minutes, take another 150 mg tablet.  If palpitations persist, please go to the nearest emergency department.    Do not exceed 300 mg in one day.   Patient not taking: Reported on 7/8/2024   furosemide (LASIX) 40 mg tablet  Self No No   Sig: Take 1.5 tablets (60 mg total) by mouth daily   gabapentin (NEURONTIN) 100 mg capsule   No No   Sig: Take 1 capsule (100 mg total) by mouth 2 (two) times a day   glucose blood (OneTouch Verio) test strip  Self No No   Sig: Use 1 each daily   hydrALAZINE (APRESOLINE) 25 mg tablet  Self No No   Sig: Take 1 tablet (25 mg total) by mouth 3 (three) times a day   indomethacin (INDOCIN) 50 mg capsule   No No   Sig: Take 1 capsule (50 mg total) by mouth daily as needed for mild pain PRN with the gout   ketoconazole (NIZORAL) 2 % shampoo  Self No No   Sig: Apply 1 Application topically 2 (two) times a week   metoprolol succinate (TOPROL-XL) 25 mg 24 hr tablet  Self No No   Sig: Take 1 tablet (25 mg total) by mouth daily   metoprolol succinate (TOPROL-XL) 50 mg 24 hr tablet  Self No No   Sig: Take 1 tablet (50 mg total) by mouth daily   multivitamin (THERAGRAN) TABS  Self Yes No   Sig: Take 1 tablet by mouth daily   omeprazole (PriLOSEC) 40 MG capsule  Self No No   Sig: Take 1 capsule (40 mg total) by mouth daily   phenazopyridine (PYRIDIUM) 200 mg tablet  Self No No   Sig: Take 1 tablet (200 mg total) by mouth 3 (three) times a day    potassium chloride (Klor-Con M20) 20 mEq tablet  Self No No   Sig: Take 1 tablet (20 mEq total) by mouth daily   semaglutide, 0.25 or 0.5 mg/dose, (Ozempic, 0.25 or 0.5 MG/DOSE,) 2 mg/3 mL injection pen  Self No No   Sig: E11.65 inject 0.25 mg weekly for 4-weeks, then 0.5 mg weekly thereafter   tamsulosin (FLOMAX) 0.4 mg   No No   Sig: Take 1 capsule (0.4 mg total) by mouth daily with dinner   triamcinolone (KENALOG) 0.1 % lotion  Self No No   Sig: Apply topically 2 (two) times a day   triamcinolone (KENALOG) 0.5 % cream  Self Yes No   valACYclovir (VALTREX) 1,000 mg tablet   No No   Sig: Take 1 tablet (1,000 mg total) by mouth 3 (three) times a day for 7 days      Facility-Administered Medications: None     Patient's Medications   Discharge Prescriptions    CEFPODOXIME (VANTIN) 200 MG TABLET    Take 1 tablet (200 mg total) by mouth 2 (two) times a day for 10 days       Start Date: 10/5/2024 End Date: 10/15/2024       Order Dose: 200 mg       Quantity: 20 tablet    Refills: 0     No discharge procedures on file.  ED SEPSIS DOCUMENTATION   Time reflects when diagnosis was documented in both MDM as applicable and the Disposition within this note       Time User Action Codes Description Comment    10/5/2024  4:22 AM Will Mckinley [N12] Pyelonephritis     10/5/2024  4:22 AM Will Mckinley [N30.90] Cystitis                  Will Mckinley MD  10/05/24 0454

## 2024-10-05 NOTE — DISCHARGE INSTRUCTIONS
Please follow all return precautions.    If your symptoms worsen in the next 24-48 hours, you do not feel comfortable being at home, or are unable to tolerate antibiotics, please return immediately. Thank you.

## 2024-10-05 NOTE — ED PROVIDER NOTES
Final Diagnosis:  1. Pyelonephritis    2. Cystitis        MDM:  -Patient is febrile.  Listed allergy to Tylenol.  Will provide with NSAIDs.  - Will evaluate for sources of infection, flu/COVID, UTI, pneumonia.  Given age and significant fever will provide blood work to screen for electrolyte abnormalities, significant leukocytosis  - Patient discovered to have presentation consistent with urinary tract infection, pyelonephritis.  Started on antibiotics.    Chief Complaint   Patient presents with    Fever     Pt presents with fever x 1 day, dx with shingles 2 weeks ago, enamorado catheter was removed on Wednesday     HPI  Patient presents for evaluation of fever.  Apparently this started today.  Overall patient states that he also feels weak.  Denies any cough or congestion.  No sergey chest pain, abdominal pain.  States that he does have some dysuria but he attributes this to recently having a Enamorado catheter removed.  No known sick contacts.    Review of records show that patient was diagnosed with shingles a couple weeks ago.  Shortly after that he started to have urinary retention.  It was thought that the shingles may have caused some of his urinary retention.  He was treated with full course of Valtrex.  He contacted primary care today and was started on the low-dose Neurontin.      Unless otherwise specified:  - No language barrier.   - History obtained from patient.   - There are no limitations to the history obtained.  - Previous charting was reviewed    PMH:   has a past medical history of Arthritis, Bronchitis (02/02/2023), Chronic diastolic (congestive) heart failure (HCC), Diabetes mellitus (HCC), Diabetes mellitus (HCC) (03/16/2018), Dyslipidemia, GERD (gastroesophageal reflux disease), Hypertension, Hypertensive urgency (03/16/2023), Irregular heart beat, Obstructive sleep apnea, Palpitations, Sexual dysfunction, and Thyroid disease.    PSH:   has a past surgical history that includes Colonoscopy and  TONSILLECTOMY.       ROS:  Review of Systems   - 13 point ROS was performed and all are normal unless stated in the history above.   - Nursing note reviewed. Vitals reviewed.   - Orders placed by myself and/or advanced practitioner / resident.        PE:   Vitals:    10/04/24 2321 10/05/24 0008 10/05/24 0100 10/05/24 0423   BP: 134/61  102/55    BP Location: Right arm      Pulse: 95  74    Resp: 20  17    Temp: (!) 103.8 °F (39.9 °C) (!) 101.3 °F (38.5 °C)  99.2 °F (37.3 °C)   TempSrc: Temporal Temporal  Oral   SpO2: 94%  92%    Weight: 131 kg (289 lb 11 oz)        Vitals reviewed by me.     Patient appears to feel unwell but is nondistressed.  No actual work of breathing.  No abdominal pain.  Saturating well on room air.  Unless otherwise specified above:    General: VS reviewed  Appears in NAD    Head: Normocephalic, atraumatic  Eyes: EOM-I.     ENT: Atraumatic external nose and ears.    No drooling.     Neck: No JVD.    CV: No pallor noted  Lungs:   No tachypnea  No respiratory distress    Abdomen:  Soft, non-tender, non-distended    MSK:   No obvious deformity    Skin: Dry, intact. No obvious rash.    Psychiatric/Behavioral: Appropriate mood and affect   Exam: deferred    Physical Exam     Procedures   - Nursing note reviewed.                      CT abdomen pelvis wo contrast   Final Result      1.  Mild thickening of the urinary bladder wall which may be due to cystitis, correlate with urinalysis.   2.  Bilateral renal cysts. No hydronephrosis.   3.  Small to moderate hiatal hernia.         Workstation performed: [id number]         XR chest 1 view portable   ED Interpretation   No obvious consolidation on my interpretation      Final Result      No acute cardiopulmonary disease.            Workstation performed: [id number]           Orders Placed This Encounter   Procedures    FLU/COVID Rapid Antigen (30 min. TAT) - Preferred screening test in ED    Blood culture #1    Blood culture #2    Urine culture    XR  chest 1 view portable    CT abdomen pelvis wo contrast    CBC and differential    Basic metabolic panel    Lactic acid, plasma (w/reflex if result > 2.0)    UA w Reflex to Microscopic w Reflex to Culture    Procalcitonin    Protime-INR    APTT    Urine Microscopic     Labs Reviewed   CBC AND DIFFERENTIAL - Abnormal       Result Value Ref Range Status    WBC 16.34 (*) 4.31 - 10.16 Thousand/uL Final    RBC 4.44  3.88 - 5.62 Million/uL Final    Hemoglobin 12.9  12.0 - 17.0 g/dL Final    Hematocrit 40.0  36.5 - 49.3 % Final    MCV 90  82 - 98 fL Final    MCH 29.1  26.8 - 34.3 pg Final    MCHC 32.3  31.4 - 37.4 g/dL Final    RDW 14.3  11.6 - 15.1 % Final    MPV 10.6  8.9 - 12.7 fL Final    Platelets 208  149 - 390 Thousands/uL Final    nRBC 0  /100 WBCs Final    Segmented % 87 (*) 43 - 75 % Final    Immature Grans % 0  0 - 2 % Final    Lymphocytes % 7 (*) 14 - 44 % Final    Monocytes % 6  4 - 12 % Final    Eosinophils Relative 0  0 - 6 % Final    Basophils Relative 0  0 - 1 % Final    Absolute Neutrophils 14.07 (*) 1.85 - 7.62 Thousands/µL Final    Absolute Immature Grans 0.05  0.00 - 0.20 Thousand/uL Final    Absolute Lymphocytes 1.12  0.60 - 4.47 Thousands/µL Final    Absolute Monocytes 1.00  0.17 - 1.22 Thousand/µL Final    Eosinophils Absolute 0.05  0.00 - 0.61 Thousand/µL Final    Basophils Absolute 0.05  0.00 - 0.10 Thousands/µL Final   BASIC METABOLIC PANEL - Abnormal    Sodium 134 (*) 135 - 147 mmol/L Final    Potassium 4.0  3.5 - 5.3 mmol/L Final    Chloride 100  96 - 108 mmol/L Final    CO2 23  21 - 32 mmol/L Final    ANION GAP 11  4 - 13 mmol/L Final    BUN 41 (*) 5 - 25 mg/dL Final    Creatinine 1.89 (*) 0.60 - 1.30 mg/dL Final    Comment: Standardized to IDMS reference method    Glucose 129  65 - 140 mg/dL Final    Comment: If the patient is fasting, the ADA then defines impaired fasting glucose as > 100 mg/dL and diabetes as > or equal to 123 mg/dL.    Calcium 9.2  8.4 - 10.2 mg/dL Final    eGFR 34   ml/min/1.73sq m Final    Narrative:     National Kid[employer] Disease Foundation guidelines for Chronic Kidney Disease (CKD):     Stage 1 with normal or high GFR (GFR > 90 mL/min/1.73 square meters)    Stage 2 Mild CKD (GFR = 60-89 mL/min/1.73 square meters)    Stage 3A Moderate CKD (GFR = 45-59 mL/min/1.73 square meters)    Stage 3B Moderate CKD (GFR = 30-44 mL/min/1.73 square meters)    Stage 4 Severe CKD (GFR = 15-29 mL/min/1.73 square meters)    Stage 5 End Stage CKD (GFR <15 mL/min/1.73 square meters)  Note: GFR calculation is accurate only with a steady state creatinine   UA W REFLEX TO MICROSCOPIC WITH REFLEX TO CULTURE - Abnormal    Color, UA Dark Yellow   Final    Clarity, UA Slightly Cloudy   Final    Specific Gravity, UA 1.015  1.005 - 1.030 Final    pH, UA 5.5  4.5, 5.0, 5.5, 6.0, 6.5, 7.0, 7.5, 8.0 Final    Leukocytes, UA Large (*) Negative Final    Nitrite, UA Negative  Negative Final    Protein, UA 30 (1+) (*) Negative mg/dl Final    Glucose, UA 1000 (1%) (*) Negative mg/dl Final    Comment: Elevated glucose may cause false negative leukocyte esterase    Ketones, UA Negative  Negative mg/dl Final    Urobilinogen, UA <2.0  <2.0 mg/dl mg/dl Final    Bilirubin, UA Negative  Negative Final    Occult Blood, UA Small (*) Negative Final   PROCALCITONIN TEST - Abnormal    Procalcitonin 0.38 (*) <=0.25 ng/ml Final    Comment: Suspected Lower Respiratory Tract Infection (LRTI):  - LESS than or EQUAL to 0.25 ng/mL:   low likelihood for bacterial LRTI; antibiotics DISCOURAGED.  - GREATER than 0.25 ng/mL:   increased likelihood for bacterial LRTI; antibiotics ENCOURAGED.    Suspected Sepsis:  - Strongly consider initiating antibiotics in ALL UNSTABLE patients.  - LESS than or EQUAL to 0.5 ng/mL:   low likelihood for bacterial sepsis; antibiotics DISCOURAGED.  - GREATER than 0.5 ng/mL:   increased likelihood for bacterial sepsis; antibiotics ENCOURAGED.  - GREATER than 2 ng/mL:   high risk for severe sepsis / septic  shock; antibiotics strongly ENCOURAGED.    Decisions on antibiotic use should not be based solely on Procalcitonin (PCT) levels. If PCT is low but uncertainty exists with stopping antibiotics, repeat PCT in 6-24 hours to confirm the low level. If antibiotics are administered (regardless if initial PCT was high or low), repeat PCT every 1-2 days to consider early antibiotic cessation (when GREATER than 80% decrease from the peak OR when PCT drops below designated cutoffs, whichever comes first), so long as the infection is NOT one that typically requires prolonged treatment durations (e.g., bone/joint infections, endocarditis, Staph. aureus bacteremia).    Situations of FALSE-POSITIVE Procalcitonin values:  1) Newborns < 72 hours old  2) Massive stress from severe trauma / burns, major surgery, acute pancreatitis, cardiogenic / hemorrhagic shock, sickle cell crisis, or other organ perfusion abnormalities  3) Malaria and some Candidal infections  4) Treatment with agents that stimulate cytokines (e.g., OKT3, anti-lymphocyte globulins, alemtuzumab, IL-2, granulocyte transfusion [NOT GCSFs])  5) Chronic renal disease causes elevated baseline levels (consider GREATER than 0.75 ng/mL as an abnormal cut-off); initiating HD/CRRT may cause transient decreases  6) Paraneoplastic syndromes from medullary thyroid or SCLC, some forms of vasculitis, and acute lidgo-sk-mhyu disease    Situations of FALSE-NEGATIVE Procalcitonin values:  1) Too early in clinical course for PCT to have reached its peak (may repeat in 6-24 hours to confirm low level)  2) Localized infection WITHOUT systemic (SIRS / sepsis) response (e.g., an abscess, osteomyelitis, cystitis)  3) Mycobacteria (e.g., Tuberculosis, MAC)  4) Cystic fibrosis exacerbations     PROTIME-INR - Abnormal    Protime 15.3 (*) 12.3 - 15.0 seconds Final    INR 1.16  0.85 - 1.19 Final    Narrative:     INR Therapeutic Range    Indication                                              INR Range      Atrial Fibrillation                                               2.0-3.0  Hypercoagulable State                                    2.0.2.3  Left Ventricular Asist Device                            2.0-3.0  Mechanical Heart Valve                                  -    Aortic(with afib, MI, embolism, HF, LA enlargement,    and/or coagulopathy)                                     2.0-3.0 (2.5-3.5)     Mitral                                                             2.5-3.5  Prosthetic/Bioprosthetic Heart Valve               2.0-3.0  Venous thromboembolism (VTE: VT, PE        2.0-3.0   URINE MICROSCOPIC - Abnormal    RBC, UA 4-10 (*) None Seen, 0-1, 1-2, 2-4, 0-5 /hpf Final    WBC, UA Innumerable (*) None Seen, 0-1, 1-2, 0-5, 2-4 /hpf Final    Epithelial Cells Occasional  None Seen, Occasional /hpf Final    Bacteria, UA Innumerable (*) None Seen, Occasional /hpf Final   COVID-19/INFLUENZA A/B RAPID ANTIGEN (30 MIN.TAT) - Normal    SARS COV Rapid Antigen Negative  Negative Final    Influenza A Rapid Antigen Negative  Negative Final    Influenza B Rapid Antigen Negative  Negative Final    Narrative:     This test has been performed using the Dragon Insideidel Lelo 2 FLU+SARS Antigen test under the Emergency Use Authorization (EUA). This test has been validated by the  and verified by the performing laboratory. The Lelo uses lateral flow immunofluorescent sandwich assay to detect SARS-COV, Influenza A and Influenza B Antigen.     The Quidel Lelo 2 SARS Antigen test does not differentiate between SARS-CoV and SARS-CoV-2.     Negative results are presumptive and may be confirmed with a molecular assay, if necessary, for patient management. Negative results do not rule out SARS-CoV-2 or influenza infection and should not be used as the sole basis for treatment or patient management decisions. A negative test result may occur if the level of antigen in a sample is below the limit of detection of this  test.     Positive results are indicative of the presence of viral antigens, but do not rule out bacterial infection or co-infection with other viruses.     All test results should be used as an adjunct to clinical observations and other information available to the provider.    FOR PEDIATRIC PATIENTS - copy/paste COVID Guidelines URL to browser: [email or web address]   LACTIC ACID, PLASMA (W/REFLEX IF RESULT > 2.0) - Normal    LACTIC ACID 1.4  0.5 - 2.0 mmol/L Final    Narrative:     Result may be elevated if tourniquet was used during collection.   APTT - Normal    PTT 30  23 - 34 seconds Final    Comment: Therapeutic Heparin Range =  60-90 seconds   BLOOD CULTURE   BLOOD CULTURE   URINE CULTURE         Final Diagnosis:  1. Pyelonephritis    2. Cystitis              Unless otherwise noted the patient's medications were reviewed and they should continue as directed.    Unless otherwise specified:  CC is exclusive from any separately billable procedures  CC is exclusive of treating other patients  CC is exclusive of teaching time   All splints are static    Medications   sodium chloride 0.9 % bolus 1,000 mL (0 mL Intravenous Stopped 10/5/24 0051)   ketorolac (TORADOL) injection 15 mg (15 mg Intravenous Given 10/4/24 4214)   cefpodoxime (VANTIN) tablet 200 mg (200 mg Oral Given 10/5/24 5358)     Time reflects when diagnosis was documented in both MDM as applicable and the Disposition within this note       Time User Action Codes Description Comment    10/5/2024  4:22 AM Will Mckinley Add [N12] Pyelonephritis     10/5/2024  4:22 AM Will Mckinley [N30.90] Cystitis           ED Disposition       ED Disposition   Discharge    Condition   Stable    Date/Time   Sat Oct 5, 2024  4:22 AM    Comment   Mhjennifer Haro discharge to home/self care.                   Follow-up Information       Follow up With Specialties Details Why Contact Info    Alma Delia  DO [facility] Call  For re-evaluation [address] [facility] [place] [address]  [phone]            Discharge Medication List as of 10/5/2024  4:49 AM        START taking these medications    Details   cefpodoxime (VANTIN) 200 mg tablet Take 1 tablet (200 mg total) by mouth 2 (two) times a day for 10 days, Starting Sat 10/5/2024, Until Tue 10/15/2024, Normal           CONTINUE these medications which have NOT CHANGED    Details   amLODIPine (NORVASC) 10 mg tablet Take 1 tablet (10 mg total) by mouth daily, Starting Mon 5/6/2024, Normal      aspirin 81 mg chewable tablet Chew 81 mg daily, Historical Med      atorvastatin (LIPITOR) 10 mg tablet Take 1 tablet by mouth once daily, Starting Sun 8/4/2024, Normal      Empagliflozin 25 MG TABS Take 1 tablet (25 mg total) by mouth daily, Starting Mon 8/26/2024, Until Sat 2/22/2025, Normal      enalapril (VASOTEC) 10 mg tablet Take 1 tablet (10 mg total) by mouth 4 (four) times a day 2 tabs in the am, 1 tab in the afternoon, 1 tab @ hs., Starting Mon 5/6/2024, Normal      flecainide (TAMBOCOR) 150 MG tablet Take 1 tablet (150 mg total) by mouth see administration instructions For palpitations lasting longer than 5 minutes, take one 150 mg tablet.  If palpitations last longer than 30 minutes, take another 150 mg tablet.  If palpitations persist, please go to  the nearest emergency department.    Do not exceed 300 mg in one day., Starting Thu 2/8/2024, Normal      furosemide (LASIX) 40 mg tablet Take 1.5 tablets (60 mg total) by mouth daily, Starting Fri 8/2/2024, Until Mon 7/28/2025, Normal      gabapentin (NEURONTIN) 100 mg capsule Take 1 capsule (100 mg total) by mouth 2 (two) times a day, Starting Thu 10/3/2024, Normal      glucose blood (OneTouch Verio) test strip Use 1 each daily, Starting Mon 3/11/2024, Normal      hydrALAZINE (APRESOLINE) 25 mg tablet Take 1 tablet (25 mg total) by mouth 3 (three) times a day, Starting Mon 5/6/2024,  Normal      indomethacin (INDOCIN) 50 mg capsule Take 1 capsule (50 mg total) by mouth daily as needed for mild pain PRN with the gout, Starting Thu 2/8/2024, Until Wed 10/2/2024 at 2359, Normal      ketoconazole (NIZORAL) 2 % shampoo Apply 1 Application topically 2 (two) times a week, Starting Thu 2/8/2024, Normal      Lancets (onetouch ultrasoft) lancets Check glucose daily, Normal      !! metoprolol succinate (TOPROL-XL) 25 mg 24 hr tablet Take 1 tablet (25 mg total) by mouth daily, Starting Mon 5/6/2024, Normal      !! metoprolol succinate (TOPROL-XL) 50 mg 24 hr tablet Take 1 tablet (50 mg total) by mouth daily, Starting Mon 5/6/2024, Normal      multivitamin (THERAGRAN) TABS Take 1 tablet by mouth daily, Historical Med      omeprazole (PriLOSEC) 40 MG capsule Take 1 capsule (40 mg total) by mouth daily, Starting Mon 5/6/2024, Normal      phenazopyridine (PYRIDIUM) 200 mg tablet Take 1 tablet (200 mg total) by mouth 3 (three) times a day, Starting Tue 9/24/2024, Normal      potassium chloride (Klor-Con M20) 20 mEq tablet Take 1 tablet (20 mEq total) by mouth daily, Starting Thu 5/9/2024, Normal      Red Yeast Rice Extract (RED YEAST RICE PO) Take by mouth, Historical Med      semaglutide, 0.25 or 0.5 mg/dose, (Ozempic, 0.25 or 0.5 MG/DOSE,) 2 mg/3 mL injection pen E11.65 inject 0.25 mg weekly for 4-weeks, then 0.5 mg weekly thereafter, Normal      tamsulosin (FLOMAX) 0.4 mg Take 1 capsule (0.4 mg total) by mouth daily with dinner, Starting Wed 10/2/2024, Normal      triamcinolone (KENALOG) 0.1 % lotion Apply topically 2 (two) times a day, Starting Thu 4/6/2023, Normal      triamcinolone (KENALOG) 0.5 % cream Starting Wed 9/15/2021, Historical Med      valACYclovir (VALTREX) 1,000 mg tablet Take 1 tablet (1,000 mg total) by mouth 3 (three) times a day for 7 days, Starting Wed 9/18/2024, Until Wed 10/2/2024, Normal      Vitamin D, Cholecalciferol, 50 MCG (2000 UT) CAPS Take 50 mcg by mouth daily, Starting Wed  1/4/2023, Normal       !! - Potential duplicate medications found. Please discuss with provider.        No discharge procedures on file.  Prior to Admission Medications   Prescriptions Last Dose Informant Patient Reported? Taking?   Empagliflozin 25 MG TABS  Self No No   Sig: Take 1 tablet (25 mg total) by mouth daily   Lancets (onetouch ultrasoft) lancets  Self No No   Sig: Check glucose daily   Red Yeast Rice Extract (RED YEAST RICE PO)  Self Yes No   Sig: Take by mouth   Vitamin D, Cholecalciferol, 50 MCG (2000 UT) CAPS  Self No No   Sig: Take 50 mcg by mouth daily   amLODIPine (NORVASC) 10 mg tablet  Self No No   Sig: Take 1 tablet (10 mg total) by mouth daily   aspirin 81 mg chewable tablet  Self Yes No   Sig: Chew 81 mg daily   atorvastatin (LIPITOR) 10 mg tablet  Self No No   Sig: Take 1 tablet by mouth once daily   enalapril (VASOTEC) 10 mg tablet  Self No No   Sig: Take 1 tablet (10 mg total) by mouth 4 (four) times a day 2 tabs in the am, 1 tab in the afternoon, 1 tab @ hs.   flecainide (TAMBOCOR) 150 MG tablet  Self No No   Sig: Take 1 tablet (150 mg total) by mouth see administration instructions For palpitations lasting longer than 5 minutes, take one 150 mg tablet.  If palpitations last longer than 30 minutes, take another 150 mg tablet.  If palpitations persist, please go to the nearest emergency department.    Do not exceed 300 mg in one day.   Patient not taking: Reported on 7/8/2024   furosemide (LASIX) 40 mg tablet  Self No No   Sig: Take 1.5 tablets (60 mg total) by mouth daily   gabapentin (NEURONTIN) 100 mg capsule   No No   Sig: Take 1 capsule (100 mg total) by mouth 2 (two) times a day   glucose blood (OneTouch Verio) test strip  Self No No   Sig: Use 1 each daily   hydrALAZINE (APRESOLINE) 25 mg tablet  Self No No   Sig: Take 1 tablet (25 mg total) by mouth 3 (three) times a day   indomethacin (INDOCIN) 50 mg capsule   No No   Sig: Take 1 capsule (50 mg total) by mouth daily as needed for  "mild pain PRN with the gout   ketoconazole (NIZORAL) 2 % shampoo  Self No No   Sig: Apply 1 Application topically 2 (two) times a week   metoprolol succinate (TOPROL-XL) 25 mg 24 hr tablet  Self No No   Sig: Take 1 tablet (25 mg total) by mouth daily   metoprolol succinate (TOPROL-XL) 50 mg 24 hr tablet  Self No No   Sig: Take 1 tablet (50 mg total) by mouth daily   multivitamin (THERAGRAN) TABS  Self Yes No   Sig: Take 1 tablet by mouth daily   omeprazole (PriLOSEC) 40 MG capsule  Self No No   Sig: Take 1 capsule (40 mg total) by mouth daily   phenazopyridine (PYRIDIUM) 200 mg tablet  Self No No   Sig: Take 1 tablet (200 mg total) by mouth 3 (three) times a day   potassium chloride (Klor-Con M20) 20 mEq tablet  Self No No   Sig: Take 1 tablet (20 mEq total) by mouth daily   semaglutide, 0.25 or 0.5 mg/dose, (Ozempic, 0.25 or 0.5 MG/DOSE,) 2 mg/3 mL injection pen  Self No No   Sig: E11.65 inject 0.25 mg weekly for 4-weeks, then 0.5 mg weekly thereafter   tamsulosin (FLOMAX) 0.4 mg   No No   Sig: Take 1 capsule (0.4 mg total) by mouth daily with dinner   triamcinolone (KENALOG) 0.1 % lotion  Self No No   Sig: Apply topically 2 (two) times a day   triamcinolone (KENALOG) 0.5 % cream  Self Yes No   valACYclovir (VALTREX) 1,000 mg tablet   No No   Sig: Take 1 tablet (1,000 mg total) by mouth 3 (three) times a day for 7 days      Facility-Administered Medications: None       Portions of the record may have been created with voice recognition software. Occasional wrong word or \"sound a like\" substitutions may have occurred due to the inherent limitations of voice recognition software. Read the chart carefully and recognize, using context, where substitutions have occurred.    Electronically signed by:  MD Shay Kebede MD  10/05/24 1611    "

## 2024-10-06 ENCOUNTER — HOSPITAL ENCOUNTER (INPATIENT)
Facility: HOSPITAL | Age: 69
LOS: 5 days | Discharge: HOME | DRG: 872 | End: 2024-10-11
Attending: EMERGENCY MEDICINE | Admitting: INTERNAL MEDICINE
Payer: MEDICARE

## 2024-10-06 ENCOUNTER — RA CDI HCC (OUTPATIENT)
Dept: OTHER | Facility: HOSPITAL | Age: 69
End: 2024-10-06

## 2024-10-06 DIAGNOSIS — K40.90 UNILATERAL INGUINAL HERNIA WITHOUT OBSTRUCTION: ICD-10-CM

## 2024-10-06 DIAGNOSIS — N30.90 CYSTITIS: ICD-10-CM

## 2024-10-06 DIAGNOSIS — N30.00 ACUTE CYSTITIS WITHOUT HEMATURIA: ICD-10-CM

## 2024-10-06 DIAGNOSIS — R78.81 POSITIVE BLOOD CULTURE: ICD-10-CM

## 2024-10-06 DIAGNOSIS — N12 PYELONEPHRITIS: Primary | ICD-10-CM

## 2024-10-06 DIAGNOSIS — N45.1 EPIDIDYMITIS: ICD-10-CM

## 2024-10-06 DIAGNOSIS — L98.9 SKIN LESION: ICD-10-CM

## 2024-10-06 DIAGNOSIS — R78.81 BACTEREMIA: ICD-10-CM

## 2024-10-06 PROBLEM — E87.1 HYPONATREMIA: Status: ACTIVE | Noted: 2024-10-06

## 2024-10-06 LAB
ALBUMIN SERPL BCG-MCNC: 3.3 G/DL (ref 3.5–5)
ALBUMIN SERPL BCG-MCNC: 3.6 G/DL (ref 3.5–5)
ALP SERPL-CCNC: 26 U/L (ref 34–104)
ALP SERPL-CCNC: 27 U/L (ref 34–104)
ALT SERPL W P-5'-P-CCNC: 40 U/L (ref 7–52)
ALT SERPL W P-5'-P-CCNC: 52 U/L (ref 7–52)
ANION GAP SERPL CALCULATED.3IONS-SCNC: 10 MMOL/L (ref 4–13)
ANION GAP SERPL CALCULATED.3IONS-SCNC: 11 MMOL/L (ref 4–13)
AST SERPL W P-5'-P-CCNC: 43 U/L (ref 13–39)
AST SERPL W P-5'-P-CCNC: 55 U/L (ref 13–39)
BACTERIA UR CULT: ABNORMAL
BACTERIA UR CULT: ABNORMAL
BASOPHILS # BLD AUTO: 0.04 THOUSANDS/ΜL (ref 0–0.1)
BASOPHILS # BLD AUTO: 0.04 THOUSANDS/ΜL (ref 0–0.1)
BASOPHILS NFR BLD AUTO: 1 % (ref 0–1)
BASOPHILS NFR BLD AUTO: 1 % (ref 0–1)
BILIRUB SERPL-MCNC: 1.09 MG/DL (ref 0.2–1)
BILIRUB SERPL-MCNC: 1.48 MG/DL (ref 0.2–1)
BUN SERPL-MCNC: 42 MG/DL (ref 5–25)
BUN SERPL-MCNC: 42 MG/DL (ref 5–25)
CALCIUM ALBUM COR SERPL-MCNC: 8.8 MG/DL (ref 8.3–10.1)
CALCIUM SERPL-MCNC: 8.2 MG/DL (ref 8.4–10.2)
CALCIUM SERPL-MCNC: 8.4 MG/DL (ref 8.4–10.2)
CHLORIDE SERPL-SCNC: 103 MMOL/L (ref 96–108)
CHLORIDE SERPL-SCNC: 104 MMOL/L (ref 96–108)
CO2 SERPL-SCNC: 21 MMOL/L (ref 21–32)
CO2 SERPL-SCNC: 21 MMOL/L (ref 21–32)
CREAT SERPL-MCNC: 1.68 MG/DL (ref 0.6–1.3)
CREAT SERPL-MCNC: 1.8 MG/DL (ref 0.6–1.3)
EOSINOPHIL # BLD AUTO: 0.01 THOUSAND/ΜL (ref 0–0.61)
EOSINOPHIL # BLD AUTO: 0.01 THOUSAND/ΜL (ref 0–0.61)
EOSINOPHIL NFR BLD AUTO: 0 % (ref 0–6)
EOSINOPHIL NFR BLD AUTO: 0 % (ref 0–6)
ERYTHROCYTE [DISTWIDTH] IN BLOOD BY AUTOMATED COUNT: 14.4 % (ref 11.6–15.1)
ERYTHROCYTE [DISTWIDTH] IN BLOOD BY AUTOMATED COUNT: 14.6 % (ref 11.6–15.1)
EST. AVERAGE GLUCOSE BLD GHB EST-MCNC: 126 MG/DL
GFR SERPL CREATININE-BSD FRML MDRD: 37 ML/MIN/1.73SQ M
GFR SERPL CREATININE-BSD FRML MDRD: 40 ML/MIN/1.73SQ M
GLUCOSE SERPL-MCNC: 112 MG/DL (ref 65–140)
GLUCOSE SERPL-MCNC: 116 MG/DL (ref 65–140)
GLUCOSE SERPL-MCNC: 118 MG/DL (ref 65–140)
GLUCOSE SERPL-MCNC: 126 MG/DL (ref 65–140)
GLUCOSE SERPL-MCNC: 146 MG/DL (ref 65–140)
GLUCOSE SERPL-MCNC: 159 MG/DL (ref 65–140)
HBA1C MFR BLD: 6 %
HCT VFR BLD AUTO: 31.5 % (ref 36.5–49.3)
HCT VFR BLD AUTO: 35.1 % (ref 36.5–49.3)
HGB BLD-MCNC: 10.3 G/DL (ref 12–17)
HGB BLD-MCNC: 11.3 G/DL (ref 12–17)
IMM GRANULOCYTES # BLD AUTO: 0.02 THOUSAND/UL (ref 0–0.2)
IMM GRANULOCYTES # BLD AUTO: 0.03 THOUSAND/UL (ref 0–0.2)
IMM GRANULOCYTES NFR BLD AUTO: 0 % (ref 0–2)
IMM GRANULOCYTES NFR BLD AUTO: 0 % (ref 0–2)
LACTATE SERPL-SCNC: 1 MMOL/L (ref 0.5–2)
LYMPHOCYTES # BLD AUTO: 0.99 THOUSANDS/ΜL (ref 0.6–4.47)
LYMPHOCYTES # BLD AUTO: 1.1 THOUSANDS/ΜL (ref 0.6–4.47)
LYMPHOCYTES NFR BLD AUTO: 13 % (ref 14–44)
LYMPHOCYTES NFR BLD AUTO: 14 % (ref 14–44)
MAGNESIUM SERPL-MCNC: 2.3 MG/DL (ref 1.9–2.7)
MCH RBC QN AUTO: 29.1 PG (ref 26.8–34.3)
MCH RBC QN AUTO: 29.4 PG (ref 26.8–34.3)
MCHC RBC AUTO-ENTMCNC: 32.2 G/DL (ref 31.4–37.4)
MCHC RBC AUTO-ENTMCNC: 32.7 G/DL (ref 31.4–37.4)
MCV RBC AUTO: 89 FL (ref 82–98)
MCV RBC AUTO: 91 FL (ref 82–98)
MONOCYTES # BLD AUTO: 0.85 THOUSAND/ΜL (ref 0.17–1.22)
MONOCYTES # BLD AUTO: 0.92 THOUSAND/ΜL (ref 0.17–1.22)
MONOCYTES NFR BLD AUTO: 11 % (ref 4–12)
MONOCYTES NFR BLD AUTO: 12 % (ref 4–12)
NEUTROPHILS # BLD AUTO: 5.02 THOUSANDS/ΜL (ref 1.85–7.62)
NEUTROPHILS # BLD AUTO: 6.09 THOUSANDS/ΜL (ref 1.85–7.62)
NEUTS SEG NFR BLD AUTO: 73 % (ref 43–75)
NEUTS SEG NFR BLD AUTO: 75 % (ref 43–75)
NRBC BLD AUTO-RTO: 0 /100 WBCS
NRBC BLD AUTO-RTO: 0 /100 WBCS
PHOSPHATE SERPL-MCNC: 3.3 MG/DL (ref 2.3–4.1)
PLATELET # BLD AUTO: 121 THOUSANDS/UL (ref 149–390)
PLATELET # BLD AUTO: 129 THOUSANDS/UL (ref 149–390)
PLATELET # BLD AUTO: 139 THOUSANDS/UL (ref 149–390)
PMV BLD AUTO: 10.8 FL (ref 8.9–12.7)
PMV BLD AUTO: 10.9 FL (ref 8.9–12.7)
PMV BLD AUTO: 11.1 FL (ref 8.9–12.7)
POTASSIUM SERPL-SCNC: 4 MMOL/L (ref 3.5–5.3)
POTASSIUM SERPL-SCNC: 4.1 MMOL/L (ref 3.5–5.3)
PROCALCITONIN SERPL-MCNC: 0.89 NG/ML
PROT SERPL-MCNC: 6.5 G/DL (ref 6.4–8.4)
PROT SERPL-MCNC: 6.8 G/DL (ref 6.4–8.4)
RBC # BLD AUTO: 3.54 MILLION/UL (ref 3.88–5.62)
RBC # BLD AUTO: 3.85 MILLION/UL (ref 3.88–5.62)
SODIUM SERPL-SCNC: 134 MMOL/L (ref 135–147)
SODIUM SERPL-SCNC: 136 MMOL/L (ref 135–147)
WBC # BLD AUTO: 6.93 THOUSAND/UL (ref 4.31–10.16)
WBC # BLD AUTO: 8.19 THOUSAND/UL (ref 4.31–10.16)

## 2024-10-06 PROCEDURE — 85025 COMPLETE CBC W/AUTO DIFF WBC: CPT | Performed by: EMERGENCY MEDICINE

## 2024-10-06 PROCEDURE — 94760 N-INVAS EAR/PLS OXIMETRY 1: CPT

## 2024-10-06 PROCEDURE — 84145 PROCALCITONIN (PCT): CPT | Performed by: EMERGENCY MEDICINE

## 2024-10-06 PROCEDURE — 82948 REAGENT STRIP/BLOOD GLUCOSE: CPT

## 2024-10-06 PROCEDURE — 83735 ASSAY OF MAGNESIUM: CPT | Performed by: PHYSICIAN ASSISTANT

## 2024-10-06 PROCEDURE — 80053 COMPREHEN METABOLIC PANEL: CPT | Performed by: EMERGENCY MEDICINE

## 2024-10-06 PROCEDURE — 83605 ASSAY OF LACTIC ACID: CPT | Performed by: EMERGENCY MEDICINE

## 2024-10-06 PROCEDURE — 84100 ASSAY OF PHOSPHORUS: CPT | Performed by: PHYSICIAN ASSISTANT

## 2024-10-06 PROCEDURE — 87040 BLOOD CULTURE FOR BACTERIA: CPT | Performed by: FAMILY MEDICINE

## 2024-10-06 PROCEDURE — 99285 EMERGENCY DEPT VISIT HI MDM: CPT | Performed by: EMERGENCY MEDICINE

## 2024-10-06 PROCEDURE — 83036 HEMOGLOBIN GLYCOSYLATED A1C: CPT | Performed by: PHYSICIAN ASSISTANT

## 2024-10-06 PROCEDURE — 94660 CPAP INITIATION&MGMT: CPT

## 2024-10-06 PROCEDURE — 85025 COMPLETE CBC W/AUTO DIFF WBC: CPT | Performed by: PHYSICIAN ASSISTANT

## 2024-10-06 PROCEDURE — 96374 THER/PROPH/DIAG INJ IV PUSH: CPT

## 2024-10-06 PROCEDURE — 36415 COLL VENOUS BLD VENIPUNCTURE: CPT | Performed by: EMERGENCY MEDICINE

## 2024-10-06 PROCEDURE — 80053 COMPREHEN METABOLIC PANEL: CPT | Performed by: PHYSICIAN ASSISTANT

## 2024-10-06 PROCEDURE — 99222 1ST HOSP IP/OBS MODERATE 55: CPT | Performed by: FAMILY MEDICINE

## 2024-10-06 PROCEDURE — 97167 OT EVAL HIGH COMPLEX 60 MIN: CPT

## 2024-10-06 PROCEDURE — 99283 EMERGENCY DEPT VISIT LOW MDM: CPT

## 2024-10-06 PROCEDURE — 85049 AUTOMATED PLATELET COUNT: CPT | Performed by: PHYSICIAN ASSISTANT

## 2024-10-06 RX ORDER — TAMSULOSIN HYDROCHLORIDE 0.4 MG/1
0.4 CAPSULE ORAL
Status: DISCONTINUED | OUTPATIENT
Start: 2024-10-06 | End: 2024-10-11 | Stop reason: HOSPADM

## 2024-10-06 RX ORDER — POTASSIUM CHLORIDE 1500 MG/1
20 TABLET, EXTENDED RELEASE ORAL DAILY
Status: DISCONTINUED | OUTPATIENT
Start: 2024-10-06 | End: 2024-10-11 | Stop reason: HOSPADM

## 2024-10-06 RX ORDER — METOPROLOL SUCCINATE 25 MG/1
25 TABLET, EXTENDED RELEASE ORAL DAILY
Status: DISCONTINUED | OUTPATIENT
Start: 2024-10-06 | End: 2024-10-11 | Stop reason: HOSPADM

## 2024-10-06 RX ORDER — INSULIN LISPRO 100 [IU]/ML
1-5 INJECTION, SOLUTION INTRAVENOUS; SUBCUTANEOUS
Status: DISCONTINUED | OUTPATIENT
Start: 2024-10-06 | End: 2024-10-11 | Stop reason: HOSPADM

## 2024-10-06 RX ORDER — ENOXAPARIN SODIUM 100 MG/ML
30 INJECTION SUBCUTANEOUS
Status: DISCONTINUED | OUTPATIENT
Start: 2024-10-07 | End: 2024-10-07

## 2024-10-06 RX ORDER — HYDRALAZINE HYDROCHLORIDE 25 MG/1
25 TABLET, FILM COATED ORAL 3 TIMES DAILY
Status: DISCONTINUED | OUTPATIENT
Start: 2024-10-06 | End: 2024-10-06

## 2024-10-06 RX ORDER — PANTOPRAZOLE SODIUM 40 MG/1
40 TABLET, DELAYED RELEASE ORAL
Status: DISCONTINUED | OUTPATIENT
Start: 2024-10-06 | End: 2024-10-11 | Stop reason: HOSPADM

## 2024-10-06 RX ORDER — CEFTRIAXONE 1 G/50ML
1000 INJECTION, SOLUTION INTRAVENOUS EVERY 24 HOURS
Status: DISCONTINUED | OUTPATIENT
Start: 2024-10-07 | End: 2024-10-06

## 2024-10-06 RX ORDER — LISINOPRIL 20 MG/1
20 TABLET ORAL DAILY
Status: DISCONTINUED | OUTPATIENT
Start: 2024-10-06 | End: 2024-10-06

## 2024-10-06 RX ORDER — CEFTRIAXONE 1 G/50ML
1000 INJECTION, SOLUTION INTRAVENOUS ONCE
Status: COMPLETED | OUTPATIENT
Start: 2024-10-06 | End: 2024-10-06

## 2024-10-06 RX ORDER — GABAPENTIN 100 MG/1
100 CAPSULE ORAL 2 TIMES DAILY
Status: DISCONTINUED | OUTPATIENT
Start: 2024-10-06 | End: 2024-10-11 | Stop reason: HOSPADM

## 2024-10-06 RX ORDER — SODIUM CHLORIDE, SODIUM GLUCONATE, SODIUM ACETATE, POTASSIUM CHLORIDE, MAGNESIUM CHLORIDE, SODIUM PHOSPHATE, DIBASIC, AND POTASSIUM PHOSPHATE .53; .5; .37; .037; .03; .012; .00082 G/100ML; G/100ML; G/100ML; G/100ML; G/100ML; G/100ML; G/100ML
125 INJECTION, SOLUTION INTRAVENOUS ONCE
Status: COMPLETED | OUTPATIENT
Start: 2024-10-06 | End: 2024-10-07

## 2024-10-06 RX ORDER — METOPROLOL SUCCINATE 50 MG/1
50 TABLET, EXTENDED RELEASE ORAL DAILY
Status: DISCONTINUED | OUTPATIENT
Start: 2024-10-06 | End: 2024-10-11 | Stop reason: HOSPADM

## 2024-10-06 RX ORDER — AMLODIPINE BESYLATE 10 MG/1
10 TABLET ORAL DAILY
Status: DISCONTINUED | OUTPATIENT
Start: 2024-10-06 | End: 2024-10-06

## 2024-10-06 RX ORDER — ONDANSETRON 2 MG/ML
4 INJECTION INTRAMUSCULAR; INTRAVENOUS EVERY 6 HOURS PRN
Status: DISCONTINUED | OUTPATIENT
Start: 2024-10-06 | End: 2024-10-11 | Stop reason: HOSPADM

## 2024-10-06 RX ORDER — ATORVASTATIN CALCIUM 10 MG/1
10 TABLET, FILM COATED ORAL
Status: DISCONTINUED | OUTPATIENT
Start: 2024-10-06 | End: 2024-10-11 | Stop reason: HOSPADM

## 2024-10-06 RX ORDER — HEPARIN SODIUM 5000 [USP'U]/ML
5000 INJECTION, SOLUTION INTRAVENOUS; SUBCUTANEOUS EVERY 8 HOURS SCHEDULED
Status: DISCONTINUED | OUTPATIENT
Start: 2024-10-06 | End: 2024-10-06

## 2024-10-06 RX ORDER — CEFTRIAXONE 2 G/50ML
2000 INJECTION, SOLUTION INTRAVENOUS EVERY 24 HOURS
Status: DISCONTINUED | OUTPATIENT
Start: 2024-10-06 | End: 2024-10-11 | Stop reason: HOSPADM

## 2024-10-06 RX ORDER — INSULIN LISPRO 100 [IU]/ML
2-12 INJECTION, SOLUTION INTRAVENOUS; SUBCUTANEOUS
Status: DISCONTINUED | OUTPATIENT
Start: 2024-10-06 | End: 2024-10-11 | Stop reason: HOSPADM

## 2024-10-06 RX ORDER — ASPIRIN 81 MG/1
81 TABLET, CHEWABLE ORAL DAILY
Status: DISCONTINUED | OUTPATIENT
Start: 2024-10-06 | End: 2024-10-11 | Stop reason: HOSPADM

## 2024-10-06 RX ADMIN — SODIUM CHLORIDE 250 ML: 0.9 INJECTION, SOLUTION INTRAVENOUS at 05:08

## 2024-10-06 RX ADMIN — GABAPENTIN 100 MG: 100 CAPSULE ORAL at 08:54

## 2024-10-06 RX ADMIN — SODIUM CHLORIDE, SODIUM GLUCONATE, SODIUM ACETATE, POTASSIUM CHLORIDE, MAGNESIUM CHLORIDE, SODIUM PHOSPHATE, DIBASIC, AND POTASSIUM PHOSPHATE 125 ML/HR: .53; .5; .37; .037; .03; .012; .00082 INJECTION, SOLUTION INTRAVENOUS at 09:34

## 2024-10-06 RX ADMIN — B-COMPLEX W/ C & FOLIC ACID TAB 1 TABLET: TAB at 08:54

## 2024-10-06 RX ADMIN — CEFTRIAXONE 1000 MG: 1 INJECTION, SOLUTION INTRAVENOUS at 01:54

## 2024-10-06 RX ADMIN — ATORVASTATIN CALCIUM 10 MG: 10 TABLET, FILM COATED ORAL at 17:50

## 2024-10-06 RX ADMIN — PANTOPRAZOLE SODIUM 40 MG: 40 TABLET, DELAYED RELEASE ORAL at 05:08

## 2024-10-06 RX ADMIN — GABAPENTIN 100 MG: 100 CAPSULE ORAL at 17:50

## 2024-10-06 RX ADMIN — ASPIRIN 324 MG: 81 TABLET, COATED ORAL at 03:37

## 2024-10-06 RX ADMIN — TAMSULOSIN HYDROCHLORIDE 0.4 MG: 0.4 CAPSULE ORAL at 17:50

## 2024-10-06 RX ADMIN — HEPARIN SODIUM 5000 UNITS: 5000 INJECTION, SOLUTION INTRAVENOUS; SUBCUTANEOUS at 13:37

## 2024-10-06 RX ADMIN — Medication 2000 UNITS: at 08:54

## 2024-10-06 RX ADMIN — POTASSIUM CHLORIDE 20 MEQ: 1500 TABLET, EXTENDED RELEASE ORAL at 08:54

## 2024-10-06 RX ADMIN — HEPARIN SODIUM 5000 UNITS: 5000 INJECTION, SOLUTION INTRAVENOUS; SUBCUTANEOUS at 05:08

## 2024-10-06 RX ADMIN — CEFTRIAXONE 2000 MG: 2 INJECTION, SOLUTION INTRAVENOUS at 13:37

## 2024-10-06 RX ADMIN — ASPIRIN 81 MG 81 MG: 81 TABLET ORAL at 08:54

## 2024-10-06 NOTE — ASSESSMENT & PLAN NOTE
Blood pressure is stable  --Continue PTA blood pressure medication.   --Monitor blood pressure while admitted

## 2024-10-06 NOTE — ED PROVIDER NOTES
Final diagnoses:   Pyelonephritis   Positive blood culture     ED Disposition       ED Disposition   Admit    Condition   Stable    Date/Time   Sun Oct 6, 2024  2:03 AM    Comment   Case was discussed with SALONI and the patient's admission status was agreed to be Admission Status: inpatient status to the service of Dr. Pugh .               Assessment & Plan       Medical Decision Making  I reviewed the patient's medical chart, PMHx, prior encounters, medications.    My DDx includes: Cystitis after recent indwelling Moreau catheter, pyelonephritis, positive blood culture    Given patient's result, he was called to come back to the emergency department to initiate IV antibiotic therapy.  Patient returned, will admit to some service on ceftriaxone as recommended, while awaiting susceptibilities    Amount and/or Complexity of Data Reviewed  Labs: ordered.    Risk  Prescription drug management.  Decision regarding hospitalization.             Medications   cefTRIAXone (ROCEPHIN) IVPB (premix in dextrose) 1,000 mg 50 mL (1,000 mg Intravenous New Bag 10/6/24 0154)       ED Risk Strat Scores                           SBIRT 20yo+      Flowsheet Row Most Recent Value   Initial Alcohol Screen: US AUDIT-C     1. How often do you have a drink containing alcohol? 0 Filed at: 10/06/2024 0144   2. How many drinks containing alcohol do you have on a typical day you are drinking?  0 Filed at: 10/06/2024 0144   3b. FEMALE Any Age, or MALE 65+: How often do you have 4 or more drinks on one occassion? 0 Filed at: 10/06/2024 0144   Audit-C Score 0 Filed at: 10/06/2024 0144   LOPEZ: How many times in the past year have you...    Used an illegal drug or used a prescription medication for non-medical reasons? Never Filed at: 10/06/2024 0144                            History of Present Illness       Chief Complaint   Patient presents with    Medical Problem     Pt returns to ED for positive blood cultures       Past Medical History:    Diagnosis Date    Arthritis     last assessed 7/1/15    Bronchitis 02/02/2023    Chronic diastolic (congestive) heart failure (HCC)     Diabetes mellitus (HCC)     type 2-last assessed 1/28/15    Diabetes mellitus (HCC) 03/16/2018    Dyslipidemia     GERD (gastroesophageal reflux disease)     Hypertension     Hypertensive urgency 03/16/2023    Irregular heart beat     last assessed 7/1/15    Obstructive sleep apnea     Palpitations     last assessed 9/7/17    Sexual dysfunction     last assessed 7/1/15    Thyroid disease     last assessed 7/1/15      Past Surgical History:   Procedure Laterality Date    COLONOSCOPY      TONSILLECTOMY        Family History   Problem Relation Age of Onset    Hypertension Mother         essential    Stroke Mother     Hypertension Father         essential    Heart defect Father         cardiac disorder    Stroke Brother     Hypertension Brother       Social History     Tobacco Use    Smoking status: Former     Current packs/day: 1.00     Types: Cigarettes     Passive exposure: Never    Smokeless tobacco: Never    Tobacco comments:     former smoker-quit 27 yrs ago 1pppd x 30 yrs as per [employer]   Vaping Use    Vaping status: Never Used   Substance Use Topics    Alcohol use: Never    Drug use: Never      E-Cigarette/Vaping    E-Cigarette Use Never User       E-Cigarette/Vaping Substances    Nicotine No     THC No     CBD No     Flavoring No     Other No     Unknown No       I have reviewed and agree with the history as documented.     69-year-old male just recently seen for pyelonephritis yesterday, who is here for positive blood culture.  His blood cultures growing positive for Serratia, with recommendation to be treated with ceftriaxone.  Patient does report overall he is feeling somewhat improved since starting cefpodoxime yesterday.  He does not have recurrent fever.  No complaints currently.  Review systems otherwise negative        Review of Systems   Constitutional:  Negative  for chills, diaphoresis, fatigue and fever.   HENT:  Negative for congestion and sore throat.    Eyes:  Negative for visual disturbance.   Respiratory:  Negative for cough, chest tightness and shortness of breath.    Cardiovascular:  Negative for chest pain, palpitations and leg swelling.   Gastrointestinal:  Negative for abdominal distention, abdominal pain, constipation, diarrhea, nausea and vomiting.   Genitourinary:  Negative for difficulty urinating and dysuria.   Musculoskeletal:  Negative for arthralgias and myalgias.   Skin:  Negative for rash.   Neurological:  Negative for dizziness, weakness, light-headedness, numbness and headaches.   Psychiatric/Behavioral:  Negative for agitation, behavioral problems and confusion. The patient is not nervous/anxious.    All other systems reviewed and are negative.          Objective       ED Triage Vitals [10/06/24 0144]   Temperature Pulse Blood Pressure Respirations SpO2 Patient Position - Orthostatic VS   98.2 °F (36.8 °C) 78 109/56 18 94 % Sitting      Temp Source Heart Rate Source BP Location FiO2 (%) Pain Score    Temporal Monitor Left arm -- No Pain      Vitals      Date and Time Temp Pulse SpO2 Resp BP Pain Score FACES Pain Rating User   10/06/24 0144 98.2 °F (36.8 °C) 78 94 % 18 109/56 No Pain -- BB            Physical Exam  Constitutional:       Appearance: He is well-developed.   HENT:      Head: Normocephalic and atraumatic.   Cardiovascular:      Rate and Rhythm: Normal rate and regular rhythm.      Heart sounds: Normal heart sounds. No murmur heard.  Pulmonary:      Effort: Pulmonary effort is normal. No respiratory distress.      Breath sounds: Normal breath sounds.   Abdominal:      General: Bowel sounds are normal. There is no distension.      Palpations: Abdomen is soft.      Tenderness: There is no abdominal tenderness.   Musculoskeletal:         General: No deformity.   Skin:     General: Skin is warm.      Findings: No rash.   Neurological:       Mental Status: He is alert and oriented to person, place, and time.   Psychiatric:         Behavior: Behavior normal.         Thought Content: Thought content normal.         Judgment: Judgment normal.         Results Reviewed       Procedure Component Value Units Date/Time    Lactic acid, plasma (w/reflex if result > 2.0) [[id number]] Collected: 10/06/24 0153    Lab Status: In process Specimen: Blood from Arm, Right Updated: 10/06/24 0159    CBC and differential [[id number]] Collected: 10/06/24 0153    Lab Status: In process Specimen: Blood from Arm, Right Updated: 10/06/24 0159    Procalcitonin [[id number]] Collected: 10/06/24 0153    Lab Status: In process Specimen: Blood from Arm, Right Updated: 10/06/24 0159    Comprehensive metabolic panel [[id number]] Collected: 10/06/24 0153    Lab Status: In process Specimen: Blood from Arm, Right Updated: 10/06/24 0159            No orders to display       Procedures    ED Medication and Procedure Management   Prior to Admission Medications   Prescriptions Last Dose Informant Patient Reported? Taking?   Empagliflozin 25 MG TABS  Self No No   Sig: Take 1 tablet (25 mg total) by mouth daily   Lancets (onetouch ultrasoft) lancets  Self No No   Sig: Check glucose daily   Red Yeast Rice Extract (RED YEAST RICE PO)  Self Yes No   Sig: Take by mouth   Vitamin D, Cholecalciferol, 50 MCG (2000 UT) CAPS  Self No No   Sig: Take 50 mcg by mouth daily   amLODIPine (NORVASC) 10 mg tablet  Self No No   Sig: Take 1 tablet (10 mg total) by mouth daily   aspirin 81 mg chewable tablet  Self Yes No   Sig: Chew 81 mg daily   atorvastatin (LIPITOR) 10 mg tablet  Self No No   Sig: Take 1 tablet by mouth once daily   cefpodoxime (VANTIN) 200 mg tablet   No No   Sig: Take 1 tablet (200 mg total) by mouth 2 (two) times a day for 10 days   enalapril (VASOTEC) 10 mg tablet  Self No No   Sig: Take 1 tablet (10 mg total) by mouth 4 (four) times a day 2 tabs in the am, 1 tab in the afternoon, 1 tab @ hs.    flecainide (TAMBOCOR) 150 MG tablet  Self No No   Sig: Take 1 tablet (150 mg total) by mouth see administration instructions For palpitations lasting longer than 5 minutes, take one 150 mg tablet.  If palpitations last longer than 30 minutes, take another 150 mg tablet.  If palpitations persist, please go to the nearest emergency department.    Do not exceed 300 mg in one day.   Patient not taking: Reported on 7/8/2024   furosemide (LASIX) 40 mg tablet  Self No No   Sig: Take 1.5 tablets (60 mg total) by mouth daily   gabapentin (NEURONTIN) 100 mg capsule   No No   Sig: Take 1 capsule (100 mg total) by mouth 2 (two) times a day   glucose blood (OneTouch Verio) test strip  Self No No   Sig: Use 1 each daily   hydrALAZINE (APRESOLINE) 25 mg tablet  Self No No   Sig: Take 1 tablet (25 mg total) by mouth 3 (three) times a day   indomethacin (INDOCIN) 50 mg capsule   No No   Sig: Take 1 capsule (50 mg total) by mouth daily as needed for mild pain PRN with the gout   ketoconazole (NIZORAL) 2 % shampoo  Self No No   Sig: Apply 1 Application topically 2 (two) times a week   metoprolol succinate (TOPROL-XL) 25 mg 24 hr tablet  Self No No   Sig: Take 1 tablet (25 mg total) by mouth daily   metoprolol succinate (TOPROL-XL) 50 mg 24 hr tablet  Self No No   Sig: Take 1 tablet (50 mg total) by mouth daily   multivitamin (THERAGRAN) TABS  Self Yes No   Sig: Take 1 tablet by mouth daily   omeprazole (PriLOSEC) 40 MG capsule  Self No No   Sig: Take 1 capsule (40 mg total) by mouth daily   phenazopyridine (PYRIDIUM) 200 mg tablet  Self No No   Sig: Take 1 tablet (200 mg total) by mouth 3 (three) times a day   potassium chloride (Klor-Con M20) 20 mEq tablet  Self No No   Sig: Take 1 tablet (20 mEq total) by mouth daily   semaglutide, 0.25 or 0.5 mg/dose, (Ozempic, 0.25 or 0.5 MG/DOSE,) 2 mg/3 mL injection pen  Self No No   Sig: E11.65 inject 0.25 mg weekly for 4-weeks, then 0.5 mg weekly thereafter   tamsulosin (FLOMAX) 0.4 mg   No  No   Sig: Take 1 capsule (0.4 mg total) by mouth daily with dinner   triamcinolone (KENALOG) 0.1 % lotion  Self No No   Sig: Apply topically 2 (two) times a day   triamcinolone (KENALOG) 0.5 % cream  Self Yes No   valACYclovir (VALTREX) 1,000 mg tablet   No No   Sig: Take 1 tablet (1,000 mg total) by mouth 3 (three) times a day for 7 days      Facility-Administered Medications: None     Patient's Medications   Discharge Prescriptions    No medications on file     No discharge procedures on file.  ED SEPSIS DOCUMENTATION   Time reflects when diagnosis was documented in both MDM as applicable and the Disposition within this note       Time User Action Codes Description Comment    10/6/2024  2:04 AM Will Mckinley [N12] Pyelonephritis     10/6/2024  2:04 AM Will Mckinley [R78.81] Positive blood culture                  Will Mckinley MD  10/06/24 0209

## 2024-10-06 NOTE — ASSESSMENT & PLAN NOTE
Patient with positive blood culture  Cultures collected yesterday results positive for serratia marcescens  --Started on IV ceftriaxone in the ER  --Will continue with IV Ceftriaxone for now  --Repeat blood cultures in the Am   --Trend procalcitonin  --Monitor vital signs  --AM labs   --Supportive care

## 2024-10-06 NOTE — ASSESSMENT & PLAN NOTE
Lab Results   Component Value Date    HGBA1C 6.2 (H) 07/01/2024   Initial blood glucose at 126  --Hold oral diabetic medication  --Sliding scale insulin  --Fingerstick glucose 4 times daily AC/HS  --Check A1C  --Monitor while admitted

## 2024-10-06 NOTE — PLAN OF CARE
Problem: PAIN - ADULT  Goal: Verbalizes/displays adequate comfort level or baseline comfort level  Description: Interventions:  - Encourage patient to monitor pain and request assistance  - Assess pain using appropriate pain scale  - Administer analgesics based on type and severity of pain and evaluate response  - Implement non-pharmacological measures as appropriate and evaluate response  - Consider cultural and social influences on pain and pain management  - Notify physician/advanced practitioner if interventions unsuccessful or patient reports new pain  Outcome: Progressing     Problem: INFECTION - ADULT  Goal: Absence or prevention of progression during hospitalization  Description: INTERVENTIONS:  - Assess and monitor for signs and symptoms of infection  - Monitor lab/diagnostic results  - Monitor all insertion sites, i.e. indwelling lines, tubes, and drains  - Monitor endotracheal if appropriate and nasal secretions for changes in amount and color  - [place] appropriate cooling/warming therapies per order  - Administer medications as ordered  - Instruct and encourage patient and family to use good hand hygiene technique  - Identify and instruct in appropriate isolation precautions for identified infection/condition  Outcome: Progressing  Goal: Absence of fever/infection during neutropenic period  Description: INTERVENTIONS:  - Monitor WBC    Outcome: Progressing     Problem: SAFETY ADULT  Goal: Patient will remain free of falls  Description: INTERVENTIONS:  - Educate patient/family on patient safety including physical limitations  - Instruct patient to call for assistance with activity   - Consult OT/PT to assist with strengthening/mobility   - Keep Call bell within reach  - Keep bed low and locked with side rails adjusted as appropriate  - Keep care items and personal belongings within reach  - Initiate and maintain comfort rounds  - Make Fall Risk Sign visible to staff  - Offer Toileting every 2 Hours,  in advance of need  - Initiate/Maintain bed/chair alarm  - Obtain necessary fall risk management equipment: alarms, nonskid footwear, needs in reach  - Apply yellow socks and bracelet for high fall risk patients  - Consider moving patient to room near nurses station  Outcome: Progressing  Goal: Maintain or return to baseline ADL function  Description: INTERVENTIONS:  -  Assess patient's ability to carry out ADLs; assess patient's baseline for ADL function and identify physical deficits which impact ability to perform ADLs (bathing, care of mouth/teeth, toileting, grooming, dressing, etc.)  - Assess/evaluate cause of self-care deficits   - Assess range of motion  - Assess patient's mobility; develop plan if impaired  - Assess patient's need for assistive devices and provide as appropriate  - Encourage maximum independence but intervene and supervise when necessary  - Involve family in performance of ADLs  - Assess for home care needs following discharge   - Consider OT consult to assist with ADL evaluation and planning for discharge  - Provide patient education as appropriate  Outcome: Progressing  Goal: Maintains/Returns to pre admission functional level  Description: INTERVENTIONS:  - Perform AM-PAC 6 Click Basic Mobility/ Daily Activity assessment daily.  - Set and communicate daily mobility goal to care team and patient/family/caregiver.   - Collaborate with rehabilitation services on mobility goals if consulted  - Perform Range of Motion 3 times a day.  - Reposition patient every 2 hours.  - Dangle patient 3 times a day  - Stand patient 3 times a day  - Ambulate patient 3 times a day  - Out of bed to chair 3 times a day   - Out of bed for meals 3 times a day  - Out of bed for toileting  - Record patient progress and toleration of activity level   Outcome: Progressing     Problem: DISCHARGE PLANNING  Goal: Discharge to home or other facility with appropriate resources  Description: INTERVENTIONS:  - Identify  barriers to discharge w/patient and caregiver  - Arrange for needed discharge resources and transportation as appropriate  - Identify discharge learning needs (meds, wound care, etc.)  - Arrange for interpretive services to assist at discharge as needed  - Refer to Case Management Department for coordinating discharge planning if the patient needs post-hospital services based on physician/advanced practitioner order or complex needs related to functional status, cognitive ability, or social support system  Outcome: Progressing     Problem: Knowledge Deficit  Goal: Patient/family/caregiver demonstrates understanding of disease process, treatment plan, medications, and discharge instructions  Description: Complete learning assessment and assess knowledge base.  Interventions:  - Provide teaching at level of understanding  - Provide teaching via preferred learning methods  Outcome: Progressing

## 2024-10-06 NOTE — RESPIRATORY THERAPY NOTE
RT Protocol Note  Christy Haro 69 y.o. male MRN: [id number]  Unit/Bed#: 417-01 Encounter: [id number]    Assessment    Principal Problem:    Bacteremia  Active Problems:    Benign essential HTN    Dyslipidemia    GERD (gastroesophageal reflux disease)    AYLEEN (obstructive sleep apnea)    Type 2 diabetes mellitus with obesity  (HCC)    Stage 3 chronic kidney disease, unspecified whether stage 3a or 3b CKD (HCC)    Acute cystitis without hematuria    Hyponatremia      Home Pulmonary Medications:    Home Devices/Therapy: BiPAP/CPAP (Patient denies home O2, and dnies any use of bronchodilator therapy.  He does wear a CPAP at home.)    Past Medical History:   Diagnosis Date    Arthritis     last assessed 7/1/15    Bronchitis 02/02/2023    Chronic diastolic (congestive) heart failure (HCC)     Diabetes mellitus (HCC)     type 2-last assessed 1/28/15    Diabetes mellitus (HCC) 03/16/2018    Dyslipidemia     GERD (gastroesophageal reflux disease)     Hypertension     Hypertensive urgency 03/16/2023    Irregular heart beat     last assessed 7/1/15    Obstructive sleep apnea     Palpitations     last assessed 9/7/17    Sexual dysfunction     last assessed 7/1/15    Thyroid disease     last assessed 7/1/15     Social History     Socioeconomic History    Marital status: Single     Spouse name: None    Number of children: None    Years of education: None    Highest education level: None   Occupational History    None   Tobacco Use    Smoking status: Former     Current packs/day: 1.00     Types: Cigarettes     Passive exposure: Never    Smokeless tobacco: Never    Tobacco comments:     former smoker-quit 27 yrs ago 1pppd x 30 yrs as per [employer]   Vaping Use    Vaping status: Never Used   Substance and Sexual Activity    Alcohol use: Never    Drug use: Never    Sexual activity: Not Currently   Other Topics Concern    None   Social History Narrative    None     Social Determinants of Health     Financial Resource Strain: Low Risk   (1/8/2024)    Overall Financial Resource Strain (CARDIA)     Difficulty of Paying Living Expenses: Not hard at all   Food Insecurity: Not on file   Transportation Needs: No Transportation Needs (1/8/2024)    PRAPARE - Transportation     Lack of Transportation (Medical): No     Lack of Transportation (Non-Medical): No   Physical Activity: Not on file   Stress: Not on file   Social Connections: Not on file   Intimate Partner Violence: Not on file   Housing Stability: Not on file       Subjective     Patient assessed per RT protocol.  Patient came to the ER with generalized feeling unwell.  He was in the ED yesterday, and diagnosed with UTI sepsis.  He does have an indwelling Moreau catheter, and will be receiving antibiotic therapy for sepsis.  He was instructed to return to the hospital today, due to positive blood cultures.  Upon my physical assessment, the patient reports to issues with his breathing, lung sounds are clear bilaterally with no wheezing and SPO2 is 96% on room air.  Patient is a former smoker, and I did see that he had PFT testing done years ago.  PFT testing did not show evidence of obstructive lung disease.  Patient does wear BiPAP at home and according to notes, he is following sleep medicine to get a November appointment to ensure that his BiPAP is still working adequately.  He reports still using it, but believes there might be an issue with the water tank for humidification.  Patient agreeable to using a hospital machine, and he does not know his settings.  Will titrate settings per protocol.  Patient denies taking any bronchodilator therapy on a regular basis.  At this time, will continue BiPAP therapy and monitor per RT protocol for any acute changes in condition.     Objective    Physical Exam:   Assessment Type: Assess only  General Appearance: Alert, Awake  Respiratory Pattern: Normal  Chest Assessment: Chest expansion symmetrical  Bilateral Breath Sounds: Clear  Cough:  None    Vitals:  Blood pressure 119/51, pulse 69, temperature 100.2 °F (37.9 °C), temperature source Oral, resp. rate 16, height 6' (1.829 m), weight 133 kg (292 lb 12.3 oz), SpO2 96%.          Imaging and other studies: Results Review Statement: I personally reviewed the following image studies/reports in PACS and discussed pertinent findings with Radiology:  . My interpretation of the radiology images/reports is:  .          Plan    Respiratory Plan: No distress/Pulmonary history    BiPAP at all HS, and continue to monitor per protocol.         Resp Comments: Patient assessed per RT protocol

## 2024-10-06 NOTE — ASSESSMENT & PLAN NOTE
Patient  was diagnosed with UTI yesterday  Patient was discharged with p.o. cefpodoxime   --Will continue Antibiotic coverage with IV ceftriaxone   --Follow cultures   --Monitor urinary output, renal function  --Am labs  --Supportive care

## 2024-10-06 NOTE — ASSESSMENT & PLAN NOTE
Stable  --Currently on Prilosec 40 mg p.o. daily  --Will give formulary alternative Protonix 40 mg PO daily

## 2024-10-06 NOTE — ASSESSMENT & PLAN NOTE
Sodium mildly decreased at 134  --Will give normal saline bolus  --Monitor sodium levels  --Check a.m. CMP

## 2024-10-06 NOTE — RESPIRATORY THERAPY NOTE
10/06/24 0714   Respiratory Assessment   Assessment Type Assess only   General Appearance Awake   Respiratory Pattern Normal   Chest Assessment Chest expansion symmetrical;Trachea midline   Resp Comments patient taken off bipap at this time and placed on room air. He states he is not sob at this time   Oxygen Therapy/Pulse Ox   O2 Device None (Room air)   O2 Therapy Room air   SpO2 94 %   SpO2 Activity At Rest   $ Pulse Oximetry Spot Check Charge Completed

## 2024-10-06 NOTE — PLAN OF CARE
Problem: OCCUPATIONAL THERAPY ADULT  Goal: Performs self-care activities at highest level of function for planned discharge setting.  See evaluation for individualized goals.  Description: Treatment Interventions: ADL retraining, UE strengthening/ROM, Functional transfer training, Endurance training, Cognitive reorientation, Patient/family training, Equipment evaluation/education, Neuromuscular reeducation, Compensatory technique education, Continued evaluation, Energy conservation, Activityengagement, Cardiac education          See flowsheet documentation for full assessment, interventions and recommendations.   Note: Limitation: Decreased ADL status, Decreased UE strength, Decreased Safe judgement during ADL, Decreased endurance, Decreased high-level ADLs, Decreased self-care trans (dec functional reach, coordination, balance)  Prognosis: Good  Assessment: Patient is a 69 y.o. year old male seen for OT eval s/p admit to [facility] on 10/6/2024 with bacteremia, hyponatremia, acute cystitis without hematuria.  OT consulted to assess ADLs/IADLs/functional mobility and assist w/ D/C planning. Patient demonstrates the following deficits impacting occupational performance: weakness, decreased strength , decreased balance, decreased activity tolerance, limited functional reach, impaired memory, decreased safety awareness, increased pain, lethargy , increased body habitus , impaired coordination, decreased cardiovascular endurance, and decreased skin integrity . These impairments, as well at pt’s [place] home environment, difficulty performing ADLs, difficulty performing IADLs, difficulty performing transfers/mobility, limited insight into deficits, compliance, fall risk , functional decline , increased reliance on DME , and advanced age, limit pt’s ability to safely engage in all baseline areas of occupation. Pt's CLOF as follows: eating/grooming: supervision , UB ADLs: Sarah, LB ADLs: modA, toileting: Sarah, bed mobility: Sarah,  functional transfers: supervision , functional mobility: supervision , standing tolerance: ~4 min. Pt would benefit from continued skilled OT while in acute setting to address deficits as defined above and to maximize (I) w/ ADLs/functional mobility. Occupational performance areas to address include: grooming, bathing/shower, toilet hygiene, dressing, medication management, health maintenance, functional mobility, clothing management, cleaning, meal prep, and household maintenance. Based on the aforementioned evaluation, functional performance deficits, and assessments, pt has been identified as a high complexity evaluation. At this time, recommendation for pt to receive post-acute rehabilitation services at a Level III (minimum resource intensity) due to above deficits and CLOF. OT will continue to follow pt 3-5x/wk to address the goals listed below to  w/in 10-14 days.     Rehab Resource Intensity Level, OT: III (Minimum Resource Intensity) (Pending progress.)

## 2024-10-06 NOTE — ASSESSMENT & PLAN NOTE
Lab Results   Component Value Date    EGFR 37 10/06/2024    EGFR 34 10/04/2024    EGFR 44 09/19/2024    CREATININE 1.80 (H) 10/06/2024    CREATININE 1.89 (H) 10/04/2024    CREATININE 1.55 (H) 09/19/2024   Creatinine level at 1.80  --Avoid nephrotoxic agents  --Monitor creatinine levels, electrolytes  --Check AM CMP  --Continue outpatient nephrology follow up

## 2024-10-06 NOTE — ED NOTES
Pt given further information and reasons why he needs to stay after talking with family pt decided he will agree to stay      Rd Sharma Jr., RN  10/06/24 020

## 2024-10-06 NOTE — RESPIRATORY THERAPY NOTE
10/06/24 0425   Respiratory Assessment   Assessment Type Assess only   General Appearance Alert;Awake;Sleeping   Respiratory Pattern Normal   Chest Assessment Chest expansion symmetrical   Resp Comments (S)  Patient placed on BiPAP for sleeping.   Non-Invasive Information   O2 Interface Device Full face mask   Non-Invasive Ventilation Mode BiPAP   $ Intermittent NIV Yes   SpO2 95 %   $ Pulse Oximetry Spot Check Charge Completed   Non-Invasive Settings   IPAP (cm) 12 cm   EPAP (cm) 6 cm   Rate (Set) 14   FiO2 (%) 28   Pressure Support (cm H2O) 6   Rise Time 3   Inspiratory Time (Set) 1   Non-Invasive Readings   Skin Intervention Mask rotated;Skin intact   Total Rate 14   Vt (mL) (Mech) 655   MV (Mech) 9.4   Peak Pressure (Obs) 12   Spontaneous Vt (mL) 655   I/E Ratio (Obs) 1:3.3   Leak (lpm) 21   Non-Invasive Alarms   Insp Pressure High (cm H20) 20   Insp Pressure Low (cm H20) 4   MV Low (L/min) 3   Vt High (mL) 1.2   Vt Low (mL) 200   High Resp Rate (BPM) 35 BPM   Low Resp Rate (BPM) 8 BPM   Apnea Interval (sec) 20

## 2024-10-06 NOTE — OCCUPATIONAL THERAPY NOTE
Occupational Therapy Evaluation     Patient Name: Christy Haro  Today's Date: 10/6/2024  Problem List  Principal Problem:    Bacteremia  Active Problems:    Benign essential HTN    Dyslipidemia    GERD (gastroesophageal reflux disease)    AYLEEN (obstructive sleep apnea)    Type 2 diabetes mellitus with obesity  (HCC)    Stage 3 chronic kidney disease, unspecified whether stage 3a or 3b CKD (HCC)    Acute cystitis without hematuria    Hyponatremia    Past Medical History  Past Medical History:   Diagnosis Date    Arthritis     last assessed 7/1/15    Bronchitis 02/02/2023    Chronic diastolic (congestive) heart failure (HCC)     Diabetes mellitus (HCC)     type 2-last assessed 1/28/15    Diabetes mellitus (HCC) 03/16/2018    Dyslipidemia     GERD (gastroesophageal reflux disease)     Hypertension     Hypertensive urgency 03/16/2023    Irregular heart beat     last assessed 7/1/15    Obstructive sleep apnea     Palpitations     last assessed 9/7/17    Sexual dysfunction     last assessed 7/1/15    Thyroid disease     last assessed 7/1/15     Past Surgical History  Past Surgical History:   Procedure Laterality Date    COLONOSCOPY      TONSILLECTOMY          10/06/24 1408   OT Last Visit   OT Visit Date 10/06/24   Note Type   Note type Evaluation   Pain Assessment   Pain Assessment Tool 0-10   Pain Score 3   Pain Location/Orientation Location: Abdomen   Pain Onset/Description Descriptor: Discomfort   Hospital Pain Intervention(s) Repositioned;Ambulation/increased activity;Rest;Emotional support   Restrictions/Precautions   Weight Bearing Precautions Per Order No   Other Precautions Bed Alarm;Multiple lines;Fall Risk;Pain   Home Living   Type of Home House   Home Layout One level;Performs ADLs on one level;Stairs to enter without rails  (1 ARDEN)   Bathroom Shower/Tub Tub/shower unit  (and walk in shower - utilizes tub/shower)   Bathroom Toilet Raised   Bathroom Equipment Shower chair;Grab bars in shower   Home  "Equipment Cane;Walker   Additional Comments Reports use of cane.   Prior Function   Level of [place] Independent with ADLs;Independent with functional mobility;Needs assistance with IADLS   Lives With Spouse   Receives Help From Family   IADLs Independent with driving;Family/Friend/Other provides meals;Independent with medication management   Falls in the last 6 months 0   Vocational Retired   Lifestyle   Autonomy PTA, was (I) with ADLs and required (A) with IADLs. Patient lives in a one-story home w spouse, 1 AR[place]. Uses cane intermittently at baseline (currently has with him) - also has RW. Tub/shower and walk in shower - uses tub w/ GB and SC. Raised toilet. (+) . Denies falls. Retired. Spouse home.   Reciprocal Relationships SPouse   Service to Others Retired   Intrinsic Gratification Watching TV   General   Additional Pertinent History Comorbidities affecting pt’s functional performance include a significant PMH of: CKD3, DM2, AYLEEN, GERD, HTN, obesity, lymphedema, CHF, a-fib.  Patient with active OT orders and activity orders for Up and OOB as tolerated .   Family/Caregiver Present No   Subjective   Subjective \"I'm just not feeling well\" Agreeable to OT eval.   ADL   Where Assessed Edge of bed   Eating Assistance 5  Supervision/Setup   Grooming Assistance 5  Supervision/Setup   UB Bathing Assistance 5  Supervision/Setup   LB Bathing Assistance 3  Moderate Assistance   UB Dressing Assistance 4  Minimal Assistance   LB Dressing Assistance 3  Moderate Assistance   Toileting Assistance  4  Minimal Assistance   Bed Mobility   Supine to Sit 4  Minimal assistance   Additional items Assist x 1;HOB elevated;Bedrails;Increased time required;Verbal cues;Other  (trunk assist.)   Sit to Supine 5  Supervision   Additional items Increased time required;Verbal cues   Additional Comments Pt awoken from deep sleep, lethargic to start but increased alertness w mobility. While asleep, O2 83-87% on RA. Once upright, >91%. " EOB BP: 102/55   Transfers   Sit to Stand 5  Supervision  (x2 attempts to stand.)   Additional items Assist x 1;Increased time required;Verbal cues;Other;Bedrails  (SPC)   Stand to Sit 5  Supervision   Additional items Assist x 1;Bedrails;Increased time required;Verbal cues;Other  (SPC)   Functional Mobility   Functional Mobility 5  Supervision   Additional Comments x100 ft w SPC. Noted somewhat of a drag to RLE. No overt gross LOB noted. Pt reports continued fatigue t/o evaluation.   Additional items SPC   Cognition   Overall Cognitive Status Impaired   Arousal/Participation Arousable;Lethargic;Responsive   Attention Attends with cues to redirect   Orientation Level Oriented to person;Oriented to place;Disoriented to time  (Reports October 2023.)   Memory Decreased recall of precautions;Decreased short term memory   Following Commands Follows one step commands with increased time or repetition   Comments Pt lethargic.   Assessment   Limitation Decreased ADL status;Decreased UE strength;Decreased Safe judgement during ADL;Decreased endurance;Decreased high-level ADLs;Decreased self-care trans  (dec functional reach, coordination, balance)   Prognosis Good   Assessment Patient is a 69 y.o. year old male seen for OT eval s/p admit to [facility] on 10/6/2024 with bacteremia, hyponatremia, acute cystitis without hematuria.  OT consulted to assess ADLs/IADLs/functional mobility and assist w/ D/C planning. Patient demonstrates the following deficits impacting occupational performance: weakness, decreased strength , decreased balance, decreased activity tolerance, limited functional reach, impaired memory, decreased safety awareness, increased pain, lethargy , increased body habitus , impaired coordination, decreased cardiovascular endurance, and decreased skin integrity . These impairments, as well at pt’s [place] home environment, difficulty performing ADLs, difficulty performing IADLs, difficulty performing transfers/mobility,  limited insight into deficits, compliance, fall risk , functional decline , increased reliance on DME , and advanced age, limit pt’s ability to safely engage in all baseline areas of occupation. Pt's CLOF as follows: eating/grooming: supervision , UB ADLs: Sarah, LB ADLs: modA, toileting: Sarah, bed mobility: Sarah, functional transfers: supervision , functional mobility: supervision , standing tolerance: ~4 min. Pt would benefit from continued skilled OT while in acute setting to address deficits as defined above and to maximize (I) w/ ADLs/functional mobility. Occupational performance areas to address include: grooming, bathing/shower, toilet hygiene, dressing, medication management, health maintenance, functional mobility, clothing management, cleaning, meal prep, and household maintenance. Based on the aforementioned evaluation, functional performance deficits, and assessments, pt has been identified as a high complexity evaluation. At this time, recommendation for pt to receive post-acute rehabilitation services at a Level III (minimum resource intensity) due to above deficits and CLOF. OT will continue to follow pt 3-5x/wk to address the goals listed below to  w/in 10-14 days.   Goals   Patient Goals to rest   LTG Time Frame 10-14   Plan   Treatment Interventions ADL retraining;UE strengthening/ROM;Functional transfer training;Endurance training;Cognitive reorientation;Patient/family training;Equipment evaluation/education;Neuromuscular reeducation;Compensatory technique education;Continued evaluation;Energy conservation;Activityengagement;Cardiac education   Goal Expiration Date 10/20/24   OT Treatment Day 0   OT Frequency 3-5x/wk   Discharge Recommendation   Rehab Resource Intensity Level, OT III (Minimum Resource Intensity)  (Pending progress.)   AM-PAC Daily Activity Inpatient   Lower Body Dressing 2   Bathing 3   Toileting 3   Upper Body Dressing 3   Grooming 3   Eating 3   Daily Activity Raw Score 17    Daily Activity Standardized Score (Calc for Raw Score >=11) 37.26   AM-PAC Applied Cognition Inpatient   Following a Speech/Presentation 3   Understanding Ordinary Conversation 3   Taking Medications 2   Remembering Where Things Are Placed or Put Away 2   Remembering List of 4-5 Errands 2   Taking Care of Complicated Tasks 2   Applied Cognition Raw Score 14   Applied Cognition Standardized Score 32.02     Occupational Therapy goals: In 7-14 days:     1- Patient will verbalize and demonstrate use of energy conservation/deep breathing technique and work simplification skills during functional activity with no verbal cues.   2- Patient will verbalize and demonstrate good body mechanics and joint protection techniques during ADLs/IADLs with no verbal cues   3- Pt will complete bed mobility at a Mod I level w/ G balance/safety demonstrated to decrease caregiver assistance required   4- Patient will increase OOB/ sitting tolerance to 2-4 hours per day for increased participation in self care and leisure tasks with no s/s of exertion.   5-Patient will increase standing tolerance time to 5 minutes with unilateral UE support to complete sink level ADLs@ mod I level    6- Pt will improve functional transfers to Mod I on/off all surfaces using DME as needed w/ G balance/safety   7- Patient will complete UB ADLs with [place] utilizing appropriate DME/AE PRN   8- Patient will complete LB ADLs with [place] utilizing appropriate DME/AE PRN   9- Patient will complete toileting tasks with [place] with G hygiene/thoroughness utilizing appropriate DME/AE PRN   10- Pt will improve functional mobility during ADL/IADL/leisure tasks to Mod I using DME as needed w/ G balance/safety    11- Pt will be attentive 100% of the time during ongoing cognitive assessment w/ G participation to assist w/ safe d/c planning/recommendations   12- Pt will participate in simulated IADL management task to increase independence to Mod I w/ G safety and endurance    13- Pt will increase BUE strength by 1MM grade via AROM/AAROM/PROM exercises to increase independence in ADLs and transfers       Kat Evans, OTR/L

## 2024-10-06 NOTE — ED NOTES
Pt was called to come back to the ER for ABX infusion due to a positive blood culture. When discussed witht he pt for the need to be admitted for proper tx with ABX for the blood infection he was reluctant to stay. Pt states he will just come back and forth for ABX. It was explained to him that it is not the proper way and that he needs to be admitted and have a provider monitor his condition. Pt still requested to leave and just come back to have ABX infused. Pt family member called from waiting area to discuss with the pt the need for the treatment and the reasons. Awaiting decision      Rd Sharma Jr., RN  10/06/24 [id number]

## 2024-10-06 NOTE — RESULT ENCOUNTER NOTE
I called patient and informed him of his positive blood culture. Suspect etiology is pyelonephritis. Recommended he return for IV antibiotic therapy, patient states he will be returning right away.

## 2024-10-06 NOTE — QUICK NOTE
Received Sepsis Time Zero Alert for this patient due to temperature of 101 respiration rate of 22 and patient being on BiPAP.  At the time of notification patient's temperature 98.6 heart rate of 65 patient is on BiPAP for sleep apnea.  Patient has no new source of infection.      Will continue current management   AM labs pending  Will continue to monitor closely

## 2024-10-06 NOTE — H&P
H&P - Hospitalist   Name: Christy Haro 69 y.o. male I MRN: [id number]  Unit/Bed#: 417-01 I Date of Admission: 10/6/2024   Date of Service: 10/6/2024 I Hospital Day: 0     Assessment & Plan  Acute cystitis without hematuria  Patient  was diagnosed with UTI yesterday  Patient was discharged with p.o. cefpodoxime   --Will continue Antibiotic coverage with IV ceftriaxone   --Follow cultures   --Monitor urinary output, renal function  --Am labs  --Supportive care  Benign essential HTN  Blood pressure is stable  --Continue PTA blood pressure medication.   --Monitor blood pressure while admitted   AYLEEN (obstructive sleep apnea)  History of AYLEEN  --Respiratory protocol  --Continue CPAP at bedtime  Type 2 diabetes mellitus with obesity  (formerly [facility])  Lab Results   Component Value Date    HGBA1C 6.2 (H) 07/01/2024   Initial blood glucose at 126  --Hold oral diabetic medication  --Sliding scale insulin  --Fingerstick glucose 4 times daily AC/HS  --Check A1C  --Monitor while admitted   Stage 3 chronic kidney disease, unspecified whether stage 3a or 3b CKD (formerly [facility])  Lab Results   Component Value Date    EGFR 37 10/06/2024    EGFR 34 10/04/2024    EGFR 44 09/19/2024    CREATININE 1.80 (H) 10/06/2024    CREATININE 1.89 (H) 10/04/2024    CREATININE 1.55 (H) 09/19/2024   Creatinine level at 1.80  --Avoid nephrotoxic agents  --Monitor creatinine levels, electrolytes  --Check AM CMP  --Continue outpatient nephrology follow up  Dyslipidemia  --Continue statin  GERD (gastroesophageal reflux disease)  Stable  --Currently on Prilosec 40 mg p.o. daily  --Will give formulary alternative Protonix 40 mg PO daily  Bacteremia  Patient with positive blood culture  Cultures collected yesterday results positive for serratia marcescens  --Started on IV ceftriaxone in the ER  --Will continue with IV Ceftriaxone for now  --Repeat blood cultures in the Am   --Trend procalcitonin  --Monitor vital signs  --AM labs   --Supportive care   Hyponatremia  Sodium mildly  decreased at 134  --Will give normal saline bolus  --Monitor sodium levels  --Check a.m. CMP      VTE Pharmacologic Prophylaxis:   Moderate Risk (Score 3-4) - Pharmacological DVT Prophylaxis Ordered: heparin.  Code Status: Level 1 - Full Code   Discussion with family: Updated  (wife) at bedside.    Anticipated Length of Stay: Patient will be admitted on an inpatient basis with an anticipated length of stay of greater than 2 midnights secondary to bacteremia, UTI, hyponatremia.    History of Present Illness   Chief Complaint: Positive blood cultures    Christy Haro is a 69 y.o. male with a PMH of diabetes, CHF, hypertension, hyperlipidemia, GERD, AYLEEN who presents to the emergency room as he was recalled due to positive blood cultures.  Patient was in the emergency room yesterday and diagnosed with urinary tract infection and was sent home on cefpodoxime.  He received a call and notified him to return to the emergency room due to blood culture.  Patient offered no complaints upon arrival to the ER.    Labs completed in the ER today showed a sodium of 134, BUN of 42, creatinine of 1.8, AST of 43, Phos of 27, bilirubin of 1.48, lactic acid of 1.0, procalcitonin of 0.89, hemoglobin of 11.3.  UA completed yesterday showed large leukocytes, innumerable WBCs, innumerable bacteria.  Blood cultures elected yesterday resulting today shows serratia marcescens.  While in the ER patient started on IV ceftriaxone.    Patient is being admitted on inpatient status MedSur level care for further workup and management of bacteremia, urinary tract infection, hyponatremia        Review of Systems   Constitutional:  Positive for fever. Negative for activity change, chills and diaphoresis.   Eyes:  Negative for photophobia and visual disturbance.   Respiratory:  Negative for cough, chest tightness, shortness of breath and wheezing.    Cardiovascular:  Positive for leg swelling. Negative for chest pain and palpitations.    Gastrointestinal:  Positive for abdominal pain. Negative for diarrhea, nausea and vomiting.   Genitourinary:  Negative for difficulty urinating, dysuria, flank pain and hematuria.   Musculoskeletal:  Negative for arthralgias, back pain, gait problem, neck pain and neck stiffness.   Skin:  Negative for rash and wound.   Neurological:  Positive for headaches. Negative for dizziness, tremors, syncope and weakness.   Psychiatric/Behavioral:  Negative for agitation and confusion. The patient is not nervous/anxious.        Historical Information   Past Medical History:   Diagnosis Date    Arthritis     last assessed 7/1/15    Bronchitis 02/02/2023    Chronic diastolic (congestive) heart failure (HCC)     Diabetes mellitus (HCC)     type 2-last assessed 1/28/15    Diabetes mellitus (HCC) 03/16/2018    Dyslipidemia     GERD (gastroesophageal reflux disease)     Hypertension     Hypertensive urgency 03/16/2023    Irregular heart beat     last assessed 7/1/15    Obstructive sleep apnea     Palpitations     last assessed 9/7/17    Sexual dysfunction     last assessed 7/1/15    Thyroid disease     last assessed 7/1/15     Past Surgical History:   Procedure Laterality Date    COLONOSCOPY      TONSILLECTOMY       Social History     Tobacco Use    Smoking status: Former     Current packs/day: 1.00     Types: Cigarettes     Passive exposure: Never    Smokeless tobacco: Never    Tobacco comments:     former smoker-quit 27 yrs ago 1pppd x 30 yrs as per [employer]   Vaping Use    Vaping status: Never Used   Substance and Sexual Activity    Alcohol use: Never    Drug use: Never    Sexual activity: Not Currently     E-Cigarette/Vaping    E-Cigarette Use Never User      E-Cigarette/Vaping Substances    Nicotine No     THC No     CBD No     Flavoring No     Other No     Unknown No      Family History   Problem Relation Age of Onset    Hypertension Mother         essential    Stroke Mother     Hypertension Father         essential     Heart defect Father         cardiac disorder    Stroke Brother     Hypertension Brother      Social History:  Marital Status: Single   Occupation: Retired  Patient Pre-hospital Living Situation: Home  Patient Pre-hospital Level of Mobility: walks with cane  Patient Pre-hospital Diet Restrictions: None reported    Objective :  Temp:  [98.2 °F (36.8 °C)-101 °F (38.3 °C)] 100.2 °F (37.9 °C)  HR:  [75-78] 75  BP: (109-119)/(51-56) 119/51  Resp:  [18-22] 22  SpO2:  [94 %-96 %] 96 %  O2 Device: None (Room air)    Physical Exam  Constitutional:       General: He is not in acute distress.     Appearance: He is not ill-appearing.   HENT:      Head: Normocephalic and atraumatic.      Mouth/Throat:      Mouth: Mucous membranes are moist.      Pharynx: Oropharynx is clear. No oropharyngeal exudate or posterior oropharyngeal erythema.   Eyes:      Pupils: Pupils are equal, round, and reactive to light.   Cardiovascular:      Rate and Rhythm: Normal rate and regular rhythm.      Pulses: Normal pulses.   Pulmonary:      Effort: No respiratory distress.      Breath sounds: No stridor. No wheezing, rhonchi or rales.   Abdominal:      General: There is no distension.      Palpations: Abdomen is soft.      Tenderness: There is no abdominal tenderness.   Musculoskeletal:         General: No tenderness.      Cervical back: No rigidity or tenderness.      Right lower leg: Edema present.      Left lower leg: Edema present.   Skin:     General: Skin is warm and dry.      Capillary Refill: Capillary refill takes less than 2 seconds.   Neurological:      Mental Status: He is alert and oriented to person, place, and time.   Psychiatric:         Mood and Affect: Mood normal.         Lines/Drains:            Lab Results: I have reviewed the following results:  Results from last 7 days   Lab Units 10/06/24  0153   WBC Thousand/uL 8.19   HEMOGLOBIN g/dL 11.3*   HEMATOCRIT % 35.1*   PLATELETS Thousands/uL 139*   SEGS PCT % 75   LYMPHO PCT % 13*    MONO PCT % 11   EOS PCT % 0     Results from last 7 days   Lab Units 10/06/24  0153   SODIUM mmol/L 134*   POTASSIUM mmol/L 4.0   CHLORIDE mmol/L 103   CO2 mmol/L 21   BUN mg/dL 42*   CREATININE mg/dL 1.80*   ANION GAP mmol/L 10   CALCIUM mg/dL 8.4   ALBUMIN g/dL 3.6   TOTAL BILIRUBIN mg/dL 1.48*   ALK PHOS U/L 27*   ALT U/L 40   AST U/L 43*   GLUCOSE RANDOM mg/dL 126     Results from last 7 days   Lab Units 10/05/24  0008   INR  1.16         Lab Results   Component Value Date    HGBA1C 6.2 (H) 07/01/2024    HGBA1C 6.0 (H) 04/07/2024    HGBA1C 6.3 (H) 11/08/2023     Results from last 7 days   Lab Units 10/06/24  0153 10/04/24  2334   LACTIC ACID mmol/L 1.0 1.4   PROCALCITONIN ng/ml 0.89* 0.38*       Imaging Results Review: No pertinent imaging studies reviewed.  Other Study Results Review: No additional pertinent studies reviewed.    Administrative Statements   I have spent a total time of 40 minutes in caring for this patient on the day of the visit/encounter including Documenting in the medical record, Reviewing / ordering tests, medicine, procedures  , Obtaining or reviewing history  , and Communicating with other healthcare professionals .    ** Please Note: This note has been constructed using a voice recognition system. **

## 2024-10-06 NOTE — PLAN OF CARE
Problem: PAIN - ADULT  Goal: Verbalizes/displays adequate comfort level or baseline comfort level  Description: Interventions:  - Encourage patient to monitor pain and request assistance  - Assess pain using appropriate pain scale  - Administer analgesics based on type and severity of pain and evaluate response  - Implement non-pharmacological measures as appropriate and evaluate response  - Consider cultural and social influences on pain and pain management  - Notify physician/advanced practitioner if interventions unsuccessful or patient reports new pain  Outcome: Progressing     Problem: INFECTION - ADULT  Goal: Absence or prevention of progression during hospitalization  Description: INTERVENTIONS:  - Assess and monitor for signs and symptoms of infection  - Monitor lab/diagnostic results  - Monitor all insertion sites, i.e. indwelling lines, tubes, and drains  - Monitor endotracheal if appropriate and nasal secretions for changes in amount and color  - [place] appropriate cooling/warming therapies per order  - Administer medications as ordered  - Instruct and encourage patient and family to use good hand hygiene technique  - Identify and instruct in appropriate isolation precautions for identified infection/condition  Outcome: Progressing  Goal: Absence of fever/infection during neutropenic period  Description: INTERVENTIONS:  - Monitor WBC    Outcome: Progressing     Problem: SAFETY ADULT  Goal: Patient will remain free of falls  Description: INTERVENTIONS:  - Educate patient/family on patient safety including physical limitations  - Instruct patient to call for assistance with activity   - Consult OT/PT to assist with strengthening/mobility   - Keep Call bell within reach  - Keep bed low and locked with side rails adjusted as appropriate  - Keep care items and personal belongings within reach  - Initiate and maintain comfort rounds  - Make Fall Risk Sign visible to staff  - Offer Toileting every 2 Hours,  in advance of need  - Initiate/Maintain bed/chair alarm  - Obtain necessary fall risk management equipment: alarms, nonskid footwear, needs in reach  - Apply yellow socks and bracelet for high fall risk patients  - Consider moving patient to room near nurses station  Outcome: Progressing  Goal: Maintain or return to baseline ADL function  Description: INTERVENTIONS:  -  Assess patient's ability to carry out ADLs; assess patient's baseline for ADL function and identify physical deficits which impact ability to perform ADLs (bathing, care of mouth/teeth, toileting, grooming, dressing, etc.)  - Assess/evaluate cause of self-care deficits   - Assess range of motion  - Assess patient's mobility; develop plan if impaired  - Assess patient's need for assistive devices and provide as appropriate  - Encourage maximum independence but intervene and supervise when necessary  - Involve family in performance of ADLs  - Assess for home care needs following discharge   - Consider OT consult to assist with ADL evaluation and planning for discharge  - Provide patient education as appropriate  Outcome: Progressing  Goal: Maintains/Returns to pre admission functional level  Description: INTERVENTIONS:  - Perform AM-PAC 6 Click Basic Mobility/ Daily Activity assessment daily.  - Set and communicate daily mobility goal to care team and patient/family/caregiver.   - Collaborate with rehabilitation services on mobility goals if consulted  - Perform Range of Motion 3 times a day.  - Reposition patient every 2 hours.  - Dangle patient 3 times a day  - Stand patient 3 times a day  - Ambulate patient 3 times a day  - Out of bed to chair 3 times a day   - Out of bed for meals 3 times a day  - Out of bed for toileting  - Record patient progress and toleration of activity level   Outcome: Progressing     Problem: DISCHARGE PLANNING  Goal: Discharge to home or other facility with appropriate resources  Description: INTERVENTIONS:  - Identify  barriers to discharge w/patient and caregiver  - Arrange for needed discharge resources and transportation as appropriate  - Identify discharge learning needs (meds, wound care, etc.)  - Arrange for interpretive services to assist at discharge as needed  - Refer to Case Management Department for coordinating discharge planning if the patient needs post-hospital services based on physician/advanced practitioner order or complex needs related to functional status, cognitive ability, or social support system  Outcome: Progressing     Problem: Knowledge Deficit  Goal: Patient/family/caregiver demonstrates understanding of disease process, treatment plan, medications, and discharge instructions  Description: Complete learning assessment and assess knowledge base.  Interventions:  - Provide teaching at level of understanding  - Provide teaching via preferred learning methods  Outcome: Progressing

## 2024-10-07 LAB
ANION GAP SERPL CALCULATED.3IONS-SCNC: 9 MMOL/L (ref 4–13)
BASOPHILS # BLD AUTO: 0.02 THOUSANDS/ΜL (ref 0–0.1)
BASOPHILS NFR BLD AUTO: 0 % (ref 0–1)
BUN SERPL-MCNC: 26 MG/DL (ref 5–25)
CALCIUM SERPL-MCNC: 8.1 MG/DL (ref 8.4–10.2)
CHLORIDE SERPL-SCNC: 105 MMOL/L (ref 96–108)
CO2 SERPL-SCNC: 21 MMOL/L (ref 21–32)
CREAT SERPL-MCNC: 1.3 MG/DL (ref 0.6–1.3)
DME PARACHUTE DELIVERY DATE REQUESTED: NORMAL
DME PARACHUTE DELIVERY NOTE: NORMAL
DME PARACHUTE ITEM DESCRIPTION: NORMAL
DME PARACHUTE ORDER STATUS: NORMAL
DME PARACHUTE SUPPLIER NAME: NORMAL
DME PARACHUTE SUPPLIER PHONE: NORMAL
EOSINOPHIL # BLD AUTO: 0.01 THOUSAND/ΜL (ref 0–0.61)
EOSINOPHIL NFR BLD AUTO: 0 % (ref 0–6)
ERYTHROCYTE [DISTWIDTH] IN BLOOD BY AUTOMATED COUNT: 14.3 % (ref 11.6–15.1)
GFR SERPL CREATININE-BSD FRML MDRD: 54 ML/MIN/1.73SQ M
GLUCOSE SERPL-MCNC: 114 MG/DL (ref 65–140)
GLUCOSE SERPL-MCNC: 116 MG/DL (ref 65–140)
GLUCOSE SERPL-MCNC: 122 MG/DL (ref 65–140)
GLUCOSE SERPL-MCNC: 139 MG/DL (ref 65–140)
GLUCOSE SERPL-MCNC: 159 MG/DL (ref 65–140)
HCT VFR BLD AUTO: 34.8 % (ref 36.5–49.3)
HGB BLD-MCNC: 11 G/DL (ref 12–17)
IMM GRANULOCYTES # BLD AUTO: 0.01 THOUSAND/UL (ref 0–0.2)
IMM GRANULOCYTES NFR BLD AUTO: 0 % (ref 0–2)
LYMPHOCYTES # BLD AUTO: 0.81 THOUSANDS/ΜL (ref 0.6–4.47)
LYMPHOCYTES NFR BLD AUTO: 18 % (ref 14–44)
MCH RBC QN AUTO: 28.5 PG (ref 26.8–34.3)
MCHC RBC AUTO-ENTMCNC: 31.6 G/DL (ref 31.4–37.4)
MCV RBC AUTO: 90 FL (ref 82–98)
MONOCYTES # BLD AUTO: 0.55 THOUSAND/ΜL (ref 0.17–1.22)
MONOCYTES NFR BLD AUTO: 12 % (ref 4–12)
NEUTROPHILS # BLD AUTO: 3.05 THOUSANDS/ΜL (ref 1.85–7.62)
NEUTS SEG NFR BLD AUTO: 70 % (ref 43–75)
NRBC BLD AUTO-RTO: 0 /100 WBCS
PLATELET # BLD AUTO: 127 THOUSANDS/UL (ref 149–390)
PMV BLD AUTO: 11 FL (ref 8.9–12.7)
POTASSIUM SERPL-SCNC: 3.8 MMOL/L (ref 3.5–5.3)
PROCALCITONIN SERPL-MCNC: 0.49 NG/ML
RBC # BLD AUTO: 3.86 MILLION/UL (ref 3.88–5.62)
SODIUM SERPL-SCNC: 135 MMOL/L (ref 135–147)
WBC # BLD AUTO: 4.45 THOUSAND/UL (ref 4.31–10.16)

## 2024-10-07 PROCEDURE — 94660 CPAP INITIATION&MGMT: CPT

## 2024-10-07 PROCEDURE — 99232 SBSQ HOSP IP/OBS MODERATE 35: CPT | Performed by: FAMILY MEDICINE

## 2024-10-07 PROCEDURE — 82948 REAGENT STRIP/BLOOD GLUCOSE: CPT

## 2024-10-07 PROCEDURE — 85025 COMPLETE CBC W/AUTO DIFF WBC: CPT | Performed by: FAMILY MEDICINE

## 2024-10-07 PROCEDURE — 97163 PT EVAL HIGH COMPLEX 45 MIN: CPT

## 2024-10-07 PROCEDURE — 94760 N-INVAS EAR/PLS OXIMETRY 1: CPT

## 2024-10-07 PROCEDURE — 97535 SELF CARE MNGMENT TRAINING: CPT

## 2024-10-07 PROCEDURE — 84145 PROCALCITONIN (PCT): CPT | Performed by: PHYSICIAN ASSISTANT

## 2024-10-07 PROCEDURE — 97530 THERAPEUTIC ACTIVITIES: CPT

## 2024-10-07 PROCEDURE — 99223 1ST HOSP IP/OBS HIGH 75: CPT | Performed by: INTERNAL MEDICINE

## 2024-10-07 PROCEDURE — 80048 BASIC METABOLIC PNL TOTAL CA: CPT | Performed by: FAMILY MEDICINE

## 2024-10-07 RX ORDER — FONDAPARINUX SODIUM 5 MG/.4ML
5 INJECTION SUBCUTANEOUS EVERY 24 HOURS
Status: DISCONTINUED | OUTPATIENT
Start: 2024-10-08 | End: 2024-10-11 | Stop reason: HOSPADM

## 2024-10-07 RX ORDER — MIDODRINE HYDROCHLORIDE 5 MG/1
2.5 TABLET ORAL
Status: DISCONTINUED | OUTPATIENT
Start: 2024-10-07 | End: 2024-10-09

## 2024-10-07 RX ORDER — FONDAPARINUX SODIUM 10 MG/.8ML
10 INJECTION SUBCUTANEOUS EVERY 24 HOURS
Status: DISCONTINUED | OUTPATIENT
Start: 2024-10-08 | End: 2024-10-07

## 2024-10-07 RX ADMIN — ASPIRIN 81 MG 81 MG: 81 TABLET ORAL at 08:25

## 2024-10-07 RX ADMIN — GABAPENTIN 100 MG: 100 CAPSULE ORAL at 08:26

## 2024-10-07 RX ADMIN — METOPROLOL SUCCINATE 25 MG: 50 TABLET, EXTENDED RELEASE ORAL at 08:29

## 2024-10-07 RX ADMIN — MIDODRINE HYDROCHLORIDE 2.5 MG: 5 TABLET ORAL at 11:35

## 2024-10-07 RX ADMIN — MIDODRINE HYDROCHLORIDE 2.5 MG: 5 TABLET ORAL at 17:23

## 2024-10-07 RX ADMIN — ATORVASTATIN CALCIUM 10 MG: 10 TABLET, FILM COATED ORAL at 17:23

## 2024-10-07 RX ADMIN — CEFTRIAXONE 2000 MG: 2 INJECTION, SOLUTION INTRAVENOUS at 13:30

## 2024-10-07 RX ADMIN — PANTOPRAZOLE SODIUM 40 MG: 40 TABLET, DELAYED RELEASE ORAL at 05:02

## 2024-10-07 RX ADMIN — METOPROLOL SUCCINATE 50 MG: 50 TABLET, EXTENDED RELEASE ORAL at 08:29

## 2024-10-07 RX ADMIN — ENOXAPARIN SODIUM 30 MG: 30 INJECTION SUBCUTANEOUS at 08:25

## 2024-10-07 RX ADMIN — B-COMPLEX W/ C & FOLIC ACID TAB 1 TABLET: TAB at 08:26

## 2024-10-07 RX ADMIN — GABAPENTIN 100 MG: 100 CAPSULE ORAL at 17:23

## 2024-10-07 RX ADMIN — TAMSULOSIN HYDROCHLORIDE 0.4 MG: 0.4 CAPSULE ORAL at 17:23

## 2024-10-07 RX ADMIN — POTASSIUM CHLORIDE 20 MEQ: 1500 TABLET, EXTENDED RELEASE ORAL at 08:26

## 2024-10-07 NOTE — OCCUPATIONAL THERAPY NOTE
Occupational Therapy Progress Note     Patient Name: Christy Haro  Today's Date: 10/7/2024  Problem List  Principal Problem:    Bacteremia  Active Problems:    Benign essential HTN    Dyslipidemia    GERD (gastroesophageal reflux disease)    AYLEEN (obstructive sleep apnea)    Type 2 diabetes mellitus with obesity  (HCC)    Stage 3 chronic kidney disease, unspecified whether stage 3a or 3b CKD (HCC)    Acute cystitis without hematuria    Hyponatremia              10/07/24 0939   OT Last Visit   OT Visit Date 10/07/24   Note Type   Note Type Treatment   Pain Assessment   Pain Score No Pain   Restrictions/Precautions   Weight Bearing Precautions Per Order No   Other Precautions Chair Alarm;Fall Risk   ADL   Where Assessed Other (Comment)  (bathroom)   Grooming Assistance 5  Supervision/Setup   Grooming Deficit Setup;Teeth care   UB Bathing Assistance 4  Minimal Assistance   LB Bathing Assistance 4  Minimal Assistance   UB Dressing Assistance 5  Supervision/Setup   LB Dressing Assistance 5  Supervision/Setup   LB Dressing Deficit Pull up over hips;Requires assistive device for steadying   Toileting Assistance  5  Supervision/Setup   Toileting Deficit Clothing management up;Clothing management down;Perineal hygiene   Bed Mobility   Additional Comments pt seated in chair at start and end of session; SPO2 WFL on RA with mild complaints of SOB from the patient with functional activity   Transfers   Sit to Stand 5  Supervision   Additional items Increased time required;Verbal cues   Stand to Sit 5  Supervision   Additional items Increased time required;Verbal cues   Toilet transfer 5  Supervision   Additional items Increased time required;Standard toilet;Verbal cues   Additional Comments pt performs all functional transfers with (S) level and use of RW; requires cues for safety with RW   Functional Mobility   Functional Mobility 5  Supervision   Additional Comments pt performs ~60ft with RW; limited by endurance and  "fatigue   Additional items Rolling walker   Subjective   Subjective \"I can use the bathroom\"   Cognition   Overall Cognitive Status WFL   Arousal/Participation Alert   Attention Within functional limits   Orientation Level Oriented X4   Memory Within functional limits   Following Commands Follows one step commands without difficulty   Activity Tolerance   Activity Tolerance Patient limited by fatigue   Assessment   Assessment Patient participated in Skilled OT session this date with interventions consisting of ADL re training with the use of correct body mechnaics, safety awareness and fall prevention techniques,  therapeutic activities to: increase activity tolerance, increase standing tolerance time with unilateral UE support to complete sink level ADLs, increase dynamic sit/ stand balance during functional activity , and increase OOB/ sitting tolerance . Co treatment with PT secondary to complex medical condition of pt, mod-max A of 2 required to achieve and maintain transitional movements, requiring the need of skilled therapeutic intervention of 2 therapists to achieve delivery of services. Patient agreeable to OT treatment session, upon arrival patient was found seated OOB to Chair. Patient requiring verbal cues for safety and frequent rest periods. Patient continues to be functioning below baseline level, occupational performance remains limited secondary to factors listed above and increased risk for falls and injury. The patient's raw score on the AM-PAC Daily Activity Inpatient Short Form is 21. A raw score of greater than or equal to 19 suggests the patient may benefit from discharge to home. Please refer to the recommendation of the Occupational Therapist for safe discharge planning. From OT standpoint, recommendation at time of d/c would be level II moderate resource intensity.   Plan   Goal Expiration Date 10/20/24   OT Treatment Day 1   OT Frequency 3-5x/wk   Discharge Recommendation   Rehab Resource " Intensity Level, OT III (Minimum Resource Intensity)   AM-PAC Daily Activity Inpatient   Lower Body Dressing 3   Bathing 3   Toileting 3   Upper Body Dressing 4   Grooming 4   Eating 4   Daily Activity Raw Score 21   Daily Activity Standardized Score (Calc for Raw Score >=11) 44.27   AM-PAC Applied Cognition Inpatient   Following a Speech/Presentation 4   Understanding Ordinary Conversation 4   Taking Medications 3   Remembering Where Things Are Placed or Put Away 3   Remembering List of 4-5 Errands 3   Taking Care of Complicated Tasks 3   Applied Cognition Raw Score 20   Applied Cognition Standardized Score 41.76

## 2024-10-07 NOTE — ASSESSMENT & PLAN NOTE
Lab Results   Component Value Date    HGBA1C 6.0 (H) 10/06/2024     Recent Labs     10/06/24  1106 10/06/24  1643 10/06/24  2109 10/07/24  0725   POCGLU 146* 159* 116 116     Blood Sugar Average: Last 72 hrs:  (P) 129.8  Well controlled with A1c of 6.0. Remains a risk factor for development of infection. Continue with tight glycemic control.

## 2024-10-07 NOTE — PHYSICAL THERAPY NOTE
Physical Therapy Evaluation    Patient Name: Christy Haro    Today's Date: 10/7/2024     Problem List  Principal Problem:    Bacteremia  Active Problems:    Benign essential HTN    Dyslipidemia    GERD (gastroesophageal reflux disease)    AYLEEN (obstructive sleep apnea)    Type 2 diabetes mellitus with obesity  (HCC)    Stage 3 chronic kidney disease, unspecified whether stage 3a or 3b CKD (HCC)    Acute cystitis without hematuria    Hyponatremia       Past Medical History  Past Medical History:   Diagnosis Date    Arthritis     last assessed 7/1/15    Bronchitis 02/02/2023    Chronic diastolic (congestive) heart failure (HCC)     Diabetes mellitus (HCC)     type 2-last assessed 1/28/15    Diabetes mellitus (HCC) 03/16/2018    Dyslipidemia     GERD (gastroesophageal reflux disease)     Hypertension     Hypertensive urgency 03/16/2023    Irregular heart beat     last assessed 7/1/15    Obstructive sleep apnea     Palpitations     last assessed 9/7/17    Sexual dysfunction     last assessed 7/1/15    Thyroid disease     last assessed 7/1/15        Past Surgical History  Past Surgical History:   Procedure Laterality Date    COLONOSCOPY      TONSILLECTOMY             10/07/24 0937   PT Last Visit   PT Visit Date 10/07/24   Note Type   Note type Evaluation   Pain Assessment   Pain Assessment Tool 0-10   Pain Score No Pain   Restrictions/Precautions   Weight Bearing Precautions Per Order No   Other Precautions Fall Risk;Chair Alarm   Home Living   Type of Home House   Home Layout One level;Stairs to enter without rails  (1 ARDEN)   Bathroom Shower/Tub Tub/shower unit   Bathroom Toilet Raised   Bathroom Equipment Grab bars in shower;Shower chair   Bathroom Accessibility Accessible   Home Equipment Cane   Additional Comments pt reports use of cane at baseline   Prior Function   Level of [place] Independent with ADLs;Independent with functional mobility;Needs  "assistance with IADLS   Lives With Spouse   Receives Help From Family   IADLs Independent with driving   Falls in the last 6 months 0   Vocational Retired   General   Family/Caregiver Present No   Cognition   Overall Cognitive Status WFL   Arousal/Participation Alert   Attention Within functional limits   Orientation Level Oriented X4   Following Commands Follows all commands and directions without difficulty   Subjective   Subjective \"This is too short\"   RLE Assessment   RLE Assessment WFL  (4/5 grossly)   LLE Assessment   LLE Assessment WFL  (4/5 grossly)   Bed Mobility   Additional Comments pt OOB at start/end of session   Transfers   Sit to Stand 5  Supervision   Additional items Increased time required;Verbal cues   Stand to Sit 5  Supervision   Additional items Increased time required;Verbal cues   Toilet transfer 5  Supervision   Additional items Increased time required;Verbal cues;Standard toilet   Additional Comments RW used   Ambulation/Elevation   Gait pattern Excessively slow;Short stride;Foward flexed;Decreased foot clearance;Wide ZARA   Gait Assistance 5  Supervision   Additional items Verbal cues   Assistive Device Standard walker   Distance 100'   Balance   Static Sitting Good   Dynamic Sitting Fair +   Static Standing Fair   Dynamic Standing Fair   Ambulatory Fair -  (with RW)   Endurance Deficit   Endurance Deficit Yes   Endurance Deficit Description pt appears fatigued with increased ambulation   Activity Tolerance   Activity Tolerance Patient limited by fatigue   Assessment   Prognosis Good   Problem List Decreased strength;Decreased endurance;Impaired balance;Decreased mobility;Obesity   Assessment Patient is a 69 y.o. male evaluated by Physical Therapy s/p admit to [facility] on 10/6/2024 with admitting diagnosis of: Pyelonephritis, Positive blood culture, and principal problem of: Bacteremia. PT was consulted to assess patient's functional mobility and discharge needs. Ordered " are PT Evaluation and treatment with activity level of: up and OOB as tolerated. Comorbidities affecting patient's physical performance at time of assessment include:  HTN, dyslipidemia, GERD, AYLEEN, DM, CKD, lymphedema, PAF . Personal factors affecting the patient at time of IE include: ambulating with assistive device, step(s) to enter home, inability to navigate community distances, inability/difficulty performing IADLs, and inability/difficulty performing ADLs. Please locate objective findings from PT assessment regarding body systems outlined above. Upon evaluation, pt able to perform all functional mobility with SUP, RW, and increased time. Occasional verbal cuing provided for safety awareness and sequencing. Seated rest break taken following ~100 of ambulation d/t fatigue and SOB. Mild to moderate postural sway demonstrated with mobility but no true LOB experienced. HR and SpO2 remained WFL on RA throughout. The patient's AM-PAC Basic Mobility Inpatient Short Form Raw Score is 18. A Raw score of greater than 16 suggests the patient may benefit from discharge to home. Please also refer to the recommendation of the Physical Therapist for safe discharge planning. Co treatment with OT secondary to complex medical condition of pt, possible A of 2 required to achieve and maintain transitional movements, requiring the need of skilled therapeutic intervention of 2 therapists to achieve delivery of services. Pt will benefit from continued PT intervention during LOS to address current deficits, increase LOF, and facilitate safe d/c to next level of care when medically appropriate. D/c recommendation at this time is rehabilitation resource intensity level III.   Goals   Patient Goals to have a walker for home   LTG Expiration Date 10/21/24   Long Term Goal #1 Pt will participate in B LE strengthening exercises to facilitate improved functional activity tolerance. Pt will perform all functional transfers and bed mobility  mod(I) with good safety awareness. Pt will ambulate 250' mod(I) with LRAD while maintaining good functional dynamic balance. Pt will ascend/descend 1 stair with HR and SUP to reflect the ability to safely navigate the home.   Plan   Treatment/Interventions Functional transfer training;Elevations;Therapeutic exercise;LE strengthening/ROM;Endurance training;Bed mobility;Gait training   PT Frequency 3-5x/wk   Discharge Recommendation   Rehab Resource Intensity Level, PT III (Minimum Resource Intensity)   AM-PAC Basic Mobility Inpatient   Turning in Flat Bed Without Bedrails 3   Lying on Back to Sitting on Edge of Flat Bed Without Bedrails 3   Moving Bed to Chair 3   Standing Up From Chair Using Arms 3   Walk in Room 3   Climb 3-5 Stairs With Railing 3   Basic Mobility Inpatient Raw Score 18   Basic Mobility Standardized Score 41.05   [facility] Highest Level Of Mobility   JH-HLM Goal 6: Walk 10 steps or more   JH-HLM Achieved 7: Walk 25 feet or more   End of Consult   Patient Position at End of Consult Other (comment)  (pt set up in bathroom to perform bathing; RN and PCA made aware)

## 2024-10-07 NOTE — ASSESSMENT & PLAN NOTE
Stable  -Currently on Prilosec 40 mg p.o. daily  -Will give formulary alternative Protonix 40 mg PO daily

## 2024-10-07 NOTE — ASSESSMENT & PLAN NOTE
Presented to the ED on 10/4 with complaint of fever, weakness, and dysuria. Attributed the dysuria at the time to recent enamorado placement on 9/19/24 in setting of urinary retention. Catheter was removed on 10/2 by urology. UA obtained in the ED had innumerable WBC, large LE, and blood. Ucx resulted positive for Citrobacter koseri, as well as two other unidentified contaminants. CT A/P obtained demonstrated bladder wall thickening which could be associated with cystitis. Course now further complicated by bacteremia as above.    Continue IV ceftriaxone as above  Follow-up urine culture until finalized  Follow-up C.koseri sensitivities from 10/5  Anticipate at least 7d total antibiotic course

## 2024-10-07 NOTE — PLAN OF CARE
Problem: PAIN - ADULT  Goal: Verbalizes/displays adequate comfort level or baseline comfort level  Description: Interventions:  - Encourage patient to monitor pain and request assistance  - Assess pain using appropriate pain scale  - Administer analgesics based on type and severity of pain and evaluate response  - Implement non-pharmacological measures as appropriate and evaluate response  - Consider cultural and social influences on pain and pain management  - Notify physician/advanced practitioner if interventions unsuccessful or patient reports new pain  Outcome: Progressing     Problem: INFECTION - ADULT  Goal: Absence or prevention of progression during hospitalization  Description: INTERVENTIONS:  - Assess and monitor for signs and symptoms of infection  - Monitor lab/diagnostic results  - Monitor all insertion sites, i.e. indwelling lines, tubes, and drains  - Monitor endotracheal if appropriate and nasal secretions for changes in amount and color  - [place] appropriate cooling/warming therapies per order  - Administer medications as ordered  - Instruct and encourage patient and family to use good hand hygiene technique  - Identify and instruct in appropriate isolation precautions for identified infection/condition  Outcome: Progressing  Goal: Absence of fever/infection during neutropenic period  Description: INTERVENTIONS:  - Monitor WBC    Outcome: Progressing     Problem: SAFETY ADULT  Goal: Patient will remain free of falls  Description: INTERVENTIONS:  - Educate patient/family on patient safety including physical limitations  - Instruct patient to call for assistance with activity   - Consult OT/PT to assist with strengthening/mobility   - Keep Call bell within reach  - Keep bed low and locked with side rails adjusted as appropriate  - Keep care items and personal belongings within reach  - Initiate and maintain comfort rounds  - Make Fall Risk Sign visible to staff  - Offer Toileting every 2 Hours,  in advance of need  - Initiate/Maintain bed/chair alarm  - Obtain necessary fall risk management equipment: alarms, nonskid footwear, needs in reach  - Apply yellow socks and bracelet for high fall risk patients  - Consider moving patient to room near nurses station  Outcome: Progressing  Goal: Maintain or return to baseline ADL function  Description: INTERVENTIONS:  -  Assess patient's ability to carry out ADLs; assess patient's baseline for ADL function and identify physical deficits which impact ability to perform ADLs (bathing, care of mouth/teeth, toileting, grooming, dressing, etc.)  - Assess/evaluate cause of self-care deficits   - Assess range of motion  - Assess patient's mobility; develop plan if impaired  - Assess patient's need for assistive devices and provide as appropriate  - Encourage maximum independence but intervene and supervise when necessary  - Involve family in performance of ADLs  - Assess for home care needs following discharge   - Consider OT consult to assist with ADL evaluation and planning for discharge  - Provide patient education as appropriate  Outcome: Progressing  Goal: Maintains/Returns to pre admission functional level  Description: INTERVENTIONS:  - Perform AM-PAC 6 Click Basic Mobility/ Daily Activity assessment daily.  - Set and communicate daily mobility goal to care team and patient/family/caregiver.   - Collaborate with rehabilitation services on mobility goals if consulted  - Perform Range of Motion 3 times a day.  - Reposition patient every 2 hours.  - Dangle patient 3 times a day  - Stand patient 3 times a day  - Ambulate patient 3 times a day  - Out of bed to chair 3 times a day   - Out of bed for meals 3 times a day  - Out of bed for toileting  - Record patient progress and toleration of activity level   Outcome: Progressing     Problem: DISCHARGE PLANNING  Goal: Discharge to home or other facility with appropriate resources  Description: INTERVENTIONS:  - Identify  barriers to discharge w/patient and caregiver  - Arrange for needed discharge resources and transportation as appropriate  - Identify discharge learning needs (meds, wound care, etc.)  - Arrange for interpretive services to assist at discharge as needed  - Refer to Case Management Department for coordinating discharge planning if the patient needs post-hospital services based on physician/advanced practitioner order or complex needs related to functional status, cognitive ability, or social support system  Outcome: Progressing     Problem: Knowledge Deficit  Goal: Patient/family/caregiver demonstrates understanding of disease process, treatment plan, medications, and discharge instructions  Description: Complete learning assessment and assess knowledge base.  Interventions:  - Provide teaching at level of understanding  - Provide teaching via preferred learning methods  Outcome: Progressing

## 2024-10-07 NOTE — ASSESSMENT & PLAN NOTE
Patient was diagnosed with UTI on 10/5/24 in ED. Discharged with p.o. cefpodoxime and eventually called back due to positive blood cultures.     -Continue Antibiotic coverage with IV ceftriaxone   -Follow up culture sensitivity   -Trend Cr   -I&Os  -Supportive care

## 2024-10-07 NOTE — ASSESSMENT & PLAN NOTE
Lab Results   Component Value Date    HGBA1C 6.0 (H) 10/06/2024     -A1c of 6.0, controlled   -Hold oral diabetic medication  -Sliding scale insulin  -Fingerstick glucose 4 times daily AC/HS  -Diabetic diet   -Hypoglycemia protocol

## 2024-10-07 NOTE — ASSESSMENT & PLAN NOTE
Patient with positive blood culture. Cultures collected yesterday results positive for serratia marcescens    -WBC of 4.45  -Procal of 0.49   -Continue with IV Ceftriaxone  -Repeat blood cultures pending   -Monitor vital signs  -AM labs   -Supportive care   -ID consulted for recommendations on length of treatment

## 2024-10-07 NOTE — ASSESSMENT & PLAN NOTE
Lab Results   Component Value Date    EGFR 54 10/07/2024    EGFR 40 10/06/2024    EGFR 37 10/06/2024    CREATININE 1.30 10/07/2024    CREATININE 1.68 (H) 10/06/2024    CREATININE 1.80 (H) 10/06/2024   Serum creatinine was elevated to 1.80 on admission, now improved. Ceftriaxone does not need dose adjustment for renal function. Will continue to monitor serum creatinine closely.

## 2024-10-07 NOTE — PLAN OF CARE
Problem: OCCUPATIONAL THERAPY ADULT  Goal: Performs self-care activities at highest level of function for planned discharge setting.  See evaluation for individualized goals.  Description: Treatment Interventions: ADL retraining, UE strengthening/ROM, Functional transfer training, Endurance training, Cognitive reorientation, Patient/family training, Equipment evaluation/education, Neuromuscular reeducation, Compensatory technique education, Continued evaluation, Energy conservation, Activityengagement, Cardiac education          See flowsheet documentation for full assessment, interventions and recommendations.   10/7/2024 1207 by Hema Frederick OT  Outcome: Progressing  Note: Limitation: Decreased ADL status, Decreased UE strength, Decreased Safe judgement during ADL, Decreased endurance, Decreased high-level ADLs, Decreased self-care trans (dec functional reach, coordination, balance)  Prognosis: Good  Assessment: Patient participated in Skilled OT session this date with interventions consisting of ADL re training with the use of correct body mechnaics, safety awareness and fall prevention techniques,  therapeutic activities to: increase activity tolerance, increase standing tolerance time with unilateral UE support to complete sink level ADLs, increase dynamic sit/ stand balance during functional activity , and increase OOB/ sitting tolerance . Co treatment with PT secondary to complex medical condition of pt, mod-max A of 2 required to achieve and maintain transitional movements, requiring the need of skilled therapeutic intervention of 2 therapists to achieve delivery of services. Patient agreeable to OT treatment session, upon arrival patient was found seated OOB to Chair. Patient requiring verbal cues for safety and frequent rest periods. Patient continues to be functioning below baseline level, occupational performance remains limited secondary to factors listed above and increased risk for falls and  injury. The patient's raw score on the AM-PAC Daily Activity Inpatient Short Form is 21. A raw score of greater than or equal to 19 suggests the patient may benefit from discharge to home. Please refer to the recommendation of the Occupational Therapist for safe discharge planning. From OT standpoint, recommendation at time of d/c would be level II moderate resource intensity.     Rehab Resource Intensity Level, OT: III (Minimum Resource Intensity)

## 2024-10-07 NOTE — PLAN OF CARE
Problem: PAIN - ADULT  Goal: Verbalizes/displays adequate comfort level or baseline comfort level  Description: Interventions:  - Encourage patient to monitor pain and request assistance  - Assess pain using appropriate pain scale  - Administer analgesics based on type and severity of pain and evaluate response  - Implement non-pharmacological measures as appropriate and evaluate response  - Consider cultural and social influences on pain and pain management  - Notify physician/advanced practitioner if interventions unsuccessful or patient reports new pain  Outcome: Progressing     Problem: INFECTION - ADULT  Goal: Absence or prevention of progression during hospitalization  Description: INTERVENTIONS:  - Assess and monitor for signs and symptoms of infection  - Monitor lab/diagnostic results  - Monitor all insertion sites, i.e. indwelling lines, tubes, and drains  - Monitor endotracheal if appropriate and nasal secretions for changes in amount and color  - [place] appropriate cooling/warming therapies per order  - Administer medications as ordered  - Instruct and encourage patient and family to use good hand hygiene technique  - Identify and instruct in appropriate isolation precautions for identified infection/condition  Outcome: Progressing     Problem: SAFETY ADULT  Goal: Patient will remain free of falls  Description: INTERVENTIONS:  - Educate patient/family on patient safety including physical limitations  - Instruct patient to call for assistance with activity   - Consult OT/PT to assist with strengthening/mobility   - Keep Call bell within reach  - Keep bed low and locked with side rails adjusted as appropriate  - Keep care items and personal belongings within reach  - Initiate and maintain comfort rounds  - Make Fall Risk Sign visible to staff  - Offer Toileting every 2 Hours, in advance of need  - Initiate/Maintain bed/chair alarm  - Obtain necessary fall risk management equipment: alarms, nonskid  footwear, needs in reach  - Apply yellow socks and bracelet for high fall risk patients  - Consider moving patient to room near nurses station  Outcome: Progressing     Problem: DISCHARGE PLANNING  Goal: Discharge to home or other facility with appropriate resources  Description: INTERVENTIONS:  - Identify barriers to discharge w/patient and caregiver  - Arrange for needed discharge resources and transportation as appropriate  - Identify discharge learning needs (meds, wound care, etc.)  - Arrange for interpretive services to assist at discharge as needed  - Refer to Case Management Department for coordinating discharge planning if the patient needs post-hospital services based on physician/advanced practitioner order or complex needs related to functional status, cognitive ability, or social support system  Outcome: Progressing     Problem: Knowledge Deficit  Goal: Patient/family/caregiver demonstrates understanding of disease process, treatment plan, medications, and discharge instructions  Description: Complete learning assessment and assess knowledge base.  Interventions:  - Provide teaching at level of understanding  - Provide teaching via preferred learning methods  Outcome: Progressing

## 2024-10-07 NOTE — ASSESSMENT & PLAN NOTE
-BP meds held due to hypotension   -S/p fluids   -Will consider midodrine for low BP   -Monitor blood pressure while admitted

## 2024-10-07 NOTE — ASSESSMENT & PLAN NOTE
Lab Results   Component Value Date    EGFR 54 10/07/2024    EGFR 40 10/06/2024    EGFR 37 10/06/2024    CREATININE 1.30 10/07/2024    CREATININE 1.68 (H) 10/06/2024    CREATININE 1.80 (H) 10/06/2024   Creatinine baseline of 1.15 to 1.2     -Initial cr on admission of 1.80  -s/p fluids   -Home BP meds held   -Avoid nephrotoxic agents  -Monitor creatinine levels, electrolytes  -Continue outpatient nephrology follow up

## 2024-10-07 NOTE — ASSESSMENT & PLAN NOTE
2 out of 2 blood cultures resulted positive for gram variable rods identified as Serratia marcescens. Suspect urinary source, though urine culture resulted positive for Citrobacter koseri. There is potential that the initial BCID is incorrect. Would follow-up final culture data. CT A/P unremarkable without evidence of acute intra-abdominal infection. No evidence of hydronephrosis or perinephric stranding to suggest pyelonephritis. Repeat blood cultures were sent. Fever curve appears to be down-trending. Leukocytosis down-trended. On IV ceftriaxone.   Continue IV ceftriaxone 2g q24h  Follow-up finalized blood cultures from 10/5   Follow-up repeat blood cultures from 10/6  Anticipate minimum 7d antibiotic course  Hopefully eventual transition to PO antibiotics  Continue to follow CBCD and CMP with AM labs to monitor for potential antimicrobial toxicities and assess for clinical response to antibiotics.

## 2024-10-07 NOTE — PROGRESS NOTES
Progress Note - Hospitalist   Name: Christy Haro 69 y.o. male I MRN: [id number]  Unit/Bed#: 417-01 I Date of Admission: 10/6/2024   Date of Service: 10/7/2024 I Hospital Day: 1    Assessment & Plan  Acute cystitis without hematuria  Patient was diagnosed with UTI on 10/5/24 in ED. Discharged with p.o. cefpodoxime and eventually called back due to positive blood cultures.     -Continue Antibiotic coverage with IV ceftriaxone   -Follow up culture sensitivity   -Trend Cr   -I&Os  -Supportive care  Benign essential HTN  -BP meds held due to hypotension   -S/p fluids   -Will consider midodrine for low BP   -Monitor blood pressure while admitted   AYLEEN (obstructive sleep apnea)  History of AYLEEN  -Respiratory protocol  -Continue CPAP at bedtime  Type 2 diabetes mellitus with obesity  ([facility])  Lab Results   Component Value Date    HGBA1C 6.0 (H) 10/06/2024     -A1c of 6.0, controlled   -Hold oral diabetic medication  -Sliding scale insulin  -Fingerstick glucose 4 times daily AC/HS  -Diabetic diet   -Hypoglycemia protocol    Stage 3 chronic kidney disease, unspecified whether stage 3a or 3b CKD ([facility])  Lab Results   Component Value Date    EGFR 54 10/07/2024    EGFR 40 10/06/2024    EGFR 37 10/06/2024    CREATININE 1.30 10/07/2024    CREATININE 1.68 (H) 10/06/2024    CREATININE 1.80 (H) 10/06/2024   Creatinine baseline of 1.15 to 1.2     -Initial cr on admission of 1.80  -s/p fluids   -Home BP meds held   -Avoid nephrotoxic agents  -Monitor creatinine levels, electrolytes  -Continue outpatient nephrology follow up  Dyslipidemia  -Continue statin  GERD (gastroesophageal reflux disease)  Stable  -Currently on Prilosec 40 mg p.o. daily  -Will give formulary alternative Protonix 40 mg PO daily  Bacteremia  Patient with positive blood culture. Cultures collected yesterday results positive for serratia marcescens    -WBC of 4.45  -Procal of 0.49   -Continue with IV Ceftriaxone  -Repeat blood cultures pending   -Monitor vital  signs  -AM labs   -Supportive care   -ID consulted for recommendations on length of treatment   Hyponatremia  -Resolved   -s/p normal saline fluids  -Monitor sodium levels  -Check a.m. CMP    VTE Pharmacologic Prophylaxis:   Moderate Risk (Score 3-4) - Pharmacological DVT Prophylaxis Ordered: enoxaparin (Lovenox).    Mobility:   Basic Mobility Inpatient Raw Score: 16  JH-HLM Goal: 5: Stand one or more mins  JH-HLM Achieved: 7: Walk 25 feet or more  JH-HLM Goal achieved. Continue to encourage appropriate mobility.    Patient Centered Rounds: I performed bedside rounds with nursing staff today.   Discussions with Specialists or Other Care Team Provider: ID     Education and Discussions with Family / Patient: Updated  (wife) via phone.    Current Length of Stay: 1 day(s)  Current Patient Status: Inpatient   Certification Statement: The patient will continue to require additional inpatient hospital stay due to bacteremia   Discharge Plan: Anticipate discharge in 48-72 hrs to home.    Code Status: Level 1 - Full Code    Subjective   Patient seen sitting upright in bed in no acute distress. Complains of dizziness and SOB with ambulation. Tolerating p.o. intake. Urinating and stooling without issue. Ambulatory with walker, does not use walker at baseline. Sleeping well. Denies chest pain, nausea, vomiting, hematuria, constipation, diarrhea.     Objective :  Temp:  [99.4 °F (37.4 °C)-99.9 °F (37.7 °C)] 99.7 °F (37.6 °C)  HR:  [66-83] 74  BP: (106-137)/(48-62) 135/62  Resp:  [14-17] 16  SpO2:  [91 %-96 %] 93 %  O2 Device: None (Room air)    Body mass index is 40.87 kg/m².     Input and Output Summary (last 24 hours):     Intake/Output Summary (Last 24 hours) at 10/7/2024 0911  Last data filed at 10/7/2024 0809  Gross per 24 hour   Intake 1410 ml   Output 525 ml   Net 885 ml       Physical Exam  Vitals and nursing note reviewed.   Constitutional:       General: He is not in acute distress.     Appearance: Normal  appearance. He is obese. He is not ill-appearing or toxic-appearing.   HENT:      Head: Normocephalic and atraumatic.      Nose: Nose normal.   Eyes:      Conjunctiva/sclera: Conjunctivae normal.   Cardiovascular:      Rate and Rhythm: Normal rate and regular rhythm.      Heart sounds: Normal heart sounds. No murmur heard.     No friction rub. No gallop.   Pulmonary:      Effort: Pulmonary effort is normal. No respiratory distress.      Breath sounds: Normal breath sounds. No wheezing, rhonchi or rales.   Musculoskeletal:      Right lower leg: Edema present.      Left lower leg: Edema (Patient reports swelling at baseline, unsure if this swelling is worse but states worse than R side at baseline) present.   Skin:     General: Skin is warm and dry.      Findings: No rash.   Neurological:      General: No focal deficit present.      Mental Status: He is alert and oriented to person, place, and time.   Psychiatric:         Mood and Affect: Mood normal.         Behavior: Behavior normal.         Thought Content: Thought content normal.           Lab Results: I have reviewed the following results:   Results from last 7 days   Lab Units 10/07/24  0429   WBC Thousand/uL 4.45   HEMOGLOBIN g/dL 11.0*   HEMATOCRIT % 34.8*   PLATELETS Thousands/uL 127*   SEGS PCT % 70   LYMPHO PCT % 18   MONO PCT % 12   EOS PCT % 0     Results from last 7 days   Lab Units 10/07/24  0429 10/06/24  0515   SODIUM mmol/L 135 136   POTASSIUM mmol/L 3.8 4.1   CHLORIDE mmol/L 105 104   CO2 mmol/L 21 21   BUN mg/dL 26* 42*   CREATININE mg/dL 1.30 1.68*   ANION GAP mmol/L 9 11   CALCIUM mg/dL 8.1* 8.2*   ALBUMIN g/dL  --  3.3*   TOTAL BILIRUBIN mg/dL  --  1.09*   ALK PHOS U/L  --  26*   ALT U/L  --  52   AST U/L  --  55*   GLUCOSE RANDOM mg/dL 159* 118     Results from last 7 days   Lab Units 10/05/24  0008   INR  1.16     Results from last 7 days   Lab Units 10/07/24  0725 10/06/24  2109 10/06/24  1643 10/06/24  1106 10/06/24  0719   POC GLUCOSE mg/dl  116 116 159* 146* 112     Results from last 7 days   Lab Units 10/06/24  0515   HEMOGLOBIN A1C % 6.0*     Results from last 7 days   Lab Units 10/07/24  0429 10/06/24  0153 10/04/24  2334   LACTIC ACID mmol/L  --  1.0 1.4   PROCALCITONIN ng/ml 0.49* 0.89* 0.38*       Recent Cultures (last 7 days):   Results from last 7 days   Lab Units 10/06/24  1504 10/05/24  0050 10/05/24  0008 10/04/24  1236   BLOOD CULTURE  Received in Microbiology Lab. Culture in Progress.  Received in Microbiology Lab. Culture in Progress.  --   --   --    GRAM STAIN RESULT   --   --  Gram variable rods*  Gram variable rods*  --    URINE CULTURE   --  >100,000 cfu/ml Citrobacter koseri*  --  80,000-89,000 cfu/ml Citrobacter koseri*  10,000-19,000 cfu/ml       Imaging Results Review: I reviewed radiology reports from this admission including:   and CT abdomen/pelvis.  Other Study Results Review: EKG was reviewed.     Last 24 Hours Medication List:     Current Facility-Administered Medications:     aspirin chewable tablet 81 mg, Daily    atorvastatin (LIPITOR) tablet 10 mg, Daily With Dinner    cefTRIAXone (ROCEPHIN) IVPB (premix in dextrose) 2,000 mg 50 mL, Q24H, Last Rate: 100 mL/hr at 10/07/24 0617    enoxaparin (LOVENOX) subcutaneous injection 30 mg, Q24H SPENCER    gabapentin (NEURONTIN) capsule 100 mg, BID    insulin lispro (HumALOG/ADMELOG) 100 units/mL subcutaneous injection 1-5 Units, HS    insulin lispro (HumALOG/ADMELOG) 100 units/mL subcutaneous injection 2-12 Units, TID AC **AND** Fingerstick Glucose (POCT), TID AC    metoprolol succinate (TOPROL-XL) 24 hr tablet 25 mg, Daily    metoprolol succinate (TOPROL-XL) 24 hr tablet 50 mg, Daily    multivitamin stress formula tablet 1 tablet, Daily    ondansetron (ZOFRAN) injection 4 mg, Q6H PRN    pantoprazole (PROTONIX) EC tablet 40 mg, Early Morning    potassium chloride (Klor-Con M20) CR tablet 20 mEq, Daily    tamsulosin (FLOMAX) capsule 0.4 mg, Daily With Dinner    Administrative  Statements   Today, Patient Was Seen By: Jeannette Marquez DO    **Please Note: This note may have been constructed using a voice recognition system.**

## 2024-10-07 NOTE — PLAN OF CARE
Problem: PHYSICAL THERAPY ADULT  Goal: Performs mobility at highest level of function for planned discharge setting.  See evaluation for individualized goals.  Description: Treatment/Interventions: Functional transfer training, Elevations, Therapeutic exercise, LE strengthening/ROM, Endurance training, Bed mobility, Gait training          See flowsheet documentation for full assessment, interventions and recommendations.  Note: Prognosis: Good  Problem List: Decreased strength, Decreased endurance, Impaired balance, Decreased mobility, Obesity  Assessment: Patient is a 69 y.o. male evaluated by Physical Therapy s/p admit to [facility] on 10/6/2024 with admitting diagnosis of: Pyelonephritis, Positive blood culture, and principal problem of: Bacteremia. PT was consulted to assess patient's functional mobility and discharge needs. Ordered are PT Evaluation and treatment with activity level of: up and OOB as tolerated. Comorbidities affecting patient's physical performance at time of assessment include:  HTN, dyslipidemia, GERD, AYLEEN, DM, CKD, lymphedema, PAF . Personal factors affecting the patient at time of IE include: ambulating with assistive device, step(s) to enter home, inability to navigate community distances, inability/difficulty performing IADLs, and inability/difficulty performing ADLs. Please locate objective findings from PT assessment regarding body systems outlined above. Upon evaluation, pt able to perform all functional mobility with SUP, RW, and increased time. Occasional verbal cuing provided for safety awareness and sequencing. Seated rest break taken following ~100 of ambulation d/t fatigue and SOB. Mild to moderate postural sway demonstrated with mobility but no true LOB experienced. HR and SpO2 remained WFL on RA throughout. The patient's AM-PAC Basic Mobility Inpatient Short Form Raw Score is 18. A Raw score of greater than 16 suggests the patient may benefit from discharge to  home. Please also refer to the recommendation of the Physical Therapist for safe discharge planning. Co treatment with OT secondary to complex medical condition of pt, possible A of 2 required to achieve and maintain transitional movements, requiring the need of skilled therapeutic intervention of 2 therapists to achieve delivery of services. Pt will benefit from continued PT intervention during LOS to address current deficits, increase LOF, and facilitate safe d/c to next level of care when medically appropriate. D/c recommendation at this time is rehabilitation resource intensity level III.        Rehab Resource Intensity Level, PT: III (Minimum Resource Intensity)    See flowsheet documentation for full assessment.

## 2024-10-07 NOTE — CASE MANAGEMENT
Case Management Assessment & Discharge Planning Note    Patient name Christy Haro  Location /417-01 MRN [id number]  : 1955 Date 10/7/2024       Current Admission Date: 10/6/2024  Current Admission Diagnosis:Bacteremia   Patient Active Problem List    Diagnosis Date Noted Date Diagnosed    Acute cystitis without hematuria 10/06/2024     Bacteremia 10/06/2024     Hyponatremia 10/06/2024     Stage 3 chronic kidney disease, unspecified whether stage 3a or 3b CKD (HCC) 2024     Pain in both lower extremities 2024     Paroxysmal atrial fibrillation (HCC) 2024     Palpitations 2023     Chronic diastolic heart failure (HCC) 2023     Type 2 diabetes mellitus with obesity  (HCC) 2022     Lymphedema 2022     Diastolic dysfunction 2021     Morbid obesity ([facility]) 2021     Stasis edema of both lower extremities 2018     GERD (gastroesophageal reflux disease) 2018     AYLEEN (obstructive sleep apnea) 2017     Benign essential HTN 2017     Dyslipidemia 2017       LOS (days): 1  Geometric Mean LOS (GMLOS) (days): 3.5  Days to GMLOS:2.2     OBJECTIVE:    Risk of Unplanned Readmission Score: 22.66         Current admission status: Inpatient       Preferred Pharmacy:   [facility] - [address] [place] [address], [address]  [address] [place] [address], [address] [facility] [address]  Phone: [phone] Fax: [phone]    [facility] [address] [address]  Phone: [phone] Fax: [phone]    [id number] - [place] - [address] [place] [address]  Phone: [phone] Fax: [phone]    Primary Care Provider: Alma Delia Fountain DO    Primary Insurance: MEDICARE  Secondary Insurance: AARP    ASSESSMENT:  Active Health Care Proxies       FabianrupertochariTiffany Health Care Representative - Daughter   Primary  Phone: [phone] (Home)                 Advance Directives  Does patient have a Health Care POA?: Yes  Does patient have Advance Directives?: Yes  Advance Directives: Living will, Power of  for health care  Primary Contact: Tiffany Haro (Daughter)         Readmission Root Cause  30 Day Readmission: No    Patient Information  Admitted from:: Home  Mental Status: Alert  During Assessment patient was accompanied by: Not accompanied during assessment  Assessment information provided by:: Patient  Primary Caregiver: Self  Support Systems: Self, Spouse/significant other, Daughter  County [facility] Residence: [facility]  What city do you live in?: [place]  Home entry access options. Select all that apply.: Stairs  Number of steps to enter home.: 1  Do the steps have railings?: No  Type of Current Residence: [facility]  Living Arrangements: Lives w/ Spouse/significant other  Is patient a ?: No    Activities of Daily Living Prior to Admission  Functional Status: Independent  Completes ADLs independently?: Yes  Ambulates independently?: Yes  Does patient use assisted devices?: Yes  Assisted Devices (DME) used: Straight Cane, BiPAP  DME Company Name (respiratory supplies): unsure  Does patient currently own DME?: Yes  What DME does the patient currently own?: Straight Cane  Does patient have a history of Outpatient Therapy (PT/OT)?: No  Does the patient have a history of Short-Term Rehab?: No  Does patient have a history of HHC?: No  Does patient currently have HHC?: No         Patient Information Continued  Income Source: SSI/SSD  Does patient have prescription coverage?: Yes  Does patient receive dialysis treatments?: No  Does patient have a history of substance abuse?: No  Does patient have a history of Mental Health Diagnosis?: No         Means of Transportation  Means of Transport to Appts:: Drives Self      Social Determinants of Health (SDOH)      Flowsheet Row Most Recent Value   Housing Stability    In the  last 12 months, was there a time when you were not able to pay the mortgage or rent on time? N   In the past 12 months, how many times have you moved where you were living? 1   At any time in the past 12 months, were you homeless or living in a shelter (including now)? N   Transportation Needs    In the past 12 months, has lack of transportation kept you from medical appointments or from getting medications? no   In the past 12 months, has lack of transportation kept you from meetings, work, or from getting things needed for daily living? No   Food Insecurity    Within the past 12 months, you worried that your food would run out before you got the money to buy more. Never true   Within the past 12 months, the food you bought just didn't last and you didn't have money to get more. Never true   Utilities    In the past 12 months has the electric, gas, oil, or water company threatened to shut off services in your home? No            DISCHARGE DETAILS:    Discharge planning discussed with:: patient        CM contacted family/caregiver?: No- see comments (declined)  Were Treatment Team discharge recommendations reviewed with patient/caregiver?: Yes  Did patient/caregiver verbalize understanding of patient care needs?: Yes  Were patient/caregiver advised of the risks associated with not following Treatment Team discharge recommendations?: Yes    Contacts  Contact Method: In Person  Reason/Outcome: Discharge Planning    Requested Home Health Care         Is the patient interested in HHC at discharge?: No    DME Referral Provided  Referral made for DME?: Yes  DME referral completed for the following items:: Vinnie  DME Supplier Name:: [employer]    Other Referral/Resources/Interventions Provided:  Interventions: Outpatient OT, Outpatient PT    Would you like to participate in our [facility] service program?  : No - Declined    Treatment Team Recommendation: Home, Outpatient Rehab  Discharge Destination Plan::  Outpatient Rehab, Home   Cm met with the patient to evaluate the patients prior function and living situation and any barriers to d/c and form a safe d/c plan. Cm also evaluated the patient for any services in the home or needs for services. CM also discussed with patient that their preferences will be taken into account/consideration.  Pt admitted with cystitis. IV abx, infection disease consulted. Pt seen by therapy o/p recommended. Pt states he can go in hometown. Requesting walker for home. Placed order in parachute. CM to follow for all discharge needs.

## 2024-10-07 NOTE — CONSULTS
Consultation - Infectious Disease   Name: Christy Haro 69 y.o. male I MRN: [id number]  Unit/Bed#: 417-01 I Date of Admission: 10/6/2024   Date of Service: 10/7/2024 I Hospital Day: 1     REQUIRED DOCUMENTATION:     1. This service was provided via Telemedicine.  2. Provider located at [facility].  3. TeleMed provider: Prachi Preston PA-C  4. Identify all parties in room with patient during tele consult: Patient, PA-C  5. After connecting through [employer], patient was identified by name and date of birth and assistant checked wristband.  Patient was then informed that this was a Telemedicine visit and that the exam was being conducted confidentially over secure lines. My office door was closed. No one else was in the room.  Patient acknowledged consent and understanding of privacy and security of the Telemedicine visit, and gave us permission to have the assistant stay in the room in order to assist with the history and to conduct the exam.  I informed the patient that I have reviewed their record in Epic and presented the opportunity for them to ask any questions regarding the visit today.  The patient agreed to participate.     Inpatient consult to Infectious Diseases  Consult performed by: Prachi Preston PA-C  Consult ordered by: Jeannette Marquez DO        Physician Requesting Evaluation: Cristel Emerson MD   Reason for Evaluation / Principal Problem: Bacteremia    Assessment & Plan  Bacteremia  2 out of 2 blood cultures resulted positive for gram variable rods identified as Serratia marcescens. Suspect urinary source, though urine culture resulted positive for Citrobacter koseri. There is potential that the initial BCID is incorrect. Would follow-up final culture data. CT A/P unremarkable without evidence of acute intra-abdominal infection. No evidence of hydronephrosis or perinephric stranding to suggest pyelonephritis. Repeat blood cultures were sent. Fever curve appears to be  down-trending. Leukocytosis down-trended. On IV ceftriaxone.   Continue IV ceftriaxone 2g q24h  Follow-up finalized blood cultures from 10/5   Follow-up repeat blood cultures from 10/6  Anticipate minimum 7d antibiotic course  Hopefully eventual transition to PO antibiotics  Continue to follow CBCD and CMP with AM labs to monitor for potential antimicrobial toxicities and assess for clinical response to antibiotics.   Acute cystitis without hematuria  Presented to the ED on 10/4 with complaint of fever, weakness, and dysuria. Attributed the dysuria at the time to recent enamorado placement on 9/19/24 in setting of urinary retention. Catheter was removed on 10/2 by urology. UA obtained in the ED had innumerable WBC, large LE, and blood. Ucx resulted positive for Citrobacter koseri, as well as two other unidentified contaminants. CT A/P obtained demonstrated bladder wall thickening which could be associated with cystitis. Course now further complicated by bacteremia as above.    Continue IV ceftriaxone as above  Follow-up urine culture until finalized  Follow-up C.koseri sensitivities from 10/5  Anticipate at least 7d total antibiotic course  Type 2 diabetes mellitus with obesity  ([facility])  Lab Results   Component Value Date    HGBA1C 6.0 (H) 10/06/2024     Recent Labs     10/06/24  1106 10/06/24  1643 10/06/24  2109 10/07/24  0725   POCGLU 146* 159* 116 116     Blood Sugar Average: Last 72 hrs:  (P) 129.8  Well controlled with A1c of 6.0. Remains a risk factor for development of infection. Continue with tight glycemic control.   Stage 3 chronic kidney disease, unspecified whether stage 3a or 3b CKD ([facility])  Lab Results   Component Value Date    EGFR 54 10/07/2024    EGFR 40 10/06/2024    EGFR 37 10/06/2024    CREATININE 1.30 10/07/2024    CREATININE 1.68 (H) 10/06/2024    CREATININE 1.80 (H) 10/06/2024   Serum creatinine was elevated to 1.80 on admission, now improved. Ceftriaxone does not need dose adjustment for renal  function. Will continue to monitor serum creatinine closely.     I have discussed the above management plan in detail with the primary service.   Above plan discussed with the patient via telemedicine [employer].  Infectious Disease service will follow.    Antibiotics:  Ceftriaxone    History of Present Illness   Christy Haro is a 69 y.o. year old male with pmhx of T2DM, CHF, HTN, HLD, GERD, AYLEEN, who presented to the emergency room after he was called back for having positive blood cultures. Per my record review, patient presented to the ED on 10/4 for evaluation of fever. Patient was diagnosed with shingles a couple weeks prior and shortly after he started to have urinary retention requiring a enamorado catheter. He was treated with valtrex. In the ED, he reported dysuria, but attributed it to recently having a enamorado placed. Patient was found to be febrile and had a leukocytosis to 16. He underwent a CT A/P which demonstrated mild thickening of the urinary bladder wall concerning for cystitis. No hydronephrosis. No other acute intra-abdominal abnormalities. He underwent a UA which demonstrated innumerable WBC, large leukocytes, and small blood. Urine culture resulted positive for >100k Citrobacter koseri. Patient was discharged on PO cefpodoxime. However, he was called back after his blood cultures resulted positive for gram variable rods in 2 out of 2 cultures, identified as Serratia marcescens. Patient was afebrile upon coming back to the ED. Labs demonstrated WBC WNL at 8.19 and thrombocytopenia to 139k. Serum creatinine was elevated to 1.80. LFTs stable. Procalcitonin was elevated to 0.89 and lactic acid was WNL. Repeat blood cultures were obtained on 10/6 and are pending. Patient was started on IV ceftriaxone. ID is consulted for antibiotic recommendations and recommendations on duration of therapy.     Per discussion with the patient, he developed shingles a couple weeks ago. States the rash was on his posterior  thigh, buttock, and wrapped around to his groin involving his penis. He developed then developed urinary retention, which was attributed to the shingles and had a enamorado placed on 9/19 in the ED. He had follow-up in the outpatient setting with urology on 10/2 and the enamorado was removed. Started with the fevers on 10/4. States his dysuria has improved, though he still has discomfort in his groin. States it is hard to distinguish that discomfort from urinary symptoms. Noted that he has some burning and tingling sensation in his groin. No nausea, vomiting, diarrhea, CP, SOB, abdominal pain, back pain, gross hematuria. Tolerating IV ceftriaxone. No fevers so far this morning. States he still isn't feeling well and feels apprehensive about going home any time soon.     A complete review of systems is negative other than that noted in the HPI.    I have reviewed the patient's PMH, PSH, Social History, Family History, Meds, and Allergies  Historical Information   Past Medical History:   Diagnosis Date    Arthritis     last assessed 7/1/15    Bronchitis 02/02/2023    Chronic diastolic (congestive) heart failure (HCC)     Diabetes mellitus (HCC)     type 2-last assessed 1/28/15    Diabetes mellitus (HCC) 03/16/2018    Dyslipidemia     GERD (gastroesophageal reflux disease)     Hypertension     Hypertensive urgency 03/16/2023    Irregular heart beat     last assessed 7/1/15    Obstructive sleep apnea     Palpitations     last assessed 9/7/17    Sexual dysfunction     last assessed 7/1/15    Thyroid disease     last assessed 7/1/15     Past Surgical History:   Procedure Laterality Date    COLONOSCOPY      TONSILLECTOMY       Social History     Tobacco Use    Smoking status: Former     Current packs/day: 1.00     Types: Cigarettes     Passive exposure: Never    Smokeless tobacco: Never    Tobacco comments:     former smoker-quit 27 yrs ago 1pppd x 30 yrs as per [employer]   Vaping Use    Vaping status: Never Used   Substance and  Sexual Activity    Alcohol use: Never    Drug use: Never    Sexual activity: Not Currently     E-Cigarette/Vaping    E-Cigarette Use Never User      E-Cigarette/Vaping Substances    Nicotine No     THC No     CBD No     Flavoring No     Other No     Unknown No      Family History   Problem Relation Age of Onset    Hypertension Mother         essential    Stroke Mother     Hypertension Father         essential    Heart defect Father         cardiac disorder    Stroke Brother     Hypertension Brother      Social History     Tobacco Use    Smoking status: Former     Current packs/day: 1.00     Types: Cigarettes     Passive exposure: Never    Smokeless tobacco: Never    Tobacco comments:     former smoker-quit 27 yrs ago 1pppd x 30 yrs as per [employer]   Vaping Use    Vaping status: Never Used   Substance and Sexual Activity    Alcohol use: Never    Drug use: Never    Sexual activity: Not Currently       Current Facility-Administered Medications:     aspirin chewable tablet 81 mg, Daily    atorvastatin (LIPITOR) tablet 10 mg, Daily With Dinner    cefTRIAXone (ROCEPHIN) IVPB (premix in dextrose) 2,000 mg 50 mL, Q24H, Last Rate: 100 mL/hr at 10/07/24 0617    enoxaparin (LOVENOX) subcutaneous injection 30 mg, Q24H SPENCER    gabapentin (NEURONTIN) capsule 100 mg, BID    insulin lispro (HumALOG/ADMELOG) 100 units/mL subcutaneous injection 1-5 Units, HS    insulin lispro (HumALOG/ADMELOG) 100 units/mL subcutaneous injection 2-12 Units, TID AC **AND** Fingerstick Glucose (POCT), TID AC    metoprolol succinate (TOPROL-XL) 24 hr tablet 25 mg, Daily    metoprolol succinate (TOPROL-XL) 24 hr tablet 50 mg, Daily    midodrine (PROAMATINE) tablet 2.5 mg, TID AC    multivitamin stress formula tablet 1 tablet, Daily    ondansetron (ZOFRAN) injection 4 mg, Q6H PRN    pantoprazole (PROTONIX) EC tablet 40 mg, Early Morning    potassium chloride (Klor-Con M20) CR tablet 20 mEq, Daily    tamsulosin (FLOMAX) capsule 0.4 mg, Daily With  Dinner  Acetaminophen    Objective :  Temp:  [99.4 °F (37.4 °C)-99.9 °F (37.7 °C)] 99.7 °F (37.6 °C)  HR:  [66-83] 74  BP: (106-137)/(48-62) 135/62  Resp:  [14-17] 16  SpO2:  [91 %-96 %] 93 %  O2 Device: None (Room air)    Physical exam findings reported by bedside and primary medical team staff, also observed over TV kit:   General Appearance:  Appears fatigued, but otherwise nontoxic, and in no distress. Sitting upright in chair next to bed.    Head:  Normocephalic, without obvious abnormality, atraumatic.   Eyes:  Conjunctiva pink and sclera anicteric, both eyes.   Nose: Nares normal, mucosa normal, no drainage.   Throat: Oropharynx moist without lesions.   Neck: Supple, symmetrical, no adenopathy, no tenderness/mass/nodules.   Back:   Symmetric, ROM normal, no CVA tenderness.   Lungs:   Clear to auscultation bilaterally, respirations unlabored.   Chest Wall:  No tenderness or deformity.   Heart:  RRR; no murmur, rub or gallop.   Abdomen:   Soft, obese, non-tender, non-distended, positive bowel sounds throughout.   Extremities: No cyanosis or clubbing. Bilateral lower extremity edema.    Skin: No rashes or lesions. No draining wounds noted.   Neurologic: Alert and oriented times 3, follows commands, speech is organized and appropriate, symmetric facial movement.          Lab Results: I have reviewed the following results:  Results from last 7 days   Lab Units 10/07/24  0429 10/06/24  1505 10/06/24  0515 10/06/24  0153   WBC Thousand/uL 4.45  --  6.93 8.19   HEMOGLOBIN g/dL 11.0*  --  10.3* 11.3*   PLATELETS Thousands/uL 127* 129* 121* 139*     Results from last 7 days   Lab Units 10/07/24  0429 10/06/24  0515 10/06/24  0153   SODIUM mmol/L 135 136 134*   POTASSIUM mmol/L 3.8 4.1 4.0   CHLORIDE mmol/L 105 104 103   CO2 mmol/L 21 21 21   BUN mg/dL 26* 42* 42*   CREATININE mg/dL 1.30 1.68* 1.80*   EGFR ml/min/1.73sq m 54 40 37   CALCIUM mg/dL 8.1* 8.2* 8.4   AST U/L  --  55* 43*   ALT U/L  --  52 40   ALK PHOS U/L   --  26* 27*   ALBUMIN g/dL  --  3.3* 3.6     Results from last 7 days   Lab Units 10/06/24  1504 10/05/24  0050 10/05/24  0008 10/04/24  1236   BLOOD CULTURE  Received in Microbiology Lab. Culture in Progress.  Received in Microbiology Lab. Culture in Progress.  --   --   --    GRAM STAIN RESULT   --   --  Gram variable rods*  Gram variable rods*  --    URINE CULTURE   --  >100,000 cfu/ml Citrobacter koseri*  --  80,000-89,000 cfu/ml Citrobacter koseri*  10,000-19,000 cfu/ml     Results from last 7 days   Lab Units 10/07/24  0429 10/06/24  0153 10/04/24  2334   PROCALCITONIN ng/ml 0.49* 0.89* 0.38*                   Imaging Results Review: I personally reviewed the following image studies in PACS and associated radiology reports: CT abdomen/pelvis. My interpretation of the radiology images/reports is: no acute intra-abdominal abnormalities. Demonstrated bladder wall thickening which may correlate with cystitis. No hydronephrosis or evidence of pyelonephritis.  Other Study Results Review: No additional pertinent studies reviewed.      Prachi Preston PA-C  [facility]

## 2024-10-08 ENCOUNTER — TELEPHONE (OUTPATIENT)
Age: 69
End: 2024-10-08

## 2024-10-08 PROBLEM — L98.9 SKIN LESION: Status: ACTIVE | Noted: 2024-10-08

## 2024-10-08 PROBLEM — K40.90 INGUINAL HERNIA OF LEFT SIDE WITHOUT OBSTRUCTION OR GANGRENE: Status: ACTIVE | Noted: 2024-10-08

## 2024-10-08 LAB
ALBUMIN SERPL BCG-MCNC: 3.3 G/DL (ref 3.5–5)
ANION GAP SERPL CALCULATED.3IONS-SCNC: 7 MMOL/L (ref 4–13)
BACTERIA BLD CULT: ABNORMAL
BACTERIA BLD CULT: ABNORMAL
BASOPHILS # BLD AUTO: 0.01 THOUSANDS/ΜL (ref 0–0.1)
BASOPHILS NFR BLD AUTO: 0 % (ref 0–1)
BUN SERPL-MCNC: 19 MG/DL (ref 5–25)
CALCIUM SERPL-MCNC: 8.2 MG/DL (ref 8.4–10.2)
CHLORIDE SERPL-SCNC: 107 MMOL/L (ref 96–108)
CO2 SERPL-SCNC: 23 MMOL/L (ref 21–32)
CREAT SERPL-MCNC: 1.08 MG/DL (ref 0.6–1.3)
EOSINOPHIL # BLD AUTO: 0 THOUSAND/ΜL (ref 0–0.61)
EOSINOPHIL NFR BLD AUTO: 0 % (ref 0–6)
ERYTHROCYTE [DISTWIDTH] IN BLOOD BY AUTOMATED COUNT: 14.1 % (ref 11.6–15.1)
GFR SERPL CREATININE-BSD FRML MDRD: 68 ML/MIN/1.73SQ M
GLUCOSE SERPL-MCNC: 122 MG/DL (ref 65–140)
GLUCOSE SERPL-MCNC: 128 MG/DL (ref 65–140)
GLUCOSE SERPL-MCNC: 133 MG/DL (ref 65–140)
GLUCOSE SERPL-MCNC: 139 MG/DL (ref 65–140)
GLUCOSE SERPL-MCNC: 144 MG/DL (ref 65–140)
GRAM STN SPEC: ABNORMAL
GRAM STN SPEC: ABNORMAL
HCT VFR BLD AUTO: 32.6 % (ref 36.5–49.3)
HGB BLD-MCNC: 10.6 G/DL (ref 12–17)
IMM GRANULOCYTES # BLD AUTO: 0.01 THOUSAND/UL (ref 0–0.2)
IMM GRANULOCYTES NFR BLD AUTO: 0 % (ref 0–2)
LYMPHOCYTES # BLD AUTO: 0.93 THOUSANDS/ΜL (ref 0.6–4.47)
LYMPHOCYTES NFR BLD AUTO: 22 % (ref 14–44)
MCH RBC QN AUTO: 29.1 PG (ref 26.8–34.3)
MCHC RBC AUTO-ENTMCNC: 32.5 G/DL (ref 31.4–37.4)
MCV RBC AUTO: 90 FL (ref 82–98)
MONOCYTES # BLD AUTO: 0.63 THOUSAND/ΜL (ref 0.17–1.22)
MONOCYTES NFR BLD AUTO: 15 % (ref 4–12)
NEUTROPHILS # BLD AUTO: 2.59 THOUSANDS/ΜL (ref 1.85–7.62)
NEUTS SEG NFR BLD AUTO: 63 % (ref 43–75)
NRBC BLD AUTO-RTO: 0 /100 WBCS
PLATELET # BLD AUTO: 125 THOUSANDS/UL (ref 149–390)
PMV BLD AUTO: 10.4 FL (ref 8.9–12.7)
POTASSIUM SERPL-SCNC: 3.7 MMOL/L (ref 3.5–5.3)
RBC # BLD AUTO: 3.64 MILLION/UL (ref 3.88–5.62)
S MARCESCENS DNA BLD POS NAA+NON-PROBE: DETECTED
SODIUM SERPL-SCNC: 137 MMOL/L (ref 135–147)
WBC # BLD AUTO: 4.17 THOUSAND/UL (ref 4.31–10.16)

## 2024-10-08 PROCEDURE — 99232 SBSQ HOSP IP/OBS MODERATE 35: CPT | Performed by: FAMILY MEDICINE

## 2024-10-08 PROCEDURE — 97530 THERAPEUTIC ACTIVITIES: CPT

## 2024-10-08 PROCEDURE — 82040 ASSAY OF SERUM ALBUMIN: CPT | Performed by: STUDENT IN AN ORGANIZED HEALTH CARE EDUCATION/TRAINING PROGRAM

## 2024-10-08 PROCEDURE — 80048 BASIC METABOLIC PNL TOTAL CA: CPT | Performed by: STUDENT IN AN ORGANIZED HEALTH CARE EDUCATION/TRAINING PROGRAM

## 2024-10-08 PROCEDURE — 85025 COMPLETE CBC W/AUTO DIFF WBC: CPT | Performed by: STUDENT IN AN ORGANIZED HEALTH CARE EDUCATION/TRAINING PROGRAM

## 2024-10-08 PROCEDURE — 82948 REAGENT STRIP/BLOOD GLUCOSE: CPT

## 2024-10-08 PROCEDURE — 99233 SBSQ HOSP IP/OBS HIGH 50: CPT | Performed by: INTERNAL MEDICINE

## 2024-10-08 PROCEDURE — 97116 GAIT TRAINING THERAPY: CPT

## 2024-10-08 RX ORDER — HYDROCORTISONE 25 MG/G
1 CREAM TOPICAL 3 TIMES DAILY
Status: DISCONTINUED | OUTPATIENT
Start: 2024-10-08 | End: 2024-10-11 | Stop reason: HOSPADM

## 2024-10-08 RX ORDER — ATORVASTATIN CALCIUM 10 MG/1
10 TABLET, FILM COATED ORAL
Qty: 30 TABLET | Refills: 0 | Status: CANCELLED | OUTPATIENT
Start: 2024-10-08 | End: 2024-11-07

## 2024-10-08 RX ADMIN — POTASSIUM CHLORIDE 20 MEQ: 1500 TABLET, EXTENDED RELEASE ORAL at 09:09

## 2024-10-08 RX ADMIN — B-COMPLEX W/ C & FOLIC ACID TAB 1 TABLET: TAB at 09:09

## 2024-10-08 RX ADMIN — MIDODRINE HYDROCHLORIDE 2.5 MG: 5 TABLET ORAL at 06:17

## 2024-10-08 RX ADMIN — FONDAPARINUX SODIUM 5 MG: 5 INJECTION SUBCUTANEOUS at 09:11

## 2024-10-08 RX ADMIN — CEFTRIAXONE 2000 MG: 2 INJECTION, SOLUTION INTRAVENOUS at 13:57

## 2024-10-08 RX ADMIN — ASPIRIN 81 MG 81 MG: 81 TABLET ORAL at 09:08

## 2024-10-08 RX ADMIN — HYDROCORTISONE 1 APPLICATION: 25 CREAM TOPICAL at 12:00

## 2024-10-08 RX ADMIN — GABAPENTIN 100 MG: 100 CAPSULE ORAL at 17:55

## 2024-10-08 RX ADMIN — MIDODRINE HYDROCHLORIDE 2.5 MG: 5 TABLET ORAL at 17:55

## 2024-10-08 RX ADMIN — MIDODRINE HYDROCHLORIDE 2.5 MG: 5 TABLET ORAL at 12:00

## 2024-10-08 RX ADMIN — ATORVASTATIN CALCIUM 10 MG: 10 TABLET, FILM COATED ORAL at 17:54

## 2024-10-08 RX ADMIN — HYDROCORTISONE 1 APPLICATION: 25 CREAM TOPICAL at 21:44

## 2024-10-08 RX ADMIN — METOPROLOL SUCCINATE 25 MG: 50 TABLET, EXTENDED RELEASE ORAL at 09:09

## 2024-10-08 RX ADMIN — TAMSULOSIN HYDROCHLORIDE 0.4 MG: 0.4 CAPSULE ORAL at 17:55

## 2024-10-08 RX ADMIN — EMPAGLIFLOZIN 20 MG: 10 TABLET, FILM COATED ORAL at 09:11

## 2024-10-08 RX ADMIN — PANTOPRAZOLE SODIUM 40 MG: 40 TABLET, DELAYED RELEASE ORAL at 06:17

## 2024-10-08 RX ADMIN — GABAPENTIN 100 MG: 100 CAPSULE ORAL at 09:08

## 2024-10-08 RX ADMIN — METOPROLOL SUCCINATE 50 MG: 50 TABLET, EXTENDED RELEASE ORAL at 09:09

## 2024-10-08 NOTE — ASSESSMENT & PLAN NOTE
-BP meds held due to hypotension   -S/p fluids   -Continue midodrine for low BP   -Monitor blood pressure while admitted

## 2024-10-08 NOTE — PHYSICAL THERAPY NOTE
PHYSICAL THERAPY NOTE          Patient Name: Chritsy Haro  Today's Date: 10/8/2024   10/08/24 1407   PT Last Visit   PT Visit Date 10/08/24   Note Type   Note Type Treatment   Pain Assessment   Pain Assessment Tool 0-10   Pain Score No Pain   Restrictions/Precautions   Weight Bearing Precautions Per Order No   Other Precautions   (Bed Alarm; Multiple lines; Fall Risk; Pain)   General   Chart Reviewed Yes   Family/Caregiver Present No   Cognition   Overall Cognitive Status WFL   Arousal/Participation Alert;Cooperative   Attention Within functional limits   Orientation Level Oriented X4   Following Commands Follows one step commands without difficulty   Subjective   Subjective c/o mild SOB with ambulation.   Bed Mobility   Supine to Sit 4  Minimal assistance   Additional items Assist x 1;HOB elevated;Increased time required;Verbal cues   Additional Comments Pt in bed start session on Bippap. Pt removed to ambulate. RN aware of removal of bipap. RN states pt applies bipap himself.   Transfers   Sit to Stand 5  Supervision   Additional items Increased time required;Verbal cues   Stand to Sit 5  Supervision   Additional items Increased time required;Verbal cues   Additional Comments RW used.   Ambulation/Elevation   Gait pattern   (Excessively slow; Short stride; Foward flexed; Decreased foot clearance; Wide ZARA)   Gait Assistance 5  Supervision   Additional items Verbal cues   Assistive Device Bariatric Rolling walker   Distance 125'   Balance   Static Sitting Good   Dynamic Sitting Good   Static Standing Fair +   Dynamic Standing Fair   Ambulatory Fair -  (RW)   Endurance Deficit   Endurance Deficit Yes   Activity Tolerance   Activity Tolerance Patient limited by fatigue   Assessment   Prognosis Good   Problem List   (Decreased strength; Decreased endurance; Impaired balance; Decreased mobility; Obesity)   Assessment Pt. seen for PT  treatment session this date with interventions consisting of provided bariatric RW for home, bed mobility, transfers and  gait training w/ emphasis on improving pt's ability to ambulate. In comparison to previous session, Pt. With improvements in activity tolerance.Mild SOB with ambulation. Vitals WFL's RA.    Pt is in need of continued activity in PT to improve strength balance endurance mobility transfers and ambulation with return to maximize LOF. From PT/mobility standpoint, recommendation at time of d/c would be level III: min resource intensity   in order to promote return to PLOF and independence.   The patient's AM-PAC Basic Mobility Inpatient Short Form Raw Score is 21. A Raw score of greater than 16 suggests the patient may benefit from discharge to home.  Please also refer to physical therapy recommendation for safe DC planning.   Goals   LTG Expiration Date 10/21/24   PT Treatment Day 1   Plan   Treatment/Interventions Functional transfer training;LE strengthening/ROM;Therapeutic exercise;Endurance training;Bed mobility;Gait training;Spoke to nursing   Progress Progressing toward goals   PT Frequency 3-5x/wk   Discharge Recommendation   Rehab Resource Intensity Level, PT III (Minimum Resource Intensity)   Equipment Recommended Walker   Walker Package Recommended HD Bariatric wheeled walker   Additional Comments Pt provided with bariatric RW for home. Adjusted to proper height and glides added.   AM-PAC Basic Mobility Inpatient   Turning in Flat Bed Without Bedrails 4   Lying on Back to Sitting on Edge of Flat Bed Without Bedrails 3   Moving Bed to Chair 4   Standing Up From Chair Using Arms 4   Walk in Room 3   Climb 3-5 Stairs With Railing 3   Basic Mobility Inpatient Raw Score 21   Basic Mobility Standardized Score 45.55   [facility] Highest Level Of Mobility   -HLM Goal 6: Walk 10 steps or more   -HLM Achieved 7: Walk 25 feet or more   Education   Education Provided Mobility training   Patient  Demonstrates verbal understanding   End of Consult   Patient Position at End of Consult Bedside chair;All needs within reach   End of Consult Comments discussed POC with PT

## 2024-10-08 NOTE — ASSESSMENT & PLAN NOTE
Patient with 2/2 positive blood cultures for serratia marcescens.     -WBC of 4.17  -Continue with IV Ceftriaxone  -Repeat blood cultures NTD  -Monitor vital signs  -AM labs   -Supportive care   -ID recommending 7 days of abx with transition to PO after

## 2024-10-08 NOTE — PLAN OF CARE
Problem: PAIN - ADULT  Goal: Verbalizes/displays adequate comfort level or baseline comfort level  Description: Interventions:  - Encourage patient to monitor pain and request assistance  - Assess pain using appropriate pain scale  - Administer analgesics based on type and severity of pain and evaluate response  - Implement non-pharmacological measures as appropriate and evaluate response  - Consider cultural and social influences on pain and pain management  - Notify physician/advanced practitioner if interventions unsuccessful or patient reports new pain  Outcome: Progressing     Problem: INFECTION - ADULT  Goal: Absence or prevention of progression during hospitalization  Description: INTERVENTIONS:  - Assess and monitor for signs and symptoms of infection  - Monitor lab/diagnostic results  - Monitor all insertion sites, i.e. indwelling lines, tubes, and drains  - Monitor endotracheal if appropriate and nasal secretions for changes in amount and color  - [place] appropriate cooling/warming therapies per order  - Administer medications as ordered  - Instruct and encourage patient and family to use good hand hygiene technique  - Identify and instruct in appropriate isolation precautions for identified infection/condition  Outcome: Progressing     Problem: SAFETY ADULT  Goal: Patient will remain free of falls  Description: INTERVENTIONS:  - Educate patient/family on patient safety including physical limitations  - Instruct patient to call for assistance with activity   - Consult OT/PT to assist with strengthening/mobility   - Keep Call bell within reach  - Keep bed low and locked with side rails adjusted as appropriate  - Keep care items and personal belongings within reach  - Initiate and maintain comfort rounds  - Make Fall Risk Sign visible to staff  - Offer Toileting every 2 Hours, in advance of need  - Initiate/Maintain bed/chair alarm  - Obtain necessary fall risk management equipment: alarms, nonskid  footwear, needs in reach  - Apply yellow socks and bracelet for high fall risk patients  - Consider moving patient to room near nurses station  Outcome: Progressing     Problem: DISCHARGE PLANNING  Goal: Discharge to home or other facility with appropriate resources  Description: INTERVENTIONS:  - Identify barriers to discharge w/patient and caregiver  - Arrange for needed discharge resources and transportation as appropriate  - Identify discharge learning needs (meds, wound care, etc.)  - Arrange for interpretive services to assist at discharge as needed  - Refer to Case Management Department for coordinating discharge planning if the patient needs post-hospital services based on physician/advanced practitioner order or complex needs related to functional status, cognitive ability, or social support system  Outcome: Progressing     Problem: Knowledge Deficit  Goal: Patient/family/caregiver demonstrates understanding of disease process, treatment plan, medications, and discharge instructions  Description: Complete learning assessment and assess knowledge base.  Interventions:  - Provide teaching at level of understanding  - Provide teaching via preferred learning methods  Outcome: Progressing

## 2024-10-08 NOTE — PLAN OF CARE
Problem: PAIN - ADULT  Goal: Verbalizes/displays adequate comfort level or baseline comfort level  Description: Interventions:  - Encourage patient to monitor pain and request assistance  - Assess pain using appropriate pain scale  - Administer analgesics based on type and severity of pain and evaluate response  - Implement non-pharmacological measures as appropriate and evaluate response  - Consider cultural and social influences on pain and pain management  - Notify physician/advanced practitioner if interventions unsuccessful or patient reports new pain  Outcome: Progressing     Problem: INFECTION - ADULT  Goal: Absence or prevention of progression during hospitalization  Description: INTERVENTIONS:  - Assess and monitor for signs and symptoms of infection  - Monitor lab/diagnostic results  - Monitor all insertion sites, i.e. indwelling lines, tubes, and drains  - Monitor endotracheal if appropriate and nasal secretions for changes in amount and color  - [place] appropriate cooling/warming therapies per order  - Administer medications as ordered  - Instruct and encourage patient and family to use good hand hygiene technique  - Identify and instruct in appropriate isolation precautions for identified infection/condition  Outcome: Progressing     Problem: SAFETY ADULT  Goal: Patient will remain free of falls  Description: INTERVENTIONS:  - Educate patient/family on patient safety including physical limitations  - Instruct patient to call for assistance with activity   - Consult OT/PT to assist with strengthening/mobility   - Keep Call bell within reach  - Keep bed low and locked with side rails adjusted as appropriate  - Keep care items and personal belongings within reach  - Initiate and maintain comfort rounds  - Make Fall Risk Sign visible to staff  - Offer Toileting every 2 Hours, in advance of need  - Initiate/Maintain bed/chair alarm  - Obtain necessary fall risk management equipment: alarms, nonskid  footwear, needs in reach  - Apply yellow socks and bracelet for high fall risk patients  - Consider moving patient to room near nurses station  Outcome: Progressing     Problem: DISCHARGE PLANNING  Goal: Discharge to home or other facility with appropriate resources  Description: INTERVENTIONS:  - Identify barriers to discharge w/patient and caregiver  - Arrange for needed discharge resources and transportation as appropriate  - Identify discharge learning needs (meds, wound care, etc.)  - Arrange for interpretive services to assist at discharge as needed  - Refer to Case Management Department for coordinating discharge planning if the patient needs post-hospital services based on physician/advanced practitioner order or complex needs related to functional status, cognitive ability, or social support system  Outcome: Progressing     Problem: Knowledge Deficit  Goal: Patient/family/caregiver demonstrates understanding of disease process, treatment plan, medications, and discharge instructions  Description: Complete learning assessment and assess knowledge base.  Interventions:  - Provide teaching at level of understanding  - Provide teaching via preferred learning methods  Outcome: Progressing

## 2024-10-08 NOTE — PROGRESS NOTES
Progress Note - Hospitalist   Name: Christy Haro 69 y.o. male I MRN: [id number]  Unit/Bed#: 417-01 I Date of Admission: 10/6/2024   Date of Service: 10/8/2024 I Hospital Day: 2    Assessment & Plan  Acute cystitis without hematuria  Patient was diagnosed with UTI on 10/5/24 in ED. Discharged with p.o. cefpodoxime and eventually called back due to positive blood cultures.     -Continue antibiotic coverage with IV ceftriaxone, sensitivies reviewed   -Trend Cr, down trending and WNL at this time    -I&Os  -Supportive care  Benign essential HTN  -BP meds held due to hypotension   -S/p fluids   -Continue midodrine for low BP   -Monitor blood pressure while admitted   AYLEEN (obstructive sleep apnea)    -Respiratory protocol  -Continue CPAP at bedtime  Type 2 diabetes mellitus with obesity  ([facility])  Lab Results   Component Value Date    HGBA1C 6.0 (H) 10/06/2024     -A1c of 6.0, controlled   -Hold oral diabetic medication  -Sliding scale insulin  -Fingerstick glucose 4 times daily AC/HS  -Diabetic diet   -Hypoglycemia protocol    Stage 3 chronic kidney disease, unspecified whether stage 3a or 3b CKD ([facility])  Lab Results   Component Value Date    EGFR 68 10/08/2024    EGFR 54 10/07/2024    EGFR 40 10/06/2024    CREATININE 1.08 10/08/2024    CREATININE 1.30 10/07/2024    CREATININE 1.68 (H) 10/06/2024   Creatinine baseline of 1.15 to 1.2     -Initial cr on admission of 1.80  -s/p fluids   -Home BP meds held   -Avoid nephrotoxic agents  -Monitor creatinine levels, electrolytes  -Continue outpatient nephrology follow up  Dyslipidemia  -Continue statin  GERD (gastroesophageal reflux disease)  Stable    -Currently on Prilosec 40 mg p.o. daily  -Will give formulary alternative Protonix 40 mg PO daily  Bacteremia  Patient with 2/2 positive blood cultures for serratia marcescens.     -WBC of 4.17  -Continue with IV Ceftriaxone  -Repeat blood cultures NTD  -Monitor vital signs  -AM labs   -Supportive care   -ID recommending 7 days of abx  "with transition to PO after   Skin lesion  -Noted on physical exam on back of L thigh   -No signs of infection   -Steroid cream ordered   -Follow up outpatient with PCP for further management   Inguinal hernia of left side without obstruction or gangrene    -Pt complaining of pain in groin with straining   -CT on admission notable for inguinal hernia without obstruction   -General surgery referral upon discharge     VTE Pharmacologic Prophylaxis:   Moderate Risk (Score 3-4) - Pharmacological DVT Prophylaxis Ordered: fondaparinux.    Mobility:   Basic Mobility Inpatient Raw Score: 21  JH-HLM Goal: 6: Walk 10 steps or more  JH-HLM Achieved: 6: Walk 10 steps or more  JH-HLM Goal achieved. Continue to encourage appropriate mobility.    Patient Centered Rounds: I performed bedside rounds with nursing staff today.   Discussions with Specialists or Other Care Team Provider: ID    Education and Discussions with Family / Patient: Updated  (wife) via phone.    Current Length of Stay: 2 day(s)  Current Patient Status: Inpatient   Certification Statement: The patient will continue to require additional inpatient hospital stay due to IV abx   Discharge Plan: Anticipate discharge in >72 hrs to home.    Code Status: Level 1 - Full Code    Subjective   Patient seen sitting upright in bed in no acute distress. Complains of pain in R testicle only when straining with bowel movement. Also complaining of continued \"shingles rash\" in the groin area, back of L thigh. Tolerating p.o. intake. Urinating and stooling without issue. Ambulatory with walker. Sleeping well. Denies chest pain, shortness of breath, nausea, vomiting, constipation, diarrhea.     Objective :  Temp:  [98.2 °F (36.8 °C)-99.2 °F (37.3 °C)] 99.2 °F (37.3 °C)  HR:  [58-74] 58  BP: (101-135)/(48-66) 119/50  Resp:  [17-20] 20  SpO2:  [93 %-98 %] 98 %  O2 Device: None (Room air)  FiO2 (%):  [28] 28    Body mass index is 40.84 kg/m².     Input and Output Summary " (last 24 hours):     Intake/Output Summary (Last 24 hours) at 10/8/2024 0804  Last data filed at 10/8/2024 0616  Gross per 24 hour   Intake 960 ml   Output 500 ml   Net 460 ml       Physical Exam  Vitals and nursing note reviewed.   Constitutional:       General: He is not in acute distress.     Appearance: Normal appearance. He is obese. He is not ill-appearing or toxic-appearing.   HENT:      Head: Normocephalic and atraumatic.      Nose: Nose normal.   Eyes:      Conjunctiva/sclera: Conjunctivae normal.   Cardiovascular:      Rate and Rhythm: Normal rate and regular rhythm.      Heart sounds: Normal heart sounds. No murmur heard.     No friction rub. No gallop.   Pulmonary:      Effort: Pulmonary effort is normal. No respiratory distress.      Breath sounds: Normal breath sounds. No wheezing, rhonchi or rales.   Musculoskeletal:      Right lower leg: Edema present.      Left lower leg: Edema (Patient reports swelling at baseline, unsure if this swelling is worse but states worse than R side at baseline) present.   Skin:     General: Skin is warm and dry.      Findings: Lesion (back of L thigh with 1 cm lesions, appears to have been picked at. Minimal surrounding erythema. No drainage. Does not follow dermatomal pattern) present.   Neurological:      General: No focal deficit present.      Mental Status: He is alert and oriented to person, place, and time.   Psychiatric:         Mood and Affect: Mood normal.         Behavior: Behavior normal.         Thought Content: Thought content normal.           Lab Results: I have reviewed the following results:   Results from last 7 days   Lab Units 10/08/24  0435   WBC Thousand/uL 4.17*   HEMOGLOBIN g/dL 10.6*   HEMATOCRIT % 32.6*   PLATELETS Thousands/uL 125*   SEGS PCT % 63   LYMPHO PCT % 22   MONO PCT % 15*   EOS PCT % 0     Results from last 7 days   Lab Units 10/08/24  0435 10/07/24  0429 10/06/24  0515   SODIUM mmol/L 137   < > 136   POTASSIUM mmol/L 3.7   < > 4.1    CHLORIDE mmol/L 107   < > 104   CO2 mmol/L 23   < > 21   BUN mg/dL 19   < > 42*   CREATININE mg/dL 1.08   < > 1.68*   ANION GAP mmol/L 7   < > 11   CALCIUM mg/dL 8.2*   < > 8.2*   ALBUMIN g/dL 3.3*  --  3.3*   TOTAL BILIRUBIN mg/dL  --   --  1.09*   ALK PHOS U/L  --   --  26*   ALT U/L  --   --  52   AST U/L  --   --  55*   GLUCOSE RANDOM mg/dL 133   < > 118    < > = values in this interval not displayed.     Results from last 7 days   Lab Units 10/05/24  0008   INR  1.16     Results from last 7 days   Lab Units 10/08/24  0657 10/07/24  2117 10/07/24  1607 10/07/24  1106 10/07/24  0725 10/06/24  2109 10/06/24  1643 10/06/24  1106 10/06/24  0719   POC GLUCOSE mg/dl 144* 114 122 139 116 116 159* 146* 112     Results from last 7 days   Lab Units 10/06/24  0515   HEMOGLOBIN A1C % 6.0*     Results from last 7 days   Lab Units 10/07/24  0429 10/06/24  0153 10/04/24  2334   LACTIC ACID mmol/L  --  1.0 1.4   PROCALCITONIN ng/ml 0.49* 0.89* 0.38*       Recent Cultures (last 7 days):   Results from last 7 days   Lab Units 10/06/24  1504 10/05/24  0050 10/05/24  0008 10/04/24  1236   BLOOD CULTURE  No Growth at 24 hrs.  No Growth at 24 hrs.  --  Non lactose fermenting gram negative emeterio*  Serratia marcescens*  --    GRAM STAIN RESULT   --   --  Gram variable rods*  Gram variable rods*  --    URINE CULTURE   --  >100,000 cfu/ml Citrobacter koseri*  --  80,000-89,000 cfu/ml Citrobacter koseri*  10,000-19,000 cfu/ml       Imaging Results Review: I reviewed radiology reports from this admission including: CT abdomen/pelvis.  Other Study Results Review: EKG was reviewed.     Last 24 Hours Medication List:     Current Facility-Administered Medications:     aspirin chewable tablet 81 mg, Daily    atorvastatin (LIPITOR) tablet 10 mg, Daily With Dinner    cefTRIAXone (ROCEPHIN) IVPB (premix in dextrose) 2,000 mg 50 mL, Q24H, Last Rate: 2,000 mg (10/07/24 1330)    fondaparinux (ARIXTRA) subcutaneous injection 5 mg, Q24H     gabapentin (NEURONTIN) capsule 100 mg, BID    insulin lispro (HumALOG/ADMELOG) 100 units/mL subcutaneous injection 1-5 Units, HS    insulin lispro (HumALOG/ADMELOG) 100 units/mL subcutaneous injection 2-12 Units, TID AC **AND** Fingerstick Glucose (POCT), TID AC    metoprolol succinate (TOPROL-XL) 24 hr tablet 25 mg, Daily    metoprolol succinate (TOPROL-XL) 24 hr tablet 50 mg, Daily    midodrine (PROAMATINE) tablet 2.5 mg, TID AC    multivitamin stress formula tablet 1 tablet, Daily    ondansetron (ZOFRAN) injection 4 mg, Q6H PRN    pantoprazole (PROTONIX) EC tablet 40 mg, Early Morning    potassium chloride (Klor-Con M20) CR tablet 20 mEq, Daily    tamsulosin (FLOMAX) capsule 0.4 mg, Daily With Dinner    Administrative Statements   Today, Patient Was Seen By: Jeannette Marquez DO    **Please Note: This note may have been constructed using a voice recognition system.**

## 2024-10-08 NOTE — ASSESSMENT & PLAN NOTE
Presented to the ED on 10/4 with complaint of fever, weakness, and dysuria. Attributed the dysuria at the time to recent enamorado placement on 9/19/24 in setting of urinary retention. Catheter was removed on 10/2 by urology. UA obtained in the ED had innumerable WBC, large LE, and blood. Ucx resulted positive for Citrobacter koseri, as well as two other unidentified contaminants. CT A/P obtained demonstrated bladder wall thickening which could be associated with cystitis. Course now further complicated by bacteremia as above.    Antibiotic plan as above.  Close outpatient urology follow-up.

## 2024-10-08 NOTE — TELEPHONE ENCOUNTER
Pt called in to r/s appt: 10/14 - due to him currently being in the hospital.    Appt r/s to: 10/29 @ 4:00 pm    Pt then inquired if he has labs to complete for the appt?    Confirmed w/ pt that currently there are no labs ordered for the upcoming appt. Pt understood.

## 2024-10-08 NOTE — ASSESSMENT & PLAN NOTE
Lab Results   Component Value Date    EGFR 68 10/08/2024    EGFR 54 10/07/2024    EGFR 40 10/06/2024    CREATININE 1.08 10/08/2024    CREATININE 1.30 10/07/2024    CREATININE 1.68 (H) 10/06/2024   Improving. No obstructive process noted on CT. Ceftriaxone does not need dose adjustment for renal function. Will continue to monitor serum creatinine closely.

## 2024-10-08 NOTE — ASSESSMENT & PLAN NOTE
Patient was diagnosed with UTI on 10/5/24 in ED. Discharged with p.o. cefpodoxime and eventually called back due to positive blood cultures.     -Continue antibiotic coverage with IV ceftriaxone, sensitivies reviewed   -Trend Cr, down trending and WNL at this time    -I&Os  -Supportive care

## 2024-10-08 NOTE — ASSESSMENT & PLAN NOTE
-Pt complaining of pain in groin with straining   -CT on admission notable for inguinal hernia without obstruction   -General surgery referral upon discharge

## 2024-10-08 NOTE — PLAN OF CARE
Problem: PHYSICAL THERAPY ADULT  Goal: Performs mobility at highest level of function for planned discharge setting.  See evaluation for individualized goals.  Description: Treatment/Interventions: Functional transfer training, Elevations, Therapeutic exercise, LE strengthening/ROM, Endurance training, Bed mobility, Gait training          See flowsheet documentation for full assessment, interventions and recommendations.  Outcome: Progressing  Note: Prognosis: Good  Problem List:  (Decreased strength; Decreased endurance; Impaired balance; Decreased mobility; Obesity)  Assessment: Pt. seen for PT treatment session this date with interventions consisting of provided bariatric RW for home, bed mobility, transfers and  gait training w/ emphasis on improving pt's ability to ambulate. In comparison to previous session, Pt. With improvements in activity tolerance.Mild SOB with ambulation. Vitals WFL's RA.    Pt is in need of continued activity in PT to improve strength balance endurance mobility transfers and ambulation with return to maximize LOF. From PT/mobility standpoint, recommendation at time of d/c would be level III: min resource intensity   in order to promote return to PLOF and independence.   The patient's AM-PAC Basic Mobility Inpatient Short Form Raw Score is 21. A Raw score of greater than 16 suggests the patient may benefit from discharge to home.  Please also refer to physical therapy recommendation for safe DC planning.        Rehab Resource Intensity Level, PT: III (Minimum Resource Intensity)    See flowsheet documentation for full assessment.

## 2024-10-08 NOTE — ASSESSMENT & PLAN NOTE
Lab Results   Component Value Date    EGFR 68 10/08/2024    EGFR 54 10/07/2024    EGFR 40 10/06/2024    CREATININE 1.08 10/08/2024    CREATININE 1.30 10/07/2024    CREATININE 1.68 (H) 10/06/2024   Creatinine baseline of 1.15 to 1.2     -Initial cr on admission of 1.80  -s/p fluids   -Home BP meds held   -Avoid nephrotoxic agents  -Monitor creatinine levels, electrolytes  -Continue outpatient nephrology follow up

## 2024-10-08 NOTE — ASSESSMENT & PLAN NOTE
-Noted on physical exam on back of L thigh   -No signs of infection   -Steroid cream ordered   -Follow up outpatient with PCP for further management

## 2024-10-08 NOTE — PROGRESS NOTES
Progress Note - Infectious Disease   Name: Christy Haro 69 y.o. male I MRN: [id number]  Unit/Bed#: 417-01 I Date of Admission: 10/6/2024   Date of Service: 10/8/2024 I Hospital Day: 2       REQUIRED DOCUMENTATION:      1. This service was provided via Telemedicine.  2. Provider located at [facility].  3. TeleMed provider: Rossy Pandey  4. Identify all parties in room with patient during tele consult: Patient  5. After connecting through [employer], patient was identified by name and date of birth and assistant checked wristband.  Patient was then informed that this was a Telemedicine visit and that the exam was being conducted confidentially over secure lines. My office door was closed. No one else was in the room.  Patient acknowledged consent and understanding of privacy and security of the Telemedicine visit, and gave us permission to have the assistant stay in the room in order to assist with the history and to conduct the exam.  I informed the patient that I have reviewed their record in Epic and presented the opportunity for them to ask any questions regarding the visit today.  The patient agreed to participate.     Assessment & Plan  Bacteremia  2 out of 2 blood cultures resulted positive for Serratia marcescens. Most likely urinary source given recent urinary symptoms, enamorado placement although urine culture predominantly isolated Citrobacter koseri. Consider multiple organisms in urine. CT A/P concerning for cystitis without other evidence of acute intra-abdominal infection. No evidence of hydronephrosis or perinephric stranding to suggest pyelonephritis. Repeat blood cultures are negative. Fever curve appears to be down-trending. Leukocytosis down-trended. On IV ceftriaxone, which patient seems to be tolerating.  Continue IV ceftriaxone 2g q24h while inpatient.  Continue to follow CBCD and CMP with AM labs to monitor for potential antimicrobial toxicities and assess for clinical response to  antibiotics.  On discharge, can transition back to cefpodoxime to complete full 10-day course of antibiotic.  Acute cystitis without hematuria  Presented to the ED on 10/4 with complaint of fever, weakness, and dysuria. Attributed the dysuria at the time to recent enamorado placement on 9/19/24 in setting of urinary retention. Catheter was removed on 10/2 by urology. UA obtained in the ED had innumerable WBC, large LE, and blood. Ucx resulted positive for Citrobacter koseri, as well as two other unidentified contaminants. CT A/P obtained demonstrated bladder wall thickening which could be associated with cystitis. Course now further complicated by bacteremia as above.    Antibiotic plan as above.  Close outpatient urology follow-up.  Type 2 diabetes mellitus with obesity  ([facility])  Lab Results   Component Value Date    HGBA1C 6.0 (H) 10/06/2024     Recent Labs     10/07/24  1106 10/07/24  1607 10/07/24  2117 10/08/24  0657   POCGLU 139 122 114 144*     Blood Sugar Average: Last 72 hrs:  (P) 129.7527937353343876  Well controlled with A1c of 6.0. Remains a risk factor for development of infection. Continue with tight glycemic control.   Stage 3 chronic kidney disease, unspecified whether stage 3a or 3b CKD ([facility])  Lab Results   Component Value Date    EGFR 68 10/08/2024    EGFR 54 10/07/2024    EGFR 40 10/06/2024    CREATININE 1.08 10/08/2024    CREATININE 1.30 10/07/2024    CREATININE 1.68 (H) 10/06/2024   Improving. No obstructive process noted on CT. Ceftriaxone does not need dose adjustment for renal function. Will continue to monitor serum creatinine closely.   Skin lesion  Reported lesion on back of left thigh without overt clinical signs of infection. Serial exams.  Inguinal hernia of left side without obstruction or gangrene  Without radiographic or clinical signs of obstruction. Outpatient surgery followup.        Antibiotics:  Ceftriaxone  3    I discussed above plan with primary service who agrees with transition  to oral antibiotic on discharge.         Subjective   No further high fevers. Tolerating oral intake. Reportedly complained of testicular pain only when straining to have a bowel movement. No new evidence of SSTI per report.    Objective :  Temp:  [98.2 °F (36.8 °C)-99.2 °F (37.3 °C)] 99.2 °F (37.3 °C)  HR:  [58-61] 58  BP: (101-132)/(48-66) 119/50  Resp:  [17-20] 20  SpO2:  [94 %-98 %] 98 %  O2 Device: None (Room air)  FiO2 (%):  [28] 28    General:  No acute distress, resting comfortably in bed  HEENT ATNC  Neck trachea midline  Psychiatric:  Awake and alert  Pulmonary:  Normal respiratory excursion without accessory muscle use  Abdomen:  No visible distension  Extremities:  No visible edema  Skin:  No visible diffuse rashes or draining wounds  Neuro: moves all extremities        Lab Results: I have reviewed the following results:  Results from last 7 days   Lab Units 10/08/24  0435 10/07/24  0429 10/06/24  1505 10/06/24  0515   WBC Thousand/uL 4.17* 4.45  --  6.93   HEMOGLOBIN g/dL 10.6* 11.0*  --  10.3*   PLATELETS Thousands/uL 125* 127* 129* 121*     Results from last 7 days   Lab Units 10/08/24  0435 10/07/24  0429 10/06/24  0515 10/06/24  0153   SODIUM mmol/L 137 135 136 134*   POTASSIUM mmol/L 3.7 3.8 4.1 4.0   CHLORIDE mmol/L 107 105 104 103   CO2 mmol/L 23 21 21 21   BUN mg/dL 19 26* 42* 42*   CREATININE mg/dL 1.08 1.30 1.68* 1.80*   EGFR ml/min/1.73sq m 68 54 40 37   CALCIUM mg/dL 8.2* 8.1* 8.2* 8.4   AST U/L  --   --  55* 43*   ALT U/L  --   --  52 40   ALK PHOS U/L  --   --  26* 27*   ALBUMIN g/dL 3.3*  --  3.3* 3.6     Results from last 7 days   Lab Units 10/06/24  1504 10/05/24  0050 10/05/24  0008 10/04/24  1236   BLOOD CULTURE  No Growth at 24 hrs.  No Growth at 24 hrs.  --  Serratia marcescens*  Serratia marcescens*  --    GRAM STAIN RESULT   --   --  Gram variable rods*  Gram variable rods*  --    URINE CULTURE   --  >100,000 cfu/ml Citrobacter koseri*  --  80,000-89,000 cfu/ml Citrobacter  koseri*  10,000-19,000 cfu/ml     Results from last 7 days   Lab Units 10/07/24  0429 10/06/24  0153 10/04/24  2334   PROCALCITONIN ng/ml 0.49* 0.89* 0.38*                 Imaging Results Review: I personally reviewed the following image studies in PACS and associated radiology reports:   and CT abdomen/pelvis. My interpretation of the radiology images/reports is: Mild bladder wall thickening.  Other Study Results Review: No additional pertinent studies reviewed.    Administrative Statements     VIRTUAL CARE DOCUMENTATION:     1. This service was provided via Telemedicine using Other: Epic      2. Parties in the room with patient during teleconsult Patient only    3. Confidentiality My office door was closed     4. Participants No one else was in the room    5. Patient acknowledged consent and understanding of privacy and security of the  Telemedicine consult. I informed the patient that I have reviewed their record in Epic and presented the opportunity for them to ask any questions regarding the visit today.  The patient agreed to participate.    6. I have spent a total time of 20 minutes in caring for this patient on the day of the visit/encounter including Counseling / Coordination of care, Documenting in the medical record, Reviewing / ordering tests, medicine, procedures  , Obtaining or reviewing history  , and Communicating with other healthcare professionals .

## 2024-10-08 NOTE — ASSESSMENT & PLAN NOTE
Lab Results   Component Value Date    HGBA1C 6.0 (H) 10/06/2024     Recent Labs     10/07/24  1106 10/07/24  1607 10/07/24  2117 10/08/24  0657   POCGLU 139 122 114 144*     Blood Sugar Average: Last 72 hrs:  (P) [id number]  Well controlled with A1c of 6.0. Remains a risk factor for development of infection. Continue with tight glycemic control.

## 2024-10-08 NOTE — ASSESSMENT & PLAN NOTE
2 out of 2 blood cultures resulted positive for Serratia marcescens. Most likely urinary source given recent urinary symptoms, enamorado placement although urine culture predominantly isolated Citrobacter koseri. Consider multiple organisms in urine. CT A/P concerning for cystitis without other evidence of acute intra-abdominal infection. No evidence of hydronephrosis or perinephric stranding to suggest pyelonephritis. Repeat blood cultures are negative. Fever curve appears to be down-trending. Leukocytosis down-trended. On IV ceftriaxone, which patient seems to be tolerating.  Continue IV ceftriaxone 2g q24h while inpatient.  Continue to follow CBCD and CMP with AM labs to monitor for potential antimicrobial toxicities and assess for clinical response to antibiotics.  On discharge, can transition back to cefpodoxime to complete full 10-day course of antibiotic.

## 2024-10-09 ENCOUNTER — APPOINTMENT (INPATIENT)
Dept: ULTRASOUND IMAGING | Facility: HOSPITAL | Age: 69
DRG: 872 | End: 2024-10-09
Payer: MEDICARE

## 2024-10-09 PROBLEM — N45.4: Status: ACTIVE | Noted: 2024-10-09

## 2024-10-09 PROBLEM — N17.9 ACUTE KIDNEY INJURY SUPERIMPOSED ON STAGE 3A CHRONIC KIDNEY DISEASE (HCC): Status: ACTIVE | Noted: 2024-10-09

## 2024-10-09 PROBLEM — N18.31 ACUTE KIDNEY INJURY SUPERIMPOSED ON STAGE 3A CHRONIC KIDNEY DISEASE (HCC): Status: ACTIVE | Noted: 2024-10-09

## 2024-10-09 LAB
ALBUMIN SERPL BCG-MCNC: 3.3 G/DL (ref 3.5–5)
ALP SERPL-CCNC: 29 U/L (ref 34–104)
ALT SERPL W P-5'-P-CCNC: 148 U/L (ref 7–52)
ANION GAP SERPL CALCULATED.3IONS-SCNC: 9 MMOL/L (ref 4–13)
AST SERPL W P-5'-P-CCNC: 68 U/L (ref 13–39)
BASOPHILS # BLD AUTO: 0.03 THOUSANDS/ΜL (ref 0–0.1)
BASOPHILS NFR BLD AUTO: 1 % (ref 0–1)
BILIRUB SERPL-MCNC: 0.53 MG/DL (ref 0.2–1)
BUN SERPL-MCNC: 18 MG/DL (ref 5–25)
CALCIUM ALBUM COR SERPL-MCNC: 9.2 MG/DL (ref 8.3–10.1)
CALCIUM SERPL-MCNC: 8.6 MG/DL (ref 8.4–10.2)
CHLORIDE SERPL-SCNC: 107 MMOL/L (ref 96–108)
CO2 SERPL-SCNC: 23 MMOL/L (ref 21–32)
CREAT SERPL-MCNC: 1.12 MG/DL (ref 0.6–1.3)
EOSINOPHIL # BLD AUTO: 0.01 THOUSAND/ΜL (ref 0–0.61)
EOSINOPHIL NFR BLD AUTO: 0 % (ref 0–6)
ERYTHROCYTE [DISTWIDTH] IN BLOOD BY AUTOMATED COUNT: 14.2 % (ref 11.6–15.1)
GFR SERPL CREATININE-BSD FRML MDRD: 65 ML/MIN/1.73SQ M
GLUCOSE SERPL-MCNC: 115 MG/DL (ref 65–140)
GLUCOSE SERPL-MCNC: 123 MG/DL (ref 65–140)
GLUCOSE SERPL-MCNC: 124 MG/DL (ref 65–140)
GLUCOSE SERPL-MCNC: 128 MG/DL (ref 65–140)
GLUCOSE SERPL-MCNC: 141 MG/DL (ref 65–140)
HCT VFR BLD AUTO: 34.8 % (ref 36.5–49.3)
HGB BLD-MCNC: 10.9 G/DL (ref 12–17)
IMM GRANULOCYTES # BLD AUTO: 0.03 THOUSAND/UL (ref 0–0.2)
IMM GRANULOCYTES NFR BLD AUTO: 1 % (ref 0–2)
LYMPHOCYTES # BLD AUTO: 1.1 THOUSANDS/ΜL (ref 0.6–4.47)
LYMPHOCYTES NFR BLD AUTO: 20 % (ref 14–44)
MCH RBC QN AUTO: 28.6 PG (ref 26.8–34.3)
MCHC RBC AUTO-ENTMCNC: 31.3 G/DL (ref 31.4–37.4)
MCV RBC AUTO: 91 FL (ref 82–98)
MONOCYTES # BLD AUTO: 0.58 THOUSAND/ΜL (ref 0.17–1.22)
MONOCYTES NFR BLD AUTO: 11 % (ref 4–12)
NEUTROPHILS # BLD AUTO: 3.68 THOUSANDS/ΜL (ref 1.85–7.62)
NEUTS SEG NFR BLD AUTO: 67 % (ref 43–75)
NRBC BLD AUTO-RTO: 0 /100 WBCS
PLATELET # BLD AUTO: 152 THOUSANDS/UL (ref 149–390)
PMV BLD AUTO: 10.3 FL (ref 8.9–12.7)
POTASSIUM SERPL-SCNC: 3.9 MMOL/L (ref 3.5–5.3)
PROCALCITONIN SERPL-MCNC: 0.19 NG/ML
PROT SERPL-MCNC: 6.4 G/DL (ref 6.4–8.4)
RBC # BLD AUTO: 3.81 MILLION/UL (ref 3.88–5.62)
SODIUM SERPL-SCNC: 139 MMOL/L (ref 135–147)
WBC # BLD AUTO: 5.43 THOUSAND/UL (ref 4.31–10.16)

## 2024-10-09 PROCEDURE — 99223 1ST HOSP IP/OBS HIGH 75: CPT | Performed by: UROLOGY

## 2024-10-09 PROCEDURE — 76870 US EXAM SCROTUM: CPT

## 2024-10-09 PROCEDURE — 99233 SBSQ HOSP IP/OBS HIGH 50: CPT | Performed by: FAMILY MEDICINE

## 2024-10-09 PROCEDURE — 80053 COMPREHEN METABOLIC PANEL: CPT | Performed by: STUDENT IN AN ORGANIZED HEALTH CARE EDUCATION/TRAINING PROGRAM

## 2024-10-09 PROCEDURE — 94760 N-INVAS EAR/PLS OXIMETRY 1: CPT

## 2024-10-09 PROCEDURE — 85025 COMPLETE CBC W/AUTO DIFF WBC: CPT | Performed by: STUDENT IN AN ORGANIZED HEALTH CARE EDUCATION/TRAINING PROGRAM

## 2024-10-09 PROCEDURE — 82948 REAGENT STRIP/BLOOD GLUCOSE: CPT

## 2024-10-09 PROCEDURE — 84145 PROCALCITONIN (PCT): CPT | Performed by: STUDENT IN AN ORGANIZED HEALTH CARE EDUCATION/TRAINING PROGRAM

## 2024-10-09 PROCEDURE — 97530 THERAPEUTIC ACTIVITIES: CPT

## 2024-10-09 PROCEDURE — 99233 SBSQ HOSP IP/OBS HIGH 50: CPT | Performed by: INTERNAL MEDICINE

## 2024-10-09 PROCEDURE — 97116 GAIT TRAINING THERAPY: CPT

## 2024-10-09 RX ORDER — OXYCODONE HYDROCHLORIDE 5 MG/1
5 TABLET ORAL EVERY 6 HOURS PRN
Status: DISCONTINUED | OUTPATIENT
Start: 2024-10-09 | End: 2024-10-11 | Stop reason: HOSPADM

## 2024-10-09 RX ORDER — KETOROLAC TROMETHAMINE 30 MG/ML
15 INJECTION, SOLUTION INTRAMUSCULAR; INTRAVENOUS EVERY 6 HOURS SCHEDULED
Status: COMPLETED | OUTPATIENT
Start: 2024-10-09 | End: 2024-10-10

## 2024-10-09 RX ADMIN — METOPROLOL SUCCINATE 50 MG: 50 TABLET, EXTENDED RELEASE ORAL at 08:58

## 2024-10-09 RX ADMIN — MIDODRINE HYDROCHLORIDE 2.5 MG: 5 TABLET ORAL at 12:06

## 2024-10-09 RX ADMIN — TAMSULOSIN HYDROCHLORIDE 0.4 MG: 0.4 CAPSULE ORAL at 17:17

## 2024-10-09 RX ADMIN — ATORVASTATIN CALCIUM 10 MG: 10 TABLET, FILM COATED ORAL at 17:17

## 2024-10-09 RX ADMIN — ASPIRIN 81 MG 81 MG: 81 TABLET ORAL at 08:58

## 2024-10-09 RX ADMIN — EMPAGLIFLOZIN 20 MG: 10 TABLET, FILM COATED ORAL at 09:00

## 2024-10-09 RX ADMIN — METOPROLOL SUCCINATE 25 MG: 50 TABLET, EXTENDED RELEASE ORAL at 08:57

## 2024-10-09 RX ADMIN — KETOROLAC TROMETHAMINE 15 MG: 30 INJECTION, SOLUTION INTRAMUSCULAR at 14:52

## 2024-10-09 RX ADMIN — B-COMPLEX W/ C & FOLIC ACID TAB 1 TABLET: TAB at 08:58

## 2024-10-09 RX ADMIN — GABAPENTIN 100 MG: 100 CAPSULE ORAL at 17:17

## 2024-10-09 RX ADMIN — POTASSIUM CHLORIDE 20 MEQ: 1500 TABLET, EXTENDED RELEASE ORAL at 08:58

## 2024-10-09 RX ADMIN — MIDODRINE HYDROCHLORIDE 2.5 MG: 5 TABLET ORAL at 06:10

## 2024-10-09 RX ADMIN — FONDAPARINUX SODIUM 5 MG: 5 INJECTION SUBCUTANEOUS at 09:00

## 2024-10-09 RX ADMIN — KETOROLAC TROMETHAMINE 15 MG: 30 INJECTION, SOLUTION INTRAMUSCULAR at 21:05

## 2024-10-09 RX ADMIN — CEFTRIAXONE 2000 MG: 2 INJECTION, SOLUTION INTRAVENOUS at 13:35

## 2024-10-09 RX ADMIN — PANTOPRAZOLE SODIUM 40 MG: 40 TABLET, DELAYED RELEASE ORAL at 06:10

## 2024-10-09 RX ADMIN — GABAPENTIN 100 MG: 100 CAPSULE ORAL at 08:58

## 2024-10-09 RX ADMIN — Medication 2.5 MG: at 13:40

## 2024-10-09 RX ADMIN — HYDROCORTISONE 1 APPLICATION: 25 CREAM TOPICAL at 21:08

## 2024-10-09 NOTE — PLAN OF CARE
Problem: PAIN - ADULT  Goal: Verbalizes/displays adequate comfort level or baseline comfort level  Description: Interventions:  - Encourage patient to monitor pain and request assistance  - Assess pain using appropriate pain scale  - Administer analgesics based on type and severity of pain and evaluate response  - Implement non-pharmacological measures as appropriate and evaluate response  - Consider cultural and social influences on pain and pain management  - Notify physician/advanced practitioner if interventions unsuccessful or patient reports new pain  Outcome: Progressing     Problem: INFECTION - ADULT  Goal: Absence or prevention of progression during hospitalization  Description: INTERVENTIONS:  - Assess and monitor for signs and symptoms of infection  - Monitor lab/diagnostic results  - Monitor all insertion sites, i.e. indwelling lines, tubes, and drains  - Monitor endotracheal if appropriate and nasal secretions for changes in amount and color  - [place] appropriate cooling/warming therapies per order  - Administer medications as ordered  - Instruct and encourage patient and family to use good hand hygiene technique  - Identify and instruct in appropriate isolation precautions for identified infection/condition  Outcome: Progressing     Problem: SAFETY ADULT  Goal: Patient will remain free of falls  Description: INTERVENTIONS:  - Educate patient/family on patient safety including physical limitations  - Instruct patient to call for assistance with activity   - Consult OT/PT to assist with strengthening/mobility   - Keep Call bell within reach  - Keep bed low and locked with side rails adjusted as appropriate  - Keep care items and personal belongings within reach  - Initiate and maintain comfort rounds  - Make Fall Risk Sign visible to staff  - Offer Toileting every 2 Hours, in advance of need  - Initiate/Maintain bed/chair alarm  - Obtain necessary fall risk management equipment: alarms, nonskid  footwear, needs in reach  - Apply yellow socks and bracelet for high fall risk patients  - Consider moving patient to room near nurses station  Outcome: Progressing     Problem: DISCHARGE PLANNING  Goal: Discharge to home or other facility with appropriate resources  Description: INTERVENTIONS:  - Identify barriers to discharge w/patient and caregiver  - Arrange for needed discharge resources and transportation as appropriate  - Identify discharge learning needs (meds, wound care, etc.)  - Arrange for interpretive services to assist at discharge as needed  - Refer to Case Management Department for coordinating discharge planning if the patient needs post-hospital services based on physician/advanced practitioner order or complex needs related to functional status, cognitive ability, or social support system  Outcome: Progressing     Problem: Knowledge Deficit  Goal: Patient/family/caregiver demonstrates understanding of disease process, treatment plan, medications, and discharge instructions  Description: Complete learning assessment and assess knowledge base.  Interventions:  - Provide teaching at level of understanding  - Provide teaching via preferred learning methods  Outcome: Progressing

## 2024-10-09 NOTE — ASSESSMENT & PLAN NOTE
Scrotal ultrasound now shows right-sided epididymitis with 1.5 cm developing abscess in tail of epididymis.  Case was discussed with urology who recommends conservative management with antibiotic as collection is too small to drain.   Continue IV ceftriaxone for now.   Anticipate transition to oral Bactrim DS twice daily.   Recommend repeat scrotal ultrasound in about 2 weeks as outpatient to ensure resolution of developing abscess.   Recommend outpatient urology follow-up for reevaluation to ensure resolution of symptoms.   Antibiotic can be discontinued once symptoms and abscess resolve.

## 2024-10-09 NOTE — ASSESSMENT & PLAN NOTE
Patient initially with hypotension with his home antihypertensive held, was initiated on midodrine  With improvement in blood pressure metoprolol resumed, will now discontinue midodrine

## 2024-10-09 NOTE — ASSESSMENT & PLAN NOTE
2 out of 2 blood cultures resulted positive for Serratia marcescens. Most likely urinary source given recent urinary symptoms, enamorado placement although urine culture predominantly isolated Citrobacter koseri. Consider multiple organisms in urine. CT A/P concerning for cystitis without other evidence of acute intra-abdominal infection. Now scrotal ultrasound is concerning for right-sided epididymitis with developing abscess in tail of epididymis. Repeat blood cultures are negative. Fever curve appears to be down-trending. Leukocytosis down-trended. On IV ceftriaxone, which patient seems to be tolerating.  Antibiotic plan as below.

## 2024-10-09 NOTE — ASSESSMENT & PLAN NOTE
Patient was diagnosed with UTI on 10/5/24 in ED. Discharged with p.o. cefpodoxime and eventually called back due to positive blood cultures.   10/9/24: Patient complaining of right testicular pain and swelling.  Ultrasound scrotum/groin revealing Right-sided epididymitis and 1.5 cm hypoechoic focus in the tail of the epididymis suspicious for developing abscess.  No surgical interventions per urology    Continue IV ceftriaxone  Appreciate ID and urology input

## 2024-10-09 NOTE — ASSESSMENT & PLAN NOTE
Lab Results   Component Value Date    EGFR 65 10/09/2024    EGFR 68 10/08/2024    EGFR 54 10/07/2024    CREATININE 1.12 10/09/2024    CREATININE 1.08 10/08/2024    CREATININE 1.30 10/07/2024   LIZ on admission resolved with IV fluids and withholding diuretics and blood pressure medications -most likely prerenal in setting of infection  Creatinine back to baseline 1-1.2

## 2024-10-09 NOTE — PROGRESS NOTES
"Progress Note - Hospitalist   Name: Christy Haro 69 y.o. male I MRN: [id number]  Unit/Bed#: 417-01 I Date of Admission: 10/6/2024   Date of Service: 10/9/2024 I Hospital Day: 3    Assessment & Plan  Bacteremia  Patient with 2/2 positive blood cultures for serratia marcescens.     Continue with IV Ceftriaxone  Repeat blood cultures no growth at 48 hours  With evidence of epididymitis, continue IV antibiotics for now  Monitor CBC, vital signs  Appreciate ID input    Acute cystitis without hematuria  Patient was diagnosed with UTI on 10/5/24 in ED. Discharged with p.o. cefpodoxime and eventually called back due to positive blood cultures.   10/9/24: Patient complaining of right testicular pain and swelling.  Ultrasound scrotum/groin revealing Right-sided epididymitis and 1.5 cm hypoechoic focus in the tail of the epididymis suspicious for developing abscess.  No surgical interventions per urology    Continue IV ceftriaxone  Appreciate ID and urology input    Epididymitis with abscess  Patient c/o right testicular pain and swelling  US scrotum/groin:  \"Right-sided epididymitis and 1.5 cm hypoechoic focus in the tail of the epididymis suspicious for developing abscess.\"  Discussed with Urology -due to small size of phlegmon, no indication for procedure, proceed with IV antibiotics  Benign essential HTN  Patient initially with hypotension with his home antihypertensive held, was initiated on midodrine  With improvement in blood pressure metoprolol resumed, will now discontinue midodrine  AYLEEN (obstructive sleep apnea)  Continue CPAP at bedtime  Type 2 diabetes mellitus with obesity  (HCC)  Lab Results   Component Value Date    HGBA1C 6.0 (H) 10/06/2024     Controlled with hemoglobin A1c of 6  Resumed Jardiance  Monitor blood glucose, insulin sliding scale  Tabetic diet  Stage 3 chronic kidney disease, unspecified whether stage 3a or 3b CKD (HCC)  Lab Results   Component Value Date    EGFR 65 10/09/2024    EGFR 68 " 10/08/2024    EGFR 54 10/07/2024    CREATININE 1.12 10/09/2024    CREATININE 1.08 10/08/2024    CREATININE 1.30 10/07/2024     LIZ resolved  Cr baseline 1-1.2    Dyslipidemia  -Continue statin  GERD (gastroesophageal reflux disease)  Continue PPI  Skin lesion  -Noted on physical exam on back of L thigh   -No signs of infection   -Steroid cream ordered   -Follow up outpatient with PCP for further management   Inguinal hernia of left side without obstruction or gangrene    Hyponatremia  -Resolved     Acute kidney injury superimposed on stage 3a chronic kidney disease (HCC)  Lab Results   Component Value Date    EGFR 65 10/09/2024    EGFR 68 10/08/2024    EGFR 54 10/07/2024    CREATININE 1.12 10/09/2024    CREATININE 1.08 10/08/2024    CREATININE 1.30 10/07/2024   LIZ on admission resolved with IV fluids and withholding diuretics and blood pressure medications -most likely prerenal in setting of infection  Creatinine back to baseline 1-1.2    VTE Pharmacologic Prophylaxis: VTE Score: 4 Moderate Risk (Score 3-4) - Pharmacological DVT Prophylaxis Ordered: Arixtra.    Mobility:   Basic Mobility Inpatient Raw Score: 21  JH-HLM Goal: 6: Walk 10 steps or more  JH-HLM Achieved: 7: Walk 25 feet or more  JH-HLM Goal achieved. Continue to encourage appropriate mobility.    Patient Centered Rounds: I performed bedside rounds with nursing staff today.   Discussions with Specialists or Other Care Team Provider: Urology    Education and Discussions with Family / Patient:  Patient.     Current Length of Stay: 3 day(s)  Current Patient Status: Inpatient   Certification Statement: The patient will continue to require additional inpatient hospital stay due to close monitoring  Discharge Plan: Anticipate discharge in 48-72 hrs to home.    Code Status: Level 1 - Full Code    Subjective   Patient complaining of right testicular swelling and pain.    Objective :  Temp:  [98 °F (36.7 °C)-98.8 °F (37.1 °C)] 98.5 °F (36.9 °C)  HR:  [53-66]  58  BP: (114-137)/(52-65) 131/64  Resp:  [18-22] 20  SpO2:  [92 %-96 %] 94 %  O2 Device: None (Room air)    Body mass index is 40.99 kg/m².     Input and Output Summary (last 24 hours):     Intake/Output Summary (Last 24 hours) at 10/9/2024 1245  Last data filed at 10/9/2024 1204  Gross per 24 hour   Intake 1300 ml   Output 360 ml   Net 940 ml       Physical Exam  Constitutional:       General: He is not in acute distress.  HENT:      Head: Normocephalic and atraumatic.   Eyes:      Conjunctiva/sclera: Conjunctivae normal.   Cardiovascular:      Rate and Rhythm: Normal rate and regular rhythm.   Pulmonary:      Effort: No respiratory distress.   Abdominal:      General: There is no distension.      Tenderness: There is no abdominal tenderness.   Genitourinary:     Testes:         Right: Tenderness and swelling present.   Musculoskeletal:      Right lower leg: No edema.      Left lower leg: No edema.   Skin:     General: Skin is warm.   Neurological:      Mental Status: He is oriented to person, place, and time.   Psychiatric:         Mood and Affect: Mood normal.         Lines/Drains:        Telemetry:  Telemetry Orders (From admission, onward)               24 Hour Telemetry Monitoring  Continuous x 24 Hours (Telem)        Expiring   Question:  Reason for 24 Hour Telemetry  Answer:  Arrhythmias requiring acute medical intervention / PPM or ICD malfunction                     Telemetry Reviewed: Normal Sinus Rhythm  Indication for Continued Telemetry Use: Arrthymias requiring medical therapy               Lab Results: I have reviewed the following results:   Results from last 7 days   Lab Units 10/09/24  0606   WBC Thousand/uL 5.43   HEMOGLOBIN g/dL 10.9*   HEMATOCRIT % 34.8*   PLATELETS Thousands/uL 152   SEGS PCT % 67   LYMPHO PCT % 20   MONO PCT % 11   EOS PCT % 0     Results from last 7 days   Lab Units 10/09/24  0606   SODIUM mmol/L 139   POTASSIUM mmol/L 3.9   CHLORIDE mmol/L 107   CO2 mmol/L 23   BUN mg/dL 18    CREATININE mg/dL 1.12   ANION GAP mmol/L 9   CALCIUM mg/dL 8.6   ALBUMIN g/dL 3.3*   TOTAL BILIRUBIN mg/dL 0.53   ALK PHOS U/L 29*   ALT U/L 148*   AST U/L 68*   GLUCOSE RANDOM mg/dL 123     Results from last 7 days   Lab Units 10/05/24  0008   INR  1.16     Results from last 7 days   Lab Units 10/09/24  1102 10/09/24  0734 10/08/24  2107 10/08/24  1626 10/08/24  1059 10/08/24  0657 10/07/24  2117 10/07/24  1607 10/07/24  1106 10/07/24  0725 10/06/24  2109 10/06/24  1643   POC GLUCOSE mg/dl 128 115 122 139 128 144* 114 122 139 116 116 159*     Results from last 7 days   Lab Units 10/06/24  0515   HEMOGLOBIN A1C % 6.0*     Results from last 7 days   Lab Units 10/09/24  0606 10/07/24  0429 10/06/24  0153 10/04/24  2334   LACTIC ACID mmol/L  --   --  1.0 1.4   PROCALCITONIN ng/ml 0.19 0.49* 0.89* 0.38*       Recent Cultures (last 7 days):   Results from last 7 days   Lab Units 10/06/24  1504 10/05/24  0050 10/05/24  0008 10/04/24  1236   BLOOD CULTURE  No Growth at 48 hrs.  No Growth at 48 hrs.  --  Serratia marcescens*  Serratia marcescens*  --    GRAM STAIN RESULT   --   --  Gram variable rods*  Gram variable rods*  --    URINE CULTURE   --  >100,000 cfu/ml Citrobacter koseri*  --  80,000-89,000 cfu/ml Citrobacter koseri*  10,000-19,000 cfu/ml       Imaging Results Review: I reviewed radiology reports from this admission including: Ultrasound(s). Scrotum/groin      Last 24 Hours Medication List:     Current Facility-Administered Medications:     aspirin chewable tablet 81 mg, Daily    atorvastatin (LIPITOR) tablet 10 mg, Daily With Dinner    cefTRIAXone (ROCEPHIN) IVPB (premix in dextrose) 2,000 mg 50 mL, Q24H, Last Rate: Stopped (10/08/24 6747)    Empagliflozin (JARDIANCE) tablet 20 mg, Daily    fondaparinux (ARIXTRA) subcutaneous injection 5 mg, Q24H    gabapentin (NEURONTIN) capsule 100 mg, BID    hydrocortisone 2.5 % cream 1 Application, TID    insulin lispro (HumALOG/ADMELOG) 100 units/mL subcutaneous  injection 1-5 Units, HS    insulin lispro (HumALOG/ADMELOG) 100 units/mL subcutaneous injection 2-12 Units, TID AC **AND** Fingerstick Glucose (POCT), TID AC    metoprolol succinate (TOPROL-XL) 24 hr tablet 25 mg, Daily    metoprolol succinate (TOPROL-XL) 24 hr tablet 50 mg, Daily    multivitamin stress formula tablet 1 tablet, Daily    ondansetron (ZOFRAN) injection 4 mg, Q6H PRN    oxyCODONE (ROXICODONE) split tablet 2.5 mg, Q6H PRN **OR** oxyCODONE (ROXICODONE) IR tablet 5 mg, Q6H PRN    pantoprazole (PROTONIX) EC tablet 40 mg, Early Morning    potassium chloride (Klor-Con M20) CR tablet 20 mEq, Daily    tamsulosin (FLOMAX) capsule 0.4 mg, Daily With Dinner    Administrative Statements   Today, Patient Was Seen By: Cristel Emerson MD      **Please Note: This note may have been constructed using a voice recognition system.**

## 2024-10-09 NOTE — CONSULTS
Consultation - Urology  Christy Haro 69 y.o. male MRN: [id number]  Unit/Bed#: 417-01 Encounter: [id number]    ASSESSMENT/PLAN:  Epididymitis  -Pt with scrotal swelling and pain worsening over the last few days, R>L  -US finding: Right epididymis is enlarged and heterogeneous.. In the tail a rounded area centrally measures approximately 1.5 cm which is hypoechoic and avascular suspicious for developing abscess.   -AFVSS, no leukocytosis  -Urology PA reviewed imaging, d/w SLIM that it appears more like phelgmon than abscess. On exam, no fluctuant area. No role for any drainage at this time.   -Continue NSAIDs if Cr stable  -Scrotal elevation  -Ice prn   -medical management with abx per primary    Cystitis  -on Jardiance, SGLT2 inhibitor, UTI = possible side effect ?  -10/5 UA  WBC, bacteria   -10/5 Ucx Citrobacter koseri Abnormal   -10/5 CT showed thickened bladder  -IV abx     Hx urinary retention  -Recent ED visit for UR 2/2 shingles vs BPH? 1st episode   -S/p enamorado catheter placement, removal after voiding trial  -Continue frequent PVRs to ensure not retaining, UR protocol, monitor UOP  -Continue daily flomax   -OP FU with urology for continued monitoring/management     Bacteremia  -10/5 blood cx 2/2 + Serratia marcescens   -IV abx per primary    LIZ superimposed on CKD3--resolved  -Cr 1.89 POA, now at baseline, 1.12  -Trend Cr    DM2  -risk factor for infection  -A1C 6  -management per primary    D/w [facility] u[facility] PAINGRID Mcdonnell is the urologist on call if needed    SUBJECTIVE:  Chief Complaint: right sided scrotal swelling/pain    HPI: Christy Haro is a 69 y.o. year old male with PMHx significant for hypertension, CHF, A-fib, AYLEEN, GERD, T2DM, CKD stage III, dyslipidemia, lymphedema  who presented back to the ED due to + blood culture results. Pt reports his recent hospitalization and information also gathered from chart review. Hx of ER visit on 9/19/2024 for complaints of difficulty with urination.  Found to be retaining greater than 700 cc urine, enamorado catheter was placed.No prior history of urinary retention. At that time he was recently diagnosed with shingles 1 day prior to L5/S1 dermatome and was started on Valtrex. AUR was thought to be 2/2 the shingles, however BPH can not be ruled out.     Review of Systems:  Review of Systems   Constitutional:  Positive for fever.   HENT: Negative.     Eyes: Negative.    Respiratory: Negative.     Cardiovascular: Negative.    Gastrointestinal: Negative.    Genitourinary:  Positive for dysuria.   Musculoskeletal: Negative.    Neurological: Negative.    Hematological: Negative.    Psychiatric/Behavioral: Negative.         Previous History:  Historical Information   Past Medical History:   Diagnosis Date    Arthritis     last assessed 7/1/15    Bronchitis 02/02/2023    Chronic diastolic (congestive) heart failure (HCC)     Diabetes mellitus (HCC)     type 2-last assessed 1/28/15    Diabetes mellitus (HCC) 03/16/2018    Dyslipidemia     GERD (gastroesophageal reflux disease)     Hypertension     Hypertensive urgency 03/16/2023    Irregular heart beat     last assessed 7/1/15    Obstructive sleep apnea     Palpitations     last assessed 9/7/17    Sexual dysfunction     last assessed 7/1/15    Thyroid disease     last assessed 7/1/15     Past Surgical History:   Procedure Laterality Date    COLONOSCOPY      TONSILLECTOMY       Social History   Social History     Substance and Sexual Activity   Alcohol Use Never     Social History     Substance and Sexual Activity   Drug Use Never     Social History     Tobacco Use   Smoking Status Former    Current packs/day: 1.00    Types: Cigarettes    Passive exposure: Never   Smokeless Tobacco Never   Tobacco Comments    former smoker-quit 27 yrs ago 1pppd x 30 yrs as per [employer]     Family History:   Family History   Problem Relation Age of Onset    Hypertension Mother         essential    Stroke Mother     Hypertension Father          essential    Heart defect Father         cardiac disorder    Stroke Brother     Hypertension Brother        Meds/Allergies   Home meds:   Prior to Admission medications    Medication Sig Start Date End Date Taking? Authorizing Provider   amLODIPine (NORVASC) 10 mg tablet Take 1 tablet (10 mg total) by mouth daily 5/6/24  Yes Alma Delia Fountain DO   aspirin 81 mg chewable tablet Chew 81 mg daily   Yes Historical Provider, MD   atorvastatin (LIPITOR) 10 mg tablet Take 1 tablet by mouth once daily 8/4/24  Yes Autumn Larios PA-C   cefpodoxime (VANTIN) 200 mg tablet Take 1 tablet (200 mg total) by mouth 2 (two) times a day for 10 days 10/5/24 10/15/24 Yes Will Mckinley MD   Empagliflozin 25 MG TABS Take 1 tablet (25 mg total) by mouth daily 8/26/24 2/22/25 Yes Alma Delia Fountain DO   enalapril (VASOTEC) 10 mg tablet Take 1 tablet (10 mg total) by mouth 4 (four) times a day 2 tabs in the am, 1 tab in the afternoon, 1 tab @ hs. 5/6/24  Yes Alma Delia Fountain DO   furosemide (LASIX) 40 mg tablet Take 1.5 tablets (60 mg total) by mouth daily 8/2/24 7/28/25 Yes Tri Schroeder MD   gabapentin (NEURONTIN) 100 mg capsule Take 1 capsule (100 mg total) by mouth 2 (two) times a day 10/3/24  Yes Alma Delia Fountain DO   metoprolol succinate (TOPROL-XL) 25 mg 24 hr tablet Take 1 tablet (25 mg total) by mouth daily 5/6/24  Yes Alma Delia Fountain DO   metoprolol succinate (TOPROL-XL) 50 mg 24 hr tablet Take 1 tablet (50 mg total) by mouth daily 5/6/24  Yes Alma Delia Fountain DO   multivitamin (THERAGRAN) TABS Take 1 tablet by mouth daily   Yes Historical Provider, MD   omeprazole (PriLOSEC) 40 MG capsule Take 1 capsule (40 mg total) by mouth daily 5/6/24  Yes Alma Delia Fountain DO   potassium chloride (Klor-Con M20) 20 mEq tablet Take 1 tablet (20 mEq total) by mouth daily 5/9/24  Yes Alma Delia Fountain, DO   Red Yeast Rice Extract (RED YEAST RICE PO) Take by mouth   Yes Historical Provider, MD   tamsulosin (FLOMAX) 0.4 mg  Take 1 capsule (0.4 mg total) by mouth daily with dinner 10/2/24  Yes ANDREEA Kelly   Vitamin D, Cholecalciferol, 50 MCG (2000 UT) CAPS Take 50 mcg by mouth daily 1/4/23  Yes Alma Delia Fountain DO   flecainide (TAMBOCOR) 150 MG tablet Take 1 tablet (150 mg total) by mouth see administration instructions For palpitations lasting longer than 5 minutes, take one 150 mg tablet.  If palpitations last longer than 30 minutes, take another 150 mg tablet.  If palpitations persist, please go to the nearest emergency department.    Do not exceed 300 mg in one day.  Patient not taking: Reported on 7/8/2024 2/8/24   Alma Delia Fountain DO   glucose blood (OneTouch Verio) test strip Use 1 each daily 3/11/24   Alma Delia Fountain DO   hydrALAZINE (APRESOLINE) 25 mg tablet Take 1 tablet (25 mg total) by mouth 3 (three) times a day 5/6/24   Alma Delia Fountain DO   indomethacin (INDOCIN) 50 mg capsule Take 1 capsule (50 mg total) by mouth daily as needed for mild pain PRN with the gout 2/8/24 10/2/24  Alma Delia Fountain DO   ketoconazole (NIZORAL) 2 % shampoo Apply 1 Application topically 2 (two) times a week 2/8/24   Alma Delia Fountain DO   Lancets (onetouch ultrasoft) lancets Check glucose daily 2/19/21   Theodora Taveras DO   phenazopyridine (PYRIDIUM) 200 mg tablet Take 1 tablet (200 mg total) by mouth 3 (three) times a day  Patient not taking: Reported on 10/6/2024 9/24/24   Alma Delia Fountain DO   semaglutide, 0.25 or 0.5 mg/dose, (Ozempic, 0.25 or 0.5 MG/DOSE,) 2 mg/3 mL injection pen E11.65 inject 0.25 mg weekly for 4-weeks, then 0.5 mg weekly thereafter  Patient not taking: Reported on 10/6/2024 9/10/24   Marshall Francisco, DO   triamcinolone (KENALOG) 0.1 % lotion Apply topically 2 (two) times a day 4/6/23   Alma Delia Fountain DO   triamcinolone (KENALOG) 0.5 % cream  9/15/21   Historical Provider, MD   valACYclovir (VALTREX) 1,000 mg tablet Take 1 tablet (1,000 mg total) by mouth 3 (three) times a day for 7 days 9/18/24 10/2/24   "Padmaja Joseph,      Allergies:   Allergies   Allergen Reactions    Acetaminophen Other (See Comments)     Other reaction(s): Unknown Reaction  \"I had to go to the hospital and get shots after taking it 20yrs ago\"           OBJECTIVE:   Vitals: Blood pressure 131/64, pulse 58, temperature 98.5 °F (36.9 °C), resp. rate 20, height 6' (1.829 m), weight (!) 137 kg (302 lb 4 oz), SpO2 94%. Body mass index is 40.99 kg/m².    I/Os:  I/O         10/07 0701  10/08 0700 10/08 0701  10/09 0700 10/09 0701  10/10 0700    P.O. 960 1140 580    I.V. (mL/kg)       IV Piggyback       Total Intake(mL/kg) 960 (7) 1140 (8.3) 580 (4.2)    Urine (mL/kg/hr) 500 (0.2) 360 (0.1)     Stool  0     Total Output 500 360     Net +460 +780 +580           Unmeasured Urine Occurrence 1 x 1 x     Unmeasured Stool Occurrence  0 x             Lines/Tubes:  Invasive Devices       Peripheral Intravenous Line  Duration             Peripheral IV 10/06/24 Distal;Right;Upper;Ventral (anterior) Arm 3 days                    Current Inpatient Medications:  Scheduled Meds:  Current Facility-Administered Medications   Medication Dose Route Frequency Provider Last Rate    aspirin  81 mg Oral Daily Nathanael Dang PA-C      atorvastatin  10 mg Oral Daily With Dinner Nathanael Dang PA-C      cefTRIAXone  2,000 mg Intravenous Q24H Ravinder Wayne MD Stopped (10/08/24 0554)    Empagliflozin  20 mg Oral Daily Cristel Emerson MD      fondaparinux  5 mg Subcutaneous Q24H Cristel Emerson MD      gabapentin  100 mg Oral BID Nathanael Dang PA-C      hydrocortisone  1 Application Topical TID Cristel Emerson MD      insulin lispro  1-5 Units Subcutaneous HS Nathanael Dang PA-C      insulin lispro  2-12 Units Subcutaneous TID AC Nathanael Dang PA-C      metoprolol succinate  25 mg Oral Daily Nathanael Dang PA-C      metoprolol succinate  50 mg Oral Daily Nathanael Dang PA-C      multivitamin stress formula  1 tablet Oral Daily Nathanael Dang PA-C      ondansetron  4 mg " Intravenous Q6H PRN Nathanael Dang PA-C      oxyCODONE  2.5 mg Oral Q6H PRN Cristel Eemrson MD      Or    oxyCODONE  5 mg Oral Q6H PRN Cristel Emerson MD      pantoprazole  40 mg Oral Early Morning Nathanael Dang PA-C      potassium chloride  20 mEq Oral Daily Nathanael Dang PA-C      tamsulosin  0.4 mg Oral Daily With Dinner Nathanael Dang PA-C       Continuous Infusions:   PRN Meds:    ondansetron    oxyCODONE **OR** oxyCODONE    Diagnostics:  CBC:   Results from last 7 days   Lab Units 10/09/24  0606 10/08/24  0435 10/07/24  0429 10/06/24  1505 10/06/24  0515 10/06/24  0153   WBC Thousand/uL 5.43 4.17* 4.45  --  6.93 8.19   HEMOGLOBIN g/dL 10.9* 10.6* 11.0*  --  10.3* 11.3*   HEMATOCRIT % 34.8* 32.6* 34.8*  --  31.5* 35.1*   PLATELETS Thousands/uL 152 125* 127* 129* 121* 139*     BMP/CMP:  Results from last 7 days   Lab Units 10/09/24  0606 10/08/24  0435 10/07/24  0429 10/06/24  0515 10/06/24  0153   POTASSIUM mmol/L 3.9 3.7 3.8 4.1 4.0   CHLORIDE mmol/L 107 107 105 104 103   CO2 mmol/L 23 23 21 21 21   BUN mg/dL 18 19 26* 42* 42*   CREATININE mg/dL 1.12 1.08 1.30 1.68* 1.80*   CALCIUM mg/dL 8.6 8.2* 8.1* 8.2* 8.4     Coags:   Results from last 7 days   Lab Units 10/05/24  0008   INR  1.16   PTT seconds 30     Lipid panel:     HgbA1c:   Lab Results   Component Value Date    HGBA1C 6.0 (H) 10/06/2024    HGBA1C 6.2 (H) 07/01/2024    HGBA1C 6.0 (H) 04/07/2024    HGBA1C 6.3 (H) 11/08/2023    HGBA1C 6.2 (H) 07/31/2023    HGBA1C 6.0 (H) 04/26/2023    HGBA1C 6.1 (H) 01/27/2023    HGBA1C 6.1 (H) 10/27/2022    HGBA1C 6.1 (H) 07/18/2022    HGBA1C 6.2 (H) 04/13/2022     Urinalysis:   Lab Results   Component Value Date    COLORU Dark Yellow 10/05/2024    CLARITYU Slightly Cloudy 10/05/2024    SPECGRAV 1.015 10/05/2024    PHUR 5.5 10/05/2024    PHUR 7.0 05/07/2018    LEUKOCYTESUR Large (A) 10/05/2024    NITRITE Negative 10/05/2024    GLUCOSEU 1000 (1%) (A) 10/05/2024    KETONESU Negative 10/05/2024    BILIRUBINUR Negative  "10/05/2024    BLOODU Small (A) 10/05/2024   ,   Urine Culture:   Lab Results   Component Value Date    URINECX >100,000 cfu/ml Citrobacter koseri (A) 10/05/2024     Wound Culure: No results found for: \"WOUNDCULT\"  Blood Culture:   Lab Results   Component Value Date    BLOODCX No Growth at 48 hrs. 10/06/2024    BLOODCX No Growth at 48 hrs. 10/06/2024       Imaging Studies:   US  Result Date: 10/9/2024  Impression: Right-sided epididymitis and 1.5 cm hypoechoic focus in the tail of the epididymis suspicious for developing abscess. The study was marked in EPIC for immediate notification. Workstation performed: [id number]        PHYSICAL EXAM:  GA: Pt is in NAD, appears nontoxic  Head: Normocephalic, atraumatic  Neck: Supple, midline  Nose: No visible nasal discharge  Eyes: EOMI  Ears: Gross hearing intact  Cardio/Vas: RR  Pulm: normal lung effort on RA  Abd: ND, soft, NT, no suprapubic distention/tenderness  :  chaperone present (SLIM provider), erythema warmth edema of the bilateral scrotum R>L, negative Prehn sign, sig TTP to R scrotum, no fluctuant areas, no necrosis, no crepitus.  Extr: No LE edema, no calf tenderness  Skin: Warm, dry. No cyanosis, pallor, or rubor  MSK: No gross motor deficits, able to move extremities freely, spine and low back non-tender to palpation.   Neuro: Speech clear, no gross neurological deficits   Psych: Appropriate mood and affect        Laura Song PA-C  10/9/2024   1:44 PM    "

## 2024-10-09 NOTE — PROGRESS NOTES
Progress Note - Infectious Disease   Name: Christy Haro 69 y.o. male I MRN: [id number]  Unit/Bed#: 417-01 I Date of Admission: 10/6/2024   Date of Service: 10/9/2024 I Hospital Day: 3       REQUIRED DOCUMENTATION:      1. This service was provided via Telemedicine.  2. Provider located at [facility].  3. TeleMed provider: Rossy Pandey  4. Identify all parties in room with patient during tele consult: Patient  5. After connecting through [employer], patient was identified by name and date of birth and assistant checked wristband.  Patient was then informed that this was a Telemedicine visit and that the exam was being conducted confidentially over secure lines. My office door was closed. No one else was in the room.  Patient acknowledged consent and understanding of privacy and security of the Telemedicine visit, and gave us permission to have the assistant stay in the room in order to assist with the history and to conduct the exam.  I informed the patient that I have reviewed their record in Epic and presented the opportunity for them to ask any questions regarding the visit today.  The patient agreed to participate.      Assessment & Plan  Bacteremia  2 out of 2 blood cultures resulted positive for Serratia marcescens. Most likely urinary source given recent urinary symptoms, enamorado placement although urine culture predominantly isolated Citrobacter koseri. Consider multiple organisms in urine. CT A/P concerning for cystitis without other evidence of acute intra-abdominal infection. Now scrotal ultrasound is concerning for right-sided epididymitis with developing abscess in tail of epididymis. Repeat blood cultures are negative. Fever curve appears to be down-trending. Leukocytosis down-trended. On IV ceftriaxone, which patient seems to be tolerating.  Antibiotic plan as below.  Epididymitis with abscess  Scrotal ultrasound now shows right-sided epididymitis with 1.5 cm developing abscess in tail of  epididymis.  Case was discussed with urology who recommends conservative management with antibiotic as collection is too small to drain.   Continue IV ceftriaxone for now.   Anticipate transition to oral Bactrim DS twice daily.   Recommend repeat scrotal ultrasound in about 2 weeks as outpatient to ensure resolution of developing abscess.   Recommend outpatient urology follow-up for reevaluation to ensure resolution of symptoms.   Antibiotic can be discontinued once symptoms and abscess resolve.  Acute cystitis without hematuria  Presented to the ED on 10/4 with complaint of fever, weakness, and dysuria. Attributed the dysuria at the time to recent enamorado placement on 9/19/24 in setting of urinary retention. Catheter was removed on 10/2 by urology. UA obtained in the ED had innumerable WBC, large LE, and blood. Ucx resulted positive for Citrobacter koseri, as well as two other unidentified contaminants. CT A/P obtained demonstrated bladder wall thickening which could be associated with cystitis. Course now further complicated by bacteremia as above.    Antibiotic plan as above.  Close outpatient urology follow-up.  Type 2 diabetes mellitus with obesity  (HCC)  Lab Results   Component Value Date    HGBA1C 6.0 (H) 10/06/2024     Recent Labs     10/08/24  1626 10/08/24  2107 10/09/24  0734 10/09/24  1102   POCGLU 139 122 115 128     Blood Sugar Average: Last 72 hrs:  (P) 128.4123473183274387  Well controlled with A1c of 6.0. Remains a risk factor for development of infection. Continue with tight glycemic control.   Skin lesion  Reported lesion on back of left thigh without overt clinical signs of infection. Serial exams.  Inguinal hernia of left side without obstruction or gangrene  Without radiographic or clinical signs of obstruction. Outpatient surgery followup.        Antibiotics:  Ceftriaxone D4    I discussed the above plan with Dr. Emerson who agrees with eventual transition to oral antibiotic on  discharge..        Subjective   Patient has no fever, chills, sweats; no nausea, vomiting, diarrhea; no cough, shortness of breath; no pain. No new symptoms.  Overall feeling better.    Objective :  Temp:  [98 °F (36.7 °C)-98.8 °F (37.1 °C)] 98.5 °F (36.9 °C)  HR:  [53-66] 58  BP: (114-137)/(52-65) 131/64  Resp:  [18-22] 20  SpO2:  [92 %-96 %] 94 %  O2 Device: None (Room air)    General:  Well-nourished, well-developed, in no acute distress  Eyes:  Conjunctive clear with no hemorrhages or effusions  Oropharynx: No visible lesion  Neck: Trachea midline  Lungs: Nonlabored respirations  Abdomen:  Soft, non-tender, non-distented  Extremities:  No visible edema  Skin:  No visible rashes or draining wounds  Neurological:  Moves all four extremities spontaneously  Psych normal mood and affect      Lab Results: I have reviewed the following results:  Results from last 7 days   Lab Units 10/09/24  0606 10/08/24  0435 10/07/24  0429   WBC Thousand/uL 5.43 4.17* 4.45   HEMOGLOBIN g/dL 10.9* 10.6* 11.0*   PLATELETS Thousands/uL 152 125* 127*     Results from last 7 days   Lab Units 10/09/24  0606 10/08/24  0435 10/07/24  0429 10/06/24  0515 10/06/24  0153   SODIUM mmol/L 139 137 135 136 134*   POTASSIUM mmol/L 3.9 3.7 3.8 4.1 4.0   CHLORIDE mmol/L 107 107 105 104 103   CO2 mmol/L 23 23 21 21 21   BUN mg/dL 18 19 26* 42* 42*   CREATININE mg/dL 1.12 1.08 1.30 1.68* 1.80*   EGFR ml/min/1.73sq m 65 68 54 40 37   CALCIUM mg/dL 8.6 8.2* 8.1* 8.2* 8.4   AST U/L 68*  --   --  55* 43*   ALT U/L 148*  --   --  52 40   ALK PHOS U/L 29*  --   --  26* 27*   ALBUMIN g/dL 3.3* 3.3*  --  3.3* 3.6     Results from last 7 days   Lab Units 10/06/24  1504 10/05/24  0050 10/05/24  0008 10/04/24  1236   BLOOD CULTURE  No Growth at 48 hrs.  No Growth at 48 hrs.  --  Serratia marcescens*  Serratia marcescens*  --    GRAM STAIN RESULT   --   --  Gram variable rods*  Gram variable rods*  --    URINE CULTURE   --  >100,000 cfu/ml Citrobacter koseri*   --  80,000-89,000 cfu/ml Citrobacter koseri*  10,000-19,000 cfu/ml     Results from last 7 days   Lab Units 10/09/24  0606 10/07/24  0429 10/06/24  0153 10/04/24  2334   PROCALCITONIN ng/ml 0.19 0.49* 0.89* 0.38*                 Imaging Results Review: I reviewed radiology reports from this admission including: Ultrasound(s).  Scrotal ultrasound with right-sided epididymitis and 1.5 cm hypoechoic focus in tail of epididymis suspicious for developing abscess.  Other Study Results Review: No additional pertinent studies reviewed.

## 2024-10-09 NOTE — PLAN OF CARE
Problem: PHYSICAL THERAPY ADULT  Goal: Performs mobility at highest level of function for planned discharge setting.  See evaluation for individualized goals.  Description: Treatment/Interventions: Functional transfer training, Elevations, Therapeutic exercise, LE strengthening/ROM, Endurance training, Bed mobility, Gait training          See flowsheet documentation for full assessment, interventions and recommendations.  Outcome: Progressing  Note: Prognosis: Good  Problem List:  (Decreased strength; Decreased endurance; Impaired balance; Decreased mobility; Obesity)  Assessment: Pt. seen for PT treatment session this date with interventions consisting of   bed mobility, transfers and  gait training w/ emphasis on improving pt's ability to ambulate.In comparison to previous session, Pt. With improvements in activity tolerance.   Pt is in need of continued activity in PT to improve strength balance endurance mobility transfers and ambulation with return to maximize LOF. From PT/mobility standpoint, recommendation at time of d/c would be level III: min resource intensity in order to promote return to PLOF and independence.   The patient's AM-PAC Basic Mobility Inpatient Short Form Raw Score is 21. A Raw score of greater than 16 suggests the patient may benefit from discharge to home.  Please also refer to physical therapy recommendation for safe DC planning.        Rehab Resource Intensity Level, PT: III (Minimum Resource Intensity)    See flowsheet documentation for full assessment.

## 2024-10-09 NOTE — ASSESSMENT & PLAN NOTE
Lab Results   Component Value Date    HGBA1C 6.0 (H) 10/06/2024     Recent Labs     10/08/24  1626 10/08/24  2107 10/09/24  0734 10/09/24  1102   POCGLU 139 122 115 128     Blood Sugar Average: Last 72 hrs:  (P) [id number]  Well controlled with A1c of 6.0. Remains a risk factor for development of infection. Continue with tight glycemic control.

## 2024-10-09 NOTE — PLAN OF CARE
Problem: PAIN - ADULT  Goal: Verbalizes/displays adequate comfort level or baseline comfort level  Description: Interventions:  - Encourage patient to monitor pain and request assistance  - Assess pain using appropriate pain scale  - Administer analgesics based on type and severity of pain and evaluate response  - Implement non-pharmacological measures as appropriate and evaluate response  - Consider cultural and social influences on pain and pain management  - Notify physician/advanced practitioner if interventions unsuccessful or patient reports new pain  Outcome: Progressing     Problem: INFECTION - ADULT  Goal: Absence or prevention of progression during hospitalization  Description: INTERVENTIONS:  - Assess and monitor for signs and symptoms of infection  - Monitor lab/diagnostic results  - Monitor all insertion sites, i.e. indwelling lines, tubes, and drains  - Monitor endotracheal if appropriate and nasal secretions for changes in amount and color  - [place] appropriate cooling/warming therapies per order  - Administer medications as ordered  - Instruct and encourage patient and family to use good hand hygiene technique  - Identify and instruct in appropriate isolation precautions for identified infection/condition  Outcome: Progressing     Problem: SAFETY ADULT  Goal: Patient will remain free of falls  Description: INTERVENTIONS:  - Educate patient/family on patient safety including physical limitations  - Instruct patient to call for assistance with activity   - Consult OT/PT to assist with strengthening/mobility   - Keep Call bell within reach  - Keep bed low and locked with side rails adjusted as appropriate  - Keep care items and personal belongings within reach  - Initiate and maintain comfort rounds  - Make Fall Risk Sign visible to staff  - Offer Toileting every 2 Hours, in advance of need  - Initiate/Maintain bed/chair alarm  - Obtain necessary fall risk management equipment: alarms, nonskid  footwear, needs in reach  - Apply yellow socks and bracelet for high fall risk patients  - Consider moving patient to room near nurses station  Outcome: Progressing     Problem: DISCHARGE PLANNING  Goal: Discharge to home or other facility with appropriate resources  Description: INTERVENTIONS:  - Identify barriers to discharge w/patient and caregiver  - Arrange for needed discharge resources and transportation as appropriate  - Identify discharge learning needs (meds, wound care, etc.)  - Arrange for interpretive services to assist at discharge as needed  - Refer to Case Management Department for coordinating discharge planning if the patient needs post-hospital services based on physician/advanced practitioner order or complex needs related to functional status, cognitive ability, or social support system  Outcome: Progressing     Problem: Knowledge Deficit  Goal: Patient/family/caregiver demonstrates understanding of disease process, treatment plan, medications, and discharge instructions  Description: Complete learning assessment and assess knowledge base.  Interventions:  - Provide teaching at level of understanding  - Provide teaching via preferred learning methods  Outcome: Progressing

## 2024-10-09 NOTE — PHYSICAL THERAPY NOTE
PHYSICAL THERAPY NOTE          Patient Name: Christy Haro  Today's Date: 10/9/2024   10/09/24 1330   PT Last Visit   PT Visit Date 10/09/24   Note Type   Note Type Treatment   Pain Assessment   Pain Assessment Tool FLACC   Pain Location/Orientation Other (Comment)  (testicle. Increased with transitional movement)   Restrictions/Precautions   Weight Bearing Precautions Per Order No   Other Precautions   (Bed Alarm; Multiple lines; Fall Risk; Pain)   General   Chart Reviewed Yes   Family/Caregiver Present No   Cognition   Overall Cognitive Status WFL   Arousal/Participation Alert;Cooperative   Attention Within functional limits   Orientation Level Oriented X4   Memory Within functional limits   Following Commands Follows all commands and directions without difficulty   Subjective   Subjective c/o pain with transitional movement OOB.   Bed Mobility   Supine to Sit 5  Supervision   Additional items HOB elevated;Increased time required;Verbal cues   Additional Comments Sat EOB ~ 3 min  with Good sitting balance   Transfers   Sit to Stand 5  Supervision   Additional items Increased time required;Verbal cues   Stand to Sit 5  Supervision   Additional items Increased time required;Verbal cues   Additional Comments Bariatric RW used   Ambulation/Elevation   Gait pattern   (Excessively slow; Short stride; Foward flexed; Decreased foot clearance; Wide ZARA)   Gait Assistance 5  Supervision   Additional items Verbal cues   Assistive Device Bariatric Rolling walker   Distance 150'   Balance   Static Sitting Good   Dynamic Sitting Good   Static Standing Fair +   Dynamic Standing Fair   Ambulatory Fair  (RW)   Endurance Deficit   Endurance Deficit Yes   Activity Tolerance   Activity Tolerance Patient limited by fatigue   Assessment   Prognosis Good   Problem List   (Decreased strength; Decreased endurance; Impaired balance; Decreased mobility;  Obesity)   Assessment Pt. seen for PT treatment session this date with interventions consisting of   bed mobility, transfers and  gait training w/ emphasis on improving pt's ability to ambulate.In comparison to previous session, Pt. With improvements in activity tolerance.   Pt is in need of continued activity in PT to improve strength balance endurance mobility transfers and ambulation with return to maximize LOF. From PT/mobility standpoint, recommendation at time of d/c would be level III: min resource intensity in order to promote return to PLOF and independence.   The patient's AM-PAC Basic Mobility Inpatient Short Form Raw Score is 21. A Raw score of greater than 16 suggests the patient may benefit from discharge to home.  Please also refer to physical therapy recommendation for safe DC planning.   Goals   LTG Expiration Date 10/21/24   PT Treatment Day 2   Plan   Treatment/Interventions Functional transfer training;LE strengthening/ROM;Therapeutic exercise;Endurance training;Bed mobility;Gait training;Spoke to nursing   Progress Progressing toward goals   PT Frequency 3-5x/wk   Discharge Recommendation   Rehab Resource Intensity Level, PT III (Minimum Resource Intensity)   AM-PAC Basic Mobility Inpatient   Turning in Flat Bed Without Bedrails 4   Lying on Back to Sitting on Edge of Flat Bed Without Bedrails 3   Moving Bed to Chair 4   Standing Up From Chair Using Arms 4   Walk in Room 3   Climb 3-5 Stairs With Railing 3   Basic Mobility Inpatient Raw Score 21   Basic Mobility Standardized Score 45.55   [facility] Highest Level Of Mobility   JH-HLM Goal 6: Walk 10 steps or more   JH-HLM Achieved 7: Walk 25 feet or more   Education   Education Provided Mobility training   Patient Demonstrates verbal understanding   End of Consult   Patient Position at End of Consult Bedside chair;All needs within reach   End of Consult Comments discussed POC with PT

## 2024-10-09 NOTE — ASSESSMENT & PLAN NOTE
Lab Results   Component Value Date    HGBA1C 6.0 (H) 10/06/2024     Controlled with hemoglobin A1c of 6  Resumed Jardiance  Monitor blood glucose, insulin sliding scale  Tabetic diet

## 2024-10-09 NOTE — ASSESSMENT & PLAN NOTE
"Patient c/o right testicular pain and swelling  US scrotum/groin:  \"Right-sided epididymitis and 1.5 cm hypoechoic focus in the tail of the epididymis suspicious for developing abscess.\"  Discussed with Urology -due to small size of phlegmon, no indication for procedure, proceed with IV antibiotics  "

## 2024-10-09 NOTE — ASSESSMENT & PLAN NOTE
Patient with 2/2 positive blood cultures for serratia marcescens.     Continue with IV Ceftriaxone  Repeat blood cultures no growth at 48 hours  With evidence of epididymitis, continue IV antibiotics for now  Monitor CBC, vital signs  Appreciate ID input

## 2024-10-10 ENCOUNTER — APPOINTMENT (INPATIENT)
Dept: ULTRASOUND IMAGING | Facility: HOSPITAL | Age: 69
DRG: 872 | End: 2024-10-10
Payer: MEDICARE

## 2024-10-10 ENCOUNTER — TELEPHONE (OUTPATIENT)
Dept: OTHER | Facility: HOSPITAL | Age: 69
End: 2024-10-10

## 2024-10-10 DIAGNOSIS — I50.31 ACUTE DIASTOLIC HEART FAILURE (HCC): ICD-10-CM

## 2024-10-10 LAB
ALBUMIN SERPL BCG-MCNC: 3.2 G/DL (ref 3.5–5)
ALP SERPL-CCNC: 28 U/L (ref 34–104)
ALT SERPL W P-5'-P-CCNC: 120 U/L (ref 7–52)
ANION GAP SERPL CALCULATED.3IONS-SCNC: 7 MMOL/L (ref 4–13)
AST SERPL W P-5'-P-CCNC: 52 U/L (ref 13–39)
BASOPHILS # BLD AUTO: 0.03 THOUSANDS/ΜL (ref 0–0.1)
BASOPHILS NFR BLD AUTO: 1 % (ref 0–1)
BILIRUB SERPL-MCNC: 0.41 MG/DL (ref 0.2–1)
BUN SERPL-MCNC: 23 MG/DL (ref 5–25)
CALCIUM ALBUM COR SERPL-MCNC: 9.1 MG/DL (ref 8.3–10.1)
CALCIUM SERPL-MCNC: 8.5 MG/DL (ref 8.4–10.2)
CHLORIDE SERPL-SCNC: 110 MMOL/L (ref 96–108)
CO2 SERPL-SCNC: 25 MMOL/L (ref 21–32)
CREAT SERPL-MCNC: 1.17 MG/DL (ref 0.6–1.3)
EOSINOPHIL # BLD AUTO: 0.01 THOUSAND/ΜL (ref 0–0.61)
EOSINOPHIL NFR BLD AUTO: 0 % (ref 0–6)
ERYTHROCYTE [DISTWIDTH] IN BLOOD BY AUTOMATED COUNT: 14.2 % (ref 11.6–15.1)
GFR SERPL CREATININE-BSD FRML MDRD: 62 ML/MIN/1.73SQ M
GLUCOSE SERPL-MCNC: 110 MG/DL (ref 65–140)
GLUCOSE SERPL-MCNC: 112 MG/DL (ref 65–140)
GLUCOSE SERPL-MCNC: 126 MG/DL (ref 65–140)
GLUCOSE SERPL-MCNC: 135 MG/DL (ref 65–140)
GLUCOSE SERPL-MCNC: 136 MG/DL (ref 65–140)
HCT VFR BLD AUTO: 33.8 % (ref 36.5–49.3)
HGB BLD-MCNC: 10.7 G/DL (ref 12–17)
IMM GRANULOCYTES # BLD AUTO: 0.05 THOUSAND/UL (ref 0–0.2)
IMM GRANULOCYTES NFR BLD AUTO: 1 % (ref 0–2)
LYMPHOCYTES # BLD AUTO: 1.32 THOUSANDS/ΜL (ref 0.6–4.47)
LYMPHOCYTES NFR BLD AUTO: 22 % (ref 14–44)
MCH RBC QN AUTO: 28.7 PG (ref 26.8–34.3)
MCHC RBC AUTO-ENTMCNC: 31.7 G/DL (ref 31.4–37.4)
MCV RBC AUTO: 91 FL (ref 82–98)
MONOCYTES # BLD AUTO: 0.52 THOUSAND/ΜL (ref 0.17–1.22)
MONOCYTES NFR BLD AUTO: 9 % (ref 4–12)
NEUTROPHILS # BLD AUTO: 3.96 THOUSANDS/ΜL (ref 1.85–7.62)
NEUTS SEG NFR BLD AUTO: 67 % (ref 43–75)
NRBC BLD AUTO-RTO: 0 /100 WBCS
PLATELET # BLD AUTO: 160 THOUSANDS/UL (ref 149–390)
PMV BLD AUTO: 10.2 FL (ref 8.9–12.7)
POTASSIUM SERPL-SCNC: 3.9 MMOL/L (ref 3.5–5.3)
PROCALCITONIN SERPL-MCNC: 0.14 NG/ML
PROT SERPL-MCNC: 6.1 G/DL (ref 6.4–8.4)
RBC # BLD AUTO: 3.73 MILLION/UL (ref 3.88–5.62)
SODIUM SERPL-SCNC: 142 MMOL/L (ref 135–147)
WBC # BLD AUTO: 5.89 THOUSAND/UL (ref 4.31–10.16)

## 2024-10-10 PROCEDURE — 99233 SBSQ HOSP IP/OBS HIGH 50: CPT | Performed by: INTERNAL MEDICINE

## 2024-10-10 PROCEDURE — 80074 ACUTE HEPATITIS PANEL: CPT | Performed by: FAMILY MEDICINE

## 2024-10-10 PROCEDURE — 97110 THERAPEUTIC EXERCISES: CPT

## 2024-10-10 PROCEDURE — 82948 REAGENT STRIP/BLOOD GLUCOSE: CPT

## 2024-10-10 PROCEDURE — 99232 SBSQ HOSP IP/OBS MODERATE 35: CPT

## 2024-10-10 PROCEDURE — 94760 N-INVAS EAR/PLS OXIMETRY 1: CPT

## 2024-10-10 PROCEDURE — 94660 CPAP INITIATION&MGMT: CPT

## 2024-10-10 PROCEDURE — 84145 PROCALCITONIN (PCT): CPT | Performed by: FAMILY MEDICINE

## 2024-10-10 PROCEDURE — 99232 SBSQ HOSP IP/OBS MODERATE 35: CPT | Performed by: FAMILY MEDICINE

## 2024-10-10 PROCEDURE — 97116 GAIT TRAINING THERAPY: CPT

## 2024-10-10 PROCEDURE — 85025 COMPLETE CBC W/AUTO DIFF WBC: CPT | Performed by: FAMILY MEDICINE

## 2024-10-10 PROCEDURE — 76705 ECHO EXAM OF ABDOMEN: CPT

## 2024-10-10 PROCEDURE — 80053 COMPREHEN METABOLIC PANEL: CPT | Performed by: FAMILY MEDICINE

## 2024-10-10 PROCEDURE — NC001 PR NO CHARGE

## 2024-10-10 RX ORDER — KETOROLAC TROMETHAMINE 30 MG/ML
15 INJECTION, SOLUTION INTRAMUSCULAR; INTRAVENOUS EVERY 6 HOURS PRN
Status: ACTIVE | OUTPATIENT
Start: 2024-10-10 | End: 2024-10-11

## 2024-10-10 RX ORDER — POTASSIUM CHLORIDE 1500 MG/1
20 TABLET, EXTENDED RELEASE ORAL DAILY
Qty: 90 TABLET | Refills: 1 | Status: SHIPPED | OUTPATIENT
Start: 2024-10-10 | End: 2024-10-11

## 2024-10-10 RX ADMIN — B-COMPLEX W/ C & FOLIC ACID TAB 1 TABLET: TAB at 08:54

## 2024-10-10 RX ADMIN — HYDROCORTISONE 1 APPLICATION: 25 CREAM TOPICAL at 08:55

## 2024-10-10 RX ADMIN — METOPROLOL SUCCINATE 50 MG: 50 TABLET, EXTENDED RELEASE ORAL at 08:54

## 2024-10-10 RX ADMIN — TAMSULOSIN HYDROCHLORIDE 0.4 MG: 0.4 CAPSULE ORAL at 17:20

## 2024-10-10 RX ADMIN — EMPAGLIFLOZIN 20 MG: 10 TABLET, FILM COATED ORAL at 08:53

## 2024-10-10 RX ADMIN — CEFTRIAXONE 2000 MG: 2 INJECTION, SOLUTION INTRAVENOUS at 13:20

## 2024-10-10 RX ADMIN — FONDAPARINUX SODIUM 5 MG: 5 INJECTION SUBCUTANEOUS at 08:55

## 2024-10-10 RX ADMIN — ASPIRIN 81 MG 81 MG: 81 TABLET ORAL at 08:54

## 2024-10-10 RX ADMIN — PANTOPRAZOLE SODIUM 40 MG: 40 TABLET, DELAYED RELEASE ORAL at 06:00

## 2024-10-10 RX ADMIN — HYDROCORTISONE 1 APPLICATION: 25 CREAM TOPICAL at 21:54

## 2024-10-10 RX ADMIN — GABAPENTIN 100 MG: 100 CAPSULE ORAL at 08:53

## 2024-10-10 RX ADMIN — KETOROLAC TROMETHAMINE 15 MG: 30 INJECTION, SOLUTION INTRAMUSCULAR at 03:11

## 2024-10-10 RX ADMIN — POTASSIUM CHLORIDE 20 MEQ: 1500 TABLET, EXTENDED RELEASE ORAL at 08:54

## 2024-10-10 RX ADMIN — GABAPENTIN 100 MG: 100 CAPSULE ORAL at 17:20

## 2024-10-10 RX ADMIN — HYDROCORTISONE 1 APPLICATION: 25 CREAM TOPICAL at 17:21

## 2024-10-10 RX ADMIN — KETOROLAC TROMETHAMINE 15 MG: 30 INJECTION, SOLUTION INTRAMUSCULAR at 08:54

## 2024-10-10 RX ADMIN — METOPROLOL SUCCINATE 25 MG: 50 TABLET, EXTENDED RELEASE ORAL at 08:54

## 2024-10-10 NOTE — ASSESSMENT & PLAN NOTE
Patient with 2/2 positive blood cultures for serratia marcescens.     Continue with IV Ceftriaxone, transition to oral bactrim   Repeat blood cultures no growth to date  With evidence of epididymitis, continue IV antibiotics for now  Monitor CBC, vital signs  Appreciate ID input

## 2024-10-10 NOTE — PLAN OF CARE
Problem: PAIN - ADULT  Goal: Verbalizes/displays adequate comfort level or baseline comfort level  Description: Interventions:  - Encourage patient to monitor pain and request assistance  - Assess pain using appropriate pain scale  - Administer analgesics based on type and severity of pain and evaluate response  - Implement non-pharmacological measures as appropriate and evaluate response  - Consider cultural and social influences on pain and pain management  - Notify physician/advanced practitioner if interventions unsuccessful or patient reports new pain  Outcome: Progressing     Problem: INFECTION - ADULT  Goal: Absence or prevention of progression during hospitalization  Description: INTERVENTIONS:  - Assess and monitor for signs and symptoms of infection  - Monitor lab/diagnostic results  - Monitor all insertion sites, i.e. indwelling lines, tubes, and drains  - Monitor endotracheal if appropriate and nasal secretions for changes in amount and color  - [place] appropriate cooling/warming therapies per order  - Administer medications as ordered  - Instruct and encourage patient and family to use good hand hygiene technique  - Identify and instruct in appropriate isolation precautions for identified infection/condition  Outcome: Progressing     Problem: SAFETY ADULT  Goal: Patient will remain free of falls  Description: INTERVENTIONS:  - Educate patient/family on patient safety including physical limitations  - Instruct patient to call for assistance with activity   - Consult OT/PT to assist with strengthening/mobility   - Keep Call bell within reach  - Keep bed low and locked with side rails adjusted as appropriate  - Keep care items and personal belongings within reach  - Initiate and maintain comfort rounds  - Make Fall Risk Sign visible to staff  - Offer Toileting every 2 Hours, in advance of need  - Initiate/Maintain bed/chair alarm  - Obtain necessary fall risk management equipment: alarms, nonskid  footwear, needs in reach  - Apply yellow socks and bracelet for high fall risk patients  - Consider moving patient to room near nurses station  Outcome: Progressing     Problem: DISCHARGE PLANNING  Goal: Discharge to home or other facility with appropriate resources  Description: INTERVENTIONS:  - Identify barriers to discharge w/patient and caregiver  - Arrange for needed discharge resources and transportation as appropriate  - Identify discharge learning needs (meds, wound care, etc.)  - Arrange for interpretive services to assist at discharge as needed  - Refer to Case Management Department for coordinating discharge planning if the patient needs post-hospital services based on physician/advanced practitioner order or complex needs related to functional status, cognitive ability, or social support system  Outcome: Progressing     Problem: Knowledge Deficit  Goal: Patient/family/caregiver demonstrates understanding of disease process, treatment plan, medications, and discharge instructions  Description: Complete learning assessment and assess knowledge base.  Interventions:  - Provide teaching at level of understanding  - Provide teaching via preferred learning methods  Outcome: Progressing     Problem: METABOLIC, FLUID AND ELECTROLYTES - ADULT  Goal: Electrolytes maintained within normal limits  Description: INTERVENTIONS:  - Monitor labs and assess patient for signs and symptoms of electrolyte imbalances  - Administer electrolyte replacement as ordered  - Monitor response to electrolyte replacements, including repeat lab results as appropriate  - Instruct patient on fluid and nutrition as appropriate  Outcome: Progressing  Goal: Fluid balance maintained  Description: INTERVENTIONS:  - Monitor labs   - Monitor I/O and WT  - Instruct patient on fluid and nutrition as appropriate  - Assess for signs & symptoms of volume excess or deficit  Outcome: Progressing

## 2024-10-10 NOTE — PHYSICAL THERAPY NOTE
PHYSICAL THERAPY NOTE          Patient Name: Christy Haro  Today's Date: 10/10/2024   10/10/24 1107   PT Last Visit   PT Visit Date 10/10/24   Note Type   Note Type Treatment   Pain Assessment   Pain Assessment Tool FLACC   Pain Location/Orientation Other (Comment)  (testicle)   Restrictions/Precautions   Weight Bearing Precautions Per Order No   Other Precautions Fall Risk;Pain   General   Chart Reviewed Yes   Response to Previous Treatment Patient with no complaints from previous session.   Family/Caregiver Present No   Cognition   Overall Cognitive Status WFL   Arousal/Participation Alert;Cooperative   Attention Within functional limits   Following Commands Follows all commands and directions without difficulty   Subjective   Subjective Agreeable to therapy   Bed Mobility   Additional Comments In bathroom start session   Transfers   Stand to Sit 5  Supervision   Additional items Armrests;Increased time required   Additional Comments bariatric RW used   Ambulation/Elevation   Gait pattern   (Excessively slow; Short stride; Foward flexed; Decreased foot clearance; Wide ZARA)   Gait Assistance 5  Supervision   Additional items Verbal cues   Assistive Device Bariatric Rolling walker   Distance 300'   Balance   Static Sitting Good   Dynamic Sitting Good   Static Standing Fair +   Dynamic Standing Fair   Ambulatory Fair  (RW)   Endurance Deficit   Endurance Deficit Yes   Activity Tolerance   Activity Tolerance Patient limited by fatigue   Exercises   Hip Flexion Sitting;20 reps;AROM;Bilateral   Knee AROM Long Arc Quad Sitting;20 reps;AROM;Bilateral   Ankle Pumps Sitting;20 reps;AROM;Bilateral   Assessment   Prognosis Good   Problem List   (Decreased strength; Decreased endurance; Impaired balance; Decreased mobility; Obesity)   Assessment Pt. seen for PT treatment session this date with interventions consisting of  therapeutic exercises,  transfers and  gait training w/ emphasis on improving pt's ability to ambulate. In comparison to previous session, Pt. With improvements in activity tolerance.   Pt is in need of continued activity in PT to improve strength balance endurance mobility transfers and ambulation with return to maximize LOF. From PT/mobility standpoint, recommendation at time of d/c would be level III:min resource intensity in order to promote return to PLOF and independence.   The patient's AM-PAC Basic Mobility Inpatient Short Form Raw Score is 22. A Raw score of greater than 16 suggests the patient may benefit from discharge to home.  Please also refer to physical therapy recommendation for safe DC planning.   Goals   LTG Expiration Date 10/21/24   PT Treatment Day 3   Plan   Treatment/Interventions Functional transfer training;LE strengthening/ROM;Therapeutic exercise;Endurance training;Bed mobility;Gait training;Spoke to nursing   Progress Progressing toward goals   PT Frequency 3-5x/wk   Discharge Recommendation   Rehab Resource Intensity Level, PT III (Minimum Resource Intensity)   AM-PAC Basic Mobility Inpatient   Turning in Flat Bed Without Bedrails 4   Lying on Back to Sitting on Edge of Flat Bed Without Bedrails 4   Moving Bed to Chair 4   Standing Up From Chair Using Arms 4   Walk in Room 3   Climb 3-5 Stairs With Railing 3   Basic Mobility Inpatient Raw Score 22   Basic Mobility Standardized Score 47.4   [facility] Highest Level Of Mobility   -HLM Goal 7: Walk 25 feet or more   JH-HLM Achieved 8: Walk 250 feet ot more   Education   Education Provided Mobility training   Patient Demonstrates verbal understanding   End of Consult   Patient Position at End of Consult Bedside chair;Bed/Chair alarm activated;All needs within reach   End of Consult Comments discussed POC with PT

## 2024-10-10 NOTE — ASSESSMENT & PLAN NOTE
Lab Results   Component Value Date    HGBA1C 6.0 (H) 10/06/2024     Controlled with hemoglobin A1c of 6  Resumed Jardiance  Monitor blood glucose, insulin sliding scale  Diabetic diet

## 2024-10-10 NOTE — ASSESSMENT & PLAN NOTE
Patient was diagnosed with UTI on 10/5/24 in ED. Discharged with p.o. cefpodoxime and eventually called back due to positive blood cultures.   10/9/24: Patient complaining of right testicular pain and swelling.  Ultrasound scrotum/groin revealing Right-sided epididymitis and 1.5 cm hypoechoic focus in the tail of the epididymis suspicious for developing abscess.  No surgical interventions per urology    Continue IV ceftriaxone, with eventual transition to oral bactrim   Appreciate ID and urology input

## 2024-10-10 NOTE — ASSESSMENT & PLAN NOTE
Lab Results   Component Value Date    HGBA1C 6.0 (H) 10/06/2024     Recent Labs     10/09/24  1550 10/09/24  2031 10/10/24  0717 10/10/24  1028   POCGLU 141* 124 110 135     Blood Sugar Average: Last 72 hrs:  (P) [id number]  Well controlled with A1c of 6.0. Remains a risk factor for development of infection. Continue with tight glycemic control.

## 2024-10-10 NOTE — ASSESSMENT & PLAN NOTE
Lab Results   Component Value Date    EGFR 62 10/10/2024    EGFR 65 10/09/2024    EGFR 68 10/08/2024    CREATININE 1.17 10/10/2024    CREATININE 1.12 10/09/2024    CREATININE 1.08 10/08/2024

## 2024-10-10 NOTE — ASSESSMENT & PLAN NOTE
Ice to right scrotum, scrotal elevation.  Analgesics.  Toradol added as needed for pain supplement.  Continue present antibiotics per ID recommendations with selection and duration as noted.  Short interval follow-up after discharge with urology in the office in 1 week.  Remainder of plan as described above.

## 2024-10-10 NOTE — ASSESSMENT & PLAN NOTE
2 out of 2 blood cultures resulted positive for Serratia marcescens. Most likely urinary source given recent urinary symptoms, enamorado placement. CT A/P concerning for cystitis without other evidence of acute intra-abdominal infection. Now scrotal ultrasound is concerning for right-sided epididymitis with developing abscess in tail of epididymis. Repeat blood cultures are negative.  Fevers have improved.  WBC count has normalized.  Antibiotic plan as below.

## 2024-10-10 NOTE — PLAN OF CARE
Problem: PHYSICAL THERAPY ADULT  Goal: Performs mobility at highest level of function for planned discharge setting.  See evaluation for individualized goals.  Description: Treatment/Interventions: Functional transfer training, Elevations, Therapeutic exercise, LE strengthening/ROM, Endurance training, Bed mobility, Gait training          See flowsheet documentation for full assessment, interventions and recommendations.  Outcome: Progressing  Note: Prognosis: Good  Problem List:  (Decreased strength; Decreased endurance; Impaired balance; Decreased mobility; Obesity)  Assessment: Pt. seen for PT treatment session this date with interventions consisting of  therapeutic exercises, transfers and  gait training w/ emphasis on improving pt's ability to ambulate. In comparison to previous session, Pt. With improvements in activity tolerance.   Pt is in need of continued activity in PT to improve strength balance endurance mobility transfers and ambulation with return to maximize LOF. From PT/mobility standpoint, recommendation at time of d/c would be level III:min resource intensity in order to promote return to PLOF and independence.   The patient's AM-[facility] Basic Mobility Inpatient Short Form Raw Score is 22. A Raw score of greater than 16 suggests the patient may benefit from discharge to home.  Please also refer to physical therapy recommendation for safe DC planning.        Rehab Resource Intensity Level, PT: III (Minimum Resource Intensity)    See flowsheet documentation for full assessment.

## 2024-10-10 NOTE — ASSESSMENT & PLAN NOTE
Scrotal ultrasound now shows right-sided epididymitis with 1.5 cm developing abscess in tail of epididymis.  Case was discussed with urology who recommends conservative management with antibiotic as collection is too small to drain.  Symptoms seem to be improving on IV antibiotic.   Continue IV ceftriaxone for now.   Anticipate transition to oral Bactrim DS twice daily.   Recommend repeat scrotal ultrasound in about 2-3 weeks as outpatient to ensure resolution of developing abscess.   Recommend outpatient urology follow-up for reevaluation to ensure resolution of symptoms.   Antibiotic can be discontinued once symptoms and abscess resolve.

## 2024-10-10 NOTE — PLAN OF CARE
Problem: PAIN - ADULT  Goal: Verbalizes/displays adequate comfort level or baseline comfort level  Description: Interventions:  - Encourage patient to monitor pain and request assistance  - Assess pain using appropriate pain scale  - Administer analgesics based on type and severity of pain and evaluate response  - Implement non-pharmacological measures as appropriate and evaluate response  - Consider cultural and social influences on pain and pain management  - Notify physician/advanced practitioner if interventions unsuccessful or patient reports new pain  Outcome: Progressing     Problem: INFECTION - ADULT  Goal: Absence or prevention of progression during hospitalization  Description: INTERVENTIONS:  - Assess and monitor for signs and symptoms of infection  - Monitor lab/diagnostic results  - Monitor all insertion sites, i.e. indwelling lines, tubes, and drains  - Monitor endotracheal if appropriate and nasal secretions for changes in amount and color  - [place] appropriate cooling/warming therapies per order  - Administer medications as ordered  - Instruct and encourage patient and family to use good hand hygiene technique  - Identify and instruct in appropriate isolation precautions for identified infection/condition  Outcome: Progressing     Problem: SAFETY ADULT  Goal: Patient will remain free of falls  Description: INTERVENTIONS:  - Educate patient/family on patient safety including physical limitations  - Instruct patient to call for assistance with activity   - Consult OT/PT to assist with strengthening/mobility   - Keep Call bell within reach  - Keep bed low and locked with side rails adjusted as appropriate  - Keep care items and personal belongings within reach  - Initiate and maintain comfort rounds  - Make Fall Risk Sign visible to staff  - Offer Toileting every 2 Hours, in advance of need  - Initiate/Maintain bed/chair alarm  - Obtain necessary fall risk management equipment: alarms, nonskid  footwear, needs in reach  - Apply yellow socks and bracelet for high fall risk patients  - Consider moving patient to room near nurses station  Outcome: Progressing     Problem: DISCHARGE PLANNING  Goal: Discharge to home or other facility with appropriate resources  Description: INTERVENTIONS:  - Identify barriers to discharge w/patient and caregiver  - Arrange for needed discharge resources and transportation as appropriate  - Identify discharge learning needs (meds, wound care, etc.)  - Arrange for interpretive services to assist at discharge as needed  - Refer to Case Management Department for coordinating discharge planning if the patient needs post-hospital services based on physician/advanced practitioner order or complex needs related to functional status, cognitive ability, or social support system  Outcome: Progressing     Problem: Knowledge Deficit  Goal: Patient/family/caregiver demonstrates understanding of disease process, treatment plan, medications, and discharge instructions  Description: Complete learning assessment and assess knowledge base.  Interventions:  - Provide teaching at level of understanding  - Provide teaching via preferred learning methods  Outcome: Progressing

## 2024-10-10 NOTE — PROGRESS NOTES
Progress Note - Infectious Disease   Name: Christy Haro 69 y.o. male I MRN: [id number]  Unit/Bed#: 417-01 I Date of Admission: 10/6/2024   Date of Service: 10/10/2024 I Hospital Day: 4      REQUIRED DOCUMENTATION:      1. This service was provided via Telemedicine.  2. Provider located at [facility].  3. TeleMed provider: Rossy Pandey  4. Identify all parties in room with patient during tele consult: Patient  5. After connecting through [employer], patient was identified by name and date of birth and assistant checked wristband.  Patient was then informed that this was a Telemedicine visit and that the exam was being conducted confidentially over secure lines. My office door was closed. No one else was in the room.  Patient acknowledged consent and understanding of privacy and security of the Telemedicine visit, and gave us permission to have the assistant stay in the room in order to assist with the history and to conduct the exam.  I informed the patient that I have reviewed their record in Epic and presented the opportunity for them to ask any questions regarding the visit today.  The patient agreed to participate.      Assessment & Plan  Bacteremia  2 out of 2 blood cultures resulted positive for Serratia marcescens. Most likely urinary source given recent urinary symptoms, enamorado placement. CT A/P concerning for cystitis without other evidence of acute intra-abdominal infection. Now scrotal ultrasound is concerning for right-sided epididymitis with developing abscess in tail of epididymis. Repeat blood cultures are negative.  Fevers have improved.  WBC count has normalized.  Antibiotic plan as below.  Epididymitis with abscess  Scrotal ultrasound now shows right-sided epididymitis with 1.5 cm developing abscess in tail of epididymis.  Case was discussed with urology who recommends conservative management with antibiotic as collection is too small to drain.  Symptoms seem to be improving on IV  antibiotic.   Continue IV ceftriaxone for now.   Anticipate transition to oral Bactrim DS twice daily.   Recommend repeat scrotal ultrasound in about 2-3 weeks as outpatient to ensure resolution of developing abscess.   Recommend outpatient urology follow-up for reevaluation to ensure resolution of symptoms.   Antibiotic can be discontinued once symptoms and abscess resolve.  Acute cystitis without hematuria  Presented to the ED on 10/4 with complaint of fever, weakness, and dysuria. Attributed the dysuria at the time to recent enamorado placement on 9/19/24 in setting of urinary retention. Catheter was removed on 10/2 by urology. UA obtained in the ED had innumerable WBC, large LE, and blood. Ucx resulted positive for Citrobacter koseri, as well as two other unidentified contaminants. CT A/P obtained demonstrated bladder wall thickening which could be associated with cystitis. Course now further complicated by bacteremia as above.    Antibiotic plan as above.  Close outpatient urology follow-up.  Type 2 diabetes mellitus with obesity  (HCC)  Lab Results   Component Value Date    HGBA1C 6.0 (H) 10/06/2024     Recent Labs     10/09/24  1550 10/09/24  2031 10/10/24  0717 10/10/24  1028   POCGLU 141* 124 110 135     Blood Sugar Average: Last 72 hrs:  (P) [id number]  Well controlled with A1c of 6.0. Remains a risk factor for development of infection. Continue with tight glycemic control.   Skin lesion  Reported lesion on back of left thigh without overt clinical signs of infection. Serial exams.  Inguinal hernia of left side without obstruction or gangrene  Without radiographic or clinical signs of obstruction. Outpatient surgery followup.        Antibiotics:  Ceftriaxone D5    I discussed above plan with the patient, and with Dr. Marquez from primary service who agrees with transition to oral antibiotic on discharge.      Subjective   Patient states he is feeling better with improving pain.  No further fevers.   Tolerating oral intake.  No new focal complaints.    Objective :  Temp:  [97.7 °F (36.5 °C)-98.1 °F (36.7 °C)] 97.7 °F (36.5 °C)  HR:  [50-59] 50  BP: (111-124)/(59-62) 124/62  Resp:  [16] 16  SpO2:  [94 %-96 %] 95 %  O2 Device: None (Room air)    General:  No acute distress, nontoxic, sitting comfortably in chair  ENT atraumatic normocephalic  Neck trachea midline  Psychiatric:  Awake and alert  Pulmonary:  Normal respiratory excursion without accessory muscle use  Abdomen: No visible distention or guarding  Extremities: Symmetric lower extremity edema  Skin:  No visible diffuse rashes  Neuro moves all extremity spontaneously      Lab Results: I have reviewed the following results:  Results from last 7 days   Lab Units 10/10/24  0549 10/09/24  0606 10/08/24  0435   WBC Thousand/uL 5.89 5.43 4.17*   HEMOGLOBIN g/dL 10.7* 10.9* 10.6*   PLATELETS Thousands/uL 160 152 125*     Results from last 7 days   Lab Units 10/10/24  0549 10/09/24  0606 10/08/24  0435 10/07/24  0429 10/06/24  0515   SODIUM mmol/L 142 139 137   < > 136   POTASSIUM mmol/L 3.9 3.9 3.7   < > 4.1   CHLORIDE mmol/L 110* 107 107   < > 104   CO2 mmol/L 25 23 23   < > 21   BUN mg/dL 23 18 19   < > 42*   CREATININE mg/dL 1.17 1.12 1.08   < > 1.68*   EGFR ml/min/1.73sq m 62 65 68   < > 40   CALCIUM mg/dL 8.5 8.6 8.2*   < > 8.2*   AST U/L 52* 68*  --   --  55*   ALT U/L 120* 148*  --   --  52   ALK PHOS U/L 28* 29*  --   --  26*   ALBUMIN g/dL 3.2* 3.3* 3.3*  --  3.3*    < > = values in this interval not displayed.     Results from last 7 days   Lab Units 10/06/24  1504 10/05/24  0050 10/05/24  0008 10/04/24  1236   BLOOD CULTURE  No Growth at 72 hrs.  No Growth at 72 hrs.  --  Serratia marcescens*  Serratia marcescens*  --    GRAM STAIN RESULT   --   --  Gram variable rods*  Gram variable rods*  --    URINE CULTURE   --  >100,000 cfu/ml Citrobacter koseri*  30,000-39,000 cfu/ml Serratia marcescens*  --  80,000-89,000 cfu/ml Citrobacter koseri*   10,000-19,000 cfu/ml     Results from last 7 days   Lab Units 10/10/24  0549 10/09/24  0606 10/07/24  0429 10/06/24  0153 10/04/24  2334   PROCALCITONIN ng/ml 0.14 0.19 0.49* 0.89* 0.38*

## 2024-10-10 NOTE — ASSESSMENT & PLAN NOTE
2/2 blood cultures from 10/5/2024 + Serratia marcescens  Sepsis secondary to suspected UTI and Moreau placement.  CT abdomen pelvis on admission concerning for cystitis  Scrotal ultrasound showed right epididymitis with possible developing abscess and tail of the epididymis.  Repeat blood cultures negative.  Vital signs stable, remained afebrile now.  Leukocytosis resolved.  WBC today 5.89, platelets 160.  Creatinine remains normal 1.17 (1.12)  Transaminitis AST 52, , alk phosphatase 28.  Total bilirubin 0.41.  Right upper quadrant abdominal ultrasound and hepatitis profile pending.    UOP yesterday recorded at 400 mL with 2 additional unwitnessed recorded urine output.  Need to check with nursing regarding PVRs every shift.    Urology attestation by Dr. Ketan Mcdonnell yesterday recommended careful observation of scrotum.  Consider repeat imaging if not improving to determine if abscesses develop which could warrant need for urological surgical drainage.  Continue to check PVRs, low threshold to place urinary catheter if he is not draining well.    Continue present IV ceftriaxone  Continue daily tamsulosin  Nursing to continue to monitor PVRs every shift, record CITLALY's, urine output.  Analgesics and antiemetics as ordered  Will add round-the-clock IV Toradol for another 24 to 48 hours to supplement pain control  Ice bag to right scrotum as needed patient comfort  Attending hospitalist had ordered right upper quadrant abdominal ultrasound for today, elevated LFTs noted.  Hepatitis profile ordered.  ID consult recommended transition to oral Bactrim DS p.o. twice daily, recommend discontinuation of antibiotics once symptoms and abscess resolved.  Repeat outpatient scrotal ultrasound in 2 or 3 weeks to ensure resolution and developing abscess  Outpatient follow-up with urology in about a week to ensure resolution of symptoms.  Remainder medical management including glycemic control per the direction of the primary  attending hospitalist service.  He is on Arixtra for DVT prophylaxis.  Can follow-up with general surgery as an outpatient with regard to left inguinal hernia, nonurgent.  Anticipate hospital discharge in the next 24 to 48 hours per the direction of primary attending service.

## 2024-10-10 NOTE — TELEPHONE ENCOUNTER
Patient is currently admitted at this time. Per ID consult: Recommend repeat scrotal ultrasound in about 2-3 weeks as outpatient to ensure resolution of developing abscess. Recommend outpatient urology follow-up for reevaluation to ensure resolution of symptoms.    Plan to follow up with patient post discharge to discuss repeat US and adjust his follow up with ANDREEA Lane.

## 2024-10-10 NOTE — ASSESSMENT & PLAN NOTE
ID recommendations for antibiotics as outlined above.  Outpatient with urology in a week after discharge.  Monitor urine output and kidney function  Repeat a.m. labs.

## 2024-10-10 NOTE — PROGRESS NOTES
Progress Note - Urology   Name: Christy Haro 69 y.o. male I MRN: [id number]  Unit/Bed#: 417-01 I Date of Admission: 10/6/2024   Date of Service: 10/10/2024 I Hospital Day: 4     Assessment & Plan  Bacteremia  2/2 blood cultures from 10/5/2024 + Serratia marcescens  Sepsis secondary to suspected UTI and Moreau placement.  CT abdomen pelvis on admission concerning for cystitis  Scrotal ultrasound showed right epididymitis with possible developing abscess and tail of the epididymis.  Repeat blood cultures negative.  Vital signs stable, remained afebrile now.  Leukocytosis resolved.  WBC today 5.89, platelets 160.  Creatinine remains normal 1.17 (1.12)  Transaminitis AST 52, , alk phosphatase 28.  Total bilirubin 0.41.  Right upper quadrant abdominal ultrasound and hepatitis profile pending.    UOP yesterday recorded at 400 mL with 2 additional unwitnessed recorded urine output.  Need to check with nursing regarding PVRs every shift.    Urology attestation by Dr. Ketan Mcdonnell yesterday recommended careful observation of scrotum.  Consider repeat imaging if not improving to determine if abscesses develop which could warrant need for urological surgical drainage.  Continue to check PVRs, low threshold to place urinary catheter if he is not draining well.    Continue present IV ceftriaxone  Continue daily tamsulosin  Nursing to continue to monitor PVRs every shift, record CITLALY's, urine output.  Analgesics and antiemetics as ordered  Will add round-the-clock IV Toradol for another 24 to 48 hours to supplement pain control  Ice bag to right scrotum as needed patient comfort  Attending hospitalist had ordered right upper quadrant abdominal ultrasound for today, elevated LFTs noted.  Hepatitis profile ordered.  ID consult recommended transition to oral Bactrim DS p.o. twice daily, recommend discontinuation of antibiotics once symptoms and abscess resolved.  Repeat outpatient scrotal ultrasound in 2 or 3 weeks to ensure  resolution and developing abscess  Outpatient follow-up with urology in about a week to ensure resolution of symptoms.  Remainder medical management including glycemic control per the direction of the primary attending hospitalist service.  He is on Arixtra for DVT prophylaxis.  Can follow-up with general surgery as an outpatient with regard to left inguinal hernia, nonurgent.  Anticipate hospital discharge in the next 24 to 48 hours per the direction of primary attending service.  Acute cystitis without hematuria  ID recommendations for antibiotics as outlined above.  Outpatient with urology in a week after discharge.  Monitor urine output and kidney function  Repeat a.m. labs.  Epididymitis with abscess  Ice to right scrotum, scrotal elevation.  Analgesics.  Toradol added as needed for pain supplement.  Continue present antibiotics per ID recommendations with selection and duration as noted.  Short interval follow-up after discharge with urology in the office in 1 week.  Remainder of plan as described above.  Acute kidney injury superimposed on stage 3a chronic kidney disease (HCC)  Lab Results   Component Value Date    EGFR 62 10/10/2024    EGFR 65 10/09/2024    EGFR 68 10/08/2024    CREATININE 1.17 10/10/2024    CREATININE 1.12 10/09/2024    CREATININE 1.08 10/08/2024       Urology service will follow.    Subjective     69-year-old obese type II diabetic male seen in urology consultation yesterday because of right epididymal orchitis.  No overnight events.  Pain and swelling right scrotum and epididymis slightly improved today.  No fever or chills.  Urinating without problem.  ID consult with antibiotic recommendations based on blood culture sensitivity.  Currently on IV ceftriaxone.  BiPAP at night, remainder of medical management per the primary attending hospitalist service.      Scheduled Meds:  Current Facility-Administered Medications   Medication Dose Route Frequency Provider Last Rate    aspirin  81 mg  Oral Daily Nathanael Dang PA-C      [Held by provider] atorvastatin  10 mg Oral Daily With Dinner Nathanael Dang PA-C      cefTRIAXone  2,000 mg Intravenous Q24H Ravinder Wayne MD 2,000 mg (10/10/24 1320)    Empagliflozin  20 mg Oral Daily Cristel Emerson MD      fondaparinux  5 mg Subcutaneous Q24H Cristel Emerson MD      gabapentin  100 mg Oral BID Nathanael Dang PA-C      hydrocortisone  1 Application Topical TID Cristel Emerson MD      insulin lispro  1-5 Units Subcutaneous HS Nathanael Dang PA-C      insulin lispro  2-12 Units Subcutaneous TID AC Nathanael Dang PA-C      metoprolol succinate  25 mg Oral Daily Nathanael Dang PA-C      metoprolol succinate  50 mg Oral Daily Nathanael Dang PA-C      multivitamin stress formula  1 tablet Oral Daily Nathanael Dang PA-C      ondansetron  4 mg Intravenous Q6H PRN Nathanael Dang PA-C      oxyCODONE  2.5 mg Oral Q6H PRN Cristel Emerson MD      Or    oxyCODONE  5 mg Oral Q6H PRN Cristel Emerson MD      pantoprazole  40 mg Oral Early Morning Nathanael Dang PA-C      potassium chloride  20 mEq Oral Daily Nathanael Dang PA-C      tamsulosin  0.4 mg Oral Daily With Dinner Nathanael Dang PA-C       Continuous Infusions:   PRN Meds:.  ondansetron    oxyCODONE **OR** oxyCODONE      Objective :  Temp:  [97.7 °F (36.5 °C)-98.1 °F (36.7 °C)] 97.7 °F (36.5 °C)  HR:  [50-59] 50  BP: (111-124)/(59-62) 124/62  Resp:  [16] 16  SpO2:  [94 %-96 %] 95 %  O2 Device: None (Room air)    I/O         10/08 0701  10/09 0700 10/09 0701  10/10 0700 10/10 0701  10/11 0700    P.O. 1140 940 660    Total Intake(mL/kg) 1140 (8.3) 940 (6.9) 660 (4.8)    Urine (mL/kg/hr) 360 (0.1) 400 (0.1)     Stool 0      Total Output 360 400     Net +780 +540 +660           Unmeasured Urine Occurrence 1 x 2 x     Unmeasured Stool Occurrence 0 x              Physical Exam  Vitals reviewed.   Constitutional:       General: He is not in acute distress.     Appearance: He is obese. He is not ill-appearing or toxic-appearing.    HENT:      Head: Normocephalic.      Nose: Nose normal.      Mouth/Throat:      Mouth: Mucous membranes are moist.   Cardiovascular:      Rate and Rhythm: Normal rate and regular rhythm.      Heart sounds: Normal heart sounds. No murmur heard.     No gallop.   Pulmonary:      Effort: No respiratory distress.      Breath sounds: Normal breath sounds. No wheezing.   Abdominal:      General: Bowel sounds are normal. There is distension.      Palpations: There is no mass.      Tenderness: There is no abdominal tenderness. There is no guarding.      Hernia: A hernia is present.   Genitourinary:     Comments: Tender right side scrotum, negative Prehn's sign on right upon lifting of the testicle.  Low suspicion for testicular torsion.  Erythema right side of scrotum he claims is improved from yesterday and not particularly warm to touch today, no ulcerations or lesions seen.  Normal male penis.  No fluctuance or drainage.  No necrotic skin changes to suggest Jeison's gangrene.  Left inguinal hernia.  Musculoskeletal:         General: No swelling, tenderness, deformity or signs of injury. Normal range of motion.      Cervical back: Normal range of motion.      Right lower leg: No edema.      Left lower leg: No edema.   Skin:     Coloration: Skin is not jaundiced or pale.      Findings: No bruising, erythema or rash.   Neurological:      General: No focal deficit present.      Mental Status: He is oriented to person, place, and time. Mental status is at baseline.      Motor: No weakness.      Comments: Slow independent gait getting up from bedside chair.   Psychiatric:         Mood and Affect: Mood normal.         Thought Content: Thought content normal.         Judgment: Judgment normal.         Lab Results: I have reviewed the following results:  Recent Labs     10/10/24  0549   WBC 5.89   HGB 10.7*   HCT 33.8*      SODIUM 142   K 3.9   *   CO2 25   BUN 23   CREATININE 1.17   GLUC 112   AST 52*   *    ALB 3.2*   TBILI 0.41   ALKPHOS 28*       Imaging Results Review: I reviewed radiology reports from this admission including: Ultrasound(s).  Other Study Results Review: Other studies reviewed include: Review of a.m. labs    VTE Pharmacologic Prophylaxis: Arixtra  VTE Mechanical Prophylaxis: sequential compression device    Jamie Arnold PA-C

## 2024-10-10 NOTE — ASSESSMENT & PLAN NOTE
Lab Results   Component Value Date    EGFR 62 10/10/2024    EGFR 65 10/09/2024    EGFR 68 10/08/2024    CREATININE 1.17 10/10/2024    CREATININE 1.12 10/09/2024    CREATININE 1.08 10/08/2024   LIZ on admission resolved with IV fluids and withholding diuretics and blood pressure medications -most likely prerenal in setting of infection    Creatinine back to baseline 1-1.2  Supportive care   AM CMP

## 2024-10-10 NOTE — TELEPHONE ENCOUNTER
Patient called  stating that he noticed a missed call in his telephone from Urology.    Patient is still admitted at hospital. I reviewed encounter from earlier and informed the patient that he will be contacted after hosp discharge to speak with clinical re: ultrasound and follow up.    Call back [phone]

## 2024-10-10 NOTE — ASSESSMENT & PLAN NOTE
Patient hypotensive on admission. Started on midodrine and eventually weaned off.    -Home BP medications held   -Monitor BP on admission

## 2024-10-10 NOTE — PROGRESS NOTES
"Progress Note - Hospitalist   Name: Christy Haro 69 y.o. male I MRN: [id number]  Unit/Bed#: 417-01 I Date of Admission: 10/6/2024   Date of Service: 10/10/2024 I Hospital Day: 4    Assessment & Plan  Bacteremia  Patient with 2/2 positive blood cultures for serratia marcescens.     Continue with IV Ceftriaxone, transition to oral bactrim   Repeat blood cultures no growth to date  With evidence of epididymitis, continue IV antibiotics for now  Monitor CBC, vital signs  Appreciate ID input    Acute cystitis without hematuria  Patient was diagnosed with UTI on 10/5/24 in ED. Discharged with p.o. cefpodoxime and eventually called back due to positive blood cultures.   10/9/24: Patient complaining of right testicular pain and swelling.  Ultrasound scrotum/groin revealing Right-sided epididymitis and 1.5 cm hypoechoic focus in the tail of the epididymis suspicious for developing abscess.  No surgical interventions per urology    Continue IV ceftriaxone, with eventual transition to oral bactrim   Appreciate ID and urology input    Epididymitis with abscess  Patient c/o right testicular pain and swelling  US scrotum/groin:  \"Right-sided epididymitis and 1.5 cm hypoechoic focus in the tail of the epididymis suspicious for developing abscess.\"  Discussed with Urology -due to small size of phlegmon, no indication for procedure, proceed with IV antibiotics  Benign essential HTN  Patient hypotensive on admission. Started on midodrine and eventually weaned off.    -Home BP medications held   -Monitor BP on admission   AYLEEN (obstructive sleep apnea)  Continue CPAP at bedtime  Type 2 diabetes mellitus with obesity  (HCC)  Lab Results   Component Value Date    HGBA1C 6.0 (H) 10/06/2024     Controlled with hemoglobin A1c of 6  Resumed Jardiance  Monitor blood glucose, insulin sliding scale  Diabetic diet  Dyslipidemia  -Hold home statin given elevated liver enzymes   GERD (gastroesophageal reflux disease)  Continue PPI  Skin " lesion  -Noted on physical exam on back of L thigh   -No signs of infection   -Steroid cream ordered   -Follow up outpatient with PCP for further management   Inguinal hernia of left side without obstruction or gangrene    -Pt complaining of pain in groin with straining   -CT on admission notable for inguinal hernia without obstruction   -General surgery referral upon discharge   Acute kidney injury superimposed on stage 3a chronic kidney disease (HCC)  Lab Results   Component Value Date    EGFR 62 10/10/2024    EGFR 65 10/09/2024    EGFR 68 10/08/2024    CREATININE 1.17 10/10/2024    CREATININE 1.12 10/09/2024    CREATININE 1.08 10/08/2024   LIZ on admission resolved with IV fluids and withholding diuretics and blood pressure medications -most likely prerenal in setting of infection    Creatinine back to baseline 1-1.2  Supportive care   AM CMP     VTE Pharmacologic Prophylaxis: VTE Score: 4  Fondaparinux     Mobility:   Basic Mobility Inpatient Raw Score: 22  JH-HLM Goal: 7: Walk 25 feet or more  JH-HLM Achieved: 6: Walk 10 steps or more  JH-HLM Goal achieved. Continue to encourage appropriate mobility.    Patient Centered Rounds: I performed bedside rounds with nursing staff today.   Discussions with Specialists or Other Care Team Provider: ID, Urology     Education and Discussions with Family / Patient: Updated  (wife) via phone.    Current Length of Stay: 4 day(s)  Current Patient Status: Inpatient   Certification Statement: The patient will continue to require additional inpatient hospital stay due to infection  Discharge Plan: Anticipate discharge in 24-48 hrs to home.    Code Status: Level 1 - Full Code    Subjective   Patient seen walking in room in no acute distress. No complaints, feeling better. Tolerating p.o. intake. Urinating and stooling without issue. Ambulatory without walker. Sleeping well. Denies chest pain, shortness of breath, nausea, vomiting, constipation, diarrhea.      Objective :  Temp:  [97.7 °F (36.5 °C)-98.5 °F (36.9 °C)] 97.7 °F (36.5 °C)  HR:  [50-59] 50  BP: (111-137)/(59-65) 124/62  Resp:  [16-20] 16  SpO2:  [93 %-96 %] 95 %  O2 Device: None (Room air)    Body mass index is 40.99 kg/m².     Input and Output Summary (last 24 hours):     Intake/Output Summary (Last 24 hours) at 10/10/2024 0736  Last data filed at 10/10/2024 0607  Gross per 24 hour   Intake 940 ml   Output 400 ml   Net 540 ml       Physical Exam  Vitals and nursing note reviewed.   Constitutional:       General: He is not in acute distress.     Appearance: Normal appearance. He is obese. He is not ill-appearing or toxic-appearing.   HENT:      Head: Normocephalic and atraumatic.      Nose: Nose normal.   Eyes:      Conjunctiva/sclera: Conjunctivae normal.   Cardiovascular:      Rate and Rhythm: Normal rate and regular rhythm.      Heart sounds: Normal heart sounds. No murmur heard.     No friction rub. No gallop.   Pulmonary:      Effort: Pulmonary effort is normal. No respiratory distress.      Breath sounds: Normal breath sounds. No wheezing, rhonchi or rales.   Musculoskeletal:      Right lower leg: Edema present.      Left lower leg: Edema present.   Skin:     General: Skin is warm and dry.   Neurological:      General: No focal deficit present.      Mental Status: He is alert and oriented to person, place, and time.   Psychiatric:         Mood and Affect: Mood normal.         Behavior: Behavior normal.         Thought Content: Thought content normal.           Lab Results: I have reviewed the following results:   Results from last 7 days   Lab Units 10/10/24  0549   WBC Thousand/uL 5.89   HEMOGLOBIN g/dL 10.7*   HEMATOCRIT % 33.8*   PLATELETS Thousands/uL 160   SEGS PCT % 67   LYMPHO PCT % 22   MONO PCT % 9   EOS PCT % 0     Results from last 7 days   Lab Units 10/10/24  0549   SODIUM mmol/L 142   POTASSIUM mmol/L 3.9   CHLORIDE mmol/L 110*   CO2 mmol/L 25   BUN mg/dL 23   CREATININE mg/dL 1.17    ANION GAP mmol/L 7   CALCIUM mg/dL 8.5   ALBUMIN g/dL 3.2*   TOTAL BILIRUBIN mg/dL 0.41   ALK PHOS U/L 28*   ALT U/L 120*   AST U/L 52*   GLUCOSE RANDOM mg/dL 112     Results from last 7 days   Lab Units 10/05/24  0008   INR  1.16     Results from last 7 days   Lab Units 10/10/24  0717 10/09/24  2031 10/09/24  1550 10/09/24  1102 10/09/24  0734 10/08/24  2107 10/08/24  1626 10/08/24  1059 10/08/24  0657 10/07/24  2117 10/07/24  1607 10/07/24  1106   POC GLUCOSE mg/dl 110 124 141* 128 115 122 139 128 144* 114 122 139     Results from last 7 days   Lab Units 10/06/24  0515   HEMOGLOBIN A1C % 6.0*     Results from last 7 days   Lab Units 10/10/24  0549 10/09/24  0606 10/07/24  0429 10/06/24  0153 10/04/24  2334   LACTIC ACID mmol/L  --   --   --  1.0 1.4   PROCALCITONIN ng/ml 0.14 0.19 0.49* 0.89* 0.38*       Recent Cultures (last 7 days):   Results from last 7 days   Lab Units 10/06/24  1504 10/05/24  0050 10/05/24  0008 10/04/24  1236   BLOOD CULTURE  No Growth at 48 hrs.  No Growth at 48 hrs.  --  Serratia marcescens*  Serratia marcescens*  --    GRAM STAIN RESULT   --   --  Gram variable rods*  Gram variable rods*  --    URINE CULTURE   --  >100,000 cfu/ml Citrobacter koseri*  --  80,000-89,000 cfu/ml Citrobacter koseri*  10,000-19,000 cfu/ml       Imaging Results Review: I reviewed radiology reports from this admission including: Ultrasound(s).  Other Study Results Review: EKG was reviewed.     Last 24 Hours Medication List:     Current Facility-Administered Medications:     aspirin chewable tablet 81 mg, Daily    atorvastatin (LIPITOR) tablet 10 mg, Daily With Dinner    cefTRIAXone (ROCEPHIN) IVPB (premix in dextrose) 2,000 mg 50 mL, Q24H, Last Rate: 2,000 mg (10/09/24 1335)    Empagliflozin (JARDIANCE) tablet 20 mg, Daily    fondaparinux (ARIXTRA) subcutaneous injection 5 mg, Q24H    gabapentin (NEURONTIN) capsule 100 mg, BID    hydrocortisone 2.5 % cream 1 Application, TID    insulin lispro  (HumALOG/ADMELOG) 100 units/mL subcutaneous injection 1-5 Units, HS    insulin lispro (HumALOG/ADMELOG) 100 units/mL subcutaneous injection 2-12 Units, TID AC **AND** Fingerstick Glucose (POCT), TID AC    ketorolac (TORADOL) injection 15 mg, Q6H SPENCER    metoprolol succinate (TOPROL-XL) 24 hr tablet 25 mg, Daily    metoprolol succinate (TOPROL-XL) 24 hr tablet 50 mg, Daily    multivitamin stress formula tablet 1 tablet, Daily    ondansetron (ZOFRAN) injection 4 mg, Q6H PRN    oxyCODONE (ROXICODONE) split tablet 2.5 mg, Q6H PRN **OR** oxyCODONE (ROXICODONE) IR tablet 5 mg, Q6H PRN    pantoprazole (PROTONIX) EC tablet 40 mg, Early Morning    potassium chloride (Klor-Con M20) CR tablet 20 mEq, Daily    tamsulosin (FLOMAX) capsule 0.4 mg, Daily With Dinner    Administrative Statements   Today, Patient Was Seen By: Jeannette Marquez DO    **Please Note: This note may have been constructed using a voice recognition system.**

## 2024-10-10 NOTE — TELEPHONE ENCOUNTER
Infectious disease is recommending his outpatient appointment for urology be moved up so an exam of the epididymitis can be done before d/cing antibiotics. He has an appointment now in Mid November and they would like it sometime in October.  Patient was last seen at [facility] on 10/2/24. Patient may be willing to go to a different office,

## 2024-10-11 VITALS
DIASTOLIC BLOOD PRESSURE: 61 MMHG | SYSTOLIC BLOOD PRESSURE: 129 MMHG | OXYGEN SATURATION: 94 % | HEART RATE: 56 BPM | RESPIRATION RATE: 14 BRPM | HEIGHT: 72 IN | TEMPERATURE: 97.4 F | WEIGHT: 308.64 LBS | BODY MASS INDEX: 41.8 KG/M2

## 2024-10-11 DIAGNOSIS — N45.1 EPIDIDYMITIS: Primary | ICD-10-CM

## 2024-10-11 PROBLEM — R74.8 ELEVATED LIVER ENZYMES: Status: ACTIVE | Noted: 2024-10-11

## 2024-10-11 LAB
ALBUMIN SERPL BCG-MCNC: 3.4 G/DL (ref 3.5–5)
ALP SERPL-CCNC: 31 U/L (ref 34–104)
ALT SERPL W P-5'-P-CCNC: 101 U/L (ref 7–52)
ANION GAP SERPL CALCULATED.3IONS-SCNC: 8 MMOL/L (ref 4–13)
AST SERPL W P-5'-P-CCNC: 38 U/L (ref 13–39)
BACTERIA UR CULT: ABNORMAL
BACTERIA UR CULT: ABNORMAL
BASOPHILS # BLD AUTO: 0.04 THOUSANDS/ΜL (ref 0–0.1)
BASOPHILS NFR BLD AUTO: 1 % (ref 0–1)
BILIRUB DIRECT SERPL-MCNC: 0.1 MG/DL (ref 0–0.2)
BILIRUB SERPL-MCNC: 0.37 MG/DL (ref 0.2–1)
BUN SERPL-MCNC: 19 MG/DL (ref 5–25)
CALCIUM SERPL-MCNC: 8.6 MG/DL (ref 8.4–10.2)
CHLORIDE SERPL-SCNC: 111 MMOL/L (ref 96–108)
CO2 SERPL-SCNC: 23 MMOL/L (ref 21–32)
CREAT SERPL-MCNC: 1.08 MG/DL (ref 0.6–1.3)
EOSINOPHIL # BLD AUTO: 0.01 THOUSAND/ΜL (ref 0–0.61)
EOSINOPHIL NFR BLD AUTO: 0 % (ref 0–6)
ERYTHROCYTE [DISTWIDTH] IN BLOOD BY AUTOMATED COUNT: 14.3 % (ref 11.6–15.1)
GFR SERPL CREATININE-BSD FRML MDRD: 68 ML/MIN/1.73SQ M
GLUCOSE SERPL-MCNC: 115 MG/DL (ref 65–140)
GLUCOSE SERPL-MCNC: 137 MG/DL (ref 65–140)
GLUCOSE SERPL-MCNC: 99 MG/DL (ref 65–140)
HAV IGM SER QL: REACTIVE
HBV CORE IGM SER QL: ABNORMAL
HBV SURFACE AG SER QL: ABNORMAL
HCT VFR BLD AUTO: 36.8 % (ref 36.5–49.3)
HCV AB SER QL: ABNORMAL
HGB BLD-MCNC: 11.3 G/DL (ref 12–17)
IMM GRANULOCYTES # BLD AUTO: 0.13 THOUSAND/UL (ref 0–0.2)
IMM GRANULOCYTES NFR BLD AUTO: 2 % (ref 0–2)
LYMPHOCYTES # BLD AUTO: 1.52 THOUSANDS/ΜL (ref 0.6–4.47)
LYMPHOCYTES NFR BLD AUTO: 21 % (ref 14–44)
MCH RBC QN AUTO: 28.3 PG (ref 26.8–34.3)
MCHC RBC AUTO-ENTMCNC: 30.7 G/DL (ref 31.4–37.4)
MCV RBC AUTO: 92 FL (ref 82–98)
MONOCYTES # BLD AUTO: 0.5 THOUSAND/ΜL (ref 0.17–1.22)
MONOCYTES NFR BLD AUTO: 7 % (ref 4–12)
NEUTROPHILS # BLD AUTO: 4.97 THOUSANDS/ΜL (ref 1.85–7.62)
NEUTS SEG NFR BLD AUTO: 69 % (ref 43–75)
NRBC BLD AUTO-RTO: 0 /100 WBCS
PLATELET # BLD AUTO: 211 THOUSANDS/UL (ref 149–390)
PMV BLD AUTO: 10 FL (ref 8.9–12.7)
POTASSIUM SERPL-SCNC: 4 MMOL/L (ref 3.5–5.3)
PROT SERPL-MCNC: 6.4 G/DL (ref 6.4–8.4)
RBC # BLD AUTO: 3.99 MILLION/UL (ref 3.88–5.62)
SODIUM SERPL-SCNC: 142 MMOL/L (ref 135–147)
WBC # BLD AUTO: 7.17 THOUSAND/UL (ref 4.31–10.16)

## 2024-10-11 PROCEDURE — 82948 REAGENT STRIP/BLOOD GLUCOSE: CPT

## 2024-10-11 PROCEDURE — 85025 COMPLETE CBC W/AUTO DIFF WBC: CPT | Performed by: STUDENT IN AN ORGANIZED HEALTH CARE EDUCATION/TRAINING PROGRAM

## 2024-10-11 PROCEDURE — 94760 N-INVAS EAR/PLS OXIMETRY 1: CPT

## 2024-10-11 PROCEDURE — 80048 BASIC METABOLIC PNL TOTAL CA: CPT | Performed by: STUDENT IN AN ORGANIZED HEALTH CARE EDUCATION/TRAINING PROGRAM

## 2024-10-11 PROCEDURE — 80076 HEPATIC FUNCTION PANEL: CPT | Performed by: FAMILY MEDICINE

## 2024-10-11 PROCEDURE — 94660 CPAP INITIATION&MGMT: CPT

## 2024-10-11 PROCEDURE — 99239 HOSP IP/OBS DSCHRG MGMT >30: CPT | Performed by: FAMILY MEDICINE

## 2024-10-11 RX ORDER — SULFAMETHOXAZOLE AND TRIMETHOPRIM 800; 160 MG/1; MG/1
1 TABLET ORAL EVERY 12 HOURS SCHEDULED
Qty: 42 TABLET | Refills: 0 | Status: SHIPPED | OUTPATIENT
Start: 2024-10-11 | End: 2024-10-14

## 2024-10-11 RX ORDER — FUROSEMIDE 10 MG/ML
40 INJECTION INTRAMUSCULAR; INTRAVENOUS ONCE
Status: COMPLETED | OUTPATIENT
Start: 2024-10-11 | End: 2024-10-11

## 2024-10-11 RX ORDER — HYDROCORTISONE 25 MG/G
1 CREAM TOPICAL 2 TIMES DAILY
Qty: 10 G | Refills: 0 | Status: SHIPPED | OUTPATIENT
Start: 2024-10-11 | End: 2024-10-26

## 2024-10-11 RX ORDER — CEFPODOXIME PROXETIL 200 MG/1
200 TABLET, FILM COATED ORAL 2 TIMES DAILY
Qty: 20 TABLET | Refills: 0 | Status: CANCELLED | OUTPATIENT
Start: 2024-10-11 | End: 2024-10-21

## 2024-10-11 RX ADMIN — GABAPENTIN 100 MG: 100 CAPSULE ORAL at 08:18

## 2024-10-11 RX ADMIN — FONDAPARINUX SODIUM 5 MG: 5 INJECTION SUBCUTANEOUS at 08:15

## 2024-10-11 RX ADMIN — EMPAGLIFLOZIN 20 MG: 10 TABLET, FILM COATED ORAL at 08:16

## 2024-10-11 RX ADMIN — METOPROLOL SUCCINATE 25 MG: 50 TABLET, EXTENDED RELEASE ORAL at 08:30

## 2024-10-11 RX ADMIN — POTASSIUM CHLORIDE 20 MEQ: 1500 TABLET, EXTENDED RELEASE ORAL at 08:16

## 2024-10-11 RX ADMIN — ASPIRIN 81 MG 81 MG: 81 TABLET ORAL at 08:16

## 2024-10-11 RX ADMIN — METOPROLOL SUCCINATE 50 MG: 50 TABLET, EXTENDED RELEASE ORAL at 08:30

## 2024-10-11 RX ADMIN — CEFTRIAXONE 2000 MG: 2 INJECTION, SOLUTION INTRAVENOUS at 13:03

## 2024-10-11 RX ADMIN — HYDROCORTISONE 1 APPLICATION: 25 CREAM TOPICAL at 08:19

## 2024-10-11 RX ADMIN — B-COMPLEX W/ C & FOLIC ACID TAB 1 TABLET: TAB at 08:16

## 2024-10-11 RX ADMIN — PANTOPRAZOLE SODIUM 40 MG: 40 TABLET, DELAYED RELEASE ORAL at 05:24

## 2024-10-11 RX ADMIN — FUROSEMIDE 40 MG: 10 INJECTION, SOLUTION INTRAMUSCULAR; INTRAVENOUS at 09:40

## 2024-10-11 NOTE — ASSESSMENT & PLAN NOTE
Patient with 2/2 positive blood cultures for serratia marcescens.     Continue with IV Ceftriaxone (day 5), transition to oral bactrim   Repeat blood cultures no growth to date  With evidence of epididymitis, continue IV antibiotics for now  Monitor CBC, vital signs  Appreciate ID input

## 2024-10-11 NOTE — TELEPHONE ENCOUNTER
Patient called back and the message from Rn was relayed.    Pt states that he cannot wait 3 weeks to be seen. States that he was told in the Hospital to take ABX until he is seen by Urologist. Pt states that he will not be on ABX for three weeks.    Pt was informed he will get a call back on Monday.    Call back [phone]

## 2024-10-11 NOTE — ASSESSMENT & PLAN NOTE
"Patient c/o right testicular pain and swelling. US scrotum/groin:  \"Right-sided epididymitis and 1.5 cm hypoechoic focus in the tail of the epididymis suspicious for developing abscess.\"    Discussed with Urology -due to small size of phlegmon, no indication for procedure, proceed with IV antibiotics  Will transition to oral bactrim per ID on outpatient basis   Repeat scrotal U/S in 2-3 weeks   Urology appointment outpatient moved up to October   "

## 2024-10-11 NOTE — ASSESSMENT & PLAN NOTE
Lab Results   Component Value Date    EGFR 68 10/11/2024    EGFR 62 10/10/2024    EGFR 65 10/09/2024    CREATININE 1.08 10/11/2024    CREATININE 1.17 10/10/2024    CREATININE 1.12 10/09/2024   LIZ on admission resolved with IV fluids and withholding diuretics and blood pressure medications -most likely prerenal in setting of infection    Creatinine back to baseline 1-1.2  Supportive care   AM CMP

## 2024-10-11 NOTE — ASSESSMENT & PLAN NOTE
Patient hypotensive on admission. Started on midodrine and eventually weaned off.    -Continue home BP medications   -Monitor BP on admission

## 2024-10-11 NOTE — ASSESSMENT & PLAN NOTE
Lab Results   Component Value Date    EGFR 68 10/11/2024    EGFR 62 10/10/2024    EGFR 65 10/09/2024    CREATININE 1.08 10/11/2024    CREATININE 1.17 10/10/2024    CREATININE 1.12 10/09/2024     LIZ resolved with fluids   Cr baseline 1-1.2

## 2024-10-11 NOTE — NURSING NOTE
Patient concerned due to increased weight. Patient gained 6 pounds from 302lb-308 in 2 days. Upon review, patient takes furosemide 60mg daily. Message sent to provider regarding above. Provider to review.

## 2024-10-11 NOTE — ASSESSMENT & PLAN NOTE
-Hold home statin given elevated liver enzymes   -Can resume statin upon d/c  -Follow up outpatient with PCP for further management

## 2024-10-11 NOTE — CASE MANAGEMENT
Case Management Discharge Planning Note    Patient name Christy Haro  Location /417-01 MRN [id number]  : 1955 Date 10/11/2024       Current Admission Date: 10/6/2024  Current Admission Diagnosis:Bacteremia   Patient Active Problem List    Diagnosis Date Noted Date Diagnosed    Elevated liver enzymes 10/11/2024     Epididymitis with abscess 10/09/2024     Acute kidney injury superimposed on stage 3a chronic kidney disease (HCC) 10/09/2024     Skin lesion 10/08/2024     Unilateral inguinal hernia without obstruction 10/08/2024     Acute cystitis without hematuria 10/06/2024     Bacteremia 10/06/2024     Hyponatremia 10/06/2024     Stage 3 chronic kidney disease, unspecified whether stage 3a or 3b CKD (HCC) 2024     Pain in both lower extremities 2024     Paroxysmal atrial fibrillation (HCC) 2024     Palpitations 2023     Chronic diastolic heart failure (HCC) 2023     Type 2 diabetes mellitus with obesity  (HCC) 2022     Lymphedema 2022     Diastolic dysfunction 2021     Morbid obesity (HCC) 2021     Stasis edema of both lower extremities 2018     GERD (gastroesophageal reflux disease) 2018     AYLEEN (obstructive sleep apnea) 2017     Benign essential HTN 2017     Dyslipidemia 2017       LOS (days): 5  Geometric Mean LOS (GMLOS) (days): 3.5  Days to GMLOS:-1.9     OBJECTIVE:  Risk of Unplanned Readmission Score: 17.86         Current admission status: Inpatient   Preferred Pharmacy:   [id number]  [place] - [address] [facility], [address]  [address] [place] [address], [address]  [facility] [address]  Phone: [phone] Fax: [phone]    [facility] - [place], [place] - [facility] [facility]  [address] [facility] [address]  Phone: [phone] Fax: [phone]    [id number] - [place] - [address] [place] [address]  [facility] [address]  Phone: [phone]  Fax: [phone]    Primary Care Provider: Alma Delia Fountain DO    Primary Insurance: MEDICARE  Secondary Insurance: [facility]    DISCHARGE DETAILS:  Pt is being dc'd home on this date with OP follow up ambulatory referral for OP Physical Therapy after dc. Pt's wife to transport pt home.

## 2024-10-11 NOTE — DISCHARGE SUMMARY
"Discharge Summary - Hospitalist   Name: Christy Haro 69 y.o. male I MRN: [id number]  Unit/Bed#: 417-01 I Date of Admission: 10/6/2024   Date of Service: 10/11/2024 I Hospital Day: 5     Assessment & Plan  Bacteremia  Patient with 2/2 positive blood cultures for serratia marcescens.     Continue with IV Ceftriaxone (day 5), transition to oral bactrim   Repeat blood cultures no growth to date  With evidence of epididymitis, continue IV antibiotics for now  Monitor CBC, vital signs  Appreciate ID input    Acute cystitis without hematuria  Patient was diagnosed with UTI on 10/5/24 in ED. Discharged with p.o. cefpodoxime and eventually called back due to positive blood cultures.   10/9/24: Patient complaining of right testicular pain and swelling.  Ultrasound scrotum/groin revealing Right-sided epididymitis and 1.5 cm hypoechoic focus in the tail of the epididymis suspicious for developing abscess.  No surgical interventions per urology    Continue IV ceftriaxone, with eventual transition to oral bactrim   Appreciate ID and urology input  Will have an October urology appointment made for patient outpatient     Epididymitis with abscess  Patient c/o right testicular pain and swelling. US scrotum/groin:  \"Right-sided epididymitis and 1.5 cm hypoechoic focus in the tail of the epididymis suspicious for developing abscess.\"    Discussed with Urology -due to small size of phlegmon, no indication for procedure, proceed with IV antibiotics  Will transition to oral bactrim per ID on outpatient basis   Repeat scrotal U/S in 2-3 weeks   Urology appointment outpatient moved up to October   Benign essential HTN  Patient hypotensive on admission. Started on midodrine and eventually weaned off.    -Continue home BP medications   -Monitor BP on admission   AYLEEN (obstructive sleep apnea)  Continue CPAP at bedtime  Type 2 diabetes mellitus with obesity  (HCC)  Lab Results   Component Value Date    HGBA1C 6.0 (H) 10/06/2024     Controlled " with hemoglobin A1c of 6  Resumed Jardiance  Monitor blood glucose, insulin sliding scale  Diabetic diet  Dyslipidemia  -Hold home statin given elevated liver enzymes   -Can resume statin upon d/c  -Follow up outpatient with PCP for further management   GERD (gastroesophageal reflux disease)  Continue PPI  Skin lesion  -Noted on physical exam on back of L thigh   -No signs of infection   -Steroid cream ordered   -Follow up outpatient with PCP for further management   Unilateral inguinal hernia without obstruction    -Pt complaining of pain in groin with straining   -CT on admission notable for inguinal hernia without obstruction   -General surgery referral upon discharge   Acute kidney injury superimposed on stage 3a chronic kidney disease (HCC)  Lab Results   Component Value Date    EGFR 68 10/11/2024    EGFR 62 10/10/2024    EGFR 65 10/09/2024    CREATININE 1.08 10/11/2024    CREATININE 1.17 10/10/2024    CREATININE 1.12 10/09/2024   LIZ on admission resolved with IV fluids and withholding diuretics and blood pressure medications -most likely prerenal in setting of infection    Creatinine back to baseline 1-1.2  Supportive care   AM CMP   Stage 3 chronic kidney disease, unspecified whether stage 3a or 3b CKD (HCC)  Lab Results   Component Value Date    EGFR 68 10/11/2024    EGFR 62 10/10/2024    EGFR 65 10/09/2024    CREATININE 1.08 10/11/2024    CREATININE 1.17 10/10/2024    CREATININE 1.12 10/09/2024     LIZ resolved with fluids   Cr baseline 1-1.2    Hyponatremia  -Resolved with fluids and treatment of infections     Elevated liver enzymes  Patient with elevated liver enzymes during admission. Denies Tylenol use or alcohol use.     -Hepatitis panel ordered, pending   -RUQ u/s showing mild hepatic steatosis   -Recommend lifestyle and diet modifications   -Follow up outpatient with PCP for further management      Medical Problems       Resolved Problems  Date Reviewed: 10/6/2024   None       Discharging Physician  / Practitioner: Jeannette Marquez DO  PCP: Alma Delia Fountain DO  Admission Date:   Admission Orders (From admission, onward)       Ordered        10/06/24 0205  INPATIENT ADMISSION  Once                          Discharge Date: 10/11/24    Consultations During Hospital Stay:  ID, urology     Procedures Performed:   US right upper quadrant   Final Result      Mild hepatic steatosis.      Right renal simple cyst.      Remainder of the examination is normal.      Workstation performed: [id number]         US scrotum and testicles   Final Result      Right-sided epididymitis and 1.5 cm hypoechoic focus in the tail of the epididymis suspicious for developing abscess.      The study was marked in EPIC for immediate notification.      Workstation performed: [id number]             Significant Findings / Test Results:   Lab Results   Component Value Date    WBC 7.17 10/11/2024    HGB 11.3 (L) 10/11/2024    HCT 36.8 10/11/2024    MCV 92 10/11/2024     10/11/2024     Lab Results   Component Value Date     12/16/2015    SODIUM 142 10/11/2024    K 4.0 10/11/2024     (H) 10/11/2024    CO2 23 10/11/2024    ANIONGAP 5 12/16/2015    AGAP 8 10/11/2024    BUN 19 10/11/2024    CREATININE 1.08 10/11/2024    GLUC 115 10/11/2024    GLUF 122 (H) 07/01/2024    CALCIUM 8.6 10/11/2024    AST 52 (H) 10/10/2024     (H) 10/10/2024    ALKPHOS 28 (L) 10/10/2024    PROT 7.2 12/16/2015    TP 6.1 (L) 10/10/2024    BILITOT 0.84 12/16/2015    TBILI 0.41 10/10/2024    EGFR 68 10/11/2024        Incidental Findings:   None     Test Results Pending at Discharge (will require follow up):   None     Outpatient Tests Requested:  Scrotal U/S in 2-3 weeks     Complications:  None    Reason for Admission: Positive blood cultures     Hospital Course:   Christy Haro is a 69 y.o. male patient who originally presented to the hospital on 10/6/2024 due to positive blood cultures. Please see hospitalist H&P for further details.     [facility]  Course: [facility] symptoms.  Started on IV ceftriaxone for positive blood cultures growing Serratia marcescens.  Urine culture growing Citrobacter. In addition, home blood pressure medications were held given that the patient was experiencing hypotension in the beginning of this admission.  Fluids given.  On 10/9/24, patient began to complain of right testicular and swelling pain.  Ultrasound of the scrotum ordered and showed right-sided epididymitis hypoechoic focus within the tail of the epididymis suspicious for developing abscess. IV ceftriaxone was continued per ID recommendations. Urology saw the patient and recommended only medical management.  Patient continued to improve with negative repeat blood cultures.     It was noted during the course of admission that the patient's liver enzymes were elevated.  Home statin was held.  Right upper quadrant ultrasound showed mild hepatic steatosis.  Acute hepatitis panel was ordered.    One dose of Lasix was given prior to discharge since home medications were held during admission.    On the day of discharge, patient was saturating well on room air.  Afebrile, VSS. The patient's medical conditions can be managed on an outpatient basis. He will be discharged to home with a 3-week course of Bactrim. Patient can follow-up outpatient with PCP and urology for further management.     Please see above list of diagnoses and related plan for additional information.     Condition at Discharge: stable    Discharge Day Visit / Exam:   Subjective:  Patient seen lying in bed in no acute distress.  No complaints, scrotal pain improving. Tolerating p.o. intake. Urinating and stooling without issue. Ambulatory. Sleeping well. Denies chest pain, shortness of breath, nausea, vomiting, constipation, diarrhea.     Vitals: Blood Pressure: 117/51 (10/11/24 0728)  Pulse: (!) 53 (10/11/24 0728)  Temperature: (!) 97.4 °F (36.3 °C)  (10/11/24 0728)  Temp Source: Oral (10/09/24 0609)  Respirations: 14 (10/11/24 0728)  Height: 6' (182.9 cm) (10/06/24 0233)  Weight - Scale: (!) 140 kg (308 lb 10.3 oz) (10/11/24 0600)  SpO2: 96 % (10/11/24 0728)  Physical Exam  Vitals and nursing note reviewed.   Constitutional:       General: He is not in acute distress.     Appearance: Normal appearance. He is obese. He is not ill-appearing or toxic-appearing.   HENT:      Head: Normocephalic and atraumatic.      Nose: Nose normal.   Eyes:      Conjunctiva/sclera: Conjunctivae normal.   Cardiovascular:      Rate and Rhythm: Normal rate and regular rhythm.      Heart sounds: Normal heart sounds. No murmur heard.     No friction rub. No gallop.   Pulmonary:      Effort: Pulmonary effort is normal. No respiratory distress.      Breath sounds: Normal breath sounds. No wheezing, rhonchi or rales.   Musculoskeletal:      Right lower leg: Edema present.      Left lower leg: Edema present.   Skin:     General: Skin is warm and dry.   Neurological:      General: No focal deficit present.      Mental Status: He is alert and oriented to person, place, and time.   Psychiatric:         Mood and Affect: Mood normal.         Behavior: Behavior normal.         Thought Content: Thought content normal.          Discussion with Family: Patient declined call to .     Discharge instructions/Information to patient and family:   See after visit summary for information provided to patient and family.      Provisions for Follow-Up Care:  See after visit summary for information related to follow-up care and any pertinent home health orders.      Mobility at time of Discharge:   Basic Mobility Inpatient Raw Score: 22  JH-HLM Goal: 7: Walk 25 feet or more  JH-HLM Achieved: 8: Walk 250 feet ot more  HLM Goal achieved. Continue to encourage appropriate mobility.     Disposition:   Home    Planned Readmission: No    Discharge Medications:  See after visit summary for reconciled  discharge medications provided to patient and/or family.      Jeannette Marquez DO   PGY-2 Rural  Residency   [facility]    **Please Note: This note may have been constructed using a voice recognition system**

## 2024-10-11 NOTE — ASSESSMENT & PLAN NOTE
Patient was diagnosed with UTI on 10/5/24 in ED. Discharged with p.o. cefpodoxime and eventually called back due to positive blood cultures.   10/9/24: Patient complaining of right testicular pain and swelling.  Ultrasound scrotum/groin revealing Right-sided epididymitis and 1.5 cm hypoechoic focus in the tail of the epididymis suspicious for developing abscess.  No surgical interventions per urology    Continue IV ceftriaxone, with eventual transition to oral bactrim   Appreciate ID and urology input  Will have an October urology appointment made for patient outpatient

## 2024-10-11 NOTE — ASSESSMENT & PLAN NOTE
Patient with elevated liver enzymes during admission. Denies Tylenol use or alcohol use.     -Hepatitis panel ordered, pending   -RUQ u/s showing mild hepatic steatosis   -Recommend lifestyle and diet modifications   -Follow up outpatient with PCP for further management

## 2024-10-11 NOTE — TELEPHONE ENCOUNTER
Called and LMOM for patient to contact the office regarding an appointment. Patients appointment on 11/21 was cancelled at this time. Currently holding an appointment with ANDREEA Lane on 10/31/24 @ 8:20 am in [place]. Patient will require a repeat US scrotum and testicles 2 weeks from now, prior to office visit. Order was placed in his chart and this can be scheduled through central scheduling at [phone].

## 2024-10-12 ENCOUNTER — APPOINTMENT (EMERGENCY)
Dept: RADIOLOGY | Facility: HOSPITAL | Age: 69
DRG: 683 | End: 2024-10-12
Payer: MEDICARE

## 2024-10-12 ENCOUNTER — APPOINTMENT (OUTPATIENT)
Dept: CT IMAGING | Facility: HOSPITAL | Age: 69
DRG: 683 | End: 2024-10-12
Payer: MEDICARE

## 2024-10-12 ENCOUNTER — HOSPITAL ENCOUNTER (INPATIENT)
Facility: HOSPITAL | Age: 69
LOS: 1 days | Discharge: HOME | DRG: 683 | End: 2024-10-14
Attending: EMERGENCY MEDICINE | Admitting: HOSPITALIST
Payer: MEDICARE

## 2024-10-12 DIAGNOSIS — I48.0 PAROXYSMAL ATRIAL FIBRILLATION (HCC): ICD-10-CM

## 2024-10-12 DIAGNOSIS — N18.31 ACUTE KIDNEY INJURY SUPERIMPOSED ON STAGE 3A CHRONIC KIDNEY DISEASE (HCC): ICD-10-CM

## 2024-10-12 DIAGNOSIS — R53.1 GENERALIZED WEAKNESS: ICD-10-CM

## 2024-10-12 DIAGNOSIS — N17.9 AKI (ACUTE KIDNEY INJURY) (HCC): Primary | ICD-10-CM

## 2024-10-12 DIAGNOSIS — N17.9 ACUTE KIDNEY INJURY SUPERIMPOSED ON STAGE 3A CHRONIC KIDNEY DISEASE (HCC): ICD-10-CM

## 2024-10-12 DIAGNOSIS — E83.39 HYPERPHOSPHATEMIA: ICD-10-CM

## 2024-10-12 DIAGNOSIS — R76.8 HEPATITIS A ANTIBODY POSITIVE: ICD-10-CM

## 2024-10-12 DIAGNOSIS — N45.4: ICD-10-CM

## 2024-10-12 DIAGNOSIS — R10.13 EPIGASTRIC PAIN: ICD-10-CM

## 2024-10-12 DIAGNOSIS — E86.0 DEHYDRATION: ICD-10-CM

## 2024-10-12 DIAGNOSIS — R11.2 NAUSEA & VOMITING: ICD-10-CM

## 2024-10-12 DIAGNOSIS — R26.2 AMBULATORY DYSFUNCTION: ICD-10-CM

## 2024-10-12 DIAGNOSIS — R78.81 BACTEREMIA: ICD-10-CM

## 2024-10-12 DIAGNOSIS — R11.2 NAUSEA AND VOMITING, UNSPECIFIED VOMITING TYPE: ICD-10-CM

## 2024-10-12 DIAGNOSIS — R74.01 TRANSAMINITIS: ICD-10-CM

## 2024-10-12 PROBLEM — E87.6 HYPOKALEMIA: Status: ACTIVE | Noted: 2024-10-12

## 2024-10-12 PROBLEM — E87.20 LACTIC ACIDOSIS: Status: ACTIVE | Noted: 2024-10-12

## 2024-10-12 LAB
2HR DELTA HS TROPONIN: 7 NG/L
4HR DELTA HS TROPONIN: 9 NG/L
ALBUMIN SERPL BCG-MCNC: 3.8 G/DL (ref 3.5–5)
ALP SERPL-CCNC: 36 U/L (ref 34–104)
ALT SERPL W P-5'-P-CCNC: 79 U/L (ref 7–52)
ANION GAP SERPL CALCULATED.3IONS-SCNC: 13 MMOL/L (ref 4–13)
APTT PPP: 24 SECONDS (ref 23–34)
AST SERPL W P-5'-P-CCNC: 31 U/L (ref 13–39)
BACTERIA BLD CULT: NORMAL
BACTERIA BLD CULT: NORMAL
BASOPHILS # BLD AUTO: 0.03 THOUSANDS/ΜL (ref 0–0.1)
BASOPHILS NFR BLD AUTO: 0 % (ref 0–1)
BILIRUB SERPL-MCNC: 0.7 MG/DL (ref 0.2–1)
BILIRUB UR QL STRIP: NEGATIVE
BUN SERPL-MCNC: 23 MG/DL (ref 5–25)
CALCIUM SERPL-MCNC: 9.3 MG/DL (ref 8.4–10.2)
CARDIAC TROPONIN I PNL SERPL HS: 10 NG/L
CARDIAC TROPONIN I PNL SERPL HS: 17 NG/L
CARDIAC TROPONIN I PNL SERPL HS: 19 NG/L
CHLORIDE SERPL-SCNC: 105 MMOL/L (ref 96–108)
CLARITY UR: CLEAR
CO2 SERPL-SCNC: 24 MMOL/L (ref 21–32)
COLOR UR: YELLOW
CREAT SERPL-MCNC: 1.78 MG/DL (ref 0.6–1.3)
EOSINOPHIL # BLD AUTO: 0.01 THOUSAND/ΜL (ref 0–0.61)
EOSINOPHIL NFR BLD AUTO: 0 % (ref 0–6)
ERYTHROCYTE [DISTWIDTH] IN BLOOD BY AUTOMATED COUNT: 14.6 % (ref 11.6–15.1)
FLUAV AG UPPER RESP QL IA.RAPID: NEGATIVE
FLUBV AG UPPER RESP QL IA.RAPID: NEGATIVE
GFR SERPL CREATININE-BSD FRML MDRD: 37 ML/MIN/1.73SQ M
GLUCOSE SERPL-MCNC: 138 MG/DL (ref 65–140)
GLUCOSE SERPL-MCNC: 146 MG/DL (ref 65–140)
GLUCOSE UR STRIP-MCNC: ABNORMAL MG/DL
HCT VFR BLD AUTO: 44.9 % (ref 36.5–49.3)
HGB BLD-MCNC: 13.9 G/DL (ref 12–17)
HGB UR QL STRIP.AUTO: NEGATIVE
IMM GRANULOCYTES # BLD AUTO: 0.16 THOUSAND/UL (ref 0–0.2)
IMM GRANULOCYTES NFR BLD AUTO: 1 % (ref 0–2)
INR PPP: 1.13 (ref 0.85–1.19)
KETONES UR STRIP-MCNC: NEGATIVE MG/DL
LACTATE SERPL-SCNC: 1.3 MMOL/L (ref 0.5–2)
LACTATE SERPL-SCNC: 2.3 MMOL/L (ref 0.5–2)
LACTATE SERPL-SCNC: 2.6 MMOL/L (ref 0.5–2)
LEUKOCYTE ESTERASE UR QL STRIP: NEGATIVE
LIPASE SERPL-CCNC: 81 U/L (ref 11–82)
LYMPHOCYTES # BLD AUTO: 1.83 THOUSANDS/ΜL (ref 0.6–4.47)
LYMPHOCYTES NFR BLD AUTO: 13 % (ref 14–44)
MAGNESIUM SERPL-MCNC: 2.1 MG/DL (ref 1.9–2.7)
MCH RBC QN AUTO: 28.6 PG (ref 26.8–34.3)
MCHC RBC AUTO-ENTMCNC: 31 G/DL (ref 31.4–37.4)
MCV RBC AUTO: 92 FL (ref 82–98)
MONOCYTES # BLD AUTO: 0.84 THOUSAND/ΜL (ref 0.17–1.22)
MONOCYTES NFR BLD AUTO: 6 % (ref 4–12)
NEUTROPHILS # BLD AUTO: 11.49 THOUSANDS/ΜL (ref 1.85–7.62)
NEUTS SEG NFR BLD AUTO: 80 % (ref 43–75)
NITRITE UR QL STRIP: NEGATIVE
NRBC BLD AUTO-RTO: 0 /100 WBCS
PH UR STRIP.AUTO: 5.5 [PH]
PHOSPHATE SERPL-MCNC: 4.6 MG/DL (ref 2.3–4.1)
PLATELET # BLD AUTO: 303 THOUSANDS/UL (ref 149–390)
PMV BLD AUTO: 10.2 FL (ref 8.9–12.7)
POTASSIUM SERPL-SCNC: 3.4 MMOL/L (ref 3.5–5.3)
PROCALCITONIN SERPL-MCNC: 0.11 NG/ML
PROT SERPL-MCNC: 7.3 G/DL (ref 6.4–8.4)
PROT UR STRIP-MCNC: NEGATIVE MG/DL
PROTHROMBIN TIME: 15 SECONDS (ref 12.3–15)
RBC # BLD AUTO: 4.86 MILLION/UL (ref 3.88–5.62)
SARS-COV+SARS-COV-2 AG RESP QL IA.RAPID: NEGATIVE
SODIUM SERPL-SCNC: 142 MMOL/L (ref 135–147)
SP GR UR STRIP.AUTO: 1.01 (ref 1–1.03)
UROBILINOGEN UR STRIP-ACNC: <2 MG/DL
WBC # BLD AUTO: 14.36 THOUSAND/UL (ref 4.31–10.16)

## 2024-10-12 PROCEDURE — 3E02340 INTRODUCTION OF INFLUENZA VACCINE INTO MUSCLE, PERCUTANEOUS APPROACH: ICD-10-PCS | Performed by: INTERNAL MEDICINE

## 2024-10-12 PROCEDURE — 99285 EMERGENCY DEPT VISIT HI MDM: CPT | Performed by: EMERGENCY MEDICINE

## 2024-10-12 PROCEDURE — 83605 ASSAY OF LACTIC ACID: CPT | Performed by: PHYSICIAN ASSISTANT

## 2024-10-12 PROCEDURE — 74022 RADEX COMPL AQT ABD SERIES: CPT

## 2024-10-12 PROCEDURE — 94664 DEMO&/EVAL PT USE INHALER: CPT

## 2024-10-12 PROCEDURE — 85025 COMPLETE CBC W/AUTO DIFF WBC: CPT

## 2024-10-12 PROCEDURE — 83690 ASSAY OF LIPASE: CPT

## 2024-10-12 PROCEDURE — 87040 BLOOD CULTURE FOR BACTERIA: CPT

## 2024-10-12 PROCEDURE — 82948 REAGENT STRIP/BLOOD GLUCOSE: CPT

## 2024-10-12 PROCEDURE — 87811 SARS-COV-2 COVID19 W/OPTIC: CPT

## 2024-10-12 PROCEDURE — 96375 TX/PRO/DX INJ NEW DRUG ADDON: CPT

## 2024-10-12 PROCEDURE — 96372 THER/PROPH/DIAG INJ SC/IM: CPT

## 2024-10-12 PROCEDURE — 94760 N-INVAS EAR/PLS OXIMETRY 1: CPT

## 2024-10-12 PROCEDURE — 96361 HYDRATE IV INFUSION ADD-ON: CPT

## 2024-10-12 PROCEDURE — 84484 ASSAY OF TROPONIN QUANT: CPT | Performed by: PHYSICIAN ASSISTANT

## 2024-10-12 PROCEDURE — 85730 THROMBOPLASTIN TIME PARTIAL: CPT

## 2024-10-12 PROCEDURE — 83605 ASSAY OF LACTIC ACID: CPT

## 2024-10-12 PROCEDURE — 36415 COLL VENOUS BLD VENIPUNCTURE: CPT

## 2024-10-12 PROCEDURE — 99222 1ST HOSP IP/OBS MODERATE 55: CPT | Performed by: PHYSICIAN ASSISTANT

## 2024-10-12 PROCEDURE — 84100 ASSAY OF PHOSPHORUS: CPT | Performed by: EMERGENCY MEDICINE

## 2024-10-12 PROCEDURE — 84484 ASSAY OF TROPONIN QUANT: CPT

## 2024-10-12 PROCEDURE — 99285 EMERGENCY DEPT VISIT HI MDM: CPT

## 2024-10-12 PROCEDURE — 93005 ELECTROCARDIOGRAM TRACING: CPT

## 2024-10-12 PROCEDURE — 94660 CPAP INITIATION&MGMT: CPT

## 2024-10-12 PROCEDURE — 84145 PROCALCITONIN (PCT): CPT

## 2024-10-12 PROCEDURE — 74176 CT ABD & PELVIS W/O CONTRAST: CPT

## 2024-10-12 PROCEDURE — 80053 COMPREHEN METABOLIC PANEL: CPT

## 2024-10-12 PROCEDURE — 96374 THER/PROPH/DIAG INJ IV PUSH: CPT

## 2024-10-12 PROCEDURE — 87804 INFLUENZA ASSAY W/OPTIC: CPT

## 2024-10-12 PROCEDURE — 83735 ASSAY OF MAGNESIUM: CPT | Performed by: EMERGENCY MEDICINE

## 2024-10-12 PROCEDURE — 85610 PROTHROMBIN TIME: CPT

## 2024-10-12 RX ORDER — TAMSULOSIN HYDROCHLORIDE 0.4 MG/1
0.4 CAPSULE ORAL
Status: DISCONTINUED | OUTPATIENT
Start: 2024-10-13 | End: 2024-10-14 | Stop reason: HOSPADM

## 2024-10-12 RX ORDER — METOPROLOL SUCCINATE 50 MG/1
50 TABLET, EXTENDED RELEASE ORAL DAILY
Status: DISCONTINUED | OUTPATIENT
Start: 2024-10-13 | End: 2024-10-14 | Stop reason: HOSPADM

## 2024-10-12 RX ORDER — ONDANSETRON 2 MG/ML
4 INJECTION INTRAMUSCULAR; INTRAVENOUS ONCE
Status: COMPLETED | OUTPATIENT
Start: 2024-10-12 | End: 2024-10-12

## 2024-10-12 RX ORDER — POTASSIUM CHLORIDE 14.9 MG/ML
20 INJECTION INTRAVENOUS ONCE
Status: COMPLETED | OUTPATIENT
Start: 2024-10-12 | End: 2024-10-13

## 2024-10-12 RX ORDER — INSULIN LISPRO 100 [IU]/ML
2-12 INJECTION, SOLUTION INTRAVENOUS; SUBCUTANEOUS EVERY 6 HOURS SCHEDULED
Status: DISCONTINUED | OUTPATIENT
Start: 2024-10-13 | End: 2024-10-13

## 2024-10-12 RX ORDER — INSULIN LISPRO 100 [IU]/ML
2-12 INJECTION, SOLUTION INTRAVENOUS; SUBCUTANEOUS
Status: DISCONTINUED | OUTPATIENT
Start: 2024-10-13 | End: 2024-10-12

## 2024-10-12 RX ORDER — SODIUM CHLORIDE, SODIUM GLUCONATE, SODIUM ACETATE, POTASSIUM CHLORIDE, MAGNESIUM CHLORIDE, SODIUM PHOSPHATE, DIBASIC, AND POTASSIUM PHOSPHATE .53; .5; .37; .037; .03; .012; .00082 G/100ML; G/100ML; G/100ML; G/100ML; G/100ML; G/100ML; G/100ML
500 INJECTION, SOLUTION INTRAVENOUS ONCE
Status: COMPLETED | OUTPATIENT
Start: 2024-10-12 | End: 2024-10-13

## 2024-10-12 RX ORDER — ATORVASTATIN CALCIUM 10 MG/1
10 TABLET, FILM COATED ORAL
Status: DISCONTINUED | OUTPATIENT
Start: 2024-10-13 | End: 2024-10-14 | Stop reason: HOSPADM

## 2024-10-12 RX ORDER — PANTOPRAZOLE SODIUM 40 MG/10ML
40 INJECTION, POWDER, LYOPHILIZED, FOR SOLUTION INTRAVENOUS ONCE
Status: COMPLETED | OUTPATIENT
Start: 2024-10-12 | End: 2024-10-12

## 2024-10-12 RX ORDER — SODIUM CHLORIDE, SODIUM GLUCONATE, SODIUM ACETATE, POTASSIUM CHLORIDE, MAGNESIUM CHLORIDE, SODIUM PHOSPHATE, DIBASIC, AND POTASSIUM PHOSPHATE .53; .5; .37; .037; .03; .012; .00082 G/100ML; G/100ML; G/100ML; G/100ML; G/100ML; G/100ML; G/100ML
75 INJECTION, SOLUTION INTRAVENOUS CONTINUOUS
Status: DISCONTINUED | OUTPATIENT
Start: 2024-10-12 | End: 2024-10-14

## 2024-10-12 RX ORDER — PANTOPRAZOLE SODIUM 40 MG/1
40 TABLET, DELAYED RELEASE ORAL
Status: DISCONTINUED | OUTPATIENT
Start: 2024-10-13 | End: 2024-10-13

## 2024-10-12 RX ORDER — AMLODIPINE BESYLATE 10 MG/1
10 TABLET ORAL DAILY
Status: DISCONTINUED | OUTPATIENT
Start: 2024-10-13 | End: 2024-10-14 | Stop reason: HOSPADM

## 2024-10-12 RX ORDER — CEFTRIAXONE 1 G/50ML
1000 INJECTION, SOLUTION INTRAVENOUS EVERY 24 HOURS
Status: DISCONTINUED | OUTPATIENT
Start: 2024-10-12 | End: 2024-10-13

## 2024-10-12 RX ORDER — SUCRALFATE 1 G/1
1 TABLET ORAL ONCE
Status: COMPLETED | OUTPATIENT
Start: 2024-10-12 | End: 2024-10-12

## 2024-10-12 RX ORDER — ASPIRIN 81 MG/1
81 TABLET, CHEWABLE ORAL DAILY
Status: DISCONTINUED | OUTPATIENT
Start: 2024-10-13 | End: 2024-10-14 | Stop reason: HOSPADM

## 2024-10-12 RX ORDER — INSULIN LISPRO 100 [IU]/ML
1-5 INJECTION, SOLUTION INTRAVENOUS; SUBCUTANEOUS
Status: DISCONTINUED | OUTPATIENT
Start: 2024-10-12 | End: 2024-10-13

## 2024-10-12 RX ORDER — GABAPENTIN 100 MG/1
100 CAPSULE ORAL 2 TIMES DAILY
Status: DISCONTINUED | OUTPATIENT
Start: 2024-10-12 | End: 2024-10-14 | Stop reason: HOSPADM

## 2024-10-12 RX ORDER — ONDANSETRON 2 MG/ML
4 INJECTION INTRAMUSCULAR; INTRAVENOUS EVERY 6 HOURS PRN
Status: DISCONTINUED | OUTPATIENT
Start: 2024-10-12 | End: 2024-10-14 | Stop reason: HOSPADM

## 2024-10-12 RX ORDER — METOPROLOL SUCCINATE 25 MG/1
25 TABLET, EXTENDED RELEASE ORAL DAILY
Status: DISCONTINUED | OUTPATIENT
Start: 2024-10-13 | End: 2024-10-14 | Stop reason: HOSPADM

## 2024-10-12 RX ORDER — HYDRALAZINE HYDROCHLORIDE 25 MG/1
25 TABLET, FILM COATED ORAL 3 TIMES DAILY
Status: DISCONTINUED | OUTPATIENT
Start: 2024-10-12 | End: 2024-10-14 | Stop reason: HOSPADM

## 2024-10-12 RX ORDER — HEPARIN SODIUM 5000 [USP'U]/ML
5000 INJECTION, SOLUTION INTRAVENOUS; SUBCUTANEOUS EVERY 8 HOURS SCHEDULED
Status: DISCONTINUED | OUTPATIENT
Start: 2024-10-12 | End: 2024-10-13

## 2024-10-12 RX ORDER — HYDROMORPHONE HCL IN WATER/PF 6 MG/30 ML
0.2 PATIENT CONTROLLED ANALGESIA SYRINGE INTRAVENOUS ONCE
Status: COMPLETED | OUTPATIENT
Start: 2024-10-12 | End: 2024-10-12

## 2024-10-12 RX ADMIN — TRIMETHOBENZAMIDE HYDROCHLORIDE 200 MG: 100 INJECTION INTRAMUSCULAR at 20:29

## 2024-10-12 RX ADMIN — SODIUM CHLORIDE, SODIUM GLUCONATE, SODIUM ACETATE, POTASSIUM CHLORIDE, MAGNESIUM CHLORIDE, SODIUM PHOSPHATE, DIBASIC, AND POTASSIUM PHOSPHATE 75 ML/HR: .53; .5; .37; .037; .03; .012; .00082 INJECTION, SOLUTION INTRAVENOUS at 23:16

## 2024-10-12 RX ADMIN — HYDROMORPHONE HYDROCHLORIDE 0.2 MG: 0.2 INJECTION, SOLUTION INTRAMUSCULAR; INTRAVENOUS; SUBCUTANEOUS at 23:20

## 2024-10-12 RX ADMIN — ONDANSETRON 4 MG: 2 INJECTION INTRAMUSCULAR; INTRAVENOUS at 17:51

## 2024-10-12 RX ADMIN — CEFTRIAXONE 1000 MG: 1 INJECTION, SOLUTION INTRAVENOUS at 22:30

## 2024-10-12 RX ADMIN — SUCRALFATE 1 G: 1 TABLET ORAL at 18:12

## 2024-10-12 RX ADMIN — GABAPENTIN 100 MG: 100 CAPSULE ORAL at 22:30

## 2024-10-12 RX ADMIN — SODIUM CHLORIDE 500 ML: 0.9 INJECTION, SOLUTION INTRAVENOUS at 17:52

## 2024-10-12 RX ADMIN — HYDRALAZINE HYDROCHLORIDE 25 MG: 25 TABLET ORAL at 22:30

## 2024-10-12 RX ADMIN — POTASSIUM CHLORIDE 20 MEQ: 14.9 INJECTION, SOLUTION INTRAVENOUS at 20:49

## 2024-10-12 RX ADMIN — PANTOPRAZOLE SODIUM 40 MG: 40 INJECTION, POWDER, FOR SOLUTION INTRAVENOUS at 17:51

## 2024-10-12 RX ADMIN — SODIUM CHLORIDE, SODIUM GLUCONATE, SODIUM ACETATE, POTASSIUM CHLORIDE, MAGNESIUM CHLORIDE, SODIUM PHOSPHATE, DIBASIC, AND POTASSIUM PHOSPHATE 500 ML: .53; .5; .37; .037; .03; .012; .00082 INJECTION, SOLUTION INTRAVENOUS at 20:49

## 2024-10-12 NOTE — ED PROVIDER NOTES
Time reflects when diagnosis was documented in both MDM as applicable and the Disposition within this note       Time User Action Codes Description Comment    10/12/2024  7:44 PM Herrera, Will CHAYA Add [N17.9] LIZ (acute kidney injury) (HCC)     10/12/2024  7:44 PM Herrera, Will CHAYA Add [R11.2] Nausea & vomiting     10/12/2024  7:45 PM Herrera, Will CHAYA Add [R10.13] Epigastric pain     10/12/2024  8:49 PM Javier Will CHAYA Add [R53.1] Generalized weakness     10/12/2024  8:49 PM Herrera, Will CHAYA Add [E86.0] Dehydration           ED Disposition       ED Disposition   Admit    Condition   Stable    Date/Time   Sat Oct 12, 2024  8:54 PM    Comment   Case was discussed with SALONI and the patient's admission status was agreed to be Admission Status: observation status to the service of Dr. Emerson.               Assessment & Plan       Medical Decision Making  DDx including but not limited to: medication reaction, PUD, gastritis, GERD, gastroenteritis, appendicitis, gastroparesis, hepatitis, pancreatitis, colitis, enteritis, diverticulitis, food poisoning, epiploic appendagitis, mesenteric adenitis, mesenteric panniculitis, mesenteric ischemia, IBD, IBS, ileus, bowel obstruction, volvulus, internal hernia, cholecystitis, biliary colic, choledocholithiasis, perforated viscus, tumor, splenic etiology, constipation, AAA, testicular torsion, renal colic, pyelonephritis, UTI, cardiac etiology.     Patient is a 69-year-old male presenting to the ED with nausea, vomiting, epigastric pain beginning several hours prior to arrival.  The epigastric pain is burning similar to prior reflux exacerbations, not radiating, comes in waves, is associate with nausea, and cannot identify if it is postprandial as he has not eaten since yesterday. Pain did begin after taking Bactrim, about 10 minutes afterwards this morning.  He did have a dose last night and some mild nausea overnight. Patient also felt weak and chilled this  morning, has not been drinking as much water is normal for him, he states that this may be why he felt weak prior.  Patient also states that last EGD, colonoscopy was about 3 years ago and was only remarkable for a small gastric ulcer which was typically asymptomatic for him.  States he has had some darker stools since yesterday as well.  No NSAIDs. He started Bactrim yesterday evening for continued management of bacteremia, epididymitis -discharged yesterday from this hospital after evaluation for pyelonephritis.  Blood cultures grew Serratia marcescens in hospital, has urology/pcp follow-up.  States only known allergies to Tylenol.    Patient's labs and imaging consistent with dehydration secondary to vomiting and not tolerating p.o. intake.  Suspect GI side effects secondary to Bactrim potentially.  Patient is otherwise hemodynamically stable, nausea with mild improvement.  Hemoccult negative.  Discussed case with patient and wife, he is willing to stay for further observation, IV hydration, reassessment of tolerance for Bactrim.  He is hemodynamically stable, has LIZ secondary to dehydration, nausea and vomiting.   Discussed with Rafa GUALLPA.  He is agreeable plan for observation under Dr. Emerson.  Hemodynamically stable, no acute distress at this time.            Amount and/or Complexity of Data Reviewed  Labs: ordered.  Radiology: ordered and independent interpretation performed.    Risk  Prescription drug management.  Decision regarding hospitalization.        ED Course as of 10/12/24 2316   Sat Oct 12, 2024   1812 Hemoccult negative.   2054 Discussed case with patient and wife, he is willing to stay for further observation, IV hydration, reassessment of tolerance for Bactrim.  He is hemodynamically stable, has LIZ secondary to dehydration, nausea and vomiting.    2055 Discussed with Rafa GUALLPA.  He is agreeable plan for observation under Dr. Emerson.  Hemodynamically stable, no acute distress at this time.        Medications   ondansetron (ZOFRAN) injection 4 mg (4 mg Intravenous Given 10/12/24 1751)   sodium chloride 0.9 % bolus 500 mL (0 mL Intravenous Stopped 10/12/24 1958)   pantoprazole (PROTONIX) injection 40 mg (40 mg Intravenous Given 10/12/24 1751)   sucralfate (CARAFATE) tablet 1 g (1 g Oral Given 10/12/24 1812)   trimethobenzamide (TIGAN) IM injection 200 mg (200 mg Intramuscular Given 10/12/24 2029)   multi-electrolyte (ISOLYTE-S PH 7.4) bolus 500 mL (500 mL Intravenous New Bag 10/12/24 2049)   potassium chloride 20 mEq IVPB (premix) (20 mEq Intravenous New Bag 10/12/24 2049)       ED Risk Strat Scores                           SBIRT 22yo+      Flowsheet Row Most Recent Value   Initial Alcohol Screen: US AUDIT-C     1. How often do you have a drink containing alcohol? 0 Filed at: 10/12/2024 1729   2. How many drinks containing alcohol do you have on a typical day you are drinking?  0 Filed at: 10/12/2024 1729   3a. Male UNDER 65: How often do you have five or more drinks on one occasion? 0 Filed at: 10/12/2024 1729   3b. FEMALE Any Age, or MALE 65+: How often do you have 4 or more drinks on one occassion? 0 Filed at: 10/12/2024 1729   Audit-C Score 0 Filed at: 10/12/2024 1729   LOPEZ: How many times in the past year have you...    Used an illegal drug or used a prescription medication for non-medical reasons? Never Filed at: 10/12/2024 1729                            History of Present Illness       Chief Complaint   Patient presents with    Weakness - Generalized     Patient brought by EMS for generalized weakness, dark red stools and vomiting since last night. Was seen yesterday for UTI        Past Medical History:   Diagnosis Date    Arthritis     last assessed 7/1/15    Bronchitis 02/02/2023    Chronic diastolic (congestive) heart failure (HCC)     Diabetes mellitus (HCC)     type 2-last assessed 1/28/15    Diabetes mellitus (HCC) 03/16/2018    Dyslipidemia     GERD (gastroesophageal reflux disease)      Hypertension     Hypertensive urgency 03/16/2023    Irregular heart beat     last assessed 7/1/15    Obstructive sleep apnea     Palpitations     last assessed 9/7/17    Sexual dysfunction     last assessed 7/1/15    Thyroid disease     last assessed 7/1/15      Past Surgical History:   Procedure Laterality Date    COLONOSCOPY      TONSILLECTOMY        Family History   Problem Relation Age of Onset    Hypertension Mother         essential    Stroke Mother     Hypertension Father         essential    Heart defect Father         cardiac disorder    Stroke Brother     Hypertension Brother       Social History     Tobacco Use    Smoking status: Former     Current packs/day: 1.00     Types: Cigarettes     Passive exposure: Never    Smokeless tobacco: Never    Tobacco comments:     former smoker-quit 27 yrs ago 1pppd x 30 yrs as per [employer]   Vaping Use    Vaping status: Never Used   Substance Use Topics    Alcohol use: Never    Drug use: Never      E-Cigarette/Vaping    E-Cigarette Use Never User       E-Cigarette/Vaping Substances    Nicotine No     THC No     CBD No     Flavoring No     Other No     Unknown No       I have reviewed and agree with the history as documented.     Patient is a 69-year-old male presenting to the ED with nausea, vomiting, epigastric pain beginning several hours prior to arrival.  The epigastric pain is burning similar to prior reflux exacerbations, not radiating, comes in waves, is associate with nausea, and cannot identify if it is postprandial as he has not eaten since yesterday. Pain did begin after taking Bactrim, about 10 minutes afterwards this morning.  He did have a dose last night and some mild nausea overnight. Patient also felt weak and chilled this morning, has not been drinking as much water is normal for him, he states that this may be why he felt weak prior.  Patient also states that last EGD, colonoscopy was about 3 years ago and was only remarkable for a small gastric  ulcer which was typically asymptomatic for him.  States he has had some darker stools since yesterday as well.  No NSAIDs. He started Bactrim yesterday evening for continued management of bacteremia, epididymitis -discharged yesterday from this hospital after evaluation for pyelonephritis.  Blood cultures grew Serratia marcescens in hospital, has urology/pcp follow-up.  States only known allergies to Tylenol.          Review of Systems   Constitutional:  Positive for fatigue.   Gastrointestinal:  Positive for abdominal pain, nausea and vomiting.   All other systems reviewed and are negative.          Objective       ED Triage Vitals [10/12/24 1729]   Temperature Pulse Blood Pressure Respirations SpO2 Patient Position - Orthostatic VS   (!) 96.6 °F (35.9 °C) 81 134/72 18 94 % Lying      Temp Source Heart Rate Source BP Location FiO2 (%) Pain Score    Temporal Monitor Left arm -- 6      Vitals      Date and Time Temp Pulse SpO2 Resp BP Pain Score FACES Pain Rating User   10/12/24 2136 98.1 °F (36.7 °C) 75 93 % 20 116/62 -- -- DII   10/12/24 2136 -- -- -- -- -- 2 -- AP   10/12/24 2133 -- -- -- -- -- 2 -- AP   10/12/24 1900 97.2 °F (36.2 °C) 72 92 % 15 113/55 4 -- LC   10/12/24 1816 97 °F (36.1 °C) 81 93 % 15 134/72 6 -- CK   10/12/24 1729 96.6 °F (35.9 °C) 81 94 % 18 134/72 6 -- CLS            Physical Exam  Vitals and nursing note reviewed.   Constitutional:       General: He is in acute distress (Mild, secondary to nausea, epigastric abdominal pain).      Appearance: He is not ill-appearing, toxic-appearing or diaphoretic.   HENT:      Head: Normocephalic and atraumatic.      Nose: Nose normal.      Mouth/Throat:      Mouth: Mucous membranes are dry.      Pharynx: Oropharynx is clear.   Eyes:      General: No scleral icterus.        Right eye: No discharge.         Left eye: No discharge.      Extraocular Movements: Extraocular movements intact.      Conjunctiva/sclera: Conjunctivae normal.      Pupils: Pupils are  equal, round, and reactive to light.   Cardiovascular:      Rate and Rhythm: Normal rate and regular rhythm.      Pulses: Normal pulses.      Heart sounds: Normal heart sounds.   Pulmonary:      Effort: Pulmonary effort is normal. No respiratory distress.      Breath sounds: Normal breath sounds. No stridor. No wheezing, rhonchi or rales.   Chest:      Chest wall: No tenderness.   Abdominal:      Palpations: Abdomen is soft.      Tenderness: There is abdominal tenderness (Mild, epigastric). There is no right CVA tenderness, left CVA tenderness, guarding or rebound.   Genitourinary:     Penis: Normal.       Rectum: Guaiac result negative.      Comments: Right testicle mildly tender to palpation, states this is similar to how it is been for the past few weeks.  Musculoskeletal:         General: No tenderness, deformity or signs of injury. Normal range of motion.      Cervical back: Normal range of motion and neck supple. No rigidity or tenderness.      Right lower leg: Edema (trace) present.      Left lower leg: Edema (trace) present.   Skin:     General: Skin is warm and dry.      Coloration: Skin is not jaundiced or pale.      Findings: No bruising, erythema, lesion or rash.   Neurological:      General: No focal deficit present.      Mental Status: He is alert and oriented to person, place, and time.      Cranial Nerves: No cranial nerve deficit.      Sensory: No sensory deficit.      Motor: No weakness.      Coordination: Coordination normal.   Psychiatric:         Behavior: Behavior normal.         Results Reviewed       Procedure Component Value Units Date/Time    HS Troponin I 4hr [[id number]]  (Normal) Collected: 10/12/24 2228    Lab Status: Final result Specimen: Blood from Arm, Right Updated: 10/12/24 2302     hs TnI 4hr 19 ng/L      Delta 4hr hsTnI 9 ng/L     UA w Reflex to Microscopic w Reflex to Culture [[id number]]  (Abnormal) Collected: 10/12/24 2219    Lab Status: Final result Specimen: Urine, Clean  Catch Updated: 10/12/24 2226     Color, UA Yellow     Clarity, UA Clear     Specific Gravity, UA 1.010     pH, UA 5.5     Leukocytes, UA Negative     Nitrite, UA Negative     Protein, UA Negative mg/dl      Glucose, UA 1000 (1%) mg/dl      Ketones, UA Negative mg/dl      Urobilinogen, UA <2.0 mg/dl      Bilirubin, UA Negative     Occult Blood, UA Negative    HS Troponin I 2hr [[id number]]  (Normal) Collected: 10/12/24 1955    Lab Status: Final result Specimen: Blood from Arm, Left Updated: 10/12/24 2053     hs TnI 2hr 17 ng/L      Delta 2hr hsTnI 7 ng/L     Lactic acid 2 Hours [[id number]]  (Normal) Collected: 10/12/24 2001    Lab Status: Final result Specimen: Blood from Arm, Left Updated: 10/12/24 2043     LACTIC ACID 1.3 mmol/L     Narrative:      Result may be elevated if tourniquet was used during collection.    Phosphorus [[id number]]  (Abnormal) Collected: 10/12/24 1755    Lab Status: Final result Specimen: Blood from Arm, Right Updated: 10/12/24 1935     Phosphorus 4.6 mg/dL     Magnesium [[id number]]  (Normal) Collected: 10/12/24 1755    Lab Status: Final result Specimen: Blood from Arm, Right Updated: 10/12/24 1935     Magnesium 2.1 mg/dL     Procalcitonin [[id number]]  (Normal) Collected: 10/12/24 1755    Lab Status: Final result Specimen: Blood from Arm, Right Updated: 10/12/24 1844     Procalcitonin 0.11 ng/ml     HS Troponin 0hr (reflex protocol) [[id number]]  (Normal) Collected: 10/12/24 1758    Lab Status: Final result Specimen: Blood from Arm, Right Updated: 10/12/24 1843     hs TnI 0hr 10 ng/L     Lipase [[id number]]  (Normal) Collected: 10/12/24 1755    Lab Status: Final result Specimen: Blood from Arm, Right Updated: 10/12/24 1839     Lipase 81 u/L     Comprehensive metabolic panel [[id number]]  (Abnormal) Collected: 10/12/24 1755    Lab Status: Final result Specimen: Blood from Arm, Right Updated: 10/12/24 1839     Sodium 142 mmol/L      Potassium 3.4 mmol/L      Chloride 105 mmol/L      CO2 24 mmol/L       ANION GAP 13 mmol/L      BUN 23 mg/dL      Creatinine 1.78 mg/dL      Glucose 146 mg/dL      Calcium 9.3 mg/dL      AST 31 U/L      ALT 79 U/L      Alkaline Phosphatase 36 U/L      Total Protein 7.3 g/dL      Albumin 3.8 g/dL      Total Bilirubin 0.70 mg/dL      eGFR 37 ml/min/1.73sq m     Narrative:      National Kid[employer] guidelines for Chronic Kidney Disease (CKD):     Stage 1 with normal or high GFR (GFR > 90 mL/min/1.73 square meters)    Stage 2 Mild CKD (GFR = 60-89 mL/min/1.73 square meters)    Stage 3A Moderate CKD (GFR = 45-59 mL/min/1.73 square meters)    Stage 3B Moderate CKD (GFR = 30-44 mL/min/1.73 square meters)    Stage 4 Severe CKD (GFR = 15-29 mL/min/1.73 square meters)    Stage 5 End Stage CKD (GFR <15 mL/min/1.73 square meters)  Note: GFR calculation is accurate only with a steady state creatinine    FLU/COVID Rapid Antigen (30 min. TAT) - Preferred screening test in ED [[id number]]  (Normal) Collected: 10/12/24 1756    Lab Status: Final result Specimen: Nares from Nose Updated: 10/12/24 1837     SARS COV Rapid Antigen Negative     Influenza A Rapid Antigen Negative     Influenza B Rapid Antigen Negative    Narrative:      This test has been performed using the Quidel Lelo 2 FLU+SARS Antigen test under the Emergency Use Authorization (EUA). This test has been validated by the  and verified by the performing laboratory. The Lelo uses lateral flow immunofluorescent sandwich assay to detect SARS-COV, Influenza A and Influenza B Antigen.     The Quidel Lelo 2 SARS Antigen test does not differentiate between SARS-CoV and SARS-CoV-2.     Negative results are presumptive and may be confirmed with a molecular assay, if necessary, for patient management. Negative results do not rule out SARS-CoV-2 or influenza infection and should not be used as the sole basis for treatment or patient management decisions. A negative test result may occur if the level of antigen in a  sample is below the limit of detection of this test.     Positive results are indicative of the presence of viral antigens, but do not rule out bacterial infection or co-infection with other viruses.     All test results should be used as an adjunct to clinical observations and other information available to the provider.    FOR PEDIATRIC PATIENTS - copy/paste COVID Guidelines URL to browser: [email or web address]    Lactic acid [[id number]]  (Abnormal) Collected: 10/12/24 1755    Lab Status: Final result Specimen: Blood from Arm, Right Updated: 10/12/24 1836     LACTIC ACID 2.6 mmol/L     Narrative:      Result may be elevated if tourniquet was used during collection.    Protime-INR [[id number]]  (Normal) Collected: 10/12/24 1755    Lab Status: Final result Specimen: Blood from Arm, Right Updated: 10/12/24 1831     Protime 15.0 seconds      INR 1.13    Narrative:      INR Therapeutic Range    Indication                                             INR Range      Atrial Fibrillation                                               2.0-3.0  Hypercoagulable State                                    2.0.2.3  Left Ventricular Asist Device                            2.0-3.0  Mechanical Heart Valve                                  -    Aortic(with afib, MI, embolism, HF, LA enlargement,    and/or coagulopathy)                                     2.0-3.0 (2.5-3.5)     Mitral                                                             2.5-3.5  Prosthetic/Bioprosthetic Heart Valve               2.0-3.0  Venous thromboembolism (VTE: VT, PE        2.0-3.0    APTT [[id number]]  (Normal) Collected: 10/12/24 1755    Lab Status: Final result Specimen: Blood from Arm, Right Updated: 10/12/24 1831     PTT 24 seconds     Blood culture #2 [[id number]] Collected: 10/12/24 1753    Lab Status: In process Specimen: Blood from Arm, Right Updated: 10/12/24 1818    Blood culture #1 [[id number]]  Collected: 10/12/24 1808    Lab Status: In process Specimen: Blood from Arm, Left Updated: 10/12/24 1817    CBC and differential [[id number]]  (Abnormal) Collected: 10/12/24 1755    Lab Status: Final result Specimen: Blood from Arm, Right Updated: 10/12/24 1813     WBC 14.36 Thousand/uL      RBC 4.86 Million/uL      Hemoglobin 13.9 g/dL      Hematocrit 44.9 %      MCV 92 fL      MCH 28.6 pg      MCHC 31.0 g/dL      RDW 14.6 %      MPV 10.2 fL      Platelets 303 Thousands/uL      nRBC 0 /100 WBCs      Segmented % 80 %      Immature Grans % 1 %      Lymphocytes % 13 %      Monocytes % 6 %      Eosinophils Relative 0 %      Basophils Relative 0 %      Absolute Neutrophils 11.49 Thousands/µL      Absolute Immature Grans 0.16 Thousand/uL      Absolute Lymphocytes 1.83 Thousands/µL      Absolute Monocytes 0.84 Thousand/µL      Eosinophils Absolute 0.01 Thousand/µL      Basophils Absolute 0.03 Thousands/µL             CT abdomen pelvis wo contrast   Final Interpretation by Shaan Londono DO (10/12 2302)      Prominent wall thickening of the distal stomach with surrounding inflammatory changes (axial image 61, series 2) nonspecific but could be seen with gastritis and/or peptic ulcer disease. Correlation with the patient's symptoms recommended. No discrete    evidence of bowel obstruction.      Complex cyst versus solid lesion in the medial aspect of the left kidney measures approximately 2 cm in size. Renal neoplasm not excluded. Follow-up postcontrast imaging recommended not emergently.      Pleural calcifications, renal cysts, small hiatal hernia, and other findings as above.         Workstation performed: [id number]         XR abdomen obstruction series   ED Interpretation by Joe Jacob DO (10/12 1934)   No obvious free air.          ECG 12 Lead Documentation Only    Date/Time: 10/12/2024 5:57 PM    Performed by: Will Herrera DO  Authorized by: Will Herrera DO     Indications / Diagnosis:  Epigastric pain  ECG reviewed by me, the ED Provider: yes    Patient location:  ED  Previous ECG:     Previous ECG:  Compared to current    Comparison ECG info:  8/7/24    Similarity:  No change    Comparison to cardiac monitor: Yes    Interpretation:     Interpretation: normal    Quality:     Tracing quality:  Limited by artifact  Rate:     ECG rate:  82    ECG rate assessment: normal    Rhythm:     Rhythm: sinus rhythm    Ectopy:     Ectopy: PAC    QRS:     QRS axis:  Left    QRS intervals:  Normal  Conduction:     Conduction: abnormal      Abnormal conduction: LAFB    ST segments:     ST segments:  Non-specific  T waves:     T waves: normal    Other findings:     Other findings: prolonged qTc interval    Comments:      No STEMI. No arrhythmia. Similar to prior on 8/7/24.      ED Medication and Procedure Management   Prior to Admission Medications   Prescriptions Last Dose Informant Patient Reported? Taking?   Empagliflozin 25 MG TABS  Self No No   Sig: Take 1 tablet (25 mg total) by mouth daily   Lancets (onetouch ultrasoft) lancets  Self No No   Sig: Check glucose daily   Red Yeast Rice Extract (RED YEAST RICE PO)  Self Yes No   Sig: Take by mouth   Vitamin D, Cholecalciferol, 50 MCG (2000 UT) CAPS  Self No No   Sig: Take 50 mcg by mouth daily   amLODIPine (NORVASC) 10 mg tablet 10/12/2024 Self No Yes   Sig: Take 1 tablet (10 mg total) by mouth daily   aspirin 81 mg chewable tablet 10/11/2024 Self Yes Yes   Sig: Chew 81 mg daily   atorvastatin (LIPITOR) 10 mg tablet 10/12/2024 Self No Yes   Sig: Take 1 tablet by mouth once daily   enalapril (VASOTEC) 10 mg tablet  Self No No   Sig: Take 1 tablet (10 mg total) by mouth 4 (four) times a day 2 tabs in the am, 1 tab in the afternoon, 1 tab @ hs.   flecainide (TAMBOCOR) 150 MG tablet  Self No No   Sig: Take 1 tablet (150 mg total) by mouth see administration instructions For palpitations lasting longer than 5 minutes, take one 150 mg  tablet.  If palpitations last longer than 30 minutes, take another 150 mg tablet.  If palpitations persist, please go to the nearest emergency department.    Do not exceed 300 mg in one day.   Patient not taking: Reported on 7/8/2024   furosemide (LASIX) 40 mg tablet 10/12/2024 Self No Yes   Sig: Take 1.5 tablets (60 mg total) by mouth daily   gabapentin (NEURONTIN) 100 mg capsule   No No   Sig: Take 1 capsule (100 mg total) by mouth 2 (two) times a day   glucose blood (OneTouch Verio) test strip  Self No No   Sig: Use 1 each daily   hydrALAZINE (APRESOLINE) 25 mg tablet 10/12/2024 Self No Yes   Sig: Take 1 tablet (25 mg total) by mouth 3 (three) times a day   hydrocortisone 2.5 % cream   No No   Sig: Apply 1 Application topically 2 (two) times a day for 5 days   indomethacin (INDOCIN) 50 mg capsule   No No   Sig: Take 1 capsule (50 mg total) by mouth daily as needed for mild pain PRN with the gout   ketoconazole (NIZORAL) 2 % shampoo  Self No No   Sig: Apply 1 Application topically 2 (two) times a week   metoprolol succinate (TOPROL-XL) 25 mg 24 hr tablet  Self No No   Sig: Take 1 tablet (25 mg total) by mouth daily   metoprolol succinate (TOPROL-XL) 50 mg 24 hr tablet  Self No No   Sig: Take 1 tablet (50 mg total) by mouth daily   multivitamin (THERAGRAN) TABS  Self Yes No   Sig: Take 1 tablet by mouth daily   omeprazole (PriLOSEC) 40 MG capsule  Self No No   Sig: Take 1 capsule (40 mg total) by mouth daily   phenazopyridine (PYRIDIUM) 200 mg tablet  Self No No   Sig: Take 1 tablet (200 mg total) by mouth 3 (three) times a day   Patient not taking: Reported on 10/6/2024   semaglutide, 0.25 or 0.5 mg/dose, (Ozempic, 0.25 or 0.5 MG/DOSE,) 2 mg/3 mL injection pen  Self No No   Sig: E11.65 inject 0.25 mg weekly for 4-weeks, then 0.5 mg weekly thereafter   Patient not taking: Reported on 10/6/2024   sulfamethoxazole-trimethoprim (BACTRIM DS) 800-160 mg per tablet 10/12/2024  No Yes   Sig: Take 1 tablet by mouth every  12 (twelve) hours for 21 days   tamsulosin (FLOMAX) 0.4 mg   No No   Sig: Take 1 capsule (0.4 mg total) by mouth daily with dinner   triamcinolone (KENALOG) 0.1 % lotion  Self No No   Sig: Apply topically 2 (two) times a day      Facility-Administered Medications: None     Current Discharge Medication List        CONTINUE these medications which have NOT CHANGED    Details   amLODIPine (NORVASC) 10 mg tablet Take 1 tablet (10 mg total) by mouth daily  Qty: 90 tablet, Refills: 3    Associated Diagnoses: Benign essential HTN      aspirin 81 mg chewable tablet Chew 81 mg daily      atorvastatin (LIPITOR) 10 mg tablet Take 1 tablet by mouth once daily  Qty: 30 tablet, Refills: 0    Associated Diagnoses: Dyslipidemia      furosemide (LASIX) 40 mg tablet Take 1.5 tablets (60 mg total) by mouth daily  Qty: 135 tablet, Refills: 3    Associated Diagnoses: Heart failure with preserved ejection fraction, unspecified HF chronicity (HCC)      hydrALAZINE (APRESOLINE) 25 mg tablet Take 1 tablet (25 mg total) by mouth 3 (three) times a day  Qty: 270 tablet, Refills: 3    Associated Diagnoses: Benign essential HTN      sulfamethoxazole-trimethoprim (BACTRIM DS) 800-160 mg per tablet Take 1 tablet by mouth every 12 (twelve) hours for 21 days  Qty: 42 tablet, Refills: 0    Associated Diagnoses: Epididymitis      Empagliflozin 25 MG TABS Take 1 tablet (25 mg total) by mouth daily  Qty: 30 tablet, Refills: 5    Associated Diagnoses: Type 2 diabetes mellitus with hyperglycemia, without long-term current use of insulin (HCC)      enalapril (VASOTEC) 10 mg tablet Take 1 tablet (10 mg total) by mouth 4 (four) times a day 2 tabs in the am, 1 tab in the afternoon, 1 tab @ hs.  Qty: 360 tablet, Refills: 3    Comments: This is a change in dosing from 20 mg bid to 10 mg qid.  Associated Diagnoses: Benign essential HTN; Chronic diastolic heart failure (HCC)      flecainide (TAMBOCOR) 150 MG tablet Take 1 tablet (150 mg total) by mouth see  administration instructions For palpitations lasting longer than 5 minutes, take one 150 mg tablet.  If palpitations last longer than 30 minutes, take another 150 mg tablet.  If palpitations persist, please go to the nearest emergency department.    Do not exceed 300 mg in one day.  Qty: 90 tablet, Refills: 0    Associated Diagnoses: Benign essential HTN; Chronic diastolic heart failure (HCC); Palpitations; Junctional rhythm; Diabetes mellitus type 2 in obese; AYLEEN (obstructive sleep apnea); Dyslipidemia; Stasis edema of both lower extremities; Morbid obesity (HCC)      gabapentin (NEURONTIN) 100 mg capsule Take 1 capsule (100 mg total) by mouth 2 (two) times a day  Qty: 60 capsule, Refills: 0    Associated Diagnoses: Neuropathy      glucose blood (OneTouch Verio) test strip Use 1 each daily  Qty: 100 each, Refills: 5    Associated Diagnoses: Type 2 diabetes mellitus without complication, without long-term current use of insulin ([facility])      hydrocortisone 2.5 % cream Apply 1 Application topically 2 (two) times a day for 5 days  Qty: 10 g, Refills: 0    Associated Diagnoses: Skin lesion      indomethacin (INDOCIN) 50 mg capsule Take 1 capsule (50 mg total) by mouth daily as needed for mild pain PRN with the gout  Qty: 90 capsule, Refills: 0    Associated Diagnoses: Hx of gout      ketoconazole (NIZORAL) 2 % shampoo Apply 1 Application topically 2 (two) times a week  Qty: 120 mL, Refills: 0    Associated Diagnoses: Dermatitis      Lancets (onetouch ultrasoft) lancets Check glucose daily  Qty: 100 each, Refills: 1    Associated Diagnoses: Type 2 diabetes mellitus without complication, without long-term current use of insulin ([facility])      !! metoprolol succinate (TOPROL-XL) 25 mg 24 hr tablet Take 1 tablet (25 mg total) by mouth daily  Qty: 90 tablet, Refills: 3    Comments: Patient takes a 25mg tablet with a 50mg tablet to equal 75mg once daily  Associated Diagnoses: Benign essential HTN; Chronic diastolic heart failure  ([facility]); Palpitations      !! metoprolol succinate (TOPROL-XL) 50 mg 24 hr tablet Take 1 tablet (50 mg total) by mouth daily  Qty: 90 tablet, Refills: 3    Associated Diagnoses: Benign essential HTN; Chronic diastolic heart failure (HCC); Palpitations      multivitamin (THERAGRAN) TABS Take 1 tablet by mouth daily      omeprazole (PriLOSEC) 40 MG capsule Take 1 capsule (40 mg total) by mouth daily  Qty: 90 capsule, Refills: 3    Associated Diagnoses: Gastroesophageal reflux disease without esophagitis      phenazopyridine (PYRIDIUM) 200 mg tablet Take 1 tablet (200 mg total) by mouth 3 (three) times a day  Qty: 90 tablet, Refills: 0    Associated Diagnoses: Problem with urinary catheter ([facility])      Red Yeast Rice Extract (RED YEAST RICE PO) Take by mouth      semaglutide, 0.25 or 0.5 mg/dose, (Ozempic, 0.25 or 0.5 MG/DOSE,) 2 mg/3 mL injection pen E11.65 inject 0.25 mg weekly for 4-weeks, then 0.5 mg weekly thereafter  Qty: 3 mL, Refills: 3    Associated Diagnoses: Type 2 diabetes mellitus with hyperglycemia, without long-term current use of insulin ([facility])      tamsulosin (FLOMAX) 0.4 mg Take 1 capsule (0.4 mg total) by mouth daily with dinner  Qty: 30 capsule, Refills: 3    Associated Diagnoses: Urinary retention      triamcinolone (KENALOG) 0.1 % lotion Apply topically 2 (two) times a day  Qty: 60 mL, Refills: 3    Associated Diagnoses: Dermatitis      Vitamin D, Cholecalciferol, 50 MCG (2000 UT) CAPS Take 50 mcg by mouth daily  Qty: 90 capsule, Refills: 3    Associated Diagnoses: Vitamin D deficiency       !! - Potential duplicate medications found. Please discuss with provider.        No discharge procedures on file.  ED SEPSIS DOCUMENTATION   Time reflects when diagnosis was documented in both MDM as applicable and the Disposition within this note       Time User Action Codes Description Comment    10/12/2024  7:44 PM Will Herrera Add [N17.9] LIZ (acute kidney injury) ([facility])     10/12/2024  7:44 PM Javier  Will LARKIN Add [R11.2] Nausea & vomiting     10/12/2024  7:45 PM Will Herrera Add [R10.13] Epigastric pain     10/12/2024  8:49 PM Will Herrera Add [R53.1] Generalized weakness     10/12/2024  8:49 PM Will Herrera Add [E86.0] Dehydration                  Will Herrera, DO  10/12/24 [id number]

## 2024-10-12 NOTE — Clinical Note
Case was discussed with SALONI and the patient's admission status was agreed to be Admission Status: observation status to the service of Dr. MILVIA Saleh

## 2024-10-12 NOTE — ED ATTENDING ATTESTATION
I, Joe Jacob DO, saw and evaluated the patient. I have discussed the patient with the resident/non-physician practitioner and agree with the resident's/non-physician practitioner's findings, Plan of Care, and MDM as documented in the resident's/non-physician practitioner's note, except where noted. All available labs and Radiology studies were reviewed.      At this point I agree with the current assessment done in the Emergency Department.  I have conducted an independent evaluation of this patient including a focused history and a physical exam.    ED Note - Christy Haro 69 y.o. male MRN: [id number]  Unit/Bed#: RM06 Encounter: [id number]    History of Present Illness   HPI  Christy Haro is a 69 y.o. male who presents for evaluation of nonradiating severe epigastric pain, nausea/ vomiting.  History of GI ulcer.  Recent hospitalization for pyelonephritis and epididymitis.  Discharged home yesterday and prescribed Bactrim PO, which he believes is what started all the symptoms.  No overt chest pain or shortness of breath.  No ripping or tearing type pain.  No fever or chills.  Concern for possible darker colored stool.  No lightheadedness or dizziness.  Arrives to ED nauseated and vomiting but is not toxic or ill-appearing.    REVIEW OF SYSTEMS  See HPI for further details. 12 systems reviewed and otherwise negative except as noted.   Historical Information     PAST MEDICAL HISTORY  Past Medical History:   Diagnosis Date    Arthritis     last assessed 7/1/15    Bronchitis 02/02/2023    Chronic diastolic (congestive) heart failure (HCC)     Diabetes mellitus (HCC)     type 2-last assessed 1/28/15    Diabetes mellitus (HCC) 03/16/2018    Dyslipidemia     GERD (gastroesophageal reflux disease)     Hypertension     Hypertensive urgency 03/16/2023    Irregular heart beat     last assessed 7/1/15    Obstructive sleep apnea     Palpitations     last assessed 9/7/17    Sexual dysfunction     last assessed 7/1/15     Thyroid disease     last assessed 7/1/15       FAMILY HISTORY  Family History   Problem Relation Age of Onset    Hypertension Mother         essential    Stroke Mother     Hypertension Father         essential    Heart defect Father         cardiac disorder    Stroke Brother     Hypertension Brother        SOCIAL HISTORY  Social History     Socioeconomic History    Marital status: Single     Spouse name: None    Number of children: None    Years of education: None    Highest education level: None   Occupational History    None   Tobacco Use    Smoking status: Former     Current packs/day: 1.00     Types: Cigarettes     Passive exposure: Never    Smokeless tobacco: Never    Tobacco comments:     former smoker-quit 27 yrs ago 1pppd x 30 yrs as per [employer]   Vaping Use    Vaping status: Never Used   Substance and Sexual Activity    Alcohol use: Never    Drug use: Never    Sexual activity: Not Currently   Other Topics Concern    None   Social History Narrative    None     Social Determinants of Health     Financial Resource Strain: Low Risk  (1/8/2024)    Overall Financial Resource Strain (CARDIA)     Difficulty of Paying Living Expenses: Not hard at all   Food Insecurity: No Food Insecurity (10/7/2024)    Hunger Vital Sign     Worried About Running Out of Food in the Last Year: Never true     Ran Out of Food in the Last Year: Never true   Transportation Needs: No Transportation Needs (10/7/2024)    PRAPARE - Transportation     Lack of Transportation (Medical): No     Lack of Transportation (Non-Medical): No   Physical Activity: Not on file   Stress: Not on file   Social Connections: Not on file   Intimate Partner Violence: Not on file   Housing Stability: Low Risk  (10/7/2024)    Housing Stability Vital Sign     Unable to Pay for Housing in the Last Year: No     Number of Times Moved in the Last Year: 1     Homeless in the Last Year: No       SURGICAL HISTORY  Past Surgical History:   Procedure Laterality Date     COLONOSCOPY      TONSILLECTOMY       Meds/Allergies     CURRENT MEDICATIONS  No current facility-administered medications for this encounter.    Current Outpatient Medications:     amLODIPine (NORVASC) 10 mg tablet, Take 1 tablet (10 mg total) by mouth daily, Disp: 90 tablet, Rfl: 3    aspirin 81 mg chewable tablet, Chew 81 mg daily, Disp: , Rfl:     atorvastatin (LIPITOR) 10 mg tablet, Take 1 tablet by mouth once daily, Disp: 30 tablet, Rfl: 0    furosemide (LASIX) 40 mg tablet, Take 1.5 tablets (60 mg total) by mouth daily, Disp: 135 tablet, Rfl: 3    hydrALAZINE (APRESOLINE) 25 mg tablet, Take 1 tablet (25 mg total) by mouth 3 (three) times a day, Disp: 270 tablet, Rfl: 3    sulfamethoxazole-trimethoprim (BACTRIM DS) 800-160 mg per tablet, Take 1 tablet by mouth every 12 (twelve) hours for 21 days, Disp: 42 tablet, Rfl: 0    Empagliflozin 25 MG TABS, Take 1 tablet (25 mg total) by mouth daily, Disp: 30 tablet, Rfl: 5    enalapril (VASOTEC) 10 mg tablet, Take 1 tablet (10 mg total) by mouth 4 (four) times a day 2 tabs in the am, 1 tab in the afternoon, 1 tab @ hs., Disp: 360 tablet, Rfl: 3    flecainide (TAMBOCOR) 150 MG tablet, Take 1 tablet (150 mg total) by mouth see administration instructions For palpitations lasting longer than 5 minutes, take one 150 mg tablet.  If palpitations last longer than 30 minutes, take another 150 mg tablet.  If palpitations persist, please go to the nearest emergency department.    Do not exceed 300 mg in one day. (Patient not taking: Reported on 7/8/2024), Disp: 90 tablet, Rfl: 0    gabapentin (NEURONTIN) 100 mg capsule, Take 1 capsule (100 mg total) by mouth 2 (two) times a day, Disp: 60 capsule, Rfl: 0    glucose blood (OneTouch Verio) test strip, Use 1 each daily, Disp: 100 each, Rfl: 5    hydrocortisone 2.5 % cream, Apply 1 Application topically 2 (two) times a day for 5 days, Disp: 10 g, Rfl: 0    indomethacin (INDOCIN) 50 mg capsule, Take 1 capsule (50 mg total) by mouth  "daily as needed for mild pain PRN with the gout, Disp: 90 capsule, Rfl: 0    ketoconazole (NIZORAL) 2 % shampoo, Apply 1 Application topically 2 (two) times a week, Disp: 120 mL, Rfl: 0    Lancets (onetouch ultrasoft) lancets, Check glucose daily, Disp: 100 each, Rfl: 1    metoprolol succinate (TOPROL-XL) 25 mg 24 hr tablet, Take 1 tablet (25 mg total) by mouth daily, Disp: 90 tablet, Rfl: 3    metoprolol succinate (TOPROL-XL) 50 mg 24 hr tablet, Take 1 tablet (50 mg total) by mouth daily, Disp: 90 tablet, Rfl: 3    multivitamin (THERAGRAN) TABS, Take 1 tablet by mouth daily, Disp: , Rfl:     omeprazole (PriLOSEC) 40 MG capsule, Take 1 capsule (40 mg total) by mouth daily, Disp: 90 capsule, Rfl: 3    phenazopyridine (PYRIDIUM) 200 mg tablet, Take 1 tablet (200 mg total) by mouth 3 (three) times a day (Patient not taking: Reported on 10/6/2024), Disp: 90 tablet, Rfl: 0    Red Yeast Rice Extract (RED YEAST RICE PO), Take by mouth, Disp: , Rfl:     semaglutide, 0.25 or 0.5 mg/dose, (Ozempic, 0.25 or 0.5 MG/DOSE,) 2 mg/3 mL injection pen, E11.65 inject 0.25 mg weekly for 4-weeks, then 0.5 mg weekly thereafter (Patient not taking: Reported on 10/6/2024), Disp: 3 mL, Rfl: 3    tamsulosin (FLOMAX) 0.4 mg, Take 1 capsule (0.4 mg total) by mouth daily with dinner, Disp: 30 capsule, Rfl: 3    triamcinolone (KENALOG) 0.1 % lotion, Apply topically 2 (two) times a day, Disp: 60 mL, Rfl: 3    Vitamin D, Cholecalciferol, 50 MCG (2000 UT) CAPS, Take 50 mcg by mouth daily, Disp: 90 capsule, Rfl: 3  Not in a hospital admission.    ALLERGIES  Allergies   Allergen Reactions    Acetaminophen Other (See Comments)     Other reaction(s): Unknown Reaction  \"I had to go to the hospital and get shots after taking it 20yrs ago\"     Objective     PHYSICAL EXAM    VITAL SIGNS: Blood pressure 134/72, pulse 81, temperature (!) 97 °F (36.1 °C), temperature source Temporal, resp. rate 15, SpO2 93%.    Constitutional:  Appears well developed and well " "nourished, no acute distress, non-toxic appearance   Eyes:  PERRL, EOMI, conjunctivae pink, sclerae non-icteric    HENT:  Normocephalic/Atraumatic, no rhinorrhea, mucous membranes moist   Neck: normal range of motion, no tenderness, supple   Respiratory:  No respiratory distress, normal breath sounds   Cardiovascular:  Normal rate, normal rhythm    GI:  Soft, epigastric tenderness, non-distended  :  No CVAT, no flank ecchymosis  Musculoskeletal:  No swelling or edema, no tenderness, no deformities  Integument:  Pink, warm, dry, Well hydrated, no rash, no erythema, no bullae   Lymphatic:  No cervical/ tonsillar/ submandibular lymphadenopathy noted   Neurologic:  Awake, Alert & oriented x 3, CN 2-12 intact, no focal neurological deficits, motor function intact  Psychiatric:  Speech and behavior appropriate       ED COURSE and MDM:    Assessment & Plan   Assessment:  Christy Haro is a 69 y.o. male presents for evaluation of epigastric pain, nausea/ vomiting    Plan:  Labs, IV fluids prn, imaging prn, symptom management, disposition as appropriate.      CRITICAL CARE TIME:   0      Portions of the record may have been created with voice recognition software. Occasional wrong word or \"sound a like\" substitutions may have occurred due to the inherent limitations of voice recognition software.     ED Provider  Electronically Signed by  "

## 2024-10-13 LAB
ALBUMIN SERPL BCG-MCNC: 3.2 G/DL (ref 3.5–5)
ALP SERPL-CCNC: 28 U/L (ref 34–104)
ALT SERPL W P-5'-P-CCNC: 54 U/L (ref 7–52)
ANION GAP SERPL CALCULATED.3IONS-SCNC: 9 MMOL/L (ref 4–13)
AST SERPL W P-5'-P-CCNC: 22 U/L (ref 13–39)
BASOPHILS # BLD AUTO: 0.02 THOUSANDS/ΜL (ref 0–0.1)
BASOPHILS NFR BLD AUTO: 0 % (ref 0–1)
BILIRUB SERPL-MCNC: 0.77 MG/DL (ref 0.2–1)
BUN SERPL-MCNC: 23 MG/DL (ref 5–25)
CALCIUM ALBUM COR SERPL-MCNC: 9.1 MG/DL (ref 8.3–10.1)
CALCIUM SERPL-MCNC: 8.5 MG/DL (ref 8.4–10.2)
CHLORIDE SERPL-SCNC: 107 MMOL/L (ref 96–108)
CO2 SERPL-SCNC: 25 MMOL/L (ref 21–32)
CREAT SERPL-MCNC: 1.5 MG/DL (ref 0.6–1.3)
EOSINOPHIL # BLD AUTO: 0.02 THOUSAND/ΜL (ref 0–0.61)
EOSINOPHIL NFR BLD AUTO: 0 % (ref 0–6)
ERYTHROCYTE [DISTWIDTH] IN BLOOD BY AUTOMATED COUNT: 15 % (ref 11.6–15.1)
GFR SERPL CREATININE-BSD FRML MDRD: 46 ML/MIN/1.73SQ M
GLUCOSE P FAST SERPL-MCNC: 112 MG/DL (ref 65–99)
GLUCOSE SERPL-MCNC: 112 MG/DL (ref 65–140)
GLUCOSE SERPL-MCNC: 115 MG/DL (ref 65–140)
GLUCOSE SERPL-MCNC: 125 MG/DL (ref 65–140)
GLUCOSE SERPL-MCNC: 157 MG/DL (ref 65–140)
GLUCOSE SERPL-MCNC: 98 MG/DL (ref 65–140)
HCT VFR BLD AUTO: 35.5 % (ref 36.5–49.3)
HGB BLD-MCNC: 11.3 G/DL (ref 12–17)
IMM GRANULOCYTES # BLD AUTO: 0.09 THOUSAND/UL (ref 0–0.2)
IMM GRANULOCYTES NFR BLD AUTO: 1 % (ref 0–2)
LACTATE SERPL-SCNC: 1.4 MMOL/L (ref 0.5–2)
LYMPHOCYTES # BLD AUTO: 1.43 THOUSANDS/ΜL (ref 0.6–4.47)
LYMPHOCYTES NFR BLD AUTO: 14 % (ref 14–44)
MAGNESIUM SERPL-MCNC: 2.2 MG/DL (ref 1.9–2.7)
MCH RBC QN AUTO: 28.9 PG (ref 26.8–34.3)
MCHC RBC AUTO-ENTMCNC: 31.8 G/DL (ref 31.4–37.4)
MCV RBC AUTO: 91 FL (ref 82–98)
MONOCYTES # BLD AUTO: 0.54 THOUSAND/ΜL (ref 0.17–1.22)
MONOCYTES NFR BLD AUTO: 5 % (ref 4–12)
NEUTROPHILS # BLD AUTO: 8.07 THOUSANDS/ΜL (ref 1.85–7.62)
NEUTS SEG NFR BLD AUTO: 80 % (ref 43–75)
NRBC BLD AUTO-RTO: 0 /100 WBCS
PHOSPHATE SERPL-MCNC: 4.3 MG/DL (ref 2.3–4.1)
PLATELET # BLD AUTO: 214 THOUSANDS/UL (ref 149–390)
PMV BLD AUTO: 9.7 FL (ref 8.9–12.7)
POTASSIUM SERPL-SCNC: 3.8 MMOL/L (ref 3.5–5.3)
PROT SERPL-MCNC: 6.2 G/DL (ref 6.4–8.4)
RBC # BLD AUTO: 3.91 MILLION/UL (ref 3.88–5.62)
SODIUM SERPL-SCNC: 141 MMOL/L (ref 135–147)
WBC # BLD AUTO: 10.17 THOUSAND/UL (ref 4.31–10.16)

## 2024-10-13 PROCEDURE — 99232 SBSQ HOSP IP/OBS MODERATE 35: CPT | Performed by: HOSPITALIST

## 2024-10-13 PROCEDURE — 85025 COMPLETE CBC W/AUTO DIFF WBC: CPT | Performed by: PHYSICIAN ASSISTANT

## 2024-10-13 PROCEDURE — 97166 OT EVAL MOD COMPLEX 45 MIN: CPT

## 2024-10-13 PROCEDURE — G0008 ADMIN INFLUENZA VIRUS VAC: HCPCS | Performed by: FAMILY MEDICINE

## 2024-10-13 PROCEDURE — 83735 ASSAY OF MAGNESIUM: CPT | Performed by: PHYSICIAN ASSISTANT

## 2024-10-13 PROCEDURE — 90662 IIV NO PRSV INCREASED AG IM: CPT | Performed by: FAMILY MEDICINE

## 2024-10-13 PROCEDURE — 82948 REAGENT STRIP/BLOOD GLUCOSE: CPT

## 2024-10-13 PROCEDURE — 83605 ASSAY OF LACTIC ACID: CPT | Performed by: PHYSICIAN ASSISTANT

## 2024-10-13 PROCEDURE — 97163 PT EVAL HIGH COMPLEX 45 MIN: CPT | Performed by: PHYSICAL THERAPIST

## 2024-10-13 PROCEDURE — 84100 ASSAY OF PHOSPHORUS: CPT | Performed by: PHYSICIAN ASSISTANT

## 2024-10-13 PROCEDURE — 80053 COMPREHEN METABOLIC PANEL: CPT | Performed by: PHYSICIAN ASSISTANT

## 2024-10-13 RX ORDER — FONDAPARINUX SODIUM 2.5 MG/.5ML
2.5 INJECTION SUBCUTANEOUS EVERY 24 HOURS
Status: DISCONTINUED | OUTPATIENT
Start: 2024-10-13 | End: 2024-10-13

## 2024-10-13 RX ORDER — PANTOPRAZOLE SODIUM 40 MG/1
40 TABLET, DELAYED RELEASE ORAL
Status: DISCONTINUED | OUTPATIENT
Start: 2024-10-13 | End: 2024-10-14 | Stop reason: HOSPADM

## 2024-10-13 RX ORDER — FONDAPARINUX SODIUM 2.5 MG/.5ML
2.5 INJECTION SUBCUTANEOUS EVERY 24 HOURS
Status: DISCONTINUED | OUTPATIENT
Start: 2024-10-14 | End: 2024-10-13

## 2024-10-13 RX ORDER — SUCRALFATE 1 G/1
1 TABLET ORAL
Status: DISCONTINUED | OUTPATIENT
Start: 2024-10-13 | End: 2024-10-14 | Stop reason: HOSPADM

## 2024-10-13 RX ORDER — FONDAPARINUX SODIUM 5 MG/.4ML
5 INJECTION SUBCUTANEOUS DAILY
Status: DISCONTINUED | OUTPATIENT
Start: 2024-10-14 | End: 2024-10-14 | Stop reason: HOSPADM

## 2024-10-13 RX ORDER — CEFPODOXIME PROXETIL 200 MG/1
200 TABLET, FILM COATED ORAL EVERY 12 HOURS SCHEDULED
Status: DISCONTINUED | OUTPATIENT
Start: 2024-10-13 | End: 2024-10-14 | Stop reason: HOSPADM

## 2024-10-13 RX ADMIN — Medication 1000 UNITS: at 08:22

## 2024-10-13 RX ADMIN — ATORVASTATIN CALCIUM 10 MG: 10 TABLET, FILM COATED ORAL at 15:42

## 2024-10-13 RX ADMIN — AMLODIPINE BESYLATE 10 MG: 10 TABLET ORAL at 08:22

## 2024-10-13 RX ADMIN — GABAPENTIN 100 MG: 100 CAPSULE ORAL at 17:05

## 2024-10-13 RX ADMIN — TAMSULOSIN HYDROCHLORIDE 0.4 MG: 0.4 CAPSULE ORAL at 15:42

## 2024-10-13 RX ADMIN — ASPIRIN 81 MG 81 MG: 81 TABLET ORAL at 08:23

## 2024-10-13 RX ADMIN — CEFPODOXIME PROXETIL 200 MG: 200 TABLET, FILM COATED ORAL at 12:50

## 2024-10-13 RX ADMIN — HYDRALAZINE HYDROCHLORIDE 25 MG: 25 TABLET ORAL at 08:22

## 2024-10-13 RX ADMIN — B-COMPLEX W/ C & FOLIC ACID TAB 1 TABLET: TAB at 08:23

## 2024-10-13 RX ADMIN — GABAPENTIN 100 MG: 100 CAPSULE ORAL at 08:23

## 2024-10-13 RX ADMIN — CEFPODOXIME PROXETIL 200 MG: 200 TABLET, FILM COATED ORAL at 20:17

## 2024-10-13 RX ADMIN — SODIUM CHLORIDE, SODIUM GLUCONATE, SODIUM ACETATE, POTASSIUM CHLORIDE, MAGNESIUM CHLORIDE, SODIUM PHOSPHATE, DIBASIC, AND POTASSIUM PHOSPHATE 75 ML/HR: .53; .5; .37; .037; .03; .012; .00082 INJECTION, SOLUTION INTRAVENOUS at 13:04

## 2024-10-13 RX ADMIN — METOPROLOL SUCCINATE 50 MG: 50 TABLET, EXTENDED RELEASE ORAL at 08:22

## 2024-10-13 RX ADMIN — SUCRALFATE 1 G: 1 TABLET ORAL at 15:41

## 2024-10-13 RX ADMIN — INFLUENZA A VIRUS A/VICTORIA/4897/2022 IVR-238 (H1N1) ANTIGEN (FORMALDEHYDE INACTIVATED), INFLUENZA A VIRUS A/CALIFORNIA/122/2022 SAN-022 (H3N2) ANTIGEN (FORMALDEHYDE INACTIVATED), AND INFLUENZA B VIRUS B/MICHIGAN/01/2021 ANTIGEN (FORMALDEHYDE INACTIVATED) 0.5 ML: 60; 60; 60 INJECTION, SUSPENSION INTRAMUSCULAR at 12:47

## 2024-10-13 RX ADMIN — PANTOPRAZOLE SODIUM 40 MG: 40 TABLET, DELAYED RELEASE ORAL at 15:42

## 2024-10-13 RX ADMIN — SUCRALFATE 1 G: 1 TABLET ORAL at 12:50

## 2024-10-13 RX ADMIN — PANTOPRAZOLE SODIUM 40 MG: 40 TABLET, DELAYED RELEASE ORAL at 06:16

## 2024-10-13 RX ADMIN — METOPROLOL SUCCINATE 25 MG: 25 TABLET, EXTENDED RELEASE ORAL at 08:23

## 2024-10-13 RX ADMIN — EMPAGLIFLOZIN 20 MG: 10 TABLET, FILM COATED ORAL at 13:50

## 2024-10-13 NOTE — ASSESSMENT & PLAN NOTE
History of paroxysmal A-fib. Not currently anticoagulated.    -Continue metoprolol  -Continue out patient cardiology follow up

## 2024-10-13 NOTE — ASSESSMENT & PLAN NOTE
"Patient reports onset of abdominal pain today associated with nausea and vomiting after taking bactrim. CT abdomen pelvis showing \"Prominent wall thickening of the distal stomach with surrounding inflammatory changes nonspecific but could be seen with gastritis and/or peptic ulcer disease.\"    -Advance diet as tolerated   -Gentle IV fluids  -Pain medication PRN  -Zofran PRN  -Am labs  -Supportive care   "

## 2024-10-13 NOTE — ASSESSMENT & PLAN NOTE
Initial lactic acid level at 2.6  --Received IV fluids in the ER  --Will continue gentle IV fluids  --Trend lactic acid until resolved

## 2024-10-13 NOTE — OCCUPATIONAL THERAPY NOTE
Occupational Therapy Evaluation     Patient Name: Christy Haro  Today's Date: 10/13/2024  Problem List  Principal Problem:    Acute kidney injury superimposed on stage 3a chronic kidney disease (HCC)  Active Problems:    Benign essential HTN    Dyslipidemia    GERD (gastroesophageal reflux disease)    AYLEEN (obstructive sleep apnea)    Type 2 diabetes mellitus with obesity  (HCC)    Paroxysmal atrial fibrillation (HCC)    Epididymitis with abscess    Hypokalemia    Abdominal pain, acute, epigastric    Nausea and vomiting    Hepatitis A antibody positive    Lactic acidosis    Past Medical History  Past Medical History:   Diagnosis Date    Arthritis     last assessed 7/1/15    Bronchitis 02/02/2023    Chronic diastolic (congestive) heart failure (HCC)     Diabetes mellitus (HCC)     type 2-last assessed 1/28/15    Diabetes mellitus (HCC) 03/16/2018    Dyslipidemia     GERD (gastroesophageal reflux disease)     Hypertension     Hypertensive urgency 03/16/2023    Irregular heart beat     last assessed 7/1/15    Obstructive sleep apnea     Palpitations     last assessed 9/7/17    Sexual dysfunction     last assessed 7/1/15    Thyroid disease     last assessed 7/1/15     Past Surgical History  Past Surgical History:   Procedure Laterality Date    COLONOSCOPY      TONSILLECTOMY          10/13/24 1116   OT Last Visit   OT Visit Date 10/13/24   Note Type   Note type Evaluation   Pain Assessment   Pain Assessment Tool 0-10   Pain Score 2   Pain Location/Orientation Orientation: Upper;Location: Abdomen   Hospital Pain Intervention(s) Emotional support   Restrictions/Precautions   Weight Bearing Precautions Per Order No   Other Precautions Fall Risk;Multiple lines   Home Living   Type of Home House   Home Layout One level;Performs ADLs on one level;Ramped entrance   Bathroom Shower/Tub Tub/shower unit  (and walk in shower - utilizes tub/shower)   Bathroom Toilet Raised   Bathroom Equipment Shower chair;Grab bars in shower  "  Home Equipment Cane;Walker   Prior Function   Level of [place] Independent with ADLs;Independent with functional mobility;Independent with IADLS   Lives With Spouse   IADLs Independent with driving;Independent with meal prep;Independent with medication management   Falls in the last 6 months 0   Vocational Retired   Lifestyle   Autonomy PTA, was (I) with ADLs and required (A) with IADLs. Patient lives in a one-story home w spouse, ramped entrance Uses cane intermittently at baseline (currently has with him) - also has RW. Tub/shower and walk in shower - uses tub w/ GB and SC. Raised toilet. (+) . Denies falls. Retired. Spouse home.   Reciprocal Relationships Spouse   Service to Others Retired   General   Additional Pertinent History Recent admission at this facility 10/6-10/11 for bacteremia, epididymitis with abscess, UTI. Comorbidities affecting pt’s functional performance include a significant PMH of: CKD3, DM2, AYLEEN, GERD, HTN, obesity, lymphedema, CHF, a-fib. Patient with active OT orders and activity orders for Up and OOB as tolerated .   Family/Caregiver Present No   Subjective   Subjective \"I had a reaction to my medicine and here I am\" Agreeable to OT/PT co-evaluation.   ADL   Where Assessed Edge of bed   Eating Assistance 7  Independent   Grooming Assistance 6  Modified Independent   UB Bathing Assistance 5  Supervision/Setup   LB Bathing Assistance 5  Supervision/Setup   UB Dressing Assistance 5  Supervision/Setup   LB Dressing Assistance 5  Supervision/Setup   Toileting Assistance  5  Supervision/Setup   Transfers   Sit to Stand 5  Supervision   Additional items Bedrails;Increased time required;Other  (RW)   Stand to Sit 5  Supervision   Additional items Bedrails;Increased time required;Other  (RW)   Functional Mobility   Functional Mobility 5  Supervision   Additional Comments Ax1; household distances. No gross LOB noted.   Additional items Rolling walker   Balance   Static Sitting Good "   Dynamic Sitting Fair +   Static Standing Fair   Dynamic Standing Fair -   Ambulatory Fair -   Activity Tolerance   Activity Tolerance Patient tolerated treatment well;Patient limited by fatigue   Medical Staff Made Aware PT Thang   Nurse Made Aware Yes   RUE Assessment   RUE Assessment WFL   LUE Assessment   LUE Assessment WFL   Hand Function   Gross Motor Coordination Functional   Fine Motor Coordination Functional   Sensation   Light Touch No apparent deficits   Proprioception   Proprioception No apparent deficits   Vision-Basic Assessment   Current Vision Wears glasses all the time   Vision - Complex Assessment   Ocular Range of Motion Intact   Perception   Inattention/Neglect Appears intact   Cognition   Overall Cognitive Status WFL   Arousal/Participation Alert;Responsive;Cooperative   Attention Within functional limits   Orientation Level Oriented X4   Memory Within functional limits   Following Commands Follows all commands and directions without difficulty   Assessment   Limitation Decreased ADL status;Decreased UE strength;Decreased Safe judgement during ADL;Decreased endurance;Decreased self-care trans;Decreased high-level ADLs  (dec balance, coordination)   Prognosis Good   Assessment Patient is a 69 y.o. year old male seen for OT eval s/p admit to [facility] on 10/12/2024 with acute abdominal pain, N&V, lactic acidosis, LIZ superimposed on CKD3.  OT consulted to assess ADLs/IADLs/functional mobility and assist w/ D/C planning. Patient demonstrates the following deficits impacting occupational performance: decreased strength , decreased balance, decreased activity tolerance, limited functional reach, increased pain, increased body habitus , impaired coordination, decreased cardiovascular endurance, and decreased skin integrity . These impairments, as well at pt’s difficulty performing ADLs, difficulty performing IADLs, difficulty performing transfers/mobility, fall risk , functional decline , new use of AD for  functional transfers/mobility, and multiple admissions , limit pt’s ability to safely engage in all baseline areas of occupation. Pt's CLOF as follows: eating/grooming: [place], UB ADLs: supervision , LB ADLs: supervision , toileting: supervision , bed mobility: DNT, functional transfers: supervision , functional mobility: supervision , standing tolerance: ~5 min. Pt would benefit from continued skilled OT while in acute setting to address deficits as defined above and to maximize (I) w/ ADLs/functional mobility. Occupational performance areas to address include: grooming, bathing/shower, toilet hygiene, dressing, medication management, health maintenance, functional mobility, community mobility, clothing management, cleaning, meal prep, and household maintenance. Based on the aforementioned evaluation, functional performance deficits, and assessments, pt has been identified as a moderate complexity evaluation. At this time, anticipate no rehab needs at time of d/c. OT will continue to follow pt 2-3x/wk to address the goals listed below to  w/in 10-14 days.   Goals   Patient Goals to feel better   LTG Time Frame 10-   Plan   Treatment Interventions ADL retraining;Functional transfer training;Endurance training;UE strengthening/ROM;Compensatory technique education;Continued evaluation;Energy conservation;Activityengagement   Goal Expiration Date 10/27/24   OT Treatment Day 0   OT Frequency 2-3x/wk   Discharge Recommendation   Rehab Resource Intensity Level, OT No post-acute rehabilitation needs   Additional Comments  Co treatment with PT secondary to complex medical condition of pt, possible A of 2 required to achieve and maintain transitional movements, requiring the need of skilled therapeutic intervention of 2 therapists to achieve delivery of services.   AM-PAC Daily Activity Inpatient   Lower Body Dressing 3   Bathing 3   Toileting 3   Upper Body Dressing 4   Grooming 4   Eating 4   Daily Activity Raw Score  21   Daily Activity Standardized Score (Calc for Raw Score >=11) 44.27   AM-PAC Applied Cognition Inpatient   Following a Speech/Presentation 4   Understanding Ordinary Conversation 4   Taking Medications 4   Remembering Where Things Are Placed or Put Away 3   Remembering List of 4-5 Errands 3   Taking Care of Complicated Tasks 3   Applied Cognition Raw Score 21   Applied Cognition Standardized Score 44.3     Occupational Therapy goals: In 7-14 days:     1- Patient will verbalize and demonstrate use of energy conservation/deep breathing technique and work simplification skills during functional activity with no verbal cues.   2- Patient will verbalize and demonstrate good body mechanics and joint protection techniques during ADLs/IADLs with no verbal cues   3- Pt will complete bed mobility at a Mod I level w/ G balance/safety demonstrated to decrease caregiver assistance required   4- Patient will increase OOB/ sitting tolerance to 2-4 hours per day for increased participation in self care and leisure tasks with no s/s of exertion.   5-Patient will increase standing tolerance time to 5 minutes with unilateral UE support to complete sink level ADLs@ mod I level    6- Pt will improve functional transfers to Mod I on/off all surfaces using DME as needed w/ G balance/safety   7- Patient will complete UB ADLs with [place]alo utilizing appropriate DME/AE PRN   8- Patient will complete LB ADLs with Lalo utilizing appropriate DME/AE PRN   9- Patient will complete toileting tasks with Lalo with G hygiene/thoroughness utilizing appropriate DME/AE PRN   10- Pt will improve functional mobility during ADL/IADL/leisure tasks to Mod I using DME as needed w/ G balance/safety    11- Pt will be attentive 100% of the time during ongoing cognitive assessment w/ G participation to assist w/ safe d/c planning/recommendations   12- Pt will participate in simulated IADL management task to increase independence to Mod I w/ G safety and endurance    13- Pt will increase BUE strength by 1MM grade via AROM/AAROM/PROM exercises to increase independence in ADLs and transfers       Kat Evans, OTR/L

## 2024-10-13 NOTE — ASSESSMENT & PLAN NOTE
Lab Results   Component Value Date    HGBA1C 6.0 (H) 10/06/2024   Initial blood glucose level at 146  --Hold diabetic medication  --Fingerstick glucose every 6 hours while NPO  --Sliding scale insulin  --Monitor while admitted

## 2024-10-13 NOTE — QUICK NOTE
Patient reported improvement in epigastric pain.  CT scan of abdomen and pelvis reviewed.     CT Scan Impression:Prominent wall thickening of the distal stomach with surrounding inflammatory changes (axial image 61, series 2) nonspecific but could be seen with gastritis and/or peptic ulcer disease. Correlation with the patient's symptoms recommended. No discrete evidence of bowel obstruction.     Complex cyst versus solid lesion in the medial aspect of the left kidney measures approximately 2 cm in size. Renal neoplasm not excluded. Follow-up postcontrast imaging recommended not emergently.      --Will order GI consult for findings of possible gastritis/peptic ulcer disease  --Follow-up postcontrast imaging to further evaluate cystic versus solid lesion in left kidney will be deferred at this time due to patient's LIZ.

## 2024-10-13 NOTE — ASSESSMENT & PLAN NOTE
Blood pressure is stable  Continue PTA blood pressure medication  Hold enalapril  Monitor blood pressure while admitted

## 2024-10-13 NOTE — PLAN OF CARE
Problem: PHYSICAL THERAPY ADULT  Goal: Performs mobility at highest level of function for planned discharge setting.  See evaluation for individualized goals.  Description: Treatment/Interventions: Functional transfer training, LE strengthening/ROM, Elevations, Therapeutic exercise, Endurance training, ADL retraining, Gait training, Bed mobility          See flowsheet documentation for full assessment, interventions and recommendations.  Note: Prognosis: Good  Problem List: Decreased strength, Decreased endurance, Impaired balance, Decreased mobility, Decreased range of motion  Assessment: Pt is 69 y.o. male seen for PT evaluation s/p admit to [facility] on 10/12/2024 w/ Acute kidney injury superimposed on stage 3a chronic kidney disease (HCC). PT consulted to assess pt's functional mobility and d/c needs. Order placed for PT eval and tx, w/ up and OOB as tolerated order. Comorbidities affecting pt's physical performance at time of assessment include Acute kidney injury superimposed on stage 3a chronic kidney disease, dehydration, vomiting, epigastric pain.  PTA, pt was independent w/ all functional mobility w/ SPC . Personal factors affecting pt at time of IE include: ambulating w/ assistive device, inability to ambulate household distances, inability to navigate community distances, inability to navigate level surfaces w/o external assistance, unable to perform dynamic tasks in community, unable to perform physical activity, limited insight into impairments, inability to perform IADLs, inability to perform ADLs, and inability to live alone. Please find objective findings from PT assessment regarding body systems outlined above with impairments and limitations including weakness, decreased ROM, impaired balance, decreased endurance, gait deviations, pain, decreased activity tolerance, decreased functional mobility tolerance, and fall risk. Pt's clinical presentation is currently unstable/unpredictable seen  in pt's ability to tolerate PT treatment.  Pt to benefit from continued PT tx to address deficits as defined above and maximize level of functional independent mobility and consistency. From PT/mobility standpoint, recommendation at time of d/c would be Level 3 resource intensity pending progress in order to facilitate return to PLOF.        Rehab Resource Intensity Level, PT: III (Minimum Resource Intensity)    See flowsheet documentation for full assessment.

## 2024-10-13 NOTE — ASSESSMENT & PLAN NOTE
Noted on hepatitis panel resulting on 10/12. RUQ U/S on last admission with no acute abnormalities aside from mild hepatic steatosis.     -Monitor liver enzymes  -IV fluids   -Supportive care   -Follow up outpatient with PCP for appropriate vaccinations for Hep B

## 2024-10-13 NOTE — PROGRESS NOTES
"Progress Note - Hospitalist   Name: Christy Haro 69 y.o. male I MRN: [id number]  Unit/Bed#: 416-01 I Date of Admission: 10/12/2024   Date of Service: 10/13/2024 I Hospital Day: 0    Assessment & Plan  Acute kidney injury superimposed on stage 3a chronic kidney disease (HCC)  Lab Results   Component Value Date    EGFR 46 10/13/2024    EGFR 37 10/12/2024    EGFR 68 10/11/2024    CREATININE 1.50 (H) 10/13/2024    CREATININE 1.78 (H) 10/12/2024    CREATININE 1.08 10/11/2024   History of stage III CKD. Baseline appears to be between 1.0 and 1.2. Creatinine level at 1.78 on admission. S/p fluids in ER.     -Continue gentle IV fluids  -Hold home ACE inhibitor   -Avoid nephrotoxic agents  -Will monitor creatinine levels, urinary output, electrolytes  -Check a.m. CMP  Epididymitis with abscess  Diagnosed with epididymitis with abscess on last admission. Started on p.o. Experienced N/V after one dose of bactrim at home.    -Hold Bactrim while admitted  -IV ceftriaxone ordered  -Will discuss switching patient to doxycycline for outpatient with ID   -Urology follow up outpatient for scrotal U/S in 2-3 weeks   Hypokalemia  Potassium level mildly decreased at 3.4 on admission. S/p replacement.     -Resolved   -Monitor and replace levels as indicated   -Check AM CMP  Abdominal pain, acute, epigastric  Patient reports onset of abdominal pain today associated with nausea and vomiting after taking bactrim. CT abdomen pelvis showing \"Prominent wall thickening of the distal stomach with surrounding inflammatory changes nonspecific but could be seen with gastritis and/or peptic ulcer disease.\"    -Advance diet as tolerated   -Gentle IV fluids  -Pain medication PRN  -Zofran PRN  -Am labs  -Supportive care   Nausea and vomiting  Patient reports onset of abdominal pain today associated with nausea and vomiting after taking bactrim. CT abdomen pelvis showing \"Prominent wall thickening of the distal stomach with surrounding inflammatory " "changes nonspecific but could be seen with gastritis and/or peptic ulcer disease.\"    -See \"abdominal pain, acute, epigastric\" for plan     Hepatitis A antibody positive  Noted on hepatitis panel resulting on 10/12. RUQ U/S on last admission with no acute abnormalities aside from mild hepatic steatosis.     -Monitor liver enzymes  -IV fluids   -Supportive care   -Follow up outpatient with PCP for appropriate vaccinations for Hep B   Benign essential HTN  Blood pressure is stable  Continue PTA blood pressure medication  Hold enalapril  Monitor blood pressure while admitted  Dyslipidemia  Stable  -Continue atorvastatin  GERD (gastroesophageal reflux disease)  History of GERD  -Currently on omeprazole 40 mg p.o. daily  -Will give formulary alternative Protonix 40 mg p.o. daily  AYLEEN (obstructive sleep apnea)  History of AYLEEN  -Respiratory protocol  -Continue CPAP at bedtime  Paroxysmal atrial fibrillation (HCC)  History of paroxysmal A-fib. Not currently anticoagulated.    -Continue metoprolol  -Continue out patient cardiology follow up  Type 2 diabetes mellitus with obesity  (HCC)  Lab Results   Component Value Date    HGBA1C 6.0 (H) 10/06/2024     Initial blood glucose level at 146  -Hold home diabetic medication  -Fingerstick glucose every 6 hours while NPO  -Sliding scale insulin  -Monitor while admitted   Lactic acidosis  Initial lactic acid level at 2.6    -Resolved   -Received IV fluids in the ER  -Will continue gentle IV fluids      VTE Pharmacologic Prophylaxis:    Fondaparinux 5 mg due to Shinto reasons     Mobility:   Basic Mobility Inpatient Raw Score: 19  JH-HLM Goal: 6: Walk 10 steps or more  JH-HLM Achieved: 6: Walk 10 steps or more  JH-HLM Goal achieved. Continue to encourage appropriate mobility.    Patient Centered Rounds: I performed bedside rounds with nursing staff today.   Discussions with Specialists or Other Care Team Provider: ID    Education and Discussions with Family / Patient: Updated "  (wife) via phone.    Current Length of Stay: 0 day(s)  Current Patient Status: Observation   Certification Statement: The patient will continue to require additional inpatient hospital stay due to LIZ  Discharge Plan: Anticipate discharge in 24-48 hrs to home.    Code Status: Level 1 - Full Code    Subjective   Patient seen laying in bed in no acute distress. Feels better than on admission. Tolerating liquid intake and okay with attempting solids. Urinating and stooling without issue. Ambulatory. Sleeping well. Denies chest pain, shortness of breath, nausea, vomiting, constipation, diarrhea.    Objective :  Temp:  [96.6 °F (35.9 °C)-98.2 °F (36.8 °C)] 98.2 °F (36.8 °C)  HR:  [54-81] 54  BP: (113-134)/(55-93) 114/93  Resp:  [15-20] 20  SpO2:  [87 %-97 %] 87 %  O2 Device: BiPAP  FiO2 (%):  [30] 30    Body mass index is 40.75 kg/m².     Input and Output Summary (last 24 hours):   No intake or output data in the 24 hours ending 10/13/24 0738    Physical Exam  Vitals and nursing note reviewed.   Constitutional:       General: He is not in acute distress.     Appearance: Normal appearance. He is obese. He is not ill-appearing or toxic-appearing.   HENT:      Head: Normocephalic and atraumatic.      Nose: Nose normal.   Eyes:      Conjunctiva/sclera: Conjunctivae normal.   Cardiovascular:      Rate and Rhythm: Normal rate and regular rhythm.      Heart sounds: Normal heart sounds. No murmur heard.     No friction rub. No gallop.   Pulmonary:      Effort: Pulmonary effort is normal. No respiratory distress.      Breath sounds: Normal breath sounds. No wheezing, rhonchi or rales.   Abdominal:      General: Abdomen is flat. Bowel sounds are normal. There is no distension.      Palpations: Abdomen is soft.      Tenderness: There is no abdominal tenderness. There is no guarding or rebound.      Comments: Some bruising on RLQ from VTE prophylaxis shots    Skin:     General: Skin is warm and dry.      Findings: No  rash.   Neurological:      General: No focal deficit present.      Mental Status: He is alert and oriented to person, place, and time.   Psychiatric:         Mood and Affect: Mood normal.         Behavior: Behavior normal.         Thought Content: Thought content normal.         Lab Results: I have reviewed the following results:   Results from last 7 days   Lab Units 10/13/24  0537   WBC Thousand/uL 10.17*   HEMOGLOBIN g/dL 11.3*   HEMATOCRIT % 35.5*   PLATELETS Thousands/uL 214   SEGS PCT % 80*   LYMPHO PCT % 14   MONO PCT % 5   EOS PCT % 0     Results from last 7 days   Lab Units 10/13/24  0537   SODIUM mmol/L 141   POTASSIUM mmol/L 3.8   CHLORIDE mmol/L 107   CO2 mmol/L 25   BUN mg/dL 23   CREATININE mg/dL 1.50*   ANION GAP mmol/L 9   CALCIUM mg/dL 8.5   ALBUMIN g/dL 3.2*   TOTAL BILIRUBIN mg/dL 0.77   ALK PHOS U/L 28*   ALT U/L 54*   AST U/L 22   GLUCOSE RANDOM mg/dL 112     Results from last 7 days   Lab Units 10/12/24  1755   INR  1.13     Results from last 7 days   Lab Units 10/13/24  0659 10/12/24  2152 10/11/24  1047 10/11/24  0725 10/10/24  2110 10/10/24  1621 10/10/24  1028 10/10/24  0717 10/09/24  2031 10/09/24  1550 10/09/24  1102 10/09/24  0734   POC GLUCOSE mg/dl 115 138 137 99 136 126 135 110 124 141* 128 115         Results from last 7 days   Lab Units 10/13/24  0114 10/12/24  2228 10/12/24  2001 10/12/24  1755 10/10/24  0549 10/09/24  0606 10/07/24  0429   LACTIC ACID mmol/L 1.4 2.3* 1.3 2.6*  --   --   --    PROCALCITONIN ng/ml  --   --   --  0.11 0.14 0.19 0.49*       Recent Cultures (last 7 days):   Results from last 7 days   Lab Units 10/06/24  1504   BLOOD CULTURE  No Growth After 5 Days.  No Growth After 5 Days.       Imaging Results Review: I reviewed radiology reports from this admission including: CT abdomen/pelvis.  Other Study Results Review: EKG was reviewed.     Last 24 Hours Medication List:     Current Facility-Administered Medications:     amLODIPine (NORVASC) tablet 10 mg,  Daily    aspirin chewable tablet 81 mg, Daily    atorvastatin (LIPITOR) tablet 10 mg, Daily With Dinner    cefTRIAXone (ROCEPHIN) IVPB (premix in dextrose) 1,000 mg 50 mL, Q24H, Last Rate: 1,000 mg (10/12/24 2230)    Cholecalciferol (VITAMIN D3) tablet 1,000 Units, Daily    [START ON 10/14/2024] fondaparinux (ARIXTRA) subcutaneous injection 2.5 mg, Q24H    gabapentin (NEURONTIN) capsule 100 mg, BID    hydrALAZINE (APRESOLINE) tablet 25 mg, TID    influenza vaccine, high-dose (Fluzone High-Dose) IM injection 0.5 mL, Once    insulin lispro (HumALOG/ADMELOG) 100 units/mL subcutaneous injection 1-5 Units, HS    insulin lispro (HumALOG/ADMELOG) 100 units/mL subcutaneous injection 2-12 Units, Q6H SPENCER **AND** Fingerstick Glucose (POCT), Q6H    metoprolol succinate (TOPROL-XL) 24 hr tablet 25 mg, Daily    metoprolol succinate (TOPROL-XL) 24 hr tablet 50 mg, Daily    multi-electrolyte (PLASMALYTE-A/ISOLYTE-S PH 7.4) IV solution, Continuous, Last Rate: 75 mL/hr (10/12/24 2316)    multivitamin stress formula tablet 1 tablet, Daily    ondansetron (ZOFRAN) injection 4 mg, Q6H PRN    pantoprazole (PROTONIX) EC tablet 40 mg, Early Morning    tamsulosin (FLOMAX) capsule 0.4 mg, Daily With Dinner    Administrative Statements   Today, Patient Was Seen By: Jeannette Marquez DO    **Please Note: This note may have been constructed using a voice recognition system.**

## 2024-10-13 NOTE — ASSESSMENT & PLAN NOTE
Presented to the emergency room for evaluation of complaint of nausea and vomiting  Associated with abdominal pain  Patient reports onset of nausea and vomiting after taking Bactrim  --Will keep NPO for now  --IV fluids   --Zofran PRN  --Monitor electrolytes  --Am labs  --Supportive care

## 2024-10-13 NOTE — ASSESSMENT & PLAN NOTE
History of paroxysmal A-fib  --Not currently anticoagulated   --Will continue metoprolol  --Continue out patient cardiology follow up

## 2024-10-13 NOTE — ASSESSMENT & PLAN NOTE
Potassium level mildly decreased at 3.4 on admission. S/p replacement.     -Resolved   -Monitor and replace levels as indicated   -Check AM CMP

## 2024-10-13 NOTE — ASSESSMENT & PLAN NOTE
History of GERD  -Currently on omeprazole 40 mg p.o. daily  -Will give formulary alternative Protonix 40 mg p.o. daily

## 2024-10-13 NOTE — ASSESSMENT & PLAN NOTE
Diagnosed with epididymitis with abscess on last admission. Started on p.o. Experienced N/V after one dose of bactrim at home.    -Hold Bactrim while admitted  -IV ceftriaxone ordered  -Will discuss switching patient to doxycycline for outpatient with ID   -Urology follow up outpatient for scrotal U/S in 2-3 weeks

## 2024-10-13 NOTE — ASSESSMENT & PLAN NOTE
Lab Results   Component Value Date    EGFR 46 10/13/2024    EGFR 37 10/12/2024    EGFR 68 10/11/2024    CREATININE 1.50 (H) 10/13/2024    CREATININE 1.78 (H) 10/12/2024    CREATININE 1.08 10/11/2024   History of stage III CKD. Baseline appears to be between 1.0 and 1.2. Creatinine level at 1.78 on admission. S/p fluids in ER.     -Continue gentle IV fluids  -Hold home ACE inhibitor   -Avoid nephrotoxic agents  -Will monitor creatinine levels, urinary output, electrolytes  -Check a.m. CMP

## 2024-10-13 NOTE — PHYSICAL THERAPY NOTE
Physical Therapy Evaluation     Patient Name: Christy Haro    Today's Date: 10/13/2024     Problem List  Principal Problem:    Acute kidney injury superimposed on stage 3a chronic kidney disease (HCC)  Active Problems:    Benign essential HTN    Dyslipidemia    GERD (gastroesophageal reflux disease)    AYLEEN (obstructive sleep apnea)    Type 2 diabetes mellitus with obesity  (HCC)    Paroxysmal atrial fibrillation (HCC)    Epididymitis with abscess    Hypokalemia    Abdominal pain, acute, epigastric    Nausea and vomiting    Hepatitis A antibody positive    Lactic acidosis       Past Medical History  Past Medical History:   Diagnosis Date    Arthritis     last assessed 7/1/15    Bronchitis 02/02/2023    Chronic diastolic (congestive) heart failure (HCC)     Diabetes mellitus (HCC)     type 2-last assessed 1/28/15    Diabetes mellitus (HCC) 03/16/2018    Dyslipidemia     GERD (gastroesophageal reflux disease)     Hypertension     Hypertensive urgency 03/16/2023    Irregular heart beat     last assessed 7/1/15    Obstructive sleep apnea     Palpitations     last assessed 9/7/17    Sexual dysfunction     last assessed 7/1/15    Thyroid disease     last assessed 7/1/15        Past Surgical History  Past Surgical History:   Procedure Laterality Date    COLONOSCOPY      TONSILLECTOMY           10/13/24 1125   PT Last Visit   PT Visit Date 10/13/24   Note Type   Note type Evaluation   Restrictions/Precautions   Weight Bearing Precautions Per Order No   Other Precautions Fall Risk;Multiple lines   Home Living   Type of Home House   Home Layout One level;Ramped entrance   Bathroom Shower/Tub Tub/shower unit   Bathroom Toilet Raised   Prior Function   Level of [place] Independent with ADLs;Independent with functional mobility;Independent with IADLS   Lives With Spouse   IADLs Independent with driving;Independent with meal prep;Independent with medication management    Falls in the last 6 months 0   Subjective   Subjective Pt is agreeable to PT   RLE Assessment   RLE Assessment WFL   LLE Assessment   LLE Assessment WFL   Bed Mobility   Additional Comments Seated EOB at the start of the session   Transfers   Sit to Stand 5  Supervision   Stand to Sit 5  Supervision   Stand pivot 5  Supervision   Additional Comments RW   Ambulation/Elevation   Gait pattern Decreased hip extension;Decreased heel strike;Decreased toe off   Gait Assistance 5  Supervision   Assistive Device Rolling walker   Distance 100'   Balance   Static Sitting Fair   Dynamic Sitting Fair   Static Standing Fair -   Dynamic Standing Fair -   Ambulatory Fair -   Endurance Deficit   Endurance Deficit Yes   Endurance Deficit Description limited OOB activity   Assessment   Prognosis Good   Problem List Decreased strength;Decreased endurance;Impaired balance;Decreased mobility;Decreased range of motion   Assessment Pt is 69 y.o. male seen for PT evaluation s/p admit to [facility] on 10/12/2024 w/ Acute kidney injury superimposed on stage 3a chronic kidney disease (HCC). PT consulted to assess pt's functional mobility and d/c needs. Order placed for PT eval and tx, w/ up and OOB as tolerated order. Comorbidities affecting pt's physical performance at time of assessment include Acute kidney injury superimposed on stage 3a chronic kidney disease, dehydration, vomiting, epigastric pain.  PTA, pt was independent w/ all functional mobility w/ SPC . Personal factors affecting pt at time of IE include: ambulating w/ assistive device, inability to ambulate household distances, inability to navigate community distances, inability to navigate level surfaces w/o external assistance, unable to perform dynamic tasks in community, unable to perform physical activity, limited insight into impairments, inability to perform IADLs, inability to perform ADLs, and inability to live alone. Please find objective findings from PT  assessment regarding body systems outlined above with impairments and limitations including weakness, decreased ROM, impaired balance, decreased endurance, gait deviations, pain, decreased activity tolerance, decreased functional mobility tolerance, and fall risk. Pt's clinical presentation is currently unstable/unpredictable seen in pt's ability to tolerate PT treatment.  Pt to benefit from continued PT tx to address deficits as defined above and maximize level of functional independent mobility and consistency. From PT/mobility standpoint, recommendation at time of d/c would be Level 3 resource intensity pending progress in order to facilitate return to PLOF.   Goals   Patient Goals to feel better   LTG Expiration Date 10/27/24   Long Term Goal #1 Pt will be (I) with all transfers and bed mobility to help promote safe return to PLOF   Long Term Goal #2 Patient will safely ambulate 250 feet with MI to help promote safe return to PLOF   Plan   Treatment/Interventions Functional transfer training;LE strengthening/ROM;Elevations;Therapeutic exercise;Endurance training;ADL retraining;Gait training;Bed mobility   Discharge Recommendation   Rehab Resource Intensity Level, PT III (Minimum Resource Intensity)   AM-PAC Basic Mobility Inpatient   Turning in Flat Bed Without Bedrails 3   Lying on Back to Sitting on Edge of Flat Bed Without Bedrails 3   Moving Bed to Chair 3   Standing Up From Chair Using Arms 3   Walk in Room 3   Climb 3-5 Stairs With Railing 3   Basic Mobility Inpatient Raw Score 18   Basic Mobility Standardized Score 41.05   [facility] Highest Level Of Mobility   -HL Goal 6: Walk 10 steps or more   -HLM Achieved 7: Walk 25 feet or more     Patient seated EOB at the end of the session all lines intact, all needs within reach. Patients raw score on the AM-PAC Basic Mobility inpatient short form is 18, standardized score is 41.05, (less than) 42.9. patient's at this level are likely to benefit from post  acute rehab services, however, please refer to therapist recommendation for safe D/C Plan.

## 2024-10-13 NOTE — ASSESSMENT & PLAN NOTE
Lab Results   Component Value Date    EGFR 37 10/12/2024    EGFR 68 10/11/2024    EGFR 62 10/10/2024    CREATININE 1.78 (H) 10/12/2024    CREATININE 1.08 10/11/2024    CREATININE 1.17 10/10/2024   History of stage III CKD  Creatinine level at 1.78  Baseline appears to be between 1.0 and 1.3  Received IV fluids in the ER  --Continue gentle IV fluids  --Avoid nephrotoxic agents  --will monitor creatinine levels, urinary output, electrolytes  --Check a.m. CMP  --Nephrology consult

## 2024-10-13 NOTE — H&P
H&P - Hospitalist   Name: Christy Haro 69 y.o. male I MRN: [id number]  Unit/Bed#: 416-01 I Date of Admission: 10/12/2024   Date of Service: 10/12/2024 I Hospital Day: 0     Assessment & Plan  Acute kidney injury superimposed on stage 3a chronic kidney disease (HCC)  Lab Results   Component Value Date    EGFR 37 10/12/2024    EGFR 68 10/11/2024    EGFR 62 10/10/2024    CREATININE 1.78 (H) 10/12/2024    CREATININE 1.08 10/11/2024    CREATININE 1.17 10/10/2024   History of stage III CKD  Creatinine level at 1.78  Baseline appears to be between 1.0 and 1.3  Received IV fluids in the ER  --Continue gentle IV fluids  --Avoid nephrotoxic agents  --will monitor creatinine levels, urinary output, electrolytes  --Check a.m. CMP  --Nephrology consult  Epididymitis with abscess  Diagnosed with epididymitis with abscess on last admission  Started on p.o. Bactrim upon discharge  Patient reports taking 1 dose yesterday.  He also reports after taking Bactrim today he developed onset of nausea and vomiting.  --Will hold Bactrim while admitted  --Will continue antibiotic coverage with IV ceftriaxone  --Consider ID consult for alternative p.o. antibiotic coverage  --Continue to monitor while admitted   Hypokalemia  Potassium level mildly decreased at 3.4  --Replace potassium  --Monitor potassium levels   --Check AM CMP  Abdominal pain, acute, epigastric  Patient reports onset of abdominal pain today  Associated with nausea and vomiting  Patient reports that pain is severe upon ingesting both liquids or solids  CT abdomen without contrast pending  --Will keep NPO for now  --Gentle IV fluids  --Pain medication PRN  --Zofran PRN  --Consider general surgery consult if no improvement  --Am labs  --Supportive care   Nausea and vomiting  Presented to the emergency room for evaluation of complaint of nausea and vomiting  Associated with abdominal pain  Patient reports onset of nausea and vomiting after taking Bactrim  --Will keep NPO for  now  --IV fluids   --Zofran PRN  --Monitor electrolytes  --Am labs  --Supportive care   Hepatitis A antibody positive  Noted on hepatitis panel resulting on 10/12  --Will monitor Liver enzymes  --IV fluids   --Supportive care   Benign essential HTN  Blood pressure is stable  Continue PTA blood pressure medication  Hold enalapril  Monitor blood pressure while admitted  Dyslipidemia  Stable  --Continue atorvastatin  GERD (gastroesophageal reflux disease)  History of GERD  --Currently on omeprazole 40 mg p.o. daily  --Will give formulary alternative Protonix 40 mg p.o. daily  AYLEEN (obstructive sleep apnea)  History of AYLEEN  --Respiratory protocol  --Continue CPAP at bedtime  Paroxysmal atrial fibrillation (HCC)  History of paroxysmal A-fib  --Not currently anticoagulated   --Will continue metoprolol  --Continue out patient cardiology follow up  Type 2 diabetes mellitus with obesity  (HCC)  Lab Results   Component Value Date    HGBA1C 6.0 (H) 10/06/2024   Initial blood glucose level at 146  --Hold diabetic medication  --Fingerstick glucose every 6 hours while NPO  --Sliding scale insulin  --Monitor while admitted   Lactic acidosis  Initial lactic acid level at 2.6  --Received IV fluids in the ER  --Will continue gentle IV fluids  --Trend lactic acid until resolved      VTE Pharmacologic Prophylaxis:   Moderate Risk (Score 3-4) - Pharmacological DVT Prophylaxis Ordered: heparin.  Code Status: Level 1 - Full Code   Discussion with family: Patient declined call to .     Anticipated Length of Stay: Patient will be admitted on an observation basis with an anticipated length of stay of less than 2 midnights secondary to acute kidney injury, epigastric abdominal pain, nausea and vomiting, lactic acidosis, hypokalemia, hepatitis A antibody positive.    History of Present Illness   Chief Complaint: Pain, nausea and vomiting    Christy Haro is a 69 y.o. male with a PMH of diabetes, hypertension, CHF, paroxysmal  A-fib, struct of sleep apnea, GERD, hyperlipidemia who presents emergency room for evaluation of complaint of nausea and vomiting and abdominal pain.  Patient reports that his symptoms started earlier on today and progressively worse.  Patient was discharged from the hospital yesterday after admission for bacteremia and diagnosed with epididymitis on that same admission.  He was discharged with p.o. Bactrim and states that he took 1 dose yesterday evening.  He also stated he started having nausea and vomiting and new onset abdominal pain after taking today's dose.  Patient denies having any fever chills, chest pain, shortness of breath, cough.  Patient did state that is not able to keep any food down.  He states that even drinking liquids worsens his abdominal pain.    Work up in the ER included labs significant for potassium of 3.4, creatinine of 1.78, glucose of 146, ALT of 79, phosphorus of 4.6, 0-hour troponin of 10, lactic acid of 2.6, procalcitonin of 0.11, WBC of 14.36.  COVID-19/flu/RSV negative.  Abdominal x-ray obstruction series completed with official report pending.  CT abdomen and pelvis without contrast completed official report pending.  EKG shows a sinus rhythm with PACs as a rate of 82 bpm.  While in the ER patient treated with Zofran 4 mg IV, Protonix 40 mg IV, Carafate 1 g p.o., trimethobenzamide 200 mg IM, normal saline 500 cc bolus potassium chloride 20 mEq IV Isolyte 500 cc bolus.     Patient states that he is still experiencing abdominal pain and nausea.  No additional episodes of vomiting.  Patient is being admitted on observation status [facility] for further workup and management of acute kidney injury, gastric abdominal pain, nausea and vomiting, hypokalemia, lactic acidosis, hepatitis A antibody positive.    Review of Systems   Constitutional:  Positive for activity change and appetite change. Negative for chills, fatigue and fever.   Eyes:  Negative for photophobia and visual  disturbance.   Respiratory:  Negative for cough, chest tightness, shortness of breath and wheezing.    Cardiovascular:  Positive for leg swelling. Negative for chest pain and palpitations.   Gastrointestinal:  Positive for abdominal pain, nausea and vomiting. Negative for blood in stool, constipation and diarrhea.   Genitourinary:  Negative for difficulty urinating, dysuria, flank pain and hematuria.   Musculoskeletal:  Negative for arthralgias, back pain, neck pain and neck stiffness.   Skin:  Negative for rash and wound.   Neurological:  Negative for dizziness, tremors, syncope, weakness and headaches.   Psychiatric/Behavioral:  Negative for agitation and confusion. The patient is not nervous/anxious.        Historical Information   Past Medical History:   Diagnosis Date    Arthritis     last assessed 7/1/15    Bronchitis 02/02/2023    Chronic diastolic (congestive) heart failure (HCC)     Diabetes mellitus (HCC)     type 2-last assessed 1/28/15    Diabetes mellitus (HCC) 03/16/2018    Dyslipidemia     GERD (gastroesophageal reflux disease)     Hypertension     Hypertensive urgency 03/16/2023    Irregular heart beat     last assessed 7/1/15    Obstructive sleep apnea     Palpitations     last assessed 9/7/17    Sexual dysfunction     last assessed 7/1/15    Thyroid disease     last assessed 7/1/15     Past Surgical History:   Procedure Laterality Date    COLONOSCOPY      TONSILLECTOMY       Social History     Tobacco Use    Smoking status: Former     Current packs/day: 1.00     Types: Cigarettes     Passive exposure: Never    Smokeless tobacco: Never    Tobacco comments:     former smoker-quit 27 yrs ago 1pppd x 30 yrs as per [employer]   Vaping Use    Vaping status: Never Used   Substance and Sexual Activity    Alcohol use: Never    Drug use: Never    Sexual activity: Not Currently     E-Cigarette/Vaping    E-Cigarette Use Never User      E-Cigarette/Vaping Substances    Nicotine No     THC No     CBD No      Flavoring No     Other No     Unknown No      Family History   Problem Relation Age of Onset    Hypertension Mother         essential    Stroke Mother     Hypertension Father         essential    Heart defect Father         cardiac disorder    Stroke Brother     Hypertension Brother      Social History:  Marital Status: Single   Occupation: Retired  Patient Pre-hospital Living Situation: Home  Patient Pre-hospital Level of Mobility: walks with cane  Patient Pre-hospital Diet Restrictions: None reported    Meds/Allergies   Medications Prior to Admission   Medication Sig Dispense Refill Last Dispense    amLODIPine (NORVASC) 10 mg tablet Take 1 tablet (10 mg total) by mouth daily 90 tablet 3 Unknown (outside pharmacy)    aspirin 81 mg chewable tablet Chew 81 mg daily   Unknown (patient-reported)    atorvastatin (LIPITOR) 10 mg tablet Take 1 tablet by mouth once daily 30 tablet 0 Unknown (outside pharmacy)    furosemide (LASIX) 40 mg tablet Take 1.5 tablets (60 mg total) by mouth daily 135 tablet 3 Unknown (outside pharmacy)    hydrALAZINE (APRESOLINE) 25 mg tablet Take 1 tablet (25 mg total) by mouth 3 (three) times a day 270 tablet 3 Unknown (outside pharmacy)    sulfamethoxazole-trimethoprim (BACTRIM DS) 800-160 mg per tablet Take 1 tablet by mouth every 12 (twelve) hours for 21 days 42 tablet 0 Unknown (outside pharmacy)    Empagliflozin 25 MG TABS Take 1 tablet (25 mg total) by mouth daily 30 tablet 5 Unknown (outside pharmacy)    enalapril (VASOTEC) 10 mg tablet Take 1 tablet (10 mg total) by mouth 4 (four) times a day 2 tabs in the am, 1 tab in the afternoon, 1 tab @ hs. 360 tablet 3 Unknown (outside pharmacy)    flecainide (TAMBOCOR) 150 MG tablet Take 1 tablet (150 mg total) by mouth see administration instructions For palpitations lasting longer than 5 minutes, take one 150 mg tablet.  If palpitations last longer than 30 minutes, take another 150 mg tablet.  If palpitations persist, please go to the nearest  emergency department.    Do not exceed 300 mg in one day. (Patient not taking: Reported on 7/8/2024) 90 tablet 0 Unknown (outside pharmacy)    gabapentin (NEURONTIN) 100 mg capsule Take 1 capsule (100 mg total) by mouth 2 (two) times a day 60 capsule 0 Unknown (outside pharmacy)    glucose blood (OneTouch Verio) test strip Use 1 each daily 100 each 5 Unknown (outside pharmacy)    hydrocortisone 2.5 % cream Apply 1 Application topically 2 (two) times a day for 5 days 10 g 0 Unknown (outside pharmacy)    indomethacin (INDOCIN) 50 mg capsule Take 1 capsule (50 mg total) by mouth daily as needed for mild pain PRN with the gout 90 capsule 0 Unknown (outside pharmacy)    ketoconazole (NIZORAL) 2 % shampoo Apply 1 Application topically 2 (two) times a week 120 mL 0 Unknown (outside pharmacy)    Lancets (onetouch ultrasoft) lancets Check glucose daily 100 each 1 Unknown (outside pharmacy)    metoprolol succinate (TOPROL-XL) 25 mg 24 hr tablet Take 1 tablet (25 mg total) by mouth daily 90 tablet 3 Unknown (outside pharmacy)    metoprolol succinate (TOPROL-XL) 50 mg 24 hr tablet Take 1 tablet (50 mg total) by mouth daily 90 tablet 3 Unknown (outside pharmacy)    multivitamin (THERAGRAN) TABS Take 1 tablet by mouth daily   Unknown (patient-reported)    omeprazole (PriLOSEC) 40 MG capsule Take 1 capsule (40 mg total) by mouth daily 90 capsule 3 Unknown (outside pharmacy)    phenazopyridine (PYRIDIUM) 200 mg tablet Take 1 tablet (200 mg total) by mouth 3 (three) times a day (Patient not taking: Reported on 10/6/2024) 90 tablet 0 Unknown (outside pharmacy)    Red Yeast Rice Extract (RED YEAST RICE PO) Take by mouth   Unknown (patient-reported)    semaglutide, 0.25 or 0.5 mg/dose, (Ozempic, 0.25 or 0.5 MG/DOSE,) 2 mg/3 mL injection pen E11.65 inject 0.25 mg weekly for 4-weeks, then 0.5 mg weekly thereafter (Patient not taking: Reported on 10/6/2024) 3 mL 3 Unknown (outside pharmacy)    tamsulosin (FLOMAX) 0.4 mg Take 1 capsule  "(0.4 mg total) by mouth daily with dinner 30 capsule 3 Unknown (outside pharmacy)    triamcinolone (KENALOG) 0.1 % lotion Apply topically 2 (two) times a day 60 mL 3 Unknown (outside pharmacy)    Vitamin D, Cholecalciferol, 50 MCG (2000 UT) CAPS Take 50 mcg by mouth daily 90 capsule 3 Unknown (outside pharmacy)     Allergies   Allergen Reactions    Acetaminophen Other (See Comments)     Other reaction(s): Unknown Reaction  \"I had to go to the hospital and get shots after taking it 20yrs ago\"       Objective :  Temp:  [96.6 °F (35.9 °C)-98.1 °F (36.7 °C)] 98.1 °F (36.7 °C)  HR:  [72-81] 75  BP: (113-134)/(55-72) 116/62  Resp:  [15-20] 20  SpO2:  [92 %-94 %] 93 %  O2 Device: None (Room air)    Physical Exam  Constitutional:       General: He is not in acute distress.     Appearance: He is ill-appearing. He is not diaphoretic.   HENT:      Head: Normocephalic and atraumatic.      Nose: No congestion or rhinorrhea.      Mouth/Throat:      Mouth: Mucous membranes are moist.      Pharynx: Oropharynx is clear.   Eyes:      Pupils: Pupils are equal, round, and reactive to light.   Cardiovascular:      Rate and Rhythm: Normal rate and regular rhythm.      Pulses: Normal pulses.   Pulmonary:      Effort: No respiratory distress.      Breath sounds: No wheezing, rhonchi or rales.   Abdominal:      Palpations: Abdomen is soft. There is no mass.      Tenderness: There is abdominal tenderness.   Musculoskeletal:      Cervical back: Neck supple. No rigidity or tenderness.      Right lower leg: Edema present.      Left lower leg: Edema present.   Skin:     General: Skin is warm and dry.      Capillary Refill: Capillary refill takes less than 2 seconds.      Coloration: Skin is pale. Skin is not jaundiced.      Findings: No erythema or rash.   Neurological:      Mental Status: He is alert and oriented to person, place, and time.   Psychiatric:         Mood and Affect: Mood normal.         Lines/Drains:            Lab Results: I have " reviewed the following results:  Results from last 7 days   Lab Units 10/12/24  1755   WBC Thousand/uL 14.36*   HEMOGLOBIN g/dL 13.9   HEMATOCRIT % 44.9   PLATELETS Thousands/uL 303   SEGS PCT % 80*   LYMPHO PCT % 13*   MONO PCT % 6   EOS PCT % 0     Results from last 7 days   Lab Units 10/12/24  1755   SODIUM mmol/L 142   POTASSIUM mmol/L 3.4*   CHLORIDE mmol/L 105   CO2 mmol/L 24   BUN mg/dL 23   CREATININE mg/dL 1.78*   ANION GAP mmol/L 13   CALCIUM mg/dL 9.3   ALBUMIN g/dL 3.8   TOTAL BILIRUBIN mg/dL 0.70   ALK PHOS U/L 36   ALT U/L 79*   AST U/L 31   GLUCOSE RANDOM mg/dL 146*     Results from last 7 days   Lab Units 10/12/24  1755   INR  1.13     Results from last 7 days   Lab Units 10/12/24  2152 10/11/24  1047 10/11/24  0725 10/10/24  2110 10/10/24  1621 10/10/24  1028 10/10/24  0717 10/09/24  2031 10/09/24  1550 10/09/24  1102 10/09/24  0734 10/08/24  2107   POC GLUCOSE mg/dl 138 137 99 136 126 135 110 124 141* 128 115 122     Lab Results   Component Value Date    HGBA1C 6.0 (H) 10/06/2024    HGBA1C 6.2 (H) 07/01/2024    HGBA1C 6.0 (H) 04/07/2024     Results from last 7 days   Lab Units 10/12/24  2001 10/12/24  1755 10/10/24  0549 10/09/24  0606 10/07/24  0429 10/06/24  0153   LACTIC ACID mmol/L 1.3 2.6*  --   --   --  1.0   PROCALCITONIN ng/ml  --  0.11 0.14 0.19 0.49* 0.89*       Imaging Results Review: I reviewed radiology reports from this admission including: CT abdomen/pelvis and xray(s).  Other Study Results Review: EKG was reviewed.     Administrative Statements   I have spent a total time of 40 minutes in caring for this patient on the day of the visit/encounter including Documenting in the medical record, Reviewing / ordering tests, medicine, procedures  , Obtaining or reviewing history  , and Communicating with other healthcare professionals .    ** Please Note: This note has been constructed using a voice recognition system. **

## 2024-10-13 NOTE — ASSESSMENT & PLAN NOTE
Diagnosed with epididymitis with abscess on last admission  Started on p.o. Bactrim upon discharge  Patient reports taking 1 dose yesterday.  He also reports after taking Bactrim today he developed onset of nausea and vomiting.  --Will hold Bactrim while admitted  --Will continue antibiotic coverage with IV ceftriaxone  --Consider ID consult for alternative p.o. antibiotic coverage  --Continue to monitor while admitted

## 2024-10-13 NOTE — QUICK NOTE
Received Sepsis Time Zero alert for this patient due to temperature of 96.6 white blood cell count of 14.36, lactic acid of 2.6.  At time of notification patient has a temperature of 98.1. lactic acid at 2.3. Patient is is currently receiving IV antibiotics.  Received IV fluid boluses.  Blood cultures and urine cultures are pending.  Procalcitonin levels within normal limits.  Patient was previously on p.o. Bactrim for known epididymitis as outpatient. No new or additional source of infection at this time.    --Will continue current management with IV ceftriaxone and IV fluids  --Trend lactic acid   --Will follow cultures   --Continue to monitor

## 2024-10-13 NOTE — ASSESSMENT & PLAN NOTE
Noted on hepatitis panel resulting on 10/12  --Will monitor Liver enzymes  --IV fluids   --Supportive care

## 2024-10-13 NOTE — ASSESSMENT & PLAN NOTE
Potassium level mildly decreased at 3.4  --Replace potassium  --Monitor potassium levels   --Check AM CMP

## 2024-10-13 NOTE — ASSESSMENT & PLAN NOTE
"Patient reports onset of abdominal pain today associated with nausea and vomiting after taking bactrim. CT abdomen pelvis showing \"Prominent wall thickening of the distal stomach with surrounding inflammatory changes nonspecific but could be seen with gastritis and/or peptic ulcer disease.\"    -See \"abdominal pain, acute, epigastric\" for plan     "

## 2024-10-13 NOTE — PLAN OF CARE
Problem: OCCUPATIONAL THERAPY ADULT  Goal: Performs self-care activities at highest level of function for planned discharge setting.  See evaluation for individualized goals.  Description: Treatment Interventions: ADL retraining, Functional transfer training, Endurance training, UE strengthening/ROM, Compensatory technique education, Continued evaluation, Energy conservation, Activityengagement          See flowsheet documentation for full assessment, interventions and recommendations.   10/13/2024 1324 by Kat Evans OT  Outcome: Progressing  Note: Limitation: Decreased ADL status, Decreased UE strength, Decreased Safe judgement during ADL, Decreased endurance, Decreased self-care trans, Decreased high-level ADLs (dec balance, coordination)  Prognosis: Good  Assessment: Patient is a 69 y.o. year old male seen for OT eval s/p admit to [facility] on 10/12/2024 with acute abdominal pain, N&V, lactic acidosis, LIZ superimposed on CKD3.  OT consulted to assess ADLs/IADLs/functional mobility and assist w/ D/C planning. Patient demonstrates the following deficits impacting occupational performance: decreased strength , decreased balance, decreased activity tolerance, limited functional reach, increased pain, increased body habitus , impaired coordination, decreased cardiovascular endurance, and decreased skin integrity . These impairments, as well at pt’s difficulty performing ADLs, difficulty performing IADLs, difficulty performing transfers/mobility, fall risk , functional decline , new use of AD for functional transfers/mobility, and multiple admissions , limit pt’s ability to safely engage in all baseline areas of occupation. Pt's CLOF as follows: eating/grooming: Delbert, UB ADLs: supervision , LB ADLs: supervision , toileting: supervision , bed mobility: DNT, functional transfers: supervision , functional mobility: supervision , standing tolerance: ~5 min. Pt would benefit from continued skilled OT while in acute  setting to address deficits as defined above and to maximize (I) w/ ADLs/functional mobility. Occupational performance areas to address include: grooming, bathing/shower, toilet hygiene, dressing, medication management, health maintenance, functional mobility, community mobility, clothing management, cleaning, meal prep, and household maintenance. Based on the aforementioned evaluation, functional performance deficits, and assessments, pt has been identified as a moderate complexity evaluation. At this time, anticipate no rehab needs at time of d/c. OT will continue to follow pt 2-3x/wk to address the goals listed below to  w/in 10-14 days.     Rehab Resource Intensity Level, OT: No post-acute rehabilitation needs

## 2024-10-13 NOTE — RESPIRATORY THERAPY NOTE
RT Protocol Note  Christy Haro 69 y.o. male MRN: [id number]  Unit/Bed#: 416-01 Encounter: [id number]    Assessment    Principal Problem:    Acute kidney injury superimposed on stage 3a chronic kidney disease (HCC)  Active Problems:    Benign essential HTN    Dyslipidemia    GERD (gastroesophageal reflux disease)    AYLEEN (obstructive sleep apnea)    Type 2 diabetes mellitus with obesity  (HCC)    Paroxysmal atrial fibrillation (HCC)    Epididymitis with abscess    Hypokalemia    Abdominal pain, acute, epigastric    Nausea and vomiting    Hepatitis A antibody positive    Lactic acidosis      Home Pulmonary Medications: None  Home Devices/Therapy: BiPAP/CPAP    Past Medical History:   Diagnosis Date    Arthritis     last assessed 7/1/15    Bronchitis 02/02/2023    Chronic diastolic (congestive) heart failure (HCC)     Diabetes mellitus (HCC)     type 2-last assessed 1/28/15    Diabetes mellitus (HCC) 03/16/2018    Dyslipidemia     GERD (gastroesophageal reflux disease)     Hypertension     Hypertensive urgency 03/16/2023    Irregular heart beat     last assessed 7/1/15    Obstructive sleep apnea     Palpitations     last assessed 9/7/17    Sexual dysfunction     last assessed 7/1/15    Thyroid disease     last assessed 7/1/15     Social History     Socioeconomic History    Marital status: Single     Spouse name: None    Number of children: None    Years of education: None    Highest education level: None   Occupational History    None   Tobacco Use    Smoking status: Former     Current packs/day: 1.00     Types: Cigarettes     Passive exposure: Never    Smokeless tobacco: Never    Tobacco comments:     former smoker-quit 27 yrs ago 1pppd x 30 yrs as per [employer]   Vaping Use    Vaping status: Never Used   Substance and Sexual Activity    Alcohol use: Never    Drug use: Never    Sexual activity: Not Currently   Other Topics Concern    None   Social History Narrative    None     Social Determinants of Health      Financial Resource Strain: Low Risk  (1/8/2024)    Overall Financial Resource Strain (CARDIA)     Difficulty of Paying Living Expenses: Not hard at all   Food Insecurity: No Food Insecurity (10/7/2024)    Hunger Vital Sign     Worried About Running Out of Food in the Last Year: Never true     Ran Out of Food in the Last Year: Never true   Transportation Needs: No Transportation Needs (10/7/2024)    PRAPARE - Transportation     Lack of Transportation (Medical): No     Lack of Transportation (Non-Medical): No   Physical Activity: Not on file   Stress: Not on file   Social Connections: Not on file   Intimate Partner Violence: Not on file   Housing Stability: Low Risk  (10/7/2024)    Housing Stability Vital Sign     Unable to Pay for Housing in the Last Year: No     Number of Times Moved in the Last Year: 1     Homeless in the Last Year: No       Subjective         Objective    Physical Exam:   Assessment Type: Assess only  General Appearance: Alert, Awake  Respiratory Pattern: Normal  Chest Assessment: Chest expansion symmetrical  Bilateral Breath Sounds: Diminished  Cough: None  O2 Device: HS BiPAP 10/5/30%    Vitals:  Blood pressure 116/62, pulse 70, temperature 98.1 °F (36.7 °C), temperature source Oral, resp. rate 20, height 6' (1.829 m), weight (!) 136 kg (300 lb 7.8 oz), SpO2 97%.          Imaging and other studies: Results Review Statement: No pertinent imaging studies reviewed.    O2 Device: HS BiPAP 10/5/30%     Plan    Respiratory Plan: Vent/NIV/HFNC

## 2024-10-13 NOTE — ASSESSMENT & PLAN NOTE
Lab Results   Component Value Date    HGBA1C 6.0 (H) 10/06/2024     Initial blood glucose level at 146  -Hold home diabetic medication  -Fingerstick glucose every 6 hours while NPO  -Sliding scale insulin  -Monitor while admitted

## 2024-10-13 NOTE — ASSESSMENT & PLAN NOTE
Initial lactic acid level at 2.6    -Resolved   -Received IV fluids in the ER  -Will continue gentle IV fluids

## 2024-10-13 NOTE — OCCUPATIONAL THERAPY NOTE
Occupational Therapy Evaluation     Patient Name: Christy Haro  Today's Date: 10/13/2024  Problem List  Principal Problem:    Acute kidney injury superimposed on stage 3a chronic kidney disease (HCC)  Active Problems:    Benign essential HTN    Dyslipidemia    GERD (gastroesophageal reflux disease)    AYLEEN (obstructive sleep apnea)    Type 2 diabetes mellitus with obesity  (HCC)    Paroxysmal atrial fibrillation (HCC)    Epididymitis with abscess    Hypokalemia    Abdominal pain, acute, epigastric    Nausea and vomiting    Hepatitis A antibody positive    Lactic acidosis    Past Medical History  Past Medical History:   Diagnosis Date    Arthritis     last assessed 7/1/15    Bronchitis 02/02/2023    Chronic diastolic (congestive) heart failure (HCC)     Diabetes mellitus (HCC)     type 2-last assessed 1/28/15    Diabetes mellitus (HCC) 03/16/2018    Dyslipidemia     GERD (gastroesophageal reflux disease)     Hypertension     Hypertensive urgency 03/16/2023    Irregular heart beat     last assessed 7/1/15    Obstructive sleep apnea     Palpitations     last assessed 9/7/17    Sexual dysfunction     last assessed 7/1/15    Thyroid disease     last assessed 7/1/15     Past Surgical History  Past Surgical History:   Procedure Laterality Date    COLONOSCOPY      TONSILLECTOMY          10/13/24 1116   OT Last Visit   OT Visit Date 10/13/24   Note Type   Note type Evaluation   Pain Assessment   Pain Assessment Tool 0-10   Pain Score 2   Pain Location/Orientation Orientation: Upper;Location: Abdomen   Hospital Pain Intervention(s) Emotional support   Restrictions/Precautions   Weight Bearing Precautions Per Order No   Other Precautions Fall Risk;Multiple lines   Home Living   Type of Home House   Home Layout One level;Performs ADLs on one level;Ramped entrance   Bathroom Shower/Tub Tub/shower unit  (and walk in shower - utilizes tub/shower)   Bathroom Toilet Raised   Bathroom Equipment Shower chair;Grab bars in shower  "  Home Equipment Cane;Walker   Prior Function   Level of Major Independent with ADLs;Independent with functional mobility;Independent with IADLS   Lives With Spouse   IADLs Independent with driving;Independent with meal prep;Independent with medication management   Falls in the last 6 months 0   Vocational Retired   Lifestyle   Autonomy PTA, was (I) with ADLs and required (A) with IADLs. Patient lives in a one-story home w spouse, 1 [address]. Uses cane intermittently at baseline (currently has with him) - also has RW. Tub/shower and walk in shower - uses tub w/ GB and SC. Raised toilet. (+) . Denies falls. Retired. Spouse home.   Reciprocal Relationships Spouse   Service to Others Retired   General   Additional Pertinent History Recent admission at this facility 10/6-10/11 for bacteremia, epididymitis with abscess, UTI. Comorbidities affecting pt’s functional performance include a significant PMH of: CKD3, DM2, AYLEEN, GERD, HTN, obesity, lymphedema, CHF, a-fib. Patient with active OT orders and activity orders for Up and OOB as tolerated .   Family/Caregiver Present No   Subjective   Subjective \"I had a reaction to my medicine and here I am\" Agreeable to OT/PT co-evaluation.   ADL   Where Assessed Edge of bed   Eating Assistance 7  Independent   Grooming Assistance 6  Modified Independent   UB Bathing Assistance 5  Supervision/Setup   LB Bathing Assistance 5  Supervision/Setup   UB Dressing Assistance 5  Supervision/Setup   LB Dressing Assistance 5  Supervision/Setup   Toileting Assistance  5  Supervision/Setup   Transfers   Sit to Stand 5  Supervision   Additional items Bedrails;Increased time required;Other  (RW)   Stand to Sit 5  Supervision   Additional items Bedrails;Increased time required;Other  (RW)   Functional Mobility   Functional Mobility 5  Supervision   Additional Comments Ax1; household distances. No gross LOB noted.   Additional items Rolling walker   Balance   Static Sitting Good   Dynamic " Sitting Fair +   Static Standing Fair   Dynamic Standing Fair -   Ambulatory Fair -   Activity Tolerance   Activity Tolerance Patient tolerated treatment well;Patient limited by fatigue   Medical Staff Made Aware PT Thang   Nurse Made Aware Yes   RUE Assessment   RUE Assessment WFL   LUE Assessment   LUE Assessment WFL   Hand Function   Gross Motor Coordination Functional   Fine Motor Coordination Functional   Sensation   Light Touch No apparent deficits   Proprioception   Proprioception No apparent deficits   Vision-Basic Assessment   Current Vision Wears glasses all the time   Vision - Complex Assessment   Ocular Range of Motion Intact   Perception   Inattention/Neglect Appears intact   Cognition   Overall Cognitive Status WFL   Arousal/Participation Alert;Responsive;Cooperative   Attention Within functional limits   Orientation Level Oriented X4   Memory Within functional limits   Following Commands Follows all commands and directions without difficulty   Assessment   Limitation Decreased ADL status;Decreased UE strength;Decreased Safe judgement during ADL;Decreased endurance;Decreased self-care trans;Decreased high-level ADLs  (dec balance, coordination)   Prognosis Good   Goals   Patient Goals to feel better   LTG Time Frame 10-14   Plan   Treatment Interventions ADL retraining;Functional transfer training;Endurance training;UE strengthening/ROM;Compensatory technique education;Continued evaluation;Energy conservation;Activityengagement   Goal Expiration Date 10/27/24   OT Treatment Day 0   OT Frequency 2-3x/wk   Discharge Recommendation   Rehab Resource Intensity Level, OT No post-acute rehabilitation needs   AM-PAC Daily Activity Inpatient   Lower Body Dressing 3   Bathing 3   Toileting 3   Upper Body Dressing 4   Grooming 4   Eating 4   Daily Activity Raw Score 21   Daily Activity Standardized Score (Calc for Raw Score >=11) 44.27   AM-PAC Applied Cognition Inpatient   Following a Speech/Presentation 4    Understanding Ordinary Conversation 4   Taking Medications 4   Remembering Where Things Are Placed or Put Away 3   Remembering List of 4-5 Errands 3   Taking Care of Complicated Tasks 3   Applied Cognition Raw Score 21   Applied Cognition Standardized Score 44.3     Occupational Therapy goals: In 7-14 days:     1- Patient will verbalize and demonstrate use of energy conservation/deep breathing technique and work simplification skills during functional activity with no verbal cues.   2- Patient will verbalize and demonstrate good body mechanics and joint protection techniques during ADLs/IADLs with no verbal cues   3- Pt will complete bed mobility at a Mod I level w/ G balance/safety demonstrated to decrease caregiver assistance required   4- Patient will increase OOB/ sitting tolerance to 2-4 hours per day for increased participation in self care and leisure tasks with no s/s of exertion.   5-Patient will increase standing tolerance time to 5 minutes with unilateral UE support to complete sink level ADLs@ mod I level    6- Pt will improve functional transfers to Mod I on/off all surfaces using DME as needed w/ G balance/safety   7- Patient will complete UB ADLs with Lalo utilizing appropriate DME/AE PRN   8- Patient will complete LB ADLs with Lalo utilizing appropriate DME/AE PRN   9- Patient will complete toileting tasks with Lalo with G hygiene/thoroughness utilizing appropriate DME/AE PRN   10- Pt will improve functional mobility during ADL/IADL/leisure tasks to Mod I using DME as needed w/ G balance/safety    11- Pt will be attentive 100% of the time during ongoing cognitive assessment w/ G participation to assist w/ safe d/c planning/recommendations   12- Pt will participate in simulated IADL management task to increase independence to Mod I w/ G safety and endurance   13- Pt will increase BUE strength by 1MM grade via AROM/AAROM/PROM exercises to increase independence in ADLs and transfers       Kat  Cristina, OTR/L

## 2024-10-13 NOTE — ASSESSMENT & PLAN NOTE
Patient reports onset of abdominal pain today  Associated with nausea and vomiting  Patient reports that pain is severe upon ingesting both liquids or solids  CT abdomen without contrast pending  --Will keep NPO for now  --Gentle IV fluids  --Pain medication PRN  --Zofran PRN  --Consider general surgery consult if no improvement  --Am labs  --Supportive care

## 2024-10-13 NOTE — ASSESSMENT & PLAN NOTE
History of GERD  --Currently on omeprazole 40 mg p.o. daily  --Will give formulary alternative Protonix 40 mg p.o. daily

## 2024-10-14 ENCOUNTER — TRANSITIONAL CARE MANAGEMENT (OUTPATIENT)
Dept: FAMILY MEDICINE CLINIC | Facility: CLINIC | Age: 69
End: 2024-10-14

## 2024-10-14 VITALS
BODY MASS INDEX: 41.45 KG/M2 | RESPIRATION RATE: 18 BRPM | HEIGHT: 72 IN | HEART RATE: 65 BPM | TEMPERATURE: 97.6 F | OXYGEN SATURATION: 96 % | WEIGHT: 306 LBS | DIASTOLIC BLOOD PRESSURE: 55 MMHG | SYSTOLIC BLOOD PRESSURE: 107 MMHG

## 2024-10-14 PROBLEM — I44.0 1ST DEGREE AV BLOCK: Status: ACTIVE | Noted: 2024-10-14

## 2024-10-14 PROBLEM — I44.4 LEFT ANTERIOR FASCICULAR BLOCK: Status: ACTIVE | Noted: 2024-10-14

## 2024-10-14 PROBLEM — E83.39 HYPERPHOSPHATEMIA: Status: ACTIVE | Noted: 2024-10-14

## 2024-10-14 LAB
ALBUMIN SERPL BCG-MCNC: 3.2 G/DL (ref 3.5–5)
ALP SERPL-CCNC: 27 U/L (ref 34–104)
ALT SERPL W P-5'-P-CCNC: 41 U/L (ref 7–52)
ANION GAP SERPL CALCULATED.3IONS-SCNC: 7 MMOL/L (ref 4–13)
AST SERPL W P-5'-P-CCNC: 18 U/L (ref 13–39)
BASOPHILS # BLD AUTO: 0.02 THOUSANDS/ΜL (ref 0–0.1)
BASOPHILS NFR BLD AUTO: 0 % (ref 0–1)
BILIRUB SERPL-MCNC: 0.62 MG/DL (ref 0.2–1)
BUN SERPL-MCNC: 22 MG/DL (ref 5–25)
CALCIUM ALBUM COR SERPL-MCNC: 9 MG/DL (ref 8.3–10.1)
CALCIUM SERPL-MCNC: 8.4 MG/DL (ref 8.4–10.2)
CHLORIDE SERPL-SCNC: 108 MMOL/L (ref 96–108)
CO2 SERPL-SCNC: 26 MMOL/L (ref 21–32)
CREAT SERPL-MCNC: 1.31 MG/DL (ref 0.6–1.3)
EOSINOPHIL # BLD AUTO: 0.01 THOUSAND/ΜL (ref 0–0.61)
EOSINOPHIL NFR BLD AUTO: 0 % (ref 0–6)
ERYTHROCYTE [DISTWIDTH] IN BLOOD BY AUTOMATED COUNT: 15.1 % (ref 11.6–15.1)
GFR SERPL CREATININE-BSD FRML MDRD: 54 ML/MIN/1.73SQ M
GLUCOSE SERPL-MCNC: 105 MG/DL (ref 65–140)
GLUCOSE SERPL-MCNC: 108 MG/DL (ref 65–140)
HCT VFR BLD AUTO: 32.3 % (ref 36.5–49.3)
HGB BLD-MCNC: 10 G/DL (ref 12–17)
IMM GRANULOCYTES # BLD AUTO: 0.04 THOUSAND/UL (ref 0–0.2)
IMM GRANULOCYTES NFR BLD AUTO: 1 % (ref 0–2)
LYMPHOCYTES # BLD AUTO: 1.33 THOUSANDS/ΜL (ref 0.6–4.47)
LYMPHOCYTES NFR BLD AUTO: 22 % (ref 14–44)
MCH RBC QN AUTO: 28.5 PG (ref 26.8–34.3)
MCHC RBC AUTO-ENTMCNC: 31 G/DL (ref 31.4–37.4)
MCV RBC AUTO: 92 FL (ref 82–98)
MONOCYTES # BLD AUTO: 0.47 THOUSAND/ΜL (ref 0.17–1.22)
MONOCYTES NFR BLD AUTO: 8 % (ref 4–12)
NEUTROPHILS # BLD AUTO: 4.07 THOUSANDS/ΜL (ref 1.85–7.62)
NEUTS SEG NFR BLD AUTO: 69 % (ref 43–75)
NRBC BLD AUTO-RTO: 0 /100 WBCS
PLATELET # BLD AUTO: 189 THOUSANDS/UL (ref 149–390)
PMV BLD AUTO: 9.6 FL (ref 8.9–12.7)
POTASSIUM SERPL-SCNC: 3.9 MMOL/L (ref 3.5–5.3)
PROT SERPL-MCNC: 6 G/DL (ref 6.4–8.4)
RBC # BLD AUTO: 3.51 MILLION/UL (ref 3.88–5.62)
SODIUM SERPL-SCNC: 141 MMOL/L (ref 135–147)
WBC # BLD AUTO: 5.94 THOUSAND/UL (ref 4.31–10.16)

## 2024-10-14 PROCEDURE — 82948 REAGENT STRIP/BLOOD GLUCOSE: CPT

## 2024-10-14 PROCEDURE — 99239 HOSP IP/OBS DSCHRG MGMT >30: CPT | Performed by: INTERNAL MEDICINE

## 2024-10-14 PROCEDURE — 80053 COMPREHEN METABOLIC PANEL: CPT | Performed by: STUDENT IN AN ORGANIZED HEALTH CARE EDUCATION/TRAINING PROGRAM

## 2024-10-14 PROCEDURE — 97116 GAIT TRAINING THERAPY: CPT

## 2024-10-14 PROCEDURE — 85025 COMPLETE CBC W/AUTO DIFF WBC: CPT | Performed by: STUDENT IN AN ORGANIZED HEALTH CARE EDUCATION/TRAINING PROGRAM

## 2024-10-14 PROCEDURE — 97110 THERAPEUTIC EXERCISES: CPT

## 2024-10-14 RX ORDER — CEFPODOXIME PROXETIL 200 MG/1
200 TABLET, FILM COATED ORAL EVERY 12 HOURS SCHEDULED
Qty: 40 TABLET | Refills: 0 | OUTPATIENT
Start: 2024-10-14 | End: 2024-11-03

## 2024-10-14 RX ORDER — CEFPODOXIME PROXETIL 200 MG/1
200 TABLET, FILM COATED ORAL EVERY 12 HOURS SCHEDULED
Qty: 39 TABLET | Refills: 0 | Status: SHIPPED | OUTPATIENT
Start: 2024-10-14 | End: 2024-11-03

## 2024-10-14 RX ADMIN — EMPAGLIFLOZIN 20 MG: 10 TABLET, FILM COATED ORAL at 08:16

## 2024-10-14 RX ADMIN — GABAPENTIN 100 MG: 100 CAPSULE ORAL at 08:14

## 2024-10-14 RX ADMIN — PANTOPRAZOLE SODIUM 40 MG: 40 TABLET, DELAYED RELEASE ORAL at 04:36

## 2024-10-14 RX ADMIN — B-COMPLEX W/ C & FOLIC ACID TAB 1 TABLET: TAB at 08:14

## 2024-10-14 RX ADMIN — SUCRALFATE 1 G: 1 TABLET ORAL at 04:36

## 2024-10-14 RX ADMIN — Medication 1000 UNITS: at 08:14

## 2024-10-14 RX ADMIN — METOPROLOL SUCCINATE 50 MG: 50 TABLET, EXTENDED RELEASE ORAL at 08:15

## 2024-10-14 RX ADMIN — AMLODIPINE BESYLATE 10 MG: 10 TABLET ORAL at 08:14

## 2024-10-14 RX ADMIN — METOPROLOL SUCCINATE 25 MG: 25 TABLET, EXTENDED RELEASE ORAL at 08:15

## 2024-10-14 RX ADMIN — FONDAPARINUX SODIUM 5 MG: 5 INJECTION SUBCUTANEOUS at 08:15

## 2024-10-14 RX ADMIN — ASPIRIN 81 MG 81 MG: 81 TABLET ORAL at 08:14

## 2024-10-14 RX ADMIN — CEFPODOXIME PROXETIL 200 MG: 200 TABLET, FILM COATED ORAL at 08:14

## 2024-10-14 RX ADMIN — HYDRALAZINE HYDROCHLORIDE 25 MG: 25 TABLET ORAL at 08:14

## 2024-10-14 NOTE — ASSESSMENT & PLAN NOTE
Noted on hepatitis panel resulting on 10/12. RUQ U/S on last admission with no acute abnormalities aside from mild hepatic steatosis.     -Follow up outpatient with GI and PCP for appropriate vaccinations for Hep B   -CMP ordered for outpatient   -Referral to GI placed

## 2024-10-14 NOTE — ASSESSMENT & PLAN NOTE
Initial lactic acid level at 2.6    -Resolved   -Received IV fluids in the ER and during admission

## 2024-10-14 NOTE — TELEPHONE ENCOUNTER
VM left for patient to contact the office. Patient was hospitalized again on Saturday and discharged today. He was prescribed PO Vantin x's 3 weeks. Plan per hospital notes remains the same: Urology follow up outpatient for scrotal U/S in 2-3 weeks.     Asked patient to return call to confirm 10/31 appointment. He will require an US of the scrotum and testicles competed prior to the visit.

## 2024-10-14 NOTE — TELEPHONE ENCOUNTER
Returned call to patient and LMOM for him to contact the office. Next available afternoon appointment is 11/5 with Domingo. Patient needs another US completed for comparison to ensure epididymitis is resolving with antibiotics- this should be done prior to his follow up with Urology so results are available for review at his appointment.

## 2024-10-14 NOTE — ASSESSMENT & PLAN NOTE
"Patient reports onset of abdominal pain today associated with nausea and vomiting after taking bactrim. CT abdomen pelvis showing \"Prominent wall thickening of the distal stomach with surrounding inflammatory changes nonspecific but could be seen with gastritis and/or peptic ulcer disease.\"    -Resolved   -See \"abdominal pain, acute, epigastric\" for plan     "

## 2024-10-14 NOTE — ASSESSMENT & PLAN NOTE
Diagnosed with epididymitis with abscess on last admission. Started on p.o. Experienced N/V after one dose of bactrim at home.    -Bactrim discontinued   -Patient switched to oral vantin during admission, tolerated with no n/v   -Vantin for 3 weeks upon d/c   -Urology follow up outpatient for scrotal U/S in 2-3 weeks

## 2024-10-14 NOTE — CASE MANAGEMENT
Case Management Discharge Planning Note    Patient name Christy Haro  Location /416-01 MRN [id number]  : 1955 Date 10/14/2024       Current Admission Date: 10/12/2024  Current Admission Diagnosis:Acute kidney injury superimposed on stage 3a chronic kidney disease (HCC)   Patient Active Problem List    Diagnosis Date Noted Date Diagnosed    Hyperphosphatemia 10/14/2024     Hypokalemia 10/12/2024     Abdominal pain, acute, epigastric 10/12/2024     Nausea and vomiting 10/12/2024     Hepatitis A antibody positive 10/12/2024     Lactic acidosis 10/12/2024     Elevated liver enzymes 10/11/2024     Epididymitis with abscess 10/09/2024     Acute kidney injury superimposed on stage 3a chronic kidney disease (HCC) 10/09/2024     Skin lesion 10/08/2024     Unilateral inguinal hernia without obstruction 10/08/2024     Acute cystitis without hematuria 10/06/2024     Bacteremia 10/06/2024     Hyponatremia 10/06/2024     Stage 3 chronic kidney disease, unspecified whether stage 3a or 3b CKD (HCC) 2024     Pain in both lower extremities 2024     Paroxysmal atrial fibrillation (HCC) 2024     Palpitations 2023     Chronic diastolic heart failure (HCC) 2023     Type 2 diabetes mellitus with obesity  (HCC) 2022     Lymphedema 2022     Diastolic dysfunction 2021     Morbid obesity (HCC) 2021     Stasis edema of both lower extremities 2018     GERD (gastroesophageal reflux disease) 2018     AYLEEN (obstructive sleep apnea) 2017     Benign essential HTN 2017     Dyslipidemia 2017       LOS (days): 1  Geometric Mean LOS (GMLOS) (days): 3  Days to GMLOS:2.2     OBJECTIVE:  Risk of Unplanned Readmission Score: 22.42         Current admission status: Inpatient   Preferred Pharmacy:   [id number]  [address], [address]  [address] [place] [address] [address], [address]  [facility] [address]  Phone: [phone] Fax:  [phone]    [facility] - [place], [place] - [address]  [address] [facility] [address]  Phone: [phone] Fax: [phone]    [id number] - [place] - [address] [place] [id number]  Phone: [phone] Fax: [phone]    Primary Care Provider: Alma Delia Fountain DO    Primary Insurance: MEDICARE  Secondary Insurance: [employer]    DISCHARGE DETAILS:patient discharging home. Chart reviewed, no current discharge needs noted. Nurse to review AVS, all follow up providers listed.

## 2024-10-14 NOTE — ASSESSMENT & PLAN NOTE
"Patient reports onset of abdominal pain associated with nausea and vomiting x7 after taking bactrim. CT abdomen pelvis showing \"Prominent wall thickening of the distal stomach with surrounding inflammatory changes nonspecific but could be seen with gastritis and/or peptic ulcer disease.\"    -Advance diet as tolerated   -Pain medication PRN  -Zofran PRN  -Am labs  -Supportive care   "

## 2024-10-14 NOTE — ASSESSMENT & PLAN NOTE
History of paroxysmal A-fib. Not currently anticoagulated.    -Continue metoprolol upon d/c   -Continue outpatient cardiology follow up

## 2024-10-14 NOTE — ASSESSMENT & PLAN NOTE
Diagnosed with epididymitis with abscess on last admission. Started on p.o. Experienced N/V after one dose of bactrim at home.    -Hold Bactrim while admitted  -Per ID discussion, will switch patient to 3 weeks course of cefpodoxime for outpatient given sensitivity resistance to doxycycline   -Urology follow up outpatient for scrotal U/S in 2-3 weeks

## 2024-10-14 NOTE — ASSESSMENT & PLAN NOTE
"Patient reports onset of abdominal pain today associated with nausea and vomiting after taking bactrim. CT abdomen pelvis showing \"Prominent wall thickening of the distal stomach with surrounding inflammatory changes nonspecific but could be seen with gastritis and/or peptic ulcer disease.\"    -Resolved   -Patient tolerated full diet on day of d/c   -Gentle IV fluids given during admission   -Referral for GI placed for outpatient   "

## 2024-10-14 NOTE — ASSESSMENT & PLAN NOTE
Lab Results   Component Value Date    HGBA1C 6.0 (H) 10/06/2024     Initial blood glucose level at 146  -Continue home Jardiance, as patient refusing insulin  -Accucheck AC/HS   -Hypoglycemia protocol   -Monitor while admitted

## 2024-10-14 NOTE — PHYSICAL THERAPY NOTE
PHYSICAL THERAPY NOTE          Patient Name: Christy Haro  Today's Date: 10/14/2024   10/14/24 0851   PT Last Visit   PT Visit Date 10/14/24   Note Type   Note Type Treatment   Pain Assessment   Pain Assessment Tool 0-10   Pain Score No Pain   Restrictions/Precautions   Weight Bearing Precautions Per Order No   Other Precautions Fall Risk   General   Chart Reviewed Yes   Family/Caregiver Present No   Cognition   Overall Cognitive Status WFL   Arousal/Participation Alert   Attention Within functional limits   Orientation Level Oriented X4   Memory Within functional limits   Following Commands Follows all commands and directions without difficulty   Subjective   Subjective c/o no BM for 2 days. Dizziness with sitting EOB.   Bed Mobility   Supine to Sit 5  Supervision   Additional items Increased time required   Additional Comments Sat EOB with Good sitting balance. c/o dizziness with postion changes. /55.  Completed seated TE  at EOB.   Transfers   Sit to Stand 5  Supervision   Additional items Increased time required   Stand to Sit 5  Supervision   Additional items Increased time required;Armrests   Additional Comments no device   Ambulation/Elevation   Gait pattern Decreased foot clearance  (decreased gait speed)   Gait Assistance 5  Supervision   Assistive Device None   Distance 25' in room  (declined further)   Balance   Static Sitting Good   Dynamic Sitting Good   Static Standing Fair   Dynamic Standing Fair   Ambulatory Fair   Endurance Deficit   Endurance Deficit Yes   Activity Tolerance   Activity Tolerance Patient limited by fatigue   Exercises   Hip Flexion Sitting;20 reps;AROM;Bilateral   Hip Abduction Sitting;20 reps;AROM;Bilateral   Hip Adduction Sitting;20 reps;AROM;Bilateral   Knee AROM Long Arc Quad Sitting;20 reps;AROM;Bilateral   Ankle Pumps Sitting;20 reps;AROM;Bilateral   Assessment   Prognosis Good   Problem  List Decreased mobility;Impaired balance;Decreased strength;Decreased endurance   Assessment Pt. seen for PT treatment session this date with interventions consisting of  therapeutic exercises, bed mobility, transfers and  gait training w/ emphasis on improving pt's ability to ambulate. Pt. In comparison to previous session, Pt. With decrease  in activity tolerance. C/o dizziness with position changes. Declined ambulation beyond 25'.  Pt is in need of continued activity in PT to improve strength balance endurance mobility transfers and ambulation with return to maximize LOF. From PT/mobility standpoint, recommendation at time of d/c would be level III: min resource intensity  in order to promote return to PLOF and independence.   The patient's AM-PAC Basic Mobility Inpatient Short Form Raw Score is 22. A Raw score of greater than 16 suggests the patient may benefit from discharge to home.  Please also refer to physical therapy recommendation for safe DC planning.   Goals   Patient Goals to feel better   LTG Expiration Date 10/27/24   PT Treatment Day 1   Plan   Treatment/Interventions Functional transfer training;LE strengthening/ROM;Elevations;Therapeutic exercise;Endurance training;Bed mobility;Gait training;Spoke to nursing   Progress Progressing toward goals   PT Frequency 3-5x/wk   Discharge Recommendation   Rehab Resource Intensity Level, PT III (Minimum Resource Intensity)   AM-PAC Basic Mobility Inpatient   Turning in Flat Bed Without Bedrails 4   Lying on Back to Sitting on Edge of Flat Bed Without Bedrails 4   Moving Bed to Chair 4   Standing Up From Chair Using Arms 4   Walk in Room 3   Climb 3-5 Stairs With Railing 3   Basic Mobility Inpatient Raw Score 22   Basic Mobility Standardized Score 47.4   [place] Highest Level Of Mobility   JH-HLM Goal 7: Walk 25 feet or more   -HLM Achieved 7: Walk 25 feet or more   Education   Education Provided Mobility training   Patient Demonstrates verbal  understanding   End of Consult   Patient Position at End of Consult Bedside chair;All needs within reach   End of Consult Comments discussed POC with PT

## 2024-10-14 NOTE — ASSESSMENT & PLAN NOTE
Lab Results   Component Value Date    EGFR 54 10/14/2024    EGFR 46 10/13/2024    EGFR 37 10/12/2024    CREATININE 1.31 (H) 10/14/2024    CREATININE 1.50 (H) 10/13/2024    CREATININE 1.78 (H) 10/12/2024   History of stage III CKD. Baseline appears to be between 1.0 and 1.2. Creatinine level at 1.78 on admission. S/p fluids in ER.     -Gentle IV fluids given during admission   -Hold home ACE inhibitor upon d/c. Resume once CMP, Mg, Phos labs completed outpatient on Friday 10/18/24 if Cr improved/stable and electrolytes WNL  -CMP on Friday 10/18/24 as outpatient   -Follow up with PCP for further management

## 2024-10-14 NOTE — PLAN OF CARE
Problem: PAIN - ADULT  Goal: Verbalizes/displays adequate comfort level or baseline comfort level  Description: Interventions:  - Encourage patient to monitor pain and request assistance  - Assess pain using appropriate pain scale  - Administer analgesics based on type and severity of pain and evaluate response  - Implement non-pharmacological measures as appropriate and evaluate response  - Consider cultural and social influences on pain and pain management  - Notify physician/advanced practitioner if interventions unsuccessful or patient reports new pain  10/14/2024 0935 by Katlyn Hall RN  Outcome: Progressing  10/14/2024 0935 by Katlyn Hall RN  Outcome: Progressing     Problem: INFECTION - ADULT  Goal: Absence or prevention of progression during hospitalization  Description: INTERVENTIONS:  - Assess and monitor for signs and symptoms of infection  - Monitor lab/diagnostic results  - Monitor all insertion sites, i.e. indwelling lines, tubes, and drains  - Monitor endotracheal if appropriate and nasal secretions for changes in amount and color  - [place] appropriate cooling/warming therapies per order  - Administer medications as ordered  - Instruct and encourage patient and family to use good hand hygiene technique  - Identify and instruct in appropriate isolation precautions for identified infection/condition  10/14/2024 0935 by Katlyn Hall RN  Outcome: Progressing  10/14/2024 0935 by Katlyn Hall RN  Outcome: Progressing     Problem: DISCHARGE PLANNING  Goal: Discharge to home or other facility with appropriate resources  Description: INTERVENTIONS:  - Identify barriers to discharge w/patient and caregiver  - Arrange for needed discharge resources and transportation as appropriate  - Identify discharge learning needs (meds, wound care, etc.)  - Arrange for interpretive services to assist at discharge as needed  - Refer to Case Management Department for coordinating discharge planning if the  patient needs post-hospital services based on physician/advanced practitioner order or complex needs related to functional status, cognitive ability, or social support system  10/14/2024 0935 by Katlyn Hall RN  Outcome: Progressing  10/14/2024 0935 by Katlyn Hall RN  Outcome: Progressing     Problem: Knowledge Deficit  Goal: Patient/family/caregiver demonstrates understanding of disease process, treatment plan, medications, and discharge instructions  Description: Complete learning assessment and assess knowledge base.  Interventions:  - Provide teaching at level of understanding  - Provide teaching via preferred learning methods  10/14/2024 0935 by Katlyn Hall RN  Outcome: Progressing  10/14/2024 0935 by Katlyn Hall RN  Outcome: Progressing

## 2024-10-14 NOTE — DISCHARGE SUMMARY
"Discharge Summary - Hospitalist   Name: Christy Haro 69 y.o. male I MRN: [id number]  Unit/Bed#: 416-01 I Date of Admission: 10/12/2024   Date of Service: 10/14/2024 I Hospital Day: 1     Assessment & Plan  Acute kidney injury superimposed on stage 3a chronic kidney disease (HCC)  Lab Results   Component Value Date    EGFR 54 10/14/2024    EGFR 46 10/13/2024    EGFR 37 10/12/2024    CREATININE 1.31 (H) 10/14/2024    CREATININE 1.50 (H) 10/13/2024    CREATININE 1.78 (H) 10/12/2024   History of stage III CKD. Baseline appears to be between 1.0 and 1.2. Creatinine level at 1.78 on admission. S/p fluids in ER.     -Gentle IV fluids given during admission   -Hold home ACE inhibitor upon d/c. Resume once CMP, Mg, Phos labs completed outpatient on Friday 10/18/24 if Cr improved/stable and electrolytes WNL  -CMP on Friday 10/18/24 as outpatient   -Follow up with PCP for further management   Epididymitis with abscess  Diagnosed with epididymitis with abscess on last admission. Started on p.o. Experienced N/V after one dose of bactrim at home.    -Bactrim discontinued   -Patient switched to oral vantin during admission, tolerated with no n/v   -Vantin for 3 weeks upon d/c   -Urology follow up outpatient for scrotal U/S in 2-3 weeks   Hypokalemia  Potassium level mildly decreased at 3.4 on admission. S/p replacement.     -Resolved   -Hold home K supplement until lasix resumed pending normal CMP outpatient   -CMP on Friday 10/18/24 as outpatient   Abdominal pain, acute, epigastric  Patient reports onset of abdominal pain today associated with nausea and vomiting after taking bactrim. CT abdomen pelvis showing \"Prominent wall thickening of the distal stomach with surrounding inflammatory changes nonspecific but could be seen with gastritis and/or peptic ulcer disease.\"    -Resolved   -Patient tolerated full diet on day of d/c   -Gentle IV fluids given during admission   -Referral for GI placed for outpatient   Nausea and " "vomiting  Patient reports onset of abdominal pain today associated with nausea and vomiting after taking bactrim. CT abdomen pelvis showing \"Prominent wall thickening of the distal stomach with surrounding inflammatory changes nonspecific but could be seen with gastritis and/or peptic ulcer disease.\"    -Resolved   -See \"abdominal pain, acute, epigastric\" for plan     Hepatitis A antibody positive  Noted on hepatitis panel resulting on 10/12. RUQ U/S on last admission with no acute abnormalities aside from mild hepatic steatosis.     -Follow up outpatient with GI and PCP for appropriate vaccinations for Hep B   -CMP ordered for outpatient   -Referral to GI placed   Benign essential HTN  Blood pressure is stable  Continue PTA blood pressure medication  Hold enalapril until Friday 10/18/24 pending repeat CMP. Can resume with improved/stable Cr   Monitor blood pressure while admitted  Dyslipidemia  Stable  -Continue atorvastatin  GERD (gastroesophageal reflux disease)  History of GERD  -Currently on omeprazole 40 mg p.o. daily, continue upon d/c   AYLEEN (obstructive sleep apnea)  History of AYLEEN  -Respiratory protocol  -Continue CPAP at bedtime upon d/c   Paroxysmal atrial fibrillation (HCC)  History of paroxysmal A-fib. Not currently anticoagulated.    -Continue metoprolol upon d/c   -Continue outpatient cardiology follow up  Type 2 diabetes mellitus with obesity  (HCC)  Lab Results   Component Value Date    HGBA1C 6.0 (H) 10/06/2024     -Resume home diabetic medications   -Follow up with PCP for further management   Lactic acidosis  Initial lactic acid level at 2.6    -Resolved   -Received IV fluids in the ER and during admission     Chronic diastolic heart failure (HCC)  Wt Readings from Last 3 Encounters:   10/14/24 (!) 139 kg (306 lb)   10/11/24 (!) 140 kg (308 lb 10.3 oz)   10/04/24 131 kg (289 lb 11 oz)   Echo from April 2023 showed LVEF of 60% with normal systolic and diastolic function.     -Hold home lasix until " Friday 10/18/24. Can resume pending normal/stable Cr levels   -Hold home K supplements until lasix resumed   -CMP on Friday 10/18/24 ordered for outpatient   -Follow up outpatient with cardiology   Hyperphosphatemia       Medical Problems       Resolved Problems  Date Reviewed: 10/12/2024   None       Discharging Physician / Practitioner: Jeannette Marquez DO  PCP: Alma Delia Fuontain DO  Admission Date:   Admission Orders (From admission, onward)       Ordered        10/13/24 1350  INPATIENT ADMISSION  Once            10/12/24 2054  Place in Observation  Once                          Discharge Date: 10/14/24    Consultations During Hospital Stay:  ID    Procedures Performed:   CT abdomen pelvis wo contrast   Final Result      Prominent wall thickening of the distal stomach with surrounding inflammatory changes (axial image 61, series 2) nonspecific but could be seen with gastritis and/or peptic ulcer disease. Correlation with the patient's symptoms recommended. No discrete    evidence of bowel obstruction.      Complex cyst versus solid lesion in the medial aspect of the left kidney measures approximately 2 cm in size. Renal neoplasm not excluded. Follow-up postcontrast imaging recommended not emergently.      Pleural calcifications, renal cysts, small hiatal hernia, and other findings as above.         Workstation performed: [id number]         XR abdomen obstruction series   ED Interpretation   No obvious free air.      Final Result      Nonobstructive bowel gas pattern.         Workstation performed: [id number]             Significant Findings / Test Results:   Results for orders placed or performed during the hospital encounter of 10/12/24   Blood culture #2    Collection Time: 10/12/24  5:53 PM    Specimen: Arm, Right; Blood   Result Value Ref Range    Blood Culture Received in Microbiology Lab. Culture in Progress.    Lipase    Collection Time: 10/12/24  5:55 PM   Result Value Ref Range    Lipase 81 11 - 82 u/L    CBC and differential    Collection Time: 10/12/24  5:55 PM   Result Value Ref Range    WBC 14.36 (H) 4.31 - 10.16 Thousand/uL    RBC 4.86 3.88 - 5.62 Million/uL    Hemoglobin 13.9 12.0 - 17.0 g/dL    Hematocrit 44.9 36.5 - 49.3 %    MCV 92 82 - 98 fL    MCH 28.6 26.8 - 34.3 pg    MCHC 31.0 (L) 31.4 - 37.4 g/dL    RDW 14.6 11.6 - 15.1 %    MPV 10.2 8.9 - 12.7 fL    Platelets 303 149 - 390 Thousands/uL    nRBC 0 /100 WBCs    Segmented % 80 (H) 43 - 75 %    Immature Grans % 1 0 - 2 %    Lymphocytes % 13 (L) 14 - 44 %    Monocytes % 6 4 - 12 %    Eosinophils Relative 0 0 - 6 %    Basophils Relative 0 0 - 1 %    Absolute Neutrophils 11.49 (H) 1.85 - 7.62 Thousands/µL    Absolute Immature Grans 0.16 0.00 - 0.20 Thousand/uL    Absolute Lymphocytes 1.83 0.60 - 4.47 Thousands/µL    Absolute Monocytes 0.84 0.17 - 1.22 Thousand/µL    Eosinophils Absolute 0.01 0.00 - 0.61 Thousand/µL    Basophils Absolute 0.03 0.00 - 0.10 Thousands/µL   Comprehensive metabolic panel    Collection Time: 10/12/24  5:55 PM   Result Value Ref Range    Sodium 142 135 - 147 mmol/L    Potassium 3.4 (L) 3.5 - 5.3 mmol/L    Chloride 105 96 - 108 mmol/L    CO2 24 21 - 32 mmol/L    ANION GAP 13 4 - 13 mmol/L    BUN 23 5 - 25 mg/dL    Creatinine 1.78 (H) 0.60 - 1.30 mg/dL    Glucose 146 (H) 65 - 140 mg/dL    Calcium 9.3 8.4 - 10.2 mg/dL    AST 31 13 - 39 U/L    ALT 79 (H) 7 - 52 U/L    Alkaline Phosphatase 36 34 - 104 U/L    Total Protein 7.3 6.4 - 8.4 g/dL    Albumin 3.8 3.5 - 5.0 g/dL    Total Bilirubin 0.70 0.20 - 1.00 mg/dL    eGFR 37 ml/min/1.73sq m   Lactic acid    Collection Time: 10/12/24  5:55 PM   Result Value Ref Range    LACTIC ACID 2.6 (H) 0.5 - 2.0 mmol/L   Procalcitonin    Collection Time: 10/12/24  5:55 PM   Result Value Ref Range    Procalcitonin 0.11 <=0.25 ng/ml   Protime-INR    Collection Time: 10/12/24  5:55 PM   Result Value Ref Range    Protime 15.0 12.3 - 15.0 seconds    INR 1.13 0.85 - 1.19   APTT    Collection Time: 10/12/24   "5:55 PM   Result Value Ref Range    PTT 24 23 - 34 seconds   Phosphorus    Collection Time: 10/12/24  5:55 PM   Result Value Ref Range    Phosphorus 4.6 (H) 2.3 - 4.1 mg/dL   Magnesium    Collection Time: 10/12/24  5:55 PM   Result Value Ref Range    Magnesium 2.1 1.9 - 2.7 mg/dL   FLU/COVID Rapid Antigen (30 min. TAT) - Preferred screening test in ED    Collection Time: 10/12/24  5:56 PM    Specimen: Nose; Nares   Result Value Ref Range    SARS COV Rapid Antigen Negative Negative    Influenza A Rapid Antigen Negative Negative    Influenza B Rapid Antigen Negative Negative   HS Troponin 0hr (reflex protocol)    Collection Time: 10/12/24  5:58 PM   Result Value Ref Range    hs TnI 0hr 10 \"Refer to ACS Flowchart\"- see link ng/L   Blood culture #1    Collection Time: 10/12/24  6:08 PM    Specimen: Arm, Left; Blood   Result Value Ref Range    Blood Culture Received in Microbiology Lab. Culture in Progress.    HS Troponin I 2hr    Collection Time: 10/12/24  7:55 PM   Result Value Ref Range    hs TnI 2hr 17 \"Refer to ACS Flowchart\"- see link ng/L    Delta 2hr hsTnI 7 <20 ng/L   Lactic acid 2 Hours    Collection Time: 10/12/24  8:01 PM   Result Value Ref Range    LACTIC ACID 1.3 0.5 - 2.0 mmol/L   Fingerstick Glucose (POCT)    Collection Time: 10/12/24  9:52 PM   Result Value Ref Range    POC Glucose 138 65 - 140 mg/dl   UA w Reflex to Microscopic w Reflex to Culture    Collection Time: 10/12/24 10:19 PM    Specimen: Urine, Clean Catch   Result Value Ref Range    Color, UA Yellow     Clarity, UA Clear     Specific Gravity, UA 1.010 1.005 - 1.030    pH, UA 5.5 4.5, 5.0, 5.5, 6.0, 6.5, 7.0, 7.5, 8.0    Leukocytes, UA Negative Negative    Nitrite, UA Negative Negative    Protein, UA Negative Negative mg/dl    Glucose, UA 1000 (1%) (A) Negative mg/dl    Ketones, UA Negative Negative mg/dl    Urobilinogen, UA <2.0 <2.0 mg/dl mg/dl    Bilirubin, UA Negative Negative    Occult Blood, UA Negative Negative   HS Troponin I 4hr    " "Collection Time: 10/12/24 10:28 PM   Result Value Ref Range    hs TnI 4hr 19 \"Refer to ACS Flowchart\"- see link ng/L    Delta 4hr hsTnI 9 <20 ng/L   Lactic acid, plasma (w/reflex if result > 2.0)    Collection Time: 10/12/24 10:28 PM   Result Value Ref Range    LACTIC ACID 2.3 (H) 0.5 - 2.0 mmol/L   Lactic acid 2 Hours    Collection Time: 10/13/24  1:14 AM   Result Value Ref Range    LACTIC ACID 1.4 0.5 - 2.0 mmol/L   Comprehensive metabolic panel    Collection Time: 10/13/24  5:37 AM   Result Value Ref Range    Sodium 141 135 - 147 mmol/L    Potassium 3.8 3.5 - 5.3 mmol/L    Chloride 107 96 - 108 mmol/L    CO2 25 21 - 32 mmol/L    ANION GAP 9 4 - 13 mmol/L    BUN 23 5 - 25 mg/dL    Creatinine 1.50 (H) 0.60 - 1.30 mg/dL    Glucose 112 65 - 140 mg/dL    Glucose, Fasting 112 (H) 65 - 99 mg/dL    Calcium 8.5 8.4 - 10.2 mg/dL    Corrected Calcium 9.1 8.3 - 10.1 mg/dL    AST 22 13 - 39 U/L    ALT 54 (H) 7 - 52 U/L    Alkaline Phosphatase 28 (L) 34 - 104 U/L    Total Protein 6.2 (L) 6.4 - 8.4 g/dL    Albumin 3.2 (L) 3.5 - 5.0 g/dL    Total Bilirubin 0.77 0.20 - 1.00 mg/dL    eGFR 46 ml/min/1.73sq m   Magnesium    Collection Time: 10/13/24  5:37 AM   Result Value Ref Range    Magnesium 2.2 1.9 - 2.7 mg/dL   Phosphorus    Collection Time: 10/13/24  5:37 AM   Result Value Ref Range    Phosphorus 4.3 (H) 2.3 - 4.1 mg/dL   CBC and differential    Collection Time: 10/13/24  5:37 AM   Result Value Ref Range    WBC 10.17 (H) 4.31 - 10.16 Thousand/uL    RBC 3.91 3.88 - 5.62 Million/uL    Hemoglobin 11.3 (L) 12.0 - 17.0 g/dL    Hematocrit 35.5 (L) 36.5 - 49.3 %    MCV 91 82 - 98 fL    MCH 28.9 26.8 - 34.3 pg    MCHC 31.8 31.4 - 37.4 g/dL    RDW 15.0 11.6 - 15.1 %    MPV 9.7 8.9 - 12.7 fL    Platelets 214 149 - 390 Thousands/uL    nRBC 0 /100 WBCs    Segmented % 80 (H) 43 - 75 %    Immature Grans % 1 0 - 2 %    Lymphocytes % 14 14 - 44 %    Monocytes % 5 4 - 12 %    Eosinophils Relative 0 0 - 6 %    Basophils Relative 0 0 - 1 %    " Absolute Neutrophils 8.07 (H) 1.85 - 7.62 Thousands/µL    Absolute Immature Grans 0.09 0.00 - 0.20 Thousand/uL    Absolute Lymphocytes 1.43 0.60 - 4.47 Thousands/µL    Absolute Monocytes 0.54 0.17 - 1.22 Thousand/µL    Eosinophils Absolute 0.02 0.00 - 0.61 Thousand/µL    Basophils Absolute 0.02 0.00 - 0.10 Thousands/µL   Fingerstick Glucose (POCT)    Collection Time: 10/13/24  6:59 AM   Result Value Ref Range    POC Glucose 115 65 - 140 mg/dl   Fingerstick Glucose (POCT)    Collection Time: 10/13/24 11:02 AM   Result Value Ref Range    POC Glucose 157 (H) 65 - 140 mg/dl   Fingerstick Glucose (POCT)    Collection Time: 10/13/24  3:46 PM   Result Value Ref Range    POC Glucose 98 65 - 140 mg/dl   Fingerstick Glucose (POCT)    Collection Time: 10/13/24  9:53 PM   Result Value Ref Range    POC Glucose 125 65 - 140 mg/dl   CBC and differential    Collection Time: 10/14/24  4:54 AM   Result Value Ref Range    WBC 5.94 4.31 - 10.16 Thousand/uL    RBC 3.51 (L) 3.88 - 5.62 Million/uL    Hemoglobin 10.0 (L) 12.0 - 17.0 g/dL    Hematocrit 32.3 (L) 36.5 - 49.3 %    MCV 92 82 - 98 fL    MCH 28.5 26.8 - 34.3 pg    MCHC 31.0 (L) 31.4 - 37.4 g/dL    RDW 15.1 11.6 - 15.1 %    MPV 9.6 8.9 - 12.7 fL    Platelets 189 149 - 390 Thousands/uL    nRBC 0 /100 WBCs    Segmented % 69 43 - 75 %    Immature Grans % 1 0 - 2 %    Lymphocytes % 22 14 - 44 %    Monocytes % 8 4 - 12 %    Eosinophils Relative 0 0 - 6 %    Basophils Relative 0 0 - 1 %    Absolute Neutrophils 4.07 1.85 - 7.62 Thousands/µL    Absolute Immature Grans 0.04 0.00 - 0.20 Thousand/uL    Absolute Lymphocytes 1.33 0.60 - 4.47 Thousands/µL    Absolute Monocytes 0.47 0.17 - 1.22 Thousand/µL    Eosinophils Absolute 0.01 0.00 - 0.61 Thousand/µL    Basophils Absolute 0.02 0.00 - 0.10 Thousands/µL   Comprehensive metabolic panel    Collection Time: 10/14/24  4:54 AM   Result Value Ref Range    Sodium 141 135 - 147 mmol/L    Potassium 3.9 3.5 - 5.3 mmol/L    Chloride 108 96 - 108  mmol/L    CO2 26 21 - 32 mmol/L    ANION GAP 7 4 - 13 mmol/L    BUN 22 5 - 25 mg/dL    Creatinine 1.31 (H) 0.60 - 1.30 mg/dL    Glucose 108 65 - 140 mg/dL    Calcium 8.4 8.4 - 10.2 mg/dL    Corrected Calcium 9.0 8.3 - 10.1 mg/dL    AST 18 13 - 39 U/L    ALT 41 7 - 52 U/L    Alkaline Phosphatase 27 (L) 34 - 104 U/L    Total Protein 6.0 (L) 6.4 - 8.4 g/dL    Albumin 3.2 (L) 3.5 - 5.0 g/dL    Total Bilirubin 0.62 0.20 - 1.00 mg/dL    eGFR 54 ml/min/1.73sq m   Fingerstick Glucose (POCT)    Collection Time: 10/14/24  7:03 AM   Result Value Ref Range    POC Glucose 105 65 - 140 mg/dl       Incidental Findings:   None     Test Results Pending at Discharge (will require follow up):   None     Outpatient Tests Requested:  CMP, Mg, phos levels on 10/18/24  Scrotal ultrasound in 2 to 3 weeks-scheduling per outpatient urology team    Complications:  None    Reason for Admission: N/V, abdominal pain     Hospital Course:   Christy Haro is a 69 y.o. male patient who originally presented to the hospital on 10/12/2024 due to N/V and abdominal pain. Please see hospitalist H&P for further details.      Hospital Course: Patient moved to fourth floor MedSurg unit for further management of care.  Home medications continued with the exception of ACE inhibitor and Lasix for LIZ.  Gentle fluids started.  Bactrim discontinued. Infectious disease recommending switching to cefpodoxime for 3-week course for epididymitis, given that prior urine cultures had indicated resistance to doxycycline. Oral cefpodoxime given during admission with no nausea or vomiting. Patient improved throughout the course of admission and was able to tolerate a full diet by the day of discharge. His abdominal pain resolved.  He was saturating well on room air, afebrile, vital signs stable.    The patient's medical conditions can be managed on an outpatient basis. He will be discharged to home with cefpodoxime for 20 days.  He was instructed to hold Lasix, ACE  inhibitor, potassium supplements until he got his labs done on Friday and PCP or other provider instructed him to resume the medications. Patient verbalized understanding to instructions.  Patient can follow-up outpatient with PCP, GI and urology for further management.     Please see above list of diagnoses and related plan for additional information.     Condition at Discharge: stable    Discharge Day Visit / Exam:   Subjective:  Patient seen laying in bed in no acute distress. No complaints . Tolerating p.o. intake. Urinating and stooling without issue. Ambulatory. Sleeping well. Denies chest pain, shortness of breath, nausea, vomiting, constipation, diarrhea.     Vitals: Blood Pressure: 107/55 (10/14/24 0856)  Pulse: 65 (10/14/24 0856)  Temperature: 97.6 °F (36.4 °C) (10/14/24 0704)  Temp Source: Oral (10/14/24 0704)  Respirations: 18 (10/14/24 0704)  Height: 6' (182.9 cm) (10/12/24 2136)  Weight - Scale: (!) 139 kg (306 lb) (10/14/24 0553)  SpO2: 96 % (10/14/24 0856)    Physical Exam  Vitals and nursing note reviewed.   Constitutional:       General: He is not in acute distress.     Appearance: Normal appearance. He is obese. He is not ill-appearing or toxic-appearing.   HENT:      Head: Normocephalic and atraumatic.      Nose: Nose normal.   Eyes:      Conjunctiva/sclera: Conjunctivae normal.   Cardiovascular:      Rate and Rhythm: Normal rate and regular rhythm.      Heart sounds: Normal heart sounds. No murmur heard.     No friction rub. No gallop.   Pulmonary:      Effort: Pulmonary effort is normal. No respiratory distress.      Breath sounds: Normal breath sounds. No wheezing, rhonchi or rales.   Abdominal:      General: Abdomen is flat. Bowel sounds are normal. There is no distension.      Palpations: Abdomen is soft.      Tenderness: There is no abdominal tenderness. There is no guarding or rebound.   Skin:     General: Skin is warm and dry.      Findings: No rash.   Neurological:      General: No focal  deficit present.      Mental Status: He is alert and oriented to person, place, and time.   Psychiatric:         Mood and Affect: Mood normal.         Behavior: Behavior normal.         Thought Content: Thought content normal.          Discussion with Family: Updated  (wife) via phone.    Discharge instructions/Information to patient and family:   See after visit summary for information provided to patient and family.      Provisions for Follow-Up Care:  See after visit summary for information related to follow-up care and any pertinent home health orders.      Mobility at time of Discharge:   Basic Mobility Inpatient Raw Score: 24  JH-HLM Goal: 8: Walk 250 feet or more  JH-HLM Achieved: 7: Walk 25 feet or more  HLM Goal achieved. Continue to encourage appropriate mobility.     Disposition:   Home    Planned Readmission: No    Discharge Medications:  See after visit summary for reconciled discharge medications provided to patient and/or family.      Jeannette Marquez DO   PGY-2 [facility]   [facility]     **Please Note: This note may have been constructed using a voice recognition system**

## 2024-10-14 NOTE — ASSESSMENT & PLAN NOTE
Blood pressure is stable  Continue PTA blood pressure medication  Hold enalapril until Friday 10/18/24 pending repeat CMP. Can resume with improved/stable Cr   Monitor blood pressure while admitted

## 2024-10-14 NOTE — TELEPHONE ENCOUNTER
Patient called back and I confirmed the appointment with him but he said he needs an afternoon appointment. He also asked if we could use the u/s he had done on 10/9 while he was in the hospital?    CB: [phone]

## 2024-10-14 NOTE — ASSESSMENT & PLAN NOTE
Wt Readings from Last 3 Encounters:   10/14/24 (!) 139 kg (306 lb)   10/11/24 (!) 140 kg (308 lb 10.3 oz)   10/04/24 131 kg (289 lb 11 oz)   Echo from April 2023 showed LVEF of 60% with normal systolic and diastolic function.     -Hold home lasix until Friday 10/18/24. Can resume pending normal/stable Cr levels   -Hold home K supplements until lasix resumed   -CMP on Friday 10/18/24 ordered for outpatient   -Follow up outpatient with cardiology

## 2024-10-14 NOTE — ASSESSMENT & PLAN NOTE
Lab Results   Component Value Date    HGBA1C 6.0 (H) 10/06/2024     -Resume home diabetic medications   -Follow up with PCP for further management

## 2024-10-14 NOTE — ASSESSMENT & PLAN NOTE
Potassium level mildly decreased at 3.4 on admission. S/p replacement.     -Resolved   -Hold home K supplement until lasix resumed pending normal CMP outpatient   -CMP on Friday 10/18/24 as outpatient

## 2024-10-14 NOTE — PLAN OF CARE
Problem: PAIN - ADULT  Goal: Verbalizes/displays adequate comfort level or baseline comfort level  Description: Interventions:  - Encourage patient to monitor pain and request assistance  - Assess pain using appropriate pain scale  - Administer analgesics based on type and severity of pain and evaluate response  - Implement non-pharmacological measures as appropriate and evaluate response  - Consider cultural and social influences on pain and pain management  - Notify physician/advanced practitioner if interventions unsuccessful or patient reports new pain  10/14/2024 0959 by Katlyn Hall RN  Outcome: Adequate for Discharge  10/14/2024 0935 by Katlyn Hall RN  Outcome: Progressing  10/14/2024 0935 by Katlyn Hall RN  Outcome: Progressing     Problem: INFECTION - ADULT  Goal: Absence or prevention of progression during hospitalization  Description: INTERVENTIONS:  - Assess and monitor for signs and symptoms of infection  - Monitor lab/diagnostic results  - Monitor all insertion sites, i.e. indwelling lines, tubes, and drains  - Monitor endotracheal if appropriate and nasal secretions for changes in amount and color  - Eagle appropriate cooling/warming therapies per order  - Administer medications as ordered  - Instruct and encourage patient and family to use good hand hygiene technique  - Identify and instruct in appropriate isolation precautions for identified infection/condition  10/14/2024 0959 by Katlyn Hall RN  Outcome: Adequate for Discharge  10/14/2024 0935 by Katlyn Hall RN  Outcome: Progressing  10/14/2024 0935 by Katlyn Hall RN  Outcome: Progressing     Problem: DISCHARGE PLANNING  Goal: Discharge to home or other facility with appropriate resources  Description: INTERVENTIONS:  - Identify barriers to discharge w/patient and caregiver  - Arrange for needed discharge resources and transportation as appropriate  - Identify discharge learning needs (meds, wound care, etc.)  - Arrange for  interpretive services to assist at discharge as needed  - Refer to Case Management Department for coordinating discharge planning if the patient needs post-hospital services based on physician/advanced practitioner order or complex needs related to functional status, cognitive ability, or social support system  10/14/2024 0959 by Katlyn Hall RN  Outcome: Adequate for Discharge  10/14/2024 0935 by Katlyn Hall RN  Outcome: Progressing  10/14/2024 0935 by Katlyn Hall RN  Outcome: Progressing     Problem: Knowledge Deficit  Goal: Patient/family/caregiver demonstrates understanding of disease process, treatment plan, medications, and discharge instructions  Description: Complete learning assessment and assess knowledge base.  Interventions:  - Provide teaching at level of understanding  - Provide teaching via preferred learning methods  10/14/2024 0959 by Katlyn Hall RN  Outcome: Adequate for Discharge  10/14/2024 0935 by Katlyn Hall RN  Outcome: Progressing  10/14/2024 0935 by Katlyn Hall RN  Outcome: Progressing

## 2024-10-14 NOTE — ASSESSMENT & PLAN NOTE
Lab Results   Component Value Date    EGFR 54 10/14/2024    EGFR 46 10/13/2024    EGFR 37 10/12/2024    CREATININE 1.31 (H) 10/14/2024    CREATININE 1.50 (H) 10/13/2024    CREATININE 1.78 (H) 10/12/2024   History of stage III CKD. Baseline appears to be between 1.0 and 1.2. Creatinine level at 1.78 on admission. S/p fluids in ER.     -S/p IVF infusion, held with improving Cr and increasing weights with hx of HFpEF  -Hold home ACE inhibitor   -Avoid nephrotoxic agents  -Will monitor creatinine levels, urinary output, electrolytes  -Check a.m. CMP

## 2024-10-14 NOTE — DISCHARGE INSTRUCTIONS
Please do not take your enalapril and Lasix until your kidney function is rechecked with repeat blood work later this week.  Also, you should not take indomethacin at this time, which can hurt your kidneys.

## 2024-10-15 NOTE — TELEPHONE ENCOUNTER
Pt returned call.  Pt sts that he missed a call from Urology.  Pt sts a message was left but he did not listen to the message.  Pt sts that he was waiting for a call back in regards to scheduling a f/u appt in the afternoon.  Unable to r/s appt on 11/5, as per previous message, due to availability.  [facility] next avail 12/3/24 but pt sts that date is too far away.  Please review and assist with scheduling.    Pt call back: [phone]

## 2024-10-15 NOTE — TELEPHONE ENCOUNTER
Called and spoke with patient. His appointment was re-scheduled to 11/5 @ 1:00 pm per his request for an afternoon appointment. 10/31 appointment was cancelled at this time. Patient is aware he requires a repeat scrotal US which should be completed around 10/29/24. He was provided with the central scheduling phone number to schedule the US. Patient aware to complete antibiotic course as ordered.

## 2024-10-16 ENCOUNTER — OFFICE VISIT (OUTPATIENT)
Dept: GASTROENTEROLOGY | Facility: CLINIC | Age: 69
End: 2024-10-16
Payer: MEDICARE

## 2024-10-16 VITALS
WEIGHT: 308.6 LBS | OXYGEN SATURATION: 96 % | HEIGHT: 72 IN | HEART RATE: 76 BPM | SYSTOLIC BLOOD PRESSURE: 124 MMHG | DIASTOLIC BLOOD PRESSURE: 67 MMHG | TEMPERATURE: 97.6 F | BODY MASS INDEX: 41.8 KG/M2

## 2024-10-16 DIAGNOSIS — R93.5 ABNORMAL CT OF THE ABDOMEN: ICD-10-CM

## 2024-10-16 DIAGNOSIS — K21.9 GASTROESOPHAGEAL REFLUX DISEASE, UNSPECIFIED WHETHER ESOPHAGITIS PRESENT: ICD-10-CM

## 2024-10-16 DIAGNOSIS — K76.0 HEPATIC STEATOSIS: Primary | ICD-10-CM

## 2024-10-16 DIAGNOSIS — B15.9 VIRAL HEPATITIS A WITHOUT HEPATIC COMA: ICD-10-CM

## 2024-10-16 DIAGNOSIS — R74.01 ELEVATED TRANSAMINASE LEVEL: ICD-10-CM

## 2024-10-16 DIAGNOSIS — D64.9 NORMOCYTIC ANEMIA: ICD-10-CM

## 2024-10-16 DIAGNOSIS — Z86.0100 PERSONAL HISTORY OF COLON POLYPS, UNSPECIFIED: ICD-10-CM

## 2024-10-16 LAB
ATRIAL RATE: 82 BPM
P AXIS: 54 DEGREES
PR INTERVAL: 178 MS
QRS AXIS: -68 DEGREES
QRSD INTERVAL: 110 MS
QT INTERVAL: 444 MS
QTC INTERVAL: 518 MS
T WAVE AXIS: 68 DEGREES
VENTRICULAR RATE: 82 BPM

## 2024-10-16 PROCEDURE — 99204 OFFICE O/P NEW MOD 45 MIN: CPT | Performed by: PHYSICIAN ASSISTANT

## 2024-10-16 PROCEDURE — 93010 ELECTROCARDIOGRAM REPORT: CPT | Performed by: INTERNAL MEDICINE

## 2024-10-16 NOTE — PROGRESS NOTES
[facility] - Outpatient Consultation  Christy Estrellita 69 y.o. male MRN: [id number]  Encounter: [id number]    ASSESSMENT AND PLAN:      1. Viral hepatitis A without hepatic coma  2. Elevated transaminase level    Hospitalized for epididymitis with abscess and LIZ, found to have elevated transaminases and hepatitis panel was positive for hepatitis A IgM.    No evidence of fulminant liver failure.  LFTs down trended during hospitalization.  Reviewed with patient fecal oral source of spread, ensuring excellent hygiene, monitoring symptoms and that this virus is typically self-limited.  We will continue to trend LFTs and plan to repeat hepatitis antibody testing in a month or so.  Avoid hepatotoxins.     - Ambulatory Referral to Gastroenterology  - Protime-INR; Future    3. Hepatic steatosis    Noted, suspect NALFD/metabolic etiol.   Reviewed potential sequelae of disease.   Recommend elastography now for additional investigation.     Discussed recommendations in regards to fatty liver including:   Strict control of contributing comorbidities (obesity, prediabetes/diabetes, hypertension, and hypertriglyceridemia).  Weight loss of approx 10-15% of patient's current body weight over a period of 6-12 months through low fat diet and cardiovascular exercise as tolerated.  Limiting alcohol consumption, preferably complete abstinence.  Monitor hepatic function every 6 months with routine labs.     - US elastography/UGAP; Future    NAFLD Fibrosis Score is: 1.882    NAFLD Score Correlated Fibrosis Severity   <0.12 F0-F2   0.12-0.676 Indeterminate Score   >0.676 F3-F4   **Fibrosis Severity Scale: F0 = no fibrosis; F1= mild fibrosis; F2 = moderate fibrosis; F3 = severe fibrosis; F4 = cirrhosis    4. Gastroesophageal reflux disease, unspecified whether esophagitis present  5. Abnormal CT of the abdomen    Long hx of reflux symptoms requiring PPI therapy.   Was having acute n/v thought to be 2/2 to abx SE  "within past month or so.   CT in 10/2024 with wall thickening of the distal stomach with surrounding inflammatory change.   Reviewed recommendation to proceed with EGD to evaluate for intraluminal abnormalities at this time, given no recent EGD on file.   Continue to focus on small frequent meals and diet and lifestyle modifications for GERD.    - EGD; Future    6. Personal history of colon polyps, unspecified    Pt has personal history of colon polyps and last colonoscopy was in 10/2022.   Recall recommended for 3 years given limited exam of the right colon.  This would take pt to 10/2025, sooner if clinically indicated.     7. Normocytic anemia    Incidental finding during hospitalization.   No overt GI bleeding.   May have been secondary to acute infection, illness, LIZ, etc.   Repeat in a few weeks. If persisting check iron panel, B12/folate.     We will follow up with pt after EGD is completed.   ______________________________________________________________________    HPI: Patient is a 69 y.o. male who presents today for a consultation regarding hepatitis A. Pmhx sig for HTN, PAF, chronic diastolic heart failure, first-degree AV block, AYLEEN on BiPAP, GERD, DM2, CKD 3, HLD.    Pt is new to me. Recently hospitalized in 10/2024 for LIZ and epididymitits. Tx with abx and Urology follow up. His LFTs were elevated during hospitalization, and hepatitis panel was checked. Pt was positive for hepatitis A.     10/16/24:     Pt shares that he is still recovering from hospitalizations.  Feels weak.  Is having some loose stool.  No BRBPR or melena.  No nocturnal BMs.  No steatorrhea. No abnormal weight loss over past 6 months.     Pt does have hx of \"acid reflux\" for many years. Has required PPI therapy. Despite this, can have some breakthrough at times. No n/v. No dysphagia or odynophagia. No early satiety.     Pt denies any yellowing of eyes/skin. No recent GI illness. No travel, known exposure to sick contacts. "     NSAIDs: no regular use   Etoh: none  Tobacco: none     10/2024: RUQ US: mild hepatic steatosis  10/2024: CT A/P: prominent wall thickening of the distal stomach with surrounding inflammatory changes (axial image 61, series 2) nonspecific but could be seen with gastritis and/or peptic ulcer disease. No evidence of bowel obstruction. Complex cyst versus solid lesion in the medial aspect of the left kidney measures approximately 2 cm in size. Renal neoplasm not excluded. Pleural calcifications, renal cysts, small hiatal hernia, and other findings as above.  10/2024: Hb 10.0, MCV 92, Plt 189, BUN 22, Cr 1.31, AST 18, ALT 41, ALP 27, albumin 3.2, t bili 0.62     Endoscopic history:   EGD:   Colon: 2018: moderate diverticulosis in the descending and sigmoid colon, internal and external hemorrhoids, 2 polyps and a single angiodysplasia not actively bleeding seen in the transverse colon.   Colon: 10/2022: diverticulosis moderate in degree; hemorrhoids, 8 mm polyp in the ascending colon, 9 mm polyp in the hepatic flexure, fair prep in the colon Limited exam of the right colon    Review of Systems   Otherwise Per HPI    Historical Information   Past Medical History:   Diagnosis Date    Arthritis     last assessed 7/1/15    Bronchitis 02/02/2023    Chronic diastolic (congestive) heart failure (HCC)     Diabetes mellitus (HCC)     type 2-last assessed 1/28/15    Diabetes mellitus (HCC) 03/16/2018    Dyslipidemia     GERD (gastroesophageal reflux disease)     Hypertension     Hypertensive urgency 03/16/2023    Irregular heart beat     last assessed 7/1/15    Obstructive sleep apnea     Palpitations     last assessed 9/7/17    Sexual dysfunction     last assessed 7/1/15    Thyroid disease     last assessed 7/1/15     Past Surgical History:   Procedure Laterality Date    COLONOSCOPY      TONSILLECTOMY       Social History   Social History     Substance and Sexual Activity   Alcohol Use Never     Social History     Substance  "and Sexual Activity   Drug Use Never     Social History     Tobacco Use   Smoking Status Former    Current packs/day: 1.00    Types: Cigarettes    Passive exposure: Never   Smokeless Tobacco Never   Tobacco Comments    former smoker-quit 27 yrs ago 1pppd x 30 yrs as per [employer]     Family History   Problem Relation Age of Onset    Hypertension Mother         essential    Stroke Mother     Hypertension Father         essential    Heart defect Father         cardiac disorder    Stroke Brother     Hypertension Brother        Meds/Allergies       Current Outpatient Medications:     amLODIPine (NORVASC) 10 mg tablet    aspirin 81 mg chewable tablet    atorvastatin (LIPITOR) 10 mg tablet    cefpodoxime (VANTIN) 200 mg tablet    Empagliflozin 25 MG TABS    gabapentin (NEURONTIN) 100 mg capsule    glucose blood (OneTouch Verio) test strip    hydrALAZINE (APRESOLINE) 25 mg tablet    hydrocortisone 2.5 % cream    ketoconazole (NIZORAL) 2 % shampoo    Lancets (onetouch ultrasoft) lancets    metoprolol succinate (TOPROL-XL) 25 mg 24 hr tablet    metoprolol succinate (TOPROL-XL) 50 mg 24 hr tablet    multivitamin (THERAGRAN) TABS    omeprazole (PriLOSEC) 40 MG capsule    tamsulosin (FLOMAX) 0.4 mg    triamcinolone (KENALOG) 0.1 % lotion    Vitamin D, Cholecalciferol, 50 MCG (2000 UT) CAPS    Allergies   Allergen Reactions    Bactrim [Sulfamethoxazole-Trimethoprim] Vomiting     LIZ, seemed like a true allergy    Acetaminophen Other (See Comments)     Other reaction(s): Unknown Reaction  \"I had to go to the hospital and get shots after taking it 20yrs ago\"       Objective     Blood pressure 124/67, pulse 76, temperature 97.6 °F (36.4 °C), temperature source Temporal, height 6' (1.829 m), weight (!) 140 kg (308 lb 9.6 oz), SpO2 96%. Body mass index is 41.85 kg/m².    Physical Exam  Vitals and nursing note reviewed.   Constitutional:       General: He is not in acute distress.     Appearance: He is well-developed. He is obese. "   HENT:      Head: Normocephalic and atraumatic.   Eyes:      General: No scleral icterus.     Conjunctiva/sclera: Conjunctivae normal.   Cardiovascular:      Rate and Rhythm: Normal rate.   Pulmonary:      Effort: Pulmonary effort is normal. No respiratory distress.   Abdominal:      General: Bowel sounds are normal. There is no distension.      Palpations: Abdomen is soft.      Tenderness: There is no abdominal tenderness. There is no guarding or rebound.   Skin:     General: Skin is warm and dry.      Coloration: Skin is not jaundiced.   Neurological:      General: No focal deficit present.      Mental Status: He is alert.   Psychiatric:         Mood and Affect: Mood normal.          Lab Results:   No visits with results within 1 Day(s) from this visit.   Latest known visit with results is:   Admission on 10/12/2024, Discharged on 10/14/2024   Component Date Value    Lipase 10/12/2024 81     SARS COV Rapid Antigen 10/12/2024 Negative     Influenza A Rapid Antigen 10/12/2024 Negative     Influenza B Rapid Antigen 10/12/2024 Negative     WBC 10/12/2024 14.36 (H)     RBC 10/12/2024 4.86     Hemoglobin 10/12/2024 13.9     Hematocrit 10/12/2024 44.9     MCV 10/12/2024 92     MCH 10/12/2024 28.6     MCHC 10/12/2024 31.0 (L)     RDW 10/12/2024 14.6     MPV 10/12/2024 10.2     Platelets 10/12/2024 303     nRBC 10/12/2024 0     Segmented % 10/12/2024 80 (H)     Immature Grans % 10/12/2024 1     Lymphocytes % 10/12/2024 13 (L)     Monocytes % 10/12/2024 6     Eosinophils Relative 10/12/2024 0     Basophils Relative 10/12/2024 0     Absolute Neutrophils 10/12/2024 11.49 (H)     Absolute Immature Grans 10/12/2024 0.16     Absolute Lymphocytes 10/12/2024 1.83     Absolute Monocytes 10/12/2024 0.84     Eosinophils Absolute 10/12/2024 0.01     Basophils Absolute 10/12/2024 0.03     Sodium 10/12/2024 142     Potassium 10/12/2024 3.4 (L)     Chloride 10/12/2024 105     CO2 10/12/2024 24     ANION GAP 10/12/2024 13     BUN  10/12/2024 23     Creatinine 10/12/2024 1.78 (H)     Glucose 10/12/2024 146 (H)     Calcium 10/12/2024 9.3     AST 10/12/2024 31     ALT 10/12/2024 79 (H)     Alkaline Phosphatase 10/12/2024 36     Total Protein 10/12/2024 7.3     Albumin 10/12/2024 3.8     Total Bilirubin 10/12/2024 0.70     eGFR 10/12/2024 37     LACTIC ACID 10/12/2024 2.6 (H)     Procalcitonin 10/12/2024 0.11     Protime 10/12/2024 15.0     INR 10/12/2024 1.13     PTT 10/12/2024 24     Blood Culture 10/12/2024 No Growth at 72 hrs.     Blood Culture 10/12/2024 No Growth at 72 hrs.     Color, UA 10/12/2024 Yellow     Clarity, UA 10/12/2024 Clear     Specific Gravity, UA 10/12/2024 1.010     pH, UA 10/12/2024 5.5     Leukocytes, UA 10/12/2024 Negative     Nitrite, UA 10/12/2024 Negative     Protein, UA 10/12/2024 Negative     Glucose, UA 10/12/2024 1000 (1%) (A)     Ketones, UA 10/12/2024 Negative     Urobilinogen, UA 10/12/2024 <2.0     Bilirubin, UA 10/12/2024 Negative     Occult Blood, UA 10/12/2024 Negative     hs TnI 0hr 10/12/2024 10     LACTIC ACID 10/12/2024 1.3     hs TnI 2hr 10/12/2024 17     Delta 2hr hsTnI 10/12/2024 7     Phosphorus 10/12/2024 4.6 (H)     Magnesium 10/12/2024 2.1     hs TnI 4hr 10/12/2024 19     Delta 4hr hsTnI 10/12/2024 9     LACTIC ACID 10/12/2024 2.3 (H)     POC Glucose 10/12/2024 138     LACTIC ACID 10/13/2024 1.4     Sodium 10/13/2024 141     Potassium 10/13/2024 3.8     Chloride 10/13/2024 107     CO2 10/13/2024 25     ANION GAP 10/13/2024 9     BUN 10/13/2024 23     Creatinine 10/13/2024 1.50 (H)     Glucose 10/13/2024 112     Glucose, Fasting 10/13/2024 112 (H)     Calcium 10/13/2024 8.5     Corrected Calcium 10/13/2024 9.1     AST 10/13/2024 22     ALT 10/13/2024 54 (H)     Alkaline Phosphatase 10/13/2024 28 (L)     Total Protein 10/13/2024 6.2 (L)     Albumin 10/13/2024 3.2 (L)     Total Bilirubin 10/13/2024 0.77     eGFR 10/13/2024 46     Magnesium 10/13/2024 2.2     Phosphorus 10/13/2024 4.3 (H)     WBC  10/13/2024 10.17 (H)     RBC 10/13/2024 3.91     Hemoglobin 10/13/2024 11.3 (L)     Hematocrit 10/13/2024 35.5 (L)     MCV 10/13/2024 91     MCH 10/13/2024 28.9     MCHC 10/13/2024 31.8     RDW 10/13/2024 15.0     MPV 10/13/2024 9.7     Platelets 10/13/2024 214     nRBC 10/13/2024 0     Segmented % 10/13/2024 80 (H)     Immature Grans % 10/13/2024 1     Lymphocytes % 10/13/2024 14     Monocytes % 10/13/2024 5     Eosinophils Relative 10/13/2024 0     Basophils Relative 10/13/2024 0     Absolute Neutrophils 10/13/2024 8.07 (H)     Absolute Immature Grans 10/13/2024 0.09     Absolute Lymphocytes 10/13/2024 1.43     Absolute Monocytes 10/13/2024 0.54     Eosinophils Absolute 10/13/2024 0.02     Basophils Absolute 10/13/2024 0.02     POC Glucose 10/13/2024 115     POC Glucose 10/13/2024 157 (H)     POC Glucose 10/13/2024 98     POC Glucose 10/13/2024 125     WBC 10/14/2024 5.94     RBC 10/14/2024 3.51 (L)     Hemoglobin 10/14/2024 10.0 (L)     Hematocrit 10/14/2024 32.3 (L)     MCV 10/14/2024 92     MCH 10/14/2024 28.5     MCHC 10/14/2024 31.0 (L)     RDW 10/14/2024 15.1     MPV 10/14/2024 9.6     Platelets 10/14/2024 189     nRBC 10/14/2024 0     Segmented % 10/14/2024 69     Immature Grans % 10/14/2024 1     Lymphocytes % 10/14/2024 22     Monocytes % 10/14/2024 8     Eosinophils Relative 10/14/2024 0     Basophils Relative 10/14/2024 0     Absolute Neutrophils 10/14/2024 4.07     Absolute Immature Grans 10/14/2024 0.04     Absolute Lymphocytes 10/14/2024 1.33     Absolute Monocytes 10/14/2024 0.47     Eosinophils Absolute 10/14/2024 0.01     Basophils Absolute 10/14/2024 0.02     Sodium 10/14/2024 141     Potassium 10/14/2024 3.9     Chloride 10/14/2024 108     CO2 10/14/2024 26     ANION GAP 10/14/2024 7     BUN 10/14/2024 22     Creatinine 10/14/2024 1.31 (H)     Glucose 10/14/2024 108     Calcium 10/14/2024 8.4     Corrected Calcium 10/14/2024 9.0     AST 10/14/2024 18     ALT 10/14/2024 41     Alkaline  Phosphatase 10/14/2024 27 (L)     Total Protein 10/14/2024 6.0 (L)     Albumin 10/14/2024 3.2 (L)     Total Bilirubin 10/14/2024 0.62     eGFR 10/14/2024 54     POC Glucose 10/14/2024 105        Radiology Results:   XR abdomen obstruction series    Result Date: 10/14/2024  Narrative: XR ABDOMEN OBSTRUCTION SERIES INDICATION: vomiting, epigastric pain. COMPARISON: None FINDINGS: Nonobstructive bowel gas pattern. No pneumoperitoneum. No pathologic calcification or soft tissue mass. Bones are unremarkable for patient's age. Examination of the chest reveals clear lungs and a normal cardiomediastinal silhouette. A loop recorder overlies the left chest.     Impression: Nonobstructive bowel gas pattern. Workstation performed: [id number]     CT abdomen pelvis wo contrast    Result Date: 10/12/2024  Narrative: CT ABDOMEN AND PELVIS WITHOUT IV CONTRAST INDICATION: Acute abominal pain with Nausea and vomiting. COMPARISON: CTA chest dated 3/6/2023. TECHNIQUE: CT examination of the abdomen and pelvis was performed without intravenous contrast. Multiplanar 2D reformatted images were created from the source data. This examination, like all CT scans performed in the [facility], was performed utilizing techniques to minimize radiation dose exposure, including the use of iterative reconstruction and automated exposure control. Radiation dose length product (DLP) for this visit: 2294.54 mGy-cm Enteric Contrast: Not administered. FINDINGS: ABDOMEN LOWER CHEST: Atelectasis/scarring dependently in the bilateral lower lung fields. Pleural calcifications in the left lower lung field. Heart appears normal in size. Lungs otherwise appear grossly clear. LIVER/BILIARY TREE: Unremarkable. GALLBLADDER: No calcified gallstones. No pericholecystic inflammatory change. SPLEEN: Unremarkable. PANCREAS: Unremarkable. ADRENAL GLANDS: Unremarkable. KIDNEYS/URETERS: Simple renal cyst(s). Complex cyst versus solid lesion in the medial  aspect of the left kidney measures approximately 2 cm in size. Otherwise unremarkable kidneys. No hydronephrosis. STOMACH AND BOWEL: There is a small hiatal hernia present. Prominent wall thickening of the distal stomach with surrounding inflammatory changes (axial image 61, series 2) nonspecific but could be seen with gastritis and/or peptic ulcer disease. No discrete evidence of bowel obstruction. APPENDIX: Normal caliber appendix. ABDOMINOPELVIC CAVITY: No ascites. No pneumoperitoneum. No lymphadenopathy. VESSELS: Mild atherosclerosis, no aortic aneurysm. PELVIS REPRODUCTIVE ORGANS: Unremarkable for patient's age. URINARY BLADDER: Unremarkable. ABDOMINAL WALL/INGUINAL REGIONS: Small periumbilical fat-containing hernia. BONES: No acute fracture or suspicious osseous lesion.     Impression: Prominent wall thickening of the distal stomach with surrounding inflammatory changes (axial image 61, series 2) nonspecific but could be seen with gastritis and/or peptic ulcer disease. Correlation with the patient's symptoms recommended. No discrete evidence of bowel obstruction. Complex cyst versus solid lesion in the medial aspect of the left kidney measures approximately 2 cm in size. Renal neoplasm not excluded. Follow-up postcontrast imaging recommended not emergently. Pleural calcifications, renal cysts, small hiatal hernia, and other findings as above. Workstation performed: [id number]     US right upper quadrant    Result Date: 10/11/2024  Narrative: RIGHT UPPER QUADRANT ULTRASOUND INDICATION: abnormal LFTs. COMPARISON: None TECHNIQUE: Real-time ultrasound of the right upper quadrant was performed with a curvilinear transducer with both volumetric sweeps and still imaging techniques. FINDINGS: PANCREAS: Visualized portions of the pancreas are within normal limits. AORTA AND IVC: Visualized portions are normal for patient age. LIVER: Size: Within normal range. The liver measures 16.3 cm in the midclavicular line.  Contour: Surface contour is smooth. Parenchyma: There is mild diffuse increased echogenicity with smooth echotexture, without significant beam attenuation or loss of periportal echogenicity. Most consistent with mild hepatic steatosis. No liver mass identified. Limited imaging of the main portal vein shows it to be patent and hepatopetal. BILIARY: The gallbladder is partially contracted. No wall thickening or pericholecystic fluid. No stones or sludge identified. No sonographic Villalobos sign. No intrahepatic biliary dilatation. CBD measures 4.0 mm. No choledocholithiasis. KIDNEY: Right kidney measures 12.6 x 5.8 x 5.6 cm. Volume 213.1 mL 4.8 cm posterior interpolar simple cyst. Otherwise within normal limits. ASCITES: None.     Impression: Mild hepatic steatosis. Right renal simple cyst. Remainder of the examination is normal. Workstation performed: [id number]     US scrotum and testicles    Result Date: 10/9/2024  Narrative: SCROTAL ULTRASOUND INDICATION: right testicular pain. COMPARISON: None TECHNIQUE: Ultrasound the scrotal contents was performed with a high frequency linear transducer utilizing volumetric sweep imaging as well as standard still image techniques. Imaging performed in longitudinal and transverse orientation. Color and spectral Doppler evaluation also performed bilaterally. FINDINGS: TESTES: Testes are symmetric and normal in size. RIGHT testis = 3.4 x 4.0 x 3.0 cm. Volume 20.8 mL Normal contour with homogeneous smooth echotexture. No intratesticular mass lesion or calcifications. LEFT testis = 3.3 x 3.8 x 3.0 cm. Volume 20.0 mL Normal contour with homogeneous smooth echotexture. No intratesticular mass lesion or calcifications. Doppler flow within both testes is present and symmetric. EPIDIDYMIDES: Right epididymis is enlarged and heterogeneous.. In the tail a rounded area centrally measures approximately 1.5 cm which is hypoechoic and avascular suspicious for developing abscess. Doppler ultrasound  demonstrates normal blood flow. 4 mm right and left epididymal cysts. HYDROCELE: Small right hydrocele. Small left hydrocele containing diffuse low-level echoes likely chronic. VARICOCELE: None present. SCROTUM: Scrotal thickness and appearance within normal limits. No evidence for extratesticular mass or hernia demonstrated.     Impression: Right-sided epididymitis and 1.5 cm hypoechoic focus in the tail of the epididymis suspicious for developing abscess. The study was marked in EPIC for immediate notification. Workstation performed: [id number]     XR chest 1 view portable    Result Date: 10/5/2024  Narrative: XR CHEST PORTABLE INDICATION: Fever, eval for pneumonia. COMPARISON: CXR 8/7/2024 abdomen CT 10/05/2024, chest CT 3/6/2023. FINDINGS: Clear lungs. No pneumothorax or pleural effusion. Normal cardiomediastinal silhouette. Loop recorder in the left chest wall. Bones are unremarkable for age. Normal upper abdomen.     Impression: No acute cardiopulmonary disease. Workstation performed: [id number]     CT abdomen pelvis wo contrast    Result Date: 10/5/2024  Narrative: CT ABDOMEN AND PELVIS WITHOUT IV CONTRAST INDICATION: Fevers, recently removed indwelling enamorado catheter for urination retention, slightly worsening kidney function. COMPARISON: CT of the chest abdomen pelvis on May 7, 2018. TECHNIQUE: CT examination of the abdomen and pelvis was performed without intravenous contrast. Multiplanar 2D reformatted images were created from the source data. This examination, like all CT scans performed in the [facility], was performed utilizing techniques to minimize radiation dose exposure, including the use of iterative reconstruction and automated exposure control. Radiation dose length product (DLP) for this visit: 1418 mGy-cm Enteric Contrast: Not administered. FINDINGS: ABDOMEN LOWER CHEST: Redemonstrated calcified left pleural plaques. LIVER/BILIARY TREE: Redemonstrated calcified left pleural  plaques. GALLBLADDER: No calcified gallstones. No pericholecystic inflammatory change. SPLEEN: Unremarkable. PANCREAS: Unremarkable. ADRENAL GLANDS: Unremarkable. KIDNEYS/URETERS: Simple renal cyst(s). No hydronephrosis. STOMACH AND BOWEL: Small to moderate hiatal hernia. No bowel obstruction. APPENDIX: Normal. ABDOMINOPELVIC CAVITY: No ascites. No pneumoperitoneum. No lymphadenopathy. VESSELS: Mild atherosclerotic calcifications. PELVIS REPRODUCTIVE ORGANS: Enlarged prostate. URINARY BLADDER: Minimally distended. Mild thickening of the urinary bladder wall. ABDOMINAL WALL/INGUINAL REGIONS: Small fat-containing left inguinal hernia. Moderate fat-containing umbilical hernia. BONES: No acute fracture or suspicious osseous lesion. Spinal degenerative changes. Mild chronic appearing wedge deformity of L2 new since prior exam.     Impression: 1.  Mild thickening of the urinary bladder wall which may be due to cystitis, correlate with urinalysis. 2.  Bilateral renal cysts. No hydronephrosis. 3.  Small to moderate hiatal hernia. Workstation performed: [id number]       Marissa Young PA-C    **Please note:  Dictation voice to text software may have been used in the creation of this record.  Occasional wrong word or “sound alike” substitutions may have occurred due to the inherent limitations of voice recognition software.  Read the chart carefully and recognize, using context, where substitutions have occurred.**

## 2024-10-16 NOTE — PATIENT INSTRUCTIONS
Stay on omeprazole daily.   EGD given the CT scan showed thickening of the stomach.     For fatty liver, recommend weight reduction.   Check ultrasound.

## 2024-10-18 ENCOUNTER — APPOINTMENT (OUTPATIENT)
Dept: LAB | Facility: HOSPITAL | Age: 69
End: 2024-10-18
Payer: MEDICARE

## 2024-10-18 ENCOUNTER — HOSPITAL ENCOUNTER (OUTPATIENT)
Dept: ULTRASOUND IMAGING | Facility: HOSPITAL | Age: 69
Discharge: HOME | End: 2024-10-18
Payer: MEDICARE

## 2024-10-18 DIAGNOSIS — B15.9 VIRAL HEPATITIS A WITHOUT HEPATIC COMA: ICD-10-CM

## 2024-10-18 DIAGNOSIS — E83.39 HYPERPHOSPHATEMIA: ICD-10-CM

## 2024-10-18 DIAGNOSIS — R76.8 HEPATITIS A ANTIBODY POSITIVE: ICD-10-CM

## 2024-10-18 DIAGNOSIS — R11.2 NAUSEA AND VOMITING, UNSPECIFIED VOMITING TYPE: ICD-10-CM

## 2024-10-18 DIAGNOSIS — R74.01 TRANSAMINITIS: ICD-10-CM

## 2024-10-18 DIAGNOSIS — N18.31 ACUTE KIDNEY INJURY SUPERIMPOSED ON STAGE 3A CHRONIC KIDNEY DISEASE (HCC): ICD-10-CM

## 2024-10-18 DIAGNOSIS — N17.9 ACUTE KIDNEY INJURY SUPERIMPOSED ON STAGE 3A CHRONIC KIDNEY DISEASE (HCC): ICD-10-CM

## 2024-10-18 DIAGNOSIS — N45.4: ICD-10-CM

## 2024-10-18 DIAGNOSIS — N18.30 STAGE 3 CHRONIC KIDNEY DISEASE, UNSPECIFIED WHETHER STAGE 3A OR 3B CKD (HCC): ICD-10-CM

## 2024-10-18 LAB
ALBUMIN SERPL BCG-MCNC: 3.8 G/DL (ref 3.5–5)
ALP SERPL-CCNC: 33 U/L (ref 34–104)
ALT SERPL W P-5'-P-CCNC: 22 U/L (ref 7–52)
ANION GAP SERPL CALCULATED.3IONS-SCNC: 9 MMOL/L (ref 4–13)
AST SERPL W P-5'-P-CCNC: 17 U/L (ref 13–39)
BACTERIA BLD CULT: NORMAL
BACTERIA BLD CULT: NORMAL
BACTERIA UR QL AUTO: ABNORMAL /HPF
BASOPHILS # BLD AUTO: 0.03 THOUSANDS/ΜL (ref 0–0.1)
BASOPHILS NFR BLD AUTO: 1 % (ref 0–1)
BILIRUB SERPL-MCNC: 0.68 MG/DL (ref 0.2–1)
BILIRUB UR QL STRIP: NEGATIVE
BUN SERPL-MCNC: 19 MG/DL (ref 5–25)
CALCIUM SERPL-MCNC: 9.4 MG/DL (ref 8.4–10.2)
CHLORIDE SERPL-SCNC: 107 MMOL/L (ref 96–108)
CLARITY UR: CLEAR
CO2 SERPL-SCNC: 24 MMOL/L (ref 21–32)
COLOR UR: YELLOW
CREAT SERPL-MCNC: 1.08 MG/DL (ref 0.6–1.3)
CREAT UR-MCNC: 73.5 MG/DL
EOSINOPHIL # BLD AUTO: 0 THOUSAND/ΜL (ref 0–0.61)
EOSINOPHIL NFR BLD AUTO: 0 % (ref 0–6)
ERYTHROCYTE [DISTWIDTH] IN BLOOD BY AUTOMATED COUNT: 15.1 % (ref 11.6–15.1)
GFR SERPL CREATININE-BSD FRML MDRD: 68 ML/MIN/1.73SQ M
GLUCOSE SERPL-MCNC: 102 MG/DL (ref 65–140)
GLUCOSE UR STRIP-MCNC: ABNORMAL MG/DL
HAV IGM SER QL: REACTIVE
HBV CORE IGM SER QL: NORMAL
HBV SURFACE AG SER QL: NORMAL
HCT VFR BLD AUTO: 37.9 % (ref 36.5–49.3)
HCV AB SER QL: NORMAL
HGB BLD-MCNC: 11.9 G/DL (ref 12–17)
HGB UR QL STRIP.AUTO: NEGATIVE
IMM GRANULOCYTES # BLD AUTO: 0.01 THOUSAND/UL (ref 0–0.2)
IMM GRANULOCYTES NFR BLD AUTO: 0 % (ref 0–2)
INR PPP: 1.06 (ref 0.85–1.19)
KETONES UR STRIP-MCNC: NEGATIVE MG/DL
LEUKOCYTE ESTERASE UR QL STRIP: NEGATIVE
LYMPHOCYTES # BLD AUTO: 1.3 THOUSANDS/ΜL (ref 0.6–4.47)
LYMPHOCYTES NFR BLD AUTO: 27 % (ref 14–44)
MAGNESIUM SERPL-MCNC: 2.1 MG/DL (ref 1.9–2.7)
MCH RBC QN AUTO: 28.9 PG (ref 26.8–34.3)
MCHC RBC AUTO-ENTMCNC: 31.4 G/DL (ref 31.4–37.4)
MCV RBC AUTO: 92 FL (ref 82–98)
MONOCYTES # BLD AUTO: 0.52 THOUSAND/ΜL (ref 0.17–1.22)
MONOCYTES NFR BLD AUTO: 11 % (ref 4–12)
NEUTROPHILS # BLD AUTO: 2.98 THOUSANDS/ΜL (ref 1.85–7.62)
NEUTS SEG NFR BLD AUTO: 61 % (ref 43–75)
NITRITE UR QL STRIP: NEGATIVE
NON-SQ EPI CELLS URNS QL MICRO: ABNORMAL /HPF
NRBC BLD AUTO-RTO: 0 /100 WBCS
PH UR STRIP.AUTO: 5.5 [PH]
PHOSPHATE SERPL-MCNC: 3.9 MG/DL (ref 2.3–4.1)
PLATELET # BLD AUTO: 243 THOUSANDS/UL (ref 149–390)
PMV BLD AUTO: 9.9 FL (ref 8.9–12.7)
POTASSIUM SERPL-SCNC: 3.9 MMOL/L (ref 3.5–5.3)
PROT SERPL-MCNC: 6.9 G/DL (ref 6.4–8.4)
PROT UR STRIP-MCNC: NEGATIVE MG/DL
PROT UR-MCNC: 6.4 MG/DL
PROT/CREAT UR: 0.1 MG/G{CREAT} (ref 0–0.1)
PROTHROMBIN TIME: 14.4 SECONDS (ref 12.3–15)
RBC # BLD AUTO: 4.12 MILLION/UL (ref 3.88–5.62)
RBC #/AREA URNS AUTO: ABNORMAL /HPF
SODIUM SERPL-SCNC: 140 MMOL/L (ref 135–147)
SP GR UR STRIP.AUTO: 1.01 (ref 1–1.03)
UROBILINOGEN UR STRIP-ACNC: <2 MG/DL
WBC # BLD AUTO: 4.84 THOUSAND/UL (ref 4.31–10.16)
WBC #/AREA URNS AUTO: ABNORMAL /HPF

## 2024-10-18 PROCEDURE — 84156 ASSAY OF PROTEIN URINE: CPT

## 2024-10-18 PROCEDURE — 82570 ASSAY OF URINE CREATININE: CPT

## 2024-10-18 PROCEDURE — 85610 PROTHROMBIN TIME: CPT

## 2024-10-18 PROCEDURE — 85025 COMPLETE CBC W/AUTO DIFF WBC: CPT

## 2024-10-18 PROCEDURE — 84100 ASSAY OF PHOSPHORUS: CPT

## 2024-10-18 PROCEDURE — 80053 COMPREHEN METABOLIC PANEL: CPT

## 2024-10-18 PROCEDURE — 80074 ACUTE HEPATITIS PANEL: CPT

## 2024-10-18 PROCEDURE — 83735 ASSAY OF MAGNESIUM: CPT

## 2024-10-18 PROCEDURE — 76870 US EXAM SCROTUM: CPT

## 2024-10-18 PROCEDURE — 36415 COLL VENOUS BLD VENIPUNCTURE: CPT

## 2024-10-18 PROCEDURE — 81001 URINALYSIS AUTO W/SCOPE: CPT

## 2024-10-21 ENCOUNTER — TELEPHONE (OUTPATIENT)
Age: 69
End: 2024-10-21

## 2024-10-21 DIAGNOSIS — R33.9 URINARY RETENTION: Primary | ICD-10-CM

## 2024-10-21 RX ORDER — ALFUZOSIN HYDROCHLORIDE 10 MG/1
10 TABLET, EXTENDED RELEASE ORAL DAILY
Qty: 30 TABLET | Refills: 3 | Status: SHIPPED | OUTPATIENT
Start: 2024-10-21 | End: 2024-10-25 | Stop reason: SINTOL

## 2024-10-21 NOTE — TELEPHONE ENCOUNTER
Taper off the gabapentin - decrease to one daily in PM for at least couple days then can stop - he has appt Friday and can discuss options once off rx and see if sxs resolve off rx

## 2024-10-21 NOTE — TELEPHONE ENCOUNTER
Relayed the following to the patient:  ANDREEA Kelly   10/21/24 10:44 AM  Note     I sent an alternative for alfuzosin instead of Flomax. I would recommend continuing unless having side effects.       Also advised patient to stand up slowly when going from sitting/laying position to standing position. Patient verbalized understanding and has no questions or concerns at this time.

## 2024-10-21 NOTE — TELEPHONE ENCOUNTER
Weak, dizzy and feeling faint since starting Tamsulosin. Looking for an alternate medication. Patient also wonders if he still needs it.    Advised to stop taking the Tamsulosin and will forward a message to his provider. [facility] pharmacy     Please advise

## 2024-10-21 NOTE — TELEPHONE ENCOUNTER
I sent an alternative for alfuzosin instead of Flomax. I would recommend continuing unless having side effects.

## 2024-10-21 NOTE — TELEPHONE ENCOUNTER
----- Message from Maria Bolton DO sent at 10/20/2024 11:46 AM EDT -----  Please call patient, kidney function stable at 68%. Remainder of labs to be reviewed at upcoming visit.

## 2024-10-21 NOTE — TELEPHONE ENCOUNTER
Pt calling regarding Rx: Gabapentin.Pt states since he began taking medication he has been experiencing weak, dizziness and feeling faint. Pt looking for an alternative medication.      Please review and advise.  Thank you

## 2024-10-21 NOTE — TELEPHONE ENCOUNTER
Called and LMOM for patient to d/c Flomax and start Alfuzosin in place of this- 1 tab daily. Advised patient that medication was sent to [facility]. Call back number provided if patient has additional questions.

## 2024-10-22 ENCOUNTER — HOSPITAL ENCOUNTER (OUTPATIENT)
Dept: ULTRASOUND IMAGING | Facility: HOSPITAL | Age: 69
Discharge: HOME | End: 2024-10-22
Payer: MEDICARE

## 2024-10-22 DIAGNOSIS — K76.0 HEPATIC STEATOSIS: ICD-10-CM

## 2024-10-22 PROCEDURE — 76981 USE PARENCHYMA: CPT

## 2024-10-22 NOTE — TELEPHONE ENCOUNTER
Tried reaching patient, no answer.  Left message on voicemail for patient to return call with any questions.  Sent letter to his My Chart with results.  Also mailed letter in [place] mail.

## 2024-10-25 DIAGNOSIS — R33.9 URINARY RETENTION: Primary | ICD-10-CM

## 2024-10-25 RX ORDER — SILODOSIN 8 MG/1
8 CAPSULE ORAL DAILY
Qty: 30 CAPSULE | Refills: 3 | Status: SHIPPED | OUTPATIENT
Start: 2024-10-25

## 2024-10-25 NOTE — TELEPHONE ENCOUNTER
Patient called back in, advised patient that JUANA Vega note states he will call him back this afternoon. Reminded patient to keep phone nearby with ringer on.

## 2024-10-25 NOTE — TELEPHONE ENCOUNTER
Patient called in stating the alfuzosin is causing lightheadedness, dizziness, and fatigue. He states it is worse than the Tamsulosin. He is asking for an alternative. I verified the [facility] pharmacy in [place].Patient also requested to be added to the wait list for a sooner appointment. Please advise.

## 2024-10-25 NOTE — TELEPHONE ENCOUNTER
Patient called in again, states he missed a call from us about 10 mins ago, advised unsure of who called and still awaiting provider response re; Alfuzosin and earlier appt. Patient understanding.

## 2024-10-26 ENCOUNTER — OFFICE VISIT (OUTPATIENT)
Dept: FAMILY MEDICINE CLINIC | Facility: CLINIC | Age: 69
End: 2024-10-26
Payer: MEDICARE

## 2024-10-26 VITALS
WEIGHT: 304 LBS | TEMPERATURE: 97.8 F | SYSTOLIC BLOOD PRESSURE: 132 MMHG | OXYGEN SATURATION: 97 % | DIASTOLIC BLOOD PRESSURE: 78 MMHG | HEART RATE: 59 BPM | BODY MASS INDEX: 41.17 KG/M2 | HEIGHT: 72 IN | RESPIRATION RATE: 18 BRPM

## 2024-10-26 DIAGNOSIS — N18.30 STAGE 3 CHRONIC KIDNEY DISEASE, UNSPECIFIED WHETHER STAGE 3A OR 3B CKD (HCC): ICD-10-CM

## 2024-10-26 DIAGNOSIS — N18.31 ACUTE KIDNEY INJURY SUPERIMPOSED ON STAGE 3A CHRONIC KIDNEY DISEASE (HCC): Primary | ICD-10-CM

## 2024-10-26 DIAGNOSIS — E66.9 TYPE 2 DIABETES MELLITUS WITH OBESITY  (HCC): ICD-10-CM

## 2024-10-26 DIAGNOSIS — E66.01 MORBID OBESITY (HCC): ICD-10-CM

## 2024-10-26 DIAGNOSIS — E11.69 TYPE 2 DIABETES MELLITUS WITH OBESITY  (HCC): ICD-10-CM

## 2024-10-26 DIAGNOSIS — N45.4: ICD-10-CM

## 2024-10-26 DIAGNOSIS — M79.604 PAIN IN BOTH LOWER EXTREMITIES: ICD-10-CM

## 2024-10-26 DIAGNOSIS — M79.605 PAIN IN BOTH LOWER EXTREMITIES: ICD-10-CM

## 2024-10-26 DIAGNOSIS — N17.9 ACUTE KIDNEY INJURY SUPERIMPOSED ON STAGE 3A CHRONIC KIDNEY DISEASE (HCC): Primary | ICD-10-CM

## 2024-10-26 PROBLEM — R78.81 BACTEREMIA: Status: RESOLVED | Noted: 2024-10-06 | Resolved: 2024-10-26

## 2024-10-26 PROBLEM — L98.9 SKIN LESION: Status: RESOLVED | Noted: 2024-10-08 | Resolved: 2024-10-26

## 2024-10-26 PROBLEM — R00.2 PALPITATIONS: Status: RESOLVED | Noted: 2023-05-04 | Resolved: 2024-10-26

## 2024-10-26 PROBLEM — R10.13 ABDOMINAL PAIN, ACUTE, EPIGASTRIC: Status: RESOLVED | Noted: 2024-10-12 | Resolved: 2024-10-26

## 2024-10-26 PROBLEM — E87.20 LACTIC ACIDOSIS: Status: RESOLVED | Noted: 2024-10-12 | Resolved: 2024-10-26

## 2024-10-26 PROCEDURE — 99495 TRANSJ CARE MGMT MOD F2F 14D: CPT | Performed by: INTERNAL MEDICINE

## 2024-10-26 RX ORDER — ALFUZOSIN HYDROCHLORIDE 10 MG/1
10 TABLET, EXTENDED RELEASE ORAL DAILY
COMMUNITY
End: 2024-10-30 | Stop reason: SINTOL

## 2024-10-26 NOTE — ASSESSMENT & PLAN NOTE
Lab Results   Component Value Date    EGFR 68 10/18/2024    EGFR 54 10/14/2024    EGFR 46 10/13/2024    CREATININE 1.08 10/18/2024    CREATININE 1.31 (H) 10/14/2024    CREATININE 1.50 (H) 10/13/2024   Most recent gfr is improved/at pt prior baseline He is staying hydrated and encouraged avoidance of nsaids He does have nephrology appt upcoming He did resume his BP meds since readings were trending high since home

## 2024-10-26 NOTE — ASSESSMENT & PLAN NOTE
Pt finishing oral antibiotics - per DC ID recommended 21 days He is tolerating and eating yogurt BS stable thus far He has Urology followup scheduled

## 2024-10-26 NOTE — ASSESSMENT & PLAN NOTE
Lab Results   Component Value Date    HGBA1C 6.0 (H) 10/06/2024   Monitor BS Recent A1c in hospital was lower Hi sdiet is getting back to his normal and he is trying to cut portions and eat healthier to maintain good glycemic control

## 2024-10-26 NOTE — ASSESSMENT & PLAN NOTE
Lab Results   Component Value Date    EGFR 68 10/18/2024    EGFR 54 10/14/2024    EGFR 46 10/13/2024    CREATININE 1.08 10/18/2024    CREATININE 1.31 (H) 10/14/2024    CREATININE 1.50 (H) 10/13/2024   GFR prior to hospitalization was stage 3/low stage 2 Most recent gfr 68

## 2024-10-26 NOTE — ASSESSMENT & PLAN NOTE
Pt is getting back to his routine and is more mobile with benefit Stay hydrated   He does not feel he needs gabapentin as he is trying to limit meds that may not be necessary

## 2024-10-26 NOTE — PROGRESS NOTES
Transition of Care Visit  Name: Christy Haro      : 1955      MRN: [id number]  Encounter Provider: Alma Delia Fountain DO  Encounter Date: 10/26/2024   Encounter department: [facility]    Assessment & Plan  Acute kidney injury superimposed on stage 3a chronic kidney disease ([facility])  Lab Results   Component Value Date    EGFR 68 10/18/2024    EGFR 54 10/14/2024    EGFR 46 10/13/2024    CREATININE 1.08 10/18/2024    CREATININE 1.31 (H) 10/14/2024    CREATININE 1.50 (H) 10/13/2024   Most recent gfr is improved/at pt prior baseline He is staying hydrated and encouraged avoidance of nsaids He does have nephrology appt upcoming He did resume his BP meds since readings were trending high since home      Epididymitis with abscess  Pt finishing oral antibiotics - per DC ID recommended 21 days He is tolerating and eating yogurt BS stable thus far He has Urology followup scheduled       Stage 3 chronic kidney disease, unspecified whether stage 3a or 3b CKD ([facility])  Lab Results   Component Value Date    EGFR 68 10/18/2024    EGFR 54 10/14/2024    EGFR 46 10/13/2024    CREATININE 1.08 10/18/2024    CREATININE 1.31 (H) 10/14/2024    CREATININE 1.50 (H) 10/13/2024   GFR prior to hospitalization was stage 3/low stage 2 Most recent gfr 68       Morbid obesity (HCC)  Pt is getting back on regular diet but he did lose a few pounds with recent illness He is trying to get back on his routine and work to lose weight        Type 2 diabetes mellitus with obesity  ([facility])    Lab Results   Component Value Date    HGBA1C 6.0 (H) 10/06/2024   Monitor BS Recent A1c in hospital was lower Hi sdiet is getting back to his normal and he is trying to cut portions and eat healthier to maintain good glycemic control       Pain in both lower extremities  Pt is getting back to his routine and is more mobile with benefit Stay hydrated   He does not feel he needs gabapentin as he is trying to limit meds that may not be necessary         Rto  as scheduled     History of Present Illness     Transitional Care Management Review:   Christy Haro is a 69 y.o. male here for TCM follow up.     During the TCM phone call patient stated:  TCM Call       Date and time call was made  10/14/2024 10:23 AM    Hospital care reviewed  Records reviewed    Patient was hospitialized at  [facility]    Date of Admission  10/12/24    Date of discharge  10/14/24    Diagnosis  LIZ, stage 3 kidney disease    Disposition  Home  self care    Were the patients medications reviewed and updated  No  discharged to self care    Current Symptoms  None          TCM Call       Post hospital issues  None    Scheduled for follow up?  Yes    Patients specialists  Urologist    Urologist name  [facility]    Did you obtain your prescribed medications  Yes    Do you need help managing your prescriptions or medications  No    Is transportation to your appointment needed  No    I have advised the patient to call PCP with any new or worsening symptoms  Anabela Perez,     Are you recieving any outpatient services  No    Are you recieving home care services  No    Are you using any community resources  No    Current waiver services  No    Have you fallen in the last 12 months  No    Interperter language line needed  No    Counseling  Patient          HPI  Pt recently hospitalized with bacteremia from epididymal abscess He is finishing oral antibx course for 21 days No fever or chills He is passing his urine and denies pain or hematuria BS have been stable and he is eating more since home so he is trying to portion control He lost weight in the hospital s he did not eat much His lisinopril was he;d due to decreased kidney function and low BP readings Pt has resumed since his BP trend did increase since home He is drinking more water He has followup with Urology They added medication for urinary flow but too costly for patient so he is not taking and he is concerned as  it may lower bp No chest pain or sob NO falls No diarrhea He is getting back to his usual routine slowly He tires easily and has been more stiff since he was in and out of the hospital 2 times over past few weeks   Review of Systems   Constitutional:  Positive for activity change. Negative for chills and fever.   HENT: Negative.     Eyes:  Negative for visual disturbance.   Respiratory:  Negative for cough and shortness of breath.    Cardiovascular:  Negative for chest pain, palpitations and leg swelling.   Gastrointestinal: Negative.  Negative for abdominal distention, abdominal pain and diarrhea.   Genitourinary:  Negative for difficulty urinating, flank pain, frequency and hematuria.   Musculoskeletal:  Positive for arthralgias.   Neurological:  Negative for dizziness, light-headedness and headaches.   Psychiatric/Behavioral:  Negative for sleep disturbance. The patient is not nervous/anxious.    Objective     /78   Pulse 59   Temp 97.8 °F (36.6 °C) (Temporal)   Resp 18   Ht 6' (1.829 m)   Wt (!) 138 kg (304 lb)   SpO2 97%   BMI 41.23 kg/m²     Physical Exam  Vitals and nursing note reviewed.   Constitutional:       General: He is not in acute distress.     Appearance: Normal appearance. He is not ill-appearing, toxic-appearing or diaphoretic.   HENT:      Head: Normocephalic and atraumatic.      Right Ear: External ear normal.      Left Ear: External ear normal.      Nose: Nose normal.      Mouth/Throat:      Mouth: Mucous membranes are moist.   Eyes:      General: No scleral icterus.     Extraocular Movements: Extraocular movements intact.      Conjunctiva/sclera: Conjunctivae normal.      Pupils: Pupils are equal, round, and reactive to light.   Cardiovascular:      Rate and Rhythm: Normal rate and regular rhythm.      Pulses: Normal pulses.   Pulmonary:      Effort: Pulmonary effort is normal. No respiratory distress.      Breath sounds: Normal breath sounds. No wheezing.   Abdominal:       General: Bowel sounds are normal. There is no distension.      Palpations: Abdomen is soft.      Tenderness: There is no abdominal tenderness.   Musculoskeletal:         General: Swelling present.      Cervical back: Normal range of motion and neck supple.      Comments: Pt has lymphedema   Lymphadenopathy:      Cervical: No cervical adenopathy.   Skin:     General: Skin is warm and dry.      Coloration: Skin is not jaundiced or pale.      Findings: No erythema or rash.   Neurological:      General: No focal deficit present.      Mental Status: He is alert and oriented to person, place, and time. Mental status is at baseline.      Cranial Nerves: No cranial nerve deficit.      Sensory: Sensory deficit present.   Psychiatric:         Mood and Affect: Mood normal.         Behavior: Behavior normal.         Thought Content: Thought content normal.         Judgment: Judgment normal.   Medications have been reviewed by provider in current encounter

## 2024-10-26 NOTE — ASSESSMENT & PLAN NOTE
Pt is getting back on regular diet but he did lose a few pounds with recent illness He is trying to get back on his routine and work to lose weight

## 2024-10-28 NOTE — TELEPHONE ENCOUNTER
Patient called in regarding the Silodosin rx. Advised of JUANA Vega note re; using [facility]. Talked patient through how to utilize [facility]. Patient appreciative.

## 2024-10-29 NOTE — TELEPHONE ENCOUNTER
Contacted patient and advised for him to take Silodosin as prescribed and ANDREEA Lane will further discuss his concerns with him at his scheduled appointment next week.

## 2024-10-29 NOTE — TELEPHONE ENCOUNTER
Patient called in with concerns of silodosin. States he is concerned with side effects of medication. He is questioning if he will need to continue to take this medication if he isn't having any issues. Phone call then got disconnected.

## 2024-10-29 NOTE — TELEPHONE ENCOUNTER
Patient called in saying the [facility] pharmacy is getting the medication in today. He is also concerned that this medication will give him the same side effects. I advised to at least try the medication and discuss with the provider at his upcoming appointment about next steps.

## 2024-10-30 RX ORDER — FINASTERIDE 5 MG/1
5 TABLET, FILM COATED ORAL DAILY
Qty: 30 TABLET | Refills: 6 | Status: SHIPPED | OUTPATIENT
Start: 2024-10-30

## 2024-10-30 NOTE — TELEPHONE ENCOUNTER
Called and spoke with patient to advise him to discontinue Silodosin due to dizziness. Patient was made aware that a script for Proscar was sent to his pharmacy to start taking, however advised that medication can take a couple months to reach effectiveness. Patient is to follow up as scheduled on 11/5. He was also scheduled for the next available cystoscopy with Dr. Keith and added to the cancellation list per his request.

## 2024-10-30 NOTE — TELEPHONE ENCOUNTER
Stop silodosin, I sent a prescription for finasteride 5 mg daily.  This can take several months to potentially see clinical benefit however he has failed multiple alpha blockers due to reports of dizziness.  We previously discussed possibly trying Cialis 5 mg daily instead however he will likely have the same side effects pertaining to dizziness.  He is scheduled to see me on 11/5 for follow-up.  Please schedule patient as well for cystoscopy with Dr. Keith in the meantime.

## 2024-10-30 NOTE — TELEPHONE ENCOUNTER
Took Silodosin for the first time today at 11 am and feels dizzy and weak within 2 hours of taking. He states it is affecting him the same way the Tamsulosin. Takes B/P medication three times a day. After taking Silodosin his B/P was 98/62    Advised to stop taking Silodosin, drink 64 ounces of water daily, don't drive today and rest, his wife is home with him. ED precautions reviewed    Please advise    CB #[phone]

## 2024-11-01 NOTE — TELEPHONE ENCOUNTER
Continue as prescribed, once complete no need for additional antibiotics unless indicated at follow-up appointment next week.

## 2024-11-01 NOTE — TELEPHONE ENCOUNTER
VM left for patient that he should complete antibiotic as prescribed and he does not require additional antibiotics after this course is completed. Plan to follow up as scheduled on 11/5/24.

## 2024-11-01 NOTE — TELEPHONE ENCOUNTER
Patient called in asking if he needs additional antibiotics of the Vantin that he was prescribed for 39 doses. States he is feeling much better since starting his antibiotics. Advised most likely not necessary for more and will follow up at his appointment on 11/5.

## 2024-11-05 ENCOUNTER — OFFICE VISIT (OUTPATIENT)
Dept: UROLOGY | Facility: CLINIC | Age: 69
End: 2024-11-05
Payer: MEDICARE

## 2024-11-05 ENCOUNTER — TELEPHONE (OUTPATIENT)
Dept: GASTROENTEROLOGY | Facility: CLINIC | Age: 69
End: 2024-11-05

## 2024-11-05 VITALS
HEIGHT: 72 IN | BODY MASS INDEX: 40.97 KG/M2 | DIASTOLIC BLOOD PRESSURE: 62 MMHG | HEART RATE: 71 BPM | TEMPERATURE: 97.3 F | WEIGHT: 302.5 LBS | SYSTOLIC BLOOD PRESSURE: 130 MMHG | OXYGEN SATURATION: 97 %

## 2024-11-05 DIAGNOSIS — K74.00 LIVER FIBROSIS: Primary | ICD-10-CM

## 2024-11-05 DIAGNOSIS — R35.0 URINARY FREQUENCY: ICD-10-CM

## 2024-11-05 DIAGNOSIS — N45.4: Primary | ICD-10-CM

## 2024-11-05 PROBLEM — N30.00 ACUTE CYSTITIS WITHOUT HEMATURIA: Status: RESOLVED | Noted: 2024-10-06 | Resolved: 2024-11-05

## 2024-11-05 LAB
POST-VOID RESIDUAL VOLUME, ML POC: 45 ML
SL AMB  POCT GLUCOSE, UA: NORMAL
SL AMB LEUKOCYTE ESTERASE,UA: NEGATIVE
SL AMB POCT BILIRUBIN,UA: NEGATIVE
SL AMB POCT BLOOD,UA: NEGATIVE
SL AMB POCT CLARITY,UA: CLEAR
SL AMB POCT COLOR,UA: YELLOW
SL AMB POCT KETONES,UA: NEGATIVE
SL AMB POCT NITRITE,UA: NEGATIVE
SL AMB POCT PH,UA: 6
SL AMB POCT SPECIFIC GRAVITY,UA: 1.01
SL AMB POCT URINE PROTEIN: NEGATIVE
SL AMB POCT UROBILINOGEN: 0.2

## 2024-11-05 PROCEDURE — 51798 US URINE CAPACITY MEASURE: CPT

## 2024-11-05 PROCEDURE — 81002 URINALYSIS NONAUTO W/O SCOPE: CPT

## 2024-11-05 PROCEDURE — 99213 OFFICE O/P EST LOW 20 MIN: CPT

## 2024-11-05 RX ORDER — FINASTERIDE 5 MG/1
5 TABLET, FILM COATED ORAL DAILY
Qty: 90 TABLET | Refills: 3 | Status: SHIPPED | OUTPATIENT
Start: 2024-11-05

## 2024-11-05 NOTE — TELEPHONE ENCOUNTER
Called and spoke to [facility]. He did schedule with Dr Pang 3/31/25. He is asking to speak with Marissa because he has questions about Fibrosis/Cirrosis because he states he never had any issues with his liver.

## 2024-11-05 NOTE — TELEPHONE ENCOUNTER
----- Message from Marissa Young PA-C sent at 11/5/2024 11:07 AM EST -----  Please contact pt. Special liver ultrasound shows fatty liver and concern for hardening down of the liver or fibrosis. While this can be over and underestimated on this type of test, this needs to be monitored closely as this can transform into liver cirrhosis. I would like pt to have one time office visit with Dr Pang our liver specialist when he comes up to [place]. Please assist in scheduling. In the interim, the recs remain the same for fatty liver: weight loss, no etoh, control of blood pressure, blood sugar, triglycerides etc.

## 2024-11-05 NOTE — PROGRESS NOTES
11/5/2024    No chief complaint on file.      Assessment and Plan    69 y.o. male manage by AP Team    Epididymitis  Urinary frequency   Recent acute hospitalization in October for bacteremia secondary to right epididymitis with questionable abscess. Blood culture with growth of Serratia marcescens, urine culture positive for Citrobacter. He received IV ceftriaxone and completed a 21 day course of PO Vantin which he completed 2 days ago.  Follow Scrotal US from 10/18/2024 shows resolved right epididymitis however persistent hypoechoic focus posterior to the right testicle concerning for developing abscess.   On exam today, both testicles descended equally and bilaterally, smooth, and symmetric.  There is a small palpable cystlike nodule noted to the posterior aspect of the right testicle.  This is soft without induration or tenderness.  Right testicle itself has minimal tenderness.  The left testicle is also within normal limits however patient has moderate tenderness on exam unclear etiology.  Bilateral vas deferens are within normal limits without appreciated induration. Physical exam is negative for any outward signs of infection today.   Urine dip is negative for infection or blood, positive for glucose.   PVR is 47 mL  Urinary frequency may be due to residual inflammation following recent UTI. Possible component of BPH. He will continue finasteride 5 mg daily. He is not able to tolerate PO alpha blockers due to dizziness.   In regards to questionable scrotal abscess, I recommend continued conservative management. Patient is otherwise asymptomatic and I would not recommend incision and drainage at this time. He will call should he develop any fevers, testicular pain, or swelling and we will reassess.  Follow-up in January for scheduled cystoscopy with Dr. Keith and repeat exam.         Interval HPI:    He presents today reporting doing well but does complain of urinary frequency which started about 1 week ago.   He also reports sensation of incomplete emptying despite a PVR 45 mL today.  He states that he has not been drinking as much today and I encouraged him to make sure he is drinking plenty of water.  He denies any testicular pain, abdominal pain, fevers, or difficulty with urination.  Urine dip today is negative for infection or blood.  It is positive for glucose.         History of Present Illness  Christy Haro is a 69 y.o. male here for hospital follow-up evaluation of epididymitis with abscess.     Established patient initially seen by me on 10/2/2024 for evaluation of urinary retention.  He had an acute ER visit on 9/19/2024 for complaints of difficulty with urination.  He was found to be retaining greater than 700 cc of urine and Moreau catheter was placed.  He had been recently diagnosed with shingles 1 day prior involving the L5-S1 dermatomes and was started on Valtrex. This was believed to be the cause of his urinary retention.  I did recommend he start Flomax 0.4 mg at bedtime due to potential underlying BPH.  He had successful void trial on 10/2 with PVR of 67 mL. He has not been able to tolerate Alpha blocker due to reported dizziness and has failed Flomax, alfuzosin, and silodosin. He has since been started on finasteride 5 mg daily.     2 days after his office visit with me, he was seen at [facility] emergency department on 10/4 for complaints of fevers.  He did complain of some dysuria at the time but attributed this to recent Moreau catheter removal.  He was treated for suspected UTI and discharged on p.o. cefpodoxime.  He was later hospitalized at [facility] on 10/6 secondary to bacteremia with blood cultures positive for Serratia marcescens likely urinary source. Urine culture grew Citrobacter.  During his hospitalization, on 10/9 patient began to complain of right testicular pain and swelling. Ultrasound of the scrotum ordered and showed right-sided epididymitis hypoechoic focus  within the tail of the epididymis suspicious for developing abscess. IV ceftriaxone was continued per ID recommendations and he was discharged on PO Bactrim on 10/11. He took one dose of Bactrim and experience nausea and was admitted again to [facility] on 10/12 for LIZ. He was switched to PO Vantin with recommendations to continue for 3 weeks.     Follow-up US completed on 10/18/2024 showed persistent scrotal wall thickening but improved right epididymitis there was however persistent hypoechoic focus inferior to the right testicle concerning for developing abscess.       Review of Systems   Constitutional:  Negative for chills and fever.   HENT:  Negative for congestion and sore throat.    Respiratory:  Negative for cough and shortness of breath.    Cardiovascular:  Negative for chest pain and leg swelling.   Gastrointestinal:  Negative for abdominal pain, constipation and diarrhea.   Genitourinary:  Positive for frequency. Negative for difficulty urinating, dysuria, hematuria and urgency.   Musculoskeletal:  Negative for back pain and gait problem.   Skin:  Negative for wound.   Allergic/Immunologic: Negative for immunocompromised state.   Hematological:  Does not bruise/bleed easily.               Vitals  There were no vitals filed for this visit.    Physical Exam  Vitals reviewed.   Constitutional:       General: He is not in acute distress.     Appearance: Normal appearance. He is not ill-appearing or toxic-appearing.   HENT:      Head: Normocephalic and atraumatic.   Eyes:      General: No scleral icterus.     Conjunctiva/sclera: Conjunctivae normal.   Cardiovascular:      Rate and Rhythm: Normal rate.   Pulmonary:      Effort: Pulmonary effort is normal. No respiratory distress.   Abdominal:      Palpations: Abdomen is soft.      Tenderness: There is no abdominal tenderness. There is no right CVA tenderness or left CVA tenderness.      Hernia: No hernia is present.   Genitourinary:     Comments: On  exam today, both testicles descended equally and bilaterally, smooth, and symmetric.  There is a small palpable cystlike nodule noted to the posterior aspect of the right testicle.  This is soft without induration or tenderness.  Right testicle itself has minimal tenderness.  The left testicle is also within normal limits however patient has moderate tenderness on exam unclear etiology.  Bilateral vas deferens are within normal limits without appreciated induration.  Musculoskeletal:      Cervical back: Normal range of motion.      Right lower leg: No edema.      Left lower leg: No edema.   Skin:     General: Skin is warm and dry.      Coloration: Skin is not jaundiced or pale.   Neurological:      General: No focal deficit present.      Mental Status: He is alert and oriented to person, place, and time. Mental status is at baseline.      Gait: Gait normal.   Psychiatric:         Mood and Affect: Mood normal.         Behavior: Behavior normal.         Thought Content: Thought content normal.         Judgment: Judgment normal.         Past History  Past Medical History:   Diagnosis Date    Arthritis     last assessed 7/1/15    Bronchitis 02/02/2023    Chronic diastolic (congestive) heart failure (HCC)     Diabetes mellitus (HCC)     type 2-last assessed 1/28/15    Diabetes mellitus (HCC) 03/16/2018    Dyslipidemia     GERD (gastroesophageal reflux disease)     Hypertension     Hypertensive urgency 03/16/2023    Irregular heart beat     last assessed 7/1/15    Lactic acidosis 10/12/2024    Obstructive sleep apnea     Palpitations     last assessed 9/7/17    Sexual dysfunction     last assessed 7/1/15    Thyroid disease     last assessed 7/1/15     Social History     Socioeconomic History    Marital status: Single     Spouse name: Not on file    Number of children: Not on file    Years of education: Not on file    Highest education level: Not on file   Occupational History    Not on file   Tobacco Use    Smoking  status: Former     Current packs/day: 1.00     Types: Cigarettes     Passive exposure: Never    Smokeless tobacco: Never    Tobacco comments:     former smoker-quit 27 yrs ago 1pppd x 30 yrs as per [employer]   Vaping Use    Vaping status: Never Used   Substance and Sexual Activity    Alcohol use: Never    Drug use: Never    Sexual activity: Not Currently   Other Topics Concern    Not on file   Social History Narrative    Not on file     Social Determinants of Health     Financial Resource Strain: Low Risk  (1/8/2024)    Overall Financial Resource Strain (CARDIA)     Difficulty of Paying Living Expenses: Not hard at all   Food Insecurity: No Food Insecurity (10/7/2024)    Nursing - Inadequate Food Risk Classification     Worried About Running Out of Food in the Last Year: Never true     Ran Out of Food in the Last Year: Never true     Ran Out of Food in the Last Year: Not on file   Transportation Needs: No Transportation Needs (10/7/2024)    PRAPARE - Transportation     Lack of Transportation (Medical): No     Lack of Transportation (Non-Medical): No   Physical Activity: Not on file   Stress: Not on file   Social Connections: Not on file   Intimate Partner Violence: Not on file   Housing Stability: Low Risk  (10/7/2024)    Housing Stability Vital Sign     Unable to Pay for Housing in the Last Year: No     Number of Times Moved in the Last Year: 1     Homeless in the Last Year: No     Social History     Tobacco Use   Smoking Status Former    Current packs/day: 1.00    Types: Cigarettes    Passive exposure: Never   Smokeless Tobacco Never   Tobacco Comments    former smoker-quit 27 yrs ago 1pppd x 30 yrs as per [employer]     Family History   Problem Relation Age of Onset    Hypertension Mother         essential    Stroke Mother     Hypertension Father         essential    Heart defect Father         cardiac disorder    Stroke Brother     Hypertension Brother        The following portions of the patient's history were  reviewed and updated as appropriate allergies, current medications, past medical history, past social history, past surgical history and problem list    Imaging:    10/18/2024    Addendum    ADDENDUM:     SCROTUM: Right scrotal wall thickness measures 4.8 mm with left scrotal wall thickness measuring 3.8 mm. Complex 1.3 x 1.8 x 1.2 cm soft tissue nodule inferior to the right TESTICLE with peripheral hypervascularity likely corresponds to the previously   measured area suspicious for an abscess on the prior study and is similar in size.      Addended by Saleem Reich MD on 10/24/2024 10:43 AM     Study Result    Narrative & Impression   SCROTAL ULTRASOUND     INDICATION: N45.1: Epididymitis.     COMPARISON: None     TECHNIQUE: Ultrasound the scrotal contents was performed with a high frequency linear transducer utilizing volumetric sweep imaging as well as standard still image techniques. Imaging performed in longitudinal and transverse orientation. Color and   spectral Doppler evaluation also performed bilaterally.     FINDINGS:     TESTES:  Testes are symmetric and normal in size.     RIGHT testis = 4.7 x 2.4 x 3.1 cm. Volume 18.2 mL  Normal contour with homogeneous smooth echotexture.  No intratesticular mass lesion or calcifications.     LEFT testis = 3.7 x 3.5 x 3.8 cm. Volume 25.8 mL  Normal contour with homogeneous smooth echotexture.  No intratesticular mass lesion or calcifications.     Doppler flow within both testes is present and symmetric.     EPIDIDYMIDES:  The right epididymis is no longer enlarged and heterogeneous.  Doppler ultrasound demonstrates normal blood flow.  There are small epididymal cyst(s) bilaterally. Otherwise unremarkable.     HYDROCELE: No significant fluid present.     VARICOCELE: None present.     SCROTUM: Right scrotal wall thickness measures 4.8 mm with left scrotal wall thickness measuring 3.8 mm. Complex 1.3 x 1.8 x 1.2 cm soft tissue nodule inferior to the right kidney with  peripheral hypervascularity likely corresponds to the previously   measured area suspicious for an abscess on the prior study and is similar in size.     IMPRESSION:     Resolution of the right epididymitis with persistent hypocholic focus inferior to the testicle which remains concerning for developing abscess.     The study was marked in EPIC for significant notification.         Results  No results found for this or any previous visit (from the past 1 hour(s)).]  Lab Results   Component Value Date    PSA 1.24 04/07/2024    PSA 0.8 06/21/2021    PSA 0.8 08/06/2020    PSA 0.9 03/26/2019     Lab Results   Component Value Date    GLUCOSE 107 12/16/2015    CALCIUM 9.4 10/18/2024     12/16/2015    K 3.9 10/18/2024    CO2 24 10/18/2024     10/18/2024    BUN 19 10/18/2024    CREATININE 1.08 10/18/2024     Lab Results   Component Value Date    WBC 4.84 10/18/2024    HGB 11.9 (L) 10/18/2024    HCT 37.9 10/18/2024    MCV 92 10/18/2024     10/18/2024       Please Note:  Voice dictation software has been used to create this document. There may be inadvertent transcriptions errors.     ANDREEA Kelly 11/05/24

## 2024-11-06 ENCOUNTER — HOSPITAL ENCOUNTER (EMERGENCY)
Facility: HOSPITAL | Age: 69
Discharge: HOME | End: 2024-11-06
Attending: EMERGENCY MEDICINE
Payer: MEDICARE

## 2024-11-06 ENCOUNTER — APPOINTMENT (EMERGENCY)
Dept: RADIOLOGY | Facility: HOSPITAL | Age: 69
End: 2024-11-06
Payer: MEDICARE

## 2024-11-06 VITALS
HEART RATE: 58 BPM | TEMPERATURE: 97.6 F | OXYGEN SATURATION: 97 % | SYSTOLIC BLOOD PRESSURE: 121 MMHG | DIASTOLIC BLOOD PRESSURE: 69 MMHG | RESPIRATION RATE: 16 BRPM

## 2024-11-06 DIAGNOSIS — R00.2 PALPITATIONS: Primary | ICD-10-CM

## 2024-11-06 LAB
2HR DELTA HS TROPONIN: 0 NG/L
ALBUMIN SERPL BCG-MCNC: 4.1 G/DL (ref 3.5–5)
ALP SERPL-CCNC: 31 U/L (ref 34–104)
ALT SERPL W P-5'-P-CCNC: 17 U/L (ref 7–52)
ANION GAP SERPL CALCULATED.3IONS-SCNC: 10 MMOL/L (ref 4–13)
APTT PPP: 27 SECONDS (ref 23–34)
AST SERPL W P-5'-P-CCNC: 18 U/L (ref 13–39)
BASOPHILS # BLD AUTO: 0.04 THOUSANDS/ΜL (ref 0–0.1)
BASOPHILS NFR BLD AUTO: 1 % (ref 0–1)
BILIRUB SERPL-MCNC: 0.89 MG/DL (ref 0.2–1)
BNP SERPL-MCNC: 99 PG/ML (ref 0–100)
BUN SERPL-MCNC: 25 MG/DL (ref 5–25)
CALCIUM SERPL-MCNC: 9.5 MG/DL (ref 8.4–10.2)
CARDIAC TROPONIN I PNL SERPL HS: 5 NG/L
CARDIAC TROPONIN I PNL SERPL HS: 5 NG/L
CHLORIDE SERPL-SCNC: 103 MMOL/L (ref 96–108)
CO2 SERPL-SCNC: 26 MMOL/L (ref 21–32)
CREAT SERPL-MCNC: 1.24 MG/DL (ref 0.6–1.3)
EOSINOPHIL # BLD AUTO: 0.01 THOUSAND/ΜL (ref 0–0.61)
EOSINOPHIL NFR BLD AUTO: 0 % (ref 0–6)
ERYTHROCYTE [DISTWIDTH] IN BLOOD BY AUTOMATED COUNT: 14.6 % (ref 11.6–15.1)
GFR SERPL CREATININE-BSD FRML MDRD: 58 ML/MIN/1.73SQ M
GLUCOSE SERPL-MCNC: 126 MG/DL (ref 65–140)
HCT VFR BLD AUTO: 42 % (ref 36.5–49.3)
HGB BLD-MCNC: 13.3 G/DL (ref 12–17)
IMM GRANULOCYTES # BLD AUTO: 0.02 THOUSAND/UL (ref 0–0.2)
IMM GRANULOCYTES NFR BLD AUTO: 0 % (ref 0–2)
INR PPP: 1.08 (ref 0.85–1.19)
LYMPHOCYTES # BLD AUTO: 1.42 THOUSANDS/ΜL (ref 0.6–4.47)
LYMPHOCYTES NFR BLD AUTO: 25 % (ref 14–44)
MAGNESIUM SERPL-MCNC: 2.1 MG/DL (ref 1.9–2.7)
MCH RBC QN AUTO: 28.5 PG (ref 26.8–34.3)
MCHC RBC AUTO-ENTMCNC: 31.7 G/DL (ref 31.4–37.4)
MCV RBC AUTO: 90 FL (ref 82–98)
MONOCYTES # BLD AUTO: 0.6 THOUSAND/ΜL (ref 0.17–1.22)
MONOCYTES NFR BLD AUTO: 10 % (ref 4–12)
NEUTROPHILS # BLD AUTO: 3.71 THOUSANDS/ΜL (ref 1.85–7.62)
NEUTS SEG NFR BLD AUTO: 64 % (ref 43–75)
NRBC BLD AUTO-RTO: 0 /100 WBCS
PLATELET # BLD AUTO: 176 THOUSANDS/UL (ref 149–390)
PMV BLD AUTO: 10.5 FL (ref 8.9–12.7)
POTASSIUM SERPL-SCNC: 3.7 MMOL/L (ref 3.5–5.3)
PROT SERPL-MCNC: 7.2 G/DL (ref 6.4–8.4)
PROTHROMBIN TIME: 14.5 SECONDS (ref 12.3–15)
RBC # BLD AUTO: 4.67 MILLION/UL (ref 3.88–5.62)
SODIUM SERPL-SCNC: 139 MMOL/L (ref 135–147)
TSH SERPL DL<=0.05 MIU/L-ACNC: 0.71 UIU/ML (ref 0.45–4.5)
WBC # BLD AUTO: 5.8 THOUSAND/UL (ref 4.31–10.16)

## 2024-11-06 PROCEDURE — 85730 THROMBOPLASTIN TIME PARTIAL: CPT | Performed by: EMERGENCY MEDICINE

## 2024-11-06 PROCEDURE — 36415 COLL VENOUS BLD VENIPUNCTURE: CPT | Performed by: EMERGENCY MEDICINE

## 2024-11-06 PROCEDURE — 85025 COMPLETE CBC W/AUTO DIFF WBC: CPT | Performed by: EMERGENCY MEDICINE

## 2024-11-06 PROCEDURE — 84443 ASSAY THYROID STIM HORMONE: CPT | Performed by: EMERGENCY MEDICINE

## 2024-11-06 PROCEDURE — 99285 EMERGENCY DEPT VISIT HI MDM: CPT

## 2024-11-06 PROCEDURE — 83880 ASSAY OF NATRIURETIC PEPTIDE: CPT | Performed by: EMERGENCY MEDICINE

## 2024-11-06 PROCEDURE — 93005 ELECTROCARDIOGRAM TRACING: CPT

## 2024-11-06 PROCEDURE — 85610 PROTHROMBIN TIME: CPT | Performed by: EMERGENCY MEDICINE

## 2024-11-06 PROCEDURE — 83735 ASSAY OF MAGNESIUM: CPT | Performed by: EMERGENCY MEDICINE

## 2024-11-06 PROCEDURE — 84484 ASSAY OF TROPONIN QUANT: CPT | Performed by: EMERGENCY MEDICINE

## 2024-11-06 PROCEDURE — 99285 EMERGENCY DEPT VISIT HI MDM: CPT | Performed by: EMERGENCY MEDICINE

## 2024-11-06 PROCEDURE — 80053 COMPREHEN METABOLIC PANEL: CPT | Performed by: EMERGENCY MEDICINE

## 2024-11-06 PROCEDURE — 71045 X-RAY EXAM CHEST 1 VIEW: CPT

## 2024-11-06 NOTE — TELEPHONE ENCOUNTER
Spoke with pt, scheduled sooner appnt with RUTHIE Rojas on 11/26/24 in [place]. Pt confirmed appnt.

## 2024-11-06 NOTE — TELEPHONE ENCOUNTER
Called pt back, was able to schedule a sooner appnt on 11/7/24 with Dr. Maricruz Pang @ [facility] in [place]. (We had a cancellation)-rs from 11/26 to 11/7. Pt confirmed appnt.

## 2024-11-06 NOTE — DISCHARGE INSTRUCTIONS
Continue current medications.  Return with chest pain trouble breathing lightheadedness recurrence of symptoms or any new or worsening symptoms

## 2024-11-06 NOTE — ED PROVIDER NOTES
Time reflects when diagnosis was documented in both MDM as applicable and the Disposition within this note       Time User Action Codes Description Comment    11/6/2024  6:57 PM Manuela Cramer Add [R00.2] Palpitations           ED Disposition       ED Disposition   Discharge    Condition   Stable    Date/Time   Wed Nov 6, 2024  8:42 PM    Comment   Ethanjb Estrellita discharge to home/self care.                   Assessment & Plan       Medical Decision Making  69-year-old male with remote history of paroxysmal A-fib anticoagulated on aspirin presents with an episode of fluttering/pounding heart rate occurring an hour prior to arrival he did activate his loop recorder.  He was lightheaded at the time and had to stop his light cleaning activity he denies any chest pain he feels fatigued afterwards there is no shortness of breath or pleuritic pain.  Prior cardiology note was reviewed 8/12/24 will check chest x-ray for congestive heart failure EKG for arrhythmia serial isoenzymes TSH electrolytes and reevaluate for any anemia.  Currently on telemetry demonstrating a normal sinus rhythm in the 60s    Urology note 11/5/2024 reviewed; cardiology consult 6/24/24    Reviewed discussion in cardiology consult note from 6/24/2024 at that time the paroxysmal A-fib his reference from January 2024 and also on prior loop recorder downloads he had a history of SVT as well as a junctional rhythm.  He had a prior evaluation with Dr. Davenport as well at this time patient is not exhibiting electrolyte abnormality he has maintained a sinus rhythm we will check an additional troponin commend he contact Dr. Velez his cardiologist for further follow-up on the loop recorder download    Amount and/or Complexity of Data Reviewed  Labs: ordered.  Radiology: ordered and independent interpretation performed.        ED Course as of 11/06/24 2353   Wed Nov 06, 2024   1723 Patient has loop recorder cannot interrigate in ED   2041 Remained  asymptomatic he had no rhythm he is on telemetry while here.  Recommend he contact his cardiologist tomorrow for download of the loop recorder and further recommendations for treatment if needed.  Reviewed return precautions patient comfortable with plan       Medications - No data to display    ED Risk Strat Scores   HEART Risk Score      Flowsheet Row Most Recent Value   Heart Score Risk Calculator    History 0 Filed at: 11/06/2024 1844   ECG 1 Filed at: 11/06/2024 1844   Age 2 Filed at: 11/06/2024 1844   Risk Factors 2 Filed at: 11/06/2024 1844   Troponin 0 Filed at: 11/06/2024 1844   HEART Score 5 Filed at: 11/06/2024 1844                               SBIRT 22yo+      Flowsheet Row Most Recent Value   Initial Alcohol Screen: US AUDIT-C     1. How often do you have a drink containing alcohol? 0 Filed at: 11/06/2024 1652   2. How many drinks containing alcohol do you have on a typical day you are drinking?  0 Filed at: 11/06/2024 1652   3a. Male UNDER 65: How often do you have five or more drinks on one occasion? 0 Filed at: 11/06/2024 1652   Audit-C Score 0 Filed at: 11/06/2024 1652   LOPEZ: How many times in the past year have you...    Used an illegal drug or used a prescription medication for non-medical reasons? Never Filed at: 11/06/2024 1652                            History of Present Illness       Chief Complaint   Patient presents with    Irregular Heart Beat     States that about an hour ago he felt as if his heart was fluttering and racing. Denies any shortness of breath. Denies any chest pain. States that he feels as if he does not have any energy        Past Medical History:   Diagnosis Date    Arthritis     last assessed 7/1/15    Bronchitis 02/02/2023    Chronic diastolic (congestive) heart failure (HCC)     Diabetes mellitus (HCC)     type 2-last assessed 1/28/15    Diabetes mellitus (HCC) 03/16/2018    Dyslipidemia     GERD (gastroesophageal reflux disease)     Hypertension     Hypertensive  urgency 03/16/2023    Irregular heart beat     last assessed 7/1/15    Lactic acidosis 10/12/2024    Obstructive sleep apnea     Palpitations     last assessed 9/7/17    Sexual dysfunction     last assessed 7/1/15    Thyroid disease     last assessed 7/1/15      Past Surgical History:   Procedure Laterality Date    COLONOSCOPY      TONSILLECTOMY        Family History   Problem Relation Age of Onset    Hypertension Mother         essential    Stroke Mother     Hypertension Father         essential    Heart defect Father         cardiac disorder    Stroke Brother     Hypertension Brother       Social History     Tobacco Use    Smoking status: Former     Current packs/day: 1.00     Types: Cigarettes     Passive exposure: Never    Smokeless tobacco: Never    Tobacco comments:     former smoker-quit 27 yrs ago 1pppd x 30 yrs as per [employer]   Vaping Use    Vaping status: Never Used   Substance Use Topics    Alcohol use: Never    Drug use: Never      E-Cigarette/Vaping    E-Cigarette Use Never User       E-Cigarette/Vaping Substances    Nicotine No     THC No     CBD No     Flavoring No     Other No     Unknown No       I have reviewed and agree with the history as documented.     69-year-old male recently admitted bacteremia secondary to right epididymitis questionable abscess group show blood cultures grew out Serratia marcescens urine culture was positive for Citrobacter he received a course of ceftriaxone and then completed a 21-day course of Vantin at his follow-up urology visit yesterday.  Clinically following the possibility of a normal scrotal abscess they are recommending conservative management patient is otherwise been asymptomatic from urologic standpoint.  Today while at his home he was doing a light cleaning when he started feeling a fluttering pounding sensation he was lightheaded no diaphoresis no shortness of breath no chest pain no pleuritic pain he has had no worsening lower extremity edema has been  compliant with his medications this spontaneously resolved.  He currently just feels fatigued.  He does have a loop recorder and he activated it.  Patient is otherwise had no fever chills cough or upper respiratory complaints he denies any abdominal pain he said no nausea vomiting diarrhea.  Prior history of DVT or PE patient is anticoagulated on aspirin 81  Past medical history CHF diabetes hyperlipidemia GERD hypertension obstructive sleep apnea lymphedema paroxysmal A-fib right epididymitis  Past surgical history is colonoscopy tonsillectomy        Review of Systems   Constitutional:  Positive for fatigue. Negative for activity change, chills and fever.   HENT:  Negative for congestion, ear pain, rhinorrhea, sneezing and sore throat.    Eyes:  Negative for discharge.   Respiratory:  Negative for cough and shortness of breath.    Cardiovascular:  Positive for palpitations and leg swelling (at baseline). Negative for chest pain.   Gastrointestinal:  Negative for abdominal pain, blood in stool, diarrhea, nausea and vomiting.   Endocrine: Negative for polyuria.   Genitourinary:  Negative for dysuria, frequency and urgency.   Musculoskeletal:  Negative for back pain and myalgias.   Skin:  Negative for rash.   Neurological:  Negative for dizziness, weakness, light-headedness, numbness and headaches.   Hematological:  Negative for adenopathy.   Psychiatric/Behavioral:  Negative for confusion.    All other systems reviewed and are negative.          Objective       ED Triage Vitals   Temperature Pulse Blood Pressure Respirations SpO2 Patient Position - Orthostatic VS   11/06/24 1651 11/06/24 1652 11/06/24 1652 11/06/24 1652 11/06/24 1652 11/06/24 1652   97.6 °F (36.4 °C) 78 127/59 20 96 % Lying      Temp Source Heart Rate Source BP Location FiO2 (%) Pain Score    11/06/24 1651 11/06/24 1652 11/06/24 1652 -- --    Temporal Monitor Right arm        Vitals      Date and Time Temp Pulse SpO2 Resp BP Pain Score FACES Pain  Rating User   11/06/24 2000 -- 58 97 % 16 121/69 -- --    11/06/24 1930 -- 56 97 % 14 122/71 -- --    11/06/24 1900 -- 56 95 % 14 108/63 -- --    11/06/24 1830 -- 60 97 % 18 99/65 -- --    11/06/24 1800 -- 58 96 % 17 118/69 -- --    11/06/24 1730 -- 59 97 % 12 106/62 -- --    11/06/24 1700 -- 67 96 % 16 119/59 -- --    11/06/24 1652 -- 78 96 % 20 127/59 -- -- [place]   11/06/24 1651 97.6 °F (36.4 °C) -- -- -- -- -- -- [place]            Physical Exam  Vitals and nursing note reviewed.   Constitutional:       General: He is not in acute distress.     Appearance: He is well-developed. He is not ill-appearing, toxic-appearing or diaphoretic.   HENT:      Head: Normocephalic and atraumatic.      Right Ear: External ear normal.      Left Ear: External ear normal.      Nose: Nose normal.      Mouth/Throat:      Mouth: Mucous membranes are moist.      Pharynx: No oropharyngeal exudate or posterior oropharyngeal erythema.   Eyes:      General: No scleral icterus.        Right eye: No discharge.         Left eye: No discharge.      Extraocular Movements: Extraocular movements intact.      Conjunctiva/sclera: Conjunctivae normal.      Pupils: Pupils are equal, round, and reactive to light.   Cardiovascular:      Rate and Rhythm: Normal rate and regular rhythm.      Heart sounds: Normal heart sounds.   Pulmonary:      Effort: Pulmonary effort is normal. No respiratory distress.      Breath sounds: Normal breath sounds. No stridor. No wheezing, rhonchi or rales.   Abdominal:      General: Bowel sounds are normal. There is no distension.      Palpations: Abdomen is soft. There is no mass.      Tenderness: There is no abdominal tenderness. There is no right CVA tenderness, left CVA tenderness, guarding or rebound.      Comments: Back: no mildline or CVA tenderness   Musculoskeletal:         General: No tenderness or deformity. Normal range of motion.      Cervical back: Normal range of motion and neck supple.      Right lower  leg: No edema.      Left lower leg: No edema.   Lymphadenopathy:      Cervical: No cervical adenopathy.   Skin:     General: Skin is warm and dry.      Capillary Refill: Capillary refill takes less than 2 seconds.   Neurological:      General: No focal deficit present.      Mental Status: He is alert and oriented to person, place, and time.      Cranial Nerves: No cranial nerve deficit.      Sensory: No sensory deficit.      Motor: No weakness or abnormal muscle tone.      Coordination: Coordination normal.      Deep Tendon Reflexes: Reflexes normal.      Comments: Gait steady   Psychiatric:         Mood and Affect: Mood normal.         Results Reviewed       Procedure Component Value Units Date/Time    HS Troponin I 2hr [[id number]]  (Normal) Collected: 11/06/24 1952    Lab Status: Final result Specimen: Blood from Arm, Left Updated: 11/06/24 2034     hs TnI 2hr 5 ng/L      Delta 2hr hsTnI 0 ng/L     TSH, 3rd generation with Free T4 reflex [[id number]]  (Normal) Collected: 11/06/24 1721    Lab Status: Final result Specimen: Blood from Arm, Left Updated: 11/06/24 1802     TSH 3RD GENERATON 0.710 uIU/mL     HS Troponin 0hr (reflex protocol) [[id number]]  (Normal) Collected: 11/06/24 1721    Lab Status: Final result Specimen: Blood from Arm, Left Updated: 11/06/24 1755     hs TnI 0hr 5 ng/L     B-Type Natriuretic Peptide(BNP) [[id number]]  (Normal) Collected: 11/06/24 1721    Lab Status: Final result Specimen: Blood from Arm, Left Updated: 11/06/24 1752     BNP 99 pg/mL     Comprehensive metabolic panel [[id number]]  (Abnormal) Collected: 11/06/24 1721    Lab Status: Final result Specimen: Blood from Arm, Left Updated: 11/06/24 1748     Sodium 139 mmol/L      Potassium 3.7 mmol/L      Chloride 103 mmol/L      CO2 26 mmol/L      ANION GAP 10 mmol/L      BUN 25 mg/dL      Creatinine 1.24 mg/dL      Glucose 126 mg/dL      Calcium 9.5 mg/dL      AST 18 U/L      ALT 17 U/L      Alkaline Phosphatase 31 U/L      Total Protein 7.2  g/dL      Albumin 4.1 g/dL      Total Bilirubin 0.89 mg/dL      eGFR 58 ml/min/1.73sq m     Narrative:      National Kidney Disease Foundation guidelines for Chronic Kidney Disease (CKD):     Stage 1 with normal or high GFR (GFR > 90 mL/min/1.73 square meters)    Stage 2 Mild CKD (GFR = 60-89 mL/min/1.73 square meters)    Stage 3A Moderate CKD (GFR = 45-59 mL/min/1.73 square meters)    Stage 3B Moderate CKD (GFR = 30-44 mL/min/1.73 square meters)    Stage 4 Severe CKD (GFR = 15-29 mL/min/1.73 square meters)    Stage 5 End Stage CKD (GFR <15 mL/min/1.73 square meters)  Note: GFR calculation is accurate only with a steady state creatinine    Magnesium [[id number]]  (Normal) Collected: 11/06/24 1721    Lab Status: Final result Specimen: Blood from Arm, Left Updated: 11/06/24 1748     Magnesium 2.1 mg/dL     Protime-INR [[id number]]  (Normal) Collected: 11/06/24 1721    Lab Status: Final result Specimen: Blood from Arm, Left Updated: 11/06/24 1743     Protime 14.5 seconds      INR 1.08    Narrative:      INR Therapeutic Range    Indication                                             INR Range      Atrial Fibrillation                                               2.0-3.0  Hypercoagulable State                                    2.0.2.3  Left Ventricular Asist Device                            2.0-3.0  Mechanical Heart Valve                                  -    Aortic(with afib, MI, embolism, HF, LA enlargement,    and/or coagulopathy)                                     2.0-3.0 (2.5-3.5)     Mitral                                                             2.5-3.5  Prosthetic/Bioprosthetic Heart Valve               2.0-3.0  Venous thromboembolism (VTE: VT, PE        2.0-3.0    APTT [[id number]]  (Normal) Collected: 11/06/24 1721    Lab Status: Final result Specimen: Blood from Arm, Left Updated: 11/06/24 1743     PTT 27 seconds     CBC and differential [[id number]] Collected: 11/06/24 1721    Lab Status: Final result  Specimen: Blood from Arm, Left Updated: 11/06/24 1728     WBC 5.80 Thousand/uL      RBC 4.67 Million/uL      Hemoglobin 13.3 g/dL      Hematocrit 42.0 %      MCV 90 fL      MCH 28.5 pg      MCHC 31.7 g/dL      RDW 14.6 %      MPV 10.5 fL      Platelets 176 Thousands/uL      nRBC 0 /100 WBCs      Segmented % 64 %      Immature Grans % 0 %      Lymphocytes % 25 %      Monocytes % 10 %      Eosinophils Relative 0 %      Basophils Relative 1 %      Absolute Neutrophils 3.71 Thousands/µL      Absolute Immature Grans 0.02 Thousand/uL      Absolute Lymphocytes 1.42 Thousands/µL      Absolute Monocytes 0.60 Thousand/µL      Eosinophils Absolute 0.01 Thousand/µL      Basophils Absolute 0.04 Thousands/µL             XR chest 1 view portable   ED Interpretation by Manuela Cramer MD (11/06 1819)   Per my independent interpretation. Radiologist to provide formal read. No acute cardiopulmonary disease no significant change from 10/4/2024 loop recorder is seen in the left chest wall          ECG 12 Lead Documentation Only    Date/Time: 11/6/2024 5:03 PM    Performed by: Manuela Cramer MD  Authorized by: Manuela Cramer MD    Indications / Diagnosis:  Fluttering  ECG reviewed by me, the ED Provider: yes    Patient location:  ED  Previous ECG:     Previous ECG:  Compared to current    Comparison ECG info:  10/12/24 0257    Similarity:  Changes noted  Rate:     ECG rate:  71    ECG rate assessment: normal    Rhythm:     Rhythm: sinus rhythm    QRS:     QRS axis:  Left  Comments:       (improved from 518 previous) LAFB no acute ischemic changes      ED Medication and Procedure Management   Prior to Admission Medications   Prescriptions Last Dose Informant Patient Reported? Taking?   Empagliflozin 25 MG TABS  Self No No   Sig: Take 1 tablet (25 mg total) by mouth daily   Lancets (onetouch ultrasoft) lancets  Self No No   Sig: Check glucose daily   Vitamin D, Cholecalciferol, 50 MCG (2000 UT) CAPS  Self  No No   Sig: Take 50 mcg by mouth daily   amLODIPine (NORVASC) 10 mg tablet  Self No No   Sig: Take 1 tablet (10 mg total) by mouth daily   aspirin 81 mg chewable tablet  Self Yes No   Sig: Chew 81 mg daily   atorvastatin (LIPITOR) 10 mg tablet  Self No No   Sig: Take 1 tablet by mouth once daily   finasteride (PROSCAR) 5 mg tablet   No No   Sig: Take 1 tablet (5 mg total) by mouth daily   gabapentin (NEURONTIN) 100 mg capsule  Self No No   Sig: Take 1 capsule (100 mg total) by mouth 2 (two) times a day   glucose blood (OneTouch Verio) test strip  Self No No   Sig: Use 1 each daily   hydrALAZINE (APRESOLINE) 25 mg tablet  Self No No   Sig: Take 1 tablet (25 mg total) by mouth 3 (three) times a day   hydrocortisone 2.5 % cream  Self No No   Sig: Apply 1 Application topically 2 (two) times a day for 5 days   ketoconazole (NIZORAL) 2 % shampoo  Self No No   Sig: Apply 1 Application topically 2 (two) times a week   metoprolol succinate (TOPROL-XL) 25 mg 24 hr tablet  Self No No   Sig: Take 1 tablet (25 mg total) by mouth daily   metoprolol succinate (TOPROL-XL) 50 mg 24 hr tablet  Self No No   Sig: Take 1 tablet (50 mg total) by mouth daily   multivitamin (THERAGRAN) TABS  Self Yes No   Sig: Take 1 tablet by mouth daily   omeprazole (PriLOSEC) 40 MG capsule  Self No No   Sig: Take 1 capsule (40 mg total) by mouth daily   triamcinolone (KENALOG) 0.1 % lotion  Self No No   Sig: Apply topically 2 (two) times a day      Facility-Administered Medications: None     Discharge Medication List as of 11/6/2024  8:42 PM        CONTINUE these medications which have NOT CHANGED    Details   amLODIPine (NORVASC) 10 mg tablet Take 1 tablet (10 mg total) by mouth daily, Starting Mon 5/6/2024, Normal      aspirin 81 mg chewable tablet Chew 81 mg daily, Historical Med      atorvastatin (LIPITOR) 10 mg tablet Take 1 tablet by mouth once daily, Starting Sun 8/4/2024, Normal      Empagliflozin 25 MG TABS Take 1 tablet (25 mg total) by mouth  daily, Starting Mon 8/26/2024, Until Sat 2/22/2025, Normal      finasteride (PROSCAR) 5 mg tablet Take 1 tablet (5 mg total) by mouth daily, Starting Tue 11/5/2024, Normal      gabapentin (NEURONTIN) 100 mg capsule Take 1 capsule (100 mg total) by mouth 2 (two) times a day, Starting Thu 10/3/2024, Normal      glucose blood (OneTouch Verio) test strip Use 1 each daily, Starting Mon 3/11/2024, Normal      hydrALAZINE (APRESOLINE) 25 mg tablet Take 1 tablet (25 mg total) by mouth 3 (three) times a day, Starting Mon 5/6/2024, Normal      hydrocortisone 2.5 % cream Apply 1 Application topically 2 (two) times a day for 5 days, Starting Fri 10/11/2024, Until Sat 10/26/2024, Normal      ketoconazole (NIZORAL) 2 % shampoo Apply 1 Application topically 2 (two) times a week, Starting Thu 2/8/2024, Normal      Lancets (onetouch ultrasoft) lancets Check glucose daily, Normal      !! metoprolol succinate (TOPROL-XL) 25 mg 24 hr tablet Take 1 tablet (25 mg total) by mouth daily, Starting Mon 5/6/2024, Normal      !! metoprolol succinate (TOPROL-XL) 50 mg 24 hr tablet Take 1 tablet (50 mg total) by mouth daily, Starting Mon 5/6/2024, Normal      multivitamin (THERAGRAN) TABS Take 1 tablet by mouth daily, Historical Med      omeprazole (PriLOSEC) 40 MG capsule Take 1 capsule (40 mg total) by mouth daily, Starting Mon 5/6/2024, Normal      triamcinolone (KENALOG) 0.1 % lotion Apply topically 2 (two) times a day, Starting Thu 4/6/2023, Normal      Vitamin D, Cholecalciferol, 50 MCG (2000 UT) CAPS Take 50 mcg by mouth daily, Starting Wed 1/4/2023, Normal       !! - Potential duplicate medications found. Please discuss with provider.        No discharge procedures on file.  ED SEPSIS DOCUMENTATION   Time reflects when diagnosis was documented in both MDM as applicable and the Disposition within this note       Time User Action Codes Description Comment    11/6/2024  6:57 PM Manuela Cramer [R00.2] Palpitations                   Manuela Cramer MD  11/06/24 2580

## 2024-11-06 NOTE — TELEPHONE ENCOUNTER
I spoke to this pt. I did inform him that there appears to be some degree of fibrosis.  Sometimes this can be overestimated/underestimated.  His synthetic function is intact.  I would recommend he does try a GLP-1 agonist which was previously prescribed to him by his endocrinologist per patient report.  I can reach out to Dr. Francisco to confirm that this is okay and patient can receive further direction from their team regarding the medication.  I reviewed with patient that    Metabolic comorbidity optimization is the mainstay of treatment for hepatic steatosis.    I did also review with patient that there is a new FDA approved medication called Rezdiffra. He would like to discuss this with Dr. Pang.     Pt willing to travel to different location to see Dr. Pang.

## 2024-11-07 ENCOUNTER — TELEPHONE (OUTPATIENT)
Age: 69
End: 2024-11-07

## 2024-11-07 ENCOUNTER — OFFICE VISIT (OUTPATIENT)
Age: 69
End: 2024-11-07
Payer: MEDICARE

## 2024-11-07 ENCOUNTER — TELEPHONE (OUTPATIENT)
Dept: CARDIOLOGY CLINIC | Facility: CLINIC | Age: 69
End: 2024-11-07

## 2024-11-07 ENCOUNTER — CLINICAL SUPPORT (OUTPATIENT)
Dept: CARDIOLOGY CLINIC | Facility: CLINIC | Age: 69
End: 2024-11-07
Payer: MEDICARE

## 2024-11-07 VITALS
DIASTOLIC BLOOD PRESSURE: 70 MMHG | HEIGHT: 72 IN | SYSTOLIC BLOOD PRESSURE: 118 MMHG | WEIGHT: 303 LBS | TEMPERATURE: 98.1 F | BODY MASS INDEX: 41.04 KG/M2 | OXYGEN SATURATION: 96 % | HEART RATE: 61 BPM

## 2024-11-07 DIAGNOSIS — K74.02 ADVANCED HEPATIC FIBROSIS: ICD-10-CM

## 2024-11-07 DIAGNOSIS — R00.2 PALPITATIONS: Primary | ICD-10-CM

## 2024-11-07 DIAGNOSIS — K76.0 HEPATIC STEATOSIS: Primary | ICD-10-CM

## 2024-11-07 LAB
ATRIAL RATE: 71 BPM
P AXIS: 55 DEGREES
PR INTERVAL: 208 MS
QRS AXIS: -63 DEGREES
QRSD INTERVAL: 116 MS
QT INTERVAL: 406 MS
QTC INTERVAL: 441 MS
T WAVE AXIS: 68 DEGREES
VENTRICULAR RATE: 71 BPM

## 2024-11-07 PROCEDURE — 93246 EXT ECG>7D<15D RECORDING: CPT | Performed by: INTERNAL MEDICINE

## 2024-11-07 PROCEDURE — G2211 COMPLEX E/M VISIT ADD ON: HCPCS | Performed by: STUDENT IN AN ORGANIZED HEALTH CARE EDUCATION/TRAINING PROGRAM

## 2024-11-07 PROCEDURE — 99204 OFFICE O/P NEW MOD 45 MIN: CPT | Performed by: STUDENT IN AN ORGANIZED HEALTH CARE EDUCATION/TRAINING PROGRAM

## 2024-11-07 PROCEDURE — 93010 ELECTROCARDIOGRAM REPORT: CPT | Performed by: INTERNAL MEDICINE

## 2024-11-07 NOTE — PATIENT INSTRUCTIONS
Gave lab order to patient.  Sent message to [facility] to schedule the MRI Elastography-for GI Speciality Use ONLY.  6M F/U scheduled on 5/5/25 with Dr. Larson @ [facility] [place].

## 2024-11-07 NOTE — TELEPHONE ENCOUNTER
I spoke to Shoaib at [facility]. There is a report that came through.   No events collected. No pauses, no afib, no vt.  PVC burden 1.2%  Nothing sustained.

## 2024-11-07 NOTE — TELEPHONE ENCOUNTER
Received call from pt.  He was in ED yesterday for palpitations and pounding heart sensations.  He has an ILR and they were unable to interrogate it.  Pt is requesting device clinic to download ILR transmission as he did active it during symptoms yesterday.  He would like Dr. Schmitt to be aware and review findings.  Pt is asking if he can be prescribed a medication based on findings.  He mentioned in the past Dr. Davenport had prescribed flecainide as a pill in the pocket but prescription has .     Please review and advise.

## 2024-11-07 NOTE — TELEPHONE ENCOUNTER
Patient was seen today (11/7) by Dr. Maricruz Pang @ [facility] [place]. (Dr. Larson in hep clinic).  Dr. Pang ordered a MRI Elastography-for GI Specialty Use ONLY.   Kindly review and schedule the MRI. Thank you !

## 2024-11-07 NOTE — TELEPHONE ENCOUNTER
Spoke to [place] [facility] and she is going to upload the report in just a few minutes. Will be available soon.

## 2024-11-07 NOTE — PROGRESS NOTES
2 week olmano applied with incident.  Pt counseled regarding Do's and Dont's of the device.  Pt understanding and agreeable with the removal and return of device.      Serial#[id number]

## 2024-11-07 NOTE — PROGRESS NOTES
Ambulatory Visit  Name: Christy Haro      : 1955      MRN: [id number]  Encounter Provider: Maricruz Pang DO  Encounter Date: 2024   Encounter department: [facility] GASTROENTEROLOGY SPECIALTY [address] [place] [address]    Assessment & Plan  Hepatic steatosis  Patient likely has some fatty liver disease given risk factors including prediabetes and obesity. He was prescribed Ozempic by his endocrinologist but has not yet started the medication. I discussed that this is a great medication to help manage his blood sugars and assist with weight loss given he has had difficulty maintaining weight loss in the past. I will also check lipid panel to assess for HLD since his last panel was in .     His liver enzymes were acutely elevated in the setting of infection with an abscess and acute hepatitis A infection, but these have since normalized. He underwent US elastography which showed S3, F3 fibrosis; however, I suspect this is not likely accurate given his body habitus and likely overestimation. We discussed various options with him including repeat elastography in 6 months vs liver biopsy vs MRI elastography for further evaluation and he is prefers to undergo MRI elastography.     Orders:    Lipid Panel with Direct LDL reflex; Future    MRI Elastography- for GI Specialty Use ONLY; Future   Fibrosis-4 (FIB-4) Score is: 1.76    Indication for testing Absence of advanced fibrosis Presence of advanced fibrosis Indeterminate result   NAFLD <2.00 >2.67 2.00-2.67   Hepatitis C <1.45 >3.25 1.45-3.25   Hepatitis B <1.00 >2.65 1.00-2.65     FIB-4 Score Component Values:  Component Value Date   Age: 69 y.o.     AST: 18 U/L 2024   Platelet: 176 Thousands/uL 2024   ALT: 17 U/L 2024       History of Present Illness     Christy Haro is a 69 y.o. male with history significant for pAfib on ASA, HTN, GERD, AYLEEN, T2DM, diastolic CHF, CKD 3, whom we are seeing in consultation for hepatic steatosis.     He was following with  Marissa Orellana PA-C following hospital admission where he was found to have elevated liver enzymes and positive HAV IgM. He underwent US elastography on 10/22/24 which showed S3, F3. RUQ US on 10/10/24 showed mild hepatic steatosis. Liver enzymes are wnl.     He is seeing an endocrinologist who started the patient on Ozempic but he has not started it yet. Denies yellow eyes/skin, dark urine, GI bleeding, abdominal distention with fluid, lower extremity swelling, easy bruising, excessive bleeding, pruritus or confusion.    Hgb A1c (10/6/24) 6.0  Lipid panel (7/31/23) wnl      History obtained from : patient  Review of Systems   Constitutional:  Negative for chills, fatigue, fever and unexpected weight change.   Cardiovascular:  Negative for leg swelling.   Gastrointestinal:  Negative for abdominal distention, abdominal pain, blood in stool, constipation, diarrhea, nausea and vomiting.   Skin:  Negative for color change.   Psychiatric/Behavioral:  Negative for confusion.      Medical History Reviewed by provider this encounter:  Tobacco  Allergies  Meds  Problems  Med Hx  Surg Hx  Fam Hx       Current Outpatient Medications on File Prior to Visit   Medication Sig Dispense Refill    amLODIPine (NORVASC) 10 mg tablet Take 1 tablet (10 mg total) by mouth daily 90 tablet 3    atorvastatin (LIPITOR) 10 mg tablet Take 1 tablet by mouth once daily 30 tablet 0    Empagliflozin 25 MG TABS Take 1 tablet (25 mg total) by mouth daily 30 tablet 5    finasteride (PROSCAR) 5 mg tablet Take 1 tablet (5 mg total) by mouth daily 90 tablet 3    gabapentin (NEURONTIN) 100 mg capsule Take 1 capsule (100 mg total) by mouth 2 (two) times a day 60 capsule 0    glucose blood (OneTouch Verio) test strip Use 1 each daily 100 each 5    hydrALAZINE (APRESOLINE) 25 mg tablet Take 1 tablet (25 mg total) by mouth 3 (three) times a day 270 tablet 3    ketoconazole (NIZORAL) 2 % shampoo Apply 1 Application topically 2 (two) times a week 120  mL 0    Lancets (onetouch ultrasoft) lancets Check glucose daily 100 each 1    metoprolol succinate (TOPROL-XL) 25 mg 24 hr tablet Take 1 tablet (25 mg total) by mouth daily 90 tablet 3    metoprolol succinate (TOPROL-XL) 50 mg 24 hr tablet Take 1 tablet (50 mg total) by mouth daily 90 tablet 3    omeprazole (PriLOSEC) 40 MG capsule Take 1 capsule (40 mg total) by mouth daily 90 capsule 3    triamcinolone (KENALOG) 0.1 % lotion Apply topically 2 (two) times a day 60 mL 3    Vitamin D, Cholecalciferol, 50 MCG (2000 UT) CAPS Take 50 mcg by mouth daily 90 capsule 3    aspirin 81 mg chewable tablet Chew 81 mg daily      hydrocortisone 2.5 % cream Apply 1 Application topically 2 (two) times a day for 5 days 10 g 0    multivitamin (THERAGRAN) TABS Take 1 tablet by mouth daily       No current facility-administered medications on file prior to visit.      Social History     Tobacco Use    Smoking status: Former     Current packs/day: 1.00     Types: Cigarettes     Passive exposure: Never    Smokeless tobacco: Never    Tobacco comments:     former smoker-quit 27 yrs ago 1pppd x 30 yrs as per [employer]   Vaping Use    Vaping status: Never Used   Substance and Sexual Activity    Alcohol use: Never    Drug use: Never    Sexual activity: Not Currently         Objective     /70 (BP Location: Right arm, Patient Position: Sitting, Cuff Size: Adult)   Pulse 61   Temp 98.1 °F (36.7 °C) (Tympanic)   Ht 6' (1.829 m)   Wt (!) 137 kg (303 lb)   SpO2 96%   BMI 41.09 kg/m²     Physical Exam  Constitutional:       Appearance: He is obese.   HENT:      Head: Normocephalic and atraumatic.   Eyes:      Conjunctiva/sclera: Conjunctivae normal.   Abdominal:      General: There is no distension.      Tenderness: There is no abdominal tenderness.   Skin:     Coloration: Skin is not jaundiced.   Neurological:      Mental Status: He is oriented to person, place, and time.   Psychiatric:         Mood and Affect: Mood normal.

## 2024-11-07 NOTE — TELEPHONE ENCOUNTER
-Attempted to call patient to go over her symptoms's for ER evaluation.  I also was going to inform him that loop interrogation showed no patient or device activated episodes and PVC burden was only 1.2% of monitored beats.  Unclear what symptoms or rhythm patient was having at time of event or if possibly below tracking limit of implantable loop recorder.  Will also see if office staff can reach out to let patient know I would like to speak with him about symptoms prior to initiation of medical therapy.

## 2024-11-07 NOTE — TELEPHONE ENCOUNTER
-Patient was able to return my phone call and I was able to speak with him.  Will have patient undergo 2-week Zio patch monitor as he notes he did activate device for a patient activated episode however today interrogation does not show any patient or device activated episodes at this time.  Therefore will have him go undergo above ambulatory event monitor with additional recommendations pending results as unfortunately at this time I am unsure what arrhythmia possibly could have arisen.  Patient otherwise denies significant symptoms at this time but does note that these happen for approximately 20 minutes prior to ER evaluation yesterday.  I informed him if he were to have any warning or alarm type symptoms he is to seek emergency medical care immediately.  Patient noted understanding was agreeable.

## 2024-11-11 ENCOUNTER — TELEPHONE (OUTPATIENT)
Age: 69
End: 2024-11-11

## 2024-11-11 DIAGNOSIS — K74.02 ADVANCED HEPATIC FIBROSIS: Primary | ICD-10-CM

## 2024-11-13 ENCOUNTER — CONSULT (OUTPATIENT)
Age: 69
End: 2024-11-13
Payer: MEDICARE

## 2024-11-13 VITALS
BODY MASS INDEX: 41.17 KG/M2 | SYSTOLIC BLOOD PRESSURE: 114 MMHG | OXYGEN SATURATION: 99 % | TEMPERATURE: 97.4 F | HEART RATE: 50 BPM | HEIGHT: 72 IN | WEIGHT: 304 LBS | DIASTOLIC BLOOD PRESSURE: 62 MMHG

## 2024-11-13 DIAGNOSIS — N18.9 CHRONIC KIDNEY DISEASE, UNSPECIFIED CKD STAGE: ICD-10-CM

## 2024-11-13 DIAGNOSIS — N18.31 ACUTE KIDNEY INJURY SUPERIMPOSED ON STAGE 3A CHRONIC KIDNEY DISEASE (HCC): ICD-10-CM

## 2024-11-13 DIAGNOSIS — L30.9 DERMATITIS: ICD-10-CM

## 2024-11-13 DIAGNOSIS — N18.31 STAGE 3A CHRONIC KIDNEY DISEASE (HCC): Primary | ICD-10-CM

## 2024-11-13 DIAGNOSIS — N28.1 ACQUIRED COMPLEX CYST OF KIDNEY: ICD-10-CM

## 2024-11-13 DIAGNOSIS — N17.9 ACUTE KIDNEY INJURY SUPERIMPOSED ON STAGE 3A CHRONIC KIDNEY DISEASE (HCC): ICD-10-CM

## 2024-11-13 DIAGNOSIS — N17.9 AKI (ACUTE KIDNEY INJURY) (HCC): ICD-10-CM

## 2024-11-13 DIAGNOSIS — K74.00 LIVER FIBROSIS: ICD-10-CM

## 2024-11-13 DIAGNOSIS — K27.9 PUD (PEPTIC ULCER DISEASE): ICD-10-CM

## 2024-11-13 DIAGNOSIS — I50.32 CHRONIC DIASTOLIC HEART FAILURE (HCC): ICD-10-CM

## 2024-11-13 PROCEDURE — 99205 OFFICE O/P NEW HI 60 MIN: CPT | Performed by: INTERNAL MEDICINE

## 2024-11-13 PROCEDURE — G2211 COMPLEX E/M VISIT ADD ON: HCPCS | Performed by: INTERNAL MEDICINE

## 2024-11-13 RX ORDER — KETOCONAZOLE 20 MG/ML
1 SHAMPOO, SUSPENSION TOPICAL AS NEEDED
Start: 2024-11-13

## 2024-11-13 NOTE — PROGRESS NOTES
[facility]  Ben Reilly DO    Name: Christy Haro  YOB: 1955      Assessment/Plan:    Stage 3a chronic kidney disease (HCC)  Lab Results   Component Value Date    EGFR 58 11/06/2024    EGFR 68 10/18/2024    EGFR 54 10/14/2024    CREATININE 1.24 11/06/2024    CREATININE 1.08 10/18/2024    CREATININE 1.31 (H) 10/14/2024     Baseline creatinine likely around 1-1.2 mg/dL.  It appears the patient may have fluctuations due to volume status or other hemodynamic issues.  Continue to avoid potential nephrotoxins and optimize care in general.  We will seek to have further evaluation with a kidney ultrasound to reevaluate kidney anatomy, please refer below.    Chronic diastolic heart failure (HCC)  Wt Readings from Last 3 Encounters:   11/13/24 (!) 138 kg (304 lb)   11/07/24 (!) 137 kg (303 lb)   11/05/24 (!) 137 kg (302 lb 8 oz)     Patient is compensated at this time, continue to encourage low sodium diet.  No clear indication at this time for additional diuretic use.  Continue with Jardiance 25 mg daily and metoprolol.          Acquired complex cyst of kidney  Patient noted to have complex cyst on recent CT scan, will reevaluate with a kidney ultrasound with additional imaging if clinically indicated.    PUD (peptic ulcer disease)  Patient most likely will need to continue to remain on omeprazole due to history of peptic ulcer disease.  Unfortunately he has already trialed famotidine in the past and has failed.  Continue with current dosing of omeprazole, continue with lifestyle modifications as indicated.         Problem List Items Addressed This Visit          Cardiovascular and Mediastinum    Chronic diastolic heart failure (HCC)    Wt Readings from Last 3 Encounters:   11/13/24 (!) 138 kg (304 lb)   11/07/24 (!) 137 kg (303 lb)   11/05/24 (!) 137 kg (302 lb 8 oz)     Patient is compensated at this time, continue to encourage low sodium diet.  No clear  indication at this time for additional diuretic use.  Continue with Jardiance 25 mg daily and metoprolol.                  Digestive    PUD (peptic ulcer disease)    Patient most likely will need to continue to remain on omeprazole due to history of peptic ulcer disease.  Unfortunately he has already trialed famotidine in the past and has failed.  Continue with current dosing of omeprazole, continue with lifestyle modifications as indicated.         Liver fibrosis       Genitourinary    Stage 3a chronic kidney disease (HCC) - Primary    Lab Results   Component Value Date    EGFR 58 11/06/2024    EGFR 68 10/18/2024    EGFR 54 10/14/2024    CREATININE 1.24 11/06/2024    CREATININE 1.08 10/18/2024    CREATININE 1.31 (H) 10/14/2024     Baseline creatinine likely around 1-1.2 mg/dL.  It appears the patient may have fluctuations due to volume status or other hemodynamic issues.  Continue to avoid potential nephrotoxins and optimize care in general.  We will seek to have further evaluation with a kidney ultrasound to reevaluate kidney anatomy, please refer below.         Relevant Orders    Comprehensive metabolic panel    CBC and differential    Albumin / creatinine urine ratio    Protein / creatinine ratio, urine    Urinalysis with microscopic    Magnesium    Phosphorus    PTH, intact    Acute kidney injury superimposed on stage 3a chronic kidney disease (HCC)    Acquired complex cyst of kidney    Patient noted to have complex cyst on recent CT scan, will reevaluate with a kidney ultrasound with additional imaging if clinically indicated.         Relevant Orders    US kidney and bladder       Other    BMI 40.0-44.9, adult (HCC)     Other Visit Diagnoses         Chronic kidney disease, unspecified CKD stage          LIZ (acute kidney injury) (HCC)          Dermatitis        Relevant Medications    ketoconazole (NIZORAL) 2 % shampoo            Thank you for allowing us to participate in the care of your patient.  He will  have follow-up kidney ultrasound to further evaluate complex cyst noted on recent CT scan.  We will also have regular follow-up blood work to be done prior to his next visit with us in 5-6 months.  Anticipate the patient kidney function to on occasion increase and decrease according to volume status and other hemodynamic issues.    Total time required for this office visit was 75 minutes.    Subjective:      Patient ID: Christy Haro is a 69 y.o. male.    Patient presents for initial evaluation.    We reviewed labs in detail, most recent creatinine noted to be 1.24 mg/dL, which places estimate GFR 58 mL/min.  Previously, creatinine has been both higher and lower than this due to unclear causes.  Patient may or may not have volume status issues.    There were no significant electrolyte abnormalities noted.  Patient is taking all medications as prescribed with no specific side effects and denies use of nonsteroid anti-inflammatory medications.          The following portions of the patient's history were reviewed and updated as appropriate: allergies, current medications, past family history, past medical history, past social history, past surgical history and problem list.    Review of Systems   All other systems reviewed and are negative.        Social History     Socioeconomic History    Marital status: Single     Spouse name: None    Number of children: None    Years of education: None    Highest education level: None   Occupational History    None   Tobacco Use    Smoking status: Former     Current packs/day: 1.00     Types: Cigarettes     Passive exposure: Never    Smokeless tobacco: Never    Tobacco comments:     former smoker-quit 27 yrs ago 1pppd x 30 yrs as per [employer]   Vaping Use    Vaping status: Never Used   Substance and Sexual Activity    Alcohol use: Never    Drug use: Never    Sexual activity: Not Currently   Other Topics Concern    None   Social History Narrative    None     Social Drivers of  Health     Financial Resource Strain: Low Risk  (1/8/2024)    Overall Financial Resource Strain (CARDIA)     Difficulty of Paying Living Expenses: Not hard at all   Food Insecurity: No Food Insecurity (10/7/2024)    Nursing - Inadequate Food Risk Classification     Worried About Running Out of Food in the Last Year: Never true     Ran Out of Food in the Last Year: Never true     Ran Out of Food in the Last Year: Not on file   Transportation Needs: No Transportation Needs (10/7/2024)    PRAPARE - Transportation     Lack of Transportation (Medical): No     Lack of Transportation (Non-Medical): No   Physical Activity: Not on file   Stress: Not on file   Social Connections: Not on file   Intimate Partner Violence: Not on file   Housing Stability: Low Risk  (10/7/2024)    Housing Stability Vital Sign     Unable to Pay for Housing in the Last Year: No     Number of Times Moved in the Last Year: 1     Homeless in the Last Year: No     Past Medical History:   Diagnosis Date    Arthritis     last assessed 7/1/15    Bronchitis 02/02/2023    Chronic diastolic (congestive) heart failure (HCC)     Diabetes mellitus (HCC)     type 2-last assessed 1/28/15    Diabetes mellitus (HCC) 03/16/2018    Dyslipidemia     GERD (gastroesophageal reflux disease)     Hypertension     Hypertensive urgency 03/16/2023    Irregular heart beat     last assessed 7/1/15    Lactic acidosis 10/12/2024    Obstructive sleep apnea     Palpitations     last assessed 9/7/17    Sexual dysfunction     last assessed 7/1/15    Thyroid disease     last assessed 7/1/15     Past Surgical History:   Procedure Laterality Date    COLONOSCOPY      TONSILLECTOMY         Current Outpatient Medications:     amLODIPine (NORVASC) 10 mg tablet, Take 1 tablet (10 mg total) by mouth daily, Disp: 90 tablet, Rfl: 3    aspirin 81 mg chewable tablet, Chew 81 mg daily, Disp: , Rfl:     atorvastatin (LIPITOR) 10 mg tablet, Take 1 tablet by mouth once daily, Disp: 30 tablet, Rfl:  0    Empagliflozin 25 MG TABS, Take 1 tablet (25 mg total) by mouth daily, Disp: 30 tablet, Rfl: 5    finasteride (PROSCAR) 5 mg tablet, Take 1 tablet (5 mg total) by mouth daily, Disp: 90 tablet, Rfl: 3    gabapentin (NEURONTIN) 100 mg capsule, Take 1 capsule (100 mg total) by mouth 2 (two) times a day, Disp: 60 capsule, Rfl: 0    glucose blood (OneTouch Verio) test strip, Use 1 each daily, Disp: 100 each, Rfl: 5    hydrALAZINE (APRESOLINE) 25 mg tablet, Take 1 tablet (25 mg total) by mouth 3 (three) times a day, Disp: 270 tablet, Rfl: 3    ketoconazole (NIZORAL) 2 % shampoo, Apply 1 Application topically if needed for irritation, Disp: , Rfl:     Lancets (onetouch ultrasoft) lancets, Check glucose daily, Disp: 100 each, Rfl: 1    metoprolol succinate (TOPROL-XL) 25 mg 24 hr tablet, Take 1 tablet (25 mg total) by mouth daily, Disp: 90 tablet, Rfl: 3    metoprolol succinate (TOPROL-XL) 50 mg 24 hr tablet, Take 1 tablet (50 mg total) by mouth daily, Disp: 90 tablet, Rfl: 3    multivitamin (THERAGRAN) TABS, Take 1 tablet by mouth daily, Disp: , Rfl:     omeprazole (PriLOSEC) 40 MG capsule, Take 1 capsule (40 mg total) by mouth daily, Disp: 90 capsule, Rfl: 3    Vitamin D, Cholecalciferol, 50 MCG (2000 UT) CAPS, Take 50 mcg by mouth daily, Disp: 90 capsule, Rfl: 3    Lab Results   Component Value Date     12/16/2015    SODIUM 139 11/06/2024    K 3.7 11/06/2024     11/06/2024    CO2 26 11/06/2024    ANIONGAP 5 12/16/2015    AGAP 10 11/06/2024    BUN 25 11/06/2024    CREATININE 1.24 11/06/2024    GLUC 126 11/06/2024    GLUF 112 (H) 10/13/2024    CALCIUM 9.5 11/06/2024    AST 18 11/06/2024    ALT 17 11/06/2024    ALKPHOS 31 (L) 11/06/2024    PROT 7.2 12/16/2015    TP 7.2 11/06/2024    BILITOT 0.84 12/16/2015    TBILI 0.89 11/06/2024    EGFR 58 11/06/2024     Lab Results   Component Value Date    WBC 5.80 11/06/2024    HGB 13.3 11/06/2024    HCT 42.0 11/06/2024    MCV 90 11/06/2024     11/06/2024     Lab  "Results   Component Value Date    CHOLESTEROL 129 07/31/2023    CHOLESTEROL 133 10/27/2022    CHOLESTEROL 119 06/21/2021     Lab Results   Component Value Date    HDL 32 (L) 07/31/2023    HDL 37 (L) 10/27/2022    HDL 31 (L) 06/21/2021     Lab Results   Component Value Date    LDLCALC 71 07/31/2023    LDLCALC 82 10/27/2022    LDLCALC 74 06/21/2021     Lab Results   Component Value Date    TRIG 131 07/31/2023    TRIG 71 10/27/2022    TRIG 70 06/21/2021     No results found for: \"CHOLHDL\"  Lab Results   Component Value Date    YKR2QMWLCAAM 0.710 11/06/2024     Lab Results   Component Value Date    CALCIUM 9.5 11/06/2024    PHOS 3.9 10/18/2024     No results found for: \"SPEP\", \"UPEP\"  No results found for: \"MICROALBUR\", \"[id number]\"        Objective:      /62 (BP Location: Left arm, Patient Position: Sitting, Cuff Size: Large)   Pulse (!) 50   Temp (!) 97.4 °F (36.3 °C) (Temporal)   Ht 6' (1.829 m)   Wt (!) 138 kg (304 lb)   SpO2 99%   BMI 41.23 kg/m²          Physical Exam  Vitals reviewed.   Constitutional:       General: He is not in acute distress.     Appearance: Normal appearance.   HENT:      Head: Normocephalic and atraumatic.      Right Ear: External ear normal.      Left Ear: External ear normal.   Eyes:      Conjunctiva/sclera: Conjunctivae normal.   Cardiovascular:      Rate and Rhythm: Normal rate and regular rhythm.      Heart sounds: Normal heart sounds.   Pulmonary:      Effort: No respiratory distress.      Breath sounds: No wheezing.   Abdominal:      Palpations: Abdomen is soft.   Musculoskeletal:      Right lower leg: Edema present.      Left lower leg: Edema present.   Skin:     General: Skin is warm and dry.   Neurological:      General: No focal deficit present.      Mental Status: He is alert and oriented to person, place, and time.   Psychiatric:         Mood and Affect: Mood normal.         Behavior: Behavior normal.         "

## 2024-11-13 NOTE — ASSESSMENT & PLAN NOTE
Wt Readings from Last 3 Encounters:   11/13/24 (!) 138 kg (304 lb)   11/07/24 (!) 137 kg (303 lb)   11/05/24 (!) 137 kg (302 lb 8 oz)     Patient is compensated at this time, continue to encourage low sodium diet.  No clear indication at this time for additional diuretic use.  Continue with Jardiance 25 mg daily and metoprolol.

## 2024-11-13 NOTE — ASSESSMENT & PLAN NOTE
Patient noted to have complex cyst on recent CT scan, will reevaluate with a kidney ultrasound with additional imaging if clinically indicated.

## 2024-11-13 NOTE — PATIENT INSTRUCTIONS
If you are having pain, please take Tylenol and avoid all other over the counter pain pills (like ibuprofen, aspirin   and naproxen).

## 2024-11-13 NOTE — ASSESSMENT & PLAN NOTE
Lab Results   Component Value Date    EGFR 58 11/06/2024    EGFR 68 10/18/2024    EGFR 54 10/14/2024    CREATININE 1.24 11/06/2024    CREATININE 1.08 10/18/2024    CREATININE 1.31 (H) 10/14/2024     Baseline creatinine likely around 1-1.2 mg/dL.  It appears the patient may have fluctuations due to volume status or other hemodynamic issues.  Continue to avoid potential nephrotoxins and optimize care in general.  We will seek to have further evaluation with a kidney ultrasound to reevaluate kidney anatomy, please refer below.

## 2024-11-25 ENCOUNTER — OFFICE VISIT (OUTPATIENT)
Dept: CARDIOLOGY CLINIC | Facility: CLINIC | Age: 69
End: 2024-11-25
Payer: MEDICARE

## 2024-11-25 ENCOUNTER — TELEPHONE (OUTPATIENT)
Age: 69
End: 2024-11-25

## 2024-11-25 VITALS
BODY MASS INDEX: 41.04 KG/M2 | DIASTOLIC BLOOD PRESSURE: 68 MMHG | HEART RATE: 58 BPM | SYSTOLIC BLOOD PRESSURE: 112 MMHG | OXYGEN SATURATION: 98 % | WEIGHT: 303 LBS | HEIGHT: 72 IN

## 2024-11-25 DIAGNOSIS — I10 BENIGN ESSENTIAL HTN: Primary | ICD-10-CM

## 2024-11-25 DIAGNOSIS — E11.65 TYPE 2 DIABETES MELLITUS WITH HYPERGLYCEMIA, WITHOUT LONG-TERM CURRENT USE OF INSULIN (HCC): ICD-10-CM

## 2024-11-25 DIAGNOSIS — I50.32 CHRONIC DIASTOLIC HEART FAILURE (HCC): ICD-10-CM

## 2024-11-25 DIAGNOSIS — I48.0 PAROXYSMAL ATRIAL FIBRILLATION (HCC): ICD-10-CM

## 2024-11-25 DIAGNOSIS — G47.33 OSA (OBSTRUCTIVE SLEEP APNEA): ICD-10-CM

## 2024-11-25 PROCEDURE — 99214 OFFICE O/P EST MOD 30 MIN: CPT | Performed by: INTERNAL MEDICINE

## 2024-11-25 RX ORDER — FUROSEMIDE 40 MG/1
60 TABLET ORAL DAILY
COMMUNITY
Start: 2024-11-16

## 2024-11-25 NOTE — ASSESSMENT & PLAN NOTE
Wt Readings from Last 3 Encounters:   11/25/24 (!) 137 kg (303 lb)   11/13/24 (!) 138 kg (304 lb)   11/07/24 (!) 137 kg (303 lb)     -Appears euvolemic at this time  -Will avoid additional nephrotoxic agents given nephrology recommendations  -Continue furosemide 40 mg daily  -Can continue Jardiance 25 mg daily managed by primary care physician  -Counseled on dietary and lifestyle modifications

## 2024-11-25 NOTE — ASSESSMENT & PLAN NOTE
-No significant atrial fibrillation not on implantable loop recorder  -Diagnosis appears to have been added by primary care physician 1/8/2024  -Possibly paroxysmal SVT is documented on ambulatory event monitors  -Will monitor with implantable loop recorder for recurrence if present

## 2024-11-25 NOTE — ASSESSMENT & PLAN NOTE
-Patient notes blood pressures at home well-controlled continue amlodipine 10 mg daily, furosemide 40 mg daily, hydralazine 25 mg 3 times daily, metoprolol succinate 75 mg daily  -Continue to monitor

## 2024-11-25 NOTE — TELEPHONE ENCOUNTER
Pt. Calling with concern that he is unsure if he will have allergic reaction to contrast when he has his MRI, pt. Has never had contrast dye before, pt. Having MRI done at [facility] in [place], radiologist nurse spoke with pt. And told him to advise with provider if he needed to pre-medicate prior to MRI to avoid a possible allergic reaction, pt. Has only documted allergy to Bactrim and Tylenol, no other allergies to add after review, pt. Never had contrast dye before, please advise, pt. Has MRI scheduled for 11/27 and would need to medicate the day before, pharmacy on file is correct, thank you

## 2024-11-25 NOTE — TELEPHONE ENCOUNTER
Patient calling back to check on status of medication.  Advised that provider has not replied to message yet.  Patient needs something by end of day since needs to take tomorrow.  Please advise.

## 2024-11-25 NOTE — PROGRESS NOTES
Patient ID: Mhjennifer Haro is a 69 y.o. male.        Plan:      Assessment & Plan  Paroxysmal atrial fibrillation (HCC)  -No significant atrial fibrillation not on implantable loop recorder  -Diagnosis appears to have been added by primary care physician 1/8/2024  -Possibly paroxysmal SVT is documented on ambulatory event monitors  -Will monitor with implantable loop recorder for recurrence if present  Type 2 diabetes mellitus with hyperglycemia, without long-term current use of insulin (HCC)    Lab Results   Component Value Date    HGBA1C 6.0 (H) 10/06/2024     -Patient will continue to follow with primary care physician for ongoing treatment and evaluation  -Counseled on dietary and lifestyle modifications.  Benign essential HTN  -Patient notes blood pressures at home well-controlled continue amlodipine 10 mg daily, furosemide 40 mg daily, hydralazine 25 mg 3 times daily, metoprolol succinate 75 mg daily  -Continue to monitor  Chronic diastolic heart failure (HCC)  Wt Readings from Last 3 Encounters:   11/25/24 (!) 137 kg (303 lb)   11/13/24 (!) 138 kg (304 lb)   11/07/24 (!) 137 kg (303 lb)     -Appears euvolemic at this time  -Will avoid additional nephrotoxic agents given nephrology recommendations  -Continue furosemide 40 mg daily  -Can continue Jardiance 25 mg daily managed by primary care physician  -Counseled on dietary and lifestyle modifications        AYLEEN (obstructive sleep apnea)  -Counseled on dietary and lifestyle modification low need for weight reduction goal BMI less than 29  -Patient counseled on the importance of continued compliance of BiPAP therapy  -Continue to monitor  BMI 40.0-44.9, adult ([facility])  -Counseled on dietary lifestyle modifications  -Continue to monitor      Follow up Plan/Other summary comments:  -Follow-up ambulatory monitor is pending  -Patient can continue Jardiance 25 mg daily managed by primary care physician  -Patient will continue amlodipine 10 mg daily, atorvastatin 10 mg  daily, hydralazine 25 mg 3 times daily  -(Enalapril and furosemide discontinued in October 2024 due to acute kidney injury) however furosemide reinitiated at 40 mg daily thereafter  -Continue metoprolol succinate 75 mg daily  -Patient takes aspirin 81 mg daily  -Will check CMP prior to next office visit  -Counseled patient on dietary and lifestyle modifications including following a low-salt, low-fat, heart healthy diet with sodium restriction to less than 1800 mg of sodium daily, fluid restriction to less than 1800 mL of fluid daily along with need for weight reduction goal of MI less than 29 and compliance with CPAP therapy  -Patient will be seen in 6 months or sooner if necessary  -Patient counseled if he were to have any warning or alarm type symptoms he is to seek emergency medical care immediately.    HPI:   -Patient is a 69-year-old male with hypertension lipidemia, obesity, heart failure preserved ejection fraction who was previously seen and evaluated by cardiology with Dr. Coello and Dr. Ovalle along with Dr. Davenport.  He had been seen and evaluated by electrophysiology for ambulatory event monitor showing intermittent episodes of junctional rhythm with implantable loop order placed in October 2023 and due to issues with heart failure requested second opinion from advanced heart failure team and was seen and evaluated by Dr. Schroeder in June 2024 with up titration of medical therapy.  Unfortunately in October 2024 patient had issues with acute kidney injury requiring diuretic therapy be discontinued and was recently seen and evaluated in follow-up by nephrology who recommended continuation of Jardiance therapy.  -Patient presents to the office today for follow-up.  He notes he has been taking furosemide 40 mg daily along with his Jardiance 25 mg daily and has been doing well.  He states overall he has not had any significant chest pain or palpitations, lightness dizziness, loss consciousness or shortness of  breath at this time.  He notes that he has Zio patch monitor on and has not had any issues.      Most recent or relevant cardiac/vascular testing:    -Device interrogation 11/7/2024 showing no patient device activated episodes with PVC burden 1.2% and overall normal device function    -Transthoracic echocardiogram 4/27/2023 showing left ventricular systolic function normal, estimated LVEF 60% with normal diastolic parameters, normal right ventricular systolic function with mildly dilated left atrium, mild mitral regurgitation.    -Nuclear stress test 6/2/2021 showing image artifact with no diagnostic evidence of reversible ischemia appreciated mention.      Past Surgical History:   Procedure Laterality Date    COLONOSCOPY      TONSILLECTOMY           Review of Systems   Review of Systems   Constitutional:  Negative for chills, diaphoresis, fatigue and fever.   HENT:  Negative for trouble swallowing and voice change.    Eyes:  Negative for pain and redness.   Respiratory:  Negative for shortness of breath and wheezing.    Cardiovascular:  Negative for chest pain, palpitations and leg swelling.   Gastrointestinal:  Negative for abdominal pain, constipation, diarrhea, nausea and vomiting.   Genitourinary:  Negative for dysuria.   Musculoskeletal:  Positive for arthralgias. Negative for neck pain and neck stiffness.   Skin:  Negative for rash.   Neurological:  Negative for dizziness, syncope, light-headedness and headaches.   Psychiatric/Behavioral:  Negative for agitation and confusion.    All other systems reviewed and are negative.         Objective:     /68   Pulse 58   Ht 6' (1.829 m)   Wt (!) 137 kg (303 lb)   SpO2 98%   BMI 41.09 kg/m²     PHYSICAL EXAM:  Physical Exam  Vitals reviewed.   Constitutional:       General: He is not in acute distress.     Appearance: He is obese. He is not diaphoretic.   HENT:      Head: Normocephalic and atraumatic.   Eyes:      General:         Right eye: No discharge.          Left eye: No discharge.   Neck:      Comments: Trachea midline, neck obese, difficult assess JVD  Cardiovascular:      Rate and Rhythm: Normal rate and regular rhythm.      Heart sounds:      No friction rub.   Pulmonary:      Effort: No respiratory distress.      Breath sounds: No wheezing.      Comments: Decreased breath sounds bilaterally  Chest:      Chest wall: No tenderness.   Abdominal:      General: Bowel sounds are normal.      Palpations: Abdomen is soft.      Tenderness: There is no abdominal tenderness. There is no rebound.   Musculoskeletal:      Right lower leg: Edema (trace) present.      Left lower leg: Edema (trace) present.   Skin:     General: Skin is warm and dry.   Neurological:      Mental Status: He is alert.      Comments: Awake, alert, able to answer questions appropriately, able extremities bilaterally.   Psychiatric:         Mood and Affect: Mood normal.         Behavior: Behavior normal.        Meds reviewed.  Current Outpatient Medications on File Prior to Visit   Medication Sig Dispense Refill    amLODIPine (NORVASC) 10 mg tablet Take 1 tablet (10 mg total) by mouth daily 90 tablet 3    aspirin 81 mg chewable tablet Chew 81 mg daily      atorvastatin (LIPITOR) 10 mg tablet Take 1 tablet by mouth once daily 30 tablet 0    Empagliflozin 25 MG TABS Take 1 tablet (25 mg total) by mouth daily 30 tablet 5    finasteride (PROSCAR) 5 mg tablet Take 1 tablet (5 mg total) by mouth daily 90 tablet 3    furosemide (LASIX) 40 mg tablet Take 40 mg by mouth daily      gabapentin (NEURONTIN) 100 mg capsule Take 1 capsule (100 mg total) by mouth 2 (two) times a day 60 capsule 0    glucose blood (OneTouch Verio) test strip Use 1 each daily 100 each 5    hydrALAZINE (APRESOLINE) 25 mg tablet Take 1 tablet (25 mg total) by mouth 3 (three) times a day 270 tablet 3    ketoconazole (NIZORAL) 2 % shampoo Apply 1 Application topically if needed for irritation      Lancets (onetouch ultrasoft) lancets  Check glucose daily 100 each 1    metoprolol succinate (TOPROL-XL) 25 mg 24 hr tablet Take 1 tablet (25 mg total) by mouth daily 90 tablet 3    metoprolol succinate (TOPROL-XL) 50 mg 24 hr tablet Take 1 tablet (50 mg total) by mouth daily 90 tablet 3    multivitamin (THERAGRAN) TABS Take 1 tablet by mouth daily      omeprazole (PriLOSEC) 40 MG capsule Take 1 capsule (40 mg total) by mouth daily 90 capsule 3    Vitamin D, Cholecalciferol, 50 MCG (2000 UT) CAPS Take 50 mcg by mouth daily 90 capsule 3     No current facility-administered medications on file prior to visit.      Past Medical History:   Diagnosis Date    Arthritis     last assessed 7/1/15    Bronchitis 02/02/2023    Chronic diastolic (congestive) heart failure (HCC)     Diabetes mellitus (HCC)     type 2-last assessed 1/28/15    Diabetes mellitus (HCC) 03/16/2018    Dyslipidemia     GERD (gastroesophageal reflux disease)     Hypertension     Hypertensive urgency 03/16/2023    Irregular heart beat     last assessed 7/1/15    Lactic acidosis 10/12/2024    Obstructive sleep apnea     Palpitations     last assessed 9/7/17    Sexual dysfunction     last assessed 7/1/15    Thyroid disease     last assessed 7/1/15           Social History     Tobacco Use   Smoking Status Former    Current packs/day: 1.00    Types: Cigarettes    Passive exposure: Never   Smokeless Tobacco Never   Tobacco Comments    former smoker-quit 27 yrs ago 1pppd x 30 yrs as per [employer]      Family History   Problem Relation Age of Onset    Hypertension Mother         essential    Stroke Mother     Hypertension Father         essential    Heart defect Father         cardiac disorder    Stroke Brother     Hypertension Brother

## 2024-11-25 NOTE — ASSESSMENT & PLAN NOTE
-Counseled on dietary and lifestyle modification low need for weight reduction goal BMI less than 29  -Patient counseled on the importance of continued compliance of BiPAP therapy  -Continue to monitor

## 2024-11-26 ENCOUNTER — DOCUMENTATION (OUTPATIENT)
Dept: GASTROENTEROLOGY | Facility: HOSPITAL | Age: 69
End: 2024-11-26

## 2024-11-26 ENCOUNTER — TELEPHONE (OUTPATIENT)
Dept: GASTROENTEROLOGY | Facility: CLINIC | Age: 69
End: 2024-11-26

## 2024-11-26 ENCOUNTER — TELEPHONE (OUTPATIENT)
Age: 69
End: 2024-11-26

## 2024-11-26 DIAGNOSIS — Z91.041 CONTRAST MEDIA ALLERGY: Primary | ICD-10-CM

## 2024-11-26 RX ORDER — DIPHENHYDRAMINE HCL 50 MG
50 CAPSULE ORAL ONCE
Qty: 1 CAPSULE | Refills: 0 | Status: SHIPPED | OUTPATIENT
Start: 2024-11-27 | End: 2024-11-27

## 2024-11-26 RX ORDER — PREDNISONE 50 MG/1
50 TABLET ORAL DAILY
Qty: 3 TABLET | Refills: 0 | Status: SHIPPED | OUTPATIENT
Start: 2024-11-26 | End: 2024-11-29

## 2024-11-26 NOTE — TELEPHONE ENCOUNTER
"Pt calling x 7 today for individual scripts for Benadryl and prednisone. The pharmacy cannot fill 2 medications under 1 script. [facility] does not carry \"kits\". Pt requires this for MRI tomorrow. Please send STAT individual scripts to [facility]. This will be routed via Secure Chat to  as multiple high priority messages to the provider pool have not been returned.    Office: Please call pt as soon as scripts are sent.  "

## 2024-11-26 NOTE — TELEPHONE ENCOUNTER
Patient calling back.  Spoke to pharmacy and they are unable to fill script sent for prednisone and diphenhydramine kit.  Needs to be resent as 2 separate prescriptions.  MRI is tomorrow.

## 2024-11-26 NOTE — TELEPHONE ENCOUNTER
Patients GI provider:  Dr. Pang    Number to return call: [phone]     Reason for call: Patients pharmacy called and asked if we can please send prescriptions separate because they dont have it available together please review and resend prescription prednisone and dyphenhydramine in separate orders thank you     Scheduled procedure/appointment date if applicable: n/a

## 2024-11-26 NOTE — TELEPHONE ENCOUNTER
Pt. Called back for update, advised we still have not received a response, Epic secure chat to Dr. Pang and script for prednisone and Benadryl will be sent to pharmacy on file right now, pt. Made aware

## 2024-11-26 NOTE — TELEPHONE ENCOUNTER
Returned call to pt to update him that individual scripts for Benadry and prednisone were sent to his pharmacy. This was completed by  and confirmed by Pharmacy at 3;45 PM today. Voicemail was requested to return call to our office. Did not leave information via voicemail as there was not name listed on recording.

## 2024-11-27 ENCOUNTER — REMOTE DEVICE CLINIC VISIT (OUTPATIENT)
Dept: CARDIOLOGY CLINIC | Facility: CLINIC | Age: 69
End: 2024-11-27
Payer: MEDICARE

## 2024-11-27 ENCOUNTER — RESULTS FOLLOW-UP (OUTPATIENT)
Dept: NON INVASIVE DIAGNOSTICS | Facility: HOSPITAL | Age: 69
End: 2024-11-27

## 2024-11-27 DIAGNOSIS — I48.0 PAROXYSMAL ATRIAL FIBRILLATION (HCC): Primary | ICD-10-CM

## 2024-11-27 PROCEDURE — 93298 REM INTERROG DEV EVAL SCRMS: CPT | Performed by: INTERNAL MEDICINE

## 2024-11-27 NOTE — TELEPHONE ENCOUNTER
Called patient, no answer, left another message that medications sent to his pharmacy and requested he return call to confirm receipt of this message.(MRI is not scheduled to date in appointment desk.)

## 2024-11-27 NOTE — PROGRESS NOTES
"MDT LP2/ACTIVE SYSTEM IS MRI CONDITIONAL   CARELINK TRANSMISSION: LOOP RECORDER. PRESENTING RHYTHM NSR @ 60 BPM. BATTERY STATUS \"OK.\" NO PATIENT OR DEVICE ACTIVATED EPISODES. NORMAL DEVICE FUNCTION. DL   "

## 2024-12-02 ENCOUNTER — TELEPHONE (OUTPATIENT)
Age: 69
End: 2024-12-02

## 2024-12-02 ENCOUNTER — TRANSCRIBE ORDERS (OUTPATIENT)
Age: 69
End: 2024-12-02

## 2024-12-02 ENCOUNTER — APPOINTMENT (OUTPATIENT)
Dept: LAB | Facility: MEDICAL CENTER | Age: 69
End: 2024-12-02
Payer: MEDICARE

## 2024-12-02 ENCOUNTER — NURSE TRIAGE (OUTPATIENT)
Age: 69
End: 2024-12-02

## 2024-12-02 DIAGNOSIS — R39.9 UTI SYMPTOMS: Primary | ICD-10-CM

## 2024-12-02 DIAGNOSIS — N45.4: ICD-10-CM

## 2024-12-02 LAB
BACTERIA UR QL AUTO: ABNORMAL /HPF
BILIRUB UR QL STRIP: NEGATIVE
CLARITY UR: CLEAR
COLOR UR: COLORLESS
GLUCOSE UR STRIP-MCNC: ABNORMAL MG/DL
HGB UR QL STRIP.AUTO: ABNORMAL
KETONES UR STRIP-MCNC: NEGATIVE MG/DL
LEUKOCYTE ESTERASE UR QL STRIP: ABNORMAL
NITRITE UR QL STRIP: NEGATIVE
NON-SQ EPI CELLS URNS QL MICRO: ABNORMAL /HPF
PH UR STRIP.AUTO: 6.5 [PH]
PROT UR STRIP-MCNC: NEGATIVE MG/DL
RBC #/AREA URNS AUTO: ABNORMAL /HPF
SP GR UR STRIP.AUTO: 1.01 (ref 1–1.03)
UROBILINOGEN UR STRIP-ACNC: <2 MG/DL
WBC #/AREA URNS AUTO: ABNORMAL /HPF

## 2024-12-02 PROCEDURE — 87186 SC STD MICRODIL/AGAR DIL: CPT

## 2024-12-02 PROCEDURE — 81001 URINALYSIS AUTO W/SCOPE: CPT

## 2024-12-02 PROCEDURE — 87147 CULTURE TYPE IMMUNOLOGIC: CPT

## 2024-12-02 PROCEDURE — 87077 CULTURE AEROBIC IDENTIFY: CPT

## 2024-12-02 PROCEDURE — 87086 URINE CULTURE/COLONY COUNT: CPT

## 2024-12-02 NOTE — TELEPHONE ENCOUNTER
Ani from [facility] calling to ask if results of MRI were received.   Do not see results in chart or Care Everywhere.  She will refax to office.  States provider needs to be made aware, abnormal results.

## 2024-12-02 NOTE — TELEPHONE ENCOUNTER
DAVID FROM [facility], CALLING TO SEE IF MRI REPORT WITH SIGNIFICANT FINDING HAS BEEN RECEIVED. SHE HAS FAXED [phone], [phone], [phone]. PLEASE CONFIRM WITH DAVID  [phone] WHEN RESULTS HAVE BEEN RECEIVED

## 2024-12-02 NOTE — TELEPHONE ENCOUNTER
Patient called for urine results.     Advised patient UA and UC are still in process at this time.

## 2024-12-02 NOTE — TELEPHONE ENCOUNTER
"Patient of Ted Vega, last seen 11/5/2024, called stating he is experiencing a burning sensation, frequency, and a incomplete bladder emptying sensation. He states his symptoms started today. He denies pain, fever, chills, or blood in urine. I ordered a UA and urine culture. I reviewed ED precautions, encouraged water intake, and avoiding bladder irritants. I verified [facility]. Please advise.    Reason for Disposition   All other urine symptoms    Answer Assessment - Initial Assessment Questions  1. SYMPTOM: \"What's the main symptom you're concerned about?\" (e.g., frequency, incontinence)      Burning sensation, incomplete bladder emptying sensation, frequency    2. ONSET: \"When did the symptoms start?\"      Today    3. PAIN: \"Is there any pain?\" If Yes, ask: \"How bad is it?\" (Scale: 1-10; mild, moderate, severe)      Denies    4. CAUSE: \"What do you think is causing the symptoms?\"      Unsure    5. OTHER SYMPTOMS: \"Do you have any other symptoms?\" (e.g., blood in urine, fever, flank pain, pain with urination)      Abdominal pain    Protocols used: Urinary Symptoms-Adult-[place]    "

## 2024-12-03 NOTE — TELEPHONE ENCOUNTER
Patient calling for results. Final UA but still awaiting the UCX    Patient complains the burning with urination is worsening, pain level 8 out of 10 with urination. Urinary frequency    Denies fever,chills, N/V, hematuria    [facility] on file

## 2024-12-04 ENCOUNTER — RESULTS FOLLOW-UP (OUTPATIENT)
Dept: GASTROENTEROLOGY | Facility: CLINIC | Age: 69
End: 2024-12-04

## 2024-12-04 RX ORDER — CIPROFLOXACIN 500 MG/1
500 TABLET, FILM COATED ORAL EVERY 12 HOURS SCHEDULED
Qty: 14 TABLET | Refills: 0 | Status: SHIPPED | OUTPATIENT
Start: 2024-12-04 | End: 2024-12-11

## 2024-12-04 NOTE — TELEPHONE ENCOUNTER
Please let patient know that his preliminary urine culture is positive.  I sent a prescription for ciprofloxacin to his pharmacy.

## 2024-12-04 NOTE — PROGRESS NOTES
Advanced Heart Failure/Pulmonary Hypertension Outpatient Note - Christy Haro 69 y.o. male MRN: [id number]    @ Encounter: [id number]      Assessment:  69 y.o. male PMH and acute problems listed later in this note (a partial list may also be included within 'assessment' section) presents for consultation.  I first met Christy Haro on 12/5/24.  Referred by No ref. provider found.    The patient's primary cardiac team is general cardiology, follows Dr. Schmitt long term  Has also seen Dr. Coello more remotely  Has also seen Dr. Schroeder advanced HF 6/2024, at which time she increased his lasix 40 qd to 60 qd  HFpEF  Paf  Has loop  Dm  Htn  Morbidly Obese  Gumaro uses bipap  anxiety      Today I have reviewed all pertinent labs/imaging/data including but not limited to:        Lab Units 11/06/24  1721 10/18/24  1430 10/14/24  0454   CREATININE mg/dL 1.24 1.08 1.31*         Lab Results   Component Value Date    K 3.7 11/06/2024     Lab Results   Component Value Date    HGBA1C 6.0 (H) 10/06/2024     Lab Results   Component Value Date    ATB9HHXSYZJG 0.710 11/06/2024     Lab Results   Component Value Date    LDLCALC 71 07/31/2023     Lab Results   Component Value Date    BNP 99 11/06/2024      Lab Results   Component Value Date    NTBNP 78 05/11/2021          TODAY'S PLAN:     12/05/24  I am meeting patient for the first time today  Warm, euvolemic  No new cardiac complaints  He arrives today at suggestion of his primary cardiologist for further elucidation of his HFpEF diagnosis, per pt  BP acceptable  Nsr on recent loop interrogations    Based on chart review and all available evidence he has HFpEF, stage C HF, NYHA II. Compensated now. Multiple contributors to his prior symptoms and condition, though he says no recent changes to his functionality recently.  No chest pain.    No changes to Rx today  Cw his current lasix 60 qd in addition to other Rx  He is on sglt2i    We discussed potential contribution of norvasc to  "peripheral edema  He wishes to cw current regimen now; BP well controlled    Advised weight loss, diet, exercise    He has excellent bipap compliance    Significant anxiety mentioned by wife; address per PCP    Follow up:  With Dr. Núñez as planned  In addition to follow up with their other medical providers    Loop:  11/2024:  MDT LP2/ACTIVE SYSTEM IS MRI CONDITIONAL   CARELINK TRANSMISSION: LOOP RECORDER. PRESENTING RHYTHM NSR @ 60 BPM. BATTERY STATUS \"OK.\" NO PATIENT OR DEVICE ACTIVATED EPISODES. NORMAL DEVICE FUNCTION.     Studies:  Today I have reviewed all pertinent patient data/labs/imaging where available, including but not limited to the below studies. This includes my independent interpretation. Selected results may be displayed here but comprehensive listing is omitted for note clarity and can be found in the epic chart.    ECG.    Echo.    Stress.    Cath.    HPI:   69 y.o. male PMH and acute problems listed later in this note (a partial list may also be included within 'assessment' section) presents for consultation.  No new CP/SOB/dizziness/palpitations/syncope.  No new fatigue.  No new unintentional weight changes.  No new leg swelling, PND, pillow orthopnea.  No new fevers, chills, cough, nausea, vomiting, diarrhea, dysuria.        ROS:  10 point ROS negative except as specified in HPI    Past Medical History:   Diagnosis Date    Arthritis     last assessed 7/1/15    Bronchitis 02/02/2023    Chronic diastolic (congestive) heart failure (HCC)     Diabetes mellitus (HCC)     type 2-last assessed 1/28/15    Diabetes mellitus (HCC) 03/16/2018    Dyslipidemia     GERD (gastroesophageal reflux disease)     Hypertension     Hypertensive urgency 03/16/2023    Irregular heart beat     last assessed 7/1/15    Lactic acidosis 10/12/2024    Obstructive sleep apnea     Palpitations     last assessed 9/7/17    Sexual dysfunction     last assessed 7/1/15    Thyroid disease     last assessed 7/1/15     Patient " "Active Problem List   Diagnosis    Benign essential HTN    Dyslipidemia    GERD (gastroesophageal reflux disease)    AYLEEN (obstructive sleep apnea)    Stasis edema of both lower extremities    BMI 40.0-44.9, adult (HCC)    Morbid obesity (HCC)    Diastolic dysfunction    Type 2 diabetes mellitus with obesity  (HCC)    Lymphedema    Chronic diastolic heart failure (HCC)    Stage 3a chronic kidney disease (HCC)    Pain in both lower extremities    Hyponatremia    Unilateral inguinal hernia without obstruction    Epididymitis with abscess    Acute kidney injury superimposed on stage 3a chronic kidney disease (HCC)    Elevated liver enzymes    Hypokalemia    Nausea and vomiting    Hepatitis A antibody positive    Hyperphosphatemia    Left anterior fascicular block    1st degree AV block    PUD (peptic ulcer disease)    Acquired complex cyst of kidney    Liver fibrosis     No current facility-administered medications for this visit.      Allergies   Allergen Reactions    Bactrim [Sulfamethoxazole-Trimethoprim] Vomiting     LIZ, seemed like a true allergy    Acetaminophen Other (See Comments)     Other reaction(s): Unknown Reaction  \"I had to go to the hospital and get shots after taking it 20yrs ago\"     Social History     Socioeconomic History    Marital status: Single     Spouse name: Not on file    Number of children: Not on file    Years of education: Not on file    Highest education level: Not on file   Occupational History    Not on file   Tobacco Use    Smoking status: Former     Current packs/day: 1.00     Types: Cigarettes     Passive exposure: Never    Smokeless tobacco: Never    Tobacco comments:     former smoker-quit 27 yrs ago 1pppd x 30 yrs as per [employer]   Vaping Use    Vaping status: Never Used   Substance and Sexual Activity    Alcohol use: Never    Drug use: Never    Sexual activity: Not Currently   Other Topics Concern    Not on file   Social History Narrative    Not on file     Social Drivers of " Health     Financial Resource Strain: Low Risk  (1/8/2024)    Overall Financial Resource Strain (CARDIA)     Difficulty of Paying Living Expenses: Not hard at all   Food Insecurity: No Food Insecurity (10/7/2024)    Nursing - Inadequate Food Risk Classification     Worried About Running Out of Food in the Last Year: Never true     Ran Out of Food in the Last Year: Never true     Ran Out of Food in the Last Year: Not on file   Transportation Needs: No Transportation Needs (10/7/2024)    PRAPARE - Transportation     Lack of Transportation (Medical): No     Lack of Transportation (Non-Medical): No   Physical Activity: Not on file   Stress: Not on file   Social Connections: Not on file   Intimate Partner Violence: Not on file   Housing Stability: Low Risk  (10/7/2024)    Housing Stability Vital Sign     Unable to Pay for Housing in the Last Year: No     Number of Times Moved in the Last Year: 1     Homeless in the Last Year: No     Family History   Problem Relation Age of Onset    Hypertension Mother         essential    Stroke Mother     Hypertension Father         essential    Heart defect Father         cardiac disorder    Stroke Brother     Hypertension Brother        Physical Exam:  Vitals:    12/05/24 1342   BP: 118/66   BP Location: Left arm   Patient Position: Sitting   Cuff Size: Large   Pulse: 72   SpO2: 96%   Weight: (!) 136 kg (300 lb 9.6 oz)   Height: 6' (1.829 m)     Constitutional: NAD, non toxic  Ears/nose/mouth/throat: atraumatic  CV: RRR, nl S1S2, no murmurs/rubs/gallups, no JVD, no HJR  Resp: CTABL  GI: Soft, NTND  MSK: no swollen joints in exposed areas  Extr: +1 LE edema, warm LE  Pysche: Normal affect  Neuro: appropriate in conversation  Skin: dry and intact in exposed areas    Labs & Results:  Lab Results   Component Value Date    WBC 5.80 11/06/2024    HGB 13.3 11/06/2024    HCT 42.0 11/06/2024    MCV 90 11/06/2024     11/06/2024     Lab Results   Component Value Date    SODIUM 139  11/06/2024    K 3.7 11/06/2024     11/06/2024    CO2 26 11/06/2024    BUN 25 11/06/2024    CREATININE 1.24 11/06/2024    GLUC 126 11/06/2024    CALCIUM 9.5 11/06/2024       Counseling / Coordination of Care  Greater than 50% of total time was spent with the patient and / or family counseling and / or coordination of care. Discussion included diagnoses, most recent studies and any changes in treatment.    Thank you for the opportunity to participate in the care of this patient.    Alejo Fraire MD  Attending Physician  Advanced Heart Failure and Transplant Cardiology  [facility]

## 2024-12-04 NOTE — TELEPHONE ENCOUNTER
Called and LMOM for patient that a script for Cipro was sent to patients [facility] based on preliminary urine culture results. Advised patient he can start medication and office will contact him when final urine culture/sensitivity panel are available if antibiotic needs to be adjusted.

## 2024-12-05 ENCOUNTER — CLINICAL SUPPORT (OUTPATIENT)
Dept: CARDIOLOGY CLINIC | Facility: CLINIC | Age: 69
End: 2024-12-05
Payer: MEDICARE

## 2024-12-05 ENCOUNTER — RESULTS FOLLOW-UP (OUTPATIENT)
Dept: CARDIOLOGY CLINIC | Facility: CLINIC | Age: 69
End: 2024-12-05

## 2024-12-05 ENCOUNTER — OFFICE VISIT (OUTPATIENT)
Dept: CARDIOLOGY CLINIC | Facility: CLINIC | Age: 69
End: 2024-12-05
Payer: MEDICARE

## 2024-12-05 VITALS
SYSTOLIC BLOOD PRESSURE: 118 MMHG | WEIGHT: 300.6 LBS | HEART RATE: 72 BPM | OXYGEN SATURATION: 96 % | BODY MASS INDEX: 40.71 KG/M2 | HEIGHT: 72 IN | DIASTOLIC BLOOD PRESSURE: 66 MMHG

## 2024-12-05 DIAGNOSIS — G47.33 OSA (OBSTRUCTIVE SLEEP APNEA): ICD-10-CM

## 2024-12-05 DIAGNOSIS — I10 BENIGN ESSENTIAL HTN: ICD-10-CM

## 2024-12-05 DIAGNOSIS — R78.81 BACTEREMIA: ICD-10-CM

## 2024-12-05 DIAGNOSIS — R00.2 PALPITATIONS: ICD-10-CM

## 2024-12-05 DIAGNOSIS — E66.01 MORBID OBESITY (HCC): ICD-10-CM

## 2024-12-05 DIAGNOSIS — I50.30 HEART FAILURE WITH PRESERVED EJECTION FRACTION, UNSPECIFIED HF CHRONICITY (HCC): Primary | ICD-10-CM

## 2024-12-05 LAB
BACTERIA UR CULT: ABNORMAL
BACTERIA UR CULT: ABNORMAL

## 2024-12-05 PROCEDURE — 99214 OFFICE O/P EST MOD 30 MIN: CPT | Performed by: STUDENT IN AN ORGANIZED HEALTH CARE EDUCATION/TRAINING PROGRAM

## 2024-12-05 PROCEDURE — 93248 EXT ECG>7D<15D REV&INTERPJ: CPT | Performed by: INTERNAL MEDICINE

## 2024-12-05 NOTE — TELEPHONE ENCOUNTER
"Patient calling in with concerns about the antibiotic that was sent to the pharmacy, Cipro    \"Side effects are worse then the symptoms\"    He is requesting an antibiotic with less side effects. Did inform patient we may have to wait until the final which will show the susceptibility, verbalized understanding     #[phone]   "

## 2024-12-09 NOTE — TELEPHONE ENCOUNTER
Patient called in requesting urine culture results to be reviewed as soon as possible. Reports having pain in his right testicle now. Please review and advise.     Urine Culture 40,000-49,000 cfu/ml Serratia marcescens Abnormal    Susceptibility to follow   <10,000 cfu/ml Staphylococcus coagulase negative Abnormal         [facility] pharmacy # [id number]

## 2024-12-10 ENCOUNTER — APPOINTMENT (EMERGENCY)
Dept: ULTRASOUND IMAGING | Facility: HOSPITAL | Age: 69
End: 2024-12-10
Payer: MEDICARE

## 2024-12-10 ENCOUNTER — HOSPITAL ENCOUNTER (EMERGENCY)
Facility: HOSPITAL | Age: 69
Discharge: HOME | End: 2024-12-10
Attending: EMERGENCY MEDICINE
Payer: MEDICARE

## 2024-12-10 VITALS
BODY MASS INDEX: 40.63 KG/M2 | DIASTOLIC BLOOD PRESSURE: 64 MMHG | HEART RATE: 61 BPM | RESPIRATION RATE: 18 BRPM | SYSTOLIC BLOOD PRESSURE: 134 MMHG | TEMPERATURE: 96.9 F | OXYGEN SATURATION: 97 % | HEIGHT: 72 IN | WEIGHT: 300 LBS

## 2024-12-10 DIAGNOSIS — N39.0 UTI (URINARY TRACT INFECTION): Primary | ICD-10-CM

## 2024-12-10 LAB
ALBUMIN SERPL BCG-MCNC: 4.3 G/DL (ref 3.5–5)
ALP SERPL-CCNC: 36 U/L (ref 34–104)
ALT SERPL W P-5'-P-CCNC: 16 U/L (ref 7–52)
ANION GAP SERPL CALCULATED.3IONS-SCNC: 11 MMOL/L (ref 4–13)
APTT PPP: 28 SECONDS (ref 23–34)
AST SERPL W P-5'-P-CCNC: 16 U/L (ref 13–39)
BACTERIA UR QL AUTO: ABNORMAL /HPF
BASOPHILS # BLD AUTO: 0.05 THOUSANDS/ÂΜL (ref 0–0.1)
BASOPHILS NFR BLD AUTO: 1 % (ref 0–1)
BILIRUB SERPL-MCNC: 0.85 MG/DL (ref 0.2–1)
BILIRUB UR QL STRIP: NEGATIVE
BUN SERPL-MCNC: 26 MG/DL (ref 5–25)
CALCIUM SERPL-MCNC: 9.4 MG/DL (ref 8.4–10.2)
CHLORIDE SERPL-SCNC: 100 MMOL/L (ref 96–108)
CLARITY UR: ABNORMAL
CO2 SERPL-SCNC: 24 MMOL/L (ref 21–32)
COLOR UR: COLORLESS
CREAT SERPL-MCNC: 1.16 MG/DL (ref 0.6–1.3)
EOSINOPHIL # BLD AUTO: 0.01 THOUSAND/ÂΜL (ref 0–0.61)
EOSINOPHIL NFR BLD AUTO: 0 % (ref 0–6)
ERYTHROCYTE [DISTWIDTH] IN BLOOD BY AUTOMATED COUNT: 13.9 % (ref 11.6–15.1)
GFR SERPL CREATININE-BSD FRML MDRD: 63 ML/MIN/1.73SQ M
GLUCOSE SERPL-MCNC: 121 MG/DL (ref 65–140)
GLUCOSE UR STRIP-MCNC: ABNORMAL MG/DL
HCT VFR BLD AUTO: 43.7 % (ref 36.5–49.3)
HGB BLD-MCNC: 14.3 G/DL (ref 12–17)
HGB UR QL STRIP.AUTO: NEGATIVE
IMM GRANULOCYTES # BLD AUTO: 0.02 THOUSAND/UL (ref 0–0.2)
IMM GRANULOCYTES NFR BLD AUTO: 0 % (ref 0–2)
INR PPP: 1.01 (ref 0.85–1.19)
KETONES UR STRIP-MCNC: NEGATIVE MG/DL
LACTATE SERPL-SCNC: 0.9 MMOL/L (ref 0.5–2)
LEUKOCYTE ESTERASE UR QL STRIP: ABNORMAL
LYMPHOCYTES # BLD AUTO: 1.51 THOUSANDS/ÂΜL (ref 0.6–4.47)
LYMPHOCYTES NFR BLD AUTO: 21 % (ref 14–44)
MCH RBC QN AUTO: 28.9 PG (ref 26.8–34.3)
MCHC RBC AUTO-ENTMCNC: 32.7 G/DL (ref 31.4–37.4)
MCV RBC AUTO: 88 FL (ref 82–98)
MONOCYTES # BLD AUTO: 0.57 THOUSAND/ÂΜL (ref 0.17–1.22)
MONOCYTES NFR BLD AUTO: 8 % (ref 4–12)
NEUTROPHILS # BLD AUTO: 5.01 THOUSANDS/ÂΜL (ref 1.85–7.62)
NEUTS SEG NFR BLD AUTO: 70 % (ref 43–75)
NITRITE UR QL STRIP: NEGATIVE
NON-SQ EPI CELLS URNS QL MICRO: ABNORMAL /HPF
NRBC BLD AUTO-RTO: 0 /100 WBCS
PH UR STRIP.AUTO: 6 [PH]
PLATELET # BLD AUTO: 216 THOUSANDS/UL (ref 149–390)
PMV BLD AUTO: 10.1 FL (ref 8.9–12.7)
POTASSIUM SERPL-SCNC: 3.8 MMOL/L (ref 3.5–5.3)
PROCALCITONIN SERPL-MCNC: 0.06 NG/ML
PROT SERPL-MCNC: 7.4 G/DL (ref 6.4–8.4)
PROT UR STRIP-MCNC: NEGATIVE MG/DL
PROTHROMBIN TIME: 13.8 SECONDS (ref 12.3–15)
RBC # BLD AUTO: 4.95 MILLION/UL (ref 3.88–5.62)
RBC #/AREA URNS AUTO: ABNORMAL /HPF
SODIUM SERPL-SCNC: 135 MMOL/L (ref 135–147)
SP GR UR STRIP.AUTO: 1.01 (ref 1–1.03)
UROBILINOGEN UR STRIP-ACNC: <2 MG/DL
WBC # BLD AUTO: 7.17 THOUSAND/UL (ref 4.31–10.16)
WBC #/AREA URNS AUTO: ABNORMAL /HPF
WBC CLUMPS # UR AUTO: ABNORMAL /UL

## 2024-12-10 PROCEDURE — 96365 THER/PROPH/DIAG IV INF INIT: CPT

## 2024-12-10 PROCEDURE — 87086 URINE CULTURE/COLONY COUNT: CPT | Performed by: PHYSICIAN ASSISTANT

## 2024-12-10 PROCEDURE — 85025 COMPLETE CBC W/AUTO DIFF WBC: CPT | Performed by: PHYSICIAN ASSISTANT

## 2024-12-10 PROCEDURE — 85730 THROMBOPLASTIN TIME PARTIAL: CPT | Performed by: PHYSICIAN ASSISTANT

## 2024-12-10 PROCEDURE — 81001 URINALYSIS AUTO W/SCOPE: CPT | Performed by: PHYSICIAN ASSISTANT

## 2024-12-10 PROCEDURE — 87040 BLOOD CULTURE FOR BACTERIA: CPT | Performed by: PHYSICIAN ASSISTANT

## 2024-12-10 PROCEDURE — 51798 US URINE CAPACITY MEASURE: CPT

## 2024-12-10 PROCEDURE — 83605 ASSAY OF LACTIC ACID: CPT | Performed by: PHYSICIAN ASSISTANT

## 2024-12-10 PROCEDURE — 85610 PROTHROMBIN TIME: CPT | Performed by: PHYSICIAN ASSISTANT

## 2024-12-10 PROCEDURE — 76870 US EXAM SCROTUM: CPT

## 2024-12-10 PROCEDURE — 80053 COMPREHEN METABOLIC PANEL: CPT | Performed by: PHYSICIAN ASSISTANT

## 2024-12-10 PROCEDURE — 99285 EMERGENCY DEPT VISIT HI MDM: CPT | Performed by: PHYSICIAN ASSISTANT

## 2024-12-10 PROCEDURE — 84145 PROCALCITONIN (PCT): CPT | Performed by: PHYSICIAN ASSISTANT

## 2024-12-10 PROCEDURE — 36415 COLL VENOUS BLD VENIPUNCTURE: CPT | Performed by: PHYSICIAN ASSISTANT

## 2024-12-10 PROCEDURE — 99284 EMERGENCY DEPT VISIT MOD MDM: CPT

## 2024-12-10 RX ORDER — CEFTRIAXONE 2 G/50ML
2000 INJECTION, SOLUTION INTRAVENOUS ONCE
Status: COMPLETED | OUTPATIENT
Start: 2024-12-10 | End: 2024-12-10

## 2024-12-10 RX ORDER — CEFPODOXIME PROXETIL 200 MG/1
200 TABLET, FILM COATED ORAL 2 TIMES DAILY
Qty: 20 TABLET | Refills: 0 | Status: SHIPPED | OUTPATIENT
Start: 2024-12-10 | End: 2024-12-20

## 2024-12-10 RX ADMIN — CEFTRIAXONE 2000 MG: 2 INJECTION, SOLUTION INTRAVENOUS at 16:23

## 2024-12-10 NOTE — ED PROVIDER NOTES
Time reflects when diagnosis was documented in both MDM as applicable and the Disposition within this note       Time User Action Codes Description Comment    12/10/2024  5:22 PM BrynbetitoPat Add [N39.0] UTI (urinary tract infection)           ED Disposition       ED Disposition   Discharge    Condition   Stable    Date/Time   Tue Dec 10, 2024  5:22 PM    Comment   Ethanjb Estrellita discharge to home/self care.                   Assessment & Plan       Medical Decision Making  70 yo male presenting for UTI symptoms.  Recent urine culture from last week showing Serratia marcescens.  Reviewed susceptibilities.  He is afebrile, hemodynamically stable.  Prior records from October 2024 reviewed in which pt was admitted to this infection along with positive blood culture.  Will check labs, urine.  Obtain post void residual.  Will obtain ultrasound of testicles/scrotum.  Will provide dose of IV rocephin pending work up.  Prior consult from ID reviewed.    Work up obtained as noted above. No noted leukocytosis, no anemia.  No hypo or hyperglycemia.  Renal function within normal limits.  Electrolytes within normal limits.  Lactic acid normal limits.  Pt afebrile, hemodynamically stable without other findings of systemic illness, doubt sepsis.  UA is suggestive of infection.  Post void residual not showing urinary retention.  Ultrasound of testes and scrotum without acute findings.  Discussed urology's recommendation for admission, IV Abx and evaluation by urology tomorrow which was noted in chart.  Ultimately pt decided that he didn't want to stay as nothing would be done for him overnight.  Dose of IV Abx given wouldn't be due for another 24 hrs.  There are no findings of sepsis.  No LIZ.  No urinary retention.  There are no findings of abscess.  He has no pain at this time.  I discussed that doing a trial of PO antibiotics at home is not unreasonable (he didn't fail outpatient antibiotics as he didn't take them).  Given  current bed capacity issues along with work up and exam here, not unreasonable to trial outpatient management. He reports that he will call his urology office tomorrow.  Will Rx PO antibiotic, pending cultures.    Discussed continued symptomatic/supportive care.  Advised rest, fluids, OTC meds as needed for symptoms.  Antibiotic as directed pending urine culture result.  Strict return precautions outlined.    Advised outpatient follow up with urology or return to ER for change in condition as outlined. Pt and significant verbalized understanding and had no further questions.    Please refer to above ER course for further details/discussion.        Problems Addressed:  UTI (urinary tract infection): acute illness or injury    Amount and/or Complexity of Data Reviewed  External Data Reviewed: labs, radiology and notes.  Labs: ordered. Decision-making details documented in ED Course.  Radiology: ordered. Decision-making details documented in ED Course.    Risk  OTC drugs.  Prescription drug management.  Decision regarding hospitalization.        ED Course as of 12/10/24 1844   Tue Dec 10, 2024   1431 Prior records reviewed.  Last urine culture from 12/2/24 - Urine Culture   40,000-49,000 cfu/ml Serratia marcescens    <10,000 cfu/ml Staphylococcus coagulase negative   Susceptibility   Serratia marcescens    JOSE    Amoxicillin + Clavulanate >16/8 ug/ml Resistant    Ampicillin ($$) >16.00 ug/ml Resistant    Ampicillin + Sulbactam ($) >16/8 ug/ml Resistant    Aztreonam ($$$) <=4 ug/ml Susceptible    Cefazolin ($) >16.00 ug/ml Resistant    Cefepime ($) <=2.00 ug/ml Susceptible    Ceftazidime ($$) 16 ug/ml Resistant    Ceftriaxone ($$) <=1.00 ug/ml Susceptible    Cefuroxime ($$) >16 ug/ml Resistant    Ciprofloxacin ($) <=0.25 ug/ml Susceptible    Ertapenem ($$$) <=0.5 ug/ml Susceptible    Gentamicin ($$) <=2 ug/ml Susceptible    Levofloxacin ($) <=0.50 ug/ml Susceptible    Minocycline <=4  ug/ml Susceptible    Nitrofurantoin >64 ug/ml Resistant    Piperacillin + Tazobactam ($$$) 32 ug/ml Intermediate    Tetracycline >8 ug/ml Resistant    Trimethoprim + Sulfamethoxazole ($$$) <=0.5/9.5 u... Susceptible    1500 Testicular exam performed with assistance of RN.   1534 WBC: 7.17   1534 Hemoglobin: 14.3   1534 Platelet Count: 216   1546 POCT INR: 1.01   1546 PROTIME: 13.8   1546 PTT: 28   1556 GLUCOSE: 121   1556 Creatinine: 1.16  stable   1557 BUN(!): 26   1557 Sodium: 135   1557 Potassium: 3.8   1557 Chloride: 100   1557 Carbon Dioxide: 24   1557 ANION GAP: 11   1557 Calcium: 9.4   1557 AST: 16   1557 ALT: 16   1557 ALK PHOS: 36   1557 Total Protein: 7.4   1557 Albumin: 4.3   1557 Total Bilirubin: 0.85   1557 GFR, Calculated: 63   1557 Leukocytes, UA(!): Moderate   1557 Glucose, UA(!): 300 (3/10%)   1557 WBC, UA(!): 10-20   1557 Bacteria, UA: Occasional   1629 Ultrasound performed and pending interpretation.  IV Abx dose initiated.   1638 US scrotum and testicles     IMPRESSION:     No identifiable acute abnormality to account for the patient's clinical presentation.   1641 Procalcitonin: 0.06   1701 Post void residual = 0cc.   1702 Long discussion with pt and significant other.  Per notes from urology, recommended IV Abx and admission for evaluation by Dr. Keith tomorrow.  Pt not wanting to stay.  He is afebrile without findings of systemic illness.  No LIZ.  No urinary retention.  Ultrasound without acute findings.  No findings of abscess.  Pt feels he should be able to see urology tomorrow in the office and since next dose of antibiotics wouldn't be until tomorrow, he reports he can take these at home.  I agree that I don't see strong indication to admit and given current bed capacity issues, not unreasonable for discharge with PO antibiotics and close outpatient follow up.       Medications   cefTRIAXone (ROCEPHIN) IVPB (premix in dextrose) 2,000 mg 50 mL (0 mg Intravenous Stopped 12/10/24 0579)        ED Risk Strat Scores                                               History of Present Illness       Chief Complaint   Patient presents with    Medical Problem     Pt urine culture came back positive at his urologist follow up , pt advised to come to the ER for IV antibiotics        Past Medical History:   Diagnosis Date    Arthritis     last assessed 7/1/15    Bronchitis 02/02/2023    Chronic diastolic (congestive) heart failure (HCC)     Diabetes mellitus (HCC)     type 2-last assessed 1/28/15    Diabetes mellitus (HCC) 03/16/2018    Dyslipidemia     GERD (gastroesophageal reflux disease)     Hypertension     Hypertensive urgency 03/16/2023    Irregular heart beat     last assessed 7/1/15    Lactic acidosis 10/12/2024    Obstructive sleep apnea     Palpitations     last assessed 9/7/17    Sexual dysfunction     last assessed 7/1/15    Thyroid disease     last assessed 7/1/15      Past Surgical History:   Procedure Laterality Date    COLONOSCOPY      TONSILLECTOMY        Family History   Problem Relation Age of Onset    Hypertension Mother         essential    Stroke Mother     Hypertension Father         essential    Heart defect Father         cardiac disorder    Stroke Brother     Hypertension Brother       Social History     Tobacco Use    Smoking status: Former     Current packs/day: 1.00     Types: Cigarettes     Passive exposure: Never    Smokeless tobacco: Never    Tobacco comments:     former smoker-quit 27 yrs ago 1pppd x 30 yrs as per [employer]   Vaping Use    Vaping status: Never Used   Substance Use Topics    Alcohol use: Never    Drug use: Never      E-Cigarette/Vaping    E-Cigarette Use Never User       E-Cigarette/Vaping Substances    Nicotine No     THC No     CBD No     Flavoring No     Other No     Unknown No       I have reviewed and agree with the history as documented.     69 year old male with PMH DM, HTN, HLD, CHF, GERD, AYLEEN presenting ambulatory from home with significant other for  evaluation.  Pt reports he is not really sure why he is here.  He states his urologist called him and advised to come to ER for evaluation.  Pt reports last week he began having urinary symptoms.  Pt states it felt like he had an infection as he has burning with urination.  Reports associated frequency and incomplete emptying.  Denies hematuria. He notes intermittent pain in his right testicle but denies pain at present.  Denies swelling.  Denies abdominal pain.  Denies N/V/D/C.  Denies fever, chills.  Denies chest pain, SOB.  Denies cough or congestion.  He notes he submitted a urine sample to his urologist and was told he has infection.  He was prescribed cipro but pt did not start taking due to concerning side effects listed.  He notes he asked for alternative medication . He states he was told to come in for IM injection however was then told to go to ER for IV meds.  Per review of chart has history of epididymitis with scrotal abscess a few months ago and was admitted at that time.      History provided by:  Medical records and patient   used: No    Medical Problem  Associated symptoms: no abdominal pain, no chest pain, no congestion, no cough, no diarrhea, no fatigue, no fever, no headaches, no nausea, no rash, no rhinorrhea, no shortness of breath, no sore throat, no vomiting and no wheezing        Review of Systems   Constitutional: Negative.  Negative for chills, fatigue and fever.   HENT: Negative.  Negative for congestion, rhinorrhea and sore throat.    Eyes: Negative.  Negative for visual disturbance.   Respiratory: Negative.  Negative for cough, shortness of breath and wheezing.    Cardiovascular: Negative.  Negative for chest pain, palpitations and leg swelling.   Gastrointestinal: Negative.  Negative for abdominal pain, constipation, diarrhea, nausea and vomiting.   Genitourinary:  Positive for dysuria, frequency and testicular pain (intermittent, denies at present). Negative for  flank pain, hematuria and scrotal swelling.   Musculoskeletal: Negative.  Negative for back pain and neck pain.   Skin: Negative.  Negative for rash.   Neurological: Negative.  Negative for dizziness, light-headedness, numbness and headaches.   Psychiatric/Behavioral: Negative.     All other systems reviewed and are negative.          Objective       ED Triage Vitals [12/10/24 1421]   Temperature Pulse Blood Pressure Respirations SpO2 Patient Position - Orthostatic VS   (!) 96.9 °F (36.1 °C) 68 134/64 18 98 % Sitting      Temp Source Heart Rate Source BP Location FiO2 (%) Pain Score    Tympanic -- Left arm -- 2      Vitals      Date and Time Temp Pulse SpO2 Resp BP Pain Score FACES Pain Rating User   12/10/24 1722 -- 61 97 % -- -- -- -- KO   12/10/24 1421 96.9 °F (36.1 °C) 68 98 % 18 134/64 2 -- SH            Physical Exam  Vitals and nursing note reviewed. Exam conducted with a chaperone present (Zoila PATRICK).   Constitutional:       General: He is awake. He is not in acute distress.     Appearance: Normal appearance. He is well-developed. He is obese. He is not toxic-appearing or diaphoretic.   HENT:      Head: Normocephalic and atraumatic.      Right Ear: Hearing and external ear normal.      Left Ear: Hearing and external ear normal.      Mouth/Throat:      Mouth: Mucous membranes are moist.      Pharynx: Oropharynx is clear. Uvula midline.   Eyes:      General: Lids are normal. No scleral icterus.     Conjunctiva/sclera: Conjunctivae normal.   Neck:      Trachea: Trachea and phonation normal.   Cardiovascular:      Rate and Rhythm: Normal rate and regular rhythm.      Pulses: Normal pulses.      Heart sounds: Normal heart sounds, S1 normal and S2 normal.   Pulmonary:      Effort: Pulmonary effort is normal. No tachypnea or respiratory distress.      Breath sounds: Normal breath sounds. No wheezing, rhonchi or rales.   Abdominal:      General: Bowel sounds are normal. There is no distension.      Palpations:  Abdomen is soft.      Tenderness: There is no abdominal tenderness. There is no right CVA tenderness, left CVA tenderness, guarding or rebound.      Hernia: There is no hernia in the left inguinal area or right inguinal area.   Genitourinary:     Pubic Area: No rash.       Penis: No tenderness or swelling.       Testes: Normal.         Right: Tenderness or swelling not present.         Left: Tenderness or swelling not present.      Epididymis:      Right: Tenderness present.      Left: No tenderness.      Comments: No noted inguinal rash.  Lymphadenopathy:      Lower Body: No right inguinal adenopathy. No left inguinal adenopathy.   Skin:     General: Skin is warm and dry.      Capillary Refill: Capillary refill takes less than 2 seconds.      Findings: No erythema or rash.   Neurological:      General: No focal deficit present.      Mental Status: He is alert and oriented to person, place, and time.      GCS: GCS eye subscore is 4. GCS verbal subscore is 5. GCS motor subscore is 6.      Gait: Gait normal.   Psychiatric:         Mood and Affect: Mood normal.         Speech: Speech normal.         Behavior: Behavior normal. Behavior is cooperative.         Results Reviewed       Procedure Component Value Units Date/Time    Procalcitonin [[id number]]  (Normal) Collected: 12/10/24 1513    Lab Status: Final result Specimen: Blood from Arm, Right Updated: 12/10/24 1638     Procalcitonin 0.06 ng/ml     Urine Microscopic [[id number]]  (Abnormal) Collected: 12/10/24 1513    Lab Status: Final result Specimen: Urine, Clean Catch Updated: 12/10/24 1551     RBC, UA 0-1 /hpf      WBC, UA 10-20 /hpf      Epithelial Cells Occasional /hpf      Bacteria, UA Occasional /hpf      WBC Clumps one medium sized wbc clump observed in all fiels examined    Urine culture [[id number]] Collected: 12/10/24 1513    Lab Status: In process Specimen: Urine, Clean Catch Updated: 12/10/24 1551    Comprehensive metabolic panel [[id number]]  (Abnormal)  Collected: 12/10/24 1513    Lab Status: Final result Specimen: Blood from Arm, Right Updated: 12/10/24 1551     Sodium 135 mmol/L      Potassium 3.8 mmol/L      Chloride 100 mmol/L      CO2 24 mmol/L      ANION GAP 11 mmol/L      BUN 26 mg/dL      Creatinine 1.16 mg/dL      Glucose 121 mg/dL      Calcium 9.4 mg/dL      AST 16 U/L      ALT 16 U/L      Alkaline Phosphatase 36 U/L      Total Protein 7.4 g/dL      Albumin 4.3 g/dL      Total Bilirubin 0.85 mg/dL      eGFR 63 ml/min/1.73sq m     Narrative:      National [employer] guidelines for Chronic Kidney Disease (CKD):     Stage 1 with normal or high GFR (GFR > 90 mL/min/1.73 square meters)    Stage 2 Mild CKD (GFR = 60-89 mL/min/1.73 square meters)    Stage 3A Moderate CKD (GFR = 45-59 mL/min/1.73 square meters)    Stage 3B Moderate CKD (GFR = 30-44 mL/min/1.73 square meters)    Stage 4 Severe CKD (GFR = 15-29 mL/min/1.73 square meters)    Stage 5 End Stage CKD (GFR <15 mL/min/1.73 square meters)  Note: GFR calculation is accurate only with a steady state creatinine    UA w Reflex to Microscopic w Reflex to Culture [[id number]]  (Abnormal) Collected: 12/10/24 1513    Lab Status: Final result Specimen: Urine, Clean Catch Updated: 12/10/24 1549     Color, UA Colorless     Clarity, UA Hazy     Specific Gravity, UA 1.010     pH, UA 6.0     Leukocytes, UA Moderate     Nitrite, UA Negative     Protein, UA Negative mg/dl      Glucose,  (3/10%) mg/dl      Ketones, UA Negative mg/dl      Urobilinogen, UA <2.0 mg/dl      Bilirubin, UA Negative     Occult Blood, UA Negative    Lactic acid, plasma (w/reflex if result > 2.0) [[id number]]  (Normal) Collected: 12/10/24 1513    Lab Status: Final result Specimen: Blood from Arm, Right Updated: 12/10/24 1548     LACTIC ACID 0.9 mmol/L     Narrative:      Result may be elevated if tourniquet was used during collection.    Protime-INR [[id number]]  (Normal) Collected: 12/10/24 1513    Lab Status: Final result  Specimen: Blood from Arm, Right Updated: 12/10/24 1545     Protime 13.8 seconds      INR 1.01    Narrative:      INR Therapeutic Range    Indication                                             INR Range      Atrial Fibrillation                                               2.0-3.0  Hypercoagulable State                                    2.0.2.3  Left Ventricular Asist Device                            2.0-3.0  Mechanical Heart Valve                                  -    Aortic(with afib, MI, embolism, HF, LA enlargement,    and/or coagulopathy)                                     2.0-3.0 (2.5-3.5)     Mitral                                                             2.5-3.5  Prosthetic/Bioprosthetic Heart Valve               2.0-3.0  Venous thromboembolism (VTE: VT, PE        2.0-3.0    APTT [[id number]]  (Normal) Collected: 12/10/24 1513    Lab Status: Final result Specimen: Blood from Arm, Right Updated: 12/10/24 1545     PTT 28 seconds     CBC and differential [[id number]] Collected: 12/10/24 1513    Lab Status: Final result Specimen: Blood from Arm, Right Updated: 12/10/24 1532     WBC 7.17 Thousand/uL      RBC 4.95 Million/uL      Hemoglobin 14.3 g/dL      Hematocrit 43.7 %      MCV 88 fL      MCH 28.9 pg      MCHC 32.7 g/dL      RDW 13.9 %      MPV 10.1 fL      Platelets 216 Thousands/uL      nRBC 0 /100 WBCs      Segmented % 70 %      Immature Grans % 0 %      Lymphocytes % 21 %      Monocytes % 8 %      Eosinophils Relative 0 %      Basophils Relative 1 %      Absolute Neutrophils 5.01 Thousands/µL      Absolute Immature Grans 0.02 Thousand/uL      Absolute Lymphocytes 1.51 Thousands/µL      Absolute Monocytes 0.57 Thousand/µL      Eosinophils Absolute 0.01 Thousand/µL      Basophils Absolute 0.05 Thousands/µL     Blood culture #1 [[id number]] Collected: 12/10/24 1513    Lab Status: In process Specimen: Blood from Arm, Right Updated: 12/10/24 1527    Blood culture #2 [[id number]] Collected: 12/10/24 1513     Lab Status: In process Specimen: Blood from Arm, Right Updated: 12/10/24 152            US scrotum and testicles   Final Interpretation by Sarath Story MD (12/10 8836)      No identifiable acute abnormality to account for the patient's clinical presentation.      Workstation performed: [id number]             Procedures    ED Medication and Procedure Management   Prior to Admission Medications   Prescriptions Last Dose Informant Patient Reported? Taking?   Empagliflozin 25 MG TABS  Self No No   Sig: Take 1 tablet (25 mg total) by mouth daily   Lancets (onetouch ultrasoft) lancets  Self No No   Sig: Check glucose daily   Vitamin D, Cholecalciferol, 50 MCG (2000 UT) CAPS  Self No No   Sig: Take 50 mcg by mouth daily   amLODIPine (NORVASC) 10 mg tablet  Self No No   Sig: Take 1 tablet (10 mg total) by mouth daily   aspirin 81 mg chewable tablet  Self Yes No   Sig: Chew 81 mg daily   atorvastatin (LIPITOR) 10 mg tablet  Self No No   Sig: Take 1 tablet by mouth once daily   ciprofloxacin (CIPRO) 500 mg tablet  Self No No   Sig: Take 1 tablet (500 mg total) by mouth every 12 (twelve) hours for 7 days   Patient not taking: Reported on 12/5/2024   diphenhydrAMINE (BENADRYL) 50 mg capsule   No No   Sig: Take 1 capsule (50 mg total) by mouth 1 (one) time for 1 dose Take 1 hour before MRI. Do not start before November 27, 2024.   finasteride (PROSCAR) 5 mg tablet  Self No No   Sig: Take 1 tablet (5 mg total) by mouth daily   Patient not taking: Reported on 12/5/2024   furosemide (LASIX) 40 mg tablet  Self Yes No   Sig: Take 60 mg by mouth daily   gabapentin (NEURONTIN) 100 mg capsule  Self No No   Sig: Take 1 capsule (100 mg total) by mouth 2 (two) times a day   Patient not taking: Reported on 12/5/2024   glucose blood (OneTouch Verio) test strip  Self No No   Sig: Use 1 each daily   hydrALAZINE (APRESOLINE) 25 mg tablet  Self No No   Sig: Take 1 tablet (25 mg total) by mouth 3 (three) times a day   ketoconazole  (NIZORAL) 2 % shampoo  Self No No   Sig: Apply 1 Application topically if needed for irritation   metoprolol succinate (TOPROL-XL) 25 mg 24 hr tablet  Self No No   Sig: Take 1 tablet (25 mg total) by mouth daily   metoprolol succinate (TOPROL-XL) 50 mg 24 hr tablet  Self No No   Sig: Take 1 tablet (50 mg total) by mouth daily   multivitamin (THERAGRAN) TABS  Self Yes No   Sig: Take 1 tablet by mouth daily   omeprazole (PriLOSEC) 40 MG capsule  Self No No   Sig: Take 1 capsule (40 mg total) by mouth daily      Facility-Administered Medications: None     Discharge Medication List as of 12/10/2024  5:42 PM        START taking these medications    Details   cefpodoxime (VANTIN) 200 mg tablet Take 1 tablet (200 mg total) by mouth 2 (two) times a day for 10 days, Starting Tue 12/10/2024, Until Fri 12/20/2024, Normal           CONTINUE these medications which have NOT CHANGED    Details   amLODIPine (NORVASC) 10 mg tablet Take 1 tablet (10 mg total) by mouth daily, Starting Mon 5/6/2024, Normal      aspirin 81 mg chewable tablet Chew 81 mg daily, Historical Med      atorvastatin (LIPITOR) 10 mg tablet Take 1 tablet by mouth once daily, Starting Sun 8/4/2024, Normal      ciprofloxacin (CIPRO) 500 mg tablet Take 1 tablet (500 mg total) by mouth every 12 (twelve) hours for 7 days, Starting Wed 12/4/2024, Until Wed 12/11/2024, Normal      diphenhydrAMINE (BENADRYL) 50 mg capsule Take 1 capsule (50 mg total) by mouth 1 (one) time for 1 dose Take 1 hour before MRI. Do not start before November 27, 2024., Starting Wed 11/27/2024, Normal      Empagliflozin 25 MG TABS Take 1 tablet (25 mg total) by mouth daily, Starting Mon 8/26/2024, Until Sat 2/22/2025, Normal      finasteride (PROSCAR) 5 mg tablet Take 1 tablet (5 mg total) by mouth daily, Starting Tue 11/5/2024, Normal      furosemide (LASIX) 40 mg tablet Take 60 mg by mouth daily, Starting Sat 11/16/2024, Historical Med      gabapentin (NEURONTIN) 100 mg capsule Take 1 capsule  (100 mg total) by mouth 2 (two) times a day, Starting Thu 10/3/2024, Normal      glucose blood (OneTouch Verio) test strip Use 1 each daily, Starting Mon 3/11/2024, Normal      hydrALAZINE (APRESOLINE) 25 mg tablet Take 1 tablet (25 mg total) by mouth 3 (three) times a day, Starting Mon 5/6/2024, Normal      ketoconazole (NIZORAL) 2 % shampoo Apply 1 Application topically if needed for irritation, Starting Wed 11/13/2024, No Print      Lancets (onetouch ultrasoft) lancets Check glucose daily, Normal      !! metoprolol succinate (TOPROL-XL) 25 mg 24 hr tablet Take 1 tablet (25 mg total) by mouth daily, Starting Mon 5/6/2024, Normal      !! metoprolol succinate (TOPROL-XL) 50 mg 24 hr tablet Take 1 tablet (50 mg total) by mouth daily, Starting Mon 5/6/2024, Normal      multivitamin (THERAGRAN) TABS Take 1 tablet by mouth daily, Historical Med      omeprazole (PriLOSEC) 40 MG capsule Take 1 capsule (40 mg total) by mouth daily, Starting Mon 5/6/2024, Normal      Vitamin D, Cholecalciferol, 50 MCG (2000 UT) CAPS Take 50 mcg by mouth daily, Starting Wed 1/4/2023, Normal       !! - Potential duplicate medications found. Please discuss with provider.        No discharge procedures on file.  ED SEPSIS DOCUMENTATION   Time reflects when diagnosis was documented in both MDM as applicable and the Disposition within this note       Time User Action Codes Description Comment    12/10/2024  5:22 PM Pat Freitas Add [N39.0] UTI (urinary tract infection)                  Pat Freitas PA-C  12/10/24 5792

## 2024-12-10 NOTE — TELEPHONE ENCOUNTER
Patient calling back, he is reluctant to go to the ED. Discussed in depth the recommendations, agreeable to go to ED     Patient will present to [facility] emergency department.

## 2024-12-10 NOTE — TELEPHONE ENCOUNTER
After further discussion of patients symptoms and reviewing patient's urine culture with Dr. Keith, recommendations would be for patient to present to [facility] emergency department for admission for IV antibiotics. He has a recent history of epididymitis with scrotal abscess with complaints of testicular pain. Patient is unable to tolerate PO antibiotics and will need IV antibiotics. Dr. Keith will be at [facility] tomorrow and can exam the patient then.

## 2024-12-10 NOTE — DISCHARGE INSTRUCTIONS
Stay well hydrated.  Take antibiotic as directed for the full duration pending urine culture result.  Follow up with urology or return to ER as needed.

## 2024-12-10 NOTE — TELEPHONE ENCOUNTER
ANDREEA Lane spoke with Dr. Keith regarding patients urine culture results and reports of testicular pain. Due to patients history of testicular abscess it is recommended that patient proceed to the ER for administration of IV antibiotics. Called and spoke with patient to discuss recommendations. Patients nurse visit for IM Rocephin was cancelled and patient was instructed to present to the ER for IV antibiotics and additional testing.

## 2024-12-10 NOTE — TELEPHONE ENCOUNTER
Called and LMOM for patient to contact the office to discuss his urine culture results and antibiotics. Unfortunately, there are limited options due to patients intolerance. If patient agreeable, he can be scheduled for a nurse visit today in the [facility] office for IM Rocephin injection.

## 2024-12-10 NOTE — TELEPHONE ENCOUNTER
Patient called back I relayed the following:  ANDREEA Kelly Nurse Practitioner Signed 7:32 AM     Copy     Patient's urine culture is again positive for moderate colony count of Serratia.  Oral antibiotic options are limited due to patient's intolerance.  We could try IM Rocephin as patient has tolerated cephalosporins in the past.      Patient agreeable to this and will be there today at 1330 for injection.

## 2024-12-10 NOTE — TELEPHONE ENCOUNTER
Patient's urine culture is again positive for moderate colony count of Serratia.  Oral antibiotic options are limited due to patient's intolerance.  We could try IM Rocephin as patient has tolerated cephalosporins in the past.

## 2024-12-11 ENCOUNTER — VBI (OUTPATIENT)
Dept: FAMILY MEDICINE CLINIC | Facility: CLINIC | Age: 69
End: 2024-12-11

## 2024-12-11 LAB — BACTERIA UR CULT: NORMAL

## 2024-12-11 NOTE — TELEPHONE ENCOUNTER
12/11/24 10:06 AM    Patient contacted post ED visit, first outreach attempt made. Message was left for patient to return a call to the [facility] Department at [facility]: Phone [phone].    Thank you.  [facility] Cervantes  [facility]

## 2024-12-11 NOTE — TELEPHONE ENCOUNTER
Patient was ED and given cefTRIAXone (ROCEPHIN) IVPB (premix in dextrose) 2,000 mg 50 mL     Discharged on    cefpodoxime (VANTIN) 200 mg tablet    Sig: Take 1 tablet (200 mg total) by mouth 2 (two) times a day for 10 days     Patient advised to stay hydrated with water and to call if any further assistance is needed.

## 2024-12-12 NOTE — TELEPHONE ENCOUNTER
12/12/24 7:40 AM    Patient contacted post ED visit, [facility] department spoke with patient/caregiver and outreach was successful.    Thank you.  [facility] Cervantes  [facility]

## 2024-12-13 ENCOUNTER — OFFICE VISIT (OUTPATIENT)
Dept: BARIATRICS | Facility: CLINIC | Age: 69
End: 2024-12-13
Payer: MEDICARE

## 2024-12-13 VITALS
HEIGHT: 71 IN | WEIGHT: 302 LBS | BODY MASS INDEX: 42.28 KG/M2 | DIASTOLIC BLOOD PRESSURE: 74 MMHG | OXYGEN SATURATION: 97 % | SYSTOLIC BLOOD PRESSURE: 128 MMHG | HEART RATE: 72 BPM | TEMPERATURE: 98.4 F

## 2024-12-13 DIAGNOSIS — I10 BENIGN ESSENTIAL HTN: ICD-10-CM

## 2024-12-13 DIAGNOSIS — E66.9 TYPE 2 DIABETES MELLITUS WITH OBESITY  (HCC): ICD-10-CM

## 2024-12-13 DIAGNOSIS — E66.01 MORBID OBESITY (HCC): Primary | ICD-10-CM

## 2024-12-13 DIAGNOSIS — E11.69 TYPE 2 DIABETES MELLITUS WITH OBESITY  (HCC): ICD-10-CM

## 2024-12-13 DIAGNOSIS — G47.33 OSA (OBSTRUCTIVE SLEEP APNEA): ICD-10-CM

## 2024-12-13 PROCEDURE — 99214 OFFICE O/P EST MOD 30 MIN: CPT | Performed by: PHYSICIAN ASSISTANT

## 2024-12-13 PROCEDURE — 99204 OFFICE O/P NEW MOD 45 MIN: CPT | Performed by: PHYSICIAN ASSISTANT

## 2024-12-13 NOTE — PATIENT INSTRUCTIONS
Goals:    Food log (ie.) www.DocumentCloudpal.com,[employer].com,Thumbtackit.com,[employer].com,etc.   No sugary beverages. At least 64oz of water daily.  Increase physical activity by 10 minutes daily. Gradually increase physical activity to a goal of 5 days per week for 30 minutes of MODERATE intensity PLUS 2 days per week of FULL BODY resistance training  2643-1445 Calories per day  25-35 grams of dietary fiber per day  5-10 servings of fruits and vegetables per day  5oz lean protein, 1/2 cup starch- 2 cups nonstarchy veggies

## 2024-12-13 NOTE — ASSESSMENT & PLAN NOTE
-Discussed options of HealthyCORE-Intensive Lifestyle Intervention Program, Very Low Calorie Diet-VLCD, Conservative Program, Maicol-En-Y Gastric Bypass, and Vertical Sleeve Gastrectomy and the role of weight loss medications.  -Not a candidate for sympathomimetics  -Initial weight loss goal of 5-10% weight loss for improved health  -Screening labs: reviewed CMP, Lipid panel, TSH, HgbA1c  -Patient is interested in pursuing  conservative program  -Calorie goals, sample menu, portion size guidelines, and food logging reviewed with the patient.

## 2024-12-13 NOTE — ASSESSMENT & PLAN NOTE
-On several antihypertensives  -can improve with weight loss  -continue management per prescribing provider

## 2024-12-13 NOTE — PROGRESS NOTES
Assessment/Plan:    Morbid obesity (HCC)  -Discussed options of HealthyCORE-Intensive Lifestyle Intervention Program, Very Low Calorie Diet-VLCD, Conservative Program, Maicol-En-Y Gastric Bypass, and Vertical Sleeve Gastrectomy and the role of weight loss medications.  -Not a candidate for sympathomimetics  -Initial weight loss goal of 5-10% weight loss for improved health  -Screening labs: reviewed CMP, Lipid panel, TSH, HgbA1c  -Patient is interested in pursuing  conservative program  -Calorie goals, sample menu, portion size guidelines, and food logging reviewed with the patient.      Type 2 diabetes mellitus with obesity  (HCC)    Lab Results   Component Value Date    HGBA1C 6.0 (H) 10/06/2024   -Followed closely by endocrinology  -On Jardiance 25mg and recently started on Ozempic 0.25mg. Has taken 4 doses so far.  Advance dose per endo.  -avoid/limit refined carbohydrates    AYLEEN (obstructive sleep apnea)  -reports he is compliant with BiPAP  -can improve with weight loss    Benign essential HTN  -On several antihypertensives  -can improve with weight loss  -continue management per prescribing provider    Goals:    Food log (ie.) www.myfitnesspal.com,sparkpeople.com,loseit.com,calorieking.com,etc.   No sugary beverages. At least 64oz of water daily.  Increase physical activity by 10 minutes daily. Gradually increase physical activity to a goal of 5 days per week for 30 minutes of MODERATE intensity PLUS 2 days per week of FULL BODY resistance training  2605-4040 Calories per day  25-35 grams of dietary fiber per day  5-10 servings of fruits and vegetables per day  5oz lean protein, 1/2 cup starch- 2 cups nonstarchy veggies    Follow up in approximately  4 months  with Non-Surgical Physician/Advanced Practitioner.    Diagnoses and all orders for this visit:    Morbid obesity (HCC)  -     Ambulatory Referral to Weight Management    Type 2 diabetes mellitus with obesity  (HCC)    AYLEEN (obstructive sleep  apnea)    Benign essential HTN          Subjective:   Chief Complaint   Patient presents with    Consult       Patient ID: Mhamd Estrellita  is a 69 y.o. male with excess weight/obesity here to pursue weight managment.    Past Medical History:   Diagnosis Date    Arthritis     last assessed 7/1/15    Bronchitis 02/02/2023    Chronic diastolic (congestive) heart failure (HCC)     Diabetes mellitus (HCC)     type 2-last assessed 1/28/15    Diabetes mellitus (HCC) 03/16/2018    Dyslipidemia     GERD (gastroesophageal reflux disease)     Hypertension     Hypertensive urgency 03/16/2023    Irregular heart beat     last assessed 7/1/15    Lactic acidosis 10/12/2024    Obstructive sleep apnea     Palpitations     last assessed 9/7/17    Sexual dysfunction     last assessed 7/1/15    Thyroid disease     last assessed 7/1/15         HPI:  Obesity/Excess Weight:  Severity: Severe  Onset:  Adult life  Modifiers: exercise; physical job, cut down on carbs - able to lose 60-70 lbs  Contributing factors: Insufficient Physical Activity, mindless eating, being retired and being around food more since not out working  Associated symptoms: comorbid conditions, decreased mobility, harder to walk    Goals: lose 20 lbs  Highest:  349 lbs  Started Ozempic 0.25mg 4 weeks    Hydration: minimal water, whole milk, occ ranberry juice, diet soda  ETOH: denies  Exercise: denies  Occupation: retired  Sleep:6-7 hours, wears BiPAP  Smoking: denies    B: fruit or skips  L: skips   S: fruit or peanuts   D: meat + salad + veggie + bread/rice/pasta   S: fruit or peanuts    Colonoscopy: completed 2022, 3 year recall    The following portions of the patient's history were reviewed and updated as appropriate: allergies, current medications, past family history, past medical history, past social history, past surgical history, and problem list.    Review of Systems   Constitutional:  Negative for chills and fever.   HENT:  Negative for sore throat.   "  Respiratory:  Negative for cough and shortness of breath.    Cardiovascular:  Negative for chest pain and palpitations.   Gastrointestinal:  Negative for abdominal pain, nausea and vomiting.   Genitourinary:  Positive for dysuria (currently on Abx).   Musculoskeletal:  Positive for arthralgias.   Skin:  Negative for rash.   Neurological:  Negative for dizziness and headaches.   Psychiatric/Behavioral:  Negative for dysphoric mood.        Objective:    /74 (BP Location: Right arm, Patient Position: Sitting, Cuff Size: Large)   Pulse 72   Temp 98.4 °F (36.9 °C) (Temporal)   Ht 5' 11\" (1.803 m)   Wt (!) 137 kg (302 lb)   SpO2 97%   BMI 42.12 kg/m²     Physical Exam  Vitals and nursing note reviewed.   Constitutional:       General: He is not in acute distress.     Appearance: He is obese. He is not ill-appearing or toxic-appearing.   HENT:      Head: Normocephalic and atraumatic.      Nose: Nose normal.      Mouth/Throat:      Mouth: Mucous membranes are moist.   Eyes:      General: No scleral icterus.  Pulmonary:      Effort: Pulmonary effort is normal. No respiratory distress.   Abdominal:      Comments: protuberant   Musculoskeletal:         General: Normal range of motion.   Skin:     General: Skin is dry.      Coloration: Skin is not jaundiced.   Neurological:      Mental Status: He is alert and oriented to person, place, and time.   Psychiatric:         Mood and Affect: Mood normal.         Behavior: Behavior normal.         Thought Content: Thought content normal.         Judgment: Judgment normal.        "

## 2024-12-13 NOTE — ASSESSMENT & PLAN NOTE
Lab Results   Component Value Date    HGBA1C 6.0 (H) 10/06/2024   -Followed closely by endocrinology  -On Jardiance 25mg and recently started on Ozempic 0.25mg. Has taken 4 doses so far.  Advance dose per endo.  -avoid/limit refined carbohydrates

## 2024-12-15 LAB
BACTERIA BLD CULT: NORMAL
BACTERIA BLD CULT: NORMAL

## 2024-12-17 ENCOUNTER — TELEPHONE (OUTPATIENT)
Age: 69
End: 2024-12-17

## 2024-12-17 NOTE — TELEPHONE ENCOUNTER
Patients GI provider:       Number to return call: ( [phone]     Reason for call: Pt calling to r/s his EGD for April.  R/s April 8th, Dr. Junior MI    Scheduled procedure/appointment date if applicable: procedure 4/8/25

## 2024-12-20 ENCOUNTER — TRANSCRIBE ORDERS (OUTPATIENT)
Age: 69
End: 2024-12-20

## 2024-12-23 ENCOUNTER — TELEPHONE (OUTPATIENT)
Age: 69
End: 2024-12-23

## 2024-12-23 DIAGNOSIS — E11.65 TYPE 2 DIABETES MELLITUS WITH HYPERGLYCEMIA, WITHOUT LONG-TERM CURRENT USE OF INSULIN (HCC): Primary | ICD-10-CM

## 2024-12-23 DIAGNOSIS — R93.5 ABNORMAL MRI OF ABDOMEN: Primary | ICD-10-CM

## 2024-12-23 DIAGNOSIS — E11.9 TYPE 2 DIABETES MELLITUS WITHOUT COMPLICATION, WITHOUT LONG-TERM CURRENT USE OF INSULIN (HCC): ICD-10-CM

## 2024-12-23 DIAGNOSIS — K76.0 METABOLIC DYSFUNCTION-ASSOCIATED STEATOTIC LIVER DISEASE (MASLD): ICD-10-CM

## 2024-12-23 RX ORDER — BLOOD SUGAR DIAGNOSTIC
1 STRIP MISCELLANEOUS DAILY
Qty: 100 EACH | Refills: 5 | Status: SHIPPED | OUTPATIENT
Start: 2024-12-23

## 2024-12-23 NOTE — TELEPHONE ENCOUNTER
Patient called stating he went to get his Ozempic from the pharmacy and they told him the doctor canceled the script.  Patient has finished the Ozempic 0.25 and 0.5 doses and has had no side effects.  Please send new script to the [facility] on file.

## 2024-12-23 NOTE — TELEPHONE ENCOUNTER
Recommend we continue Ozempic at 0.5mg weekly for now, reassess progress at next office visit.   Friendly reminder - due for office follow up ASAP. Please reschedule when able (appears cancelled appt on 12/16, 12/20).

## 2025-01-02 ENCOUNTER — PROCEDURE VISIT (OUTPATIENT)
Dept: UROLOGY | Facility: CLINIC | Age: 70
End: 2025-01-02
Payer: MEDICARE

## 2025-01-02 ENCOUNTER — TELEPHONE (OUTPATIENT)
Age: 70
End: 2025-01-02

## 2025-01-02 VITALS
OXYGEN SATURATION: 99 % | HEIGHT: 71 IN | SYSTOLIC BLOOD PRESSURE: 134 MMHG | DIASTOLIC BLOOD PRESSURE: 88 MMHG | HEART RATE: 70 BPM | TEMPERATURE: 98.4 F | WEIGHT: 301 LBS | BODY MASS INDEX: 42.14 KG/M2

## 2025-01-02 DIAGNOSIS — N39.0 RECURRENT UTI: Primary | ICD-10-CM

## 2025-01-02 DIAGNOSIS — N45.4: ICD-10-CM

## 2025-01-02 PROCEDURE — 52000 CYSTOURETHROSCOPY: CPT | Performed by: UROLOGY

## 2025-01-02 PROCEDURE — 99213 OFFICE O/P EST LOW 20 MIN: CPT | Performed by: UROLOGY

## 2025-01-02 RX ORDER — CEFPODOXIME PROXETIL 200 MG/1
200 TABLET, FILM COATED ORAL ONCE
Qty: 1 TABLET | Refills: 0 | Status: SHIPPED | OUTPATIENT
Start: 2025-01-02 | End: 2025-01-02

## 2025-01-02 RX ORDER — CIPROFLOXACIN 500 MG/1
500 TABLET, FILM COATED ORAL EVERY 12 HOURS SCHEDULED
Qty: 14 TABLET | Refills: 0 | Status: SHIPPED | OUTPATIENT
Start: 2025-01-02 | End: 2025-01-02 | Stop reason: ALTCHOICE

## 2025-01-02 RX ORDER — CEFPODOXIME PROXETIL 200 MG/1
200 TABLET, FILM COATED ORAL 2 TIMES DAILY
Qty: 14 TABLET | Refills: 0 | Status: SHIPPED | OUTPATIENT
Start: 2025-01-02 | End: 2025-01-09

## 2025-01-02 RX ORDER — CIPROFLOXACIN 500 MG/1
500 TABLET, FILM COATED ORAL ONCE
Qty: 1 TABLET | Refills: 0 | Status: SHIPPED | OUTPATIENT
Start: 2025-01-02 | End: 2025-01-02 | Stop reason: ALTCHOICE

## 2025-01-02 NOTE — TELEPHONE ENCOUNTER
Called and spoke with patient to make him aware that ANDREEA Lane adjusted scripts from Cipro to Cefpodoxime per patient request. Patient is requesting clarification on Finasteride and whether or not he should continue taking medication. Patient states his note says that he is not taking Finasteride, however he states he has been taking medication for the last 2 months. If patient is to continue medication, he will need a new script.

## 2025-01-02 NOTE — TELEPHONE ENCOUNTER
Patient called to have Cefpodoxime 200mg sent to pharmacy in place of Cipro that was sent today by Dr. Keith.     Patient said Cipro does not work for him.      Please advise and call patient with decision.     Patient said he needs to take this medication today and asking assistance right away.     #[phone]

## 2025-01-02 NOTE — LETTER
2025     Alma Delia Fountain, DO  [address] [facility] [address]    Patient: Christy Haro   YOB: 1955   Date of Visit: 2025       Dear Dr. Fountain:    Thank you for referring Christy Haro to me for evaluation. Below are my notes for this consultation.    If you have questions, please do not hesitate to call me. I look forward to following your patient along with you.         Sincerely,        Russ Keith MD        CC: No Recipients    Russ Keith MD  2025  2:54 PM  Sign when Signing Visit  UROLOGY PROGRESS NOTE   [facility]  [address] [place] [address] [place]  [address]  [place] [address]  [phone]  Fax:[phone]  [email or web address]      NAME: Christy Haro  AGE: 69 y.o. SEX: male  : 1955   MRN: [id number]    DATE: 2025  TIME: 2:53 PM    Assessment and Plan;    In summary-urinary retention due to shingles of the lumbar spine, which has resolved but the catheters resulted in recurrent UTI.  Also had epididymitis due to the same.  Cystoscopy negative.    Plan: Cipro 1 dose called in to take after this procedure, then I sent in a 7-day prescription for Cipro for him to take as needed when he is traveling if he gets symptoms of another UTI.  Hopefully this resolves on its own.  Recommended yearly prostate cancer screening the patient.  Follow-up with me as needed.  He is currently on no urologic medication.               Chief Complaint   No chief complaint on file.      History of Present Illness   69-year-old man, established patient but new to me-history of urinary retention in 2024 due to Shingles of the lower backand he passed a voiding trial on that date of 2024.  After the voiding trial he was hospitalized with epididymitis with bacteremia.  He was treated with IV ceftriaxone, blood cultures grew Serratia marcescens urine culture grew Citrobacter.  He then had a 21-day course of Vantin after being hospitalized.  Follow-up  scrotal ultrasound showed resolved right epididymitis but persistent hypoechoic focus posterior to the right testicle concerning for developing abscess.  On exam was unremarkable.  PVR was 47 cc on the visit.  He was to continue finasteride 5 mg daily.  Cannot tolerate alpha blockers due to dizziness.  Here for cystoscopy for evaluation of history of urinary retention and BPH.  He called December 10, 2024 with a burning sensation of voiding, frequency and incomplete bladder emptying sensation that started that date.  Urine culture obtained but was normal.  He was complaining of pain in the testicles.  Urinalysis showed 10-20 white blood cells per high-powered field and only occasional bacteria.  0-1 red blood cells.  Blood cultures were negative.  White blood count was normal on that date and creatinine normal at 1.16.  He was seen on that date December 10, 2024 in the ED was discharged home.  He had a scrotal ultrasound December 10, 2024 which was negative.  Hemoglobin A1c was 6.0 October 6, 2024.His urine cx 12/10/2024 was negative-all of his symptoms resolved with antibiotics however.       Cystoscopy     Date/Time  1/2/2025 2:30 PM     Performed by  Russ Keith MD   Authorized by  ANDREEA Kelly     Universal Protocol:  Consent: Verbal consent obtained. Written consent obtained.      Procedure Details:  Procedure type: cystoscopy    Additional Procedure Details: Cystoscopy Procedure Note        Pre-operative Diagnosis: Recurrent UTI, history of urinary retention    Post-operative Diagnosis: As above, normal bladder nonocclusive prostate    Procedure: Flexible cystoscopy    Surgeon: Russ Keith MD    Anesthesia: 1% Xylocaine per urethra    EBL: Minimal    Complications: none    Procedure Details   The risks, benefits, complications, treatment options, and expected outcomes were discussed with the patient. The patient concurred with the proposed plan, giving informed consent.    Cystoscopy was performed  today under local anesthesia, using sterile technique. The patient was placed in the supine position, prepped with Betadine, and draped in the usual sterile fashion. The flexible cystocope was used to inspect both the urethra and bladder    Findings:  Urethra: Normal without stricture.  Prostate nonocclusive.    Bladder:  Smooth, not trabeculated and there were no stones tumors or other lesions.  The orifices were orthotopic and intact.           Specimens: None                 Complications:  None           Disposition: To home            Condition:  Stable              The following portions of the patient's history were reviewed and updated as appropriate: allergies, current medications, past family history, past medical history, past social history, past surgical history and problem list.  Past Medical History:   Diagnosis Date   • Arthritis     last assessed 7/1/15   • Bronchitis 02/02/2023   • Chronic diastolic (congestive) heart failure ([facility])    • Diabetes mellitus ([facility])     type 2-last assessed 1/28/15   • Diabetes mellitus ([facility]) 03/16/2018   • Dyslipidemia    • GERD (gastroesophageal reflux disease)    • Hypertension    • Hypertensive urgency 03/16/2023   • Irregular heart beat     last assessed 7/1/15   • Lactic acidosis 10/12/2024   • Obstructive sleep apnea    • Palpitations     last assessed 9/7/17   • Sexual dysfunction     last assessed 7/1/15   • Thyroid disease     last assessed 7/1/15     Past Surgical History:   Procedure Laterality Date   • COLONOSCOPY     • TONSILLECTOMY       shoulder  Review of Systems   Review of Systems   Genitourinary:  Positive for frequency. Negative for difficulty urinating, dysuria and hematuria.       Active Problem List     Patient Active Problem List   Diagnosis   • Benign essential HTN   • Dyslipidemia   • GERD (gastroesophageal reflux disease)   • AYLEEN (obstructive sleep apnea)   • Stasis edema of both lower extremities   • BMI 40.0-44.9, adult ([facility])   • Morbid  "obesity (HCC)   • Diastolic dysfunction   • Type 2 diabetes mellitus with obesity  (HCC)   • Lymphedema   • Chronic diastolic heart failure (HCC)   • Stage 3a chronic kidney disease (HCC)   • Pain in both lower extremities   • Hyponatremia   • Unilateral inguinal hernia without obstruction   • Epididymitis with abscess   • Acute kidney injury superimposed on stage 3a chronic kidney disease (HCC)   • Elevated liver enzymes   • Hypokalemia   • Nausea and vomiting   • Hepatitis A antibody positive   • Hyperphosphatemia   • Left anterior fascicular block   • 1st degree AV block   • PUD (peptic ulcer disease)   • Acquired complex cyst of kidney   • Liver fibrosis       Objective   /88   Pulse 70   Temp 98.4 °F (36.9 °C)   Ht 5' 11\" (1.803 m)   Wt (!) 137 kg (301 lb)   SpO2 99%   BMI 41.98 kg/m²     Physical Exam  Vitals reviewed.   Constitutional:       Appearance: Normal appearance.   HENT:      Head: Normocephalic and atraumatic.   Eyes:      Extraocular Movements: Extraocular movements intact.   Pulmonary:      Effort: Pulmonary effort is normal.   Genitourinary:     Penis: Normal.       Testes: Normal.   Musculoskeletal:         General: Normal range of motion.      Cervical back: Normal range of motion.   Skin:     General: Skin is warm and dry.      Coloration: Skin is not jaundiced or pale.   Neurological:      General: No focal deficit present.      Mental Status: He is alert and oriented to person, place, and time.   Psychiatric:         Mood and Affect: Mood normal.         Behavior: Behavior normal.         Thought Content: Thought content normal.         Judgment: Judgment normal.             Current Medications     Current Outpatient Medications:   •  amLODIPine (NORVASC) 10 mg tablet, Take 1 tablet (10 mg total) by mouth daily, Disp: 90 tablet, Rfl: 3  •  aspirin 81 mg chewable tablet, Chew 81 mg daily, Disp: , Rfl:   •  atorvastatin (LIPITOR) 10 mg tablet, Take 1 tablet by mouth once daily, " Disp: 30 tablet, Rfl: 0  •  ciprofloxacin (Cipro) 500 mg tablet, Take 1 tablet (500 mg total) by mouth once for 1 dose Take 1 dose after  procedure, Disp: 1 tablet, Rfl: 0  •  ciprofloxacin (CIPRO) 500 mg tablet, Take 1 tablet (500 mg total) by mouth every 12 (twelve) hours for 7 days, Disp: 14 tablet, Rfl: 0  •  diphenhydrAMINE (BENADRYL) 50 mg capsule, Take 1 capsule (50 mg total) by mouth 1 (one) time for 1 dose Take 1 hour before MRI. Do not start before November 27, 2024., Disp: 1 capsule, Rfl: 0  •  Empagliflozin 25 MG TABS, Take 1 tablet (25 mg total) by mouth daily, Disp: 30 tablet, Rfl: 5  •  furosemide (LASIX) 40 mg tablet, Take 60 mg by mouth daily, Disp: , Rfl:   •  glucose blood (OneTouch Verio) test strip, Use 1 each daily, Disp: 100 each, Rfl: 5  •  hydrALAZINE (APRESOLINE) 25 mg tablet, Take 1 tablet (25 mg total) by mouth 3 (three) times a day, Disp: 270 tablet, Rfl: 3  •  ketoconazole (NIZORAL) 2 % shampoo, Apply 1 Application topically if needed for irritation, Disp: , Rfl:   •  Lancets (onetouch ultrasoft) lancets, Check glucose daily, Disp: 100 each, Rfl: 1  •  metoprolol succinate (TOPROL-XL) 25 mg 24 hr tablet, Take 1 tablet (25 mg total) by mouth daily, Disp: 90 tablet, Rfl: 3  •  metoprolol succinate (TOPROL-XL) 50 mg 24 hr tablet, Take 1 tablet (50 mg total) by mouth daily, Disp: 90 tablet, Rfl: 3  •  multivitamin (THERAGRAN) TABS, Take 1 tablet by mouth daily, Disp: , Rfl:   •  omeprazole (PriLOSEC) 40 MG capsule, Take 1 capsule (40 mg total) by mouth daily, Disp: 90 capsule, Rfl: 3  •  semaglutide, 0.25 or 0.5 mg/dose, (Ozempic, 0.25 or 0.5 MG/DOSE,) 2 mg/3 mL injection pen, Inject 0.75 mL (0.5 mg total) under the skin every 7 days, Disp: 3 mL, Rfl: 2  •  Vitamin D, Cholecalciferol, 50 MCG (2000 UT) CAPS, Take 50 mcg by mouth daily, Disp: 90 capsule, Rfl: 3  •  gabapentin (NEURONTIN) 100 mg capsule, Take 1 capsule (100 mg total) by mouth 2 (two) times a day (Patient not taking: Reported  on 12/5/2024), Disp: 60 capsule, Rfl: 0        Russ Keith MD

## 2025-01-02 NOTE — TELEPHONE ENCOUNTER
Patient does not want to take Cipro as he is concerned it will give him a reaction the way Bactrim did. As we are on the phone the medication was changed and informed patient Cefpodoxime sent to his pharmacy    Patient also wants to know if he should continue to take Finasteride

## 2025-01-02 NOTE — PATIENT INSTRUCTIONS
Go immediately to your pharmacy and take 1 dose of ciprofloxacin.  There are 2 prescriptions waiting there for you, the 1 that has 1 pill in it and then the other 1 has 14 pills.  Save the 1 with 14 pills for the next time you get symptoms of a urinary tract infection.  The 1 pill is just to prevent you from getting an infection after this procedure.      You have just undergone cystoscopy of the bladder.  Expect to see some blood in the urine, which should clear up within 24 to 48 hours.  You also may experience burning urgency and frequency of urination which is normal.  Call for fever greater than 101.5 degrees, severe pain not relieved by over-the-counter medicines, or inability to urinate.  You may resume all normal activities.  For irritative symptoms, you can purchase over-the-counter remedies such as cystek or Azo, or any other preparations advertised in the pharmacy for bladder symptoms.  I will call in Pyridium which is a prescription strength medication for bladder irritability upon your request.

## 2025-01-02 NOTE — PROGRESS NOTES
UROLOGY PROGRESS NOTE   [facility]  [address] [place] [address] [place]  [address]  [place] [address]  [phone]  Fax:[phone]  [email or web address]      NAME: Christy Haro  AGE: 69 y.o. SEX: male  : 1955   MRN: [id number]    DATE: 2025  TIME: 2:53 PM    Assessment and Plan;    In summary-urinary retention due to shingles of the lumbar spine, which has resolved but the catheters resulted in recurrent UTI.  Also had epididymitis due to the same.  Cystoscopy negative.    Plan: Cipro 1 dose called in to take after this procedure, then I sent in a 7-day prescription for Cipro for him to take as needed when he is traveling if he gets symptoms of another UTI.  Hopefully this resolves on its own.  Recommended yearly prostate cancer screening the patient.  Follow-up with me as needed.  He is currently on no urologic medication.               Chief Complaint   No chief complaint on file.      History of Present Illness   69-year-old man, established patient but new to me-history of urinary retention in 2024 due to Shingles of the lower backand he passed a voiding trial on that date of 2024.  After the voiding trial he was hospitalized with epididymitis with bacteremia.  He was treated with IV ceftriaxone, blood cultures grew Serratia marcescens urine culture grew Citrobacter.  He then had a 21-day course of Vantin after being hospitalized.  Follow-up scrotal ultrasound showed resolved right epididymitis but persistent hypoechoic focus posterior to the right testicle concerning for developing abscess.  On exam was unremarkable.  PVR was 47 cc on the visit.  He was to continue finasteride 5 mg daily.  Cannot tolerate alpha blockers due to dizziness.  Here for cystoscopy for evaluation of history of urinary retention and BPH.  He called December 10, 2024 with a burning sensation of voiding, frequency and incomplete bladder emptying sensation that started that date.  Urine culture  obtained but was normal.  He was complaining of pain in the testicles.  Urinalysis showed 10-20 white blood cells per high-powered field and only occasional bacteria.  0-1 red blood cells.  Blood cultures were negative.  White blood count was normal on that date and creatinine normal at 1.16.  He was seen on that date December 10, 2024 in the ED was discharged home.  He had a scrotal ultrasound December 10, 2024 which was negative.  Hemoglobin A1c was 6.0 October 6, 2024.His urine cx 12/10/2024 was negative-all of his symptoms resolved with antibiotics however.       Cystoscopy     Date/Time  1/2/2025 2:30 PM     Performed by  Russ Keith MD   Authorized by  ANDREEA Kelly     Universal Protocol:  Consent: Verbal consent obtained. Written consent obtained.      Procedure Details:  Procedure type: cystoscopy    Additional Procedure Details: Cystoscopy Procedure Note        Pre-operative Diagnosis: Recurrent UTI, history of urinary retention    Post-operative Diagnosis: As above, normal bladder nonocclusive prostate    Procedure: Flexible cystoscopy    Surgeon: Russ Keith MD    Anesthesia: 1% Xylocaine per urethra    EBL: Minimal    Complications: none    Procedure Details   The risks, benefits, complications, treatment options, and expected outcomes were discussed with the patient. The patient concurred with the proposed plan, giving informed consent.    Cystoscopy was performed today under local anesthesia, using sterile technique. The patient was placed in the supine position, prepped with Betadine, and draped in the usual sterile fashion. The flexible cystocope was used to inspect both the urethra and bladder    Findings:  Urethra: Normal without stricture.  Prostate nonocclusive.    Bladder:  Smooth, not trabeculated and there were no stones tumors or other lesions.  The orifices were orthotopic and intact.           Specimens: None                 Complications:  None           Disposition: To home             Condition:  Stable              The following portions of the patient's history were reviewed and updated as appropriate: allergies, current medications, past family history, past medical history, past social history, past surgical history and problem list.  Past Medical History:   Diagnosis Date    Arthritis     last assessed 7/1/15    Bronchitis 02/02/2023    Chronic diastolic (congestive) heart failure (HCC)     Diabetes mellitus (HCC)     type 2-last assessed 1/28/15    Diabetes mellitus (HCC) 03/16/2018    Dyslipidemia     GERD (gastroesophageal reflux disease)     Hypertension     Hypertensive urgency 03/16/2023    Irregular heart beat     last assessed 7/1/15    Lactic acidosis 10/12/2024    Obstructive sleep apnea     Palpitations     last assessed 9/7/17    Sexual dysfunction     last assessed 7/1/15    Thyroid disease     last assessed 7/1/15     Past Surgical History:   Procedure Laterality Date    COLONOSCOPY      TONSILLECTOMY       shoulder  Review of Systems   Review of Systems   Genitourinary:  Positive for frequency. Negative for difficulty urinating, dysuria and hematuria.       Active Problem List     Patient Active Problem List   Diagnosis    Benign essential HTN    Dyslipidemia    GERD (gastroesophageal reflux disease)    AYLEEN (obstructive sleep apnea)    Stasis edema of both lower extremities    BMI 40.0-44.9, adult (HCC)    Morbid obesity (HCC)    Diastolic dysfunction    Type 2 diabetes mellitus with obesity  (HCC)    Lymphedema    Chronic diastolic heart failure (HCC)    Stage 3a chronic kidney disease (HCC)    Pain in both lower extremities    Hyponatremia    Unilateral inguinal hernia without obstruction    Epididymitis with abscess    Acute kidney injury superimposed on stage 3a chronic kidney disease (HCC)    Elevated liver enzymes    Hypokalemia    Nausea and vomiting    Hepatitis A antibody positive    Hyperphosphatemia    Left anterior fascicular block    1st degree AV block     "PUD (peptic ulcer disease)    Acquired complex cyst of kidney    Liver fibrosis       Objective   /88   Pulse 70   Temp 98.4 °F (36.9 °C)   Ht 5' 11\" (1.803 m)   Wt (!) 137 kg (301 lb)   SpO2 99%   BMI 41.98 kg/m²     Physical Exam  Vitals reviewed.   Constitutional:       Appearance: Normal appearance.   HENT:      Head: Normocephalic and atraumatic.   Eyes:      Extraocular Movements: Extraocular movements intact.   Pulmonary:      Effort: Pulmonary effort is normal.   Genitourinary:     Penis: Normal.       Testes: Normal.   Musculoskeletal:         General: Normal range of motion.      Cervical back: Normal range of motion.   Skin:     General: Skin is warm and dry.      Coloration: Skin is not jaundiced or pale.   Neurological:      General: No focal deficit present.      Mental Status: He is alert and oriented to person, place, and time.   Psychiatric:         Mood and Affect: Mood normal.         Behavior: Behavior normal.         Thought Content: Thought content normal.         Judgment: Judgment normal.             Current Medications     Current Outpatient Medications:     amLODIPine (NORVASC) 10 mg tablet, Take 1 tablet (10 mg total) by mouth daily, Disp: 90 tablet, Rfl: 3    aspirin 81 mg chewable tablet, Chew 81 mg daily, Disp: , Rfl:     atorvastatin (LIPITOR) 10 mg tablet, Take 1 tablet by mouth once daily, Disp: 30 tablet, Rfl: 0    ciprofloxacin (Cipro) 500 mg tablet, Take 1 tablet (500 mg total) by mouth once for 1 dose Take 1 dose after  procedure, Disp: 1 tablet, Rfl: 0    ciprofloxacin (CIPRO) 500 mg tablet, Take 1 tablet (500 mg total) by mouth every 12 (twelve) hours for 7 days, Disp: 14 tablet, Rfl: 0    diphenhydrAMINE (BENADRYL) 50 mg capsule, Take 1 capsule (50 mg total) by mouth 1 (one) time for 1 dose Take 1 hour before MRI. Do not start before November 27, 2024., Disp: 1 capsule, Rfl: 0    Empagliflozin 25 MG TABS, Take 1 tablet (25 mg total) by mouth daily, Disp: 30 tablet, " Rfl: 5    furosemide (LASIX) 40 mg tablet, Take 60 mg by mouth daily, Disp: , Rfl:     glucose blood (OneTouch Verio) test strip, Use 1 each daily, Disp: 100 each, Rfl: 5    hydrALAZINE (APRESOLINE) 25 mg tablet, Take 1 tablet (25 mg total) by mouth 3 (three) times a day, Disp: 270 tablet, Rfl: 3    ketoconazole (NIZORAL) 2 % shampoo, Apply 1 Application topically if needed for irritation, Disp: , Rfl:     Lancets (onetouch ultrasoft) lancets, Check glucose daily, Disp: 100 each, Rfl: 1    metoprolol succinate (TOPROL-XL) 25 mg 24 hr tablet, Take 1 tablet (25 mg total) by mouth daily, Disp: 90 tablet, Rfl: 3    metoprolol succinate (TOPROL-XL) 50 mg 24 hr tablet, Take 1 tablet (50 mg total) by mouth daily, Disp: 90 tablet, Rfl: 3    multivitamin (THERAGRAN) TABS, Take 1 tablet by mouth daily, Disp: , Rfl:     omeprazole (PriLOSEC) 40 MG capsule, Take 1 capsule (40 mg total) by mouth daily, Disp: 90 capsule, Rfl: 3    semaglutide, 0.25 or 0.5 mg/dose, (Ozempic, 0.25 or 0.5 MG/DOSE,) 2 mg/3 mL injection pen, Inject 0.75 mL (0.5 mg total) under the skin every 7 days, Disp: 3 mL, Rfl: 2    Vitamin D, Cholecalciferol, 50 MCG (2000 UT) CAPS, Take 50 mcg by mouth daily, Disp: 90 capsule, Rfl: 3    gabapentin (NEURONTIN) 100 mg capsule, Take 1 capsule (100 mg total) by mouth 2 (two) times a day (Patient not taking: Reported on 12/5/2024), Disp: 60 capsule, Rfl: 0        Russ Keith MD

## 2025-01-03 NOTE — TELEPHONE ENCOUNTER
Patient called again looking for an update on the finasteride. I did inform the patient the physicians have 24-48 hours to respond back to messages. Patient verbalized understanding.

## 2025-01-06 NOTE — TELEPHONE ENCOUNTER
Called and left a voicemail message for patient that he can discontinue Finasteride at this time.

## 2025-01-10 ENCOUNTER — TELEPHONE (OUTPATIENT)
Age: 70
End: 2025-01-10

## 2025-01-10 ENCOUNTER — NURSE TRIAGE (OUTPATIENT)
Age: 70
End: 2025-01-10

## 2025-01-10 ENCOUNTER — APPOINTMENT (OUTPATIENT)
Dept: LAB | Facility: MEDICAL CENTER | Age: 70
End: 2025-01-10
Payer: MEDICARE

## 2025-01-10 ENCOUNTER — APPOINTMENT (OUTPATIENT)
Dept: RADIOLOGY | Facility: MEDICAL CENTER | Age: 70
End: 2025-01-10
Payer: MEDICARE

## 2025-01-10 DIAGNOSIS — R39.9 UTI SYMPTOMS: Primary | ICD-10-CM

## 2025-01-10 DIAGNOSIS — R39.9 UTI SYMPTOMS: ICD-10-CM

## 2025-01-10 LAB
BACTERIA UR QL AUTO: ABNORMAL /HPF
BILIRUB UR QL STRIP: NEGATIVE
CLARITY UR: CLEAR
COLOR UR: ABNORMAL
GLUCOSE UR STRIP-MCNC: ABNORMAL MG/DL
HGB UR QL STRIP.AUTO: ABNORMAL
KETONES UR STRIP-MCNC: NEGATIVE MG/DL
LEUKOCYTE ESTERASE UR QL STRIP: ABNORMAL
NITRITE UR QL STRIP: NEGATIVE
NON-SQ EPI CELLS URNS QL MICRO: ABNORMAL /HPF
PH UR STRIP.AUTO: 6 [PH]
PROT UR STRIP-MCNC: NEGATIVE MG/DL
RBC #/AREA URNS AUTO: ABNORMAL /HPF
SP GR UR STRIP.AUTO: 1.02 (ref 1–1.03)
UROBILINOGEN UR STRIP-ACNC: <2 MG/DL
WBC #/AREA URNS AUTO: ABNORMAL /HPF

## 2025-01-10 PROCEDURE — 81001 URINALYSIS AUTO W/SCOPE: CPT

## 2025-01-10 PROCEDURE — 87077 CULTURE AEROBIC IDENTIFY: CPT

## 2025-01-10 PROCEDURE — 87186 SC STD MICRODIL/AGAR DIL: CPT

## 2025-01-10 PROCEDURE — 87086 URINE CULTURE/COLONY COUNT: CPT

## 2025-01-10 NOTE — TELEPHONE ENCOUNTER
He can get the vaccine if interested He does have some protection against the virus since he had the disease but the vaccine would boost portection

## 2025-01-10 NOTE — TELEPHONE ENCOUNTER
Christy requests PCP recommendation, asks if he may get the shingles vaccine. He states he had shingles three months ago, states PCP advised at that time to wait 2-3 months before getting the vaccine.    Per chart, Christy was seen at Urgent Care on 9/18/2024 for shingles.    He requests return call today, if possible. He gives permission for detailed message to be left on his voicemail, if he is unable to answer call.

## 2025-01-10 NOTE — TELEPHONE ENCOUNTER
Patient called in. Patient states at last OV he was prescribed ABX course to be use if he feels he has UTI. Patient stating he feels he has UTI symptoms. Reporting burning, frequency, urgency, denies fever, denies pain, denies hematuria. Ordered urine testing. Advised patient to give sample prior to starting the ABX course. Reviewed Hydration, avoidance of bladder irritants and ED precautions.         Reason for Disposition   The patient has an unknown case of mild dysuria    Answer Assessment - Initial Assessment Questions  1. When did your symptoms start?   Last couple days  2. Do you experience pain or burning with every void?   yes  3. Do you have any other urinary symptoms such as urinary frequency, urgency, incontinence, blood in your urine?   frequency  4. Do you have any abdominal pain or flank pain?  no  5. Do you have a fever of 101 or higher? How did you take your temperature?   no  6. Does your urine stream spray? (Specifically for males)   no  7. Have you become unable to urinate?   no  8. Do you have a history of urinary tract infections?   yes  9. Have you had a recent urologic procedure, surgery, or any recent urine testing?   no  10. Have you ever been tested for an STD?   no    Protocols used: Urology-Dysuria-ADULT-[place]

## 2025-01-10 NOTE — TELEPHONE ENCOUNTER
Contacted patient who is proceeding to the lab now to provide a urine sample, then he will start taking antibiotics as prescribed. He is aware office will call him with the results of his urine culture next week.

## 2025-01-12 LAB
BACTERIA UR CULT: ABNORMAL
BACTERIA UR CULT: ABNORMAL

## 2025-01-13 ENCOUNTER — RESULTS FOLLOW-UP (OUTPATIENT)
Dept: UROLOGY | Facility: CLINIC | Age: 70
End: 2025-01-13

## 2025-01-17 ENCOUNTER — APPOINTMENT (EMERGENCY)
Dept: RADIOLOGY | Facility: HOSPITAL | Age: 70
End: 2025-01-17
Payer: MEDICARE

## 2025-01-17 ENCOUNTER — HOSPITAL ENCOUNTER (EMERGENCY)
Facility: HOSPITAL | Age: 70
Discharge: HOME | End: 2025-01-17
Attending: EMERGENCY MEDICINE
Payer: MEDICARE

## 2025-01-17 VITALS
DIASTOLIC BLOOD PRESSURE: 57 MMHG | RESPIRATION RATE: 18 BRPM | WEIGHT: 300 LBS | TEMPERATURE: 98 F | BODY MASS INDEX: 42 KG/M2 | OXYGEN SATURATION: 96 % | SYSTOLIC BLOOD PRESSURE: 104 MMHG | HEIGHT: 71 IN | HEART RATE: 58 BPM

## 2025-01-17 DIAGNOSIS — R07.9 CHEST PAIN: Primary | ICD-10-CM

## 2025-01-17 LAB
2HR DELTA HS TROPONIN: -1 NG/L
ANION GAP SERPL CALCULATED.3IONS-SCNC: 9 MMOL/L (ref 4–13)
BASOPHILS # BLD AUTO: 0.05 THOUSANDS/ΜL (ref 0–0.1)
BASOPHILS NFR BLD AUTO: 1 % (ref 0–1)
BNP SERPL-MCNC: 24 PG/ML (ref 0–100)
BUN SERPL-MCNC: 20 MG/DL (ref 5–25)
CALCIUM SERPL-MCNC: 9.3 MG/DL (ref 8.4–10.2)
CARDIAC TROPONIN I PNL SERPL HS: 4 NG/L (ref ?–50)
CARDIAC TROPONIN I PNL SERPL HS: 5 NG/L (ref ?–50)
CHLORIDE SERPL-SCNC: 104 MMOL/L (ref 96–108)
CO2 SERPL-SCNC: 25 MMOL/L (ref 21–32)
CREAT SERPL-MCNC: 1.2 MG/DL (ref 0.6–1.3)
EOSINOPHIL # BLD AUTO: 0.01 THOUSAND/ΜL (ref 0–0.61)
EOSINOPHIL NFR BLD AUTO: 0 % (ref 0–6)
ERYTHROCYTE [DISTWIDTH] IN BLOOD BY AUTOMATED COUNT: 13.5 % (ref 11.6–15.1)
FLUAV AG UPPER RESP QL IA.RAPID: NEGATIVE
FLUBV AG UPPER RESP QL IA.RAPID: NEGATIVE
GFR SERPL CREATININE-BSD FRML MDRD: 60 ML/MIN/1.73SQ M
GLUCOSE SERPL-MCNC: 96 MG/DL (ref 65–140)
HCT VFR BLD AUTO: 45.1 % (ref 36.5–49.3)
HGB BLD-MCNC: 14.8 G/DL (ref 12–17)
IMM GRANULOCYTES # BLD AUTO: 0.01 THOUSAND/UL (ref 0–0.2)
IMM GRANULOCYTES NFR BLD AUTO: 0 % (ref 0–2)
LYMPHOCYTES # BLD AUTO: 1.22 THOUSANDS/ΜL (ref 0.6–4.47)
LYMPHOCYTES NFR BLD AUTO: 23 % (ref 14–44)
MCH RBC QN AUTO: 28.8 PG (ref 26.8–34.3)
MCHC RBC AUTO-ENTMCNC: 32.8 G/DL (ref 31.4–37.4)
MCV RBC AUTO: 88 FL (ref 82–98)
MONOCYTES # BLD AUTO: 0.5 THOUSAND/ΜL (ref 0.17–1.22)
MONOCYTES NFR BLD AUTO: 9 % (ref 4–12)
NEUTROPHILS # BLD AUTO: 3.58 THOUSANDS/ΜL (ref 1.85–7.62)
NEUTS SEG NFR BLD AUTO: 67 % (ref 43–75)
NRBC BLD AUTO-RTO: 0 /100 WBCS
PLATELET # BLD AUTO: 205 THOUSANDS/UL (ref 149–390)
PMV BLD AUTO: 10.9 FL (ref 8.9–12.7)
POTASSIUM SERPL-SCNC: 4.8 MMOL/L (ref 3.5–5.3)
RBC # BLD AUTO: 5.13 MILLION/UL (ref 3.88–5.62)
SARS-COV+SARS-COV-2 AG RESP QL IA.RAPID: NEGATIVE
SODIUM SERPL-SCNC: 138 MMOL/L (ref 135–147)
WBC # BLD AUTO: 5.37 THOUSAND/UL (ref 4.31–10.16)

## 2025-01-17 PROCEDURE — 83880 ASSAY OF NATRIURETIC PEPTIDE: CPT | Performed by: EMERGENCY MEDICINE

## 2025-01-17 PROCEDURE — 96374 THER/PROPH/DIAG INJ IV PUSH: CPT

## 2025-01-17 PROCEDURE — 87804 INFLUENZA ASSAY W/OPTIC: CPT | Performed by: EMERGENCY MEDICINE

## 2025-01-17 PROCEDURE — 99285 EMERGENCY DEPT VISIT HI MDM: CPT

## 2025-01-17 PROCEDURE — 84484 ASSAY OF TROPONIN QUANT: CPT | Performed by: EMERGENCY MEDICINE

## 2025-01-17 PROCEDURE — 71045 X-RAY EXAM CHEST 1 VIEW: CPT

## 2025-01-17 PROCEDURE — 36415 COLL VENOUS BLD VENIPUNCTURE: CPT | Performed by: EMERGENCY MEDICINE

## 2025-01-17 PROCEDURE — 87811 SARS-COV-2 COVID19 W/OPTIC: CPT | Performed by: EMERGENCY MEDICINE

## 2025-01-17 PROCEDURE — 85025 COMPLETE CBC W/AUTO DIFF WBC: CPT | Performed by: EMERGENCY MEDICINE

## 2025-01-17 PROCEDURE — 99285 EMERGENCY DEPT VISIT HI MDM: CPT | Performed by: EMERGENCY MEDICINE

## 2025-01-17 PROCEDURE — 80048 BASIC METABOLIC PNL TOTAL CA: CPT | Performed by: EMERGENCY MEDICINE

## 2025-01-17 PROCEDURE — 93005 ELECTROCARDIOGRAM TRACING: CPT

## 2025-01-17 RX ORDER — MAGNESIUM HYDROXIDE/ALUMINUM HYDROXICE/SIMETHICONE 120; 1200; 1200 MG/30ML; MG/30ML; MG/30ML
30 SUSPENSION ORAL ONCE
Status: COMPLETED | OUTPATIENT
Start: 2025-01-17 | End: 2025-01-17

## 2025-01-17 RX ORDER — KETOROLAC TROMETHAMINE 30 MG/ML
15 INJECTION, SOLUTION INTRAMUSCULAR; INTRAVENOUS ONCE
Status: COMPLETED | OUTPATIENT
Start: 2025-01-17 | End: 2025-01-17

## 2025-01-17 RX ORDER — NAPROXEN 500 MG/1
500 TABLET ORAL 2 TIMES DAILY WITH MEALS
Qty: 10 TABLET | Refills: 0 | Status: SHIPPED | OUTPATIENT
Start: 2025-01-17 | End: 2025-01-30

## 2025-01-17 RX ORDER — LORATADINE 10 MG/1
10 TABLET ORAL DAILY
Qty: 5 TABLET | Refills: 0 | Status: SHIPPED | OUTPATIENT
Start: 2025-01-17 | End: 2025-01-30

## 2025-01-17 RX ADMIN — KETOROLAC TROMETHAMINE 15 MG: 30 INJECTION, SOLUTION INTRAMUSCULAR at 20:56

## 2025-01-17 RX ADMIN — ALUMINUM HYDROXIDE, MAGNESIUM HYDROXIDE, AND DIMETHICONE 30 ML: 200; 20; 200 SUSPENSION ORAL at 20:55

## 2025-01-18 NOTE — ED PROVIDER NOTES
Final Diagnosis:  1. Chest pain        MDM:  -Patient presents for evaluation of chest pain.  Will evaluate for ACS, arrhythmia, signs of infection.  - Was unremarkable.  Delta troponin within normal limits.  No signs acute pathology at this time.  Could be a viral pleuritis given his symptoms and the cough.  Will provide Mucinex.  Analgesia.  Return precautions reviewed.    Chief Complaint   Patient presents with    Chest Pain     Patient presents to ED from home w/c/o intermittent palpitations and CP x 1 week, at rest and worse today, hx heart failure and loop recorder, denies CP/palpitations at this time, denies SOB     HPI  Patient presents for evaluation of intermittent chest pain and palpitations.  He states that he will have this chest tightness sensation intermittently throughout the day.  He states this will be worse when he was walking around but if he took a moment to steady himself after he got up and he would not have the sensation.  He states that this has been more constant recently.  About for the last 1.5 hours.  He does endorse a cough as well as productive sputum.  Denies any fever or chills.  No sergey shortness of breath.  States he does have a cardiac history and was told that he has congestive heart failure.  Denies any cardiac surgeries.      Unless otherwise specified:  - No language barrier.   - History obtained from patient.   - There are no limitations to the history obtained.  - Previous charting was reviewed    PMH:   has a past medical history of Arthritis, Bronchitis (02/02/2023), Chronic diastolic (congestive) heart failure (HCC), Diabetes mellitus (HCC), Diabetes mellitus (HCC) (03/16/2018), Dyslipidemia, GERD (gastroesophageal reflux disease), Hypertension, Hypertensive urgency (03/16/2023), Irregular heart beat, Lactic acidosis (10/12/2024), Obstructive sleep apnea, Palpitations, Sexual dysfunction, and Thyroid disease.    PSH:   has a past surgical history that includes  "Colonoscopy and TONSILLECTOMY.       ROS:  Review of Systems   - 13 point ROS was performed and all are normal unless stated in the history above.   - Nursing note reviewed. Vitals reviewed.   - Orders placed by myself and/or advanced practitioner / resident.        PE:   Vitals:    01/17/25 1947 01/17/25 1948   BP: 118/56    Pulse: 64    Resp: 20    Temp: 98.6 °F (37 °C)    TempSrc: Temporal    SpO2: 97% 97%   Weight: 136 kg (300 lb)    Height: 5' 11\" (1.803 m)      Vitals reviewed by me.       Unless otherwise specified above:    General: VS reviewed  Appears in NAD    Head: Normocephalic, atraumatic  Eyes: EOM-I.     ENT: Atraumatic external nose and ears.    No drooling.     Neck: No JVD.    CV: No pallor noted  Lungs:   No tachypnea  No respiratory distress    Abdomen:  Soft, non-tender, non-distended    MSK:   No obvious deformity    Skin: Dry, intact. No obvious rash.    Psychiatric/Behavioral: Appropriate mood and affect   Exam: deferred    Physical Exam     Procedures   - Nursing note reviewed.    HEART Risk Score      Flowsheet Row Most Recent Value   Heart Score Risk Calculator    History 0 Filed at: 01/17/2025 2239   ECG 0 Filed at: 01/17/2025 2239   Age 2 Filed at: 01/17/2025 2239   Risk Factors 2 Filed at: 01/17/2025 2239   Troponin 0 Filed at: 01/17/2025 2239   HEART Score 4 Filed at: 01/17/2025 2239                       ED Course as of 01/17/25 2240 Fri Jan 17, 2025 2042 Procedure Note: EKG  Date/Time: 01/17/25 8:42 PM   Interpreted by: Shay Carney  Indications / Diagnosis: CP  ECG reviewed by me, the ED Provider: yes   The EKG demonstrates:  Rhythm: normal sinus  Intervals: normal intervals  Axis: normal axis  QRS/Blocks: Left anterior fascicular block, normal QRS  ST Changes: Overall EKG appears consistent with prior EKGs.  No acute ST Changes, no STD/ARDEN.    No diffuse elevations to indicate pericarditis.  No coved ST elevations greater than 2mm with negative T waves in V1-3 to indicate " concern for brugada.  No biphasic T waves in V2, V3 to indicate Wellens (critical stenosis of LAD).   No elevation in aVR or deviation when compared to V1 (can be associated with ST depression in I,II, V4-6 when left main occlusion is present).       2133 hs TnI 0hr: 5  Given intermittent nature of chest pain will perform delta troponin   2216 XR chest 1 view portable  No acute pathology on my interpretation     XR chest 1 view portable    (Results Pending)     Orders Placed This Encounter   Procedures    FLU/COVID Rapid Antigen (30 min. TAT) - Preferred screening test in ED    XR chest 1 view portable    CBC and differential    Basic metabolic panel    HS Troponin 0hr (reflex protocol)    B-Type Natriuretic Peptide(BNP)    HS Troponin I 2hr    Insert peripheral IV    ECG 12 lead     Labs Reviewed   COVID-19/INFLUENZA A/B RAPID ANTIGEN (30 MIN.TAT) - Normal       Result Value Ref Range Status    SARS COV Rapid Antigen Negative  Negative Final    Influenza A Rapid Antigen Negative  Negative Final    Influenza B Rapid Antigen Negative  Negative Final    Narrative:     This test has been performed using the [employer]arm[employer] Lelo 2 FLU+SARS Antigen test under the Emergency Use Authorization (EUA). This test has been validated by the  and verified by the performing laboratory. The Lleo uses lateral flow immunofluorescent sandwich assay to detect SARS-COV, Influenza A and Influenza B Antigen.     The Quidel Lelo 2 SARS Antigen test does not differentiate between SARS-CoV and SARS-CoV-2.     Negative results are presumptive and may be confirmed with a molecular assay, if necessary, for patient management. Negative results do not rule out SARS-CoV-2 or influenza infection and should not be used as the sole basis for treatment or patient management decisions. A negative test result may occur if the level of antigen in a sample is below the limit of detection of this test.     Positive results are indicative of the  "presence of viral antigens, but do not rule out bacterial infection or co-infection with other viruses.     All test results should be used as an adjunct to clinical observations and other information available to the provider.    FOR PEDIATRIC PATIENTS - copy/paste COVID Guidelines URL to browser: [email or web address]   HS TROPONIN I 0HR - Normal    hs TnI 0hr 5  \"Refer to ACS Flowchart\"- see link ng/L Final    Comment:                                              Initial (time 0) result  If >=50 ng/L, Myocardial injury suggested ;  Type of myocardial injury and treatment strategy  to be determined.  If 5-49 ng/L, a delta result at 2 hours and or 4 hours will be needed to further evaluate.  If <4 ng/L, and chest pain has been >3 hours since onset, patient may qualify for discharge based on the HEART score in the ED.  If <5 ng/L and <3hours since onset of chest pain, a delta result at 2 hours will be needed to further evaluate.    HS Troponin 99th Percentile URL of a Health Population=12 ng/L with a 95% Confidence Interval of 8-18 ng/L.    Second Troponin (time 2 hours)  If calculated delta >= 20 ng/L,  Myocardial injury suggested ; Type of myocardial injury and treatment strategy to be determined.  If 5-49 ng/L and the calculated delta is 5-19 ng/L, consult medical service for evaluation.  Continue evaluation for ischemia on ecg and other possible etiology and repeat hs troponin at 4 hours.  If delta is <5 ng/L at 2 hours, consider discharge based on risk stratification via the HEART score (if in ED), or RIC risk score in IP/Observation.    HS Troponin 99th Percentile URL of a Health Population=12 ng/L with a 95% Confidence Interval of 8-18 ng/L.   B-TYPE NATRIURETIC PEPTIDE (BNP) - Normal    BNP 24  0 - 100 pg/mL Final   HS TROPONIN I 2HR - Normal    hs TnI 2hr 4  \"Refer to ACS Flowchart\"- see link ng/L Final    Comment:                                        "       Initial (time 0) result  If >=50 ng/L, Myocardial injury suggested ;  Type of myocardial injury and treatment strategy  to be determined.  If 5-49 ng/L, a delta result at 2 hours and or 4 hours will be needed to further evaluate.  If <4 ng/L, and chest pain has been >3 hours since onset, patient may qualify for discharge based on the HEART score in the ED.  If <5 ng/L and <3hours since onset of chest pain, a delta result at 2 hours will be needed to further evaluate.    HS Troponin 99th Percentile URL of a Health Population=12 ng/L with a 95% Confidence Interval of 8-18 ng/L.    Second Troponin (time 2 hours)  If calculated delta >= 20 ng/L,  Myocardial injury suggested ; Type of myocardial injury and treatment strategy to be determined.  If 5-49 ng/L and the calculated delta is 5-19 ng/L, consult medical service for evaluation.  Continue evaluation for ischemia on ecg and other possible etiology and repeat hs troponin at 4 hours.  If delta is <5 ng/L at 2 hours, consider discharge based on risk stratification via the HEART score (if in ED), or RIC risk score in IP/Observation.    HS Troponin 99th Percentile URL of a Health Population=12 ng/L with a 95% Confidence Interval of 8-18 ng/L.    Delta 2hr hsTnI -1  <20 ng/L Final   CBC AND DIFFERENTIAL    WBC 5.37  4.31 - 10.16 Thousand/uL Final    RBC 5.13  3.88 - 5.62 Million/uL Final    Hemoglobin 14.8  12.0 - 17.0 g/dL Final    Hematocrit 45.1  36.5 - 49.3 % Final    MCV 88  82 - 98 fL Final    MCH 28.8  26.8 - 34.3 pg Final    MCHC 32.8  31.4 - 37.4 g/dL Final    RDW 13.5  11.6 - 15.1 % Final    MPV 10.9  8.9 - 12.7 fL Final    Platelets 205  149 - 390 Thousands/uL Final    nRBC 0  /100 WBCs Final    Segmented % 67  43 - 75 % Final    Immature Grans % 0  0 - 2 % Final    Lymphocytes % 23  14 - 44 % Final    Monocytes % 9  4 - 12 % Final    Eosinophils Relative 0  0 - 6 % Final    Basophils Relative 1  0 - 1 % Final    Absolute Neutrophils 3.58  1.85 - 7.62  Thousands/µL Final    Absolute Immature Grans 0.01  0.00 - 0.20 Thousand/uL Final    Absolute Lymphocytes 1.22  0.60 - 4.47 Thousands/µL Final    Absolute Monocytes 0.50  0.17 - 1.22 Thousand/µL Final    Eosinophils Absolute 0.01  0.00 - 0.61 Thousand/µL Final    Basophils Absolute 0.05  0.00 - 0.10 Thousands/µL Final   BASIC METABOLIC PANEL    Sodium 138  135 - 147 mmol/L Final    Potassium 4.8  3.5 - 5.3 mmol/L Final    Comment: Moderately Hemolyzed:Results may be affected.    Chloride 104  96 - 108 mmol/L Final    CO2 25  21 - 32 mmol/L Final    ANION GAP 9  4 - 13 mmol/L Final    BUN 20  5 - 25 mg/dL Final    Creatinine 1.20  0.60 - 1.30 mg/dL Final    Comment: Standardized to IDMS reference method    Glucose 96  65 - 140 mg/dL Final    Comment: If the patient is fasting, the ADA then defines impaired fasting glucose as > 100 mg/dL and diabetes as > or equal to 123 mg/dL.    Calcium 9.3  8.4 - 10.2 mg/dL Final    eGFR 60  ml/min/1.73sq m Final    Narrative:     National Kidney Disease Foundation guidelines for Chronic Kidney Disease (CKD):     Stage 1 with normal or high GFR (GFR > 90 mL/min/1.73 square meters)    Stage 2 Mild CKD (GFR = 60-89 mL/min/1.73 square meters)    Stage 3A Moderate CKD (GFR = 45-59 mL/min/1.73 square meters)    Stage 3B Moderate CKD (GFR = 30-44 mL/min/1.73 square meters)    Stage 4 Severe CKD (GFR = 15-29 mL/min/1.73 square meters)    Stage 5 End Stage CKD (GFR <15 mL/min/1.73 square meters)  Note: GFR calculation is accurate only with a steady state creatinine         Final Diagnosis:  1. Chest pain              Unless otherwise noted the patient's medications were reviewed and they should continue as directed.    Unless otherwise specified:  CC is exclusive from any separately billable procedures  CC is exclusive of treating other patients  CC is exclusive of teaching time   All splints are static    Medications   ketorolac (TORADOL) injection 15 mg (15 mg Intravenous Given 1/17/25  2056)   aluminum-magnesium hydroxide-simethicone (MAALOX) oral suspension 30 mL (30 mL Oral Given 1/17/25 2055)     Time reflects when diagnosis was documented in both MDM as applicable and the Disposition within this note       Time User Action Codes Description Comment    1/17/2025 10:38 PM Shay Carney Add [R07.9] Chest pain           ED Disposition       ED Disposition   Discharge    Condition   Stable    Date/Time   Fri Jan 17, 2025 2238    Comment   Mhamd Elashram discharge to home/self care.                   Follow-up Information       Follow up With Specialties Details Why Contact Info    Alma Delia Fountain DO Family Medicine   [address] [facility] [address]  [phone]            Patient's Medications   Discharge Prescriptions    DEXTROMETHORPHAN-GUAIFENESIN (MUCINEX DM)  MG PER 12 HR TABLET    Take 1 tablet by mouth every 12 (twelve) hours for 5 days       Start Date: 1/17/2025 End Date: 1/22/2025       Order Dose: 1 tablet       Quantity: 10 tablet    Refills: 0    LORATADINE (CLARITIN) 10 MG TABLET    Take 1 tablet (10 mg total) by mouth daily for 5 days       Start Date: 1/17/2025 End Date: 1/22/2025       Order Dose: 10 mg       Quantity: 5 tablet    Refills: 0    NAPROXEN (NAPROSYN) 500 MG TABLET    Take 1 tablet (500 mg total) by mouth 2 (two) times a day with meals for 5 days       Start Date: 1/17/2025 End Date: 1/22/2025       Order Dose: 500 mg       Quantity: 10 tablet    Refills: 0     No discharge procedures on file.  Prior to Admission Medications   Prescriptions Last Dose Informant Patient Reported? Taking?   Empagliflozin 25 MG TABS  Self No No   Sig: Take 1 tablet (25 mg total) by mouth daily   Lancets (onetouch ultrasoft) lancets  Self No No   Sig: Check glucose daily   Vitamin D, Cholecalciferol, 50 MCG (2000 UT) CAPS  Self No No   Sig: Take 50 mcg by mouth daily   amLODIPine (NORVASC) 10 mg tablet  Self No No   Sig: Take 1 tablet (10 mg total) by mouth daily   aspirin  "81 mg chewable tablet  Self Yes No   Sig: Chew 81 mg daily   atorvastatin (LIPITOR) 10 mg tablet  Self No No   Sig: Take 1 tablet by mouth once daily   diphenhydrAMINE (BENADRYL) 50 mg capsule   No No   Sig: Take 1 capsule (50 mg total) by mouth 1 (one) time for 1 dose Take 1 hour before MRI. Do not start before November 27, 2024.   furosemide (LASIX) 40 mg tablet  Self Yes No   Sig: Take 60 mg by mouth daily   gabapentin (NEURONTIN) 100 mg capsule  Self No No   Sig: Take 1 capsule (100 mg total) by mouth 2 (two) times a day   Patient not taking: Reported on 12/5/2024   glucose blood (OneTouch Verio) test strip   No No   Sig: Use 1 each daily   hydrALAZINE (APRESOLINE) 25 mg tablet  Self No No   Sig: Take 1 tablet (25 mg total) by mouth 3 (three) times a day   ketoconazole (NIZORAL) 2 % shampoo  Self No No   Sig: Apply 1 Application topically if needed for irritation   metoprolol succinate (TOPROL-XL) 25 mg 24 hr tablet  Self No No   Sig: Take 1 tablet (25 mg total) by mouth daily   metoprolol succinate (TOPROL-XL) 50 mg 24 hr tablet  Self No No   Sig: Take 1 tablet (50 mg total) by mouth daily   multivitamin (THERAGRAN) TABS  Self Yes No   Sig: Take 1 tablet by mouth daily   omeprazole (PriLOSEC) 40 MG capsule  Self No No   Sig: Take 1 capsule (40 mg total) by mouth daily   semaglutide, 0.25 or 0.5 mg/dose, (Ozempic, 0.25 or 0.5 MG/DOSE,) 2 mg/3 mL injection pen   No No   Sig: Inject 0.75 mL (0.5 mg total) under the skin every 7 days      Facility-Administered Medications: None       Portions of the record may have been created with voice recognition software. Occasional wrong word or \"sound a like\" substitutions may have occurred due to the inherent limitations of voice recognition software. Read the chart carefully and recognize, using context, where substitutions have occurred.    Electronically signed by:  MD Shay Kebede MD  01/17/25 4620    "

## 2025-01-20 ENCOUNTER — VBI (OUTPATIENT)
Dept: FAMILY MEDICINE CLINIC | Facility: CLINIC | Age: 70
End: 2025-01-20

## 2025-01-20 DIAGNOSIS — Z71.89 COMPLEX CARE COORDINATION: Primary | ICD-10-CM

## 2025-01-20 NOTE — TELEPHONE ENCOUNTER
01/20/25 11:21 AM    Patient contacted post ED visit, [facility] department spoke with patient/caregiver and outreach was successful.    Thank you.  Mitra Long MA  [facility]

## 2025-01-20 NOTE — TELEPHONE ENCOUNTER
01/20/25 11:15 AM    Patient contacted post ED visit, first outreach attempt made. Message was left for patient to return a call to the [facility] Department at [facility]: Phone [phone].    Thank you.  Mitra Long MA  [facility]

## 2025-01-21 ENCOUNTER — TELEPHONE (OUTPATIENT)
Age: 70
End: 2025-01-21

## 2025-01-21 ENCOUNTER — PATIENT OUTREACH (OUTPATIENT)
Dept: CASE MANAGEMENT | Facility: OTHER | Age: 70
End: 2025-01-21

## 2025-01-21 DIAGNOSIS — E11.69 TYPE 2 DIABETES MELLITUS WITH OBESITY  (HCC): Primary | ICD-10-CM

## 2025-01-21 DIAGNOSIS — E66.9 TYPE 2 DIABETES MELLITUS WITH OBESITY  (HCC): Primary | ICD-10-CM

## 2025-01-21 LAB
ATRIAL RATE: 65 BPM
P AXIS: 62 DEGREES
PR INTERVAL: 220 MS
QRS AXIS: -48 DEGREES
QRSD INTERVAL: 114 MS
QT INTERVAL: 430 MS
QTC INTERVAL: 447 MS
T WAVE AXIS: 65 DEGREES
VENTRICULAR RATE: 65 BPM

## 2025-01-21 PROCEDURE — 93010 ELECTROCARDIOGRAM REPORT: CPT | Performed by: INTERNAL MEDICINE

## 2025-01-21 NOTE — TELEPHONE ENCOUNTER
Patient calling to see if [facility] office has samples of Jardiance 25 mg.  Please contact patient if samples are available.

## 2025-01-21 NOTE — PROGRESS NOTES
Ashok referral received on pt for care management who was in  [facility] 1/17 for chest pain and palpitations. Chart reviewed, hx of HF.   Call placed to pt who declines care management however he did request any help or ideas regarding the medication Empagliflozin/ Jardiance. Pt reports that it is quite expensive to afford. Inquired about PACE and he did admit to already having this, but was wondering about any other programs available. Will place referral for  [facility] to reach out to pt.

## 2025-01-22 ENCOUNTER — PATIENT OUTREACH (OUTPATIENT)
Dept: CASE MANAGEMENT | Facility: OTHER | Age: 70
End: 2025-01-22

## 2025-01-22 NOTE — PROGRESS NOTES
LENORE GUERRERO received referral for patient whom expressed interest in assistance with the cost of Jardiance.  Per cart review, patient reports that he has a PACE card.  LENORE GUERRERO reached out to patient and confirmed the above.  He was previously using another resource that lowered the cost of Jardiance to $15 from $500, but those funds have run out.  LENORE GUERRERO informed patient that his PACE card should cover most of that cost and offered to call his Pharmacy to ensure they have his PACE information.  Patient prefers to call his pharmacy on his own and will reach out to [facility] if there are any issues with this.  At this time, referral will be closed and [facility] will re-open if further assistance is needed.

## 2025-01-23 ENCOUNTER — RA CDI HCC (OUTPATIENT)
Dept: OTHER | Facility: HOSPITAL | Age: 70
End: 2025-01-23

## 2025-01-24 ENCOUNTER — TELEPHONE (OUTPATIENT)
Age: 70
End: 2025-01-24

## 2025-01-24 DIAGNOSIS — E11.9 TYPE 2 DIABETES MELLITUS WITHOUT COMPLICATION, WITHOUT LONG-TERM CURRENT USE OF INSULIN (HCC): ICD-10-CM

## 2025-01-24 DIAGNOSIS — N18.30 STAGE 3 CHRONIC KIDNEY DISEASE, UNSPECIFIED WHETHER STAGE 3A OR 3B CKD (HCC): ICD-10-CM

## 2025-01-24 DIAGNOSIS — I50.31 ACUTE DIASTOLIC HEART FAILURE (HCC): ICD-10-CM

## 2025-01-24 DIAGNOSIS — E78.5 DYSLIPIDEMIA: Primary | ICD-10-CM

## 2025-01-24 NOTE — TELEPHONE ENCOUNTER
Received a call from Patricia with [facility] phone [phone] stating that in the next 2 weeks they will be mailing paperwork to the patient regarding his Ozempic. He will complete his part and bring the paperwork to the office for us to complete the pink sections that will be highlighted. Once finished they are to be given back to the patient as they prefer the application to be mailed back. She also stated his medication will be delivered to the office via UPS or Fed Ex during normal business hours Monday through Friday. She stated the paperwork is assistance for the patient to help with Ozempic. I did make her aware of the office turn around time for paperwork to be completed and she was fine with that.

## 2025-01-28 NOTE — TELEPHONE ENCOUNTER
Patient called to check on the paperwork from [facility]. He states he is off his Ozempic and waiting for approval through them. Patient also asking if the office has any samples of Ozempic?  Please advise

## 2025-01-30 ENCOUNTER — OFFICE VISIT (OUTPATIENT)
Dept: FAMILY MEDICINE CLINIC | Facility: CLINIC | Age: 70
End: 2025-01-30
Payer: MEDICARE

## 2025-01-30 VITALS
OXYGEN SATURATION: 98 % | WEIGHT: 299 LBS | TEMPERATURE: 97.5 F | BODY MASS INDEX: 41.86 KG/M2 | HEIGHT: 71 IN | RESPIRATION RATE: 18 BRPM | SYSTOLIC BLOOD PRESSURE: 130 MMHG | DIASTOLIC BLOOD PRESSURE: 72 MMHG | HEART RATE: 63 BPM

## 2025-01-30 DIAGNOSIS — E66.01 MORBID OBESITY (HCC): ICD-10-CM

## 2025-01-30 DIAGNOSIS — Z00.00 MEDICARE ANNUAL WELLNESS VISIT, SUBSEQUENT: Primary | ICD-10-CM

## 2025-01-30 DIAGNOSIS — E66.9 TYPE 2 DIABETES MELLITUS WITH OBESITY  (HCC): ICD-10-CM

## 2025-01-30 DIAGNOSIS — N18.31 STAGE 3A CHRONIC KIDNEY DISEASE (HCC): ICD-10-CM

## 2025-01-30 DIAGNOSIS — I50.30 HEART FAILURE WITH PRESERVED EJECTION FRACTION, UNSPECIFIED HF CHRONICITY (HCC): ICD-10-CM

## 2025-01-30 DIAGNOSIS — E11.69 TYPE 2 DIABETES MELLITUS WITH OBESITY  (HCC): ICD-10-CM

## 2025-01-30 PROCEDURE — G0439 PPPS, SUBSEQ VISIT: HCPCS | Performed by: INTERNAL MEDICINE

## 2025-01-30 RX ORDER — POTASSIUM CHLORIDE 1500 MG/1
TABLET, EXTENDED RELEASE ORAL
COMMUNITY
Start: 2024-12-26

## 2025-01-30 NOTE — PATIENT INSTRUCTIONS
Medicare Preventive Visit Patient Instructions  Thank you for completing your Welcome to Medicare Visit or Medicare Annual Wellness Visit today. Your next wellness visit will be due in one year (1/31/2026).  The screening/preventive services that you may require over the next 5-10 years are detailed below. Some tests may not apply to you based off risk factors and/or age. Screening tests ordered at today's visit but not completed yet may show as past due. Also, please note that scanned in results may not display below.  Preventive Screenings:  Service Recommendations Previous Testing/Comments   Colorectal Cancer Screening  Colonoscopy    Fecal Occult Blood Test (FOBT)/Fecal Immunochemical Test (FIT)  Fecal DNA/Cologuard Test  Flexible Sigmoidoscopy Age: 45-75 years old   Colonoscopy: every 10 years (May be performed more frequently if at higher risk)  OR  FOBT/FIT: every 1 year  OR  Cologuard: every 3 years  OR  Sigmoidoscopy: every 5 years  Screening may be recommended earlier than age 45 if at higher risk for colorectal cancer. Also, an individualized decision between you and your healthcare provider will decide whether screening between the ages of 76-85 would be appropriate. Colonoscopy: 10/31/2022  FOBT/FIT: Not on file  Cologuard: Not on file  Sigmoidoscopy: Not on file    Screening Current     Prostate Cancer Screening Individualized decision between patient and health care provider in men between ages of 55-69   Medicare will cover every 12 months beginning on the day after your 50th birthday PSA: 1.24 ng/mL     Screening Current     Hepatitis C Screening Once for adults born between 1945 and 1965  More frequently in patients at high risk for Hepatitis C Hep C Antibody: 10/18/2024    Screening Current   Diabetes Screening 1-2 times per year if you're at risk for diabetes or have pre-diabetes Fasting glucose: 112 mg/dL (10/13/2024)  A1C: 6.0 % (10/6/2024)  Screening Not Indicated  History Diabetes    Cholesterol Screening Once every 5 years if you don't have a lipid disorder. May order more often based on risk factors. Lipid panel: 07/31/2023  Screening Current      Other Preventive Screenings Covered by Medicare:  Abdominal Aortic Aneurysm (AAA) Screening: covered once if your at risk. You're considered to be at risk if you have a family history of AAA or a male between the age of 65-75 who smoking at least 100 cigarettes in your lifetime.  Lung Cancer Screening: covers low dose CT scan once per year if you meet all of the following conditions: (1) Age 55-77; (2) No signs or symptoms of lung cancer; (3) Current smoker or have quit smoking within the last 15 years; (4) You have a tobacco smoking history of at least 20 pack years (packs per day x number of years you smoked); (5) You get a written order from a healthcare provider.  Glaucoma Screening: covered annually if you're considered high risk: (1) You have diabetes OR (2) Family history of glaucoma OR (3)  aged 50 and older OR (4)  American aged 65 and older  Osteoporosis Screening: covered every 2 years if you meet one of the following conditions: (1) Have a vertebral abnormality; (2) On glucocorticoid therapy for more than 3 months; (3) Have primary hyperparathyroidism; (4) On osteoporosis medications and need to assess response to drug therapy.  HIV Screening: covered annually if you're between the age of 15-65. Also covered annually if you are younger than 15 and older than 65 with risk factors for HIV infection. For pregnant patients, it is covered up to 3 times per pregnancy.    Immunizations:  Immunization Recommendations   Influenza Vaccine Annual influenza vaccination during flu season is recommended for all persons aged >= 6 months who do not have contraindications   Pneumococcal Vaccine   * Pneumococcal conjugate vaccine = PCV13 (Prevnar 13), PCV15 (Vaxneuvance), PCV20 (Prevnar 20)  * Pneumococcal polysaccharide vaccine =  PPSV23 (Pneumovax) Adults 19-63 yo with certain risk factors or if 65+ yo  If never received any pneumonia vaccine: recommend Prevnar 20 (PCV20)  Give PCV20 if previously received 1 dose of PCV13 or PPSV23   Hepatitis B Vaccine 3 dose series if at intermediate or high risk (ex: diabetes, end stage renal disease, liver disease)   Respiratory syncytial virus (RSV) Vaccine - COVERED BY MEDICARE PART D  * RSVPreF3 (Arexvy) CDC recommends that adults 60 years of age and older may receive a single dose of RSV vaccine using shared clinical decision-making (SCDM)   Tetanus (Td) Vaccine - COST NOT COVERED BY MEDICARE PART B Following completion of primary series, a booster dose should be given every 10 years to maintain immunity against tetanus. Td may also be given as tetanus wound prophylaxis.   Tdap Vaccine - COST NOT COVERED BY MEDICARE PART B Recommended at least once for all adults. For pregnant patients, recommended with each pregnancy.   Shingles Vaccine (Shingrix) - COST NOT COVERED BY MEDICARE PART B  2 shot series recommended in those 19 years and older who have or will have weakened immune systems or those 50 years and older     Health Maintenance Due:      Topic Date Due   • Colorectal Cancer Screening  10/31/2025   • Hepatitis C Screening  Completed     Immunizations Due:      Topic Date Due   • COVID-19 Vaccine (7 - 2024-25 season) 09/01/2024     Advance Directives   What are advance directives?  Advance directives are legal documents that state your wishes and plans for medical care. These plans are made ahead of time in case you lose your ability to make decisions for yourself. Advance directives can apply to any medical decision, such as the treatments you want, and if you want to donate organs.   What are the types of advance directives?  There are many types of advance directives, and each state has rules about how to use them. You may choose a combination of any of the following:  Living will:  This is a  written record of the treatment you want. You can also choose which treatments you do not want, which to limit, and which to stop at a certain time. This includes surgery, medicine, IV fluid, and tube feedings.   Durable power of  for healthcare (DPAHC):  This is a written record that states who you want to make healthcare choices for you when you are unable to make them for yourself. This person, called a proxy, is usually a family member or a friend. You may choose more than 1 proxy.  Do not resuscitate (DNR) order:  A DNR order is used in case your heart stops beating or you stop breathing. It is a request not to have certain forms of treatment, such as CPR. A DNR order may be included in other types of advance directives.  Medical directive:  This covers the care that you want if you are in a coma, near death, or unable to make decisions for yourself. You can list the treatments you want for each condition. Treatment may include pain medicine, surgery, blood transfusions, dialysis, IV or tube feedings, and a ventilator (breathing machine).  Values history:  This document has questions about your views, beliefs, and how you feel and think about life. This information can help others choose the care that you would choose.  Why are advance directives important?  An advance directive helps you control your care. Although spoken wishes may be used, it is better to have your wishes written down. Spoken wishes can be misunderstood, or not followed. Treatments may be given even if you do not want them. An advance directive may make it easier for your family to make difficult choices about your care.   Weight Management   Why it is important to manage your weight:  Being overweight increases your risk of health conditions such as heart disease, high blood pressure, type 2 diabetes, and certain types of cancer. It can also increase your risk for osteoarthritis, sleep apnea, and other respiratory problems. Aim for  a slow, steady weight loss. Even a small amount of weight loss can lower your risk of health problems.  How to lose weight safely:  A safe and healthy way to lose weight is to eat fewer calories and get regular exercise. You can lose up about 1 pound a week by decreasing the number of calories you eat by 500 calories each day.   Healthy meal plan for weight management:  A healthy meal plan includes a variety of foods, contains fewer calories, and helps you stay healthy. A healthy meal plan includes the following:  Eat whole-grain foods more often.  A healthy meal plan should contain fiber. Fiber is the part of grains, fruits, and vegetables that is not broken down by your body. Whole-grain foods are healthy and provide extra fiber in your diet. Some examples of whole-grain foods are whole-wheat breads and pastas, oatmeal, brown rice, and bulgur.  Eat a variety of vegetables every day.  Include dark, leafy greens such as spinach, kale, vick greens, and mustard greens. Eat yellow and orange vegetables such as carrots, sweet potatoes, and winter squash.   Eat a variety of fruits every day.  Choose fresh or canned fruit (canned in its own juice or light syrup) instead of juice. Fruit juice has very little or no fiber.  Eat low-fat dairy foods.  Drink fat-free (skim) milk or 1% milk. Eat fat-free yogurt and low-fat cottage cheese. Try low-fat cheeses such as mozzarella and other reduced-fat cheeses.  Choose meat and other protein foods that are low in fat.  Choose beans or other legumes such as split peas or lentils. Choose fish, skinless poultry (chicken or turkey), or lean cuts of red meat (beef or pork). Before you cook meat or poultry, cut off any visible fat.   Use less fat and oil.  Try baking foods instead of frying them. Add less fat, such as margarine, sour cream, regular salad dressing and mayonnaise to foods. Eat fewer high-fat foods. Some examples of high-fat foods include french fries, doughnuts, ice  cream, and cakes.  Eat fewer sweets.  Limit foods and drinks that are high in sugar. This includes candy, cookies, regular soda, and sweetened drinks.  Exercise:  Exercise at least 30 minutes per day on most days of the week. Some examples of exercise include walking, biking, dancing, and swimming. You can also fit in more physical activity by taking the stairs instead of the elevator or parking farther away from stores. Ask your healthcare provider about the best exercise plan for you.      © Copyright [employer] 2018 Information is for End User's use only and may not be sold, redistributed or otherwise used for commercial purposes. All illustrations and images included in CareNotes® are the copyrighted property of [employer]M., Inc. or Copyright Agent

## 2025-01-30 NOTE — ASSESSMENT & PLAN NOTE
Lab Results   Component Value Date    EGFR 60 01/17/2025    EGFR 63 12/10/2024    EGFR 58 11/06/2024    CREATININE 1.20 01/17/2025    CREATININE 1.16 12/10/2024    CREATININE 1.24 11/06/2024   Stay hydrated - he is drinking more since recent urinary issues

## 2025-01-30 NOTE — PROGRESS NOTES
Name: Christy Haro      : 1955      MRN: [id number]  Encounter Provider: Alma Delia Fountain DO  Encounter Date: 2025   Encounter department: [facility]    Assessment & Plan  Heart failure with preserved ejection fraction, unspecified HF chronicity (HCC)  Wt Readings from Last 3 Encounters:   25 136 kg (299 lb)   25 136 kg (300 lb)   25 (!) 137 kg (301 lb)   Stable and following lo sodium Has Cardio appt scheduled           Morbid obesity (HCC)  Lo fat diet Pt is on ozempic and has met with nutritionist          Type 2 diabetes mellitus with obesity  ([facility])    Lab Results   Component Value Date    HGBA1C 6.0 (H) 10/06/2024   BS stable Has endocrine appt in spring with repeat labs          Stage 3a chronic kidney disease (HCC)  Lab Results   Component Value Date    EGFR 60 2025    EGFR 63 12/10/2024    EGFR 58 2024    CREATININE 1.20 2025    CREATININE 1.16 12/10/2024    CREATININE 1.24 2024   Stay hydrated - he is drinking more since recent urinary issues          Medicare annual wellness visit, subsequent  Pt has labs for spring and endocrine/cardio followup  Lo fat diet Stay hydrated          Depression Screening and Follow-up Plan: Patient was screened for depression during today's encounter. They screened negative with a PHQ-2 score of 0.      Preventive health issues were discussed with patient, and age appropriate screening tests were ordered as noted in patient's After Visit Summary. Personalized health advice and appropriate referrals for health education or preventive services given if needed, as noted in patient's After Visit Summary.    History of Present Illness     HPI   Pt doing better Bladder function much better and is drinking more water No chest pain or sob BS have been stable   Patient Care Team:  Alma Delia Fountain DO as PCP - General (Internal Medicine)  MD Lara Sims PA-C Michael McLane, DO  (Cardiology)  Maria Bolton DO (Nephrology)  Marissa Young PA-C as Physician Assistant (Gastroenterology)  Ben Reilly DO (Nephrology)  Padmaja Lott PA-C (Bariatrics)    Review of Systems   Constitutional:  Negative for chills and fever.   HENT: Negative.     Eyes:  Negative for visual disturbance.   Respiratory:  Negative for cough and shortness of breath.    Cardiovascular:  Negative for chest pain, palpitations and leg swelling.   Gastrointestinal: Negative.    Genitourinary: Negative.    Musculoskeletal:  Positive for arthralgias.   Neurological:  Negative for dizziness, light-headedness and headaches.   Psychiatric/Behavioral:  Negative for sleep disturbance. The patient is not nervous/anxious.      Medical History Reviewed by provider this encounter:  Tobacco  Allergies  Meds  Problems  Med Hx  Surg Hx  Fam Hx       Annual Wellness Visit Questionnaire   Mhamd is here for his Subsequent Wellness visit.     Health Risk Assessment:   Patient rates overall health as good. Patient feels that their physical health rating is same. Patient is satisfied with their life. Eyesight was rated as same. Hearing was rated as same. Patient feels that their emotional and mental health rating is same. Patients states they are never, rarely angry. Patient states they are never, rarely unusually tired/fatigued. Pain experienced in the last 7 days has been none. Patient states that he has experienced no weight loss or gain in last 6 months.     Depression Screening:   PHQ-2 Score: 0      Fall Risk Screening:   In the past year, patient has experienced: no history of falling in past year      Home Safety:  Patient does not have trouble with stairs inside or outside of their home. Patient has working smoke alarms and has no working carbon monoxide detector. Home safety hazards include: none.     Nutrition:   Current diet is Regular and Limited junk food.     Medications:   Patient is currently taking  over-the-counter supplements. OTC medications include: see medication list. Patient is able to manage medications.     Activities of Daily Living (ADLs)/Instrumental Activities of Daily Living (IADLs):   Walk and transfer into and out of bed and chair?: Yes  Dress and groom yourself?: Yes    Bathe or shower yourself?: Yes    Feed yourself? Yes  Do your laundry/housekeeping?: Yes  Manage your money, pay your bills and track your expenses?: Yes  Make your own meals?: Yes    Do your own shopping?: Yes    Previous Hospitalizations:   Any hospitalizations or ED visits within the last 12 months?: Yes    How many hospitalizations have you had in the last year?: more than 4    Advance Care Planning:   Living will: Yes    Advanced directive: Yes      PREVENTIVE SCREENINGS      Cardiovascular Screening:    General: Screening Current      Diabetes Screening:     General: Screening Not Indicated and History Diabetes      Colorectal Cancer Screening:     General: Screening Current      Prostate Cancer Screening:    General: Screening Current      Abdominal Aortic Aneurysm (AAA) Screening:    Risk factors include: age between 65-74 yo and tobacco use        Lung Cancer Screening:     General: Screening Not Indicated      Hepatitis C Screening:    General: Screening Current    Screening, Brief Intervention, and Referral to Treatment (SBIRT)    Screening  Typical number of drinks in a day: 0  Typical number of drinks in a week: 0  Interpretation: Low risk drinking behavior.    Single Item Drug Screening:  How often have you used an illegal drug (including marijuana) or a prescription medication for non-medical reasons in the past year? never    Single Item Drug Screen Score: 0  Interpretation: Negative screen for possible drug use disorder    Social Drivers of Health     Financial Resource Strain: Low Risk  (1/8/2024)    Overall Financial Resource Strain (CARDIA)     Difficulty of Paying Living Expenses: Not hard at all   Food  "Insecurity: No Food Insecurity (1/30/2025)    Hunger Vital Sign     Worried About Running Out of Food in the Last Year: Never true     Ran Out of Food in the Last Year: Never true   Transportation Needs: No Transportation Needs (1/30/2025)    PRAPARE - Transportation     Lack of Transportation (Medical): No     Lack of Transportation (Non-Medical): No   Housing Stability: Low Risk  (1/30/2025)    Housing Stability Vital Sign     Unable to Pay for Housing in the Last Year: No     Number of Times Moved in the Last Year: 1     Homeless in the Last Year: No   Utilities: Not At Risk (1/30/2025)    [facility] Utilities     Threatened with loss of utilities: No     No results found.    Objective   /72   Pulse 63   Temp 97.5 °F (36.4 °C) (Temporal)   Resp 18   Ht 5' 11\" (1.803 m)   Wt 136 kg (299 lb)   SpO2 98%   BMI 41.70 kg/m²     Physical Exam  Vitals and nursing note reviewed.   Constitutional:       General: He is not in acute distress.     Appearance: Normal appearance. He is not ill-appearing, toxic-appearing or diaphoretic.   HENT:      Head: Normocephalic and atraumatic.      Right Ear: External ear normal.      Left Ear: External ear normal.      Nose: Nose normal.      Mouth/Throat:      Mouth: Mucous membranes are moist.   Eyes:      General: No scleral icterus.     Extraocular Movements: Extraocular movements intact.      Conjunctiva/sclera: Conjunctivae normal.      Pupils: Pupils are equal, round, and reactive to light.   Cardiovascular:      Rate and Rhythm: Normal rate and regular rhythm.      Pulses: Normal pulses.      Heart sounds: Normal heart sounds. No murmur heard.  Pulmonary:      Effort: Pulmonary effort is normal. No respiratory distress.      Breath sounds: Normal breath sounds. No wheezing.   Abdominal:      General: Bowel sounds are normal. There is no distension.      Palpations: Abdomen is soft.      Tenderness: There is no abdominal tenderness.   Musculoskeletal:      Cervical back: " Normal range of motion and neck supple.      Right lower leg: Edema present.      Left lower leg: Edema present.   Lymphadenopathy:      Cervical: No cervical adenopathy.   Skin:     General: Skin is warm and dry.   Neurological:      General: No focal deficit present.      Mental Status: He is alert and oriented to person, place, and time. Mental status is at baseline.      Cranial Nerves: No cranial nerve deficit.   Psychiatric:         Mood and Affect: Mood normal.         Behavior: Behavior normal.         Thought Content: Thought content normal.         Judgment: Judgment normal.

## 2025-01-30 NOTE — ASSESSMENT & PLAN NOTE
Lab Results   Component Value Date    HGBA1C 6.0 (H) 10/06/2024   BS stable Has endocrine appt in spring with repeat labs

## 2025-02-03 ENCOUNTER — TELEPHONE (OUTPATIENT)
Dept: CARDIOLOGY CLINIC | Facility: CLINIC | Age: 70
End: 2025-02-03

## 2025-02-03 ENCOUNTER — TELEPHONE (OUTPATIENT)
Age: 70
End: 2025-02-03

## 2025-02-03 DIAGNOSIS — I10 PRIMARY HYPERTENSION: Primary | ICD-10-CM

## 2025-02-03 RX ORDER — ENALAPRIL MALEATE 20 MG/1
20 TABLET ORAL 2 TIMES DAILY
Qty: 60 TABLET | Refills: 5 | Status: SHIPPED | OUTPATIENT
Start: 2025-02-03

## 2025-02-03 NOTE — TELEPHONE ENCOUNTER
Phone call from patient stating he went to refill his enalapril with mail order pharamcy and they state that this medication was stopped. Patient states they stopped it while he was in the hospital because his blood pressure was low but patient has since been taken this medication 4 times a day and is completely out of his medication and is asking for a refill to be sent to [facility] [place].  Please advise. Thank you.

## 2025-02-03 NOTE — TELEPHONE ENCOUNTER
Patient calling again about enalapril prescription. Reviewed Dr. Fountain's response. Patient will reach out to cardiology to discuss further. Provided patient with number for cardiology and transferred him. No further questions or concerns.

## 2025-02-03 NOTE — TELEPHONE ENCOUNTER
Pt should check with Cardiology - I saw a note after the hospital that Lasix restarted but not the enalapril I do not see it listed on next note so he should verify with them if he should be taking and what dose

## 2025-02-03 NOTE — TELEPHONE ENCOUNTER
Patient called today requesting a refill on Enalapril 10 mg tablets.      The med is no longer on his med list and patient said it was discontinued during a hospitalization in October of 2024.    When he returned home from the hospital, he resumed the Enalapril on his own and resumed it at 10 mg 4 times a day.  He is now out of it and would like to continue on it  as he has been taking it daily all along. . He said his SBP ranges from 120-140.      He would like a new script sent to [facility] in [place]

## 2025-02-03 NOTE — TELEPHONE ENCOUNTER
-I was able to call and speak with patient.  He notes that since hospital discharge even in October he was taking enalapril 10 mg 4 times daily.  To help simplify regimen we will trial transitioning to enalapril 20 mg twice daily and monitoring.  He will repeat BMP in 1 week to monitor renal function and electrolytes as he notes blood pressures are controlled.

## 2025-02-05 ENCOUNTER — TELEPHONE (OUTPATIENT)
Dept: ENDOCRINOLOGY | Facility: CLINIC | Age: 70
End: 2025-02-05

## 2025-02-05 LAB
LEFT EYE DIABETIC RETINOPATHY: NORMAL
RIGHT EYE DIABETIC RETINOPATHY: NORMAL

## 2025-02-13 NOTE — TELEPHONE ENCOUNTER
Pt called asking if the BMP is fasting?   I advised it is not, but if he is also completing the blood work ordered by the PCP that he must fast for those tests.   Pt stated he was not aware of the labs placed by the PCP. He will fast and complete all pending labs tomorrow morning.

## 2025-02-14 ENCOUNTER — TELEPHONE (OUTPATIENT)
Dept: NON INVASIVE DIAGNOSTICS | Facility: HOSPITAL | Age: 70
End: 2025-02-14

## 2025-02-14 ENCOUNTER — REMOTE DEVICE CLINIC VISIT (OUTPATIENT)
Dept: CARDIOLOGY CLINIC | Facility: CLINIC | Age: 70
End: 2025-02-14
Payer: MEDICARE

## 2025-02-14 ENCOUNTER — APPOINTMENT (OUTPATIENT)
Dept: LAB | Facility: MEDICAL CENTER | Age: 70
End: 2025-02-14
Payer: MEDICARE

## 2025-02-14 ENCOUNTER — TELEPHONE (OUTPATIENT)
Dept: CARDIOLOGY CLINIC | Facility: CLINIC | Age: 70
End: 2025-02-14

## 2025-02-14 DIAGNOSIS — I48.0 PAF (PAROXYSMAL ATRIAL FIBRILLATION) (HCC): Primary | ICD-10-CM

## 2025-02-14 DIAGNOSIS — N18.30 STAGE 3 CHRONIC KIDNEY DISEASE, UNSPECIFIED WHETHER STAGE 3A OR 3B CKD (HCC): ICD-10-CM

## 2025-02-14 DIAGNOSIS — N18.9 CHRONIC KIDNEY DISEASE, UNSPECIFIED CKD STAGE: ICD-10-CM

## 2025-02-14 DIAGNOSIS — K74.00 LIVER FIBROSIS: ICD-10-CM

## 2025-02-14 DIAGNOSIS — E11.65 TYPE 2 DIABETES MELLITUS WITH HYPERGLYCEMIA, WITHOUT LONG-TERM CURRENT USE OF INSULIN (HCC): ICD-10-CM

## 2025-02-14 DIAGNOSIS — K76.0 HEPATIC STEATOSIS: ICD-10-CM

## 2025-02-14 DIAGNOSIS — I10 BENIGN ESSENTIAL HTN: ICD-10-CM

## 2025-02-14 DIAGNOSIS — I50.31 ACUTE DIASTOLIC HEART FAILURE (HCC): ICD-10-CM

## 2025-02-14 DIAGNOSIS — E11.9 TYPE 2 DIABETES MELLITUS WITHOUT COMPLICATION, WITHOUT LONG-TERM CURRENT USE OF INSULIN (HCC): ICD-10-CM

## 2025-02-14 DIAGNOSIS — R00.1 BRADYCARDIA: Primary | ICD-10-CM

## 2025-02-14 DIAGNOSIS — I10 PRIMARY HYPERTENSION: ICD-10-CM

## 2025-02-14 DIAGNOSIS — E78.5 DYSLIPIDEMIA: ICD-10-CM

## 2025-02-14 LAB
25(OH)D3 SERPL-MCNC: 69.9 NG/ML (ref 30–100)
ALBUMIN SERPL BCG-MCNC: 4.2 G/DL (ref 3.5–5)
ALP SERPL-CCNC: 35 U/L (ref 34–104)
ALT SERPL W P-5'-P-CCNC: 18 U/L (ref 7–52)
ANION GAP SERPL CALCULATED.3IONS-SCNC: 12 MMOL/L (ref 4–13)
AST SERPL W P-5'-P-CCNC: 21 U/L (ref 13–39)
BILIRUB SERPL-MCNC: 0.9 MG/DL (ref 0.2–1)
BUN SERPL-MCNC: 27 MG/DL (ref 5–25)
CALCIUM SERPL-MCNC: 9.6 MG/DL (ref 8.4–10.2)
CHLORIDE SERPL-SCNC: 104 MMOL/L (ref 96–108)
CHOLEST SERPL-MCNC: 144 MG/DL (ref ?–200)
CO2 SERPL-SCNC: 23 MMOL/L (ref 21–32)
CREAT SERPL-MCNC: 1.32 MG/DL (ref 0.6–1.3)
EST. AVERAGE GLUCOSE BLD GHB EST-MCNC: 128 MG/DL
FERRITIN SERPL-MCNC: 30 NG/ML (ref 24–336)
GFR SERPL CREATININE-BSD FRML MDRD: 53 ML/MIN/1.73SQ M
GLUCOSE P FAST SERPL-MCNC: 148 MG/DL (ref 65–99)
HBA1C MFR BLD: 6.1 %
HDLC SERPL-MCNC: 33 MG/DL
IGA SERPL-MCNC: 162 MG/DL (ref 66–433)
IGG SERPL-MCNC: 1373 MG/DL (ref 635–1741)
IGM SERPL-MCNC: 58 MG/DL (ref 45–281)
IRON SATN MFR SERPL: 13 % (ref 15–50)
IRON SERPL-MCNC: 46 UG/DL (ref 50–212)
LDLC SERPL CALC-MCNC: 90 MG/DL (ref 0–100)
POTASSIUM SERPL-SCNC: 4.1 MMOL/L (ref 3.5–5.3)
PROT SERPL-MCNC: 7.4 G/DL (ref 6.4–8.4)
SODIUM SERPL-SCNC: 139 MMOL/L (ref 135–147)
TIBC SERPL-MCNC: 364 UG/DL (ref 250–450)
TRANSFERRIN SERPL-MCNC: 260 MG/DL (ref 203–362)
TRIGL SERPL-MCNC: 107 MG/DL (ref ?–150)
TSH SERPL DL<=0.05 MIU/L-ACNC: 0.88 UIU/ML (ref 0.45–4.5)
UIBC SERPL-MCNC: 318 UG/DL (ref 155–355)

## 2025-02-14 PROCEDURE — 93298 REM INTERROG DEV EVAL SCRMS: CPT | Performed by: INTERNAL MEDICINE

## 2025-02-14 PROCEDURE — 83540 ASSAY OF IRON: CPT

## 2025-02-14 PROCEDURE — 86038 ANTINUCLEAR ANTIBODIES: CPT

## 2025-02-14 PROCEDURE — 80061 LIPID PANEL: CPT

## 2025-02-14 PROCEDURE — 86364 TISS TRNSGLTMNASE EA IG CLAS: CPT

## 2025-02-14 PROCEDURE — 83550 IRON BINDING TEST: CPT

## 2025-02-14 PROCEDURE — 82390 ASSAY OF CERULOPLASMIN: CPT

## 2025-02-14 PROCEDURE — 82103 ALPHA-1-ANTITRYPSIN TOTAL: CPT

## 2025-02-14 PROCEDURE — 80053 COMPREHEN METABOLIC PANEL: CPT

## 2025-02-14 PROCEDURE — 84443 ASSAY THYROID STIM HORMONE: CPT

## 2025-02-14 PROCEDURE — 83036 HEMOGLOBIN GLYCOSYLATED A1C: CPT

## 2025-02-14 PROCEDURE — 82728 ASSAY OF FERRITIN: CPT

## 2025-02-14 PROCEDURE — 82784 ASSAY IGA/IGD/IGG/IGM EACH: CPT

## 2025-02-14 PROCEDURE — 86225 DNA ANTIBODY NATIVE: CPT

## 2025-02-14 PROCEDURE — 86381 MITOCHONDRIAL ANTIBODY EACH: CPT

## 2025-02-14 PROCEDURE — 86015 ACTIN ANTIBODY EACH: CPT

## 2025-02-14 PROCEDURE — 36415 COLL VENOUS BLD VENIPUNCTURE: CPT

## 2025-02-14 PROCEDURE — 82306 VITAMIN D 25 HYDROXY: CPT

## 2025-02-14 NOTE — PROGRESS NOTES
"MDT LP2/ACTIVE SYSTEM IS MRI CONDITIONAL   CARELINK TRANSMISSION: LOOP REOCRDER. PRESENTING RHYTHM NSR @ 60 BPM. BATTERY STATUS \"OK.\" 6 DEVICE CLASSIFIED AF EPISODES, LONGEST EPISODE IS 1:58 HOURS, AVAILABLE STRIPS DEMONSTRATE SUSPICIOUS FOR ATRIAL FIBRILLATION.  AF BURDEN IS 27.6%. PT NOT ON AC MEDS. TASKED TO DAYANARA YEBOAH   "

## 2025-02-14 NOTE — TELEPHONE ENCOUNTER
-I was able to call and speak with patient about implantable loop recorder results concerning for atrial fibrillation.  After discussion given elevated [id number] score will initiate Eliquis 5 mg twice daily and patient will discontinue aspirin.  Patient counseled on monitoring for bleeding and will repeat echocardiographic imaging.  Will have office staff reach out to assist with this.  Patient also wishes to follow-up with electrophysiology team and I encouraged this.

## 2025-02-14 NOTE — TELEPHONE ENCOUNTER
-Attempted to call patient to discuss loop recorder transmission.  Unfortunately I was not able to reach the patient but I did leave a message on his phone with my name and office number to call back for a better time to speak.  I will also forward message to electrophysiology team for confirmation of atrial fibrillation.

## 2025-02-14 NOTE — TELEPHONE ENCOUNTER
Pt returned Dr. Schmitt's call. Pt states he is available for the rest of the day to s/w him and will keep his phone w/ him.

## 2025-02-15 LAB
A1AT SERPL-MCNC: 151 MG/DL (ref 101–187)
ACTIN IGG SERPL-ACNC: 26 UNITS (ref 0–19)
CERULOPLASMIN SERPL-MCNC: 23.3 MG/DL (ref 16–31)
MITOCHONDRIA M2 IGG SER-ACNC: <20 UNITS (ref 0–20)

## 2025-02-17 ENCOUNTER — RESULTS FOLLOW-UP (OUTPATIENT)
Dept: CARDIOLOGY CLINIC | Facility: CLINIC | Age: 70
End: 2025-02-17

## 2025-02-17 ENCOUNTER — RESULTS FOLLOW-UP (OUTPATIENT)
Dept: ENDOCRINOLOGY | Facility: CLINIC | Age: 70
End: 2025-02-17

## 2025-02-17 ENCOUNTER — TELEPHONE (OUTPATIENT)
Dept: CARDIOLOGY CLINIC | Facility: CLINIC | Age: 70
End: 2025-02-17

## 2025-02-17 ENCOUNTER — TELEPHONE (OUTPATIENT)
Age: 70
End: 2025-02-17

## 2025-02-17 DIAGNOSIS — I48.0 PAROXYSMAL ATRIAL FIBRILLATION (HCC): Primary | ICD-10-CM

## 2025-02-17 LAB
DSDNA IGG SERPL IA-ACNC: 4.4 IU/ML (ref ?–15)
NUCLEAR IGG SER IA-RTO: 0.2 RATIO (ref ?–1)
TTG IGA SER-ACNC: <2 U/ML (ref 0–3)

## 2025-02-17 NOTE — TELEPHONE ENCOUNTER
-I was able to call and speak with patient about concerns for anticoagulation.  Patient notes he had an issue with small ulcer in the past but states his GI doctor was really unconcerned about this and has not had any significant symptoms or bleeding.  I informed him that he could speak with his gastroenterologist to make sure from their standpoint they were also okay with him initiating Eliquis as directed.  He notes he will be speaking with them and he is discontinuing aspirin which will also help reduce his bleeding risk.  I did also discuss with him as he is concerned about overall anticoagulation long-term possibility for Watchman.  Patient noted interest in this and would like to speak with electrophysiology team and therefore referral was given.  May even consider ablation with longer-term monitoring however as he wishes to avoid anticoagulation long-term if possible would prefer watchman if available.  Patient will let us know if there are any issues with initiation of Eliquis and will check CBC 1 week after initiation.

## 2025-02-17 NOTE — TELEPHONE ENCOUNTER
Patient has a referral placed by Dr. Maria Victoria QUINONEZ for patient to let him know that this was placed. Also sent message to EP in basket to ensure they get him scheduled.     Left Callback # for pt

## 2025-02-17 NOTE — TELEPHONE ENCOUNTER
Patient calling to schedule Echo. Provided number for central scheduling.   Pt also wants to speak to Dr. Schmitt regarding Eliquis and side effects. Pt has not started taking medication yet.

## 2025-02-25 ENCOUNTER — HOSPITAL ENCOUNTER (OUTPATIENT)
Dept: NON INVASIVE DIAGNOSTICS | Facility: HOSPITAL | Age: 70
Discharge: HOME | End: 2025-02-25
Attending: INTERNAL MEDICINE
Payer: MEDICARE

## 2025-02-25 ENCOUNTER — RESULTS FOLLOW-UP (OUTPATIENT)
Dept: CARDIOLOGY CLINIC | Facility: CLINIC | Age: 70
End: 2025-02-25

## 2025-02-25 VITALS
BODY MASS INDEX: 41.86 KG/M2 | WEIGHT: 299 LBS | DIASTOLIC BLOOD PRESSURE: 72 MMHG | HEART RATE: 63 BPM | SYSTOLIC BLOOD PRESSURE: 130 MMHG | HEIGHT: 71 IN

## 2025-02-25 DIAGNOSIS — I48.0 PAF (PAROXYSMAL ATRIAL FIBRILLATION) (HCC): ICD-10-CM

## 2025-02-25 LAB
AORTIC ROOT: 4.2 CM
ASCENDING AORTA: 3.9 CM
BSA FOR ECHO PROCEDURE: 2.5 M2
E WAVE DECELERATION TIME: 274 MS
E/A RATIO: 0.68
FRACTIONAL SHORTENING: 34 (ref 28–44)
INTERVENTRICULAR SEPTUM IN DIASTOLE (PARASTERNAL SHORT AXIS VIEW): 1.5 CM
INTERVENTRICULAR SEPTUM: 1.5 CM (ref 0.6–1.1)
LAAS-AP2: 23.6 CM2
LAAS-AP4: 21.5 CM2
LEFT ATRIUM SIZE: 4.5 CM
LEFT ATRIUM VOLUME (MOD BIPLANE): 73 ML
LEFT ATRIUM VOLUME INDEX (MOD BIPLANE): 29.2 ML/M2
LEFT INTERNAL DIMENSION IN SYSTOLE: 3.5 CM (ref 2.1–4)
LEFT VENTRICULAR INTERNAL DIMENSION IN DIASTOLE: 5.3 CM (ref 3.5–6)
LEFT VENTRICULAR POSTERIOR WALL IN END DIASTOLE: 1.5 CM
LEFT VENTRICULAR STROKE VOLUME: 84 ML
LV EF US.2D.A4C+ESTIMATED: 63 %
LVSV (TEICH): 84 ML
MV E'TISSUE VEL-LAT: 12 CM/S
MV E'TISSUE VEL-SEP: 7 CM/S
MV PEAK A VEL: 0.75 M/S
MV PEAK E VEL: 51 CM/S
MV STENOSIS PRESSURE HALF TIME: 79 MS
MV VALVE AREA P 1/2 METHOD: 2.78
RIGHT ATRIUM AREA SYSTOLE A4C: 20.6 CM2
RIGHT VENTRICLE ID DIMENSION: 4.4 CM
SL CV LEFT ATRIUM LENGTH A2C: 6.1 CM
SL CV LV EF: 65
SL CV PED ECHO LEFT VENTRICLE DIASTOLIC VOLUME (MOD BIPLANE) 2D: 135 ML
SL CV PED ECHO LEFT VENTRICLE SYSTOLIC VOLUME (MOD BIPLANE) 2D: 51 ML
TRICUSPID ANNULAR PLANE SYSTOLIC EXCURSION: 3.2 CM

## 2025-02-25 PROCEDURE — 93306 TTE W/DOPPLER COMPLETE: CPT

## 2025-02-25 PROCEDURE — 93306 TTE W/DOPPLER COMPLETE: CPT | Performed by: INTERNAL MEDICINE

## 2025-02-26 ENCOUNTER — TELEPHONE (OUTPATIENT)
Dept: ENDOCRINOLOGY | Facility: CLINIC | Age: 70
End: 2025-02-26

## 2025-02-26 NOTE — TELEPHONE ENCOUNTER
Spoke with PT 2/26/25 to inform them that their WAYNE delivery arrived at the office today and is ready for . PT confirmed and is picking up tomorrow 2/27/25, provided PT with office hours.

## 2025-02-28 ENCOUNTER — RESULTS FOLLOW-UP (OUTPATIENT)
Dept: GASTROENTEROLOGY | Facility: CLINIC | Age: 70
End: 2025-02-28

## 2025-03-01 PROBLEM — Z00.00 MEDICARE ANNUAL WELLNESS VISIT, SUBSEQUENT: Status: RESOLVED | Noted: 2022-11-03 | Resolved: 2025-03-01

## 2025-03-03 ENCOUNTER — OFFICE VISIT (OUTPATIENT)
Dept: ENDOCRINOLOGY | Facility: CLINIC | Age: 70
End: 2025-03-03
Payer: MEDICARE

## 2025-03-03 VITALS
BODY MASS INDEX: 42 KG/M2 | HEIGHT: 71 IN | DIASTOLIC BLOOD PRESSURE: 56 MMHG | HEART RATE: 55 BPM | TEMPERATURE: 98 F | WEIGHT: 300 LBS | SYSTOLIC BLOOD PRESSURE: 98 MMHG | OXYGEN SATURATION: 95 %

## 2025-03-03 DIAGNOSIS — E11.65 TYPE 2 DIABETES MELLITUS WITH HYPERGLYCEMIA, WITHOUT LONG-TERM CURRENT USE OF INSULIN (HCC): Primary | ICD-10-CM

## 2025-03-03 PROCEDURE — G2211 COMPLEX E/M VISIT ADD ON: HCPCS | Performed by: STUDENT IN AN ORGANIZED HEALTH CARE EDUCATION/TRAINING PROGRAM

## 2025-03-03 PROCEDURE — 99214 OFFICE O/P EST MOD 30 MIN: CPT | Performed by: STUDENT IN AN ORGANIZED HEALTH CARE EDUCATION/TRAINING PROGRAM

## 2025-03-03 NOTE — PROGRESS NOTES
"Name: Christy Haro      : 1955      MRN: [id number]  Encounter Provider: Mrashall Francisco DO  Encounter Date: 3/3/2025   Encounter department: [facility]    No chief complaint on file.  :  Assessment & Plan  Type 2 diabetes mellitus with hyperglycemia, without long-term current use of insulin (HCC)  Good control. Christy will continue ozempic and adjust according to  recommendations. He may be interested in an augmented dose in 3-mo. He will contact our office when new Rx is necessary, as we will have to amend his NovoNordisk PAP application. Will plan reassessment in 6-mo  Lab Results   Component Value Date    HGBA1C 6.1 (H) 2025       Orders:  •  Basic metabolic panel; Future  •  Hemoglobin A1C; Future      RTC 6-mo    History of Present Illness     Christy Haro is a 69 y.o. male with type 2 diabetes seen in follow up. Reports complications of denies. Denies recent severe hypoglycemic or severe hyperglycemic episodes. Denies any issues with his current regimen. Last A1C was 6.1%. Denies recent illness, hospitalization or steroid use.     Home blood glucometer readings:   Fasting <120, post-meal <120    Current regimen:     Jardiance 25 mg daily    Ozempic 0.25 mg weekly (just started)        Last Eye Exam: 2025  Last Foot Exam: 2024  Health Maintenance   Topic Date Due   • Diabetic Foot Exam  2025   • Diabetic Eye Exam  2027     Pertinent Medical History       Review of Systems   Constitutional:  Negative for diaphoresis.   Endocrine: Negative for polydipsia and polyuria.   All other systems reviewed and are negative.   as per HPI         Medical History Reviewed by provider this encounter:  Tobacco  Allergies  Meds  Problems  Med Hx  Surg Hx  Fam Hx     .    Objective   BP 98/56 (BP Location: Left arm, Patient Position: Sitting)   Pulse 55   Temp 98 °F (36.7 °C)   Ht 5' 11\" (1.803 m)   Wt 136 kg (300 lb) "   SpO2 95%   BMI 41.84 kg/m²      Body mass index is 41.84 kg/m².  Wt Readings from Last 3 Encounters:   03/03/25 136 kg (300 lb)   02/25/25 136 kg (299 lb)   01/30/25 136 kg (299 lb)     Physical Exam  Vitals reviewed.   Constitutional:       General: He is not in acute distress.     Appearance: Normal appearance.   HENT:      Head: Normocephalic and atraumatic.      Nose: Nose normal.   Eyes:      General: No scleral icterus.     Conjunctiva/sclera: Conjunctivae normal.   Pulmonary:      Effort: Pulmonary effort is normal. No respiratory distress.   Musculoskeletal:         General: No deformity.      Cervical back: Normal range of motion.   Skin:     General: Skin is warm and dry.   Neurological:      Mental Status: He is alert.   Psychiatric:         Mood and Affect: Mood normal.         Behavior: Behavior normal.         Labs:   Lab Results   Component Value Date    HGBA1C 6.1 (H) 02/14/2025    HGBA1C 6.0 (H) 10/06/2024    HGBA1C 6.2 (H) 07/01/2024     Lab Results   Component Value Date    CREATININE 1.32 (H) 02/14/2025    CREATININE 1.20 01/17/2025    CREATININE 1.16 12/10/2024    BUN 27 (H) 02/14/2025     12/16/2015    K 4.1 02/14/2025     02/14/2025    CO2 23 02/14/2025     EGFR   Date Value Ref Range Status   10/01/2020 58.6 (L) >60 Final     Comment:     If patient is , multiply estimated GFR by 1.159.     eGFR   Date Value Ref Range Status   02/14/2025 53 ml/min/1.73sq m Final     Lab Results   Component Value Date    CHOL 145 12/16/2015    HDL 33 (L) 02/14/2025    TRIG 107 02/14/2025     Lab Results   Component Value Date    ALT 18 02/14/2025    AST 21 02/14/2025    ALKPHOS 35 02/14/2025    BILITOT 0.84 12/16/2015     Lab Results   Component Value Date    BFC7COXCFFCD 0.875 02/14/2025    QAT5GRJXGFSM 0.710 11/06/2024    DZK5SOBTERBY 0.594 02/09/2024       There are no Patient Instructions on file for this visit.    Discussed with the patient and all questioned fully  answered. He will call me if any problems arise.

## 2025-03-04 ENCOUNTER — TELEPHONE (OUTPATIENT)
Age: 70
End: 2025-03-04

## 2025-03-04 DIAGNOSIS — D22.9 ATYPICAL MOLE: Primary | ICD-10-CM

## 2025-03-04 NOTE — TELEPHONE ENCOUNTER
Pt states he has some kind mole on the side of his eye says it is itchy. Denies any growth in size.

## 2025-03-04 NOTE — TELEPHONE ENCOUNTER
Pt called in to request a referral for dermatology, for pts new skin issue. PCP please place this order and the Dr you are referring the pt to. Also, please call the pt back once this referral is placed. Thank you

## 2025-03-05 ENCOUNTER — APPOINTMENT (OUTPATIENT)
Dept: URBAN - NONMETROPOLITAN AREA CLINIC 4 | Facility: CLINIC | Age: 70
Setting detail: DERMATOLOGY
End: 2025-03-05

## 2025-03-05 DIAGNOSIS — L82.0 INFLAMED SEBORRHEIC KERATOSIS: ICD-10-CM

## 2025-03-05 DIAGNOSIS — L72.0 EPIDERMAL CYST: ICD-10-CM

## 2025-03-05 PROCEDURE — ? PHOTO-DOCUMENTATION

## 2025-03-05 PROCEDURE — ? COUNSELING

## 2025-03-05 PROCEDURE — ? OTHER

## 2025-03-05 PROCEDURE — 17110 DESTRUCTION B9 LES UP TO 14: CPT

## 2025-03-05 PROCEDURE — 99202 OFFICE O/P NEW SF 15 MIN: CPT | Mod: 25

## 2025-03-05 PROCEDURE — ? LIQUID NITROGEN

## 2025-03-05 PROCEDURE — ? TREATMENT REGIMEN

## 2025-03-05 ASSESSMENT — LOCATION ZONE DERM
LOCATION ZONE: FACE
LOCATION ZONE: EYELID

## 2025-03-05 ASSESSMENT — LOCATION DETAILED DESCRIPTION DERM
LOCATION DETAILED: RIGHT FOREHEAD
LOCATION DETAILED: SUPERIOR MID FOREHEAD
LOCATION DETAILED: RIGHT LATERAL FOREHEAD
LOCATION DETAILED: LEFT LATERAL CANTHUS

## 2025-03-05 ASSESSMENT — LOCATION SIMPLE DESCRIPTION DERM
LOCATION SIMPLE: LEFT EYELID
LOCATION SIMPLE: RIGHT FOREHEAD
LOCATION SIMPLE: SUPERIOR FOREHEAD

## 2025-03-05 ASSESSMENT — PAIN INTENSITY VAS: HOW INTENSE IS YOUR PAIN 0 BEING NO PAIN, 10 BEING THE MOST SEVERE PAIN POSSIBLE?: 1/10 PAIN

## 2025-03-05 NOTE — PROCEDURE: LIQUID NITROGEN
Show Applicator Variable?: Yes
Spray Paint Text: The liquid nitrogen was applied to the skin utilizing a spray paint frosting technique.
Medical Necessity Information: It is in your best interest to select a reason for this procedure from the list below. All of these items fulfill various CMS LCD requirements except the new and changing color options.
Add 52 Modifier (Optional): no
Consent: The patient's consent was obtained including but not limited to risks of crusting, scabbing, blistering, scarring, darker or lighter pigmentary change, recurrence, incomplete removal and infection.
Number Of Freeze-Thaw Cycles: 3 freeze-thaw cycles
Application Tool (Optional): Cry-AC
Medical Necessity Clause: This procedure was medically necessary because the lesions that were treated were:
Detail Level: Detailed
Duration Of Freeze Thaw-Cycle (Seconds): 3
Post-Care Instructions: I reviewed with the patient in detail post-care instructions. Patient is to wear sunprotection, and avoid picking at any of the treated lesions. Pt may apply Vaseline to crusted or scabbing areas.

## 2025-03-05 NOTE — PROCEDURE: OTHER
Other (Free Text): Referred to ophthalmologist for definitive treatment. Unable to remove in office due to size and location on the lateral eyelid. 
Note Text (......Xxx Chief Complaint.): This diagnosis correlates with the
Detail Level: Zone
Render Risk Assessment In Note?: no

## 2025-03-06 ENCOUNTER — OFFICE VISIT (OUTPATIENT)
Dept: CARDIOLOGY CLINIC | Facility: CLINIC | Age: 70
End: 2025-03-06
Payer: MEDICARE

## 2025-03-06 VITALS
BODY MASS INDEX: 42.55 KG/M2 | WEIGHT: 303.9 LBS | HEIGHT: 71 IN | HEART RATE: 67 BPM | SYSTOLIC BLOOD PRESSURE: 120 MMHG | DIASTOLIC BLOOD PRESSURE: 60 MMHG

## 2025-03-06 DIAGNOSIS — G47.33 OSA (OBSTRUCTIVE SLEEP APNEA): ICD-10-CM

## 2025-03-06 DIAGNOSIS — I10 BENIGN ESSENTIAL HTN: ICD-10-CM

## 2025-03-06 DIAGNOSIS — I48.0 PAROXYSMAL ATRIAL FIBRILLATION (HCC): Primary | ICD-10-CM

## 2025-03-06 PROCEDURE — 93000 ELECTROCARDIOGRAM COMPLETE: CPT | Performed by: STUDENT IN AN ORGANIZED HEALTH CARE EDUCATION/TRAINING PROGRAM

## 2025-03-06 PROCEDURE — 99214 OFFICE O/P EST MOD 30 MIN: CPT | Performed by: STUDENT IN AN ORGANIZED HEALTH CARE EDUCATION/TRAINING PROGRAM

## 2025-03-06 NOTE — PROGRESS NOTES
HEART AND VASCULAR  CARDIAC ELECTROPHYSIOLOGY   HEART RHYTHM CENTER  [facility]    Outpatient New Consult for assessment management of newly diagnosed paroxysmal atrial fibrillation  Today's Date: 03/06/25        Patient name: Christy Haro  YOB: 1955  Sex: male         Chief Complaint: As above      ASSESSMENT:  Problem List Items Addressed This Visit       Benign essential HTN    AYLEEN (obstructive sleep apnea)    BMI 40.0-44.9, adult (HCC)     Other Visit Diagnoses         Paroxysmal atrial fibrillation (HCC)    -  Primary    Relevant Orders    POCT ECG (Completed)                  PLAN:  Paroxysmal atrial fibrillation  -apixaban 5mg BID  -continue metoprolol succinate  -discussed EPS and ablation versus antiarrhythmic  -Patient wishes to proceed conservatively with monitoring for now  -If has recurrence of atrial fibrillation we will proceed with dofetilide loading    AYLEEN  -continue BiPAP    HTN  -Blood pressure 120/60  -Continue amlodipine 5 mg daily  -Continue enalapril 20 mg daily  -Continue hydralazine 25 mg 3 times daily    Heart failure preserved ejection fraction  -Continue furosemide  -Will defer management to cardiologist    Obesity  -Patient currently on Ozempic, just darted  -Encouraged aqua aerobics        Follow up in: As needed based on recurrence of A-fib     Orders Placed This Encounter   Procedures    POCT ECG     There are no discontinued medications.      .............................................................................................    HPI/Subjective:   Mr. Christy Haro is a 69 year old male with a history of morbid obesity, AYLEEN using BiPAP, diabetes, hypertension, heart failure preserved ejection fraction, and atrial fibrillation.  Due to his atrial fibrillation he was referred to electrophysiology.    Today, we reviewed his episodes of atrial fibrillation.  It occurred while he was asleep.  No symptoms.  Notes he has had his loop recorder in  for 2 years and this was the first occurrence.  He states he has lymphedema in his lower extremities and does get occasional swelling.  He also notes that if he pushes himself a bit hard, he does feel a pronounced heartbeat.  He has had no chest pain, sergey palpitations, dizziness, lightheadedness, syncope, near syncope.    We discussed options for management of his atrial fibrillation.  We began with conservative management with his current therapies and watching for more occurrences.  We discussed rate control with current therapies.  We discussed rhythm control with antiarrhythmics as well as EP study and ablation.  We discussed the risk of the ablation which include but not limited to bleeding, bruising, hematomas, fistulas, retroperitoneal bleeding, abdominal bleeding, stroke, heart attack, death, cardiac perforation requiring drain placement worst-case scenario surgical repair.  We discussed the possibility of damage to conduction system leading to the need for pacemaker.    We also discussed modifiable risk factors.  Patient is obese with a BMI of 41.84 kg/m² and is currently taking Ozempic.  He has difficulty walking due to his legs.  Him and his wife are currently seeking out a place to do aqua aerobics.  He has a history of AYLEEN and is currently wearing his BiPAP.  We discussed alcohol use, he does not use alcohol.    After discussion, the patient noted he would like to proceed conservatively for now.  Given he has had 1 episode of atrial fibrillation in 2 years I do not feel that this is unreasonable.  This will hopefully allow some time for weight loss in the setting of Ozempic to decrease his procedural risk with regards to access.  Will continue to monitor his loop recorder for recurrence.  Should he have recurrence of atrial fibrillation, he noted he would like to proceed with dofetilide loading.  At that time we would schedule dofetilide load.    Will not schedule follow-up at this time.  If he has  "recurrence of his atrial fibrillation we will place an order to have him admitted for dofetilide loading.  If that occurs, we will schedule follow-up at that time.      Complete 12 point ROS reviewed and otherwise non pertinent or negative except as per HPI pertinent positives in Cardiovascular and Respiratory emphasized. Please see paper chart for outpatient clinic patients where the patient completed the 12 point ROS survey.           Past Medical History:   Diagnosis Date    Arthritis     last assessed 7/1/15    Bronchitis 02/02/2023    Chronic diastolic (congestive) heart failure (HCC)     Diabetes mellitus (HCC)     type 2-last assessed 1/28/15    Diabetes mellitus (HCC) 03/16/2018    Dyslipidemia     GERD (gastroesophageal reflux disease)     Hypertension     Hypertensive urgency 03/16/2023    Irregular heart beat     last assessed 7/1/15    Lactic acidosis 10/12/2024    Obstructive sleep apnea     Palpitations     last assessed 9/7/17    Sexual dysfunction     last assessed 7/1/15    Thyroid disease     last assessed 7/1/15       Allergies   Allergen Reactions    Bactrim [Sulfamethoxazole-Trimethoprim] Vomiting     LIZ, seemed like a true allergy    Acetaminophen Other (See Comments)     Other reaction(s): Unknown Reaction  \"I had to go to the hospital and get shots after taking it 20yrs ago\"     I reviewed the Home Medication list and Allergies in the chart.   Scheduled Meds:  Current Outpatient Medications   Medication Sig Dispense Refill    amLODIPine (NORVASC) 10 mg tablet Take 1 tablet (10 mg total) by mouth daily 90 tablet 3    apixaban (Eliquis) 5 mg Take 1 tablet (5 mg total) by mouth 2 (two) times a day 60 tablet 5    atorvastatin (LIPITOR) 10 mg tablet Take 1 tablet by mouth once daily 30 tablet 0    Empagliflozin 25 MG TABS Take 1 tablet (25 mg total) by mouth daily 30 tablet 5    enalapril (VASOTEC) 20 mg tablet Take 1 tablet (20 mg total) by mouth 2 (two) times a day 60 tablet 5    furosemide " (LASIX) 40 mg tablet Take 60 mg by mouth daily      glucose blood (OneTouch Verio) test strip Use 1 each daily 100 each 5    hydrALAZINE (APRESOLINE) 25 mg tablet Take 1 tablet (25 mg total) by mouth 3 (three) times a day 270 tablet 3    ketoconazole (NIZORAL) 2 % shampoo Apply 1 Application topically if needed for irritation      Klor-Con M20 20 MEQ tablet       Lancets (onetouch ultrasoft) lancets Check glucose daily 100 each 1    metoprolol succinate (TOPROL-XL) 25 mg 24 hr tablet Take 1 tablet (25 mg total) by mouth daily 90 tablet 3    metoprolol succinate (TOPROL-XL) 50 mg 24 hr tablet Take 1 tablet (50 mg total) by mouth daily 90 tablet 3    multivitamin (THERAGRAN) TABS Take 1 tablet by mouth daily      omeprazole (PriLOSEC) 40 MG capsule Take 1 capsule (40 mg total) by mouth daily 90 capsule 3    semaglutide, 0.25 or 0.5 mg/dose, (Ozempic, 0.25 or 0.5 MG/DOSE,) 2 mg/3 mL injection pen Inject 0.75 mL (0.5 mg total) under the skin every 7 days 3 mL 2    Vitamin D, Cholecalciferol, 50 MCG (2000 UT) CAPS Take 50 mcg by mouth daily 90 capsule 3    aspirin 81 mg chewable tablet Chew 81 mg daily (Patient not taking: Reported on 3/3/2025)       No current facility-administered medications for this visit.     PRN Meds:.        Family History   Problem Relation Age of Onset    Hypertension Mother         essential    Hypertension Father         essential    Heart defect Father         cardiac disorder    Hypertension Brother     Diabetes Maternal Grandfather        Social History     Socioeconomic History    Marital status: Single     Spouse name: Not on file    Number of children: Not on file    Years of education: Not on file    Highest education level: Not on file   Occupational History    Not on file   Tobacco Use    Smoking status: Former     Current packs/day: 1.00     Types: Cigarettes     Passive exposure: Never    Smokeless tobacco: Never    Tobacco comments:     former smoker-quit 27 yrs ago 1pppd x 30 yrs  "as per [employer]   Vaping Use    Vaping status: Never Used   Substance and Sexual Activity    Alcohol use: Never    Drug use: Never    Sexual activity: Not Currently   Other Topics Concern    Not on file   Social History Narrative    Not on file     Social Drivers of Health     Financial Resource Strain: Low Risk  (1/8/2024)    Overall Financial Resource Strain (CARDIA)     Difficulty of Paying Living Expenses: Not hard at all   Food Insecurity: No Food Insecurity (1/30/2025)    Hunger Vital Sign     Worried About Running Out of Food in the Last Year: Never true     Ran Out of Food in the Last Year: Never true   Transportation Needs: No Transportation Needs (1/30/2025)    PRAPARE - Transportation     Lack of Transportation (Medical): No     Lack of Transportation (Non-Medical): No   Physical Activity: Not on file   Stress: Not on file   Social Connections: Not on file   Intimate Partner Violence: Not on file   Housing Stability: Low Risk  (1/30/2025)    Housing Stability Vital Sign     Unable to Pay for Housing in the Last Year: No     Number of Times Moved in the Last Year: 1     Homeless in the Last Year: No         OBJECTIVE:    /60 (BP Location: Right arm, Patient Position: Sitting, Cuff Size: Large)   Pulse 67   Ht 5' 11\" (1.803 m)   Wt (!) 138 kg (303 lb 14.4 oz)   BMI 42.39 kg/m²   Vitals:    03/06/25 1512   Weight: (!) 138 kg (303 lb 14.4 oz)     GEN: No acute distress, Alert and oriented, well appearing  HEENT:External ears normal, oral pharynx clear, mucous membranes moist  EYES: Pupils equal, sclera anicteric, midline, normal conjuctiva  NECK: No JVD, supple, no obvious masses or thryomegaly or goiter  CARDIOVASCULAR:  irregularly irregular, No murmur, rub, gallops S1,S2  LUNGS: Clear To auscultation bilaterally, normal effort, no rales, rhonchi, crackles   ABDOMEN:  nondistended, protuberant, soft, nontender    EXTREMITIES/VASCULAR:  No edema. warm an well perfused.  PSYCH: Normal Affect,  " "linear speech pattern without evidence of psychosis.   NEURO: Grossly intact, moving all extremiteis equal, face symmetric, alert and responsive, no obvious focal defecits   GAIT:  Ambulates normally without difficulty  HEME: No bleeding, bruising, petechia, purpura   SKIN: No significant rashes on visibile skin, warm, no diaphoresis or pallor.     Lab Results:       LABS:      Chemistry        Component Value Date/Time     12/16/2015 1330    K 4.1 02/14/2025 1134    K 4.2 10/01/2020 1638     02/14/2025 1134     10/01/2020 1638    CO2 23 02/14/2025 1134    CO2 28 10/01/2020 1638    BUN 27 (H) 02/14/2025 1134    BUN 17 10/01/2020 1638    CREATININE 1.32 (H) 02/14/2025 1134    CREATININE 1.3 (H) 10/01/2020 1638        Component Value Date/Time    CALCIUM 9.6 02/14/2025 1134    CALCIUM 9.8 10/01/2020 1638    ALKPHOS 35 02/14/2025 1134    ALKPHOS 30 07/30/2018 0010    AST 21 02/14/2025 1134    AST 25 08/06/2020 1106    AST 30 07/30/2018 0010    ALT 18 02/14/2025 1134    ALT 28 08/06/2020 1106    ALT 30 07/30/2018 0010    BILITOT 0.84 12/16/2015 1330            Lab Results   Component Value Date    CHOL 145 12/16/2015    CHOL 126 06/22/2015    CHOL 153 01/19/2015     Lab Results   Component Value Date    HDL 33 (L) 02/14/2025    HDL 32 (L) 07/31/2023    HDL 37 (L) 10/27/2022     Lab Results   Component Value Date    LDLCALC 90 02/14/2025    LDLCALC 71 07/31/2023    LDLCALC 82 10/27/2022     Lab Results   Component Value Date    TRIG 107 02/14/2025    TRIG 131 07/31/2023    TRIG 71 10/27/2022     No results found for: \"CHOLHDL\"    IMAGING: Echo complete w/ contrast if indicated  Result Date: 2/25/2025  Narrative:   Left Ventricle: Left ventricular cavity size is normal. Wall thickness is normal. The left ventricular ejection fraction is 65%. Systolic function is normal. Although no diagnostic regional wall motion abnormality was identified, this possibility cannot be completely excluded on the basis of " "this study. Diastolic function is mildly abnormal, consistent with grade I (abnormal) relaxation.   Right Ventricle: Right ventricular cavity size is normal. Systolic function is normal.   Aorta: The aortic root is normal in size. The ascending aorta is mildly dilated. The aortic root is 4.20 cm. The ascending aorta is 3.9 cm.     Cardiac EP device report  Result Date: 2025  Narrative: MDT LP2/ACTIVE SYSTEM IS MRI CONDITIONAL CARELINK TRANSMISSION: LOOP REOCRDER. PRESENTING RHYTHM NSR @ 60 BPM. BATTERY STATUS \"OK.\" 6 DEVICE CLASSIFIED AF EPISODES, LONGEST EPISODE IS 1:58 HOURS, AVAILABLE STRIPS DEMONSTRATE SUSPICIOUS FOR ATRIAL FIBRILLATION.  AF BURDEN IS 27.6%. PT NOT ON AC MEDS. TASKED TO DAYANARA YEBOAH     Cardiac EP device report  Result Date: 2025  Narrative: MDT LP2/ACTIVE SYSTEM IS MRI CONDITIONAL NON-BILLABLE. CARELINK TRANSMISSION: ALERT FOR 2 DEVICE CLASSIFIED AF EPISODES, LONGEST EPISODE IS 30 MINS, AVAILABLE STRIPS DEMONSTRATE PACs, PVCs HOWEVER CAN NOT R/O ATRIAL FIBRILLATION. AF BURDEN IS 0.1%. PT NOT NO AC MEDS. TASKED TO MM. DL        Cardiac testing:   Results for orders placed during the hospital encounter of 21    Echo complete with contrast if indicated    [place] [address]  [phone]    Transthoracic Echocardiogram  2D, M-mode, Doppler, and Color Doppler    Study date:  2021    Patient: REINA BURRIS  MR number: [id number]  Account number: [id number]  : 1955  Age: 65 years  Gender: Male  Status: Outpatient  Location: Echo lab  Height: 72 in  Weight: 335 lb  BP: 138/ 74 mmHg    Indications: Shortness of breath Shortness of breath.    Diagnoses: 786.05 - SHORTNESS OF BREATH    Sonographer:  Rachna Cerna RDCS, CCT  Primary Physician:  Theodora Taveras DO  Referring Physician:  ANDREEA Valentin  Group:  [facility]  Interpreting Physician:  Osorio Begum, " DO    SUMMARY    LEFT VENTRICLE:  Systolic function was normal. Ejection fraction was estimated to be 65 %.  There were no regional wall motion abnormalities.  Wall thickness was mildly increased.  There was mild concentric hypertrophy.  Doppler parameters were consistent with abnormal left ventricular relaxation (grade 1 diastolic dysfunction).    AORTIC VALVE:  There was mild regurgitation.    TRICUSPID VALVE:  There was mild regurgitation.    HISTORY: PRIOR HISTORY: obesity, hypertension, francisca Risk factors: hypertension and a family history of coronary artery disease.    PROCEDURE: The procedure was performed in the echo lab. This was a routine study. The transthoracic approach was used. The study included complete 2D imaging, M-mode, complete spectral Doppler, and color Doppler. The heart rate was 80 bpm,  at the start of the study. Intravenous contrast (Definity solution [1.3 ml Definity/8.7ml normal saline solution]) was administered. This was a technically difficult study.    LEFT VENTRICLE: Size was normal. Systolic function was normal. Ejection fraction was estimated to be 65 %. There were no regional wall motion abnormalities. Wall thickness was mildly increased. There was mild concentric hypertrophy.  DOPPLER: Doppler parameters were consistent with abnormal left ventricular relaxation (grade 1 diastolic dysfunction).    RIGHT VENTRICLE: The size was normal. Systolic function was normal. Wall thickness was normal.    LEFT ATRIUM: Size was normal.    RIGHT ATRIUM: Size was normal.    MITRAL VALVE: Valve structure was normal. There was normal leaflet separation. DOPPLER: The transmitral velocity was within the normal range. There was no evidence for stenosis. There was no regurgitation.    AORTIC VALVE: The valve was trileaflet. Leaflets exhibited mildly increased thickness, normal cuspal separation, and sclerosis. DOPPLER: Transaortic velocity was within the normal range. There was no evidence for stenosis.  "There was mild  regurgitation.    TRICUSPID VALVE: The valve structure was normal. There was normal leaflet separation. DOPPLER: The transtricuspid velocity was within the normal range. There was no evidence for stenosis. There was mild regurgitation.    PULMONIC VALVE: Leaflets exhibited normal thickness, no calcification, and normal cuspal separation. DOPPLER: The transpulmonic velocity was within the normal range. There was no regurgitation.    PERICARDIUM: There was no pericardial effusion. The pericardium was normal in appearance.    AORTA: The root exhibited normal size.    SYSTEMIC VEINS: IVC: The inferior vena cava was not well visualized.    SYSTEM MEASUREMENT TABLES    2D  %FS: 39.81 %  Ao Diam: 3.61 cm  EDV(Teich): 146.92 ml  EF(Teich): 69.84 %  ESV(Teich): 44.31 ml  IVSd: 1.44 cm  LA Diam: 4.63 cm  LVIDd: 5.49 cm  LVIDs: 3.31 cm  LVPWd: 1.44 cm  RWT: 0.52  SV(Teich): 102.61 ml    CW  AR Dec [place]: 1.04 m/s2  AR Dec Time: 3752.31 ms  AR PHT: 1088.17 ms  AR Vmax: 3.92 m/s  AR maxP.36 mmHg    MM  TAPSE: 2.98 cm    PW  E': 0.09 m/s  E/E': 4.66  MV A Kyle: 0.85 m/s  MV Dec [place]: 3.31 m/s2  MV DecT: 205.54 ms  MV E Kyle: 0.44 m/s  MV E/A Ratio: 0.52    [facility]    Prepared and electronically signed by    Osorio Begum DO  Signed 2021 12:20:56    No results found for this or any previous visit.    No results found for this or any previous visit.    No results found for this or any previous visit.          I reviewed and interpreted the following LABS/EKG/TELE/IMAGING and below is summary of my interpretation (if data available):    LABS:     Current EKG performed at today's visit and read by me personally reveals sinus rhythm first-degree AV block, left anterior fascicular block    Interrogation of Loop recorder 25  PRESENTING RHYTHM NSR @ 60 BPM. BATTERY STATUS \"OK.\" 6 DEVICE CLASSIFIED AF EPISODES, LONGEST EPISODE IS 1:58 HOURS, " AVAILABLE STRIPS DEMONSTRATE SUSPICIOUS FOR ATRIAL FIBRILLATION.  AF BURDEN IS 27.6% based on 24-hour timeframe PT NOT ON AC MEDS prior A-fib burden less than 0.1%    ECHO 2/25/25    Left Ventricle Left ventricular cavity size is normal. Wall thickness is normal. The left ventricular ejection fraction is 65%. Systolic function is normal. Although no diagnostic regional wall motion abnormality was identified, this possibility cannot be completely excluded on the basis of this study. Diastolic function is mildly abnormal, consistent with grade I (abnormal) relaxation.   Right Ventricle Right ventricular cavity size is normal. Systolic function is normal. Wall thickness is normal.   Left Atrium The atrium is normal in size.   Right Atrium The atrium is normal in size.   Aortic Valve The aortic valve is trileaflet. The leaflets are mildly thickened. The leaflets are not calcified. The leaflets exhibit normal mobility. There is no evidence of regurgitation. The aortic valve has no significant stenosis.   Mitral Valve There is no evidence of regurgitation. There is no evidence of stenosis. The mitral valve has normal structure and normal function.   Tricuspid Valve Tricuspid valve structure is normal. There is no evidence of regurgitation. There is no evidence of stenosis.   Pulmonic Valve Pulmonic valve structure is normal. There is no evidence of regurgitation. There is no evidence of stenosis.   Ascending Aorta The aortic root is normal in size. The ascending aorta is mildly dilated. The aortic root is 4.20 cm. The ascending aorta is 3.9 cm.   Pericardium There is no pericardial effusion. The pericardium is normal in appearance.

## 2025-03-10 ENCOUNTER — TELEPHONE (OUTPATIENT)
Dept: FAMILY MEDICINE CLINIC | Facility: CLINIC | Age: 70
End: 2025-03-10

## 2025-03-10 ENCOUNTER — TELEPHONE (OUTPATIENT)
Age: 70
End: 2025-03-10

## 2025-03-10 DIAGNOSIS — M10.00 IDIOPATHIC GOUT, UNSPECIFIED CHRONICITY, UNSPECIFIED SITE: ICD-10-CM

## 2025-03-10 DIAGNOSIS — M10.00 IDIOPATHIC GOUT, UNSPECIFIED CHRONICITY, UNSPECIFIED SITE: Primary | ICD-10-CM

## 2025-03-10 RX ORDER — INDOMETHACIN 50 MG/1
50 CAPSULE ORAL 2 TIMES DAILY WITH MEALS
Qty: 30 CAPSULE | Refills: 0 | Status: SHIPPED | OUTPATIENT
Start: 2025-03-10 | End: 2025-03-11

## 2025-03-10 NOTE — TELEPHONE ENCOUNTER
Pt called and is wondering if Dr Fountain can send in a new rx for his gout. He states the one he has is .     Please advise, thanks.

## 2025-03-11 RX ORDER — INDOMETHACIN 50 MG/1
CAPSULE ORAL
Qty: 30 CAPSULE | Refills: 0 | Status: SHIPPED | OUTPATIENT
Start: 2025-03-11 | End: 2025-03-11 | Stop reason: SDUPTHER

## 2025-03-11 RX ORDER — INDOMETHACIN 50 MG/1
50 CAPSULE ORAL 2 TIMES DAILY WITH MEALS
Qty: 30 CAPSULE | Refills: 0 | Status: SHIPPED | OUTPATIENT
Start: 2025-03-11

## 2025-03-12 ENCOUNTER — HOSPITAL ENCOUNTER (EMERGENCY)
Facility: HOSPITAL | Age: 70
Discharge: HOME | End: 2025-03-12
Attending: EMERGENCY MEDICINE
Payer: MEDICARE

## 2025-03-12 ENCOUNTER — APPOINTMENT (EMERGENCY)
Dept: CT IMAGING | Facility: HOSPITAL | Age: 70
End: 2025-03-12
Payer: MEDICARE

## 2025-03-12 VITALS
RESPIRATION RATE: 18 BRPM | SYSTOLIC BLOOD PRESSURE: 121 MMHG | OXYGEN SATURATION: 95 % | HEART RATE: 64 BPM | TEMPERATURE: 97.5 F | DIASTOLIC BLOOD PRESSURE: 60 MMHG

## 2025-03-12 DIAGNOSIS — S09.90XA CLOSED HEAD INJURY, INITIAL ENCOUNTER: Primary | ICD-10-CM

## 2025-03-12 DIAGNOSIS — Z79.01 CHRONIC ANTICOAGULATION: ICD-10-CM

## 2025-03-12 PROCEDURE — 70450 CT HEAD/BRAIN W/O DYE: CPT

## 2025-03-12 PROCEDURE — 99284 EMERGENCY DEPT VISIT MOD MDM: CPT | Performed by: EMERGENCY MEDICINE

## 2025-03-12 PROCEDURE — 99283 EMERGENCY DEPT VISIT LOW MDM: CPT

## 2025-03-12 NOTE — ED PROVIDER NOTES
Time reflects when diagnosis was documented in both MDM as applicable and the Disposition within this note       Time User Action Codes Description Comment    3/12/2025  7:15 PM Leonel Hartmann [S09.90XA] Closed head injury, initial encounter     3/12/2025  7:16 PM Leonel Hartmann [Z79.01] Chronic anticoagulation           ED Disposition       ED Disposition   Discharge    Condition   Stable    Date/Time   Wed Mar 12, 2025  7:16 PM    Comment   Christy Haro discharge to home/self care.                   Assessment & Plan       Medical Decision Making  69-year-old male presenting here for head strike while on Eliquis.  Will obtain CT head to rule out intracranial hemorrhage, skull fracture.  Differential also includes contusion, laceration, abrasion.  No evidence of laceration or abrasion.  No neck pain or neurologic symptoms.  Blood pressure low normal but appears to be his baseline does follow closely with cardiology.  No evidence of significant cause of symptoms at this time.  Discussed icing, outpatient follow-up, return ER precautions if nothing on head CT.    Problems Addressed:  Chronic anticoagulation: chronic illness or injury that poses a threat to life or bodily functions  Closed head injury, initial encounter: acute illness or injury    Amount and/or Complexity of Data Reviewed  Radiology: ordered.             Medications - No data to display    ED Risk Strat Scores                            SBIRT 22yo+      Flowsheet Row Most Recent Value   Initial Alcohol Screen: US AUDIT-C     1. How often do you have a drink containing alcohol? 0 Filed at: 03/12/2025 1841   2. How many drinks containing alcohol do you have on a typical day you are drinking?  0 Filed at: 03/12/2025 1841   3b. FEMALE Any Age, or MALE 65+: How often do you have 4 or more drinks on one occassion? 0 Filed at: 03/12/2025 1841   Audit-C Score 0 Filed at: 03/12/2025 1841   LOPEZ: How many times in the past year have you...     Used an illegal drug or used a prescription medication for non-medical reasons? Never Filed at: 03/12/2025 1841                            History of Present Illness       Chief Complaint   Patient presents with    Headache     Pt stated he hit the top of his head getting in and out of car. Patient is on equlis no LOC        Past Medical History:   Diagnosis Date    Arthritis     last assessed 7/1/15    Bronchitis 02/02/2023    Chronic diastolic (congestive) heart failure (HCC)     Diabetes mellitus (HCC)     type 2-last assessed 1/28/15    Diabetes mellitus (HCC) 03/16/2018    Dyslipidemia     GERD (gastroesophageal reflux disease)     Hypertension     Hypertensive urgency 03/16/2023    Irregular heart beat     last assessed 7/1/15    Lactic acidosis 10/12/2024    Obstructive sleep apnea     Palpitations     last assessed 9/7/17    Sexual dysfunction     last assessed 7/1/15    Thyroid disease     last assessed 7/1/15      Past Surgical History:   Procedure Laterality Date    COLONOSCOPY      TONSILLECTOMY        Family History   Problem Relation Age of Onset    Hypertension Mother         essential    Hypertension Father         essential    Heart defect Father         cardiac disorder    Hypertension Brother     Diabetes Maternal Grandfather       Social History     Tobacco Use    Smoking status: Former     Current packs/day: 1.00     Types: Cigarettes     Passive exposure: Never    Smokeless tobacco: Never    Tobacco comments:     former smoker-quit 27 yrs ago 1pppd x 30 yrs as per [employer]   Vaping Use    Vaping status: Never Used   Substance Use Topics    Alcohol use: Never    Drug use: Never      E-Cigarette/Vaping    E-Cigarette Use Never User       E-Cigarette/Vaping Substances    Nicotine No     THC No     CBD No     Flavoring No     Other No     Unknown No       I have reviewed and agree with the history as documented.     69-year-old male past medical history includes paroxysmal atrial fibrillation  on anticoagulation with Eliquis, hypertension, hyperlipidemia, obesity, AYLEEN, T2DM, CHF, presented to the ER for evaluation of head trauma.  Patient states that approximately 2 hours ago he struck his head getting out of the car he did not hit the ground he did not lose consciousness.  He struck the top of his head he notes some soreness there denies any bleeding.  States that he was told to come to the ER for evaluation if he struck his head while on blood thinner.  Denies any dizziness nausea vomiting numbness tingling weakness neck pain vision disturbances syncope.      Headache  Associated symptoms: no abdominal pain, no back pain, no cough, no ear pain, no eye pain, no fever, no seizures, no sore throat and no vomiting        Review of Systems   Constitutional:  Negative for chills and fever.   HENT:  Negative for ear pain and sore throat.    Eyes:  Negative for pain and visual disturbance.   Respiratory:  Negative for cough and shortness of breath.    Cardiovascular:  Negative for chest pain and palpitations.   Gastrointestinal:  Negative for abdominal pain and vomiting.   Genitourinary:  Negative for dysuria and hematuria.   Musculoskeletal:  Negative for arthralgias and back pain.   Skin:  Negative for color change and rash.   Neurological:  Positive for headaches. Negative for seizures and syncope.   All other systems reviewed and are negative.          Objective       ED Triage Vitals [03/12/25 1825]   Temperature Pulse Blood Pressure Respirations SpO2 Patient Position - Orthostatic VS   98.7 °F (37.1 °C) 67 106/53 18 95 % Sitting      Temp Source Heart Rate Source BP Location FiO2 (%) Pain Score    Temporal Monitor Right arm -- 2      Vitals      Date and Time Temp Pulse SpO2 Resp BP Pain Score FACES Pain Rating User   03/12/25 1825 98.7 °F (37.1 °C) 67 95 % 18 106/53 2 --             Physical Exam  Vitals and nursing note reviewed.   Constitutional:       General: He is not in acute distress.      Appearance: He is well-developed.   HENT:      Head: Normocephalic and atraumatic.      Comments: Mild soft tissue swelling and slight tenderness to the top of the left parietal scalp.  There is no laceration or large hematoma.  No Stock[place] sign no periorbital ecchymosis.  Eyes:      Conjunctiva/sclera: Conjunctivae normal.   Cardiovascular:      Rate and Rhythm: Normal rate and regular rhythm.      Heart sounds: No murmur heard.  Pulmonary:      Effort: Pulmonary effort is normal. No respiratory distress.      Breath sounds: Normal breath sounds.   Abdominal:      Palpations: Abdomen is soft.      Tenderness: There is no abdominal tenderness.   Musculoskeletal:         General: No swelling.      Cervical back: Neck supple.   Skin:     General: Skin is warm and dry.      Capillary Refill: Capillary refill takes less than 2 seconds.   Neurological:      General: No focal deficit present.      Mental Status: He is alert. Mental status is at baseline.   Psychiatric:         Mood and Affect: Mood normal.         Results Reviewed       None            CT head without contrast    (Results Pending)       Procedures    ED Medication and Procedure Management   Prior to Admission Medications   Prescriptions Last Dose Informant Patient Reported? Taking?   Empagliflozin 25 MG TABS  Self No No   Sig: Take 1 tablet (25 mg total) by mouth daily   Klor-Con M20 20 MEQ tablet  Self Yes No   Lancets (onetouch ultrasoft) lancets  Self No No   Sig: Check glucose daily   Vitamin D, Cholecalciferol, 50 MCG (2000 UT) CAPS  Self No No   Sig: Take 50 mcg by mouth daily   amLODIPine (NORVASC) 10 mg tablet  Self No No   Sig: Take 1 tablet (10 mg total) by mouth daily   apixaban (Eliquis) 5 mg  Self No No   Sig: Take 1 tablet (5 mg total) by mouth 2 (two) times a day   aspirin 81 mg chewable tablet  Self Yes No   Sig: Chew 81 mg daily   Patient not taking: Reported on 3/3/2025   atorvastatin (LIPITOR) 10 mg tablet  Self No No   Sig: Take 1 tablet  by mouth once daily   enalapril (VASOTEC) 20 mg tablet  Self No No   Sig: Take 1 tablet (20 mg total) by mouth 2 (two) times a day   furosemide (LASIX) 40 mg tablet  Self Yes No   Sig: Take 60 mg by mouth daily   glucose blood (OneTouch Verio) test strip  Self No No   Sig: Use 1 each daily   hydrALAZINE (APRESOLINE) 25 mg tablet  Self No No   Sig: Take 1 tablet (25 mg total) by mouth 3 (three) times a day   indomethacin (INDOCIN) 50 mg capsule   No No   Sig: Take 1 capsule (50 mg total) by mouth 2 (two) times a day with meals   ketoconazole (NIZORAL) 2 % shampoo  Self No No   Sig: Apply 1 Application topically if needed for irritation   metoprolol succinate (TOPROL-XL) 25 mg 24 hr tablet  Self No No   Sig: Take 1 tablet (25 mg total) by mouth daily   metoprolol succinate (TOPROL-XL) 50 mg 24 hr tablet  Self No No   Sig: Take 1 tablet (50 mg total) by mouth daily   multivitamin (THERAGRAN) TABS  Self Yes No   Sig: Take 1 tablet by mouth daily   omeprazole (PriLOSEC) 40 MG capsule  Self No No   Sig: Take 1 capsule (40 mg total) by mouth daily   semaglutide, 0.25 or 0.5 mg/dose, (Ozempic, 0.25 or 0.5 MG/DOSE,) 2 mg/3 mL injection pen  Self No No   Sig: Inject 0.75 mL (0.5 mg total) under the skin every 7 days      Facility-Administered Medications: None     Patient's Medications   Discharge Prescriptions    No medications on file     No discharge procedures on file.  ED SEPSIS DOCUMENTATION   Time reflects when diagnosis was documented in both MDM as applicable and the Disposition within this note       Time User Action Codes Description Comment    3/12/2025  7:15 PM Leonel Hartmann [S09.90XA] Closed head injury, initial encounter     3/12/2025  7:16 PM Leonel Hartmann [Z79.01] Chronic anticoagulation                  Leonel Hartmann,   03/13/25 0039

## 2025-03-12 NOTE — DISCHARGE INSTRUCTIONS
Thank you for visiting the Emergency Department today.    No signs of intracranial bleeding or skull fracture.  Rest, ice.  If severe headache nausea vomiting vision changes numbness tingling weakness loss of consciousness or any other concerns return to ER for reevaluation.

## 2025-03-13 ENCOUNTER — VBI (OUTPATIENT)
Dept: FAMILY MEDICINE CLINIC | Facility: CLINIC | Age: 70
End: 2025-03-13

## 2025-03-13 NOTE — TELEPHONE ENCOUNTER
03/13/25 3:13 PM    Patient contacted post ED visit, [facility] department spoke with patient/caregiver and outreach was successful.    Thank you.  Nicky Del Castillo MA  [facility]

## 2025-03-13 NOTE — TELEPHONE ENCOUNTER
Pt called in confused why the medication(indomethacin (INDOCIN) 50 mg capsule) hasn't been pushed through and why he hasn't gotten a replacement if this isn't going to be approved by insurance. PCP please call office to further advise on an update.

## 2025-03-13 NOTE — TELEPHONE ENCOUNTER
Patient called to advise insurance is not approving medication. States pharmacy sent fax to office and has not received response. Patient requests call back with update as he is in pain.

## 2025-03-13 NOTE — TELEPHONE ENCOUNTER
I spoke with pt and let him know insurance isn't covering the rx. I told him they would cover either Ibuprofen or Meloxicam per the pharmacy but he does not want to switch since the Indocin works for him. I told him he could try paying out of pocket with a goodrx card. Pt stated he is going to call his insurance company.

## 2025-03-14 NOTE — TELEPHONE ENCOUNTER
Patient piedra in again and I read last note from office that states insurance will only cover ibuprofen or meloxicam. Patient aware can self pay for Indocin at pharmacy with good rx card. Patient states he will look into this option.

## 2025-03-17 ENCOUNTER — APPOINTMENT (EMERGENCY)
Dept: CT IMAGING | Facility: HOSPITAL | Age: 70
End: 2025-03-17
Payer: MEDICARE

## 2025-03-17 ENCOUNTER — HOSPITAL ENCOUNTER (EMERGENCY)
Facility: HOSPITAL | Age: 70
Discharge: HOME | End: 2025-03-17
Attending: EMERGENCY MEDICINE | Admitting: EMERGENCY MEDICINE
Payer: MEDICARE

## 2025-03-17 VITALS
RESPIRATION RATE: 17 BRPM | SYSTOLIC BLOOD PRESSURE: 97 MMHG | HEART RATE: 66 BPM | OXYGEN SATURATION: 95 % | TEMPERATURE: 97.6 F | DIASTOLIC BLOOD PRESSURE: 58 MMHG

## 2025-03-17 DIAGNOSIS — S00.03XA SCALP CONTUSION: Primary | ICD-10-CM

## 2025-03-17 PROCEDURE — 99283 EMERGENCY DEPT VISIT LOW MDM: CPT | Performed by: EMERGENCY MEDICINE

## 2025-03-17 PROCEDURE — 99283 EMERGENCY DEPT VISIT LOW MDM: CPT

## 2025-03-17 PROCEDURE — 90715 TDAP VACCINE 7 YRS/> IM: CPT | Performed by: EMERGENCY MEDICINE

## 2025-03-17 PROCEDURE — 70450 CT HEAD/BRAIN W/O DYE: CPT

## 2025-03-17 PROCEDURE — 90471 IMMUNIZATION ADMIN: CPT

## 2025-03-17 RX ADMIN — TETANUS TOXOID, REDUCED DIPHTHERIA TOXOID AND ACELLULAR PERTUSSIS VACCINE, ADSORBED 0.5 ML: 5; 2.5; 8; 8; 2.5 SUSPENSION INTRAMUSCULAR at 13:09

## 2025-03-17 NOTE — DISCHARGE INSTRUCTIONS
Ice to sore area  Return with worsening or change in headache.  Lightheadedness dizziness nausea vomiting difficulties with balance numbness tingling weakness  forgetfulness/memory problems or any new or worsening symptoms

## 2025-03-17 NOTE — ED PROVIDER NOTES
"Emergency Department Trauma Note  Christy Haro 69 y.o. male MRN: [id number]  Unit/Bed#: RM03/RM03 Encounter: [id number]      Trauma Alert: Trauma Acuity: Trauma Evaluation  Model of Arrival:   via    Trauma Team: Current Providers  Attending Provider: Manuela Cramer MD  ED Technician: Monae Okeefe  Registered Nurse: Shannen Vargas RN  Consultants:     None      History of Present Illness     Chief Complaint:   Chief Complaint   Patient presents with    Head Injury w/unknown LOC    Head Injury     Patient was walking down steps and bumped head on pipe. Blood thinners for afib. No LOC      HPI:  Christy Haro is a 69 y.o. male who presents with see below.  Mechanism:Details of Incident: patient reports that he \"bumped\" his head on pipe in basement. denies any loc. takes blood thinner for afib. Injury Date: 03/17/25 Injury Time: 1215 Injury Occurence Location - Specify County: [facility]    69-year-old male was walking down the steps when the left side of his head hit a pipe he did not sustain a fall there was no loss of consciousness patient is anticoagulated on Eliquis secondary to paroxysmal A-fib.  Patient planes of localized pain and a headache but no visual disturbance no neck pain no numbness tingling or weakness no nausea or vomiting no visual disturbance difficulties with speech or balance.  Denies any rib pain shortness of breath no chest pain no abdominal pain no upper or lower back pain.  States if it was not for being on Eliquis and the cautions provided to him by his cardiologist he would not even be here patient denies any other injuries he was evaluated  5 days ago after a head strike to the top of his head getting out of a car he did not sustain a fall at that time in the interim he has been asymptomatic  Past medical history hypertension hyperlipidemia paroxysmal A-fib on Eliquis GERD obstructive sleep apnea on BiPAP type 2 diabetes lymphedema chronic kidney disease CHF with preserved " ejection fraction  Surgical history is colonoscopy and tonsillectomy      Review of Systems   Constitutional:  Negative for activity change, chills and fever.   HENT:  Negative for congestion, ear pain, rhinorrhea, sneezing and sore throat.    Eyes:  Negative for discharge and visual disturbance.   Respiratory:  Negative for cough and shortness of breath.    Cardiovascular:  Negative for chest pain and leg swelling.   Gastrointestinal:  Negative for abdominal pain, blood in stool, diarrhea, nausea and vomiting.   Endocrine: Negative for polyuria.   Genitourinary:  Negative for dysuria, flank pain, frequency and urgency.   Musculoskeletal:  Negative for back pain, myalgias, neck pain and neck stiffness.   Skin:  Negative for rash and wound.   Neurological:  Positive for headaches. Negative for dizziness, speech difficulty, weakness, light-headedness and numbness.   Hematological:  Negative for adenopathy.   Psychiatric/Behavioral:  Negative for confusion.    All other systems reviewed and are negative.      Historical Information     Immunizations:   Immunization History   Administered Date(s) Administered    COVID-19 Moderna mRNA Vaccine 12 Yr+ 50 mcg/0.5 mL (Spikevax) 12/24/2023    COVID-19 PFIZER VACCINE 0.3 ML IM 02/25/2021, 03/25/2021, 10/03/2021    COVID-19 Pfizer Vac BIVALENT Emigdio-sucrose 12 Yr+ IM 12/29/2022    COVID-19 Pfizer vac (Emigdio-sucrose, gray cap) 12 yr+ IM 05/03/2022    INFLUENZA 10/14/2018, 10/01/2019, 08/25/2020, 10/12/2022, 10/14/2023    Influenza Split High Dose Preservative Free IM 10/13/2024    Influenza, high dose seasonal 0.7 mL 10/19/2021    Influenza, seasonal, injectable 10/08/2013, 10/13/2014, 10/15/2015, 09/26/2016, 09/09/2017    Pneumococcal Conjugate 13-Valent 08/25/2020    Pneumococcal Conjugate Vaccine 20-valent (Pcv20), Polysace 12/29/2022    Pneumococcal Polysaccharide PPV23 02/18/2015    Tdap 11/28/2015, 03/17/2025    influenza, trivalent, adjuvanted 10/08/2019       Past Medical  History:   Diagnosis Date    Arthritis     last assessed 7/1/15    Bronchitis 02/02/2023    Chronic diastolic (congestive) heart failure (HCC)     Diabetes mellitus (HCC)     type 2-last assessed 1/28/15    Diabetes mellitus (HCC) 03/16/2018    Dyslipidemia     GERD (gastroesophageal reflux disease)     Hypertension     Hypertensive urgency 03/16/2023    Irregular heart beat     last assessed 7/1/15    Lactic acidosis 10/12/2024    Obstructive sleep apnea     Palpitations     last assessed 9/7/17    Sexual dysfunction     last assessed 7/1/15    Thyroid disease     last assessed 7/1/15       Family History   Problem Relation Age of Onset    Hypertension Mother         essential    Hypertension Father         essential    Heart defect Father         cardiac disorder    Hypertension Brother     Diabetes Maternal Grandfather      Past Surgical History:   Procedure Laterality Date    COLONOSCOPY      TONSILLECTOMY       Social History     Tobacco Use    Smoking status: Former     Current packs/day: 1.00     Types: Cigarettes     Passive exposure: Never    Smokeless tobacco: Never    Tobacco comments:     former smoker-quit 27 yrs ago 1pppd x 30 yrs as per [employer]   Vaping Use    Vaping status: Never Used   Substance Use Topics    Alcohol use: Never    Drug use: Never     E-Cigarette/Vaping    E-Cigarette Use Never User      E-Cigarette/Vaping Substances    Nicotine No     THC No     CBD No     Flavoring No     Other No     Unknown No        Family History: non-contributory    Meds/Allergies   Prior to Admission Medications   Prescriptions Last Dose Informant Patient Reported? Taking?   Empagliflozin 25 MG TABS  Self No No   Sig: Take 1 tablet (25 mg total) by mouth daily   Klor-Con M20 20 MEQ tablet  Self Yes No   Lancets (onetouch ultrasoft) lancets  Self No No   Sig: Check glucose daily   Vitamin D, Cholecalciferol, 50 MCG (2000 UT) CAPS  Self No No   Sig: Take 50 mcg by mouth daily   amLODIPine (NORVASC) 10 mg  "tablet  Self No No   Sig: Take 1 tablet (10 mg total) by mouth daily   apixaban (Eliquis) 5 mg  Self No No   Sig: Take 1 tablet (5 mg total) by mouth 2 (two) times a day   aspirin 81 mg chewable tablet  Self Yes No   Sig: Chew 81 mg daily   Patient not taking: Reported on 3/3/2025   atorvastatin (LIPITOR) 10 mg tablet  Self No No   Sig: Take 1 tablet by mouth once daily   enalapril (VASOTEC) 20 mg tablet  Self No No   Sig: Take 1 tablet (20 mg total) by mouth 2 (two) times a day   furosemide (LASIX) 40 mg tablet  Self Yes No   Sig: Take 60 mg by mouth daily   glucose blood (OneTouch Verio) test strip  Self No No   Sig: Use 1 each daily   hydrALAZINE (APRESOLINE) 25 mg tablet  Self No No   Sig: Take 1 tablet (25 mg total) by mouth 3 (three) times a day   indomethacin (INDOCIN) 50 mg capsule   No No   Sig: Take 1 capsule (50 mg total) by mouth 2 (two) times a day with meals   ketoconazole (NIZORAL) 2 % shampoo  Self No No   Sig: Apply 1 Application topically if needed for irritation   metoprolol succinate (TOPROL-XL) 25 mg 24 hr tablet  Self No No   Sig: Take 1 tablet (25 mg total) by mouth daily   metoprolol succinate (TOPROL-XL) 50 mg 24 hr tablet  Self No No   Sig: Take 1 tablet (50 mg total) by mouth daily   multivitamin (THERAGRAN) TABS  Self Yes No   Sig: Take 1 tablet by mouth daily   omeprazole (PriLOSEC) 40 MG capsule  Self No No   Sig: Take 1 capsule (40 mg total) by mouth daily   semaglutide, 0.25 or 0.5 mg/dose, (Ozempic, 0.25 or 0.5 MG/DOSE,) 2 mg/3 mL injection pen  Self No No   Sig: Inject 0.75 mL (0.5 mg total) under the skin every 7 days      Facility-Administered Medications: None       Allergies   Allergen Reactions    Bactrim [Sulfamethoxazole-Trimethoprim] Vomiting     LIZ, seemed like a true allergy    Acetaminophen Other (See Comments)     Other reaction(s): Unknown Reaction  \"I had to go to the hospital and get shots after taking it 20yrs ago\"       PHYSICAL EXAM    PE limited by: " n/a    Objective   Vitals:   First set: Temperature: 97.5 °F (36.4 °C) (03/17/25 1225)  Pulse: 70 (03/17/25 1225)  Respirations: 19 (03/17/25 1225)  Blood Pressure: 142/63 (03/17/25 1225)  SpO2: 98 % (03/17/25 1225)    Primary Survey:   (A) Airway: speaks full sentences  (B) Breathing: BS equal bilaterally  (C) Circulation: Pulses:   normal  (D) Disabliity:  GCS Total:  15  (E) Expose:  Completed    Secondary Survey: (Click on Physical Exam tab above)  Physical Exam  Vitals and nursing note reviewed.   Constitutional:       General: He is not in acute distress.     Appearance: He is well-developed. He is not ill-appearing, toxic-appearing or diaphoretic.   HENT:      Head: Normocephalic and atraumatic.        Comments: No facial bone tenderness no tenderness to mastoid region     Right Ear: Tympanic membrane and external ear normal.      Left Ear: Tympanic membrane and external ear normal.      Nose: Nose normal. No congestion or rhinorrhea.      Mouth/Throat:      Mouth: Mucous membranes are moist.      Pharynx: No oropharyngeal exudate or posterior oropharyngeal erythema.   Eyes:      General: No scleral icterus.        Right eye: No discharge.         Left eye: No discharge.      Extraocular Movements: Extraocular movements intact.      Conjunctiva/sclera: Conjunctivae normal.      Pupils: Pupils are equal, round, and reactive to light.      Comments: 2mm equal   Neck:      Comments: No midline or paraspinous tenderness palpation no pain with range of motion  Cardiovascular:      Rate and Rhythm: Normal rate and regular rhythm.      Heart sounds: Normal heart sounds.   Pulmonary:      Effort: Pulmonary effort is normal. No respiratory distress.      Breath sounds: Normal breath sounds. No wheezing, rhonchi or rales.   Chest:      Chest wall: No tenderness.   Abdominal:      General: Bowel sounds are normal. There is no distension.      Palpations: Abdomen is soft. There is no mass.      Tenderness: There is no  abdominal tenderness. There is no right CVA tenderness, left CVA tenderness, guarding or rebound.      Comments: Back: no mildline T or L-spine tenderness   Musculoskeletal:         General: No tenderness or deformity. Normal range of motion.      Cervical back: Normal range of motion and neck supple. No rigidity or tenderness.      Right lower leg: No edema.      Left lower leg: No edema.   Lymphadenopathy:      Cervical: No cervical adenopathy.   Skin:     General: Skin is warm and dry.      Capillary Refill: Capillary refill takes less than 2 seconds.   Neurological:      General: No focal deficit present.      Mental Status: He is alert and oriented to person, place, and time.      Cranial Nerves: No cranial nerve deficit.      Sensory: No sensory deficit.      Motor: No weakness or abnormal muscle tone.      Coordination: Coordination normal.      Deep Tendon Reflexes: Reflexes normal.      Comments: GCS 15 equal handgrips no pronator drift heel-to-shin is intact reflexes are symmetric strength is 5 out of 5 light touch is intact   Psychiatric:         Mood and Affect: Mood normal.         Cervical spine cleared by clinical criteria? Yes     Invasive Devices       None                   Lab Results:   Results Reviewed       None                   Imaging Studies:   Direct to CT: No  CT head without contrast   Final Result by Jose Galeana MD (03/17 1305)      No acute intracranial abnormality.                  Workstation performed: [id number]               Procedures  Procedures         ED Course  ED Course as of 03/17/25 1439   Mon Mar 17, 2025   1331 Reviewed CT results and return precautions patient comfortable with plan           Medical Decision Making  69-year-old male on Eliquis for paroxysmal A-fib was going down the steps hit his head on a pipe that was overhead impacting the left side of his skull there was no fall he complains of generalized headache no neck pain, eval was called at triage.   Patient evaluated 5 days earlier for a head strike today there is no loss of consciousness or fall his neurologic exam is nonfocal there is no neck tenderness or pain feels improved with palpation and exam he was able to be cleared utilizing Nexus criteria proceed with CT of the head no laceration; patient is inquiring about his tetanus he is due proximately 6 months we will update today    Previous ED visit and cardiology note from 3/6/25 reviewed    Amount and/or Complexity of Data Reviewed  Radiology: ordered.    Risk  Prescription drug management.                Disposition  Priority One Transfer: No  Final diagnoses:   Scalp contusion - left     Time reflects when diagnosis was documented in both MDM as applicable and the Disposition within this note       Time User Action Codes Description Comment    3/17/2025  1:00 PM Manuela Cramer Add [S00.03XA] Scalp contusion     3/17/2025  1:09 PM Manuela Cramer Modify [S00.03XA] Scalp contusion left          ED Disposition       ED Disposition   Discharge    Condition   Stable    Date/Time   Mon Mar 17, 2025  1:09 PM    Comment   Mhamd Earnestram discharge to home/self care.                   Follow-up Information       Follow up With Specialties Details Why Contact Info Additional Information    [facility] Emergency Department Emergency Medicine Go to  If symptoms worsen [address] [facility] [id number]  [phone] [facility], [facility] Emergency Department, [address], [place], [address]          Discharge Medication List as of 3/17/2025  1:10 PM        CONTINUE these medications which have NOT CHANGED    Details   amLODIPine (NORVASC) 10 mg tablet Take 1 tablet (10 mg total) by mouth daily, Starting Mon 5/6/2024, Normal      apixaban (Eliquis) 5 mg Take 1 tablet (5 mg total) by mouth 2 (two) times a day, Starting Fri 2/14/2025, Normal      aspirin 81 mg chewable tablet Chew 81 mg daily, Historical Med       atorvastatin (LIPITOR) 10 mg tablet Take 1 tablet by mouth once daily, Starting Sun 8/4/2024, Normal      Empagliflozin 25 MG TABS Take 1 tablet (25 mg total) by mouth daily, Starting Mon 8/26/2024, Until Thu 3/6/2025, Normal      enalapril (VASOTEC) 20 mg tablet Take 1 tablet (20 mg total) by mouth 2 (two) times a day, Starting Mon 2/3/2025, Normal      furosemide (LASIX) 40 mg tablet Take 60 mg by mouth daily, Starting Sat 11/16/2024, Historical Med      glucose blood (OneTouch Verio) test strip Use 1 each daily, Starting Mon 12/23/2024, Normal      hydrALAZINE (APRESOLINE) 25 mg tablet Take 1 tablet (25 mg total) by mouth 3 (three) times a day, Starting Mon 5/6/2024, Normal      indomethacin (INDOCIN) 50 mg capsule Take 1 capsule (50 mg total) by mouth 2 (two) times a day with meals, Starting Tue 3/11/2025, Normal      ketoconazole (NIZORAL) 2 % shampoo Apply 1 Application topically if needed for irritation, Starting Wed 11/13/2024, No Print      Klor-Con M20 20 MEQ tablet Historical Med      Lancets (onetouch ultrasoft) lancets Check glucose daily, Normal      !! metoprolol succinate (TOPROL-XL) 25 mg 24 hr tablet Take 1 tablet (25 mg total) by mouth daily, Starting Mon 5/6/2024, Normal      !! metoprolol succinate (TOPROL-XL) 50 mg 24 hr tablet Take 1 tablet (50 mg total) by mouth daily, Starting Mon 5/6/2024, Normal      multivitamin (THERAGRAN) TABS Take 1 tablet by mouth daily, Historical Med      omeprazole (PriLOSEC) 40 MG capsule Take 1 capsule (40 mg total) by mouth daily, Starting Mon 5/6/2024, Normal      semaglutide, 0.25 or 0.5 mg/dose, (Ozempic, 0.25 or 0.5 MG/DOSE,) 2 mg/3 mL injection pen Inject 0.75 mL (0.5 mg total) under the skin every 7 days, Starting Mon 12/23/2024, Normal      Vitamin D, Cholecalciferol, 50 MCG (2000 UT) CAPS Take 50 mcg by mouth daily, Starting Wed 1/4/2023, Normal       !! - Potential duplicate medications found. Please discuss with provider.        No discharge  procedures on file.    PDMP Review         Value Time User    PDMP Reviewed  Yes 10/12/2024  9:42 PM Nathanael Dang PA-C            ED Provider  Electronically Signed by           Manuela Cramer MD  03/17/25 [id number]

## 2025-03-18 ENCOUNTER — VBI (OUTPATIENT)
Dept: FAMILY MEDICINE CLINIC | Facility: CLINIC | Age: 70
End: 2025-03-18

## 2025-03-18 NOTE — TELEPHONE ENCOUNTER
03/18/25 8:35 AM    Patient contacted post ED visit, [facility] department spoke with patient/caregiver and outreach was successful.    Thank you.  JUVE MILLER MA  [facility]

## 2025-03-19 ENCOUNTER — TELEPHONE (OUTPATIENT)
Dept: FAMILY MEDICINE CLINIC | Facility: CLINIC | Age: 70
End: 2025-03-19

## 2025-03-19 NOTE — TELEPHONE ENCOUNTER
Please see scanned prior auth for Indomethacin capsules received from [facility] pharmacy. Thank you.

## 2025-03-21 ENCOUNTER — TELEPHONE (OUTPATIENT)
Age: 70
End: 2025-03-21

## 2025-03-21 NOTE — TELEPHONE ENCOUNTER
Received a call from carlos and looking to speak to device, and recorder. Spoke to Tena and warm transferred call

## 2025-03-26 DIAGNOSIS — I50.32 CHRONIC DIASTOLIC HEART FAILURE (HCC): ICD-10-CM

## 2025-03-26 DIAGNOSIS — E87.6 HYPOKALEMIA: Primary | ICD-10-CM

## 2025-03-26 DIAGNOSIS — K21.9 GASTROESOPHAGEAL REFLUX DISEASE WITHOUT ESOPHAGITIS: ICD-10-CM

## 2025-03-26 DIAGNOSIS — I10 BENIGN ESSENTIAL HTN: ICD-10-CM

## 2025-03-26 DIAGNOSIS — R00.2 PALPITATIONS: ICD-10-CM

## 2025-03-27 ENCOUNTER — TELEPHONE (OUTPATIENT)
Dept: FAMILY MEDICINE CLINIC | Facility: CLINIC | Age: 70
End: 2025-03-27

## 2025-03-27 RX ORDER — HYDRALAZINE HYDROCHLORIDE 25 MG/1
25 TABLET, FILM COATED ORAL 3 TIMES DAILY
Qty: 270 TABLET | Refills: 3 | Status: SHIPPED | OUTPATIENT
Start: 2025-03-27

## 2025-03-27 RX ORDER — POTASSIUM CHLORIDE 1500 MG/1
20 TABLET, EXTENDED RELEASE ORAL DAILY
Qty: 90 TABLET | Refills: 3 | Status: SHIPPED | OUTPATIENT
Start: 2025-03-27

## 2025-03-27 RX ORDER — AMLODIPINE BESYLATE 10 MG/1
10 TABLET ORAL DAILY
Qty: 90 TABLET | Refills: 3 | Status: SHIPPED | OUTPATIENT
Start: 2025-03-27

## 2025-03-27 RX ORDER — OMEPRAZOLE 40 MG/1
40 CAPSULE, DELAYED RELEASE ORAL DAILY
Qty: 90 CAPSULE | Refills: 3 | Status: SHIPPED | OUTPATIENT
Start: 2025-03-27

## 2025-03-27 RX ORDER — METOPROLOL SUCCINATE 25 MG/1
TABLET, EXTENDED RELEASE ORAL
Qty: 90 TABLET | Refills: 3 | Status: SHIPPED | OUTPATIENT
Start: 2025-03-27

## 2025-03-27 NOTE — TELEPHONE ENCOUNTER
3/27 patient needs updated prior auth for Indocin 50 mg capsules.  Auth expires today.    Please follow up  Thank you

## 2025-03-29 DIAGNOSIS — E11.65 TYPE 2 DIABETES MELLITUS WITH HYPERGLYCEMIA, WITHOUT LONG-TERM CURRENT USE OF INSULIN (HCC): ICD-10-CM

## 2025-03-31 RX ORDER — EMPAGLIFLOZIN 25 MG/1
25 TABLET, FILM COATED ORAL DAILY
Qty: 30 TABLET | Refills: 5 | Status: SHIPPED | OUTPATIENT
Start: 2025-03-31

## 2025-04-02 ENCOUNTER — APPOINTMENT (EMERGENCY)
Dept: RADIOLOGY | Facility: HOSPITAL | Age: 70
End: 2025-04-02
Payer: MEDICARE

## 2025-04-02 ENCOUNTER — APPOINTMENT (EMERGENCY)
Dept: CT IMAGING | Facility: HOSPITAL | Age: 70
End: 2025-04-02
Payer: MEDICARE

## 2025-04-02 ENCOUNTER — HOSPITAL ENCOUNTER (EMERGENCY)
Facility: HOSPITAL | Age: 70
Discharge: HOME | End: 2025-04-02
Attending: EMERGENCY MEDICINE
Payer: MEDICARE

## 2025-04-02 VITALS
TEMPERATURE: 97.7 F | BODY MASS INDEX: 42.37 KG/M2 | WEIGHT: 303.79 LBS | DIASTOLIC BLOOD PRESSURE: 53 MMHG | SYSTOLIC BLOOD PRESSURE: 107 MMHG | RESPIRATION RATE: 16 BRPM | HEART RATE: 61 BPM | OXYGEN SATURATION: 97 %

## 2025-04-02 DIAGNOSIS — S09.90XA INJURY OF HEAD, INITIAL ENCOUNTER: Primary | ICD-10-CM

## 2025-04-02 LAB
ABO GROUP BLD: NORMAL
ANION GAP SERPL CALCULATED.3IONS-SCNC: 9 MMOL/L (ref 4–13)
BASOPHILS # BLD AUTO: 0.04 THOUSANDS/ÂΜL (ref 0–0.1)
BASOPHILS NFR BLD AUTO: 1 % (ref 0–1)
BLD GP AB SCN SERPL QL: NEGATIVE
BUN SERPL-MCNC: 26 MG/DL (ref 5–25)
CALCIUM SERPL-MCNC: 9.1 MG/DL (ref 8.4–10.2)
CHLORIDE SERPL-SCNC: 103 MMOL/L (ref 96–108)
CO2 SERPL-SCNC: 25 MMOL/L (ref 21–32)
CREAT SERPL-MCNC: 1.35 MG/DL (ref 0.6–1.3)
EOSINOPHIL # BLD AUTO: 0.03 THOUSAND/ÂΜL (ref 0–0.61)
EOSINOPHIL NFR BLD AUTO: 1 % (ref 0–6)
ERYTHROCYTE [DISTWIDTH] IN BLOOD BY AUTOMATED COUNT: 13.9 % (ref 11.6–15.1)
GFR SERPL CREATININE-BSD FRML MDRD: 52 ML/MIN/1.73SQ M
GLUCOSE SERPL-MCNC: 99 MG/DL (ref 65–140)
HCT VFR BLD AUTO: 43.5 % (ref 36.5–49.3)
HGB BLD-MCNC: 14.1 G/DL (ref 12–17)
IMM GRANULOCYTES # BLD AUTO: 0.01 THOUSAND/UL (ref 0–0.2)
IMM GRANULOCYTES NFR BLD AUTO: 0 % (ref 0–2)
LYMPHOCYTES # BLD AUTO: 1.41 THOUSANDS/ÂΜL (ref 0.6–4.47)
LYMPHOCYTES NFR BLD AUTO: 28 % (ref 14–44)
MCH RBC QN AUTO: 29 PG (ref 26.8–34.3)
MCHC RBC AUTO-ENTMCNC: 32.4 G/DL (ref 31.4–37.4)
MCV RBC AUTO: 89 FL (ref 82–98)
MONOCYTES # BLD AUTO: 0.52 THOUSAND/ÂΜL (ref 0.17–1.22)
MONOCYTES NFR BLD AUTO: 10 % (ref 4–12)
NEUTROPHILS # BLD AUTO: 2.99 THOUSANDS/ÂΜL (ref 1.85–7.62)
NEUTS SEG NFR BLD AUTO: 60 % (ref 43–75)
NRBC BLD AUTO-RTO: 0 /100 WBCS
PLATELET # BLD AUTO: 161 THOUSANDS/UL (ref 149–390)
PMV BLD AUTO: 10.3 FL (ref 8.9–12.7)
POTASSIUM SERPL-SCNC: 4 MMOL/L (ref 3.5–5.3)
RBC # BLD AUTO: 4.87 MILLION/UL (ref 3.88–5.62)
RH BLD: POSITIVE
SODIUM SERPL-SCNC: 137 MMOL/L (ref 135–147)
SPECIMEN EXPIRATION DATE: NORMAL
WBC # BLD AUTO: 5 THOUSAND/UL (ref 4.31–10.16)

## 2025-04-02 PROCEDURE — 99285 EMERGENCY DEPT VISIT HI MDM: CPT

## 2025-04-02 PROCEDURE — 80048 BASIC METABOLIC PNL TOTAL CA: CPT

## 2025-04-02 PROCEDURE — 85025 COMPLETE CBC W/AUTO DIFF WBC: CPT

## 2025-04-02 PROCEDURE — 71045 X-RAY EXAM CHEST 1 VIEW: CPT

## 2025-04-02 PROCEDURE — 72125 CT NECK SPINE W/O DYE: CPT

## 2025-04-02 PROCEDURE — 36415 COLL VENOUS BLD VENIPUNCTURE: CPT

## 2025-04-02 PROCEDURE — 70450 CT HEAD/BRAIN W/O DYE: CPT

## 2025-04-02 PROCEDURE — 86901 BLOOD TYPING SEROLOGIC RH(D): CPT

## 2025-04-02 PROCEDURE — 93005 ELECTROCARDIOGRAM TRACING: CPT

## 2025-04-02 PROCEDURE — 86900 BLOOD TYPING SEROLOGIC ABO: CPT

## 2025-04-02 PROCEDURE — 86850 RBC ANTIBODY SCREEN: CPT

## 2025-04-02 PROCEDURE — 99284 EMERGENCY DEPT VISIT MOD MDM: CPT

## 2025-04-02 NOTE — ED TRIAGE NOTES
"Ambulatory w/complaint of \"I bumped my head getting out of the car\"; admits to use of Eliquis; pt hit left side of head; denies LOC; denies falling; no abrasion and/or laceration present; denies dizziness; denies chest pain and/or SOB; denies abdominal pain; denies neck pain     "

## 2025-04-02 NOTE — DISCHARGE INSTRUCTIONS
Please take Tylenol as needed and apply ice to affected area as needed. Please return to the ER if you experience a change or worsening in symptoms, including worsening headaches, dizziness, slurred speech, passing out, or weakness in extremities.  Please follow-up with PCP as needed.

## 2025-04-02 NOTE — ED PROVIDER NOTES
"Formerly [facility] Department Trauma Note  Christy Haro 69 y.o. male MRN: [id number]  Unit/Bed#: TR 04/TR 04 Encounter: [id number]      Trauma Alert: Trauma Acuity: Trauma Evaluation  Model of Arrival: Mode of Arrival: Other (Comment) (direct from scene via POV) via    Trauma Team: Current Providers  Attending Provider: Ayush Daigle DO  Attending Provider: Claudio Stallworth MD  Registered Nurse: Yumiko Del Valle RN  Physician Assistant: Erika Rodas PA-C  Consultants:     None      History of Present Illness     Chief Complaint:   Chief Complaint   Patient presents with    Head Injury     Ambulatory w/complaint of \"I bumped my head getting out of the car\"; admits to use of Eliquis; pt hit left side of head; denies LOC; denies falling; no abrasion and/or laceration present; denies dizziness; denies chest pain and/or SOB; denies abdominal pain; denies neck pain      HPI:  Christy Haro is a 69 y.o. male who presents with complaints of bumping head on car door 3 hours ago; denies LOC, reports blood thinner us.  Mechanism:Details of Incident: bumped head getting out of car Injury Date: 04/02/25        Patient is a 69-year-old male with a PMH of DM, HTN, A-fib on Eliquis presenting to the ER after bumping head on car door a couple hours ago.  Patient states he was getting out of his car about 3 hours ago when he hit his head on his car door.  Patient states that he has a bump on his head but denies LOC.  Patient denies nausea/vomiting afterwards and states he does not have a headache either. Patient states he is taking his medications as prescribed. Patient has no complaints since time of injury but states he wanted to be evaluated since he is on blood thinners.  Patient denies syncope, headache, visual disturbance, N/V, chest pain, SOB, leg swelling, urinary incontinence,.        Review of Systems    Historical Information     Immunizations:   Immunization History   Administered Date(s) Administered    COVID-19 " Moderna mRNA Vaccine 12 Yr+ 50 mcg/0.5 mL (Spikevax) 12/24/2023    COVID-19 PFIZER VACCINE 0.3 ML IM 02/25/2021, 03/25/2021, 10/03/2021    COVID-19 Pfizer Vac BIVALENT Emigdio-sucrose 12 Yr+ IM 12/29/2022    COVID-19 Pfizer vac (Emigdio-sucrose, gray cap) 12 yr+ IM 05/03/2022    INFLUENZA 10/14/2018, 10/01/2019, 08/25/2020, 10/12/2022, 10/14/2023    Influenza Split High Dose Preservative Free IM 10/13/2024    Influenza, high dose seasonal 0.7 mL 10/19/2021    Influenza, seasonal, injectable 10/08/2013, 10/13/2014, 10/15/2015, 09/26/2016, 09/09/2017    Pneumococcal Conjugate 13-Valent 08/25/2020    Pneumococcal Conjugate Vaccine 20-valent (Pcv20), Polysace 12/29/2022    Pneumococcal Polysaccharide PPV23 02/18/2015    Tdap 11/28/2015, 03/17/2025    influenza, trivalent, adjuvanted 10/08/2019       Past Medical History:   Diagnosis Date    Arthritis     last assessed 7/1/15    Bronchitis 02/02/2023    Chronic diastolic (congestive) heart failure (HCC)     Diabetes mellitus (HCC)     type 2-last assessed 1/28/15    Diabetes mellitus (HCC) 03/16/2018    Dyslipidemia     GERD (gastroesophageal reflux disease)     Hypertension     Hypertensive urgency 03/16/2023    Irregular heart beat     last assessed 7/1/15    Lactic acidosis 10/12/2024    Obstructive sleep apnea     Palpitations     last assessed 9/7/17    Sexual dysfunction     last assessed 7/1/15    Thyroid disease     last assessed 7/1/15       Family History   Problem Relation Age of Onset    Hypertension Mother         essential    Hypertension Father         essential    Heart defect Father         cardiac disorder    Hypertension Brother     Diabetes Maternal Grandfather      Past Surgical History:   Procedure Laterality Date    COLONOSCOPY      TONSILLECTOMY       Social History     Tobacco Use    Smoking status: Former     Current packs/day: 1.00     Types: Cigarettes     Passive exposure: Never    Smokeless tobacco: Never    Tobacco comments:     former  smoker-quit 27 yrs ago 1pppd x 30 yrs as per [employer]   Vaping Use    Vaping status: Never Used   Substance Use Topics    Alcohol use: Never    Drug use: Never     E-Cigarette/Vaping    E-Cigarette Use Never User      E-Cigarette/Vaping Substances    Nicotine No     THC No     CBD No     Flavoring No     Other No     Unknown No        Family History: non-contributory    Meds/Allergies   Prior to Admission Medications   Prescriptions Last Dose Informant Patient Reported? Taking?   Jardiance 25 MG TABS   No No   Sig: take 1 tablet by mouth once daily   Klor-Con M20 20 MEQ tablet   No No   Sig: TAKE 1 TABLET DAILY   Lancets (onetouch ultrasoft) lancets  Self No No   Sig: Check glucose daily   Vitamin D, Cholecalciferol, 50 MCG (2000 UT) CAPS  Self No No   Sig: Take 50 mcg by mouth daily   amLODIPine (NORVASC) 10 mg tablet   No No   Sig: TAKE 1 TABLET DAILY   apixaban (Eliquis) 5 mg  Self No No   Sig: Take 1 tablet (5 mg total) by mouth 2 (two) times a day   aspirin 81 mg chewable tablet  Self Yes No   Sig: Chew 81 mg daily   Patient not taking: Reported on 3/3/2025   atorvastatin (LIPITOR) 10 mg tablet  Self No No   Sig: Take 1 tablet by mouth once daily   enalapril (VASOTEC) 20 mg tablet  Self No No   Sig: Take 1 tablet (20 mg total) by mouth 2 (two) times a day   furosemide (LASIX) 40 mg tablet  Self Yes No   Sig: Take 60 mg by mouth daily   glucose blood (OneTouch Verio) test strip  Self No No   Sig: Use 1 each daily   hydrALAZINE (APRESOLINE) 25 mg tablet   No No   Sig: TAKE 1 TABLET THREE TIMES A DAY   indomethacin (INDOCIN) 50 mg capsule   No No   Sig: Take 1 capsule (50 mg total) by mouth 2 (two) times a day with meals   ketoconazole (NIZORAL) 2 % shampoo  Self No No   Sig: Apply 1 Application topically if needed for irritation   metoprolol succinate (TOPROL-XL) 25 mg 24 hr tablet   No No   Sig: TAKE 1 TABLET DAILY (TAKE A 25 MG TABLET WITH A 50 MG TABLET TO EQUAL 75 MG ONCE DAILY)   metoprolol succinate  "(TOPROL-XL) 50 mg 24 hr tablet  Self No No   Sig: Take 1 tablet (50 mg total) by mouth daily   multivitamin (THERAGRAN) TABS  Self Yes No   Sig: Take 1 tablet by mouth daily   omeprazole (PriLOSEC) 40 MG capsule   No No   Sig: TAKE 1 CAPSULE DAILY   semaglutide, 0.25 or 0.5 mg/dose, (Ozempic, 0.25 or 0.5 MG/DOSE,) 2 mg/3 mL injection pen  Self No No   Sig: Inject 0.75 mL (0.5 mg total) under the skin every 7 days      Facility-Administered Medications: None       Allergies   Allergen Reactions    Bactrim [Sulfamethoxazole-Trimethoprim] Vomiting     LIZ, seemed like a true allergy    Acetaminophen Other (See Comments)     Other reaction(s): Unknown Reaction  \"I had to go to the hospital and get shots after taking it 20yrs ago\"       PHYSICAL EXAM    PE limited by: nothing    Objective   Vitals:   First set: Temperature: 97.5 °F (36.4 °C) (04/02/25 1622)  Pulse: 67 (04/02/25 1622)  Respirations: 18 (04/02/25 1622)  Blood Pressure: 123/58 (04/02/25 1622)  SpO2: 97 % (04/02/25 1622)    Primary Survey:   (A) Airway: patent  (B) Breathing: normal, breath sounds present BL  (C) Circulation: Pulses:   normal  (D) Disabliity:  GCS Total:  15  (E) Expose:  Completed    Secondary Survey: (Click on Physical Exam tab above)  Physical Exam  Vitals and nursing note reviewed.   Constitutional:       General: He is not in acute distress.     Appearance: Normal appearance. He is well-developed. He is not ill-appearing.   HENT:      Head: Normocephalic and atraumatic. No raccoon eyes, Stockton's sign, right periorbital erythema or left periorbital erythema.        Comments: No signs of injury/hematoma/skin changes to area of head strike. No tenderness to palpation over area of head strike.      Right Ear: Tympanic membrane, ear canal and external ear normal.      Left Ear: Tympanic membrane, ear canal and external ear normal.      Nose: Nose normal.   Eyes:      General:         Right eye: No discharge.         Left eye: No discharge. "      Extraocular Movements: Extraocular movements intact.      Conjunctiva/sclera: Conjunctivae normal.      Pupils: Pupils are equal, round, and reactive to light.   Cardiovascular:      Rate and Rhythm: Normal rate and regular rhythm.      Pulses: Normal pulses.      Heart sounds: Normal heart sounds. No murmur heard.     No friction rub. No gallop.   Pulmonary:      Effort: Pulmonary effort is normal. No respiratory distress.      Breath sounds: Normal breath sounds. No wheezing or rales.   Abdominal:      General: Abdomen is flat. There is no distension.      Palpations: Abdomen is soft.      Tenderness: There is no abdominal tenderness. There is no guarding.   Musculoskeletal:         General: No swelling.      Cervical back: Neck supple. No rigidity or tenderness.   Skin:     General: Skin is warm and dry.      Capillary Refill: Capillary refill takes less than 2 seconds.   Neurological:      General: No focal deficit present.      Mental Status: He is alert and oriented to person, place, and time.      GCS: GCS eye subscore is 4. GCS verbal subscore is 5. GCS motor subscore is 6.      Cranial Nerves: Cranial nerves 2-12 are intact. No dysarthria or facial asymmetry.      Sensory: Sensation is intact. No sensory deficit.      Motor: Motor function is intact. No weakness or pronator drift.      Coordination: Coordination is intact. Finger-Nose-Finger Test normal.   Psychiatric:         Mood and Affect: Mood normal.         Behavior: Behavior normal.         Thought Content: Thought content normal.         Judgment: Judgment normal.         Cervical spine cleared by clinical criteria? No (imaging required)      Invasive Devices       None                   Lab Results:   Results Reviewed       Procedure Component Value Units Date/Time    Basic metabolic panel [[id number]]  (Abnormal) Collected: 04/02/25 1637    Lab Status: Final result Specimen: Blood from Arm, Right Updated: 04/02/25 1657     Sodium 137 mmol/L       Potassium 4.0 mmol/L      Chloride 103 mmol/L      CO2 25 mmol/L      ANION GAP 9 mmol/L      BUN 26 mg/dL      Creatinine 1.35 mg/dL      Glucose 99 mg/dL      Calcium 9.1 mg/dL      eGFR 52 ml/min/1.73sq m     Narrative:      National Kidney Disease Foundation guidelines for Chronic Kidney Disease (CKD):     Stage 1 with normal or high GFR (GFR > 90 mL/min/1.73 square meters)    Stage 2 Mild CKD (GFR = 60-89 mL/min/1.73 square meters)    Stage 3A Moderate CKD (GFR = 45-59 mL/min/1.73 square meters)    Stage 3B Moderate CKD (GFR = 30-44 mL/min/1.73 square meters)    Stage 4 Severe CKD (GFR = 15-29 mL/min/1.73 square meters)    Stage 5 End Stage CKD (GFR <15 mL/min/1.73 square meters)  Note: GFR calculation is accurate only with a steady state creatinine    CBC and differential [[id number]] Collected: 04/02/25 1637    Lab Status: Final result Specimen: Blood from Arm, Right Updated: 04/02/25 1642     WBC 5.00 Thousand/uL      RBC 4.87 Million/uL      Hemoglobin 14.1 g/dL      Hematocrit 43.5 %      MCV 89 fL      MCH 29.0 pg      MCHC 32.4 g/dL      RDW 13.9 %      MPV 10.3 fL      Platelets 161 Thousands/uL      nRBC 0 /100 WBCs      Segmented % 60 %      Immature Grans % 0 %      Lymphocytes % 28 %      Monocytes % 10 %      Eosinophils Relative 1 %      Basophils Relative 1 %      Absolute Neutrophils 2.99 Thousands/µL      Absolute Immature Grans 0.01 Thousand/uL      Absolute Lymphocytes 1.41 Thousands/µL      Absolute Monocytes 0.52 Thousand/µL      Eosinophils Absolute 0.03 Thousand/µL      Basophils Absolute 0.04 Thousands/µL                    Imaging Studies:   Direct to CT: Yes  TRAUMA - CT spine cervical wo contrast   Final Result by Ralph Briceno DO (04/02 1724)      No cervical spine fracture or traumatic malalignment.                  Workstation performed: [id number]         TRAUMA - CT head wo contrast   Final Result by Ralph Briceno DO (04/02 1714)      No acute intracranial  "abnormality.                  Workstation performed: [id number]         XR chest 1 view portable   Final Result by Genesis Edgar MD (04/02 1703)      No acute cardiopulmonary disease.            Workstation performed: [id number]               Procedures  Procedures         ED Course  ED Course as of 04/02/25 1819 Wed Apr 02, 2025   1705 Chest xray showing \"No acute cardiopulmonary disease.\"   1717 CT head showing \"No acute intracranial abnormality.\"   1744 CT spine showing \"No cervical spine fracture or traumatic malalignment.\"           Medical Decision Making  Patient is a 69-year-old male with a PMH of DM, HTN, A-fib on Eliquis presenting to the ER after bumping head on car door a couple hours ago. Patient denies headache, LOC, laceration/abrasion to the area. VS stable, physical exam is benign. No focal neurologic deficits noted, no hematoma/skin changes noted over area of head injury.     Will order basic labs and CT head/neck to rule out traumatic injury. Will hold off on pain meds as patient is reporting no pain at this time.   Labs showing creatinine of 1.35 which is a little higher than patient's baseline. Imaging is negative for traumatic injury. EKG showing NSR with 1st degree AV block at a rate of 65 bpm; no signs of ischemia on my interpretation.  Discussed lab findings with patient and recommended he follow-up with his nephrologist for further management/evaluation.  Recommended patient take Tylenol as needed for headaches.  Recommended patient return to the ER if symptoms worsen or change, including if he experiences dizziness/syncope/numbness/tingling. Recommended patient follow up with PCP as needed. Patient is agreeable with plan and is stable at discharge.     Amount and/or Complexity of Data Reviewed  Labs: ordered.  Radiology: ordered.                Disposition  [facility] Transfer: No  Final diagnoses:   Injury of head, initial encounter     Time reflects when diagnosis was documented in " both MDM as applicable and the Disposition within this note       Time User Action Codes Description Comment    4/2/2025  5:45 PM Erika Rodas Add [S09.90XA] Injury of head, initial encounter     4/2/2025  5:45 PM Erika Rodas Remove [S09.90XA] Injury of head, initial encounter     4/2/2025  5:45 PM Erika Rodas Add [S09.90XA] Injury of head, initial encounter           ED Disposition       ED Disposition   Discharge    Condition   Stable    Date/Time   Wed Apr 2, 2025  5:45 PM    Comment   Christy Haro discharge to home/self care.                   Follow-up Information       Follow up With Specialties Details Why Contact Info    [facility],  Family Medicine   [address] [id number]  [phone]            Discharge Medication List as of 4/2/2025  5:48 PM        CONTINUE these medications which have NOT CHANGED    Details   amLODIPine (NORVASC) 10 mg tablet TAKE 1 TABLET DAILY, Starting Thu 3/27/2025, Normal      apixaban (Eliquis) 5 mg Take 1 tablet (5 mg total) by mouth 2 (two) times a day, Starting Fri 2/14/2025, Normal      aspirin 81 mg chewable tablet Chew 81 mg daily, Historical Med      atorvastatin (LIPITOR) 10 mg tablet Take 1 tablet by mouth once daily, Starting Sun 8/4/2024, Normal      enalapril (VASOTEC) 20 mg tablet Take 1 tablet (20 mg total) by mouth 2 (two) times a day, Starting Mon 2/3/2025, Normal      furosemide (LASIX) 40 mg tablet Take 60 mg by mouth daily, Starting Sat 11/16/2024, Historical Med      glucose blood (OneTouch Verio) test strip Use 1 each daily, Starting Mon 12/23/2024, Normal      hydrALAZINE (APRESOLINE) 25 mg tablet TAKE 1 TABLET THREE TIMES A DAY, Starting Thu 3/27/2025, Normal      indomethacin (INDOCIN) 50 mg capsule Take 1 capsule (50 mg total) by mouth 2 (two) times a day with meals, Starting Tue 3/11/2025, Normal      Jardiance 25 MG TABS take 1 tablet by mouth once daily, Starting Mon 3/31/2025, Normal      ketoconazole (NIZORAL) 2 % shampoo  Apply 1 Application topically if needed for irritation, Starting Wed 11/13/2024, No Print      Klor-Con M20 20 MEQ tablet TAKE 1 TABLET DAILY, Starting Thu 3/27/2025, Normal      Lancets (onetouch ultrasoft) lancets Check glucose daily, Normal      !! metoprolol succinate (TOPROL-XL) 25 mg 24 hr tablet TAKE 1 TABLET DAILY (TAKE A 25 MG TABLET WITH A 50 MG TABLET TO EQUAL 75 MG ONCE DAILY), Normal      !! metoprolol succinate (TOPROL-XL) 50 mg 24 hr tablet Take 1 tablet (50 mg total) by mouth daily, Starting Mon 5/6/2024, Normal      multivitamin (THERAGRAN) TABS Take 1 tablet by mouth daily, Historical Med      omeprazole (PriLOSEC) 40 MG capsule TAKE 1 CAPSULE DAILY, Starting Thu 3/27/2025, Normal      semaglutide, 0.25 or 0.5 mg/dose, (Ozempic, 0.25 or 0.5 MG/DOSE,) 2 mg/3 mL injection pen Inject 0.75 mL (0.5 mg total) under the skin every 7 days, Starting Mon 12/23/2024, Normal      Vitamin D, Cholecalciferol, 50 MCG (2000 UT) CAPS Take 50 mcg by mouth daily, Starting Wed 1/4/2023, Normal       !! - Potential duplicate medications found. Please discuss with provider.        No discharge procedures on file.    PDMP Review         Value Time User    PDMP Reviewed  Yes 10/12/2024  9:42 PM Nathanael Dang PA-C            ED Provider  Electronically Signed by           Erika Rodas PA-C  04/02/25 [id number]

## 2025-04-03 ENCOUNTER — VBI (OUTPATIENT)
Dept: FAMILY MEDICINE CLINIC | Facility: CLINIC | Age: 70
End: 2025-04-03

## 2025-04-03 LAB
ATRIAL RATE: 65 BPM
P AXIS: 47 DEGREES
PR INTERVAL: 220 MS
QRS AXIS: -60 DEGREES
QRSD INTERVAL: 114 MS
QT INTERVAL: 422 MS
QTC INTERVAL: 438 MS
T WAVE AXIS: 61 DEGREES
VENTRICULAR RATE: 65 BPM

## 2025-04-03 PROCEDURE — 93010 ELECTROCARDIOGRAM REPORT: CPT | Performed by: INTERNAL MEDICINE

## 2025-04-03 NOTE — TELEPHONE ENCOUNTER
04/03/25 10:17 AM    Patient contacted post ED visit, [facility] department spoke with patient/caregiver and outreach was successful.    Thank you.  Cristina Garrido  [facility]

## 2025-04-07 ENCOUNTER — APPOINTMENT (EMERGENCY)
Dept: CT IMAGING | Facility: HOSPITAL | Age: 70
End: 2025-04-07
Payer: MEDICARE

## 2025-04-07 ENCOUNTER — HOSPITAL ENCOUNTER (EMERGENCY)
Facility: HOSPITAL | Age: 70
Discharge: HOME | End: 2025-04-07
Attending: EMERGENCY MEDICINE
Payer: MEDICARE

## 2025-04-07 VITALS
HEART RATE: 54 BPM | OXYGEN SATURATION: 95 % | WEIGHT: 304.24 LBS | RESPIRATION RATE: 19 BRPM | SYSTOLIC BLOOD PRESSURE: 103 MMHG | BODY MASS INDEX: 42.43 KG/M2 | DIASTOLIC BLOOD PRESSURE: 58 MMHG | TEMPERATURE: 97.6 F

## 2025-04-07 DIAGNOSIS — S09.90XA CLOSED HEAD INJURY, INITIAL ENCOUNTER: Primary | ICD-10-CM

## 2025-04-07 PROCEDURE — 99284 EMERGENCY DEPT VISIT MOD MDM: CPT | Performed by: EMERGENCY MEDICINE

## 2025-04-07 PROCEDURE — 99283 EMERGENCY DEPT VISIT LOW MDM: CPT

## 2025-04-07 PROCEDURE — 70450 CT HEAD/BRAIN W/O DYE: CPT

## 2025-04-08 NOTE — ED PROVIDER NOTES
Emergency Department Trauma Note  Christy Haro 69 y.o. male MRN: [id number]  Unit/Bed#: RM13/RM13 Encounter: [id number]      Trauma Alert: Trauma Acuity: Trauma Evaluation  Model of Arrival: Mode of Arrival: Other (Comment) (private vehicle) via    Trauma Team: Current Providers  Attending Provider: Stephanie Melgoza DO  Registered Nurse: Zoila Gallegos RN  Resident: Travis Del Rio MD  Consultants:     None      History of Present Illness     Chief Complaint:   Chief Complaint   Patient presents with    Head Injury     Pt reports that around 4:30 pm he bumped his head on the car door when he was getting into the car. Pt denies LOC, does take blood thinners.      HPI:  Christy Haro is a 69 y.o. male who presents with head strike on Eliquis.  Mechanism:Details of Incident: Pt was getting into the car around 4:30pm. States he hit his head when getting into the car. Denies LOC, is on eliquis. Pt reports pain to the right side of the head. Injury Date: 04/07/25 Injury Time: 1630 Injury Occurence [facility]    (Christy Haro) Christy Haro is a 69 y.o. male     They presented to the emergency department on April 7, 2025. Patient presents with:  Head Injury: Pt reports that around 4:30 pm he bumped his head on the car door when he was getting into the car. Pt denies LOC, does take blood thinners.   .    The patient states that he has a chronic history of atrial fibrillation for which she takes Eliquis, notes that he was getting into the car and struck the right side of his head.  Patient states that he did not lose consciousness, and came to the emergency department because he was told anytime he struck his head while on the medications that may thin his blood he should be evaluated. Patient denies headache, visual changes, neck pain, numbness, tingling, weakness, chest pain, difficulty breathing, abdominal pain, nausea, vomiting, change in bowel habits, change urination, or any  other complaint at this time.            Review of Systems   Constitutional:  Negative for chills and fever.   HENT:  Negative for ear pain and sore throat.    Eyes:  Negative for pain and visual disturbance.   Respiratory:  Negative for cough and shortness of breath.    Cardiovascular:  Negative for chest pain and palpitations.   Gastrointestinal:  Negative for abdominal pain and vomiting.   Genitourinary:  Negative for dysuria and hematuria.   Musculoskeletal:  Negative for arthralgias and back pain.   Skin:  Negative for color change and rash.   Neurological:  Negative for seizures and syncope.   All other systems reviewed and are negative.      Historical Information     Immunizations:   Immunization History   Administered Date(s) Administered    COVID-19 Moderna mRNA Vaccine 12 Yr+ 50 mcg/0.5 mL (Spikevax) 12/24/2023    COVID-19 PFIZER VACCINE 0.3 ML IM 02/25/2021, 03/25/2021, 10/03/2021    COVID-19 Pfizer Vac BIVALENT Emigdio-sucrose 12 Yr+ IM 12/29/2022    COVID-19 Pfizer vac (Emigdio-sucrose, gray cap) 12 yr+ IM 05/03/2022    INFLUENZA 10/14/2018, 10/01/2019, 08/25/2020, 10/12/2022, 10/14/2023    Influenza Split High Dose Preservative Free IM 10/13/2024    Influenza, high dose seasonal 0.7 mL 10/19/2021    Influenza, seasonal, injectable 10/08/2013, 10/13/2014, 10/15/2015, 09/26/2016, 09/09/2017    Pneumococcal Conjugate 13-Valent 08/25/2020    Pneumococcal Conjugate Vaccine 20-valent (Pcv20), Polysace 12/29/2022    Pneumococcal Polysaccharide PPV23 02/18/2015    Tdap 11/28/2015, 03/17/2025    influenza, trivalent, adjuvanted 10/08/2019       Past Medical History:   Diagnosis Date    Arthritis     last assessed 7/1/15    Bronchitis 02/02/2023    Chronic diastolic (congestive) heart failure (HCC)     Diabetes mellitus (HCC)     type 2-last assessed 1/28/15    Diabetes mellitus (HCC) 03/16/2018    Dyslipidemia     GERD (gastroesophageal reflux disease)     Hypertension     Hypertensive urgency 03/16/2023     Irregular heart beat     last assessed 7/1/15    Lactic acidosis 10/12/2024    Obstructive sleep apnea     Palpitations     last assessed 9/7/17    Sexual dysfunction     last assessed 7/1/15    Thyroid disease     last assessed 7/1/15       Family History   Problem Relation Age of Onset    Hypertension Mother         essential    Hypertension Father         essential    Heart defect Father         cardiac disorder    Hypertension Brother     Diabetes Maternal Grandfather      Past Surgical History:   Procedure Laterality Date    COLONOSCOPY      TONSILLECTOMY       Social History     Tobacco Use    Smoking status: Former     Current packs/day: 1.00     Types: Cigarettes     Passive exposure: Never    Smokeless tobacco: Never    Tobacco comments:     former smoker-quit 27 yrs ago 1pppd x 30 yrs as per [employer]   Vaping Use    Vaping status: Never Used   Substance Use Topics    Alcohol use: Never    Drug use: Never     E-Cigarette/Vaping    E-Cigarette Use Never User      E-Cigarette/Vaping Substances    Nicotine No     THC No     CBD No     Flavoring No     Other No     Unknown No        Family History:   Family History   Problem Relation Age of Onset    Hypertension Mother         essential    Hypertension Father         essential    Heart defect Father         cardiac disorder    Hypertension Brother     Diabetes Maternal Grandfather        Meds/Allergies   Prior to Admission Medications   Prescriptions Last Dose Informant Patient Reported? Taking?   Jardiance 25 MG TABS   No No   Sig: take 1 tablet by mouth once daily   Klor-Con M20 20 MEQ tablet   No No   Sig: TAKE 1 TABLET DAILY   Lancets (onetouch ultrasoft) lancets  Self No No   Sig: Check glucose daily   Vitamin D, Cholecalciferol, 50 MCG (2000 UT) CAPS  Self No No   Sig: Take 50 mcg by mouth daily   amLODIPine (NORVASC) 10 mg tablet   No No   Sig: TAKE 1 TABLET DAILY   apixaban (Eliquis) 5 mg  Self No No   Sig: Take 1 tablet (5 mg total) by mouth 2 (two)  "times a day   aspirin 81 mg chewable tablet  Self Yes No   Sig: Chew 81 mg daily   Patient not taking: Reported on 3/3/2025   atorvastatin (LIPITOR) 10 mg tablet  Self No No   Sig: Take 1 tablet by mouth once daily   enalapril (VASOTEC) 20 mg tablet  Self No No   Sig: Take 1 tablet (20 mg total) by mouth 2 (two) times a day   furosemide (LASIX) 40 mg tablet  Self Yes No   Sig: Take 60 mg by mouth daily   glucose blood (OneTouch Verio) test strip  Self No No   Sig: Use 1 each daily   hydrALAZINE (APRESOLINE) 25 mg tablet   No No   Sig: TAKE 1 TABLET THREE TIMES A DAY   indomethacin (INDOCIN) 50 mg capsule   No No   Sig: Take 1 capsule (50 mg total) by mouth 2 (two) times a day with meals   ketoconazole (NIZORAL) 2 % shampoo  Self No No   Sig: Apply 1 Application topically if needed for irritation   metoprolol succinate (TOPROL-XL) 25 mg 24 hr tablet   No No   Sig: TAKE 1 TABLET DAILY (TAKE A 25 MG TABLET WITH A 50 MG TABLET TO EQUAL 75 MG ONCE DAILY)   metoprolol succinate (TOPROL-XL) 50 mg 24 hr tablet  Self No No   Sig: Take 1 tablet (50 mg total) by mouth daily   multivitamin (THERAGRAN) TABS  Self Yes No   Sig: Take 1 tablet by mouth daily   omeprazole (PriLOSEC) 40 MG capsule   No No   Sig: TAKE 1 CAPSULE DAILY   semaglutide, 0.25 or 0.5 mg/dose, (Ozempic, 0.25 or 0.5 MG/DOSE,) 2 mg/3 mL injection pen  Self No No   Sig: Inject 0.75 mL (0.5 mg total) under the skin every 7 days      Facility-Administered Medications: None       Allergies   Allergen Reactions    Bactrim [Sulfamethoxazole-Trimethoprim] Vomiting     LIZ, seemed like a true allergy    Acetaminophen Other (See Comments)     Other reaction(s): Unknown Reaction  \"I had to go to the hospital and get shots after taking it 20yrs ago\"       PHYSICAL EXAM    PE limited by: N/A    Objective   Vitals:   First set: Temperature: 98.7 °F (37.1 °C) (04/07/25 2200)  Pulse: 59 (04/07/25 2200)  Respirations: 18 (04/07/25 2200)  Blood Pressure: 107/54 (04/07/25 " 2200)  SpO2: 96 % (04/07/25 2200)    Primary Survey:   (A) Airway: Intact  (B) Breathing: CTA BL  (C) Circulation: Pulses:   2+ Radial and DP  (D) Disabliity:  15  (E) Expose:  Completed    Secondary Survey: (Click on Physical Exam tab above)  Physical Exam  Vitals and nursing note reviewed.   Constitutional:       General: He is not in acute distress.     Appearance: Normal appearance.   HENT:      Head: Normocephalic and atraumatic.      Right Ear: External ear normal.      Left Ear: External ear normal.      Nose: Nose normal.      Mouth/Throat:      Mouth: Mucous membranes are moist.   Eyes:      Extraocular Movements: Extraocular movements intact.      Conjunctiva/sclera: Conjunctivae normal.      Pupils: Pupils are equal, round, and reactive to light.   Cardiovascular:      Rate and Rhythm: Normal rate and regular rhythm.   Pulmonary:      Effort: Pulmonary effort is normal. No respiratory distress.      Breath sounds: Normal breath sounds.   Abdominal:      General: Abdomen is flat. Bowel sounds are normal.      Tenderness: There is no abdominal tenderness. There is no guarding or rebound.   Musculoskeletal:         General: Normal range of motion.      Cervical back: Normal range of motion.   Skin:     General: Skin is warm and dry.      Capillary Refill: Capillary refill takes less than 2 seconds.   Neurological:      Mental Status: He is alert. Mental status is at baseline.   Psychiatric:         Mood and Affect: Mood normal.         Cervical spine cleared by clinical criteria? Yes     Invasive Devices       None                   Lab Results:   Results Reviewed       None                   Imaging Studies:   Direct to CT: Yes  CT head without contrast   Final Result by Alvino Fernandez MD (04/07 2240)      No intracranial hemorrhage or calvarial fracture.                  Workstation performed: [id number]               Procedures  Procedures         ED Course  ED Course as of 04/08/25 0012   Mon Apr 07, 2025    2244 CT head without contrast  No intracranial hemorrhage or calvarial fracture.           Medical Decision Making  69-year-old male with past medical history of atrial fibrillation on Eliquis presents after hitting his head while getting into his car.  Occurrence was approximately 4 hours ago.  Patient without current complaints.  Due to constellation of history as well as examination differential diagnosis includes but is not limited to contusion, TBI, ICH.  Obtained CT imaging of the head which showed no acute intracranial abnormality.  Advised patient on avoiding potential head trauma as well as situations that may result in head trauma as well as to discuss with his primary and cardiologist regarding potential for Eliquis sensation. Patient appears well, nontoxic, agrees with plan of care at this time.  Answered all questions.  In light of this, patient would benefit from outpatient follow-up.    Amount and/or Complexity of Data Reviewed  Radiology: ordered. Decision-making details documented in ED Course.                Disposition  [facility] Transfer: No  Final diagnoses:   Closed head injury, initial encounter     Time reflects when diagnosis was documented in both MDM as applicable and the Disposition within this note       Time User Action Codes Description Comment    4/7/2025 10:44 PM Travis Del Rio [S09.90XA] Closed head injury, initial encounter           ED Disposition       ED Disposition   Discharge    Condition   Stable    Date/Time   Mon Apr 7, 2025 10:44 PM    Comment   Mhamd Earnestram discharge to home/self care.                   Follow-up Information       Follow up With Specialties Details Why Contact Info    Alma Delia Fountain DO Family Medicine   [address] [facility] [address]  [phone]            Discharge Medication List as of 4/7/2025 10:46 PM        CONTINUE these medications which have NOT CHANGED    Details   amLODIPine (NORVASC) 10 mg tablet TAKE 1 TABLET DAILY,  Starting Thu 3/27/2025, Normal      apixaban (Eliquis) 5 mg Take 1 tablet (5 mg total) by mouth 2 (two) times a day, Starting Fri 2/14/2025, Normal      aspirin 81 mg chewable tablet Chew 81 mg daily, Historical Med      atorvastatin (LIPITOR) 10 mg tablet Take 1 tablet by mouth once daily, Starting Sun 8/4/2024, Normal      enalapril (VASOTEC) 20 mg tablet Take 1 tablet (20 mg total) by mouth 2 (two) times a day, Starting Mon 2/3/2025, Normal      furosemide (LASIX) 40 mg tablet Take 60 mg by mouth daily, Starting Sat 11/16/2024, Historical Med      glucose blood (OneTouch Verio) test strip Use 1 each daily, Starting Mon 12/23/2024, Normal      hydrALAZINE (APRESOLINE) 25 mg tablet TAKE 1 TABLET THREE TIMES A DAY, Starting u 3/27/2025, Normal      indomethacin (INDOCIN) 50 mg capsule Take 1 capsule (50 mg total) by mouth 2 (two) times a day with meals, Starting Tue 3/11/2025, Normal      Jardiance 25 MG TABS take 1 tablet by mouth once daily, Starting Mon 3/31/2025, Normal      ketoconazole (NIZORAL) 2 % shampoo Apply 1 Application topically if needed for irritation, Starting Wed 11/13/2024, No Print      Klor-Con M20 20 MEQ tablet TAKE 1 TABLET DAILY, Starting u 3/27/2025, Normal      Lancets (onetouch ultrasoft) lancets Check glucose daily, Normal      !! metoprolol succinate (TOPROL-XL) 25 mg 24 hr tablet TAKE 1 TABLET DAILY (TAKE A 25 MG TABLET WITH A 50 MG TABLET TO EQUAL 75 MG ONCE DAILY), Normal      !! metoprolol succinate (TOPROL-XL) 50 mg 24 hr tablet Take 1 tablet (50 mg total) by mouth daily, Starting Mon 5/6/2024, Normal      multivitamin (THERAGRAN) TABS Take 1 tablet by mouth daily, Historical Med      omeprazole (PriLOSEC) 40 MG capsule TAKE 1 CAPSULE DAILY, Starting u 3/27/2025, Normal      semaglutide, 0.25 or 0.5 mg/dose, (Ozempic, 0.25 or 0.5 MG/DOSE,) 2 mg/3 mL injection pen Inject 0.75 mL (0.5 mg total) under the skin every 7 days, Starting Mon 12/23/2024, Normal      Vitamin D,  Cholecalciferol, 50 MCG (2000 UT) CAPS Take 50 mcg by mouth daily, Starting Wed 1/4/2023, Normal       !! - Potential duplicate medications found. Please discuss with provider.        No discharge procedures on file.    PDMP Review         Value Time User    PDMP Reviewed  Yes 10/12/2024  9:42 PM Nathanael Dang PA-C            ED Provider  Electronically Signed by           Travis Del Rio MD  04/08/25 0012

## 2025-04-08 NOTE — ED ATTENDING ATTESTATION
4/7/2025  IStephanie DO, saw and evaluated the patient. I have discussed the patient with the resident/non-physician practitioner and agree with the resident's/non-physician practitioner's findings, Plan of Care, and MDM as documented in the resident's/non-physician practitioner's note, except where noted. All available labs and Radiology studies were reviewed.  I was present for key portions of any procedure(s) performed by the resident/non-physician practitioner and I was immediately available to provide assistance.       At this point I agree with the current assessment done in the Emergency Department.  I have conducted an independent evaluation of this patient a history and physical is as follows:    69-year-old male presents for evaluation after head strike.  Patient was getting into his car when he struck the right side of his head.  He did not lose consciousness, he denies headaches, visual changes, neck pain.  He further denies any extremity involvement, nausea or vomiting episodes.  He is on Eliquis for atrial fibrillation and states he he was told to come to the emergency department for head strikes and thus presents.  He was made a trauma evaluation due to head strike and blood thinner use.  He appears to be neurologically intact on examination, there does not appear to be any other injury.  Will obtain CT head to assess for occult hemorrhage.    ED Course         Critical Care Time  Procedures

## 2025-04-09 ENCOUNTER — TELEPHONE (OUTPATIENT)
Age: 70
End: 2025-04-09

## 2025-04-09 NOTE — TELEPHONE ENCOUNTER
Pt canceled his 4/22/25 EGD because he would like to have his colonoscopy and EGD together. Pt is due 10/31/25. Pt has an appt on 5/5/25 with Dr Larson. Pt is on a blood thinner

## 2025-04-15 ENCOUNTER — HOSPITAL ENCOUNTER (EMERGENCY)
Facility: HOSPITAL | Age: 70
Discharge: HOME | End: 2025-04-15
Attending: EMERGENCY MEDICINE
Payer: MEDICARE

## 2025-04-15 ENCOUNTER — APPOINTMENT (EMERGENCY)
Dept: CT IMAGING | Facility: HOSPITAL | Age: 70
End: 2025-04-15
Payer: MEDICARE

## 2025-04-15 VITALS
SYSTOLIC BLOOD PRESSURE: 100 MMHG | HEART RATE: 61 BPM | OXYGEN SATURATION: 94 % | RESPIRATION RATE: 18 BRPM | DIASTOLIC BLOOD PRESSURE: 62 MMHG | TEMPERATURE: 98.4 F

## 2025-04-15 DIAGNOSIS — S09.90XA CLOSED HEAD INJURY, INITIAL ENCOUNTER: Primary | ICD-10-CM

## 2025-04-15 PROCEDURE — 99284 EMERGENCY DEPT VISIT MOD MDM: CPT | Performed by: EMERGENCY MEDICINE

## 2025-04-15 PROCEDURE — 99283 EMERGENCY DEPT VISIT LOW MDM: CPT

## 2025-04-15 PROCEDURE — 70450 CT HEAD/BRAIN W/O DYE: CPT

## 2025-04-15 NOTE — ED PROVIDER NOTES
Time reflects when diagnosis was documented in both MDM as applicable and the Disposition within this note       Time User Action Codes Description Comment    4/15/2025  5:32 PM WilyAllan claudiojuliet LOZADA Add [S09.90XA] Closed head injury, initial encounter           ED Disposition       ED Disposition   Discharge    Condition   Stable    Date/Time   Tue Apr 15, 2025  5:32 PM    Comment   Lisethamd Estrellita discharge to home/self care.                   Assessment & Plan       Medical Decision Making  69-year-old male present with minor head trauma on Eliquis.  No palpable abnormalities on exam.    Obtain CT head and reassess.  Patient awake alert and oriented.  No complaints at this time.  Ambulatory without difficulty.  Walks with a cane.    Problems Addressed:  Closed head injury, initial encounter: acute illness or injury    Amount and/or Complexity of Data Reviewed  Radiology: ordered and independent interpretation performed. Decision-making details documented in ED Course.    Risk  Risk Details: Ambulatory upon discharge without difficulty.  Reviewed return precautions and follow-up information with patient.             Medications - No data to display    ED Risk Strat Scores                    No data recorded        SBIRT 20yo+      Flowsheet Row Most Recent Value   Initial Alcohol Screen: US AUDIT-C     1. How often do you have a drink containing alcohol? 0 Filed at: 04/15/2025 1549   2. How many drinks containing alcohol do you have on a typical day you are drinking?  0 Filed at: 04/15/2025 1549   3a. Male UNDER 65: How often do you have five or more drinks on one occasion? 0 Filed at: 04/15/2025 1549   3b. FEMALE Any Age, or MALE 65+: How often do you have 4 or more drinks on one occassion? 0 Filed at: 04/15/2025 1549   Audit-C Score 0 Filed at: 04/15/2025 1549   LOPEZ: How many times in the past year have you...    Used an illegal drug or used a prescription medication for non-medical reasons? Never Filed at:  "04/15/2025 1549                            History of Present Illness       Chief Complaint   Patient presents with    Medical Problem     Patient reports \"bumping\" his head on a shelf about an hour ago. Denies any complaints, was told by Dr to come to ED if he bumps his head due to being on thinners.        Past Medical History:   Diagnosis Date    Arthritis     last assessed 7/1/15    Bronchitis 02/02/2023    Chronic diastolic (congestive) heart failure (HCC)     Diabetes mellitus (HCC)     type 2-last assessed 1/28/15    Diabetes mellitus (HCC) 03/16/2018    Dyslipidemia     GERD (gastroesophageal reflux disease)     Hypertension     Hypertensive urgency 03/16/2023    Irregular heart beat     last assessed 7/1/15    Lactic acidosis 10/12/2024    Obstructive sleep apnea     Palpitations     last assessed 9/7/17    Sexual dysfunction     last assessed 7/1/15    Thyroid disease     last assessed 7/1/15      Past Surgical History:   Procedure Laterality Date    COLONOSCOPY      TONSILLECTOMY        Family History   Problem Relation Age of Onset    Hypertension Mother         essential    Hypertension Father         essential    Heart defect Father         cardiac disorder    Hypertension Brother     Diabetes Maternal Grandfather       Social History     Tobacco Use    Smoking status: Former     Current packs/day: 1.00     Types: Cigarettes     Passive exposure: Never    Smokeless tobacco: Never    Tobacco comments:     former smoker-quit 27 yrs ago 1pppd x 30 yrs as per [employer]   Vaping Use    Vaping status: Never Used   Substance Use Topics    Alcohol use: Never    Drug use: Never      E-Cigarette/Vaping    E-Cigarette Use Never User       E-Cigarette/Vaping Substances    Nicotine No     THC No     CBD No     Flavoring No     Other No     Unknown No       I have reviewed and agree with the history as documented.     Patient is a 69-year-old male on Eliquis presenting after he bumped his head on a shelf.  Patient " states he was standing up from putting something in a drawer when he struck the left right occipital region of his scalp against a shelf.  States it was very minor but was told anytime he bumps his head to proceed to the emergency department for evaluation given that he is on Eliquis.  No loss of consciousness.  Patient denies any head pain at this time.  No neck pain.  Last dose of Eliquis was this morning.  No seizure activity.      Medical Problem  Associated symptoms: no abdominal pain, no chest pain, no cough, no diarrhea, no ear pain, no fever, no headaches, no nausea, no rash, no rhinorrhea, no shortness of breath, no sore throat, no vomiting and no wheezing        Review of Systems   Constitutional:  Negative for activity change, appetite change, chills and fever.   HENT:  Negative for ear pain, hearing loss, rhinorrhea, sneezing, sore throat and trouble swallowing.    Eyes:  Negative for pain and visual disturbance.   Respiratory:  Negative for cough, choking, chest tightness, shortness of breath, wheezing and stridor.    Cardiovascular:  Negative for chest pain, palpitations and leg swelling.   Gastrointestinal:  Negative for abdominal pain, constipation, diarrhea, nausea and vomiting.   Genitourinary:  Negative for difficulty urinating, dysuria, frequency, hematuria and testicular pain.   Musculoskeletal:  Negative for arthralgias, back pain, gait problem and neck pain.   Skin:  Negative for color change and rash.   Allergic/Immunologic: Negative for immunocompromised state.   Neurological:  Negative for dizziness, seizures, syncope, speech difficulty, weakness, light-headedness, numbness and headaches.   All other systems reviewed and are negative.          Objective       ED Triage Vitals [04/15/25 1550]   Temperature Pulse Blood Pressure Respirations SpO2 Patient Position - Orthostatic VS   98.4 °F (36.9 °C) 63 115/59 18 96 % Lying      Temp Source Heart Rate Source BP Location FiO2 (%) Pain Score     Temporal Monitor Right arm -- --      Vitals      Date and Time Temp Pulse SpO2 Resp BP Pain Score FACES Pain Rating User   04/15/25 1700 -- 61 94 % 18 100/62 -- -- JAVI   04/15/25 1600 -- 61 96 % 18 112/55 -- --    04/15/25 1550 98.4 °F (36.9 °C) 63 96 % 18 115/59 -- -- SK            Physical Exam  Vitals and nursing note reviewed.   Constitutional:       General: He is not in acute distress.     Appearance: Normal appearance. He is well-developed and normal weight. He is not ill-appearing, toxic-appearing or diaphoretic.   HENT:      Head: Normocephalic and atraumatic.      Comments: No palpable contusions or lesions or ecchymosis     Nose: Nose normal.   Eyes:      General: No scleral icterus.     Extraocular Movements: Extraocular movements intact.      Conjunctiva/sclera: Conjunctivae normal.   Cardiovascular:      Rate and Rhythm: Normal rate and regular rhythm.      Pulses: Normal pulses.      Heart sounds: Normal heart sounds. No murmur heard.  Pulmonary:      Effort: Pulmonary effort is normal. No respiratory distress.      Breath sounds: Normal breath sounds. No wheezing or rales.   Chest:      Chest wall: No tenderness.   Abdominal:      General: Bowel sounds are normal. There is no distension.      Palpations: Abdomen is soft.      Tenderness: There is no abdominal tenderness. There is no right CVA tenderness, left CVA tenderness, guarding or rebound.   Musculoskeletal:         General: No tenderness or deformity. Normal range of motion.      Cervical back: Normal range of motion and neck supple. No rigidity or tenderness.      Right lower leg: Edema present.      Left lower leg: Edema present.   Lymphadenopathy:      Cervical: No cervical adenopathy.   Skin:     General: Skin is warm and dry.      Capillary Refill: Capillary refill takes less than 2 seconds.      Coloration: Skin is not jaundiced.      Findings: No bruising, erythema, lesion or rash.   Neurological:      General: No focal deficit  present.      Mental Status: He is alert and oriented to person, place, and time. Mental status is at baseline.   Psychiatric:         Mood and Affect: Mood normal.         Behavior: Behavior normal.         Results Reviewed       None            CT head without contrast   Final Interpretation by Ted Mayorga MD (04/15 8181)      No acute intracranial abnormality.                  Workstation performed: [id number]             Procedures    ED Medication and Procedure Management   Prior to Admission Medications   Prescriptions Last Dose Informant Patient Reported? Taking?   Jardiance 25 MG TABS   No No   Sig: take 1 tablet by mouth once daily   Klor-Con M20 20 MEQ tablet   No No   Sig: TAKE 1 TABLET DAILY   Lancets (onetouch ultrasoft) lancets  Self No No   Sig: Check glucose daily   Vitamin D, Cholecalciferol, 50 MCG (2000 UT) CAPS  Self No No   Sig: Take 50 mcg by mouth daily   amLODIPine (NORVASC) 10 mg tablet   No No   Sig: TAKE 1 TABLET DAILY   apixaban (Eliquis) 5 mg  Self No No   Sig: Take 1 tablet (5 mg total) by mouth 2 (two) times a day   aspirin 81 mg chewable tablet  Self Yes No   Sig: Chew 81 mg daily   Patient not taking: Reported on 3/3/2025   atorvastatin (LIPITOR) 10 mg tablet  Self No No   Sig: Take 1 tablet by mouth once daily   enalapril (VASOTEC) 20 mg tablet  Self No No   Sig: Take 1 tablet (20 mg total) by mouth 2 (two) times a day   furosemide (LASIX) 40 mg tablet  Self Yes No   Sig: Take 60 mg by mouth daily   glucose blood (OneTouch Verio) test strip  Self No No   Sig: Use 1 each daily   hydrALAZINE (APRESOLINE) 25 mg tablet   No No   Sig: TAKE 1 TABLET THREE TIMES A DAY   indomethacin (INDOCIN) 50 mg capsule   No No   Sig: Take 1 capsule (50 mg total) by mouth 2 (two) times a day with meals   ketoconazole (NIZORAL) 2 % shampoo  Self No No   Sig: Apply 1 Application topically if needed for irritation   metoprolol succinate (TOPROL-XL) 25 mg 24 hr tablet   No No   Sig: TAKE 1 TABLET  DAILY (TAKE A 25 MG TABLET WITH A 50 MG TABLET TO EQUAL 75 MG ONCE DAILY)   metoprolol succinate (TOPROL-XL) 50 mg 24 hr tablet  Self No No   Sig: Take 1 tablet (50 mg total) by mouth daily   multivitamin (THERAGRAN) TABS  Self Yes No   Sig: Take 1 tablet by mouth daily   omeprazole (PriLOSEC) 40 MG capsule   No No   Sig: TAKE 1 CAPSULE DAILY   semaglutide, 0.25 or 0.5 mg/dose, (Ozempic, 0.25 or 0.5 MG/DOSE,) 2 mg/3 mL injection pen  Self No No   Sig: Inject 0.75 mL (0.5 mg total) under the skin every 7 days      Facility-Administered Medications: None     Discharge Medication List as of 4/15/2025  5:32 PM        CONTINUE these medications which have NOT CHANGED    Details   amLODIPine (NORVASC) 10 mg tablet TAKE 1 TABLET DAILY, Starting Thu 3/27/2025, Normal      apixaban (Eliquis) 5 mg Take 1 tablet (5 mg total) by mouth 2 (two) times a day, Starting Fri 2/14/2025, Normal      aspirin 81 mg chewable tablet Chew 81 mg daily, Historical Med      atorvastatin (LIPITOR) 10 mg tablet Take 1 tablet by mouth once daily, Starting Sun 8/4/2024, Normal      enalapril (VASOTEC) 20 mg tablet Take 1 tablet (20 mg total) by mouth 2 (two) times a day, Starting Mon 2/3/2025, Normal      furosemide (LASIX) 40 mg tablet Take 60 mg by mouth daily, Starting Sat 11/16/2024, Historical Med      glucose blood (OneTouch Verio) test strip Use 1 each daily, Starting Mon 12/23/2024, Normal      hydrALAZINE (APRESOLINE) 25 mg tablet TAKE 1 TABLET THREE TIMES A DAY, Starting Thu 3/27/2025, Normal      indomethacin (INDOCIN) 50 mg capsule Take 1 capsule (50 mg total) by mouth 2 (two) times a day with meals, Starting Tue 3/11/2025, Normal      Jardiance 25 MG TABS take 1 tablet by mouth once daily, Starting Mon 3/31/2025, Normal      ketoconazole (NIZORAL) 2 % shampoo Apply 1 Application topically if needed for irritation, Starting Wed 11/13/2024, No Print      Klor-Con M20 20 MEQ tablet TAKE 1 TABLET DAILY, Starting u 3/27/2025, Normal       Lancets (onetouch ultrasoft) lancets Check glucose daily, Normal      !! metoprolol succinate (TOPROL-XL) 25 mg 24 hr tablet TAKE 1 TABLET DAILY (TAKE A 25 MG TABLET WITH A 50 MG TABLET TO EQUAL 75 MG ONCE DAILY), Normal      !! metoprolol succinate (TOPROL-XL) 50 mg 24 hr tablet Take 1 tablet (50 mg total) by mouth daily, Starting Mon 5/6/2024, Normal      multivitamin (THERAGRAN) TABS Take 1 tablet by mouth daily, Historical Med      omeprazole (PriLOSEC) 40 MG capsule TAKE 1 CAPSULE DAILY, Starting Thu 3/27/2025, Normal      semaglutide, 0.25 or 0.5 mg/dose, (Ozempic, 0.25 or 0.5 MG/DOSE,) 2 mg/3 mL injection pen Inject 0.75 mL (0.5 mg total) under the skin every 7 days, Starting Mon 12/23/2024, Normal      Vitamin D, Cholecalciferol, 50 MCG (2000 UT) CAPS Take 50 mcg by mouth daily, Starting Wed 1/4/2023, Normal       !! - Potential duplicate medications found. Please discuss with provider.        No discharge procedures on file.  ED SEPSIS DOCUMENTATION   Time reflects when diagnosis was documented in both MDM as applicable and the Disposition within this note       Time User Action Codes Description Comment    4/15/2025  5:32 PM Mike Cohen Add [S09.90XA] Closed head injury, initial encounter                  Mike Cohen, DO  04/16/25 0752

## 2025-04-16 ENCOUNTER — VBI (OUTPATIENT)
Dept: FAMILY MEDICINE CLINIC | Facility: CLINIC | Age: 70
End: 2025-04-16

## 2025-04-16 NOTE — TELEPHONE ENCOUNTER
04/16/25 2:12 PM    Patient contacted post ED visit, [facility] department spoke with patient/caregiver and outreach was successful.    Thank you.  Urban Valdez MA  [facility]

## 2025-04-30 ENCOUNTER — TELEPHONE (OUTPATIENT)
Age: 70
End: 2025-04-30

## 2025-04-30 ENCOUNTER — OFFICE VISIT (OUTPATIENT)
Dept: FAMILY MEDICINE CLINIC | Facility: CLINIC | Age: 70
End: 2025-04-30
Payer: MEDICARE

## 2025-04-30 VITALS
HEIGHT: 71 IN | WEIGHT: 300 LBS | RESPIRATION RATE: 18 BRPM | TEMPERATURE: 96 F | BODY MASS INDEX: 42 KG/M2 | DIASTOLIC BLOOD PRESSURE: 78 MMHG | SYSTOLIC BLOOD PRESSURE: 122 MMHG | OXYGEN SATURATION: 97 % | HEART RATE: 62 BPM

## 2025-04-30 DIAGNOSIS — R00.2 PALPITATIONS: ICD-10-CM

## 2025-04-30 DIAGNOSIS — M25.561 ACUTE PAIN OF RIGHT KNEE: Primary | ICD-10-CM

## 2025-04-30 DIAGNOSIS — I10 BENIGN ESSENTIAL HTN: ICD-10-CM

## 2025-04-30 DIAGNOSIS — T50.995D ALLERGIC REACTION TO DYE, SUBSEQUENT ENCOUNTER: Primary | ICD-10-CM

## 2025-04-30 DIAGNOSIS — E78.5 DYSLIPIDEMIA: ICD-10-CM

## 2025-04-30 DIAGNOSIS — I50.32 CHRONIC DIASTOLIC HEART FAILURE (HCC): ICD-10-CM

## 2025-04-30 PROCEDURE — 99214 OFFICE O/P EST MOD 30 MIN: CPT | Performed by: INTERNAL MEDICINE

## 2025-04-30 PROCEDURE — G2211 COMPLEX E/M VISIT ADD ON: HCPCS | Performed by: INTERNAL MEDICINE

## 2025-04-30 RX ORDER — ATORVASTATIN CALCIUM 10 MG/1
10 TABLET, FILM COATED ORAL DAILY
Qty: 90 TABLET | Refills: 3 | Status: SHIPPED | OUTPATIENT
Start: 2025-04-30

## 2025-04-30 RX ORDER — METOPROLOL SUCCINATE 50 MG/1
50 TABLET, EXTENDED RELEASE ORAL DAILY
Qty: 90 TABLET | Refills: 3 | Status: SHIPPED | OUTPATIENT
Start: 2025-04-30

## 2025-04-30 RX ORDER — PREDNISONE 10 MG/1
TABLET ORAL
COMMUNITY
Start: 2025-04-26

## 2025-04-30 RX ORDER — PREDNISONE 10 MG/1
TABLET ORAL
Qty: 12 TABLET | Refills: 0 | Status: SHIPPED | OUTPATIENT
Start: 2025-04-30

## 2025-04-30 NOTE — TELEPHONE ENCOUNTER
The patient called in to follow up on the prescription needed for the MRI. He was also concerned about his appointment for 05/05/25 with Dr. Larson, advising that the MRI results might not be back in time for his appointment. We did try to look into rescheduling, but the provider has no availability until August. The patient is looking for a follow-up on the MRI medication.

## 2025-04-30 NOTE — TELEPHONE ENCOUNTER
Patient is requesting allergy prep for MRI tomorrow.   He is unsure of prior allergic reaction / type of reaction.     *patient is currently on prednisone taper day 5        30 mg daily.       Standard contrast allergy prep   -prednisone 50 mg po 13, 7 and 1 hour prior to contrast  -Benadryl 50 mg po 1 hour prior to contrast    one dose prednisone adjusted to 20 mg (with 30 mg taper dose) to equal total 50 mg

## 2025-04-30 NOTE — ASSESSMENT & PLAN NOTE
Orders:    metoprolol succinate (TOPROL-XL) 50 mg 24 hr tablet; Take 1 tablet (50 mg total) by mouth daily  Lo sodium diet Continue metoprolol

## 2025-04-30 NOTE — ASSESSMENT & PLAN NOTE
Wt Readings from Last 3 Encounters:   04/30/25 136 kg (300 lb)   04/07/25 (!) 138 kg (304 lb 3.8 oz)   04/02/25 (!) 138 kg (303 lb 12.7 oz)     Avoid nsaids, stay hydrated   Orders:    metoprolol succinate (TOPROL-XL) 50 mg 24 hr tablet; Take 1 tablet (50 mg total) by mouth daily

## 2025-04-30 NOTE — TELEPHONE ENCOUNTER
Pt calling back w/questions.about allergy prep.Transferred to Janeen in triage for further assistance.

## 2025-04-30 NOTE — TELEPHONE ENCOUNTER
Pt. Calling with additional questions regarding MRI contrast prep, reviewed information, no further questions

## 2025-04-30 NOTE — ASSESSMENT & PLAN NOTE
Orders:    atorvastatin (LIPITOR) 10 mg tablet; Take 1 tablet (10 mg total) by mouth daily  Lo fat diet Encouraged weight loss

## 2025-04-30 NOTE — TELEPHONE ENCOUNTER
"Jayjay from [facility] calling for clarification of prednisone order that was called in. Reviewed with hism Manuela Storey RN's note minus the Benadryl as it's not applicable to the Rx: \"patient is currently on prednisone taper day 5        30 mg daily.         Standard contrast allergy prep   -prednisone 50 mg po 13, 7 and 1 hour prior to contrast  -Benadryl 50 mg po 1 hour prior to contrast     one dose prednisone adjusted to 20 mg (with 30 mg taper dose) to equal total 50 mg\"  Jayjay verbalized understanding, will fill Rx and go over instructions with pt.  "

## 2025-04-30 NOTE — TELEPHONE ENCOUNTER
Patient PCP office calling stating his MRI is tomorrow and he wants allergy protocol for contrast. No record of allergy but he was given for last MRI in 11/2024.  Spoke to triage nurse who will ask provider, Dr. Larson if she will send in.  Advised Mitra at PCP office that they will call patient back.

## 2025-04-30 NOTE — TELEPHONE ENCOUNTER
"Pt. Pcp office calling asking if he can get Benadryl and Prednisone as prophylaxis for MRI tomorrow, pt. Appointment at 3:45 pm, pt. Was to have  MRI back in November and it was his first time with contrast and  was nervous he would have a reaction, unsure if MRI was done but pcp office calling asking if script for prednisone and Benadryl can be called to pharmacy, please advise and call pt. To review directions, last time scripts needed to be sent separately to pharmacy and not as a \"kit\", please advise   "

## 2025-04-30 NOTE — PROGRESS NOTES
Name: Christy Haro      : 1955      MRN: [id number]  Encounter Provider: Alma Delia Fountain DO  Encounter Date: 2025   Encounter department: [facility]  :  Assessment & Plan  Acute pain of right knee    Orders:  •  XR knee 3 vw right non injury; Future  Pt finishing steroid taper from [facility] in [place] He will get xray once completed and discussed possible ortho followup although pt notes improved sxs already   Benign essential HTN    Orders:  •  metoprolol succinate (TOPROL-XL) 50 mg 24 hr tablet; Take 1 tablet (50 mg total) by mouth daily  Lo sodium diet Continue metoprolol   Chronic diastolic heart failure (HCC)  Wt Readings from Last 3 Encounters:   25 136 kg (300 lb)   25 (!) 138 kg (304 lb 3.8 oz)   25 (!) 138 kg (303 lb 12.7 oz)     Avoid nsaids, stay hydrated   Orders:  •  metoprolol succinate (TOPROL-XL) 50 mg 24 hr tablet; Take 1 tablet (50 mg total) by mouth daily    Palpitations    Orders:  •  metoprolol succinate (TOPROL-XL) 50 mg 24 hr tablet; Take 1 tablet (50 mg total) by mouth daily  Sxs managed Discussed adequate hydration elizabeth during the summer   Dyslipidemia    Orders:  •  atorvastatin (LIPITOR) 10 mg tablet; Take 1 tablet (10 mg total) by mouth daily  Lo fat diet Encouraged weight loss     Rto 4 months     History of Present Illness   HPI  Pt doing ok He was in [place] for a wedding and drove to and from He developed knee pain and swelling right side and had to go to [facility] in order to be able to get back home He is taking prednisone taper with benefit Sxs are improving He still has some swelling but less No prior hx of knee issues BS trend are better and most sugars below 150 range No chest pain or sob No dizziness   Review of Systems   Constitutional:  Positive for activity change. Negative for chills and fever.   HENT:  Positive for postnasal drip.    Eyes:  Negative for visual disturbance.   Respiratory:  Negative for cough and shortness of breath.     Cardiovascular: Negative.    Gastrointestinal: Negative.    Genitourinary: Negative.    Musculoskeletal:  Positive for arthralgias, gait problem and joint swelling.   Skin:  Negative for color change and wound.   Psychiatric/Behavioral:  Negative for sleep disturbance. The patient is not nervous/anxious.      Past Medical History:   Diagnosis Date   • Arthritis     last assessed 7/1/15   • Bronchitis 02/02/2023   • Chronic diastolic (congestive) heart failure (HCC)    • Diabetes mellitus (HCC)     type 2-last assessed 1/28/15   • Diabetes mellitus (HCC) 03/16/2018   • Dyslipidemia    • GERD (gastroesophageal reflux disease)    • Hypertension    • Hypertensive urgency 03/16/2023   • Irregular heart beat     last assessed 7/1/15   • Lactic acidosis 10/12/2024   • Obstructive sleep apnea    • Palpitations     last assessed 9/7/17   • Sexual dysfunction     last assessed 7/1/15   • Thyroid disease     last assessed 7/1/15     Past Surgical History:   Procedure Laterality Date   • COLONOSCOPY     • TONSILLECTOMY       Social History     Socioeconomic History   • Marital status: Single     Spouse name: Not on file   • Number of children: Not on file   • Years of education: Not on file   • Highest education level: Not on file   Occupational History   • Not on file   Tobacco Use   • Smoking status: Former     Current packs/day: 1.00     Types: Cigarettes     Passive exposure: Never   • Smokeless tobacco: Never   • Tobacco comments:     former smoker-quit 27 yrs ago 1pppd x 30 yrs as per [employer]   Vaping Use   • Vaping status: Never Used   Substance and Sexual Activity   • Alcohol use: Never   • Drug use: Never   • Sexual activity: Not Currently   Other Topics Concern   • Not on file   Social History Narrative   • Not on file     Social Drivers of Health     Financial Resource Strain: Low Risk  (1/8/2024)    Overall Financial Resource Strain (CARDIA)    • Difficulty of Paying Living Expenses: Not hard at all   Food  "Insecurity: No Food Insecurity (1/30/2025)    Hunger Vital Sign    • Worried About Running Out of Food in the Last Year: Never true    • Ran Out of Food in the Last Year: Never true   Transportation Needs: No Transportation Needs (1/30/2025)    PRAPARE - Transportation    • Lack of Transportation (Medical): No    • Lack of Transportation (Non-Medical): No   Physical Activity: Not on file   Stress: Not on file   Social Connections: Not on file   Intimate Partner Violence: Not on file   Housing Stability: Low Risk  (1/30/2025)    Housing Stability Vital Sign    • Unable to Pay for Housing in the Last Year: No    • Number of Times Moved in the Last Year: 1    • Homeless in the Last Year: No     Allergies   Allergen Reactions   • Bactrim [Sulfamethoxazole-Trimethoprim] Vomiting     LIZ, seemed like a true allergy   • Acetaminophen Other (See Comments)     Other reaction(s): Unknown Reaction  \"I had to go to the hospital and get shots after taking it 20yrs ago\"       Objective   /78   Pulse 62   Temp (!) 96 °F (35.6 °C) (Temporal)   Resp 18   Ht 5' 11\" (1.803 m)   Wt 136 kg (300 lb)   SpO2 97%   BMI 41.84 kg/m²      Physical Exam  Vitals and nursing note reviewed.   Constitutional:       General: He is not in acute distress.     Appearance: Normal appearance. He is not ill-appearing, toxic-appearing or diaphoretic.   HENT:      Head: Normocephalic and atraumatic.      Right Ear: External ear normal.      Left Ear: External ear normal.      Nose: Rhinorrhea present.      Mouth/Throat:      Mouth: Mucous membranes are moist.   Eyes:      Extraocular Movements: Extraocular movements intact.      Pupils: Pupils are equal, round, and reactive to light.   Cardiovascular:      Rate and Rhythm: Normal rate and regular rhythm.      Pulses: Normal pulses.      Heart sounds: Normal heart sounds.   Pulmonary:      Effort: Pulmonary effort is normal. No respiratory distress.      Breath sounds: Normal breath sounds. No " Awake/Alert wheezing.   Abdominal:      General: Bowel sounds are normal. There is no distension.      Palpations: Abdomen is soft.      Tenderness: There is no abdominal tenderness.   Musculoskeletal:      Cervical back: Normal range of motion and neck supple.      Right lower leg: No edema.      Left lower leg: No edema.   Lymphadenopathy:      Cervical: No cervical adenopathy.   Skin:     General: Skin is warm and dry.      Coloration: Skin is not jaundiced or pale.   Neurological:      General: No focal deficit present.      Mental Status: He is alert and oriented to person, place, and time. Mental status is at baseline.      Cranial Nerves: No cranial nerve deficit.      Gait: Gait abnormal.   Psychiatric:         Mood and Affect: Mood normal.         Behavior: Behavior normal.         Thought Content: Thought content normal.         Judgment: Judgment normal.

## 2025-05-01 ENCOUNTER — HOSPITAL ENCOUNTER (OUTPATIENT)
Dept: MRI IMAGING | Facility: HOSPITAL | Age: 70
End: 2025-05-01
Attending: STUDENT IN AN ORGANIZED HEALTH CARE EDUCATION/TRAINING PROGRAM
Payer: MEDICARE

## 2025-05-01 DIAGNOSIS — K76.0 METABOLIC DYSFUNCTION-ASSOCIATED STEATOTIC LIVER DISEASE (MASLD): ICD-10-CM

## 2025-05-01 DIAGNOSIS — R93.5 ABNORMAL MRI OF ABDOMEN: ICD-10-CM

## 2025-05-01 PROCEDURE — A9585 GADOBUTROL INJECTION: HCPCS | Performed by: STUDENT IN AN ORGANIZED HEALTH CARE EDUCATION/TRAINING PROGRAM

## 2025-05-01 PROCEDURE — 74183 MRI ABD W/O CNTR FLWD CNTR: CPT

## 2025-05-01 RX ORDER — GADOBUTROL 604.72 MG/ML
13 INJECTION INTRAVENOUS
Status: COMPLETED | OUTPATIENT
Start: 2025-05-01 | End: 2025-05-01

## 2025-05-01 RX ADMIN — GADOBUTROL 13 ML: 604.72 INJECTION INTRAVENOUS at 15:50

## 2025-05-05 ENCOUNTER — OFFICE VISIT (OUTPATIENT)
Age: 70
End: 2025-05-05
Payer: MEDICARE

## 2025-05-05 ENCOUNTER — TELEPHONE (OUTPATIENT)
Dept: CARDIOLOGY CLINIC | Facility: CLINIC | Age: 70
End: 2025-05-05

## 2025-05-05 ENCOUNTER — NURSE TRIAGE (OUTPATIENT)
Age: 70
End: 2025-05-05

## 2025-05-05 VITALS
HEART RATE: 64 BPM | BODY MASS INDEX: 42.03 KG/M2 | DIASTOLIC BLOOD PRESSURE: 60 MMHG | SYSTOLIC BLOOD PRESSURE: 102 MMHG | HEIGHT: 71 IN | WEIGHT: 300.2 LBS

## 2025-05-05 DIAGNOSIS — I48.0 PAF (PAROXYSMAL ATRIAL FIBRILLATION) (HCC): Primary | ICD-10-CM

## 2025-05-05 DIAGNOSIS — K76.0 HEPATIC STEATOSIS: Primary | ICD-10-CM

## 2025-05-05 DIAGNOSIS — K76.9 LIVER LESION: ICD-10-CM

## 2025-05-05 DIAGNOSIS — K76.0 METABOLIC DYSFUNCTION-ASSOCIATED STEATOTIC LIVER DISEASE (MASLD): ICD-10-CM

## 2025-05-05 PROBLEM — K74.00 LIVER FIBROSIS: Status: RESOLVED | Noted: 2024-11-13 | Resolved: 2025-05-05

## 2025-05-05 PROCEDURE — 99214 OFFICE O/P EST MOD 30 MIN: CPT | Performed by: STUDENT IN AN ORGANIZED HEALTH CARE EDUCATION/TRAINING PROGRAM

## 2025-05-05 PROCEDURE — G2211 COMPLEX E/M VISIT ADD ON: HCPCS | Performed by: STUDENT IN AN ORGANIZED HEALTH CARE EDUCATION/TRAINING PROGRAM

## 2025-05-05 NOTE — TELEPHONE ENCOUNTER
"FOLLOW UP: pt is questioning if Eliquis dose can be lowered from 5mg BID to 2.5mg BID.  He is also asking if any activity has been seen on loop recorder since February.     REASON FOR CONVERSATION: Medication Problem    SYMPTOMS: pt noticed yesterday he has some dark red blood in \"my mucous when I cough\".  He also had a \"light cut\" on his finger 3 days ago and it took 2 days to stop bleeding.     OTHER: re: activity on loop recorder - I ? Pt if he has felt like he has had any activity/afib and he states no, he was just wondering if any activity has been noted.     DISPOSITION: Callback by PCP Today      Reason for Disposition   Caller has NON-URGENT medicine question about med that PCP or specialist prescribed and triager unable to answer question    Answer Assessment - Initial Assessment Questions  1. NAME of MEDICINE: \"What medicine(s) are you calling about?\"      Eliquis 5mg  BID  2. QUESTION: \"What is your question?\" (e.g., double dose of medicine, side effect)      Pt states when he coughs he sees some dark red blood in his sputum.  Also he had a very light cut on his finger a few days ago and it took two days to stop bleeding.   3. PRESCRIBER: \"Who prescribed the medicine?\" Reason: if prescribed by specialist, call should be referred to that group.      Dr. Schmitt  4. SYMPTOMS: \"Do you have any symptoms?\" If Yes, ask: \"What symptoms are you having?\"  \"How bad are the symptoms (e.g., mild, moderate, severe)      See above    Protocols used: Medication Question Call-Adult-[place]    "

## 2025-05-05 NOTE — TELEPHONE ENCOUNTER
- I was able to call and speak with patient about question about lowering Eliquis dosing.  After discussion and informing him of dosing criteria for Eliquis and that he would likely not be as well protected from stroke prophylaxis on lower dosing patient wishes to continue Eliquis 5 mg twice daily.  He does note some intermittent blood tingeing in sputum with heavy coughing but states this is not frequent or consistent.  He states overall if he cuts himself he does bleed more but this does stop with pressure over longer periods of time.  I informed him if he were to have any warning or alarm type symptoms or significant bleeding he is to seek emergency medical care immediately.  Will check CBC to monitor.  Patient overall notes otherwise doing well and we will follow-up device interrogation.

## 2025-05-05 NOTE — PROGRESS NOTES
Name: Christy Haro      : 1955      MRN: [id number]  Encounter Provider: Irene Larson MD  Encounter Date: 2025   Encounter department: [facility] [address]  :  Assessment & Plan  Hepatic steatosis  70yo M with history of  pAfib on eliquis, HTN, GERD, AYLEEN, T2DM, diastolic CHF, CKD 3 who presents for follow up of hepatic steatosis. Last seen in the clinic in 2024 to discuss finding of F3 fibrosis on elstography. His liver enzymes have now normalized after a previous Hep A infection. A follow up MR elastography did not show any significant steatosis or fibrosis    Given his metabolic risk factors, suspect his still has some underlying steatosis but thankfully no fibrosis on imaging    Plan  - Repeat MRE every 2-3 years  - CMP annually  - He will follow up with his PCP/Endocrinology to consider other alternative pharmacotherapy for weight loss  - Avoid alcohol intake  - Counseled on dietary and lifestyle changes            History of Present Illness   Christy Haro is a 69 y.o. male with history of  pAfib on eliquis, HTN, GERD, AYLEEN, T2DM, diastolic CHF, CKD 3 who presents for follow up of hepatic steatosis.      He was last seen in 2024 as a consultation for hepatic steatosis. His prior elastopgraphy was reviewed and showed concern for F3 fibrosis and S3 steatosis. He was suspected to have MASLD given his metabolic rosks factors. Although his elastography showed advanced fibrosis, he had no clinicial evidence of this on exam and labs. It suspected to be an overestimation as it was obtained in setting of Hep A virsus infection. He was recommended to obtain an MR Elastograpghy. This did not show any evidence of hepatic steatosis or Fibrosis. He is on semaglutide but has had a difficult time with consistent weight loss despite this.    He is planned for a repeat MRI abdomen to reevaluation a possible vascular abnormality that was seen on the MRE but was  inconclusive      HPI    Review of Systems A complete review of systems is negative other than that noted above in the HPI.      Current Outpatient Medications   Medication Sig Dispense Refill    amLODIPine (NORVASC) 10 mg tablet TAKE 1 TABLET DAILY 90 tablet 3    apixaban (Eliquis) 5 mg Take 1 tablet (5 mg total) by mouth 2 (two) times a day 60 tablet 5    atorvastatin (LIPITOR) 10 mg tablet Take 1 tablet (10 mg total) by mouth daily 90 tablet 3    diphenhydrAMINE (BENADRYL) 50 MG tablet Diphenhydramine (benadryl) 50 mg by mouth 1 hour prior to contrast administration. 30 tablet 0    enalapril (VASOTEC) 20 mg tablet Take 1 tablet (20 mg total) by mouth 2 (two) times a day 60 tablet 5    furosemide (LASIX) 40 mg tablet Take 60 mg by mouth daily      glucose blood (OneTouch Verio) test strip Use 1 each daily 100 each 5    hydrALAZINE (APRESOLINE) 25 mg tablet TAKE 1 TABLET THREE TIMES A  tablet 3    indomethacin (INDOCIN) 50 mg capsule Take 1 capsule (50 mg total) by mouth 2 (two) times a day with meals 30 capsule 0    Jardiance 25 MG TABS take 1 tablet by mouth once daily 30 tablet 5    ketoconazole (NIZORAL) 2 % shampoo Apply 1 Application topically if needed for irritation      Klor-Con M20 20 MEQ tablet TAKE 1 TABLET DAILY 90 tablet 3    Lancets (onetouch ultrasoft) lancets Check glucose daily 100 each 1    metoprolol succinate (TOPROL-XL) 25 mg 24 hr tablet TAKE 1 TABLET DAILY (TAKE A 25 MG TABLET WITH A 50 MG TABLET TO EQUAL 75 MG ONCE DAILY) 90 tablet 3    metoprolol succinate (TOPROL-XL) 50 mg 24 hr tablet Take 1 tablet (50 mg total) by mouth daily 90 tablet 3    multivitamin (THERAGRAN) TABS Take 1 tablet by mouth daily      omeprazole (PriLOSEC) 40 MG capsule TAKE 1 CAPSULE DAILY 90 capsule 3    predniSONE 10 mg tablet Take 4 pills daily for 4 days, 3 pills for 3 days, 2 pills for 2 days then 1 pill daily for 1 day      predniSONE 10 mg tablet Take prednisone 50 mg by mouth 13 hours prior, 7 hours  "prior and 1 hour prior to contrast administration. Patient on prednisone taper 30 mg daily 5/1/25. Take total of 50 mg each dose. 12 tablet 0    semaglutide, 0.25 or 0.5 mg/dose, (Ozempic, 0.25 or 0.5 MG/DOSE,) 2 mg/3 mL injection pen Inject 0.75 mL (0.5 mg total) under the skin every 7 days 3 mL 2    Vitamin D, Cholecalciferol, 50 MCG (2000 UT) CAPS Take 50 mcg by mouth daily 90 capsule 3     No current facility-administered medications for this visit.     Objective   /60   Pulse 64   Ht 5' 11\" (1.803 m)   Wt (!) 136 kg (300 lb 3.2 oz)   BMI 41.87 kg/m²     Physical Exam  Vitals and nursing note reviewed.   Constitutional:       General: He is not in acute distress.     Appearance: He is well-developed. He is obese.   HENT:      Head: Normocephalic and atraumatic.   Eyes:      Conjunctiva/sclera: Conjunctivae normal.   Cardiovascular:      Rate and Rhythm: Normal rate and regular rhythm.      Heart sounds: No murmur heard.  Pulmonary:      Effort: Pulmonary effort is normal. No respiratory distress.      Breath sounds: Normal breath sounds.   Abdominal:      Palpations: Abdomen is soft.      Tenderness: There is no abdominal tenderness.   Musculoskeletal:         General: No swelling.      Cervical back: Neck supple.   Skin:     General: Skin is warm and dry.      Capillary Refill: Capillary refill takes less than 2 seconds.   Neurological:      Mental Status: He is alert.   Psychiatric:         Mood and Affect: Mood normal.            Lab Results: I personally reviewed relevant lab results.           Rosetta Small MD  Gastroenterology Fellow, PGY- 4  Available on [employer] Secure Chat  5/5/2025 8:18 PM    "

## 2025-05-06 NOTE — ASSESSMENT & PLAN NOTE
68yo M with history of  pAfib on eliquis, HTN, GERD, AYLEEN, T2DM, diastolic CHF, CKD 3 who presents for follow up of hepatic steatosis. Last seen in the clinic in Nov 2024 to discuss finding of F3 fibrosis on elstography. His liver enzymes have now normalized after a previous Hep A infection. A follow up MR elastography did not show any significant steatosis or fibrosis    Given his metabolic risk factors, suspect his still has some underlying steatosis but thankfully no fibrosis on imaging    Plan  - Repeat MRE every 2-3 years  - CMP annually  - He will follow up with his PCP/Endocrinology to consider other alternative pharmacotherapy for weight loss  - Avoid alcohol intake  - Counseled on dietary and lifestyle changes

## 2025-05-06 NOTE — TELEPHONE ENCOUNTER
Saleem Schmitt, DO  You;  [id number] hours ago (3:57 PM)       Called and spoke with patient.  Please see telephone note for details.

## 2025-05-07 ENCOUNTER — LAB (OUTPATIENT)
Dept: LAB | Facility: MEDICAL CENTER | Age: 70
End: 2025-05-07
Attending: INTERNAL MEDICINE
Payer: MEDICARE

## 2025-05-07 ENCOUNTER — APPOINTMENT (OUTPATIENT)
Dept: RADIOLOGY | Facility: MEDICAL CENTER | Age: 70
End: 2025-05-07
Attending: INTERNAL MEDICINE
Payer: MEDICARE

## 2025-05-07 ENCOUNTER — RESULTS FOLLOW-UP (OUTPATIENT)
Age: 70
End: 2025-05-07

## 2025-05-07 DIAGNOSIS — I48.0 PAROXYSMAL ATRIAL FIBRILLATION (HCC): ICD-10-CM

## 2025-05-07 DIAGNOSIS — N18.31 STAGE 3A CHRONIC KIDNEY DISEASE (HCC): ICD-10-CM

## 2025-05-07 DIAGNOSIS — I48.0 PAF (PAROXYSMAL ATRIAL FIBRILLATION) (HCC): ICD-10-CM

## 2025-05-07 DIAGNOSIS — M25.561 ACUTE PAIN OF RIGHT KNEE: ICD-10-CM

## 2025-05-07 LAB
ALBUMIN SERPL BCG-MCNC: 4.2 G/DL (ref 3.5–5)
ALP SERPL-CCNC: 40 U/L (ref 34–104)
ALT SERPL W P-5'-P-CCNC: 19 U/L (ref 7–52)
ANION GAP SERPL CALCULATED.3IONS-SCNC: 12 MMOL/L (ref 4–13)
AST SERPL W P-5'-P-CCNC: 18 U/L (ref 13–39)
BACTERIA UR QL AUTO: ABNORMAL /HPF
BASOPHILS # BLD AUTO: 0.04 THOUSANDS/ÂΜL (ref 0–0.1)
BASOPHILS NFR BLD AUTO: 0 % (ref 0–1)
BILIRUB SERPL-MCNC: 1.14 MG/DL (ref 0.2–1)
BILIRUB UR QL STRIP: NEGATIVE
BUN SERPL-MCNC: 37 MG/DL (ref 5–25)
CALCIUM SERPL-MCNC: 9.6 MG/DL (ref 8.4–10.2)
CHLORIDE SERPL-SCNC: 100 MMOL/L (ref 96–108)
CLARITY UR: CLEAR
CO2 SERPL-SCNC: 26 MMOL/L (ref 21–32)
COLOR UR: COLORLESS
CREAT SERPL-MCNC: 1.18 MG/DL (ref 0.6–1.3)
CREAT UR-MCNC: 21.2 MG/DL
CREAT UR-MCNC: 21.2 MG/DL
EOSINOPHIL # BLD AUTO: 0.02 THOUSAND/ÂΜL (ref 0–0.61)
EOSINOPHIL NFR BLD AUTO: 0 % (ref 0–6)
ERYTHROCYTE [DISTWIDTH] IN BLOOD BY AUTOMATED COUNT: 14.6 % (ref 11.6–15.1)
GFR SERPL CREATININE-BSD FRML MDRD: 61 ML/MIN/1.73SQ M
GLUCOSE SERPL-MCNC: 108 MG/DL (ref 65–140)
GLUCOSE UR STRIP-MCNC: ABNORMAL MG/DL
HCT VFR BLD AUTO: 46.4 % (ref 36.5–49.3)
HGB BLD-MCNC: 15.2 G/DL (ref 12–17)
HGB UR QL STRIP.AUTO: NEGATIVE
IMM GRANULOCYTES # BLD AUTO: 0.07 THOUSAND/UL (ref 0–0.2)
IMM GRANULOCYTES NFR BLD AUTO: 1 % (ref 0–2)
KETONES UR STRIP-MCNC: NEGATIVE MG/DL
LEUKOCYTE ESTERASE UR QL STRIP: NEGATIVE
LYMPHOCYTES # BLD AUTO: 2.19 THOUSANDS/ÂΜL (ref 0.6–4.47)
LYMPHOCYTES NFR BLD AUTO: 23 % (ref 14–44)
MAGNESIUM SERPL-MCNC: 2.3 MG/DL (ref 1.9–2.7)
MCH RBC QN AUTO: 28.9 PG (ref 26.8–34.3)
MCHC RBC AUTO-ENTMCNC: 32.8 G/DL (ref 31.4–37.4)
MCV RBC AUTO: 88 FL (ref 82–98)
MICROALBUMIN UR-MCNC: 7.6 MG/L
MICROALBUMIN/CREAT 24H UR: 36 MG/G CREATININE (ref 0–30)
MONOCYTES # BLD AUTO: 0.75 THOUSAND/ÂΜL (ref 0.17–1.22)
MONOCYTES NFR BLD AUTO: 8 % (ref 4–12)
NEUTROPHILS # BLD AUTO: 6.6 THOUSANDS/ÂΜL (ref 1.85–7.62)
NEUTS SEG NFR BLD AUTO: 68 % (ref 43–75)
NITRITE UR QL STRIP: NEGATIVE
NON-SQ EPI CELLS URNS QL MICRO: ABNORMAL /HPF
NRBC BLD AUTO-RTO: 0 /100 WBCS
PH UR STRIP.AUTO: 6.5 [PH]
PHOSPHATE SERPL-MCNC: 4.1 MG/DL (ref 2.3–4.1)
PLATELET # BLD AUTO: 223 THOUSANDS/UL (ref 149–390)
PMV BLD AUTO: 11.5 FL (ref 8.9–12.7)
POTASSIUM SERPL-SCNC: 3.8 MMOL/L (ref 3.5–5.3)
PROT SERPL-MCNC: 7.5 G/DL (ref 6.4–8.4)
PROT UR STRIP-MCNC: NEGATIVE MG/DL
PROT UR-MCNC: <4 MG/DL
PTH-INTACT SERPL-MCNC: 72.7 PG/ML (ref 12–88)
RBC # BLD AUTO: 5.26 MILLION/UL (ref 3.88–5.62)
RBC #/AREA URNS AUTO: ABNORMAL /HPF
SODIUM SERPL-SCNC: 138 MMOL/L (ref 135–147)
SP GR UR STRIP.AUTO: 1.01 (ref 1–1.03)
UROBILINOGEN UR STRIP-ACNC: <2 MG/DL
WBC # BLD AUTO: 9.67 THOUSAND/UL (ref 4.31–10.16)
WBC #/AREA URNS AUTO: ABNORMAL /HPF

## 2025-05-07 PROCEDURE — 83735 ASSAY OF MAGNESIUM: CPT

## 2025-05-07 PROCEDURE — 81001 URINALYSIS AUTO W/SCOPE: CPT

## 2025-05-07 PROCEDURE — 84100 ASSAY OF PHOSPHORUS: CPT

## 2025-05-07 PROCEDURE — 80053 COMPREHEN METABOLIC PANEL: CPT

## 2025-05-07 PROCEDURE — 73562 X-RAY EXAM OF KNEE 3: CPT

## 2025-05-07 PROCEDURE — 84156 ASSAY OF PROTEIN URINE: CPT

## 2025-05-07 PROCEDURE — 36415 COLL VENOUS BLD VENIPUNCTURE: CPT

## 2025-05-07 PROCEDURE — 82570 ASSAY OF URINE CREATININE: CPT

## 2025-05-07 PROCEDURE — 82043 UR ALBUMIN QUANTITATIVE: CPT

## 2025-05-07 PROCEDURE — 85025 COMPLETE CBC W/AUTO DIFF WBC: CPT

## 2025-05-07 PROCEDURE — 83970 ASSAY OF PARATHORMONE: CPT

## 2025-05-08 DIAGNOSIS — M10.00 IDIOPATHIC GOUT, UNSPECIFIED CHRONICITY, UNSPECIFIED SITE: ICD-10-CM

## 2025-05-08 RX ORDER — INDOMETHACIN 50 MG/1
50 CAPSULE ORAL 2 TIMES DAILY WITH MEALS
Qty: 30 CAPSULE | Refills: 0 | Status: SHIPPED | OUTPATIENT
Start: 2025-05-08

## 2025-05-09 ENCOUNTER — RESULTS FOLLOW-UP (OUTPATIENT)
Dept: FAMILY MEDICINE CLINIC | Facility: CLINIC | Age: 70
End: 2025-05-09

## 2025-05-09 DIAGNOSIS — E11.65 TYPE 2 DIABETES MELLITUS WITH HYPERGLYCEMIA, WITHOUT LONG-TERM CURRENT USE OF INSULIN (HCC): ICD-10-CM

## 2025-05-09 DIAGNOSIS — M25.562 LEFT KNEE PAIN, UNSPECIFIED CHRONICITY: Primary | ICD-10-CM

## 2025-05-09 DIAGNOSIS — Z00.6 ENCOUNTER FOR EXAMINATION FOR NORMAL COMPARISON OR CONTROL IN CLINICAL RESEARCH PROGRAM: ICD-10-CM

## 2025-05-09 NOTE — TELEPHONE ENCOUNTER
Patient called requesting an Xray of left knee as well as he is experiencing pain.        Please advise.  Thank you

## 2025-05-10 ENCOUNTER — HOSPITAL ENCOUNTER (EMERGENCY)
Facility: HOSPITAL | Age: 70
Discharge: HOME | End: 2025-05-10
Attending: EMERGENCY MEDICINE | Admitting: EMERGENCY MEDICINE
Payer: MEDICARE

## 2025-05-10 VITALS
WEIGHT: 301.59 LBS | BODY MASS INDEX: 42.22 KG/M2 | HEART RATE: 65 BPM | TEMPERATURE: 98.1 F | HEIGHT: 71 IN | OXYGEN SATURATION: 97 % | RESPIRATION RATE: 19 BRPM | SYSTOLIC BLOOD PRESSURE: 119 MMHG | DIASTOLIC BLOOD PRESSURE: 64 MMHG

## 2025-05-10 DIAGNOSIS — R00.2 PALPITATIONS: Primary | ICD-10-CM

## 2025-05-10 DIAGNOSIS — I49.3 PVC (PREMATURE VENTRICULAR CONTRACTION): ICD-10-CM

## 2025-05-10 LAB
ALBUMIN SERPL BCG-MCNC: 4.2 G/DL (ref 3.5–5)
ALP SERPL-CCNC: 35 U/L (ref 34–104)
ALT SERPL W P-5'-P-CCNC: 17 U/L (ref 7–52)
ANION GAP SERPL CALCULATED.3IONS-SCNC: 8 MMOL/L (ref 4–13)
AST SERPL W P-5'-P-CCNC: 14 U/L (ref 13–39)
BASOPHILS # BLD AUTO: 0.03 THOUSANDS/ÂΜL (ref 0–0.1)
BASOPHILS NFR BLD AUTO: 0 % (ref 0–1)
BILIRUB SERPL-MCNC: 0.9 MG/DL (ref 0.2–1)
BNP SERPL-MCNC: 29 PG/ML (ref 0–100)
BUN SERPL-MCNC: 29 MG/DL (ref 5–25)
CALCIUM SERPL-MCNC: 9.1 MG/DL (ref 8.4–10.2)
CARDIAC TROPONIN I PNL SERPL HS: 6 NG/L (ref ?–50)
CHLORIDE SERPL-SCNC: 102 MMOL/L (ref 96–108)
CO2 SERPL-SCNC: 27 MMOL/L (ref 21–32)
CREAT SERPL-MCNC: 1.3 MG/DL (ref 0.6–1.3)
EOSINOPHIL # BLD AUTO: 0.02 THOUSAND/ÂΜL (ref 0–0.61)
EOSINOPHIL NFR BLD AUTO: 0 % (ref 0–6)
ERYTHROCYTE [DISTWIDTH] IN BLOOD BY AUTOMATED COUNT: 14.2 % (ref 11.6–15.1)
GFR SERPL CREATININE-BSD FRML MDRD: 54 ML/MIN/1.73SQ M
GLUCOSE SERPL-MCNC: 128 MG/DL (ref 65–140)
HCT VFR BLD AUTO: 44 % (ref 36.5–49.3)
HGB BLD-MCNC: 14.3 G/DL (ref 12–17)
IMM GRANULOCYTES # BLD AUTO: 0.04 THOUSAND/UL (ref 0–0.2)
IMM GRANULOCYTES NFR BLD AUTO: 1 % (ref 0–2)
LYMPHOCYTES # BLD AUTO: 1.68 THOUSANDS/ÂΜL (ref 0.6–4.47)
LYMPHOCYTES NFR BLD AUTO: 20 % (ref 14–44)
MAGNESIUM SERPL-MCNC: 2.3 MG/DL (ref 1.9–2.7)
MCH RBC QN AUTO: 29.2 PG (ref 26.8–34.3)
MCHC RBC AUTO-ENTMCNC: 32.5 G/DL (ref 31.4–37.4)
MCV RBC AUTO: 90 FL (ref 82–98)
MONOCYTES # BLD AUTO: 0.84 THOUSAND/ÂΜL (ref 0.17–1.22)
MONOCYTES NFR BLD AUTO: 10 % (ref 4–12)
NEUTROPHILS # BLD AUTO: 5.61 THOUSANDS/ÂΜL (ref 1.85–7.62)
NEUTS SEG NFR BLD AUTO: 69 % (ref 43–75)
NRBC BLD AUTO-RTO: 0 /100 WBCS
PLATELET # BLD AUTO: 174 THOUSANDS/UL (ref 149–390)
PMV BLD AUTO: 11.1 FL (ref 8.9–12.7)
POTASSIUM SERPL-SCNC: 3.8 MMOL/L (ref 3.5–5.3)
PROT SERPL-MCNC: 7.2 G/DL (ref 6.4–8.4)
RBC # BLD AUTO: 4.89 MILLION/UL (ref 3.88–5.62)
SODIUM SERPL-SCNC: 137 MMOL/L (ref 135–147)
TSH SERPL DL<=0.05 MIU/L-ACNC: 0.84 UIU/ML (ref 0.45–4.5)
WBC # BLD AUTO: 8.22 THOUSAND/UL (ref 4.31–10.16)

## 2025-05-10 PROCEDURE — 83735 ASSAY OF MAGNESIUM: CPT | Performed by: EMERGENCY MEDICINE

## 2025-05-10 PROCEDURE — 96360 HYDRATION IV INFUSION INIT: CPT

## 2025-05-10 PROCEDURE — 83880 ASSAY OF NATRIURETIC PEPTIDE: CPT | Performed by: EMERGENCY MEDICINE

## 2025-05-10 PROCEDURE — 99285 EMERGENCY DEPT VISIT HI MDM: CPT

## 2025-05-10 PROCEDURE — 84484 ASSAY OF TROPONIN QUANT: CPT | Performed by: EMERGENCY MEDICINE

## 2025-05-10 PROCEDURE — 96361 HYDRATE IV INFUSION ADD-ON: CPT

## 2025-05-10 PROCEDURE — 36415 COLL VENOUS BLD VENIPUNCTURE: CPT | Performed by: EMERGENCY MEDICINE

## 2025-05-10 PROCEDURE — 93005 ELECTROCARDIOGRAM TRACING: CPT

## 2025-05-10 PROCEDURE — 85025 COMPLETE CBC W/AUTO DIFF WBC: CPT | Performed by: EMERGENCY MEDICINE

## 2025-05-10 PROCEDURE — 84443 ASSAY THYROID STIM HORMONE: CPT | Performed by: EMERGENCY MEDICINE

## 2025-05-10 PROCEDURE — 99285 EMERGENCY DEPT VISIT HI MDM: CPT | Performed by: EMERGENCY MEDICINE

## 2025-05-10 PROCEDURE — 80053 COMPREHEN METABOLIC PANEL: CPT | Performed by: EMERGENCY MEDICINE

## 2025-05-10 RX ADMIN — SODIUM CHLORIDE 500 ML: 0.9 INJECTION, SOLUTION INTRAVENOUS at 18:12

## 2025-05-11 NOTE — ED PROVIDER NOTES
Time reflects when diagnosis was documented in both MDM as applicable and the Disposition within this note       Time User Action Codes Description Comment    5/10/2025  8:28 PM Stephanie Melgoza Add [R00.2] Palpitations     5/10/2025  8:28 PM Stephanie Melgoza Add [I49.3] PVC (premature ventricular contraction)           ED Disposition       ED Disposition   Discharge    Condition   Stable    Date/Time   Sat May 10, 2025  8:28 PM    Comment   Mhamd Estrellita discharge to home/self care.                   Assessment & Plan       Medical Decision Making  69-year-old male presents for evaluation of palpitations.  He is well-appearing on examination, initial EKG is consistent with previous although he does have occasional infrequent PVC on monitor.  Patient states he has a history of such and has been on metoprolol, this can cause fatigue and some individuals however states he has been on the same dose for some time, doubtful this is the source but may be in the setting of LIZ and will check basic labs for this.  He is also maintained on diuretics, will check for electrolyte abnormalities as a potential source of fatigue, PVCs.  Will additionally check for thyroid disorder, anemia.  He denies significant outdoor exposure or known bug bites, will defer Lyme testing.  He has a loop recorder in place, will attempt to interrogate however uncertain if we are able to.  Will provide him with a small bolus of fluids as he does appear dry on examination, symptoms may be due to dehydration.     Amount and/or Complexity of Data Reviewed  Labs: ordered. Decision-making details documented in ED Course.        ED Course as of 05/11/25 1304   Sat May 10, 2025   1751 Procedure Note: EKG  Date/Time: 05/10/25 5:51 PM   Interpreted by: Stephanie Melgoza  Indications / Diagnosis: palpitations  ECG reviewed by me, the ED Provider: yes   The EKG demonstrates:  Rhythm: normal sinus  Intervals: 1st deg avb pr 204  Axis: left axis  QRS/Blocks:  poor progression  ST Changes: No acute ST Changes, no STD/ARDEN. No significant change from previous       1852 TSH 3RD GENERATON: 0.838  normal   1853 BNP: 29  Normal/negative   1853 MAGNESIUM: 2.3  normal   1853 Comprehensive metabolic panel(!)  unremarkable   1853 CBC and differential  unremarkable   1853 Has occasional infrequent PVC on monitor   2027 We cannot interroagate loop. Feeling improved, no sustained arrthymias on monitor.        Medications   sodium chloride 0.9 % bolus 500 mL (0 mL Intravenous Stopped 5/10/25 2022)       ED Risk Strat Scores                    No data recorded        SBIRT 20yo+      Flowsheet Row Most Recent Value   Initial Alcohol Screen: US AUDIT-C     1. How often do you have a drink containing alcohol? 0 Filed at: 05/10/2025 1743   2. How many drinks containing alcohol do you have on a typical day you are drinking?  0 Filed at: 05/10/2025 1743   3a. Male UNDER 65: How often do you have five or more drinks on one occasion? 0 Filed at: 05/10/2025 1743   3b. FEMALE Any Age, or MALE 65+: How often do you have 4 or more drinks on one occassion? 0 Filed at: 05/10/2025 1743   Audit-C Score 0 Filed at: 05/10/2025 1743   LOPEZ: How many times in the past year have you...    Used an illegal drug or used a prescription medication for non-medical reasons? Never Filed at: 05/10/2025 1743                            History of Present Illness       Chief Complaint   Patient presents with    Palpitations     Palpitations and dizziness since last night. States his heart feels like its irregular        Past Medical History:   Diagnosis Date    Arthritis     last assessed 7/1/15    Bronchitis 02/02/2023    Chronic diastolic (congestive) heart failure (HCC)     Diabetes mellitus (HCC)     type 2-last assessed 1/28/15    Diabetes mellitus (HCC) 03/16/2018    Dyslipidemia     GERD (gastroesophageal reflux disease)     Hypertension     Hypertensive urgency 03/16/2023    Irregular heart beat     last  assessed 7/1/15    Lactic acidosis 10/12/2024    Obstructive sleep apnea     Palpitations     last assessed 9/7/17    Sexual dysfunction     last assessed 7/1/15    Thyroid disease     last assessed 7/1/15      Past Surgical History:   Procedure Laterality Date    COLONOSCOPY      TONSILLECTOMY        Family History   Problem Relation Age of Onset    Hypertension Mother         essential    Hypertension Father         essential    Heart defect Father         cardiac disorder    Hypertension Brother     Diabetes Maternal Grandfather       Social History     Tobacco Use    Smoking status: Former     Current packs/day: 1.00     Types: Cigarettes     Passive exposure: Never    Smokeless tobacco: Never    Tobacco comments:     former smoker-quit 27 yrs ago 1pppd x 30 yrs as per [employer]   Vaping Use    Vaping status: Never Used   Substance Use Topics    Alcohol use: Never    Drug use: Never      E-Cigarette/Vaping    E-Cigarette Use Never User       E-Cigarette/Vaping Substances    Nicotine No     THC No     CBD No     Flavoring No     Other No     Unknown No       I have reviewed and agree with the history as documented.     69-year-old male presents for evaluation of palpitations, weakness.  Patient states palpitations started yesterday feel like an occasional strong heartbeat.  He also has an associated lightheadedness with standing.  He states fatigue more recently.  He denies dyspnea, chest pain, lower extremity swelling or weight gain.  He has had no GI upset.  Patient has been compliant with his medications and states no recent changes.  He has had a normal appetite. No recent fevers, significant outdoor exposure/bug bites.        Review of Systems   Constitutional:  Negative for activity change and fever.   HENT:  Negative for congestion.    Respiratory:  Negative for cough and shortness of breath.    Cardiovascular:  Positive for palpitations. Negative for chest pain and leg swelling.   Gastrointestinal:   Negative for abdominal pain, nausea and vomiting.   Genitourinary:  Negative for dysuria.   Musculoskeletal:  Negative for back pain and neck pain.   Skin:  Negative for wound.   Neurological:  Positive for light-headedness. Negative for syncope and headaches.   All other systems reviewed and are negative.          Objective       ED Triage Vitals [05/10/25 1747]   Temperature Pulse Blood Pressure Respirations SpO2 Patient Position - Orthostatic VS   98.1 °F (36.7 °C) 69 132/61 18 96 % Lying      Temp Source Heart Rate Source BP Location FiO2 (%) Pain Score    Temporal Monitor Left arm -- No Pain      Vitals      Date and Time Temp Pulse SpO2 Resp BP Pain Score FACES Pain Rating User   05/10/25 1800 -- 65 97 % 19 119/64 No Pain -- CLS   05/10/25 1747 98.1 °F (36.7 °C) 69 96 % 18 132/61 No Pain -- CLS            Physical Exam  Vitals reviewed.   Constitutional:       General: He is not in acute distress.     Appearance: Normal appearance. He is not ill-appearing, toxic-appearing or diaphoretic.   HENT:      Head: Normocephalic and atraumatic.      Right Ear: External ear normal.      Left Ear: External ear normal.      Nose: Nose normal.      Mouth/Throat:      Mouth: Mucous membranes are dry.      Comments: Tacky saliva  Eyes:      General: No scleral icterus.        Right eye: No discharge.         Left eye: No discharge.   Cardiovascular:      Rate and Rhythm: Normal rate and regular rhythm.   Pulmonary:      Effort: Pulmonary effort is normal. No respiratory distress.   Abdominal:      General: There is no distension.      Palpations: Abdomen is soft.      Tenderness: There is no abdominal tenderness. There is no guarding or rebound.   Musculoskeletal:         General: No deformity or signs of injury.      Right lower leg: No edema.      Left lower leg: No edema.   Skin:     General: Skin is warm.      Coloration: Skin is not jaundiced or pale.   Neurological:      General: No focal deficit present.      Mental  Status: He is alert. Mental status is at baseline.         Results Reviewed       Procedure Component Value Units Date/Time    TSH, 3rd generation with Free T4 reflex [[id number]]  (Normal) Collected: 05/10/25 1812    Lab Status: Final result Specimen: Blood from Arm, Right Updated: 05/10/25 1850     TSH 3RD GENERATON 0.838 uIU/mL     HS Troponin 0hr (reflex protocol) [[id number]]  (Normal) Collected: 05/10/25 1812    Lab Status: Final result Specimen: Blood from Arm, Right Updated: 05/10/25 1841     hs TnI 0hr 6 ng/L     B-Type Natriuretic Peptide(BNP) [[id number]]  (Normal) Collected: 05/10/25 1812    Lab Status: Final result Specimen: Blood from Arm, Right Updated: 05/10/25 1840     BNP 29 pg/mL     Comprehensive metabolic panel [[id number]]  (Abnormal) Collected: 05/10/25 1812    Lab Status: Final result Specimen: Blood from Arm, Right Updated: 05/10/25 1836     Sodium 137 mmol/L      Potassium 3.8 mmol/L      Chloride 102 mmol/L      CO2 27 mmol/L      ANION GAP 8 mmol/L      BUN 29 mg/dL      Creatinine 1.30 mg/dL      Glucose 128 mg/dL      Calcium 9.1 mg/dL      AST 14 U/L      ALT 17 U/L      Alkaline Phosphatase 35 U/L      Total Protein 7.2 g/dL      Albumin 4.2 g/dL      Total Bilirubin 0.90 mg/dL      eGFR 54 ml/min/1.73sq m     Narrative:      National Kid[employer] guidelines for Chronic Kidney Disease (CKD):     Stage 1 with normal or high GFR (GFR > 90 mL/min/1.73 square meters)    Stage 2 Mild CKD (GFR = 60-89 mL/min/1.73 square meters)    Stage 3A Moderate CKD (GFR = 45-59 mL/min/1.73 square meters)    Stage 3B Moderate CKD (GFR = 30-44 mL/min/1.73 square meters)    Stage 4 Severe CKD (GFR = 15-29 mL/min/1.73 square meters)    Stage 5 End Stage CKD (GFR <15 mL/min/1.73 square meters)  Note: GFR calculation is accurate only with a steady state creatinine    Magnesium [[id number]]  (Normal) Collected: 05/10/25 1812    Lab Status: Final result Specimen: Blood from Arm, Right Updated: 05/10/25  1836     Magnesium 2.3 mg/dL     CBC and differential [[id number]] Collected: 05/10/25 1812    Lab Status: Final result Specimen: Blood from Arm, Right Updated: 05/10/25 1820     WBC 8.22 Thousand/uL      RBC 4.89 Million/uL      Hemoglobin 14.3 g/dL      Hematocrit 44.0 %      MCV 90 fL      MCH 29.2 pg      MCHC 32.5 g/dL      RDW 14.2 %      MPV 11.1 fL      Platelets 174 Thousands/uL      nRBC 0 /100 WBCs      Segmented % 69 %      Immature Grans % 1 %      Lymphocytes % 20 %      Monocytes % 10 %      Eosinophils Relative 0 %      Basophils Relative 0 %      Absolute Neutrophils 5.61 Thousands/µL      Absolute Immature Grans 0.04 Thousand/uL      Absolute Lymphocytes 1.68 Thousands/µL      Absolute Monocytes 0.84 Thousand/µL      Eosinophils Absolute 0.02 Thousand/µL      Basophils Absolute 0.03 Thousands/µL             No orders to display       Procedures    ED Medication and Procedure Management   Prior to Admission Medications   Prescriptions Last Dose Informant Patient Reported? Taking?   Jardiance 25 MG TABS 5/10/2025  No Yes   Sig: take 1 tablet by mouth once daily   Klor-Con M20 20 MEQ tablet 5/10/2025  No Yes   Sig: TAKE 1 TABLET DAILY   Lancets (onetouch ultrasoft) lancets  Self No No   Sig: Check glucose daily   Vitamin D, Cholecalciferol, 50 MCG (2000 UT) CAPS 5/10/2025 Self No Yes   Sig: Take 50 mcg by mouth daily   amLODIPine (NORVASC) 10 mg tablet 5/10/2025  No Yes   Sig: TAKE 1 TABLET DAILY   apixaban (Eliquis) 5 mg 5/10/2025 Self No Yes   Sig: Take 1 tablet (5 mg total) by mouth 2 (two) times a day   atorvastatin (LIPITOR) 10 mg tablet 5/10/2025  No Yes   Sig: Take 1 tablet (10 mg total) by mouth daily   diphenhydrAMINE (BENADRYL) 50 MG tablet   No No   Sig: Diphenhydramine (benadryl) 50 mg by mouth 1 hour prior to contrast administration.   enalapril (VASOTEC) 20 mg tablet 5/10/2025 Self No Yes   Sig: Take 1 tablet (20 mg total) by mouth 2 (two) times a day   furosemide (LASIX) 40 mg tablet  5/10/2025 Self Yes Yes   Sig: Take 60 mg by mouth daily   glucose blood (OneTouch Verio) test strip  Self No No   Sig: Use 1 each daily   hydrALAZINE (APRESOLINE) 25 mg tablet 5/10/2025  No Yes   Sig: TAKE 1 TABLET THREE TIMES A DAY   indomethacin (INDOCIN) 50 mg capsule   No No   Sig: Take 1 capsule (50 mg total) by mouth 2 (two) times a day with meals   ketoconazole (NIZORAL) 2 % shampoo  Self No No   Sig: Apply 1 Application topically if needed for irritation   metoprolol succinate (TOPROL-XL) 25 mg 24 hr tablet 5/10/2025  No Yes   Sig: TAKE 1 TABLET DAILY (TAKE A 25 MG TABLET WITH A 50 MG TABLET TO EQUAL 75 MG ONCE DAILY)   metoprolol succinate (TOPROL-XL) 50 mg 24 hr tablet 5/10/2025  No Yes   Sig: Take 1 tablet (50 mg total) by mouth daily   multivitamin (THERAGRAN) TABS  Self Yes No   Sig: Take 1 tablet by mouth daily   omeprazole (PriLOSEC) 40 MG capsule 5/10/2025  No Yes   Sig: TAKE 1 CAPSULE DAILY   predniSONE 10 mg tablet   Yes No   Sig: Take 4 pills daily for 4 days, 3 pills for 3 days, 2 pills for 2 days then 1 pill daily for 1 day   predniSONE 10 mg tablet Not Taking  No No   Sig: Take prednisone 50 mg by mouth 13 hours prior, 7 hours prior and 1 hour prior to contrast administration. Patient on prednisone taper 30 mg daily 5/1/25. Take total of 50 mg each dose.   Patient not taking: Reported on 5/10/2025   semaglutide, 0.25 or 0.5 mg/dose, (Ozempic, 0.25 or 0.5 MG/DOSE,) 2 mg/3 mL injection pen 5/10/2025 Self No Yes   Sig: Inject 0.75 mL (0.5 mg total) under the skin every 7 days      Facility-Administered Medications: None     Discharge Medication List as of 5/10/2025  8:29 PM        CONTINUE these medications which have NOT CHANGED    Details   amLODIPine (NORVASC) 10 mg tablet TAKE 1 TABLET DAILY, Starting Thu 3/27/2025, Normal      apixaban (Eliquis) 5 mg Take 1 tablet (5 mg total) by mouth 2 (two) times a day, Starting Fri 2/14/2025, Normal      atorvastatin (LIPITOR) 10 mg tablet Take 1 tablet  (10 mg total) by mouth daily, Starting Wed 4/30/2025, Normal      enalapril (VASOTEC) 20 mg tablet Take 1 tablet (20 mg total) by mouth 2 (two) times a day, Starting Mon 2/3/2025, Normal      furosemide (LASIX) 40 mg tablet Take 60 mg by mouth daily, Starting Sat 11/16/2024, Historical Med      hydrALAZINE (APRESOLINE) 25 mg tablet TAKE 1 TABLET THREE TIMES A DAY, Starting Thu 3/27/2025, Normal      Jardiance 25 MG TABS take 1 tablet by mouth once daily, Starting Mon 3/31/2025, Normal      Klor-Con M20 20 MEQ tablet TAKE 1 TABLET DAILY, Starting Thu 3/27/2025, Normal      !! metoprolol succinate (TOPROL-XL) 25 mg 24 hr tablet TAKE 1 TABLET DAILY (TAKE A 25 MG TABLET WITH A 50 MG TABLET TO EQUAL 75 MG ONCE DAILY), Normal      !! metoprolol succinate (TOPROL-XL) 50 mg 24 hr tablet Take 1 tablet (50 mg total) by mouth daily, Starting Wed 4/30/2025, Normal      omeprazole (PriLOSEC) 40 MG capsule TAKE 1 CAPSULE DAILY, Starting Thu 3/27/2025, Normal      semaglutide, 0.25 or 0.5 mg/dose, (Ozempic, 0.25 or 0.5 MG/DOSE,) 2 mg/3 mL injection pen Inject 0.75 mL (0.5 mg total) under the skin every 7 days, Starting Mon 12/23/2024, Normal      Vitamin D, Cholecalciferol, 50 MCG (2000 UT) CAPS Take 50 mcg by mouth daily, Starting Wed 1/4/2023, Normal      diphenhydrAMINE (BENADRYL) 50 MG tablet Diphenhydramine (benadryl) 50 mg by mouth 1 hour prior to contrast administration., Normal      glucose blood (OneTouch Verio) test strip Use 1 each daily, Starting Mon 12/23/2024, Normal      indomethacin (INDOCIN) 50 mg capsule Take 1 capsule (50 mg total) by mouth 2 (two) times a day with meals, Starting Thu 5/8/2025, Normal      ketoconazole (NIZORAL) 2 % shampoo Apply 1 Application topically if needed for irritation, Starting Wed 11/13/2024, No Print      Lancets (onetouch ultrasoft) lancets Check glucose daily, Normal      multivitamin (THERAGRAN) TABS Take 1 tablet by mouth daily, Historical Med      !! predniSONE 10 mg tablet Take  4 pills daily for 4 days, 3 pills for 3 days, 2 pills for 2 days then 1 pill daily for 1 day, Historical Med      !! predniSONE 10 mg tablet Take prednisone 50 mg by mouth 13 hours prior, 7 hours prior and 1 hour prior to contrast administration. Patient on prednisone taper 30 mg daily 5/1/25. Take total of 50 mg each dose., Normal       !! - Potential duplicate medications found. Please discuss with provider.        No discharge procedures on file.  ED SEPSIS DOCUMENTATION   Time reflects when diagnosis was documented in both MDM as applicable and the Disposition within this note       Time User Action Codes Description Comment    5/10/2025  8:28 PM Stephanie Melgoza Add [R00.2] Palpitations     5/10/2025  8:28 PM Stephanie Melgoza Add [I49.3] PVC (premature ventricular contraction)                  Stephanie Melgoza,   05/11/25 3082

## 2025-05-12 ENCOUNTER — VBI (OUTPATIENT)
Dept: FAMILY MEDICINE CLINIC | Facility: CLINIC | Age: 70
End: 2025-05-12

## 2025-05-12 ENCOUNTER — PATIENT OUTREACH (OUTPATIENT)
Dept: CASE MANAGEMENT | Facility: OTHER | Age: 70
End: 2025-05-12

## 2025-05-12 NOTE — TELEPHONE ENCOUNTER
05/12/25 2:58 PM    Patient contacted post ED visit, [facility] department spoke with patient/caregiver and outreach was successful.    Thank you.  Nicky Del Castillo MA  [facility]

## 2025-05-13 ENCOUNTER — PATIENT OUTREACH (OUTPATIENT)
Dept: CASE MANAGEMENT | Facility: OTHER | Age: 70
End: 2025-05-13

## 2025-05-13 ENCOUNTER — APPOINTMENT (OUTPATIENT)
Dept: RADIOLOGY | Facility: MEDICAL CENTER | Age: 70
End: 2025-05-13
Payer: MEDICARE

## 2025-05-13 ENCOUNTER — OFFICE VISIT (OUTPATIENT)
Age: 70
End: 2025-05-13
Payer: MEDICARE

## 2025-05-13 ENCOUNTER — APPOINTMENT (OUTPATIENT)
Dept: LAB | Facility: MEDICAL CENTER | Age: 70
End: 2025-05-13
Payer: MEDICARE

## 2025-05-13 VITALS
DIASTOLIC BLOOD PRESSURE: 70 MMHG | HEART RATE: 62 BPM | WEIGHT: 300 LBS | SYSTOLIC BLOOD PRESSURE: 118 MMHG | HEIGHT: 71 IN | TEMPERATURE: 97.6 F | BODY MASS INDEX: 42 KG/M2 | OXYGEN SATURATION: 99 %

## 2025-05-13 DIAGNOSIS — N18.31 STAGE 3A CHRONIC KIDNEY DISEASE (HCC): Primary | ICD-10-CM

## 2025-05-13 DIAGNOSIS — M25.562 LEFT KNEE PAIN, UNSPECIFIED CHRONICITY: ICD-10-CM

## 2025-05-13 DIAGNOSIS — I10 BENIGN ESSENTIAL HTN: ICD-10-CM

## 2025-05-13 DIAGNOSIS — N28.1 ACQUIRED COMPLEX CYST OF KIDNEY: ICD-10-CM

## 2025-05-13 DIAGNOSIS — I87.303 STASIS EDEMA OF BOTH LOWER EXTREMITIES: ICD-10-CM

## 2025-05-13 DIAGNOSIS — I50.32 CHRONIC DIASTOLIC HEART FAILURE (HCC): ICD-10-CM

## 2025-05-13 PROBLEM — N17.9 ACUTE KIDNEY INJURY SUPERIMPOSED ON STAGE 3A CHRONIC KIDNEY DISEASE (HCC): Status: RESOLVED | Noted: 2024-10-09 | Resolved: 2025-05-13

## 2025-05-13 PROCEDURE — G2211 COMPLEX E/M VISIT ADD ON: HCPCS | Performed by: INTERNAL MEDICINE

## 2025-05-13 PROCEDURE — 73562 X-RAY EXAM OF KNEE 3: CPT

## 2025-05-13 PROCEDURE — 99214 OFFICE O/P EST MOD 30 MIN: CPT | Performed by: INTERNAL MEDICINE

## 2025-05-13 NOTE — PROGRESS NOTES
Christy returned my phone call Explained I was checking to see how he is doing since going to the emergency room. No chest pains as of right now. To see nephrologist today  No questions about medications or discharge instructions.   No care management needs identified

## 2025-05-13 NOTE — PROGRESS NOTES
[facility] [place]  Ben Reilly DO    Name: Christy Haro  YOB: 1955      Assessment/Plan:    Stage 3a chronic kidney disease (HCC)  Lab Results   Component Value Date    EGFR 60 05/14/2025    EGFR 54 05/10/2025    EGFR 61 05/07/2025    CREATININE 1.27 05/14/2025    CREATININE 1.30 05/10/2025    CREATININE 1.18 05/07/2025     Patient's kidney function continues to do very well, baseline right around 1.2-1.3 mg/dL.    Chronic diastolic heart failure (HCC)  Wt Readings from Last 3 Encounters:   05/13/25 136 kg (300 lb)   05/10/25 (!) 137 kg (301 lb 9.4 oz)   05/05/25 (!) 136 kg (300 lb 3.2 oz)     Patient is compensated at this time, continue with Jardiance enalapril metoprolol and furosemide.  Continue to encourage low-sodium diet.          Stasis edema of both lower extremities  Patient has mild bilateral lower extremity edema, advised against more aggressive diuretics at this time as it does not appear to be clinically indicated.    Benign essential HTN  Blood pressure continues to be well-controlled, continue with current medications however, I am concerned it may be too low.  If indicated would look to discontinue low-dose hydralazine, and then reassess blood pressures.  In the meantime continue all other medications.  Continue to focus on a low sodium diet.    Acquired complex cyst of kidney  No further assessment indicated at this time, MRI noted proteinaceous material with no evidence of cyst concerning for malignancy.  May proceed with additional imaging depending on how progresses clinically, but certainly in the short-term no indication for now.         Problem List Items Addressed This Visit          Cardiovascular and Mediastinum    Benign essential HTN    Blood pressure continues to be well-controlled, continue with current medications however, I am concerned it may be too low.  If indicated would look to discontinue low-dose hydralazine, and then  reassess blood pressures.  In the meantime continue all other medications.  Continue to focus on a low sodium diet.         Chronic diastolic heart failure (HCC)    Wt Readings from Last 3 Encounters:   05/13/25 136 kg (300 lb)   05/10/25 (!) 137 kg (301 lb 9.4 oz)   05/05/25 (!) 136 kg (300 lb 3.2 oz)     Patient is compensated at this time, continue with Jardiance enalapril metoprolol and furosemide.  Continue to encourage low-sodium diet.                  Genitourinary    Stage 3a chronic kidney disease (HCC) - Primary    Lab Results   Component Value Date    EGFR 60 05/14/2025    EGFR 54 05/10/2025    EGFR 61 05/07/2025    CREATININE 1.27 05/14/2025    CREATININE 1.30 05/10/2025    CREATININE 1.18 05/07/2025     Patient's kidney function continues to do very well, baseline right around 1.2-1.3 mg/dL.         Relevant Orders    Comprehensive metabolic panel    CBC and differential    Albumin / creatinine urine ratio    Protein / creatinine ratio, urine    Urinalysis with microscopic    Magnesium    Protein electrophoresis, urine    Protein electrophoresis, serum    Phosphorus    PTH, intact    Acquired complex cyst of kidney    No further assessment indicated at this time, MRI noted proteinaceous material with no evidence of cyst concerning for malignancy.  May proceed with additional imaging depending on how progresses clinically, but certainly in the short-term no indication for now.            Surgery/Wound/Pain    Stasis edema of both lower extremities    Patient has mild bilateral lower extremity edema, advised against more aggressive diuretics at this time as it does not appear to be clinically indicated.            Patient is stable from renal standpoint, we will see him back for regular visit in 6 months, blood work to be done prior provided during this appointment.    Subjective:      Patient ID: Mhamd Estrellita is a 69 y.o. male.    Patient presents for follow up.    We reviewed labs in detail, most  recent creatinine noted to be 1.3 mg/dL, estimated GFR is 54 mL/min.    There were no significant electrolyte abnormalities noted.  Patient is taking all medications as prescribed with no specific side effects and denies use of nonsteroid anti-inflammatory medications.              The following portions of the patient's history were reviewed and updated as appropriate: allergies, current medications, past family history, past medical history, past social history, past surgical history and problem list.    Review of Systems   All other systems reviewed and are negative.        Social History     Socioeconomic History    Marital status: Single     Spouse name: None    Number of children: None    Years of education: None    Highest education level: None   Occupational History    None   Tobacco Use    Smoking status: Former     Current packs/day: 1.00     Types: Cigarettes     Passive exposure: Never    Smokeless tobacco: Never    Tobacco comments:     former smoker-quit 27 yrs ago 1pppd x 30 yrs as per [employer]   Vaping Use    Vaping status: Never Used   Substance and Sexual Activity    Alcohol use: Never    Drug use: Never    Sexual activity: Not Currently   Other Topics Concern    None   Social History Narrative    None     Social Drivers of Health     Financial Resource Strain: Low Risk  (1/8/2024)    Overall Financial Resource Strain (CARDIA)     Difficulty of Paying Living Expenses: Not hard at all   Food Insecurity: No Food Insecurity (1/30/2025)    Nursing - Inadequate Food Risk Classification     Worried About Running Out of Food in the Last Year: Never true     Ran Out of Food in the Last Year: Never true     Ran Out of Food in the Last Year: Not on file   Transportation Needs: No Transportation Needs (1/30/2025)    PRAPARE - Transportation     Lack of Transportation (Medical): No     Lack of Transportation (Non-Medical): No   Physical Activity: Not on file   Stress: Not on file   Social Connections: Not on  file   Intimate Partner Violence: Not on file   Housing Stability: Low Risk  (1/30/2025)    Housing Stability Vital Sign     Unable to Pay for Housing in the Last Year: No     Number of Times Moved in the Last Year: 1     Homeless in the Last Year: No     Past Medical History:   Diagnosis Date    Abnormal ECG     Acute kidney injury superimposed on stage 3a chronic kidney disease (HCC) 10/09/2024    Arthritis     last assessed 7/1/15    Bronchitis 02/02/2023    Chronic diastolic (congestive) heart failure (HCC)     Coronary artery disease     Diabetes mellitus (HCC)     type 2-last assessed 1/28/15    Diabetes mellitus (HCC) 03/16/2018    Dyslipidemia     GERD (gastroesophageal reflux disease)     Hypertension     Hypertensive urgency 03/16/2023    Irregular heart beat     last assessed 7/1/15    Lactic acidosis 10/12/2024    Obstructive sleep apnea     Palpitations     last assessed 9/7/17    Sexual dysfunction     last assessed 7/1/15    Thyroid disease     last assessed 7/1/15     Past Surgical History:   Procedure Laterality Date    COLONOSCOPY      TONSILLECTOMY         Current Outpatient Medications:     amLODIPine (NORVASC) 10 mg tablet, TAKE 1 TABLET DAILY, Disp: 90 tablet, Rfl: 3    apixaban (Eliquis) 5 mg, Take 1 tablet (5 mg total) by mouth 2 (two) times a day, Disp: 60 tablet, Rfl: 5    atorvastatin (LIPITOR) 10 mg tablet, Take 1 tablet (10 mg total) by mouth daily, Disp: 90 tablet, Rfl: 3    diphenhydrAMINE (BENADRYL) 50 MG tablet, Diphenhydramine (benadryl) 50 mg by mouth 1 hour prior to contrast administration., Disp: 30 tablet, Rfl: 0    enalapril (VASOTEC) 20 mg tablet, Take 1 tablet (20 mg total) by mouth 2 (two) times a day, Disp: 60 tablet, Rfl: 5    furosemide (LASIX) 40 mg tablet, Take 60 mg by mouth daily, Disp: , Rfl:     glucose blood (OneTouch Verio) test strip, Use 1 each daily, Disp: 100 each, Rfl: 5    hydrALAZINE (APRESOLINE) 25 mg tablet, TAKE 1 TABLET THREE TIMES A DAY, Disp: 270  tablet, Rfl: 3    indomethacin (INDOCIN) 50 mg capsule, Take 1 capsule (50 mg total) by mouth 2 (two) times a day with meals, Disp: 30 capsule, Rfl: 0    Jardiance 25 MG TABS, take 1 tablet by mouth once daily, Disp: 30 tablet, Rfl: 5    ketoconazole (NIZORAL) 2 % shampoo, Apply 1 Application topically if needed for irritation, Disp: , Rfl:     Klor-Con M20 20 MEQ tablet, TAKE 1 TABLET DAILY, Disp: 90 tablet, Rfl: 3    Lancets (onetouch ultrasoft) lancets, Check glucose daily, Disp: 100 each, Rfl: 1    metoprolol succinate (TOPROL-XL) 25 mg 24 hr tablet, TAKE 1 TABLET DAILY (TAKE A 25 MG TABLET WITH A 50 MG TABLET TO EQUAL 75 MG ONCE DAILY), Disp: 90 tablet, Rfl: 3    metoprolol succinate (TOPROL-XL) 50 mg 24 hr tablet, Take 1 tablet (50 mg total) by mouth daily, Disp: 90 tablet, Rfl: 3    multivitamin (THERAGRAN) TABS, Take 1 tablet by mouth daily, Disp: , Rfl:     semaglutide, 0.25 or 0.5 mg/dose, (Ozempic, 0.25 or 0.5 MG/DOSE,) 2 mg/3 mL injection pen, Inject 0.75 mL (0.5 mg total) under the skin every 7 days, Disp: 3 mL, Rfl: 2    Vitamin D, Cholecalciferol, 50 MCG (2000 UT) CAPS, Take 50 mcg by mouth daily, Disp: 90 capsule, Rfl: 3    omeprazole (PriLOSEC) 40 MG capsule, TAKE 1 CAPSULE DAILY, Disp: 90 capsule, Rfl: 3  No current facility-administered medications for this visit.    Lab Results   Component Value Date     12/16/2015    SODIUM 138 05/14/2025    K 4 05/14/2025     05/14/2025    CO2 27 05/14/2025    ANIONGAP 5 12/16/2015    AGAP 8 05/14/2025    BUN 21 05/14/2025    CREATININE 1.27 05/14/2025    GLUC 104 (H) 05/14/2025    GLUF 148 (H) 02/14/2025    CALCIUM 9.8 05/14/2025    AST 17 05/14/2025    ALT 17 05/14/2025    ALKPHOS 37 05/14/2025    PROT 7.2 12/16/2015    TP 7.8 05/14/2025    BILITOT 0.84 12/16/2015    TBILI 1.1 (H) 05/14/2025    EGFR 60 05/14/2025     Lab Results   Component Value Date    WBC 8.22 05/10/2025    HGB 14.3 05/10/2025    HCT 44.0 05/10/2025    MCV 90 05/10/2025    PLT  "174 05/10/2025     Lab Results   Component Value Date    CHOLESTEROL 144 02/14/2025    CHOLESTEROL 129 07/31/2023    CHOLESTEROL 133 10/27/2022     Lab Results   Component Value Date    HDL 33 (L) 02/14/2025    HDL 32 (L) 07/31/2023    HDL 37 (L) 10/27/2022     Lab Results   Component Value Date    LDLCALC 90 02/14/2025    LDLCALC 71 07/31/2023    LDLCALC 82 10/27/2022     Lab Results   Component Value Date    TRIG 107 02/14/2025    TRIG 131 07/31/2023    TRIG 71 10/27/2022     No results found for: \"CHOLHDL\"  Lab Results   Component Value Date    WLD1QHNSCIJU 0.838 05/10/2025     Lab Results   Component Value Date    PTH 72.7 05/07/2025    CALCIUM 9.8 05/14/2025    PHOS 4.1 05/07/2025     No results found for: \"SPEP\", \"UPEP\"  No results found for: \"MICROALBUR\", \"[id number]\"        Objective:      /70 (BP Location: Right arm, Patient Position: Sitting, Cuff Size: Large)   Pulse 62   Temp 97.6 °F (36.4 °C) (Temporal)   Ht 5' 11\" (1.803 m)   Wt 136 kg (300 lb)   SpO2 99%   BMI 41.84 kg/m²          Physical Exam  Vitals reviewed.   Constitutional:       General: He is not in acute distress.     Appearance: Normal appearance.   HENT:      Head: Normocephalic and atraumatic.      Right Ear: External ear normal.      Left Ear: External ear normal.     Eyes:      Conjunctiva/sclera: Conjunctivae normal.       Cardiovascular:      Rate and Rhythm: Normal rate and regular rhythm.      Heart sounds: Normal heart sounds.   Pulmonary:      Effort: No respiratory distress.      Breath sounds: No wheezing.   Abdominal:      Palpations: Abdomen is soft.     Skin:     General: Skin is warm and dry.     Neurological:      General: No focal deficit present.      Mental Status: He is alert and oriented to person, place, and time.     Psychiatric:         Mood and Affect: Mood normal.         Behavior: Behavior normal.         "

## 2025-05-14 ENCOUNTER — APPOINTMENT (OUTPATIENT)
Dept: URBAN - NONMETROPOLITAN AREA CLINIC 4 | Facility: CLINIC | Age: 70
Setting detail: DERMATOLOGY
End: 2025-05-14

## 2025-05-14 DIAGNOSIS — L91.8 OTHER HYPERTROPHIC DISORDERS OF THE SKIN: ICD-10-CM

## 2025-05-14 DIAGNOSIS — L82.0 INFLAMED SEBORRHEIC KERATOSIS: ICD-10-CM | Status: INADEQUATELY CONTROLLED

## 2025-05-14 DIAGNOSIS — L57.8 OTHER SKIN CHANGES DUE TO CHRONIC EXPOSURE TO NONIONIZING RADIATION: ICD-10-CM

## 2025-05-14 DIAGNOSIS — L85.3 XEROSIS CUTIS: ICD-10-CM | Status: INADEQUATELY CONTROLLED

## 2025-05-14 DIAGNOSIS — I83.9 ASYMPTOMATIC VARICOSE VEINS OF LOWER EXTREMITIES: ICD-10-CM

## 2025-05-14 DIAGNOSIS — D22 MELANOCYTIC NEVI: ICD-10-CM

## 2025-05-14 DIAGNOSIS — D18.0 HEMANGIOMA: ICD-10-CM

## 2025-05-14 PROBLEM — D22.5 MELANOCYTIC NEVI OF TRUNK: Status: ACTIVE | Noted: 2025-05-14

## 2025-05-14 PROBLEM — D18.01 HEMANGIOMA OF SKIN AND SUBCUTANEOUS TISSUE: Status: ACTIVE | Noted: 2025-05-14

## 2025-05-14 PROBLEM — I83.93 ASYMPTOMATIC VARICOSE VEINS OF BILATERAL LOWER EXTREMITIES: Status: ACTIVE | Noted: 2025-05-14

## 2025-05-14 LAB
ATRIAL RATE: 69 BPM
P AXIS: 51 DEGREES
PR INTERVAL: 204 MS
QRS AXIS: -63 DEGREES
QRSD INTERVAL: 110 MS
QT INTERVAL: 412 MS
QTC INTERVAL: 441 MS
T WAVE AXIS: 62 DEGREES
VENTRICULAR RATE: 69 BPM

## 2025-05-14 PROCEDURE — 93010 ELECTROCARDIOGRAM REPORT: CPT | Performed by: INTERNAL MEDICINE

## 2025-05-14 PROCEDURE — ? FULL BODY SKIN EXAM

## 2025-05-14 PROCEDURE — ? LIQUID NITROGEN

## 2025-05-14 PROCEDURE — 17110 DESTRUCTION B9 LES UP TO 14: CPT

## 2025-05-14 PROCEDURE — 99213 OFFICE O/P EST LOW 20 MIN: CPT | Mod: 25

## 2025-05-14 PROCEDURE — ? TREATMENT REGIMEN

## 2025-05-14 PROCEDURE — ? COUNSELING

## 2025-05-14 PROCEDURE — ? SUNSCREEN RECOMMENDATIONS

## 2025-05-14 ASSESSMENT — PAIN INTENSITY VAS: HOW INTENSE IS YOUR PAIN 0 BEING NO PAIN, 10 BEING THE MOST SEVERE PAIN POSSIBLE?: NO PAIN

## 2025-05-14 ASSESSMENT — LOCATION DETAILED DESCRIPTION DERM
LOCATION DETAILED: RIGHT ANTERIOR DISTAL THIGH
LOCATION DETAILED: EPIGASTRIC SKIN
LOCATION DETAILED: RIGHT MEDIAL UPPER BACK
LOCATION DETAILED: RIGHT DISTAL PRETIBIAL REGION
LOCATION DETAILED: LEFT PROXIMAL PRETIBIAL REGION
LOCATION DETAILED: LEFT MEDIAL FOREHEAD
LOCATION DETAILED: LEFT MEDIAL INFERIOR CHEST
LOCATION DETAILED: LEFT AXILLARY VAULT
LOCATION DETAILED: LEFT DISTAL PRETIBIAL REGION
LOCATION DETAILED: MID POSTERIOR NECK
LOCATION DETAILED: STERNUM

## 2025-05-14 ASSESSMENT — LOCATION SIMPLE DESCRIPTION DERM
LOCATION SIMPLE: LEFT FOREHEAD
LOCATION SIMPLE: ABDOMEN
LOCATION SIMPLE: CHEST
LOCATION SIMPLE: RIGHT PRETIBIAL REGION
LOCATION SIMPLE: RIGHT UPPER BACK
LOCATION SIMPLE: LEFT PRETIBIAL REGION
LOCATION SIMPLE: POSTERIOR NECK
LOCATION SIMPLE: LEFT AXILLARY VAULT
LOCATION SIMPLE: RIGHT THIGH

## 2025-05-14 ASSESSMENT — LOCATION ZONE DERM
LOCATION ZONE: AXILLAE
LOCATION ZONE: NECK
LOCATION ZONE: LEG
LOCATION ZONE: FACE
LOCATION ZONE: TRUNK

## 2025-05-14 NOTE — PROCEDURE: TREATMENT REGIMEN
Otc Regimen: Cerave or Cetaphil moisturizer BID - TID
Detail Level: Zone
Otc Regimen: Vaseline QD until healed

## 2025-05-14 NOTE — PROCEDURE: LIQUID NITROGEN
Detail Level: Detailed
Medical Necessity Information: It is in your best interest to select a reason for this procedure from the list below. All of these items fulfill various CMS LCD requirements except the new and changing color options.
Post-Care Instructions: I reviewed with the patient in detail post-care instructions. Patient is to wear sunprotection, and avoid picking at any of the treated lesions. Pt may apply Vaseline to crusted or scabbing areas.
Spray Paint Text: The liquid nitrogen was applied to the skin utilizing a spray paint frosting technique.
Show Spray Paint Technique Variable?: Yes
Add 52 Modifier (Optional): no
Application Tool (Optional): Cry-AC
Consent: The patient's consent was obtained including but not limited to risks of crusting, scabbing, blistering, scarring, darker or lighter pigmentary change, recurrence, incomplete removal and infection.
Medical Necessity Clause: This procedure was medically necessary because the lesions that were treated were:
Number Of Freeze-Thaw Cycles: 3 freeze-thaw cycles

## 2025-05-15 ENCOUNTER — RESULTS FOLLOW-UP (OUTPATIENT)
Dept: FAMILY MEDICINE CLINIC | Facility: CLINIC | Age: 70
End: 2025-05-15

## 2025-05-15 NOTE — ASSESSMENT & PLAN NOTE
No further assessment indicated at this time, MRI noted proteinaceous material with no evidence of cyst concerning for malignancy.  May proceed with additional imaging depending on how progresses clinically, but certainly in the short-term no indication for now.

## 2025-05-15 NOTE — ASSESSMENT & PLAN NOTE
Patient has mild bilateral lower extremity edema, advised against more aggressive diuretics at this time as it does not appear to be clinically indicated.

## 2025-05-15 NOTE — ASSESSMENT & PLAN NOTE
Lab Results   Component Value Date    EGFR 60 05/14/2025    EGFR 54 05/10/2025    EGFR 61 05/07/2025    CREATININE 1.27 05/14/2025    CREATININE 1.30 05/10/2025    CREATININE 1.18 05/07/2025     Patient's kidney function continues to do very well, baseline right around 1.2-1.3 mg/dL.

## 2025-05-15 NOTE — ASSESSMENT & PLAN NOTE
Wt Readings from Last 3 Encounters:   05/13/25 136 kg (300 lb)   05/10/25 (!) 137 kg (301 lb 9.4 oz)   05/05/25 (!) 136 kg (300 lb 3.2 oz)     Patient is compensated at this time, continue with Jardiance enalapril metoprolol and furosemide.  Continue to encourage low-sodium diet.

## 2025-05-15 NOTE — ASSESSMENT & PLAN NOTE
Blood pressure continues to be well-controlled, continue with current medications however, I am concerned it may be too low.  If indicated would look to discontinue low-dose hydralazine, and then reassess blood pressures.  In the meantime continue all other medications.  Continue to focus on a low sodium diet.   85.3

## 2025-05-16 DIAGNOSIS — I10 PRIMARY HYPERTENSION: ICD-10-CM

## 2025-05-16 DIAGNOSIS — M25.569 ACUTE KNEE PAIN, UNSPECIFIED LATERALITY: Primary | ICD-10-CM

## 2025-05-16 RX ORDER — ENALAPRIL MALEATE 20 MG/1
20 TABLET ORAL 2 TIMES DAILY
Qty: 60 TABLET | Refills: 5 | Status: SHIPPED | OUTPATIENT
Start: 2025-05-16 | End: 2025-05-21

## 2025-05-16 NOTE — TELEPHONE ENCOUNTER
Pt contacted Call Center requested refill of their medication.        Doctor Name: Dr. Saleem Schmitt       Medication Name: enalapril (VASOTEC) 20 mg tablet       Dosage of Med: 20 MG       Frequency of Med: Take 1 tablet by mouth 2 times a day       Remaining Medication: 2 weeks       Pharmacy and Location: [address], [address]         Pt. Preferred Callback Phone Number: [phone]      Thank you.       PLEASE ADVISE PATIENTS:    REFILL REQUESTS WILL BE PROCESSED WITHIN 24-48 HOURS.

## 2025-05-17 ENCOUNTER — APPOINTMENT (EMERGENCY)
Dept: CT IMAGING | Facility: HOSPITAL | Age: 70
End: 2025-05-17
Payer: MEDICARE

## 2025-05-17 ENCOUNTER — HOSPITAL ENCOUNTER (EMERGENCY)
Facility: HOSPITAL | Age: 70
Discharge: HOME | End: 2025-05-17
Attending: EMERGENCY MEDICINE
Payer: MEDICARE

## 2025-05-17 VITALS
WEIGHT: 300 LBS | SYSTOLIC BLOOD PRESSURE: 109 MMHG | TEMPERATURE: 97.8 F | RESPIRATION RATE: 18 BRPM | OXYGEN SATURATION: 95 % | BODY MASS INDEX: 41.84 KG/M2 | DIASTOLIC BLOOD PRESSURE: 59 MMHG | HEART RATE: 60 BPM

## 2025-05-17 DIAGNOSIS — S09.90XA CLOSED HEAD INJURY, INITIAL ENCOUNTER: Primary | ICD-10-CM

## 2025-05-17 DIAGNOSIS — Z79.01 CHRONIC ANTICOAGULATION: ICD-10-CM

## 2025-05-17 PROCEDURE — 70450 CT HEAD/BRAIN W/O DYE: CPT

## 2025-05-17 PROCEDURE — 99283 EMERGENCY DEPT VISIT LOW MDM: CPT

## 2025-05-17 PROCEDURE — 99284 EMERGENCY DEPT VISIT MOD MDM: CPT | Performed by: EMERGENCY MEDICINE

## 2025-05-18 NOTE — ED PROVIDER NOTES
Emergency Department Trauma Note  Christy Haro 69 y.o. male MRN: [id number]  Unit/Bed#: RM07/RM07 Encounter: [id number]      Trauma Alert: Trauma Acuity: Trauma Evaluation  Model of Arrival:   via    Trauma Team: Current Providers  Attending Provider: Leonel Hartmann DO  Registered Nurse: Gregory Mcdonnell RN  Consultants:     None      History of Present Illness     Chief Complaint:   Chief Complaint   Patient presents with    Head Injury     Pt states he was in the shower dropped the soap bent over to pick it up and hit the R side of his head on the soap dish denies loc no fall      HPI:  Christy Haro is a 69 y.o. male who presents with closed head injury on [facility].  Patient states he was in the shower he dropped a soap he went to  the soap he stood up abruptly and hit the right parietal side of his head on the soap tray he said he hit it forcefully.  He denies LOC nausea vomiting this occurred just prior to arrival he was told to come to the ER for any head trauma on Eliquis even minor head trauma.  Notes some mild discomfort on the side of the head no other complaints..  Mechanism:Details of Incident: hit head on soap dish Injury Date: 05/17/25 Injury Time: 2200 Injury Occurence Location - Specify County: [facility]    HPI  Review of Systems   Constitutional:  Negative for chills and fever.   HENT:  Negative for ear pain and sore throat.    Eyes:  Negative for pain and visual disturbance.   Respiratory:  Negative for cough and shortness of breath.    Cardiovascular:  Negative for chest pain and palpitations.   Gastrointestinal:  Negative for abdominal pain and vomiting.   Genitourinary:  Negative for dysuria and hematuria.   Musculoskeletal:  Negative for arthralgias and back pain.   Skin:  Negative for color change and rash.   Neurological:  Positive for headaches. Negative for seizures and syncope.   All other systems reviewed and are negative.      Historical Information     Immunizations:    Immunization History   Administered Date(s) Administered    COVID-19 Moderna mRNA Vaccine 12 Yr+ 50 mcg/0.5 mL (Spikevax) 12/24/2023    COVID-19 PFIZER VACCINE 0.3 ML IM 02/25/2021, 03/25/2021, 10/03/2021    COVID-19 Pfizer Vac BIVALENT Emigdio-sucrose 12 Yr+ IM 12/29/2022    COVID-19 Pfizer mRNA vacc PF emigdio-sucrose 12 yr and older (Comirnaty) 10/16/2024    COVID-19 Pfizer vac (Emigdio-sucrose, gray cap) 12 yr+ IM 05/03/2022    INFLUENZA 10/14/2018, 10/01/2019, 08/25/2020, 10/12/2022, 10/14/2023    Influenza Split High Dose Preservative Free IM 10/13/2024    Influenza, high dose seasonal 0.7 mL 10/19/2021    Influenza, seasonal, injectable 10/08/2013, 10/13/2014, 10/15/2015, 09/26/2016, 09/09/2017    Pneumococcal Conjugate 13-Valent 08/25/2020    Pneumococcal Conjugate Vaccine 20-valent (Pcv20), Polysace 12/29/2022    Pneumococcal Polysaccharide PPV23 02/18/2015    Tdap 11/28/2015, 03/17/2025    influenza, trivalent, adjuvanted 10/08/2019       Past Medical History:   Diagnosis Date    Abnormal ECG     Acute kidney injury superimposed on stage 3a chronic kidney disease (HCC) 10/09/2024    Arthritis     last assessed 7/1/15    Bronchitis 02/02/2023    Chronic diastolic (congestive) heart failure (HCC)     Coronary artery disease     Diabetes mellitus (HCC)     type 2-last assessed 1/28/15    Diabetes mellitus (HCC) 03/16/2018    Dyslipidemia     GERD (gastroesophageal reflux disease)     Hypertension     Hypertensive urgency 03/16/2023    Irregular heart beat     last assessed 7/1/15    Lactic acidosis 10/12/2024    Obstructive sleep apnea     Palpitations     last assessed 9/7/17    Sexual dysfunction     last assessed 7/1/15    Thyroid disease     last assessed 7/1/15       Family History   Problem Relation Age of Onset    Hypertension Mother         essential    Hypertension Father         essential    Heart defect Father         cardiac disorder    Hypertension Brother     Heart disease Brother     Diabetes Maternal  Grandfather      Past Surgical History:   Procedure Laterality Date    COLONOSCOPY      TONSILLECTOMY       Social History[1]  E-Cigarette/Vaping    E-Cigarette Use Never User      E-Cigarette/Vaping Substances    Nicotine No     THC No     CBD No     Flavoring No     Other No     Unknown No        Family History: non-contributory    Meds/Allergies   Prior to Admission Medications   Prescriptions Last Dose Informant Patient Reported? Taking?   Jardiance 25 MG TABS   No No   Sig: take 1 tablet by mouth once daily   Klor-Con M20 20 MEQ tablet   No No   Sig: TAKE 1 TABLET DAILY   Lancets (onetouch ultrasoft) lancets  Self No No   Sig: Check glucose daily   Vitamin D, Cholecalciferol, 50 MCG (2000 UT) CAPS  Self No No   Sig: Take 50 mcg by mouth daily   amLODIPine (NORVASC) 10 mg tablet   No No   Sig: TAKE 1 TABLET DAILY   apixaban (Eliquis) 5 mg  Self No No   Sig: Take 1 tablet (5 mg total) by mouth 2 (two) times a day   atorvastatin (LIPITOR) 10 mg tablet   No No   Sig: Take 1 tablet (10 mg total) by mouth daily   diphenhydrAMINE (BENADRYL) 50 MG tablet   No No   Sig: Diphenhydramine (benadryl) 50 mg by mouth 1 hour prior to contrast administration.   enalapril (VASOTEC) 20 mg tablet   No No   Sig: Take 1 tablet (20 mg total) by mouth 2 (two) times a day   furosemide (LASIX) 40 mg tablet  Self Yes No   Sig: Take 60 mg by mouth daily   glucose blood (OneTouch Verio) test strip  Self No No   Sig: Use 1 each daily   hydrALAZINE (APRESOLINE) 25 mg tablet   No No   Sig: TAKE 1 TABLET THREE TIMES A DAY   indomethacin (INDOCIN) 50 mg capsule   No No   Sig: Take 1 capsule (50 mg total) by mouth 2 (two) times a day with meals   ketoconazole (NIZORAL) 2 % shampoo  Self No No   Sig: Apply 1 Application topically if needed for irritation   metoprolol succinate (TOPROL-XL) 25 mg 24 hr tablet   No No   Sig: TAKE 1 TABLET DAILY (TAKE A 25 MG TABLET WITH A 50 MG TABLET TO EQUAL 75 MG ONCE DAILY)   metoprolol succinate (TOPROL-XL) 50  mg 24 hr tablet   No No   Sig: Take 1 tablet (50 mg total) by mouth daily   multivitamin (THERAGRAN) TABS  Self Yes No   Sig: Take 1 tablet by mouth daily   omeprazole (PriLOSEC) 40 MG capsule   No No   Sig: TAKE 1 CAPSULE DAILY   semaglutide, 0.25 or 0.5 mg/dose, (Ozempic, 0.25 or 0.5 MG/DOSE,) 2 mg/3 mL injection pen  Self No No   Sig: Inject 0.75 mL (0.5 mg total) under the skin every 7 days      Facility-Administered Medications: None       Allergies[2]    PHYSICAL EXAM    PE limited by:     Objective   Vitals:   First set: Temperature: 97.8 °F (36.6 °C) (05/17/25 2227)  Pulse: 65 (05/17/25 2227)  Respirations: 18 (05/17/25 2227)  Blood Pressure: 130/60 (05/17/25 2227)  SpO2: 96 % (05/17/25 2227)    Primary Survey:   (A) Airway: intact  (B) Breathing: equal bs  (C) Circulation: Pulses:   normal  (D) Disabliity:  GCS Total:  15  (E) Expose:  Completed    Secondary Survey: (Click on Physical Exam tab above)  Physical Exam  Vitals and nursing note reviewed.   Constitutional:       General: He is not in acute distress.     Appearance: He is well-developed.   HENT:      Head: Normocephalic and atraumatic.      Comments: No periorbital ecchymosis no [place] sign no large scalp contusion laceration hematoma.  Very mild parietal scalp tenderness to palpation.  No evidence of skull fracture.     Right Ear: External ear normal.      Left Ear: External ear normal.     Eyes:      Conjunctiva/sclera: Conjunctivae normal.       Cardiovascular:      Rate and Rhythm: Normal rate and regular rhythm.      Heart sounds: No murmur heard.  Pulmonary:      Effort: Pulmonary effort is normal. No respiratory distress.      Breath sounds: Normal breath sounds.   Abdominal:      Palpations: Abdomen is soft.      Tenderness: There is no abdominal tenderness.     Musculoskeletal:         General: No swelling.      Cervical back: Neck supple.     Skin:     General: Skin is warm and dry.      Capillary Refill: Capillary refill takes less than  2 seconds.     Neurological:      General: No focal deficit present.      Mental Status: He is alert. Mental status is at baseline.     Psychiatric:         Mood and Affect: Mood normal.         Cervical spine cleared by clinical criteria? Yes     Invasive Devices       None                   Lab Results:   Results Reviewed       None                   Imaging Studies:   Direct to CT: Yes  TRAUMA - CT head wo contrast   ED Interpretation by Leonel Hartmann DO (05/17 0256)   CT head interpreted by myself.  No ICH or other acute intracranial abnormality seen.  Pending official radiology report.      Final Result by Maurice Licona DO (05/17 5028)      No acute intracranial abnormality.               The study was marked in EPIC for immediate notification, per trauma protocol.      Workstation performed: [id number]               Procedures  Procedures         ED Course           Medical Decision Making  69-year-old male with closed head injury on anticoagulation.  Differential diagnosis includes contusion, abrasion, skull fracture, intracranial hemorrhage.  Will obtain CT head.  No other imaging indicated.    ** It is of note that this patient does not require portable chest x-ray in the trauma setting due to the fact that they have no traumatic complaints of the chest.  No physical examination findings suggesting the need for chest x-ray.  There is no tenderness over the thorax upon palpation.  Lungs are clear to auscultation bilaterally.  There is no respiratory distress present at the time of examination.  No evidence of flail chest.  No contusion, abrasion, laceration or swelling of the thorax. No pain with deep inspiration. NO JVD, trachea midline.**     Problems Addressed:  Chronic anticoagulation: chronic illness or injury that poses a threat to life or bodily functions  Closed head injury, initial encounter: acute illness or injury    Amount and/or Complexity of Data Reviewed  Radiology: ordered and  "independent interpretation performed.                Disposition  [facility] Transfer: No  Final diagnoses:   Closed head injury, initial encounter   Chronic anticoagulation     Time reflects when diagnosis was documented in both MDM as applicable and the Disposition within this note       Time User Action Codes Description Comment    5/17/2025 10:34 PM Leonel Hartmann [S09.90XA] Closed head injury, initial encounter     5/17/2025 10:34 PM Leonel Hartmann [Z79.01] Chronic anticoagulation           ED Disposition       ED Disposition   Discharge    Condition   Stable    Date/Time   Sat May 17, 2025 11:19 PM    Comment   Christy Haro discharge to home/self care.                   Follow-up Information    None       Patient's Medications   Discharge Prescriptions    No medications on file     No discharge procedures on file.    PDMP Review         Value Time User    PDMP Reviewed  Yes 10/12/2024  9:42 PM Nathanael Dang PA-C            ED Provider  Electronically Signed by             [1]   Social History  Tobacco Use    Smoking status: Former     Current packs/day: 1.00     Types: Cigarettes     Passive exposure: Never    Smokeless tobacco: Never    Tobacco comments:     former smoker-quit 27 yrs ago 1pppd x 30 yrs as per [employer]   Vaping Use    Vaping status: Never Used   Substance Use Topics    Alcohol use: Never    Drug use: Never   [2]   Allergies  Allergen Reactions    Bactrim [Sulfamethoxazole-Trimethoprim] Vomiting     LIZ, seemed like a true allergy    Acetaminophen Other (See Comments)     Other reaction(s): Unknown Reaction  \"I had to go to the hospital and get shots after taking it 20yrs ago\"        Leonel Hartmann, DO  05/17/25 1885    "

## 2025-05-19 ENCOUNTER — APPOINTMENT (EMERGENCY)
Dept: CT IMAGING | Facility: HOSPITAL | Age: 70
End: 2025-05-19
Payer: MEDICARE

## 2025-05-19 ENCOUNTER — HOSPITAL ENCOUNTER (EMERGENCY)
Facility: HOSPITAL | Age: 70
Discharge: HOME | End: 2025-05-19
Attending: EMERGENCY MEDICINE
Payer: MEDICARE

## 2025-05-19 ENCOUNTER — VBI (OUTPATIENT)
Dept: FAMILY MEDICINE CLINIC | Facility: CLINIC | Age: 70
End: 2025-05-19

## 2025-05-19 VITALS
DIASTOLIC BLOOD PRESSURE: 62 MMHG | RESPIRATION RATE: 16 BRPM | SYSTOLIC BLOOD PRESSURE: 122 MMHG | WEIGHT: 300 LBS | TEMPERATURE: 98.8 F | OXYGEN SATURATION: 98 % | HEART RATE: 62 BPM | BODY MASS INDEX: 41.84 KG/M2

## 2025-05-19 DIAGNOSIS — S09.90XA INJURY OF HEAD, INITIAL ENCOUNTER: Primary | ICD-10-CM

## 2025-05-19 PROCEDURE — 99283 EMERGENCY DEPT VISIT LOW MDM: CPT

## 2025-05-19 PROCEDURE — 70450 CT HEAD/BRAIN W/O DYE: CPT

## 2025-05-19 PROCEDURE — 99284 EMERGENCY DEPT VISIT MOD MDM: CPT | Performed by: EMERGENCY MEDICINE

## 2025-05-19 NOTE — TELEPHONE ENCOUNTER
05/19/25 9:59 AM    Patient contacted post ED visit, first outreach attempt made. Message was left for patient to return a call to the [facility] Department at [facility]: Phone [phone].    Thank you.  [facility] Garr[facility]  [facility]

## 2025-05-20 NOTE — TELEPHONE ENCOUNTER
05/20/25 10:27 AM    Patient contacted post ED visit, [facility] department spoke with patient/caregiver and outreach was successful.    Thank you.  [facility] Garr[facility]  [facility]

## 2025-05-20 NOTE — ED PROVIDER NOTES
Emergency Department Trauma Note  Christy Haro 69 y.o. male MRN: [id number]  Unit/Bed#: ED 28/ED 28 Encounter: [id number]      Trauma Alert: Trauma Acuity: Trauma Evaluation  Model of Arrival: Mode of Arrival: Direct from scene via    Trauma Team: Current Providers  Attending Provider: Claudio Stallworth MD  Registered Nurse: Suze Alvarez RN  Registered Nurse: Shabnam Almanza RN  Consultants:     None      History of Present Illness     Chief Complaint:   Chief Complaint   Patient presents with   • Head Injury     HPI:  Christy Haro is a 69 y.o. male who presents with .  Mechanism:Details of Incident: states he hit his head on the car door around 1600 tonight, on eliquis          Patient is a 69-year-old male that presents for evaluation of head trauma.  Patient is on Eliquis and came in to be evaluated because of the blood thinner.  Patient hit the left side of his head on his car door.  Relatively minor trauma but trauma evaluation was called on the left.  Patient endorses a mild dull/achy headache in this area.  No vomiting altered mental status or focal neurologic deficit noted.  He otherwise denies symptoms including neck pain chest pain dyspnea abdominal pain.    Review of Systems   Constitutional:  Negative for fever.   HENT:  Negative for sore throat.    Eyes:  Negative for photophobia.   Respiratory:  Negative for shortness of breath.    Cardiovascular:  Negative for chest pain.   Gastrointestinal:  Negative for abdominal pain.   Genitourinary:  Negative for dysuria.   Musculoskeletal:  Negative for back pain.   Skin:  Negative for rash.   Neurological:  Positive for headaches. Negative for light-headedness.   Hematological:  Negative for adenopathy.   Psychiatric/Behavioral:  Negative for agitation.    All other systems reviewed and are negative.      Historical Information     Immunizations:   Immunization History   Administered Date(s) Administered   • COVID-19 Moderna mRNA Vaccine 12  Yr+ 50 mcg/0.5 mL (Spikevax) 12/24/2023   • COVID-19 PFIZER VACCINE 0.3 ML IM 02/25/2021, 03/25/2021, 10/03/2021   • COVID-19 Pfizer Vac BIVALENT Emigdio-sucrose 12 Yr+ IM 12/29/2022   • COVID-19 Pfizer mRNA vacc PF emigdio-sucrose 12 yr and older (Comirnaty) 10/16/2024   • COVID-19 Pfizer vac (Emigdio-sucrose, gray cap) 12 yr+ IM 05/03/2022   • INFLUENZA 10/14/2018, 10/01/2019, 08/25/2020, 10/12/2022, 10/14/2023   • Influenza Split High Dose Preservative Free IM 10/13/2024   • Influenza, high dose seasonal 0.7 mL 10/19/2021   • Influenza, seasonal, injectable 10/08/2013, 10/13/2014, 10/15/2015, 09/26/2016, 09/09/2017   • Pneumococcal Conjugate 13-Valent 08/25/2020   • Pneumococcal Conjugate Vaccine 20-valent (Pcv20), Polysace 12/29/2022   • Pneumococcal Polysaccharide PPV23 02/18/2015   • Tdap 11/28/2015, 03/17/2025   • influenza, trivalent, adjuvanted 10/08/2019       Past Medical History:   Diagnosis Date   • Abnormal ECG    • Acute kidney injury superimposed on stage 3a chronic kidney disease (HCC) 10/09/2024   • Arthritis     last assessed 7/1/15   • Bronchitis 02/02/2023   • Chronic diastolic (congestive) heart failure (HCC)    • Coronary artery disease    • Diabetes mellitus (HCC)     type 2-last assessed 1/28/15   • Diabetes mellitus (HCC) 03/16/2018   • Dyslipidemia    • GERD (gastroesophageal reflux disease)    • Hypertension    • Hypertensive urgency 03/16/2023   • Irregular heart beat     last assessed 7/1/15   • Lactic acidosis 10/12/2024   • Obstructive sleep apnea    • Palpitations     last assessed 9/7/17   • Sexual dysfunction     last assessed 7/1/15   • Thyroid disease     last assessed 7/1/15       Family History   Problem Relation Age of Onset   • Hypertension Mother         essential   • Hypertension Father         essential   • Heart defect Father         cardiac disorder   • Hypertension Brother    • Heart disease Brother    • Diabetes Maternal Grandfather      Past Surgical History:   Procedure  Laterality Date   • COLONOSCOPY     • TONSILLECTOMY       Social History[1]  E-Cigarette/Vaping   • E-Cigarette Use Never User      E-Cigarette/Vaping Substances   • Nicotine No    • THC No    • CBD No    • Flavoring No    • Other No    • Unknown No        Family History: non-contributory    Meds/Allergies   Prior to Admission Medications   Prescriptions Last Dose Informant Patient Reported? Taking?   Jardiance 25 MG TABS   No No   Sig: take 1 tablet by mouth once daily   Klor-Con M20 20 MEQ tablet   No No   Sig: TAKE 1 TABLET DAILY   Lancets (onetouch ultrasoft) lancets  Self No No   Sig: Check glucose daily   Vitamin D, Cholecalciferol, 50 MCG (2000 UT) CAPS  Self No No   Sig: Take 50 mcg by mouth daily   amLODIPine (NORVASC) 10 mg tablet   No No   Sig: TAKE 1 TABLET DAILY   apixaban (Eliquis) 5 mg  Self No No   Sig: Take 1 tablet (5 mg total) by mouth 2 (two) times a day   atorvastatin (LIPITOR) 10 mg tablet   No No   Sig: Take 1 tablet (10 mg total) by mouth daily   diphenhydrAMINE (BENADRYL) 50 MG tablet   No No   Sig: Diphenhydramine (benadryl) 50 mg by mouth 1 hour prior to contrast administration.   enalapril (VASOTEC) 20 mg tablet   No No   Sig: Take 1 tablet (20 mg total) by mouth 2 (two) times a day   furosemide (LASIX) 40 mg tablet  Self Yes No   Sig: Take 60 mg by mouth daily   glucose blood (OneTouch Verio) test strip  Self No No   Sig: Use 1 each daily   hydrALAZINE (APRESOLINE) 25 mg tablet   No No   Sig: TAKE 1 TABLET THREE TIMES A DAY   indomethacin (INDOCIN) 50 mg capsule   No No   Sig: Take 1 capsule (50 mg total) by mouth 2 (two) times a day with meals   ketoconazole (NIZORAL) 2 % shampoo  Self No No   Sig: Apply 1 Application topically if needed for irritation   metoprolol succinate (TOPROL-XL) 25 mg 24 hr tablet   No No   Sig: TAKE 1 TABLET DAILY (TAKE A 25 MG TABLET WITH A 50 MG TABLET TO EQUAL 75 MG ONCE DAILY)   metoprolol succinate (TOPROL-XL) 50 mg 24 hr tablet   No No   Sig: Take 1  tablet (50 mg total) by mouth daily   multivitamin (THERAGRAN) TABS  Self Yes No   Sig: Take 1 tablet by mouth daily   omeprazole (PriLOSEC) 40 MG capsule   No No   Sig: TAKE 1 CAPSULE DAILY   semaglutide, 0.25 or 0.5 mg/dose, (Ozempic, 0.25 or 0.5 MG/DOSE,) 2 mg/3 mL injection pen  Self No No   Sig: Inject 0.75 mL (0.5 mg total) under the skin every 7 days      Facility-Administered Medications: None       Allergies[2]    PHYSICAL EXAM    PE limited by: NA    Objective   Vitals:   First set: Temperature: 98 °F (36.7 °C) (05/19/25 1927)  Pulse: 67 (05/19/25 1927)  Respirations: 19 (05/19/25 1927)  Blood Pressure: 132/59 (05/19/25 1927)  SpO2: 96 % (05/19/25 1927)    Primary Survey:   (A) Airway: intact  (B) Breathing: Bilateral breath sounds   (C) Circulation: Pulses:   normal  (D) Disabliity:  GCS Total:  15  (E) Expose:  Completed    Secondary Survey: (Click on Physical Exam tab above)  Physical Exam  Vitals reviewed.   Constitutional:       General: He is not in acute distress.     Appearance: He is well-developed.   HENT:      Head: Normocephalic.     Eyes:      Pupils: Pupils are equal, round, and reactive to light.       Cardiovascular:      Rate and Rhythm: Normal rate and regular rhythm.      Heart sounds: Normal heart sounds. No murmur heard.     No friction rub. No gallop.   Pulmonary:      Effort: Pulmonary effort is normal.      Breath sounds: Normal breath sounds.   Abdominal:      General: Bowel sounds are normal. There is no distension.      Palpations: Abdomen is soft.      Tenderness: There is no abdominal tenderness. There is no guarding.     Musculoskeletal:         General: Normal range of motion.      Cervical back: Normal range of motion and neck supple.     Skin:     Capillary Refill: Capillary refill takes less than 2 seconds.     Neurological:      Mental Status: He is alert and oriented to person, place, and time.      Cranial Nerves: No cranial nerve deficit.      Sensory: No sensory  deficit.      Motor: No abnormal muscle tone.     Psychiatric:         Behavior: Behavior normal.         Thought Content: Thought content normal.         Judgment: Judgment normal.       Cervical spine cleared by clinical criteria? No (imaging required)      Invasive Devices       None                   Lab Results:   Results Reviewed       None                   Imaging Studies:   Direct to CT: No  TRAUMA - CT head wo contrast   Final Result by E. Alec Schoenberger, MD ([id number])      No acute intracranial abnormality.                  Workstation performed: [id number]               Procedures  Procedures         ED Course           Medical Decision Making  Patient is a 69-year-old male who presents for evaluation of head trauma on thinners.  CT scan reviewed and negative.  Patient otherwise without complaints.  Discharged in stable condition.    Amount and/or Complexity of Data Reviewed  Radiology: ordered.                Disposition  [facility] Transfer: No  Final diagnoses:   Injury of head, initial encounter     Time reflects when diagnosis was documented in both MDM as applicable and the Disposition within this note       Time User Action Codes Description Comment    5/19/2025  7:55 PM Claudio Stallworth Add [S09.90XA] Injury of head, initial encounter           ED Disposition       ED Disposition   Discharge    Condition   Stable    Date/Time   Mon May 19, 2025  7:55 PM    Comment   Christy Haro discharge to home/self care.                   Follow-up Information       Follow up With Specialties Details Why Contact Info Additional Information    [facility] Emergency Medicine  If symptoms worsen [address] [facility] [id number] [phone] [facility], [address], [place] [address]          Discharge Medication List as of 5/19/2025  7:55 PM        CONTINUE these medications which have NOT  CHANGED    Details   amLODIPine (NORVASC) 10 mg tablet TAKE 1 TABLET DAILY, Starting Thu 3/27/2025, Normal      apixaban (Eliquis) 5 mg Take 1 tablet (5 mg total) by mouth 2 (two) times a day, Starting Fri 2/14/2025, Normal      atorvastatin (LIPITOR) 10 mg tablet Take 1 tablet (10 mg total) by mouth daily, Starting Wed 4/30/2025, Normal      diphenhydrAMINE (BENADRYL) 50 MG tablet Diphenhydramine (benadryl) 50 mg by mouth 1 hour prior to contrast administration., Normal      enalapril (VASOTEC) 20 mg tablet Take 1 tablet (20 mg total) by mouth 2 (two) times a day, Starting Fri 5/16/2025, Normal      furosemide (LASIX) 40 mg tablet Take 60 mg by mouth daily, Starting Sat 11/16/2024, Historical Med      glucose blood (OneTouch Verio) test strip Use 1 each daily, Starting Mon 12/23/2024, Normal      hydrALAZINE (APRESOLINE) 25 mg tablet TAKE 1 TABLET THREE TIMES A DAY, Starting Thu 3/27/2025, Normal      indomethacin (INDOCIN) 50 mg capsule Take 1 capsule (50 mg total) by mouth 2 (two) times a day with meals, Starting Thu 5/8/2025, Normal      Jardiance 25 MG TABS take 1 tablet by mouth once daily, Starting Mon 3/31/2025, Normal      ketoconazole (NIZORAL) 2 % shampoo Apply 1 Application topically if needed for irritation, Starting Wed 11/13/2024, No Print      Klor-Con M20 20 MEQ tablet TAKE 1 TABLET DAILY, Starting Thu 3/27/2025, Normal      Lancets (onetouch ultrasoft) lancets Check glucose daily, Normal      !! metoprolol succinate (TOPROL-XL) 25 mg 24 hr tablet TAKE 1 TABLET DAILY (TAKE A 25 MG TABLET WITH A 50 MG TABLET TO EQUAL 75 MG ONCE DAILY), Normal      !! metoprolol succinate (TOPROL-XL) 50 mg 24 hr tablet Take 1 tablet (50 mg total) by mouth daily, Starting Wed 4/30/2025, Normal      multivitamin (THERAGRAN) TABS Take 1 tablet by mouth daily, Historical Med      omeprazole (PriLOSEC) 40 MG capsule TAKE 1 CAPSULE DAILY, Starting Thu 3/27/2025, Normal      semaglutide, 0.25 or 0.5 mg/dose, (Ozempic, 0.25  "or 0.5 MG/DOSE,) 2 mg/3 mL injection pen Inject 0.75 mL (0.5 mg total) under the skin every 7 days, Starting Mon 12/23/2024, Normal      Vitamin D, Cholecalciferol, 50 MCG (2000 UT) CAPS Take 50 mcg by mouth daily, Starting Wed 1/4/2023, Normal       !! - Potential duplicate medications found. Please discuss with provider.        No discharge procedures on file.    PDMP Review         Value Time User    PDMP Reviewed  Yes 10/12/2024  9:42 PM Nathanael Dang PA-C            ED Provider  Electronically Signed by               [1]  Social History  Tobacco Use   • Smoking status: Former     Current packs/day: 1.00     Types: Cigarettes     Passive exposure: Never   • Smokeless tobacco: Never   • Tobacco comments:     former smoker-quit 27 yrs ago 1pppd x 30 yrs as per [employer]   Vaping Use   • Vaping status: Never Used   Substance Use Topics   • Alcohol use: Never   • Drug use: Never   [2]  Allergies  Allergen Reactions   • Bactrim [Sulfamethoxazole-Trimethoprim] Vomiting     LIZ, seemed like a true allergy   • Acetaminophen Other (See Comments)     Other reaction(s): Unknown Reaction  \"I had to go to the hospital and get shots after taking it 20yrs ago\"      Claudio Stallworth MD  05/19/25 0593    "

## 2025-05-21 ENCOUNTER — REMOTE DEVICE CLINIC VISIT (OUTPATIENT)
Dept: CARDIOLOGY CLINIC | Facility: CLINIC | Age: 70
End: 2025-05-21
Payer: MEDICARE

## 2025-05-21 ENCOUNTER — RESULTS FOLLOW-UP (OUTPATIENT)
Dept: NON INVASIVE DIAGNOSTICS | Facility: HOSPITAL | Age: 70
End: 2025-05-21

## 2025-05-21 ENCOUNTER — TELEPHONE (OUTPATIENT)
Age: 70
End: 2025-05-21

## 2025-05-21 DIAGNOSIS — I10 PRIMARY HYPERTENSION: ICD-10-CM

## 2025-05-21 DIAGNOSIS — I48.0 PAROXYSMAL ATRIAL FIBRILLATION (HCC): Primary | ICD-10-CM

## 2025-05-21 PROCEDURE — 93298 REM INTERROG DEV EVAL SCRMS: CPT | Performed by: STUDENT IN AN ORGANIZED HEALTH CARE EDUCATION/TRAINING PROGRAM

## 2025-05-21 RX ORDER — ENALAPRIL MALEATE 10 MG/1
20 TABLET ORAL 2 TIMES DAILY
Qty: 360 TABLET | Refills: 2 | Status: SHIPPED | OUTPATIENT
Start: 2025-05-21

## 2025-05-21 NOTE — PROGRESS NOTES
"Results for orders placed or performed in visit on 05/21/25   Cardiac EP device report    Narrative    MDT LP2/ACTIVE SYSTEM IS MRI CONDITIONAL  CARELINK TRANSMISSION: BATTERY STATUS \"GOOD.\" DEVICE DETECTED 8 CONSECUTIVE AF EPISODES, ALL ON 2/13/25, AVG HR- 57-65 BPM, TOTAL 12.1 HRS. DEVICE CLASSIFIED 1 TACHY EPISODE DUE TO MAXWELL DURING MRI. PT ON ELIQUIS & METOPROLOL. SINCE 1/18/25, AF BURDEN- 0.2% & PVC BURDEN-1%. PRESENTING RHYTHM NSR. NORMAL DEVICE FUNCTION. GV        "

## 2025-05-21 NOTE — TELEPHONE ENCOUNTER
Pt called asking if Enalapril 10 mg, instead of 20 mg, could be sent to [facility] for a 90 day supply? Pt stated he takes two tabs in the am, one in the afternoon, and one in the evening instead of BID dosing. He stated he has taken it this way for years and it works well.    The 20 mg tablet crumbles when he tries to cut it in half  Pt did not  [place] [facility] [place] [facility]

## 2025-05-23 ENCOUNTER — HOSPITAL ENCOUNTER (EMERGENCY)
Facility: HOSPITAL | Age: 70
Discharge: HOME | End: 2025-05-23
Attending: EMERGENCY MEDICINE
Payer: MEDICARE

## 2025-05-23 ENCOUNTER — APPOINTMENT (EMERGENCY)
Dept: CT IMAGING | Facility: HOSPITAL | Age: 70
End: 2025-05-23
Payer: MEDICARE

## 2025-05-23 VITALS
BODY MASS INDEX: 41.84 KG/M2 | WEIGHT: 300 LBS | DIASTOLIC BLOOD PRESSURE: 59 MMHG | RESPIRATION RATE: 20 BRPM | OXYGEN SATURATION: 95 % | SYSTOLIC BLOOD PRESSURE: 100 MMHG | TEMPERATURE: 98.3 F | HEART RATE: 57 BPM

## 2025-05-23 DIAGNOSIS — S09.90XA INJURY OF HEAD, INITIAL ENCOUNTER: Primary | ICD-10-CM

## 2025-05-23 PROCEDURE — 99284 EMERGENCY DEPT VISIT MOD MDM: CPT | Performed by: EMERGENCY MEDICINE

## 2025-05-23 PROCEDURE — 70450 CT HEAD/BRAIN W/O DYE: CPT

## 2025-05-23 PROCEDURE — 99283 EMERGENCY DEPT VISIT LOW MDM: CPT

## 2025-05-24 NOTE — ED PROVIDER NOTES
Time reflects when diagnosis was documented in both MDM as applicable and the Disposition within this note       Time User Action Codes Description Comment    5/23/2025 10:40 PM Urban Copeland Add [S09.90XA] Injury of head, initial encounter           ED Disposition       ED Disposition   Discharge    Condition   Stable    Date/Time   Fri May 23, 2025 10:40 PM    Comment   Christy Haro discharge to home/self care.                   Assessment & Plan       Medical Decision Making  69-year-old male actually hit his head against a car door there is no LOC GCS is 15.  CT scan head completely negative.  I explained to him he has to be careful he is on a blood thinner Eliquis he is had 3 minor head injuries in the last 7 days, that he has no symptoms of concussion or TBI.  Patient be discharged at this time    Amount and/or Complexity of Data Reviewed  Radiology: ordered.             Medications - No data to display    ED Risk Strat Scores                    No data recorded                            History of Present Illness       Chief Complaint   Patient presents with    Head Injury     Patient hit head 3 hours ago on car door. Patient on Eliquis.        Past Medical History[1]   Past Surgical History[2]   Family History[3]   Social History[4]   E-Cigarette/Vaping    E-Cigarette Use Never User       E-Cigarette/Vaping Substances    Nicotine No     THC No     CBD No     Flavoring No     Other No     Unknown No       I have reviewed and agree with the history as documented.     69-year-old male states he was in his car when the wind blew the door shot and hit him in the left temple area just 2 hours prior to arrival no vomiting no nausea.  No open wound.  He has had 2 head injuries in the last week May 17 May 19 both requiring CT scans that were negative.          Review of Systems   Constitutional:  Negative for chills and fever.   HENT:  Negative for ear pain and sore throat.    Eyes:  Negative for pain and  visual disturbance.   Respiratory:  Negative for cough and shortness of breath.    Cardiovascular:  Negative for chest pain and palpitations.   Gastrointestinal:  Negative for abdominal pain and vomiting.   Genitourinary:  Negative for dysuria and hematuria.   Musculoskeletal:  Negative for arthralgias and back pain.   Skin:  Negative for color change and rash.   Neurological:  Negative for seizures and syncope.   All other systems reviewed and are negative.          Objective       ED Triage Vitals   Temperature Pulse Blood Pressure Respirations SpO2 Patient Position - Orthostatic VS   05/23/25 2100 05/23/25 2100 05/23/25 2100 05/23/25 2100 05/23/25 2100 05/23/25 2135   98.3 °F (36.8 °C) 62 115/59 20 96 % Sitting      Temp Source Heart Rate Source BP Location FiO2 (%) Pain Score    05/23/25 2135 05/23/25 2100 -- -- --    Temporal Monitor         Vitals      Date and Time Temp Pulse SpO2 Resp BP Pain Score FACES Pain Rating User   05/23/25 2200 -- 59 95 % 20 111/56 -- -- JR   05/23/25 2135 -- 63 96 % 20 108/54 -- -- JR   05/23/25 2100 98.3 °F (36.8 °C) 62 96 % 20 115/59 -- -- JS            Physical Exam  Vitals and nursing note reviewed.   Constitutional:       General: He is not in acute distress.     Appearance: He is well-developed.   HENT:      Head: Normocephalic and atraumatic.      Comments: Slight tenderness left temporal area no soft tissue swelling no open wound.     Mouth/Throat:      Mouth: Mucous membranes are moist.     Eyes:      Conjunctiva/sclera: Conjunctivae normal.       Cardiovascular:      Rate and Rhythm: Normal rate and regular rhythm.      Heart sounds: No murmur heard.  Pulmonary:      Effort: Pulmonary effort is normal. No respiratory distress.      Breath sounds: Normal breath sounds.   Abdominal:      Palpations: Abdomen is soft.      Tenderness: There is no abdominal tenderness.     Musculoskeletal:         General: No swelling.      Cervical back: Neck supple.     Skin:     General:  Skin is warm and dry.      Capillary Refill: Capillary refill takes less than 2 seconds.     Neurological:      General: No focal deficit present.      Mental Status: He is alert and oriented to person, place, and time. Mental status is at baseline.      Comments: GCS of 15   Psychiatric:         Mood and Affect: Mood normal.         Results Reviewed       None            CT head without contrast   Final Interpretation by Cruz Plascencia MD (05/23 2122)      No acute intracranial abnormality.                  Workstation performed: [id number]             Procedures    ED Medication and Procedure Management   Prior to Admission Medications   Prescriptions Last Dose Informant Patient Reported? Taking?   Jardiance 25 MG TABS   No No   Sig: take 1 tablet by mouth once daily   Klor-Con M20 20 MEQ tablet   No No   Sig: TAKE 1 TABLET DAILY   Lancets (onetouch ultrasoft) lancets  Self No No   Sig: Check glucose daily   Vitamin D, Cholecalciferol, 50 MCG (2000 UT) CAPS  Self No No   Sig: Take 50 mcg by mouth daily   amLODIPine (NORVASC) 10 mg tablet   No No   Sig: TAKE 1 TABLET DAILY   apixaban (Eliquis) 5 mg  Self No No   Sig: Take 1 tablet (5 mg total) by mouth 2 (two) times a day   atorvastatin (LIPITOR) 10 mg tablet   No No   Sig: Take 1 tablet (10 mg total) by mouth daily   diphenhydrAMINE (BENADRYL) 50 MG tablet   No No   Sig: Diphenhydramine (benadryl) 50 mg by mouth 1 hour prior to contrast administration.   enalapril (VASOTEC) 10 mg tablet   No No   Sig: Take 2 tablets (20 mg total) by mouth 2 (two) times a day   furosemide (LASIX) 40 mg tablet  Self Yes No   Sig: Take 60 mg by mouth daily   glucose blood (OneTouch Verio) test strip  Self No No   Sig: Use 1 each daily   hydrALAZINE (APRESOLINE) 25 mg tablet   No No   Sig: TAKE 1 TABLET THREE TIMES A DAY   indomethacin (INDOCIN) 50 mg capsule   No No   Sig: Take 1 capsule (50 mg total) by mouth 2 (two) times a day with meals   ketoconazole (NIZORAL) 2 % shampoo   Self No No   Sig: Apply 1 Application topically if needed for irritation   metoprolol succinate (TOPROL-XL) 25 mg 24 hr tablet   No No   Sig: TAKE 1 TABLET DAILY (TAKE A 25 MG TABLET WITH A 50 MG TABLET TO EQUAL 75 MG ONCE DAILY)   metoprolol succinate (TOPROL-XL) 50 mg 24 hr tablet   No No   Sig: Take 1 tablet (50 mg total) by mouth daily   multivitamin (THERAGRAN) TABS  Self Yes No   Sig: Take 1 tablet by mouth daily   omeprazole (PriLOSEC) 40 MG capsule   No No   Sig: TAKE 1 CAPSULE DAILY   semaglutide, 0.25 or 0.5 mg/dose, (Ozempic, 0.25 or 0.5 MG/DOSE,) 2 mg/3 mL injection pen  Self No No   Sig: Inject 0.75 mL (0.5 mg total) under the skin every 7 days      Facility-Administered Medications: None     Patient's Medications   Discharge Prescriptions    No medications on file     No discharge procedures on file.  ED SEPSIS DOCUMENTATION   Time reflects when diagnosis was documented in both MDM as applicable and the Disposition within this note       Time User Action Codes Description Comment    5/23/2025 10:40 PM Urban Copeland Add [S09.90XA] Injury of head, initial encounter                      [1]   Past Medical History:  Diagnosis Date    Abnormal ECG     Acute kidney injury superimposed on stage 3a chronic kidney disease (HCC) 10/09/2024    Arthritis     last assessed 7/1/15    Bronchitis 02/02/2023    Chronic diastolic (congestive) heart failure (HCC)     Coronary artery disease     Diabetes mellitus (HCC)     type 2-last assessed 1/28/15    Diabetes mellitus (HCC) 03/16/2018    Dyslipidemia     GERD (gastroesophageal reflux disease)     Hypertension     Hypertensive urgency 03/16/2023    Irregular heart beat     last assessed 7/1/15    Lactic acidosis 10/12/2024    Obstructive sleep apnea     Palpitations     last assessed 9/7/17    Sexual dysfunction     last assessed 7/1/15    Thyroid disease     last assessed 7/1/15   [2]   Past Surgical History:  Procedure Laterality Date    COLONOSCOPY       TONSILLECTOMY     [3]   Family History  Problem Relation Name Age of Onset    Hypertension Mother Rasmia         essential    Hypertension Father          essential    Heart defect Father          cardiac disorder    Hypertension Brother Molham     Heart disease Brother Molham     Diabetes Maternal Grandfather     [4]   Social History  Tobacco Use    Smoking status: Former     Current packs/day: 1.00     Types: Cigarettes     Passive exposure: Never    Smokeless tobacco: Never    Tobacco comments:     former smoker-quit 27 yrs ago 1pppd x 30 yrs as per [employer]   Vaping Use    Vaping status: Never Used   Substance Use Topics    Alcohol use: Never    Drug use: Never        Urban Copeland MD  05/23/25 [id number]

## 2025-06-02 ENCOUNTER — OFFICE VISIT (OUTPATIENT)
Dept: OBGYN CLINIC | Facility: CLINIC | Age: 70
End: 2025-06-02
Attending: INTERNAL MEDICINE
Payer: MEDICARE

## 2025-06-02 VITALS
BODY MASS INDEX: 41.58 KG/M2 | RESPIRATION RATE: 16 BRPM | HEIGHT: 71 IN | TEMPERATURE: 98 F | WEIGHT: 297 LBS | OXYGEN SATURATION: 95 % | HEART RATE: 67 BPM

## 2025-06-02 DIAGNOSIS — M17.0 PRIMARY OSTEOARTHRITIS OF BOTH KNEES: Primary | ICD-10-CM

## 2025-06-02 PROCEDURE — 99204 OFFICE O/P NEW MOD 45 MIN: CPT | Performed by: STUDENT IN AN ORGANIZED HEALTH CARE EDUCATION/TRAINING PROGRAM

## 2025-06-02 NOTE — PROGRESS NOTES
Assessment & Plan  Primary osteoarthritis of both knees    Orders:    Ambulatory Referral to Physical Therapy; Future    Christy Haro is a 69 y.o. year old male with chronic bilateral knee pain due to osteoarthritis.      We had a shared medical decision making discussion with the patient regarding their diagnosis of knee arthritis  We reviewed the imaging and physical exam findings together that confirm the diagnosis of  Bilateral knee arthritis.   We discussed the following treatments, and included the risks and benefits of each.   Lifestyle modifications, which include diet, exercise, and health changes, as well as changes in activities.  The benefits are clear of overall health improvements and there is minimal risk.   Physical therapy, which has been shown in several studies to be beneficial in reducing pain and improving function, but does not repair the cartilage.    Use of over-the-counter, non-narcotic pain relievers and anti-inflammatory medications are usually the first choice of therapy for arthritis of the knee. I think the most effective medications are nonsteroidal anti-inflammatory drugs, or NSAIDs. NSAIDs, such as ibuprofen, celebrex, and naproxen, are available both over-the-counter and by prescription and help by decreasing inflammation.  Injections such as steroid and viscosupplementation--both of which can help in patients with OA, but have the limitation of an overall small effect size. Patient un wanting to receive CSI as his wife receives them and she does not get relief from them.   We also discussed 'biologics' including PRP and stem cell injections. There is limited data right now to support the use of these treatment strategies, but some evidence that PRP can improve pain and function better than steroid injections.   Finally, we discussed surgical options, including arthroscopy, debridement, and osteotomies, Christy Haro is not a candidate for these given the extent of their disease.  We discussed the role of meeting with a total knee arthroplasty surgeon if the patient fails to improve with non-operative treatment.    At this point, the patient wishes to proceed with physical therapy.     Patient is to follow up as needed.       General  Chief Complaint   Patient presents with    Right Knee - Pain, Locking, Swelling     Pain is worse with driving, pain overall is at 3    Left Knee - Pain, Clicking        Subjective    Mhamd Estrellita is a 69 y.o. male who presents with BILATERAL knee pain:    Chief Complaint   Patient presents with    Right Knee - Pain, Locking, Swelling     Pain is worse with driving, pain overall is at 3    Left Knee - Pain, Clicking          History of Present Illness   The patient complains of bilateral knee pain. The pain started several months ago without injury. States he experiences a dull achy pain on the anterior aspects of both knees, worse on the right knee.   The pain is 5/10. The pain is located Anterior. The pain is described as dull and aching. They have tried NSAIDS and icing for their problem. Pain improves with rest. Pain worsens with ambulation .  The patient did not hear a pop. The patient does not have swelling.  The patient does not feel unstable.    [facility] Patient Answers  Failed to redirect to the Timeline version of the [email or web address] SmartLink.  Allergies[1]  Encounter Medications[2]  Past Medical History[3]  Past Surgical History[4]  Family History[5]  Social History[6]        Objective      Vitals:    06/02/25 1507   Pulse: 67   Resp: 16   Temp: 98 °F (36.7 °C)   SpO2: 95%     Body mass index is 41.42 kg/m².  Physical Exam  Knee Exam     bilateral   Inspection: Without ecchymosis, wounds or prior incisions   Gait: Normal   Quadriceps atrophy: None   Tenderness: Anterior knee   ROM: 0-110   Effusion: None   Meniscus Exam   bilateral   Medial Meniscus: None   Lateral Meniscus: None   Ligament Exam   Right   ACL Lachman: negative    Anterior  "Drawer:  negative   PCL Posterior Drawer:negative           Distally the patient's neurovascular status is normal.    Review of Prior Testing  I independently interpreted the following test: X-rays of the bilateral knee taken today, including weight bearing and merchant views.  Multiple views of the knee show degenerative changes consistent with mild osteoarthritis.      Follow Up: Return if symptoms worsen or fail to improve.    All questions answered and patient agrees with plan.    Scribe Attestation      I,:  Yifan Kay PA-C am acting as a scribe while in the presence of the attending physician.:       I,:  Saleem Spaulding MD personally performed the services described in this documentation    as scribed in my presence.:                    [1]   Allergies  Allergen Reactions    Bactrim [Sulfamethoxazole-Trimethoprim] Vomiting     LIZ, seemed like a true allergy    Acetaminophen Other (See Comments)     Other reaction(s): Unknown Reaction  \"I had to go to the hospital and get shots after taking it 20yrs ago\"   [2]   Outpatient Encounter Medications as of 6/2/2025   Medication Sig Dispense Refill    amLODIPine (NORVASC) 10 mg tablet TAKE 1 TABLET DAILY 90 tablet 3    apixaban (Eliquis) 5 mg Take 1 tablet (5 mg total) by mouth 2 (two) times a day 60 tablet 5    atorvastatin (LIPITOR) 10 mg tablet Take 1 tablet (10 mg total) by mouth daily 90 tablet 3    diphenhydrAMINE (BENADRYL) 50 MG tablet Diphenhydramine (benadryl) 50 mg by mouth 1 hour prior to contrast administration. 30 tablet 0    enalapril (VASOTEC) 10 mg tablet Take 2 tablets (20 mg total) by mouth 2 (two) times a day 360 tablet 2    furosemide (LASIX) 40 mg tablet Take 60 mg by mouth in the morning.      glucose blood (OneTouch Verio) test strip Use 1 each daily 100 each 5    hydrALAZINE (APRESOLINE) 25 mg tablet TAKE 1 TABLET THREE TIMES A  tablet 3    indomethacin (INDOCIN) 50 mg capsule Take 1 capsule (50 mg total) by mouth 2 (two) " times a day with meals 30 capsule 0    Jardiance 25 MG TABS take 1 tablet by mouth once daily 30 tablet 5    ketoconazole (NIZORAL) 2 % shampoo Apply 1 Application topically if needed for irritation      Klor-Con M20 20 MEQ tablet TAKE 1 TABLET DAILY 90 tablet 3    Lancets (onetouch ultrasoft) lancets Check glucose daily 100 each 1    metoprolol succinate (TOPROL-XL) 25 mg 24 hr tablet TAKE 1 TABLET DAILY (TAKE A 25 MG TABLET WITH A 50 MG TABLET TO EQUAL 75 MG ONCE DAILY) 90 tablet 3    metoprolol succinate (TOPROL-XL) 50 mg 24 hr tablet Take 1 tablet (50 mg total) by mouth daily 90 tablet 3    multivitamin (THERAGRAN) TABS Take 1 tablet by mouth in the morning.      omeprazole (PriLOSEC) 40 MG capsule TAKE 1 CAPSULE DAILY 90 capsule 3    semaglutide, 0.25 or 0.5 mg/dose, (Ozempic, 0.25 or 0.5 MG/DOSE,) 2 mg/3 mL injection pen Inject 0.75 mL (0.5 mg total) under the skin every 7 days 3 mL 2    Vitamin D, Cholecalciferol, 50 MCG (2000 UT) CAPS Take 50 mcg by mouth daily 90 capsule 3     No facility-administered encounter medications on file as of 6/2/2025.   [3]   Past Medical History:  Diagnosis Date    Abnormal ECG     Acute kidney injury superimposed on stage 3a chronic kidney disease (HCC) 10/09/2024    Arthritis     last assessed 7/1/15    Bronchitis 02/02/2023    Chronic diastolic (congestive) heart failure (HCC)     Coronary artery disease     Diabetes mellitus (HCC)     type 2-last assessed 1/28/15    Diabetes mellitus (HCC) 03/16/2018    Dyslipidemia     GERD (gastroesophageal reflux disease)     Hypertension     Hypertensive urgency 03/16/2023    Irregular heart beat     last assessed 7/1/15    Lactic acidosis 10/12/2024    Obstructive sleep apnea     Palpitations     last assessed 9/7/17    Sexual dysfunction     last assessed 7/1/15    Thyroid disease     last assessed 7/1/15   [4]   Past Surgical History:  Procedure Laterality Date    COLONOSCOPY      TONSILLECTOMY     [5]   Family History  Problem  Relation Name Age of Onset    Hypertension Mother Rasmia         essential    Hypertension Father          essential    Heart defect Father          cardiac disorder    Hypertension Brother Molham     Heart disease Brother Molham     Diabetes Maternal Grandfather     [6]   Social History  Tobacco Use    Smoking status: Former     Current packs/day: 1.00     Types: Cigarettes     Passive exposure: Never    Smokeless tobacco: Never    Tobacco comments:     former smoker-quit 27 yrs ago 1pppd x 30 yrs as per [employer]   Vaping Use    Vaping status: Never Used   Substance Use Topics    Alcohol use: Never    Drug use: Never

## 2025-06-05 ENCOUNTER — TELEPHONE (OUTPATIENT)
Dept: ENDOCRINOLOGY | Facility: CLINIC | Age: 70
End: 2025-06-05

## 2025-06-05 NOTE — TELEPHONE ENCOUNTER
Pt stopped by to  ozempic from pt assistance, wants to know if he can go up on the dose, right now he is taking 0.5 mg weekly

## 2025-06-06 DIAGNOSIS — I10 PRIMARY HYPERTENSION: ICD-10-CM

## 2025-06-06 RX ORDER — ENALAPRIL MALEATE 10 MG/1
20 TABLET ORAL 2 TIMES DAILY
Qty: 360 TABLET | Refills: 3 | Status: SHIPPED | OUTPATIENT
Start: 2025-06-06

## 2025-06-06 NOTE — TELEPHONE ENCOUNTER
Natali LOPEZ  [facility]  Received on fax a renewal for enalapril 10 mg 2 tabs twice a day  360 with 3 refills [facility].  Dr Schmitt

## 2025-06-06 NOTE — TELEPHONE ENCOUNTER
Left message for pt that new paperwork was sent to [employer] for higher dose of ozempic to 1 mg weekly

## 2025-06-10 ENCOUNTER — APPOINTMENT (EMERGENCY)
Dept: CT IMAGING | Facility: HOSPITAL | Age: 70
End: 2025-06-10
Payer: MEDICARE

## 2025-06-10 ENCOUNTER — HOSPITAL ENCOUNTER (EMERGENCY)
Facility: HOSPITAL | Age: 70
Discharge: HOME | End: 2025-06-10
Attending: EMERGENCY MEDICINE | Admitting: EMERGENCY MEDICINE
Payer: MEDICARE

## 2025-06-10 VITALS
DIASTOLIC BLOOD PRESSURE: 70 MMHG | TEMPERATURE: 97.5 F | RESPIRATION RATE: 18 BRPM | SYSTOLIC BLOOD PRESSURE: 135 MMHG | WEIGHT: 300 LBS | OXYGEN SATURATION: 95 % | BODY MASS INDEX: 41.84 KG/M2 | HEART RATE: 58 BPM

## 2025-06-10 DIAGNOSIS — S09.90XA INJURY OF HEAD, INITIAL ENCOUNTER: Primary | ICD-10-CM

## 2025-06-10 PROCEDURE — 99284 EMERGENCY DEPT VISIT MOD MDM: CPT | Performed by: EMERGENCY MEDICINE

## 2025-06-10 PROCEDURE — 99283 EMERGENCY DEPT VISIT LOW MDM: CPT

## 2025-06-10 PROCEDURE — 70450 CT HEAD/BRAIN W/O DYE: CPT

## 2025-06-11 NOTE — ED PROVIDER NOTES
Emergency Department Trauma Note  Christy Haro 69 y.o. male MRN: [id number]  Unit/Bed#: RM25/RM25 Encounter: [id number]      Trauma Alert: Trauma Acuity: Trauma Evaluation  Model of Arrival:   via    Trauma Team: Current Providers  Attending Provider: Shanna Stubbs MD  Registered Nurse: Steffany Sawant RN  Consultants:     None      History of Present Illness     Chief Complaint:   Chief Complaint   Patient presents with    Head Injury     Patient presents to ED from home w/c/o hitting L side head on airplane window yesterday approx 2300, he is on blood thinners, headache persists today     HPI:  Christy Haro is a 69 y.o. male who presents after a head injury.  Patient is on Eliquis for A-fib.  He states he was on an airplane last night and bumped his head against the window.  He states he developed a headache today so presented to the emergency department.  Patient has had numerous visits in the past for similar head injuries.  He otherwise denies any numbness or weakness in his extremities, dysphagia, dysarthria, or blurred or double vision.  Mechanism:Details of Incident: pt reports that he was on an airplane and hit his head on the roof at approx 11pm last night Injury Date: 06/09/25 Injury Time: 2300      HPI  Review of Systems   Constitutional:  Negative for appetite change, chills and fever.   HENT:  Negative for congestion, rhinorrhea and sore throat.    Respiratory:  Negative for cough and shortness of breath.    Cardiovascular:  Negative for chest pain.   Gastrointestinal:  Negative for abdominal pain, diarrhea, nausea and vomiting.   Genitourinary:  Negative for dysuria, frequency, hematuria and urgency.   Musculoskeletal:  Negative for arthralgias and myalgias.   Skin:  Negative for rash.   Neurological:  Positive for headaches. Negative for dizziness, weakness, light-headedness and numbness.   All other systems reviewed and are negative.      Historical Information     Immunizations:    Immunization History   Administered Date(s) Administered    COVID-19 Moderna mRNA Vaccine 12 Yr+ 50 mcg/0.5 mL (Spikevax) 12/24/2023    COVID-19 PFIZER VACCINE 0.3 ML IM 02/25/2021, 03/25/2021, 10/03/2021    COVID-19 Pfizer Vac BIVALENT Emigdio-sucrose 12 Yr+ IM 12/29/2022    COVID-19 Pfizer mRNA vacc PF emigdio-sucrose 12 yr and older (Comirnaty) 10/16/2024    COVID-19 Pfizer vac (Emigdio-sucrose, gray cap) 12 yr+ IM 05/03/2022    INFLUENZA 10/14/2018, 10/01/2019, 08/25/2020, 10/12/2022, 10/14/2023    Influenza Split High Dose Preservative Free IM 10/13/2024    Influenza, high dose seasonal 0.7 mL 10/19/2021    Influenza, seasonal, injectable 10/08/2013, 10/13/2014, 10/15/2015, 09/26/2016, 09/09/2017    Pneumococcal Conjugate 13-Valent 08/25/2020    Pneumococcal Conjugate Vaccine 20-valent (Pcv20), Polysace 12/29/2022    Pneumococcal Polysaccharide PPV23 02/18/2015    Tdap 11/28/2015, 03/17/2025    influenza, trivalent, adjuvanted 10/08/2019       Past Medical History[1]  Family History[2]  Past Surgical History[3]  Social History[4]  E-Cigarette/Vaping    E-Cigarette Use Never User      E-Cigarette/Vaping Substances    Nicotine No     THC No     CBD No     Flavoring No     Other No     Unknown No        Family History: non-contributory    Meds/Allergies   Prior to Admission Medications   Prescriptions Last Dose Informant Patient Reported? Taking?   Jardiance 25 MG TABS  Self No No   Sig: take 1 tablet by mouth once daily   Klor-Con M20 20 MEQ tablet  Self No No   Sig: TAKE 1 TABLET DAILY   Lancets (onetouch ultrasoft) lancets  Self No No   Sig: Check glucose daily   Vitamin D, Cholecalciferol, 50 MCG (2000 UT) CAPS  Self No No   Sig: Take 50 mcg by mouth daily   amLODIPine (NORVASC) 10 mg tablet  Self No No   Sig: TAKE 1 TABLET DAILY   apixaban (Eliquis) 5 mg  Self No No   Sig: Take 1 tablet (5 mg total) by mouth 2 (two) times a day   atorvastatin (LIPITOR) 10 mg tablet  Self No No   Sig: Take 1 tablet (10 mg total) by  mouth daily   diphenhydrAMINE (BENADRYL) 50 MG tablet  Self No No   Sig: Diphenhydramine (benadryl) 50 mg by mouth 1 hour prior to contrast administration.   enalapril (VASOTEC) 10 mg tablet   No No   Sig: Take 2 tablets (20 mg total) by mouth 2 (two) times a day   furosemide (LASIX) 40 mg tablet  Self Yes No   Sig: Take 60 mg by mouth in the morning.   glucose blood (OneTouch Verio) test strip  Self No No   Sig: Use 1 each daily   hydrALAZINE (APRESOLINE) 25 mg tablet  Self No No   Sig: TAKE 1 TABLET THREE TIMES A DAY   indomethacin (INDOCIN) 50 mg capsule  Self No No   Sig: Take 1 capsule (50 mg total) by mouth 2 (two) times a day with meals   ketoconazole (NIZORAL) 2 % shampoo  Self No No   Sig: Apply 1 Application topically if needed for irritation   metoprolol succinate (TOPROL-XL) 25 mg 24 hr tablet  Self No No   Sig: TAKE 1 TABLET DAILY (TAKE A 25 MG TABLET WITH A 50 MG TABLET TO EQUAL 75 MG ONCE DAILY)   metoprolol succinate (TOPROL-XL) 50 mg 24 hr tablet  Self No No   Sig: Take 1 tablet (50 mg total) by mouth daily   multivitamin (THERAGRAN) TABS  Self Yes No   Sig: Take 1 tablet by mouth in the morning.   omeprazole (PriLOSEC) 40 MG capsule  Self No No   Sig: TAKE 1 CAPSULE DAILY   semaglutide, 0.25 or 0.5 mg/dose, (Ozempic, 0.25 or 0.5 MG/DOSE,) 2 mg/3 mL injection pen  Self No No   Sig: Inject 0.75 mL (0.5 mg total) under the skin every 7 days      Facility-Administered Medications: None       Allergies[5]    PHYSICAL EXAM    PE limited by: none    Objective   Vitals:   First set: Temperature: 97.5 °F (36.4 °C) (06/10/25 2100)  Pulse: 64 (06/10/25 2100)  Respirations: 18 (06/10/25 2100)  Blood Pressure: 118/57 (06/10/25 2100)  SpO2: 95 % (06/10/25 2115)    Primary Survey:   (A) Airway: patent   (B) Breathing: clear and equal breath sounds bilaterally  (C) Circulation: Pulses:   pedal  2/4 and radial  2/4  (D) Disabliity:  GCS Total:  15  (E) Expose:  Completed    Secondary Survey: (Click on Physical Exam  tab above)  Physical Exam  Vitals and nursing note reviewed.   Constitutional:       General: He is not in acute distress.     Appearance: Normal appearance. He is well-developed and normal weight. He is not ill-appearing, toxic-appearing or diaphoretic.   HENT:      Head: Normocephalic and atraumatic.      Right Ear: External ear normal.      Left Ear: External ear normal.      Nose: Nose normal.      Mouth/Throat:      Mouth: Mucous membranes are moist.      Pharynx: Oropharynx is clear.     Eyes:      Extraocular Movements: Extraocular movements intact.      Conjunctiva/sclera: Conjunctivae normal.       Cardiovascular:      Rate and Rhythm: Normal rate and regular rhythm.      Pulses: Normal pulses.      Heart sounds: Normal heart sounds. No murmur heard.     No friction rub. No gallop.   Pulmonary:      Effort: Pulmonary effort is normal. No respiratory distress.      Breath sounds: Normal breath sounds. No wheezing or rales.   Abdominal:      General: There is no distension.      Palpations: Abdomen is soft.      Tenderness: There is no abdominal tenderness. There is no guarding or rebound.     Musculoskeletal:         General: No tenderness.      Cervical back: Neck supple.     Skin:     General: Skin is warm and dry.      Coloration: Skin is not pale.      Findings: No rash.     Neurological:      General: No focal deficit present.      Mental Status: He is alert and oriented to person, place, and time.      Cranial Nerves: No cranial nerve deficit.      Sensory: No sensory deficit.      Motor: No weakness.      Coordination: Coordination normal.      Gait: Gait normal.     Psychiatric:         Mood and Affect: Mood normal.         Behavior: Behavior normal.         Cervical spine cleared by clinical criteria? Yes     Invasive Devices       None                   Lab Results:   Results Reviewed       None                   Imaging Studies:   Direct to CT: Yes  TRAUMA - CT head wo contrast   Final Result by  Cruz Plascencia MD (06/10 2126)      No acute intracranial abnormality.                  Workstation performed: [id number]               Procedures  Procedures         ED Course           Medical Decision Making  Amount and/or Complexity of Data Reviewed  Radiology: ordered.       69 y.o. male who presents after a head injury.   Patient bumped his head yesterday when on airplane, he is not having a headache, he is on Eliquis.  This seems like a very minor head injury but given headache, will obtain CT head to evaluate for bleed.  He otherwise has a normal neurologic exam.  Reviewed CT head, negative for acute bleed.  Discussed with patient that he should follow-up with his primary care doctor.  Discussed with patient strict return precautions.  Patient expressed understanding and was agreeable for discharge.      Disposition  [facility] Transfer: No  Final diagnoses:   Injury of head, initial encounter     Time reflects when diagnosis was documented in both MDM as applicable and the Disposition within this note       Time User Action Codes Description Comment    6/10/2025  9:28 PM Shanna Stubbs Add [S09.90XA] Injury of head, initial encounter           ED Disposition       ED Disposition   Discharge    Condition   Stable    Date/Time   Tue Олег 10, 2025  9:28 PM    Comment   Mhamd Elashram discharge to home/self care.                   Follow-up Information       Follow up With Specialties Details Why Contact Info    Alma Delia Fountain DO Family Medicine   [address] [facility] [address]  [phone]            Discharge Medication List as of 6/10/2025  9:29 PM        CONTINUE these medications which have NOT CHANGED    Details   amLODIPine (NORVASC) 10 mg tablet TAKE 1 TABLET DAILY, Starting Thu 3/27/2025, Normal      apixaban (Eliquis) 5 mg Take 1 tablet (5 mg total) by mouth 2 (two) times a day, Starting Fri 2/14/2025, Normal      atorvastatin (LIPITOR) 10 mg tablet Take 1 tablet (10 mg total) by mouth  daily, Starting Wed 4/30/2025, Normal      diphenhydrAMINE (BENADRYL) 50 MG tablet Diphenhydramine (benadryl) 50 mg by mouth 1 hour prior to contrast administration., Normal      enalapril (VASOTEC) 10 mg tablet Take 2 tablets (20 mg total) by mouth 2 (two) times a day, Starting Fri 6/6/2025, Normal      furosemide (LASIX) 40 mg tablet Take 60 mg by mouth in the morning., Starting Sat 11/16/2024, Historical Med      glucose blood (OneTouch Verio) test strip Use 1 each daily, Starting Mon 12/23/2024, Normal      hydrALAZINE (APRESOLINE) 25 mg tablet TAKE 1 TABLET THREE TIMES A DAY, Starting Thu 3/27/2025, Normal      indomethacin (INDOCIN) 50 mg capsule Take 1 capsule (50 mg total) by mouth 2 (two) times a day with meals, Starting Thu 5/8/2025, Normal      Jardiance 25 MG TABS take 1 tablet by mouth once daily, Starting Mon 3/31/2025, Normal      ketoconazole (NIZORAL) 2 % shampoo Apply 1 Application topically if needed for irritation, Starting Wed 11/13/2024, No Print      Klor-Con M20 20 MEQ tablet TAKE 1 TABLET DAILY, Starting Thu 3/27/2025, Normal      Lancets (onetouch ultrasoft) lancets Check glucose daily, Normal      !! metoprolol succinate (TOPROL-XL) 25 mg 24 hr tablet TAKE 1 TABLET DAILY (TAKE A 25 MG TABLET WITH A 50 MG TABLET TO EQUAL 75 MG ONCE DAILY), Normal      !! metoprolol succinate (TOPROL-XL) 50 mg 24 hr tablet Take 1 tablet (50 mg total) by mouth daily, Starting Wed 4/30/2025, Normal      multivitamin (THERAGRAN) TABS Take 1 tablet by mouth in the morning., Historical Med      omeprazole (PriLOSEC) 40 MG capsule TAKE 1 CAPSULE DAILY, Starting Thu 3/27/2025, Normal      semaglutide, 0.25 or 0.5 mg/dose, (Ozempic, 0.25 or 0.5 MG/DOSE,) 2 mg/3 mL injection pen Inject 0.75 mL (0.5 mg total) under the skin every 7 days, Starting Mon 12/23/2024, Normal      Vitamin D, Cholecalciferol, 50 MCG (2000 UT) CAPS Take 50 mcg by mouth daily, Starting Wed 1/4/2023, Normal       !! - Potential duplicate  medications found. Please discuss with provider.        No discharge procedures on file.    PDMP Review         Value Time User    PDMP Reviewed  Yes 10/12/2024  9:42 PM Nathanael Dang PA-C            ED Provider  Electronically Signed by             [1]   Past Medical History:  Diagnosis Date    Abnormal ECG     Acute kidney injury superimposed on stage 3a chronic kidney disease (HCC) 10/09/2024    Arthritis     last assessed 7/1/15    Bronchitis 02/02/2023    Chronic diastolic (congestive) heart failure (HCC)     Coronary artery disease     Diabetes mellitus (HCC)     type 2-last assessed 1/28/15    Diabetes mellitus (HCC) 03/16/2018    Dyslipidemia     GERD (gastroesophageal reflux disease)     Hypertension     Hypertensive urgency 03/16/2023    Irregular heart beat     last assessed 7/1/15    Lactic acidosis 10/12/2024    Obstructive sleep apnea     Palpitations     last assessed 9/7/17    Sexual dysfunction     last assessed 7/1/15    Thyroid disease     last assessed 7/1/15   [2]   Family History  Problem Relation Name Age of Onset    Hypertension Mother Rasmia         essential    Hypertension Father          essential    Heart defect Father          cardiac disorder    Hypertension Brother Molham     Heart disease Brother Molham     Diabetes Maternal Grandfather     [3]   Past Surgical History:  Procedure Laterality Date    COLONOSCOPY      TONSILLECTOMY     [4]   Social History  Tobacco Use    Smoking status: Former     Current packs/day: 1.00     Types: Cigarettes     Passive exposure: Never    Smokeless tobacco: Never    Tobacco comments:     former smoker-quit 27 yrs ago 1pppd x 30 yrs as per [employer]   Vaping Use    Vaping status: Never Used   Substance Use Topics    Alcohol use: Never    Drug use: Never   [5]   Allergies  Allergen Reactions    Bactrim [Sulfamethoxazole-Trimethoprim] Vomiting     LIZ, seemed like a true allergy    Acetaminophen Other (See Comments)     Other reaction(s): Unknown  "Reaction  \"I had to go to the hospital and get shots after taking it 20yrs ago\"        Shanna Stubbs MD  06/10/25 3494    "

## 2025-06-11 NOTE — TELEPHONE ENCOUNTER
Patient calling for update on [employer] paperwork for Ozempic 1 mg. Please advise, thank you.

## 2025-06-12 NOTE — TELEPHONE ENCOUNTER
Spoke to [address], wrong qty was sent on paperwork , corrected and resent paperwork will be processed today

## 2025-06-12 NOTE — TELEPHONE ENCOUNTER
Spoke to pt and explained I sent paper work on 06/06/25 I have to here back from [employer] about the new dose

## 2025-06-16 ENCOUNTER — TELEPHONE (OUTPATIENT)
Age: 70
End: 2025-06-16

## 2025-06-16 DIAGNOSIS — I48.0 PAF (PAROXYSMAL ATRIAL FIBRILLATION) (HCC): ICD-10-CM

## 2025-06-16 NOTE — TELEPHONE ENCOUNTER
Pt called back and reported the [place] pharmacy is [facility]. It would have to be written for  the generic form of Eliquis - Apixaban 5 mg twice daily and the quantity would need to written for 168 tablets.     The patient was only given a phone # [phone]; No fax # or address.    Patient's Act # [id number]     Please inform the patient when/if this done for him.

## 2025-06-16 NOTE — TELEPHONE ENCOUNTER
Pt states he found a [facility] pharmacy to fill his script for Eliquis that would be more cost effective for him.  Pt will be calling back with the name of the pharmacy as well as their phone and fax number to send script to.  Pt states he needs the script to be a quantity of 168 pills.

## 2025-06-17 ENCOUNTER — APPOINTMENT (EMERGENCY)
Dept: CT IMAGING | Facility: HOSPITAL | Age: 70
End: 2025-06-17
Payer: MEDICARE

## 2025-06-17 ENCOUNTER — APPOINTMENT (OUTPATIENT)
Dept: LAB | Facility: HOSPITAL | Age: 70
End: 2025-06-17
Payer: MEDICARE

## 2025-06-17 ENCOUNTER — TELEPHONE (OUTPATIENT)
Dept: NON INVASIVE DIAGNOSTICS | Facility: HOSPITAL | Age: 70
End: 2025-06-17

## 2025-06-17 ENCOUNTER — NURSE TRIAGE (OUTPATIENT)
Age: 70
End: 2025-06-17

## 2025-06-17 ENCOUNTER — HOSPITAL ENCOUNTER (EMERGENCY)
Facility: HOSPITAL | Age: 70
Discharge: HOME | End: 2025-06-17
Attending: EMERGENCY MEDICINE | Admitting: EMERGENCY MEDICINE
Payer: MEDICARE

## 2025-06-17 ENCOUNTER — APPOINTMENT (EMERGENCY)
Dept: RADIOLOGY | Facility: HOSPITAL | Age: 70
End: 2025-06-17
Payer: MEDICARE

## 2025-06-17 VITALS
BODY MASS INDEX: 43.69 KG/M2 | TEMPERATURE: 98.1 F | HEART RATE: 57 BPM | OXYGEN SATURATION: 96 % | SYSTOLIC BLOOD PRESSURE: 113 MMHG | RESPIRATION RATE: 16 BRPM | DIASTOLIC BLOOD PRESSURE: 56 MMHG | WEIGHT: 313.27 LBS

## 2025-06-17 DIAGNOSIS — S09.90XA CLOSED HEAD INJURY, INITIAL ENCOUNTER: Primary | ICD-10-CM

## 2025-06-17 DIAGNOSIS — Z00.6 ENCOUNTER FOR EXAMINATION FOR NORMAL COMPARISON OR CONTROL IN CLINICAL RESEARCH PROGRAM: ICD-10-CM

## 2025-06-17 DIAGNOSIS — I48.0 PAF (PAROXYSMAL ATRIAL FIBRILLATION) (HCC): ICD-10-CM

## 2025-06-17 LAB — GLUCOSE SERPL-MCNC: 127 MG/DL (ref 65–140)

## 2025-06-17 PROCEDURE — 36415 COLL VENOUS BLD VENIPUNCTURE: CPT

## 2025-06-17 PROCEDURE — 82948 REAGENT STRIP/BLOOD GLUCOSE: CPT

## 2025-06-17 PROCEDURE — 99284 EMERGENCY DEPT VISIT MOD MDM: CPT

## 2025-06-17 PROCEDURE — 70450 CT HEAD/BRAIN W/O DYE: CPT

## 2025-06-17 PROCEDURE — 93005 ELECTROCARDIOGRAM TRACING: CPT

## 2025-06-17 PROCEDURE — 71045 X-RAY EXAM CHEST 1 VIEW: CPT

## 2025-06-17 PROCEDURE — 72125 CT NECK SPINE W/O DYE: CPT

## 2025-06-17 PROCEDURE — 99284 EMERGENCY DEPT VISIT MOD MDM: CPT | Performed by: EMERGENCY MEDICINE

## 2025-06-17 NOTE — DISCHARGE INSTRUCTIONS
You can discuss with Dr. Velez regarding the continued benefit of Eliquis versus its risk.    Please continue all your other medications at current doses and frequencies.    Go to the emergency department if you develop severe headache, difficulty speaking, changes in vision, weakness on one side of the body, or difficulty with balance/coordination.

## 2025-06-17 NOTE — TELEPHONE ENCOUNTER
"REASON FOR CONVERSATION: Medication Problem    SYMPTOMS: falling and striking head while taking Eliquis    OTHER HEALTH INFORMATION: pt has fallen and struck his had multiple times, was in ED again today. He is concerned as he is taking Eliquis and also is requiring large amts of radiation due to having CT scans done when he falls.     PROTOCOL DISPOSITION: Callback by PCP Today    CARE ADVICE PROVIDED: advised Dr. Schmitt would be made aware of request.     PRACTICE FOLLOW-UP: pt is asking to discuss Eliquis w/ Dr. Schmitt.            Reason for Disposition   Caller has NON-URGENT medicine question about med that PCP or specialist prescribed and triager unable to answer question    Answer Assessment - Initial Assessment Questions  1. NAME of MEDICINE: \"What medicine(s) are you calling about?\"      Eliquis  2. QUESTION: \"What is your question?\" (e.g., double dose of medicine, side effect)      Does he need to continue  3. PRESCRIBER: \"Who prescribed the medicine?\" Reason: if prescribed by specialist, call should be referred to that group.      cardilogist  4. SYMPTOMS: \"Do you have any symptoms?\" If Yes, ask: \"What symptoms are you having?\"  \"How bad are the symptoms (e.g., mild, moderate, severe)      Pt has fallen numerous times and hit his head resulting in ED visits    Protocols used: Medication Question Call-Adult-[place]    "

## 2025-06-17 NOTE — TELEPHONE ENCOUNTER
Prescription for apixaban faxed to [facility] at [phone] and phone [phone].

## 2025-06-17 NOTE — ED PROVIDER NOTES
Emergency Department Trauma Note  Christy Haro 69 y.o. male MRN: [id number]  Unit/Bed#: RM03/RM03 Encounter: [id number]      Trauma Alert: Trauma Acuity: Trauma Evaluation  Model of Arrival: Mode of Arrival: Other (Comment) via Trauma Squad Name and Number: private car  Trauma Team: Current Providers  Attending Provider: Mitch Marquez DO  Registered Nurse: Mavis Darling RN  Consultants:     None      History of Present Illness     Chief Complaint:   Chief Complaint   Patient presents with    Fall     Pt fell OOB with +headstrike, +thinners, unknown LOC.      HPI:  Christy Haro is a 69 y.o. male who presents to Emergency Department from home after falling out of bed approximately 1 hour prior to arrival.  He was asleep when he rolled out of bed, awakening when he fell to the floor, striking his head against a small basket sitting next to the bed.  No LOC.  Fall less than 1 m in height.  Able to get up himself and has been ambulatory since the injury without difficulty.  Reports very minimal pain in the right temporal region since the injury.  Otherwise, no generalized headache/neck pain/vision changes/nausea/vomiting/chest pain/back pain/dyspnea.  No extremity pain/weakness/paresthesias.  No external bleeding any location that he is aware of. He is anticoagulated with apixaban which is his primary concern given the head injury.  Had 1 prior episode of atrial fibrillation detected by loop recorder; not normally in atrial fibrillation.    Trauma activation given head injury with anticoagulation.  CT head/C-spine.  Chest x-ray.  Assuming no critical findings, patient already desirous of speaking with cardiologist regarding possible change in his anticoagulation which I think is appropriate given the several ED visits that he has had in the recent past for head injuries from various minor traumatic mechanisms.    Mechanism:Details of Incident: pt states he fell OOB, +headstrike, +thinners, -loc. Injury Date:  06/17/25 Injury Time: 0649 Injury Occurence Location - Specify County: [facility]      History provided by:  Patient and medical records  Fall  Associated symptoms: headaches    Associated symptoms: no abdominal pain, no chest pain, no nausea, no neck pain and no vomiting      Review of Systems   HENT:  Negative for congestion.    Eyes:  Negative for photophobia and visual disturbance.   Respiratory:  Negative for chest tightness and shortness of breath.    Cardiovascular:  Negative for chest pain.   Gastrointestinal:  Negative for abdominal pain, nausea and vomiting.   Musculoskeletal:  Negative for arthralgias, gait problem, neck pain and neck stiffness.   Skin: Negative.    Neurological:  Positive for headaches. Negative for dizziness, syncope, weakness, light-headedness and numbness.   Hematological: Negative.    Psychiatric/Behavioral:  Negative for confusion.    All other systems reviewed and are negative.      Historical Information     Immunizations:   Immunization History   Administered Date(s) Administered    COVID-19 Moderna mRNA Vaccine 12 Yr+ 50 mcg/0.5 mL (Spikevax) 12/24/2023    COVID-19 PFIZER VACCINE 0.3 ML IM 02/25/2021, 03/25/2021, 10/03/2021    COVID-19 Pfizer Vac BIVALENT Emigdio-sucrose 12 Yr+ IM 12/29/2022    COVID-19 Pfizer mRNA vacc PF emigdio-sucrose 12 yr and older (Comirnaty) 10/16/2024    COVID-19 Pfizer vac (Emigdio-sucrose, gray cap) 12 yr+ IM 05/03/2022    INFLUENZA 10/14/2018, 10/01/2019, 08/25/2020, 10/12/2022, 10/14/2023    Influenza Split High Dose Preservative Free IM 10/13/2024    Influenza, high dose seasonal 0.7 mL 10/19/2021    Influenza, seasonal, injectable 10/08/2013, 10/13/2014, 10/15/2015, 09/26/2016, 09/09/2017    Pneumococcal Conjugate 13-Valent 08/25/2020    Pneumococcal Conjugate Vaccine 20-valent (Pcv20), Polysace 12/29/2022    Pneumococcal Polysaccharide PPV23 02/18/2015    Tdap 11/28/2015, 03/17/2025    influenza, trivalent, adjuvanted 10/08/2019       Past Medical  History[1]  Family History[2]  Past Surgical History[3]  Social History[4]  E-Cigarette/Vaping    E-Cigarette Use Never User      E-Cigarette/Vaping Substances    Nicotine No     THC No     CBD No     Flavoring No     Other No     Unknown No        Family History: non-contributory    Meds/Allergies   Prior to Admission Medications   Prescriptions Last Dose Informant Patient Reported? Taking?   Jardiance 25 MG TABS 6/16/2025 Self No Yes   Sig: take 1 tablet by mouth once daily   Klor-Con M20 20 MEQ tablet 6/16/2025 Self No Yes   Sig: TAKE 1 TABLET DAILY   Lancets (onetouch ultrasoft) lancets  Self No No   Sig: Check glucose daily   Vitamin D, Cholecalciferol, 50 MCG (2000 UT) CAPS 6/16/2025 Self No Yes   Sig: Take 50 mcg by mouth daily   amLODIPine (NORVASC) 10 mg tablet  Self No No   Sig: TAKE 1 TABLET DAILY   apixaban (Eliquis) 5 mg   No No   Sig: Take 1 tablet (5 mg total) by mouth 2 (two) times a day   atorvastatin (LIPITOR) 10 mg tablet 6/16/2025 Self No Yes   Sig: Take 1 tablet (10 mg total) by mouth daily   diphenhydrAMINE (BENADRYL) 50 MG tablet  Self No No   Sig: Diphenhydramine (benadryl) 50 mg by mouth 1 hour prior to contrast administration.   enalapril (VASOTEC) 10 mg tablet 6/16/2025  No Yes   Sig: Take 2 tablets (20 mg total) by mouth 2 (two) times a day   furosemide (LASIX) 40 mg tablet 6/16/2025 Self Yes Yes   Sig: Take 60 mg by mouth in the morning.   glucose blood (OneTouch Verio) test strip 6/16/2025 Self No Yes   Sig: Use 1 each daily   hydrALAZINE (APRESOLINE) 25 mg tablet 6/16/2025 Self No Yes   Sig: TAKE 1 TABLET THREE TIMES A DAY   indomethacin (INDOCIN) 50 mg capsule 6/16/2025 Self No Yes   Sig: Take 1 capsule (50 mg total) by mouth 2 (two) times a day with meals   ketoconazole (NIZORAL) 2 % shampoo  Self No No   Sig: Apply 1 Application topically if needed for irritation   metoprolol succinate (TOPROL-XL) 25 mg 24 hr tablet  Self No No   Sig: TAKE 1 TABLET DAILY (TAKE A 25 MG TABLET WITH A  50 MG TABLET TO EQUAL 75 MG ONCE DAILY)   metoprolol succinate (TOPROL-XL) 50 mg 24 hr tablet 6/16/2025 Self No Yes   Sig: Take 1 tablet (50 mg total) by mouth daily   multivitamin (THERAGRAN) TABS 6/16/2025 Self Yes Yes   Sig: Take 1 tablet by mouth in the morning.   omeprazole (PriLOSEC) 40 MG capsule 6/16/2025 Self No Yes   Sig: TAKE 1 CAPSULE DAILY   semaglutide, 0.25 or 0.5 mg/dose, (Ozempic, 0.25 or 0.5 MG/DOSE,) 2 mg/3 mL injection pen  Self No No   Sig: Inject 0.75 mL (0.5 mg total) under the skin every 7 days      Facility-Administered Medications: None       Allergies[5]    PHYSICAL EXAM    Objective   Vitals:   First set: Temperature: 98.1 °F (36.7 °C) (06/17/25 0750)  Pulse: 64 (06/17/25 0750)  Respirations: 20 (06/17/25 0750)  Blood Pressure: 145/67 (06/17/25 0750)  SpO2: 97 % (06/17/25 0750)    Primary survey completed at time of initial evaluation:  Airway patent. Patient phonating normally with no stridor/dysphonia.  Lung sounds are present bilaterally with no wheeze/crackles/rhonchi.  Distal pulses are present in all extremities: 2+ radial/DP/PT. There is no visible external hemorrhage.  GCS 15. WEINSTEIN x4 with equal tone. Sensation intact in all extremities.  Exposure completed to assess for other occult injuries.    Secondary survey was completed for all other injuries.  Notable findings are reviewed in the full examination section.    Secondary Survey:   Physical Exam  Vitals and nursing note reviewed.   Constitutional:       General: He is awake. He is not in acute distress.     Appearance: Normal appearance. He is well-developed.   HENT:      Head: Normocephalic and atraumatic. No raccoon eyes, Stockton's sign or contusion.      Jaw: There is normal jaw occlusion. No trismus, tenderness or pain on movement.      Comments: No visible scalp contusion/abrasion/ecchymosis     Right Ear: Hearing and external ear normal.      Left Ear: Hearing and external ear normal.     Eyes:      General: Lids are  normal. Vision grossly intact.      Extraocular Movements: Extraocular movements intact.      Conjunctiva/sclera: Conjunctivae normal.      Pupils: Pupils are equal, round, and reactive to light.     Neck:      Trachea: Trachea and phonation normal.      Comments: Patient's cervical spine can be cleared by Nexus c-spine criteria:  Patient has a normal mental status and is awake and alert with no evidence of intoxication.  The patient has no distracting injuries.  The patient has a normal strength/sensation examination in the upper/lower extremities bilaterally.  There is no posterior midline c-spine ttp or step-off.        Cardiovascular:      Rate and Rhythm: Normal rate and regular rhythm.      Pulses:           Radial pulses are 2+ on the right side and 2+ on the left side.        Dorsalis pedis pulses are 2+ on the right side and 2+ on the left side.        Posterior tibial pulses are 2+ on the right side and 2+ on the left side.      Heart sounds: Normal heart sounds, S1 normal and S2 normal. No murmur heard.     No friction rub. No gallop.   Pulmonary:      Effort: Pulmonary effort is normal. No respiratory distress.      Breath sounds: Normal breath sounds. No stridor. No decreased breath sounds, wheezing, rhonchi or rales.   Abdominal:      General: There is no distension.      Palpations: There is no mass.      Tenderness: There is no abdominal tenderness. There is no guarding or rebound.     Musculoskeletal:      Cervical back: No spinous process tenderness or muscular tenderness.      Comments: Full active range of motion of all extremities.  Pelvis stable and nontender to compression     Skin:     General: Skin is warm and dry.     Neurological:      Mental Status: He is alert and oriented to person, place, and time.      GCS: GCS eye subscore is 4. GCS verbal subscore is 5. GCS motor subscore is 6.      Cranial Nerves: No cranial nerve deficit.      Sensory: No sensory deficit.      Motor: No abnormal  muscle tone.      Comments: PERRLA; EOMI. Sensation intact to light touch over face in V1-V3 distribution bilaterally. Facial expressions symmetric. Tongue/uvula midline. Shoulder shrug equal bilaterally. Strength 5/5 in UE/LE bilaterally. Sensation intact to light touch in UE/LE bilaterally.       Cervical spine cleared by clinical criteria? Yes   CT C-spine obtained to identify noncritical injuries of the kind that can be missed by Nexus C-spine criteria    Invasive Devices       None                   Lab Results:   Results Reviewed       Procedure Component Value Units Date/Time    Fingerstick Glucose (POCT) [[id number]]  (Normal) Collected: 06/17/25 0756    Lab Status: Final result Specimen: Blood Updated: 06/17/25 0757     POC Glucose 127 mg/dl                    Imaging Studies:   Direct to CT: No  XR Trauma chest portable   Final Result by Shoaib Sy MD (06/17 0843)      Patchy bibasilar atelectasis.            Workstation performed: [id number]         TRAUMA - CT spine cervical wo contrast   Final Result by Shoaib Sy MD (06/17 0839)      No cervical spine fracture or traumatic malalignment.                  Resident: CAREN FARMER I, the attending radiologist, have reviewed the images and agree with the final report above.      Workstation performed: [id number]         TRAUMA - CT head wo contrast   Final Result by Shoaib Sy MD (06/17 0838)      No intracranial hemorrhage or calvarial fracture.                  Resident: CAREN FARMER I, the attending radiologist, have reviewed the images and agree with the final report above.      Workstation performed: [id number]               Procedures  Procedures         ED Course  ED Course as of 06/17/25 0934   Tue Jun 17, 2025   0801 Fingerstick Glucose (POCT)   0801 ECG NSR 63 bpm +1st deg AVB    QTc 442  No ectopy  Poor R wave progression  No acute ST/T changes  No Q waves  Interpreted by me   0811 XR Trauma chest  portable  Airway is midline.  No pneumothorax/effusion/consolidation/vascular congestion.  Cardiomegaly.  Loop recorder present.  Mediastinal silhouette otherwise normal.  Osseous structures appear normal   0824 CT is completed and awaiting interpretation.  By my review, no ICH or displaced skull fracture.  No displaced C-spine fracture.   0840 TRAUMA - CT head wo contrast  FINDINGS:     PARENCHYMA:No intracranial mass, mass effect or midline shift. No CT signs of acute infarction.  No acute parenchymal hemorrhage. Mild chronic microvascular ischemic changes.     VENTRICLES AND EXTRA-AXIAL SPACES:  Normal for the patient's age.     VISUALIZED ORBITS:  Normal visualized orbits.     PARANASAL SINUSES:  Mucosal thickening in the left frontal sinus, ethmoid air cells bilaterally and right greater than left maxillary sinuses.     CALVARIUM AND EXTRACRANIAL SOFT TISSUES:  Normal.     IMPRESSION:     No intracranial hemorrhage or calvarial fracture.        0848 TRAUMA - CT spine cervical wo contrast  FINDINGS:     ALIGNMENT:  There is straightening of normal cervical lordosis.  No traumatic subluxation or compression deformity.     VERTEBRAE: No acute fracture. Nonunion of the C1 posterior arch, benign congenital variant.     DEGENERATIVE CHANGES: Moderate multilevel cervical degenerative changes are noted. No critical central canal stenosis.     PREVERTEBRAL AND PARASPINAL SOFT TISSUES: Redemonstration of a partially calcified nodule in the left lobe of the thyroid gland measuring approximately 3.2 cm, not significantly changed from prior. Otherwise unremarkable     THORACIC INLET:  Normal.     IMPRESSION:     No cervical spine fracture or traumatic malalignment.        0859 CT results reviewed with patient.  No traumatic findings. Advised again discussion with Dr. Schmitt regarding utility of continued apixaban therapy given the multiple falls/head injuries (even, thankfully, without major traumatic findings). He notes  frustration with the fact that he has to return to the emergency dept frequently after even minor injuries given that he is anticoagulated. This is absolutely valid. Unfortunately I am not able to advise him otherwise at present given the increased risk of hemorrhage even with relatively minor trauma+anticoagulation.  He is in agreement with plan for discussion with his cardiologist.  Otherwise continue all other medications at current doses and frequencies.  ED return for severe headache/confusion/vision changes/dysarthria/extremity weakness  All questions answered to satisfaction prior to discharge.  Patient expressed understanding and agreed to plan.           Medical Decision Making  Amount and/or Complexity of Data Reviewed  Labs: ordered. Decision-making details documented in ED Course.  Radiology: ordered. Decision-making details documented in ED Course.                Disposition  [facility] Transfer: No  Final diagnoses:   Closed head injury, initial encounter     Time reflects when diagnosis was documented in both MDM as applicable and the Disposition within this note       Time User Action Codes Description Comment    6/17/2025  9:01 AM Mitch Marquez Add [S09.90XA] Closed head injury, initial encounter           ED Disposition       ED Disposition   Discharge    Condition   Stable    Date/Time   Tue Jun 17, 2025  9:01 AM    Comment   Christy Haro discharge to home/self care.                   Follow-up Information       Follow up With Specialties Details Why Contact Info    Saleem Schmitt,  Cardiology   [address]  [address], [address]  [facility] [address] [phone]            Discharge Medication List as of 6/17/2025  9:03 AM        START taking these medications    Details   apixaban (Eliquis) 5 mg Take 1 tablet (5 mg total) by mouth 2 (two) times a day, Starting Tue 6/17/2025, Print           CONTINUE these medications which have NOT CHANGED    Details   atorvastatin (LIPITOR) 10  mg tablet Take 1 tablet (10 mg total) by mouth daily, Starting Wed 4/30/2025, Normal      enalapril (VASOTEC) 10 mg tablet Take 2 tablets (20 mg total) by mouth 2 (two) times a day, Starting Fri 6/6/2025, Normal      furosemide (LASIX) 40 mg tablet Take 60 mg by mouth in the morning., Starting Sat 11/16/2024, Historical Med      glucose blood (OneTouch Verio) test strip Use 1 each daily, Starting Mon 12/23/2024, Normal      hydrALAZINE (APRESOLINE) 25 mg tablet TAKE 1 TABLET THREE TIMES A DAY, Starting Thu 3/27/2025, Normal      indomethacin (INDOCIN) 50 mg capsule Take 1 capsule (50 mg total) by mouth 2 (two) times a day with meals, Starting Thu 5/8/2025, Normal      Jardiance 25 MG TABS take 1 tablet by mouth once daily, Starting Mon 3/31/2025, Normal      Klor-Con M20 20 MEQ tablet TAKE 1 TABLET DAILY, Starting Thu 3/27/2025, Normal      !! metoprolol succinate (TOPROL-XL) 50 mg 24 hr tablet Take 1 tablet (50 mg total) by mouth daily, Starting Wed 4/30/2025, Normal      multivitamin (THERAGRAN) TABS Take 1 tablet by mouth in the morning., Historical Med      omeprazole (PriLOSEC) 40 MG capsule TAKE 1 CAPSULE DAILY, Starting Thu 3/27/2025, Normal      Vitamin D, Cholecalciferol, 50 MCG (2000 UT) CAPS Take 50 mcg by mouth daily, Starting Wed 1/4/2023, Normal      amLODIPine (NORVASC) 10 mg tablet TAKE 1 TABLET DAILY, Starting Thu 3/27/2025, Normal      diphenhydrAMINE (BENADRYL) 50 MG tablet Diphenhydramine (benadryl) 50 mg by mouth 1 hour prior to contrast administration., Normal      ketoconazole (NIZORAL) 2 % shampoo Apply 1 Application topically if needed for irritation, Starting Wed 11/13/2024, No Print      Lancets (onetouch ultrasoft) lancets Check glucose daily, Normal      !! metoprolol succinate (TOPROL-XL) 25 mg 24 hr tablet TAKE 1 TABLET DAILY (TAKE A 25 MG TABLET WITH A 50 MG TABLET TO EQUAL 75 MG ONCE DAILY), Normal      semaglutide, 0.25 or 0.5 mg/dose, (Ozempic, 0.25 or 0.5 MG/DOSE,) 2 mg/3 mL  injection pen Inject 0.75 mL (0.5 mg total) under the skin every 7 days, Starting Mon 12/23/2024, Normal       !! - Potential duplicate medications found. Please discuss with provider.        No discharge procedures on file.    PDMP Review         Value Time User    PDMP Reviewed  Yes 10/12/2024  9:42 PM Nathanael Dang PA-C            ED Provider  Electronically Signed by             [1]   Past Medical History:  Diagnosis Date    Abnormal ECG     Acute kidney injury superimposed on stage 3a chronic kidney disease (HCC) 10/09/2024    Arthritis     last assessed 7/1/15    Bronchitis 02/02/2023    Chronic diastolic (congestive) heart failure (HCC)     Coronary artery disease     Diabetes mellitus (HCC)     type 2-last assessed 1/28/15    Diabetes mellitus (HCC) 03/16/2018    Dyslipidemia     GERD (gastroesophageal reflux disease)     Hypertension     Hypertensive urgency 03/16/2023    Irregular heart beat     last assessed 7/1/15    Lactic acidosis 10/12/2024    Obstructive sleep apnea     Palpitations     last assessed 9/7/17    Sexual dysfunction     last assessed 7/1/15    Thyroid disease     last assessed 7/1/15   [2]   Family History  Problem Relation Name Age of Onset    Hypertension Mother Rasmia         essential    Hypertension Father          essential    Heart defect Father          cardiac disorder    Hypertension Brother Molham     Heart disease Brother Molham     Diabetes Maternal Grandfather     [3]   Past Surgical History:  Procedure Laterality Date    COLONOSCOPY      TONSILLECTOMY     [4]   Social History  Tobacco Use    Smoking status: Former     Current packs/day: 1.00     Types: Cigarettes     Passive exposure: Never    Smokeless tobacco: Never    Tobacco comments:     former smoker-quit 27 yrs ago 1pppd x 30 yrs as per [employer]   Vaping Use    Vaping status: Never Used   Substance Use Topics    Alcohol use: Never    Drug use: Never   [5]   Allergies  Allergen Reactions    Bactrim  "[Sulfamethoxazole-Trimethoprim] Vomiting     LIZ, seemed like a true allergy    Acetaminophen Other (See Comments)     Other reaction(s): Unknown Reaction  \"I had to go to the hospital and get shots after taking it 20yrs ago\"        Mitch Marquez,   06/17/25 0935    "

## 2025-06-17 NOTE — TELEPHONE ENCOUNTER
- I was able to call and speak with patient about oral anticoagulation.  Again discussed with patient about monitoring off oral anticoagulation or even possible Watchman device.  Patient at this time wishes to avoid either of these options and wishes to continue oral anticoagulation at this time.  In that setting I did inform him that many of the ER evaluations seem to be due to minor head strikes.  I informed the patient that he really should be more careful as he did deny any loss of consciousness, lightheadedness or dizziness or any other issue leading to these head strikes and noted that he may just need to be more careful in the meantime.  He wishes to continue oral anticoagulation at this time and will try to avoid additional head strike.  I informed him know if he does have significant head strike he should seek emergency medical care to rule out intracranial abnormality.  He noted understanding.

## 2025-06-17 NOTE — TELEPHONE ENCOUNTER
Pt called back and reported the [place] pharmacy is [facility]. It would have to be written for  the generic form of Eliquis - Apixaban 5 mg twice daily and the quantity would need to written for 168 tablets.      The patient was only given a phone # [phone]; No fax # or address.     Patient's Act # [id number]      Please inform the patient when/if this done for him.

## 2025-06-18 ENCOUNTER — VBI (OUTPATIENT)
Dept: FAMILY MEDICINE CLINIC | Facility: CLINIC | Age: 70
End: 2025-06-18

## 2025-06-18 NOTE — TELEPHONE ENCOUNTER
06/18/25 8:40 AM    Patient contacted post ED visit, [facility] department spoke with patient/caregiver and outreach was successful.    Thank you.  JUVE MILLER MA  [facility]

## 2025-06-19 LAB
ATRIAL RATE: 63 BPM
P AXIS: 54 DEGREES
PR INTERVAL: 224 MS
QRS AXIS: -60 DEGREES
QRSD INTERVAL: 112 MS
QT INTERVAL: 432 MS
QTC INTERVAL: 442 MS
T WAVE AXIS: 79 DEGREES
VENTRICULAR RATE: 63 BPM

## 2025-06-19 PROCEDURE — 93010 ELECTROCARDIOGRAM REPORT: CPT | Performed by: INTERNAL MEDICINE

## 2025-06-27 LAB
APOB+LDLR+PCSK9 GENE MUT ANL BLD/T: NOT DETECTED
BRCA1+BRCA2 DEL+DUP + FULL MUT ANL BLD/T: NOT DETECTED
MLH1+MSH2+MSH6+PMS2 GN DEL+DUP+FUL M: NOT DETECTED

## 2025-07-28 ENCOUNTER — PREP FOR PROCEDURE (OUTPATIENT)
Age: 70
End: 2025-07-28

## 2025-07-28 ENCOUNTER — TELEPHONE (OUTPATIENT)
Dept: GASTROENTEROLOGY | Facility: CLINIC | Age: 70
End: 2025-07-28

## 2025-07-28 DIAGNOSIS — Z86.0100 PERSONAL HISTORY OF COLON POLYPS, UNSPECIFIED: Primary | ICD-10-CM

## 2025-07-28 RX ORDER — POLYETHYLENE GLYCOL 3350, SODIUM SULFATE ANHYDROUS, SODIUM BICARBONATE, SODIUM CHLORIDE, POTASSIUM CHLORIDE 236; 22.74; 6.74; 5.86; 2.97 G/4L; G/4L; G/4L; G/4L; G/4L
4000 POWDER, FOR SOLUTION ORAL ONCE
Qty: 4000 ML | Refills: 0 | Status: SHIPPED | OUTPATIENT
Start: 2025-07-28 | End: 2025-07-28

## 2025-07-28 RX ORDER — BISACODYL 5 MG/1
20 TABLET, DELAYED RELEASE ORAL ONCE
Qty: 4 TABLET | Refills: 0 | Status: SHIPPED | OUTPATIENT
Start: 2025-07-28 | End: 2025-07-28

## 2025-07-31 ENCOUNTER — TELEPHONE (OUTPATIENT)
Age: 70
End: 2025-07-31

## 2025-08-13 ENCOUNTER — OFFICE VISIT (OUTPATIENT)
Dept: CARDIOLOGY CLINIC | Facility: CLINIC | Age: 70
End: 2025-08-13
Payer: MEDICARE

## 2025-08-20 ENCOUNTER — REMOTE DEVICE CLINIC VISIT (OUTPATIENT)
Dept: CARDIOLOGY CLINIC | Facility: CLINIC | Age: 70
End: 2025-08-20
Payer: MEDICARE

## 2025-08-20 DIAGNOSIS — I48.0 PAF (PAROXYSMAL ATRIAL FIBRILLATION) (HCC): Primary | ICD-10-CM

## 2025-08-20 PROCEDURE — 93298 REM INTERROG DEV EVAL SCRMS: CPT | Performed by: STUDENT IN AN ORGANIZED HEALTH CARE EDUCATION/TRAINING PROGRAM

## 2025-08-23 ENCOUNTER — OFFICE VISIT (OUTPATIENT)
Dept: URGENT CARE | Facility: MEDICAL CENTER | Age: 70
End: 2025-08-23
Payer: MEDICARE

## 2025-08-23 VITALS
HEIGHT: 71 IN | DIASTOLIC BLOOD PRESSURE: 64 MMHG | HEART RATE: 72 BPM | BODY MASS INDEX: 42.48 KG/M2 | SYSTOLIC BLOOD PRESSURE: 130 MMHG | OXYGEN SATURATION: 98 % | TEMPERATURE: 98 F | RESPIRATION RATE: 19 BRPM | WEIGHT: 303.4 LBS

## 2025-08-23 DIAGNOSIS — M10.9 ACUTE GOUT OF RIGHT WRIST, UNSPECIFIED CAUSE: Primary | ICD-10-CM

## 2025-08-23 PROCEDURE — G0463 HOSPITAL OUTPT CLINIC VISIT: HCPCS

## 2025-08-23 PROCEDURE — 99213 OFFICE O/P EST LOW 20 MIN: CPT

## 2025-08-23 RX ORDER — INDOMETHACIN 50 MG/1
50 CAPSULE ORAL 2 TIMES DAILY WITH MEALS
Qty: 30 CAPSULE | Refills: 0 | Status: SHIPPED | OUTPATIENT
Start: 2025-08-23

## 2025-08-23 RX ORDER — KETOROLAC TROMETHAMINE 30 MG/ML
30 INJECTION, SOLUTION INTRAMUSCULAR; INTRAVENOUS ONCE
Status: DISCONTINUED | OUTPATIENT
Start: 2025-08-23 | End: 2025-08-23